# Patient Record
Sex: FEMALE | Race: WHITE | Employment: OTHER | URBAN - METROPOLITAN AREA
[De-identification: names, ages, dates, MRNs, and addresses within clinical notes are randomized per-mention and may not be internally consistent; named-entity substitution may affect disease eponyms.]

---

## 2017-02-01 RX ORDER — TRAMADOL HYDROCHLORIDE 50 MG/1
50 TABLET ORAL 2 TIMES DAILY
Qty: 60 TAB | Refills: 2 | Status: SHIPPED | OUTPATIENT
Start: 2017-02-01 | End: 2017-09-25 | Stop reason: SDUPTHER

## 2017-02-01 RX ORDER — FENTANYL 25 UG/1
1 PATCH TRANSDERMAL
Qty: 10 PATCH | Refills: 0 | Status: SHIPPED | OUTPATIENT
Start: 2017-02-01 | End: 2017-02-28 | Stop reason: SDUPTHER

## 2017-02-28 ENCOUNTER — OFFICE VISIT (OUTPATIENT)
Dept: INTERNAL MEDICINE CLINIC | Age: 82
End: 2017-02-28

## 2017-02-28 VITALS
SYSTOLIC BLOOD PRESSURE: 116 MMHG | TEMPERATURE: 98.8 F | HEART RATE: 74 BPM | DIASTOLIC BLOOD PRESSURE: 58 MMHG | OXYGEN SATURATION: 96 % | RESPIRATION RATE: 16 BRPM

## 2017-02-28 DIAGNOSIS — N30.00 ACUTE CYSTITIS WITHOUT HEMATURIA: Primary | ICD-10-CM

## 2017-02-28 LAB
BILIRUB UR QL STRIP: NEGATIVE
GLUCOSE UR-MCNC: NEGATIVE MG/DL
KETONES P FAST UR STRIP-MCNC: NEGATIVE MG/DL
PH UR STRIP: 5.5 [PH] (ref 4.6–8)
PROT UR QL STRIP: NEGATIVE MG/DL
SP GR UR STRIP: 1 (ref 1–1.03)
UA UROBILINOGEN AMB POC: NORMAL (ref 0.2–1)
URINALYSIS CLARITY POC: CLEAR
URINALYSIS COLOR POC: YELLOW
URINE BLOOD POC: NEGATIVE
URINE LEUKOCYTES POC: NORMAL
URINE NITRITES POC: NEGATIVE

## 2017-02-28 RX ORDER — GABAPENTIN 300 MG/1
600 CAPSULE ORAL 2 TIMES DAILY
Qty: 120 CAP | Refills: 5 | Status: SHIPPED | OUTPATIENT
Start: 2017-02-28 | End: 2017-09-11 | Stop reason: SDUPTHER

## 2017-02-28 RX ORDER — FENTANYL 25 UG/1
1 PATCH TRANSDERMAL
Qty: 10 PATCH | Refills: 0 | Status: SHIPPED | OUTPATIENT
Start: 2017-02-28 | End: 2017-04-05 | Stop reason: SDUPTHER

## 2017-02-28 RX ORDER — CIPROFLOXACIN 250 MG/1
250 TABLET, FILM COATED ORAL EVERY 12 HOURS
Qty: 6 TAB | Refills: 0 | Status: SHIPPED | OUTPATIENT
Start: 2017-02-28 | End: 2017-03-03

## 2017-02-28 NOTE — PROGRESS NOTES
HISTORY OF PRESENT ILLNESS  Amadeo Starr is a 80 y.o. female.   HPI    ROS    Physical Exam    ASSESSMENT and PLAN  {ASSESSMENT/PLAN:[id number]}

## 2017-02-28 NOTE — PROGRESS NOTES
Subjective:     Buddy Rosado is a 80 y.o. female who complains of dysuria, frequency, urgency, pelvic discomfort and urinary incontinence for 3 days. Patient denies flank pain, vomiting, fever, unusual vaginal discharge. Patient does not have a history of recurrent UTI. Patient does not have a history of pyelonephritis. 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 Current Outpatient Prescriptions   Medication Sig Dispense Refill    fentaNYL (DURAGESIC) 25 mcg/hr PATCH 1 Patch by TransDERmal route every seventy-two (72) hours. Max Daily Amount: 1 Patch. Indications: Chronic Pain with Opioid Tolerance 10 Patch 0    gabapentin (NEURONTIN) 300 mg capsule Take 2 Caps by mouth two (2) times a day. 120 Cap 5    traMADol (ULTRAM) 50 mg tablet Take 1 Tab by mouth two (2) times a day. Max Daily Amount: 100 mg. 60 Tab 2    topiramate (TOPAMAX) 25 mg tablet Take 1 Tab by mouth daily (with breakfast). 60 Tab 5    spironolactone (ALDACTONE) 25 mg tablet Take 1 Tab by mouth daily. 30 Tab 11    metoprolol (LOPRESSOR) 25 mg tablet Take 1 Tab by mouth two (2) times a day. 60 Tab 11    predniSONE (DELTASONE) 5 mg tablet Take 1 Tab by mouth daily. 30 Tab 11    nystatin (MYCOSTATIN) powder Apply  to affected area four (4) times daily. 60 g 1    XARELTO 15 mg tab tablet       cetirizine (ZYRTEC) 10 mg tablet Take  by mouth daily.  nitroglycerin (NITROSTAT) 0.4 mg SL tablet by SubLINGual route every five (5) minutes as needed for Chest Pain.  ondansetron hcl (ZOFRAN) 8 mg tablet Take 8 mg by mouth as needed for Nausea.  cholecalciferol (VITAMIN D3) 1,000 unit tablet Take  by mouth daily.  esomeprazole (NEXIUM) 40 mg capsule Take  by mouth daily. Review of Systems  Pertinent items are noted in HPI.     Objective:     Visit Vitals    /58 (BP 1 Location: Left arm, BP Patient Position: Sitting)    Pulse 74    Temp 98.8 °F (37.1 °C) (Oral)    Resp 16    SpO2 96% General:  alert, cooperative, no distress   Abdomen: soft, nontender, nondistended, no masses or organomegaly. Back:  CVA tenderness absent   :  defer exam     Laboratory:   Urine dipstick shows positive for leukocytes. Micro exam: not done. Assessment/Plan:     Acute cystitis     1. ciprofloxacin  2. Maintain adequate hydration  3. May use OTC pyridium as desired, which will turn urine orange/red color  4. Follow up if symptoms not improving, and prn. Orders Placed This Encounter    CULTURE, URINE    AMB POC URINALYSIS DIP STICK AUTO W/O MICRO    fentaNYL (DURAGESIC) 25 mcg/hr PATCH    gabapentin (NEURONTIN) 300 mg capsule    ciprofloxacin HCl (CIPRO) 250 mg tablet     Follow-up Disposition: Not on File.

## 2017-02-28 NOTE — PROGRESS NOTES
Results for orders placed or performed in visit on 02/28/17   AMB POC URINALYSIS DIP STICK AUTO W/O MICRO   Result Value Ref Range    Color (UA POC) Yellow     Clarity (UA POC) Clear     Glucose (UA POC) Negative Negative    Bilirubin (UA POC) Negative Negative    Ketones (UA POC) Negative Negative    Specific gravity (UA POC) 1.005 1.001 - 1.035    Blood (UA POC) Negative Negative    pH (UA POC) 5.5 4.6 - 8.0    Protein (UA POC) Negative Negative mg/dL    Urobilinogen (UA POC) 0.2 mg/dL 0.2 - 1    Nitrites (UA POC) Negative Negative    Leukocyte esterase (UA POC) 1+ Negative

## 2017-03-02 LAB — BACTERIA UR CULT: ABNORMAL

## 2017-04-06 RX ORDER — FENTANYL 25 UG/1
1 PATCH TRANSDERMAL
Qty: 10 PATCH | Refills: 0 | Status: SHIPPED | OUTPATIENT
Start: 2017-04-06 | End: 2017-05-10 | Stop reason: SDUPTHER

## 2017-04-13 RX ORDER — METOPROLOL TARTRATE 25 MG/1
25 TABLET, FILM COATED ORAL 2 TIMES DAILY
Qty: 60 TAB | Refills: 11 | Status: SHIPPED | OUTPATIENT
Start: 2017-04-13 | End: 2018-02-05 | Stop reason: DRUGHIGH

## 2017-04-17 RX ORDER — PREDNISONE 5 MG/1
5 TABLET ORAL DAILY
Qty: 30 TAB | Refills: 11 | Status: SHIPPED | OUTPATIENT
Start: 2017-04-17 | End: 2017-04-28 | Stop reason: SDUPTHER

## 2017-04-26 RX ORDER — SPIRONOLACTONE 25 MG/1
25 TABLET ORAL DAILY
Qty: 30 TAB | Refills: 11 | Status: SHIPPED | OUTPATIENT
Start: 2017-04-26 | End: 2018-04-25 | Stop reason: DRUGHIGH

## 2017-04-26 RX ORDER — TOPIRAMATE 25 MG/1
25 TABLET ORAL
Qty: 60 TAB | Refills: 5 | Status: SHIPPED | OUTPATIENT
Start: 2017-04-26 | End: 2018-05-02 | Stop reason: SDUPTHER

## 2017-04-28 RX ORDER — PREDNISONE 5 MG/1
5 TABLET ORAL DAILY
Qty: 30 TAB | Refills: 11 | Status: SHIPPED | OUTPATIENT
Start: 2017-04-28 | End: 2017-11-15 | Stop reason: SDUPTHER

## 2017-05-10 ENCOUNTER — OFFICE VISIT (OUTPATIENT)
Dept: INTERNAL MEDICINE CLINIC | Age: 82
End: 2017-05-10

## 2017-05-10 VITALS
WEIGHT: 134.4 LBS | TEMPERATURE: 99.2 F | BODY MASS INDEX: 26.39 KG/M2 | HEIGHT: 60 IN | DIASTOLIC BLOOD PRESSURE: 48 MMHG | SYSTOLIC BLOOD PRESSURE: 133 MMHG | OXYGEN SATURATION: 95 % | RESPIRATION RATE: 17 BRPM | HEART RATE: 72 BPM

## 2017-05-10 DIAGNOSIS — Z00.00 ROUTINE GENERAL MEDICAL EXAMINATION AT A HEALTH CARE FACILITY: ICD-10-CM

## 2017-05-10 DIAGNOSIS — M48.061 LUMBAR SPINAL STENOSIS: ICD-10-CM

## 2017-05-10 DIAGNOSIS — I50.9 CHF, STAGE C (HCC): ICD-10-CM

## 2017-05-10 DIAGNOSIS — Z78.9 ADVANCE DIRECTIVE ON FILE: ICD-10-CM

## 2017-05-10 DIAGNOSIS — I25.111 CORONARY ARTERY DISEASE INVOLVING NATIVE CORONARY ARTERY OF NATIVE HEART WITH ANGINA PECTORIS WITH DOCUMENTED SPASM (HCC): Primary | ICD-10-CM

## 2017-05-10 DIAGNOSIS — Z13.39 SCREENING FOR ALCOHOLISM: ICD-10-CM

## 2017-05-10 DIAGNOSIS — S46.912A LEFT SHOULDER STRAIN, INITIAL ENCOUNTER: ICD-10-CM

## 2017-05-10 RX ORDER — FENTANYL 25 UG/1
1 PATCH TRANSDERMAL
Qty: 10 PATCH | Refills: 0 | Status: SHIPPED | OUTPATIENT
Start: 2017-05-10 | End: 2017-05-31 | Stop reason: SDUPTHER

## 2017-05-10 NOTE — MR AVS SNAPSHOT
Visit Information Date & Time Provider Department Dept. Phone Encounter #  
 5/10/2017 12:40 PM [address], [place] [facility] [phone] [id number] Upcoming Health Maintenance Date Due DTaP/Tdap/Td series (1 - Tdap) 7/27/1946 OSTEOPOROSIS SCREENING (DEXA) 7/27/1990 Pneumococcal 65+ Low/Medium Risk (1 of 2 - PCV13) 7/27/1990 MEDICARE YEARLY EXAM 7/27/1990 INFLUENZA AGE 9 TO ADULT 8/1/2017 GLAUCOMA SCREENING Q2Y 8/9/2018 Allergies as of 5/10/2017  Review Complete On: 5/10/2017 By: [id number], [place]  
  
 Severity Noted Reaction Type Reactions Novacare High 05/18/2015    Other (comments) PARALYSIS; \"NOVACAINE\" not novacare. Phenergan [Promethazine] High 02/16/2015   Systemic Other (comments) \"loose my wits i go crazy\" Nsaids (Non-steroidal Anti-inflammatory Drug) Medium 02/16/2015   Systemic Nausea and Vomiting Adhesive Tape-silicones  67/01/4468    Other (comments) BLISTERS Atenolol  05/18/2015    Nausea and Vomiting Bactrim [Sulfamethoprim Ds]  05/18/2015    Nausea and Vomiting Cymbalta [Duloxetine]  05/18/2015    Other (comments) CONFUSION Digoxin  05/18/2015    Nausea and Vomiting Gluten  05/14/2015    Other (comments) GLUTEN INTOLERANCE Macrobid [Nitrofurantoin Monohyd/m-cryst]  05/18/2015    Nausea and Vomiting  
 Sulfa (Sulfonamide Antibiotics)  05/18/2015    Nausea and Vomiting Codeine Low 02/16/2015   Systemic Other (comments) Unable to swallow Current Immunizations  Reviewed on 1/28/2016 Name Date Influenza High Dose Vaccine PF 10/12/2016, 10/20/2015 11:52 AM  
 Zoster Vaccine, Live 3/22/2012 Not reviewed this visit You Were Diagnosed With   
  
 Codes Comments Coronary artery disease involving native coronary artery of native heart with angina pectoris with documented spasm ([facility] [address]    -  Primary ICD-10-CM: I25.111 ICD-9-CM: 414.01, 413.9 Routine general medical examination at a health care facility     ICD-10-CM: Z00.00 ICD-9-CM: V70.0 Screening for alcoholism     ICD-10-CM: Z13.89 ICD-9-CM: V79.1 CHF, stage C ([facility] [address]     ICD-10-CM: I50.9 ICD-9-CM: 428.0 Lumbar spinal stenosis     ICD-10-CM: M48.06 
ICD-9-CM: 724.02 Left shoulder strain, initial encounter     ICD-10-CM: S78.444D ICD-9-CM: 840.9 Vitals BP Pulse Temp Resp Height(growth percentile) Weight(growth percentile) 133/48 (BP 1 Location: Right arm, BP Patient Position: Sitting) 72 99.2 °F (37.3 °C) (Oral) 17 5' (1.524 m) 134 lb 6.4 oz (61 kg) SpO2 BMI OB Status Smoking Status 95% 26.25 kg/m2 Postmenopausal Never Smoker Vitals History BMI and BSA Data Body Mass Index Body Surface Area  
 26.25 kg/m 2 1.61 m 2 Preferred Pharmacy Pharmacy Name Phone [id number] [address] [phone] Your Updated Medication List  
  
   
This list is accurate as of: 5/10/17  1:34 PM.  Always use your most recent med list.  
  
  
  
  
 cetirizine 10 mg tablet Commonly known as:  ZYRTEC Take  by mouth daily. fentaNYL 25 mcg/hr PATCH Commonly known as:  DURAGESIC  
1 Patch by TransDERmal route every seventy-two (72) hours. Max Daily Amount: 1 Patch. Indications: Chronic Pain with Opioid Tolerance  
  
 gabapentin 300 mg capsule Commonly known as:  NEURONTIN Take 2 Caps by mouth two (2) times a day. metoprolol tartrate 25 mg tablet Commonly known as:  LOPRESSOR Take 1 Tab by mouth two (2) times a day. NexIUM 40 mg capsule Generic drug:  esomeprazole Take  by mouth daily. NITROSTAT 0.4 mg SL tablet Generic drug:  nitroglycerin  
by SubLINGual route every five (5) minutes as needed for Chest Pain. ondansetron hcl 8 mg tablet Commonly known as:  Lori Nation Take 8 mg by mouth as needed for Nausea. predniSONE 5 mg tablet Commonly known as:  Sadia Schilder Take 1 Tab by mouth daily. spironolactone 25 mg tablet Commonly known as:  ALDACTONE Take 1 Tab by mouth daily. topiramate 25 mg tablet Commonly known as:  TOPAMAX Take 1 Tab by mouth daily (with breakfast). traMADol 50 mg tablet Commonly known as:  ULTRAM  
Take 1 Tab by mouth two (2) times a day. Max Daily Amount: 100 mg. VITAMIN D3 1,000 unit tablet Generic drug:  cholecalciferol Take  by mouth daily. XARELTO 15 mg Tab tablet Generic drug:  rivaroxaban  
  
  
  
  
Prescriptions Printed Refills  
 fentaNYL (DURAGESIC) 25 mcg/hr PATCH 0 Si Patch by TransDERmal route every seventy-two (72) hours. Max Daily Amount: 1 Patch. Indications: Chronic Pain with Opioid Tolerance Class: Print Route: TransDERmal  
  
Patient Instructions Medicare Wellness Visit, Female The best way to live healthy is to have a healthy lifestyle by eating a well-balanced diet, exercising regularly, limiting alcohol and stopping smoking. Regular physical exams and screening tests are another way to keep healthy. Preventive exams provided by your health care provider can find health problems before they become diseases or illnesses. Preventive services including immunizations, screening tests, monitoring and exams can help you take care of your own health. All people over age 72 should have a pneumovax  and and a prevnar shot to prevent pneumonia. These are once in a lifetime unless you and your provider decide differently. All people over 65 should have a yearly flu shot and a tetanus vaccine every 10 years. A bone mass density to screen for osteoporosis or thinning of the bones should be done every 2 years after 65. Screening for diabetes mellitus with a blood sugar test should be done every year.  
 
Glaucoma is a disease of the eye due to increased ocular pressure that can lead to blindness and it should be done every year by an eye professional. 
 
Cardiovascular screening tests that check for elevated lipids (fatty part of blood) which can lead to heart disease and strokes should be done every 5 years. Colorectal screening that evaluates for blood or polyps in your colon should be done yearly as a stool test or every five years as a flexible sigmoidoscope or every 10 years as a colonoscopy up to age 76. Breast cancer screening with a mammogram is recommended biennially  for women age 54-69. Screening for cervical cancer with a pap smear and pelvic exam is recommended for women after age 72 years every 2 years up to age 79 or when the provider and patient decide to stop. If there is a history of cervical abnormalities or other increased risk for cancer then the test is recommended yearly. Hepatitis C screening is also recommended for anyone born between [birth year] through [address]. A shingles vaccine is also recommended once in a lifetime after age 61. Your Medicare Wellness Exam is recommended annually. Here is a list of your current Health Maintenance items with a due date: 
Health Maintenance Due Topic Date Due  
 DTaP/Tdap/Td  (1 - Tdap) Pt declined  Bone Density Screening  Will obtain copy  Pneumococcal Vaccine (1 of 2 - PCV13) Completed. Georganna Duverney Annual Well Visit  05/10/2018 Rotator Cuff: Exercises Your Care Instructions Here are some examples of typical rehabilitation exercises for your condition. Start each exercise slowly. Ease off the exercise if you start to have pain. Your doctor or physical therapist will tell you when you can start these exercises and which ones will work best for you. How to do the exercises Pendulum swing Note: If you have pain in your back, do not do this exercise. 1. Hold on to a table or the back of a chair with your good arm. Then bend forward a little and let your sore arm hang straight down.  This exercise does not use the arm muscles. Rather, use your legs and your hips to create movement that makes your arm swing freely. 2. Use the movement from your hips and legs to guide the slightly swinging arm back and forth like a pendulum (or elephant trunk). Then guide it in circles that start small (about the size of a dinner plate). Make the circles a bit larger each day, as your pain allows. 3. Do this exercise for 5 minutes, 5 to 7 times each day. 4. As you have less pain, try bending over a little farther to do this exercise. This will increase the amount of movement at your shoulder. Posterior stretching exercise 1. Hold the elbow of your injured arm with your other hand. 2. Use your hand to pull your injured arm gently up and across your body. You will feel a gentle stretch across the back of your injured shoulder. 3. Hold for at least 15 to 30 seconds. Then slowly lower your arm. 4. Repeat 2 to 4 times. Up-the-back stretch Note: Your doctor or physical therapist may want you to wait to do this stretch until you have regained most of your range of motion and strength. You can do this stretch in different ways. Hold any of these stretches for at least 15 to 30 seconds. Repeat them 2 to 4 times. 1. Put your hand in your back pocket. Let it rest there to stretch your shoulder. 2. With your other hand, hold your injured arm (palm outward) behind your back by the wrist. Pull your arm up gently to stretch your shoulder. 3. Next, put a towel over your other shoulder. Put the hand of your injured arm behind your back. Now hold the back end of the towel. With the other hand, hold the front end of the towel in front of your body. Pull gently on the front end of the towel. This will bring your hand farther up your back to stretch your shoulder. Overhead stretch 1. Standing about an arm's length away, grasp onto a solid surface. You could use a countertop, a doorknob, or the back of a sturdy chair. 2. With your knees slightly bent, bend forward with your arms straight. Lower your upper body, and let your shoulders stretch. 3. As your shoulders are able to stretch farther, you may need to take a step or two backward. 4. Hold for at least 15 to 30 seconds. Then stand up and relax. If you had stepped back during your stretch, step forward so you can keep your hands on the solid surface. 5. Repeat 2 to 4 times. Shoulder flexion (lying down) Note: To make a wand for this exercise, use a piece of PVC pipe or a broom handle with the broom removed. Make the wand about a foot wider than your shoulders. 1. Lie on your back, holding a wand with both hands. Your palms should face down as you hold the wand. 2. Keeping your elbows straight, slowly raise your arms over your head. Raise them until you feel a stretch in your shoulders, upper back, and chest. 
3. Hold for 15 to 30 seconds. 4. Repeat 2 to 4 times. Shoulder rotation (lying down) Note: To make a wand for this exercise, use a piece of PVC pipe or a broom handle with the broom removed. Make the wand about a foot wider than your shoulders. 1. Lie on your back. Hold a wand with both hands with your elbows bent and palms up. 2. Keep your elbows close to your body, and move the wand across your body toward the sore arm. 3. Hold for 8 to 12 seconds. 4. Repeat 2 to 4 times. Wall climbing (to the side) Note: Avoid any movement that is straight to your side, and be careful not to arch your back. Your arm should stay about 30 degrees to the front of your side. 1. Stand with your side to a wall so that your fingers can just touch it at an angle about 30 degrees toward the front of your body. 2. Walk the fingers of your injured arm up the wall as high as pain permits. Try not to shrug your shoulder up toward your ear as you move your arm up. 3. Hold that position for a count of at least 15 to 20. 4. Walk your fingers back down to the starting position. 5. Repeat at least 2 to 4 times. Try to reach higher each time. Wall climbing (to the front) Note: During this stretching exercise, be careful not to arch your back. 1. Face a wall, and stand so your fingers can just touch it. 2. Keeping your shoulder down, walk the fingers of your injured arm up the wall as high as pain permits. (Don't shrug your shoulder up toward your ear.) 3. Hold your arm in that position for at least 15 to 30 seconds. 4. Slowly walk your fingers back down to where you started. 5. Repeat at least 2 to 4 times. Try to reach higher each time. Shoulder blade squeeze 1. Stand with your arms at your sides, and squeeze your shoulder blades together. Do not raise your shoulders up as you squeeze. 2. Hold 6 seconds. 3. Repeat 8 to 12 times. Scapular exercise: Arm reach 1. Lie flat on your back. This exercise is a very slight motion that starts with your arms raised (elbows straight, arms straight). 2. From this position, reach higher toward the mary jo or ceiling. Keep your elbows straight. All motion should be from your shoulder blade only. 3. Relax your arms back to where you started. 4. Repeat 8 to 12 times. Arm raise to the side Note: During this strengthening exercise, your arm should stay about 30 degrees to the front of your side. 1. Slowly raise your injured arm to the side, with your thumb facing up. Raise your arm 60 degrees at the most (shoulder level is 90 degrees). 2. Hold the position for 3 to 5 seconds. Then lower your arm back to your side. If you need to, bring your \"good\" arm across your body and place it under the elbow as you lower your injured arm. Use your good arm to keep your injured arm from dropping down too fast. 
3. Repeat 8 to 12 times. 4. When you first start out, don't hold any extra weight in your hand. As you get stronger, you may use a 1-pound to 2-pound dumbbell or a small can of food. Shoulder flexor and extensor exercise Note: These are isometric exercises. That means you contract your muscles without actually moving. · Push forward (flex): Stand facing a wall or doorjamb, about 6 inches or less back. Hold your injured arm against your body. Make a closed fist with your thumb on top. Then gently push your hand forward into the wall with about 25% to 50% of your strength. Don't let your body move backward as you push. Hold for about 6 seconds. Relax for a few seconds. Repeat 8 to 12 times. · Push backward (extend): Stand with your back flat against a wall. Your upper arm should be against the wall, with your elbow bent 90 degrees (your hand straight ahead). Push your elbow gently back against the wall with about 25% to 50% of your strength. Don't let your body move forward as you push. Hold for about 6 seconds. Relax for a few seconds. Repeat 8 to 12 times. Scapular exercise: Wall push-ups Note: This exercise is best done with your fingers somewhat turned out, rather than straight up and down. 1. Stand facing a wall, about 12 inches to 18 inches away. 2. Place your hands on the wall at shoulder height. 3. Slowly bend your elbows and bring your face to the wall. Keep your back and hips straight. 4. Push back to where you started. 5. Repeat 8 to 12 times. 6. When you can do this exercise against a wall comfortably, you can try it against a counter. You can then slowly progress to the end of a couch, then to a sturdy chair, and finally to the floor. Scapular exercise: Retraction Note: For this exercise, you will need elastic exercise material, such as surgical tubing or Thera-Band. 1. Put the band around a solid object at about waist level. (A bedpost will work well.) Each hand should hold an end of the band. 2. With your elbows at your sides and bent to 90 degrees, pull the band back. Your shoulder blades should move toward each other.  Then move your arms back where you started. 3. Repeat 8 to 12 times. 4. If you have good range of motion in your shoulders, try this exercise with your arms lifted out to the sides. Keep your elbows at a 90-degree angle. Raise the elastic band up to about shoulder level. Pull the band back to move your shoulder blades toward each other. Then move your arms back where you started. Internal rotator strengthening exercise 1. Start by tying a piece of elastic exercise material to a doorknob. You can use surgical tubing or Thera-Band. 2. Stand or sit with your shoulder relaxed and your elbow bent 90 degrees. Your upper arm should rest comfortably against your side. Squeeze a rolled towel between your elbow and your body for comfort. This will help keep your arm at your side. 3. Hold one end of the elastic band in the hand of the painful arm. 4. Slowly rotate your forearm toward your body until it touches your belly. Slowly move it back to where you started. 5. Keep your elbow and upper arm firmly tucked against the towel roll or at your side. 6. Repeat 8 to 12 times. External rotator strengthening exercise 1. Start by tying a piece of elastic exercise material to a doorknob. You can use surgical tubing or Thera-Band. (You may also hold one end of the band in each hand.) 2. Stand or sit with your shoulder relaxed and your elbow bent 90 degrees. Your upper arm should rest comfortably against your side. Squeeze a rolled towel between your elbow and your body for comfort. This will help keep your arm at your side. 3. Hold one end of the elastic band with the hand of the painful arm. 4. Start with your forearm across your belly. Slowly rotate the forearm out away from your body. Keep your elbow and upper arm tucked against the towel roll or the side of your body until you begin to feel tightness in your shoulder. Slowly move your arm back to where you started. 5. Repeat 8 to 12 times. Follow-up care is a key part of your treatment and safety. Be sure to make and go to all appointments, and call your doctor if you are having problems. It's also a good idea to know your test results and keep a list of the medicines you take. Where can you learn more? Go to http://anna-sampson.info/. Enter Елена Reeves in the search box to learn more about \"Rotator Cuff: Exercises. \" Current as of: May 23, 2016 Content Version: 11.2 © 8321-5538 [employer]. Care instructions adapted under license by [employer] (which disclaims liability or warranty for this information). If you have questions about a medical condition or this instruction, always ask your healthcare professional. [address] any warranty or liability for your use of this information. Introducing [facility]! Dear Nick Cardenas: 
Thank you for requesting a [employer] account. Our records indicate that you already have an active [employer] account. You can access your account anytime at [email or web address] Did you know that you can access your hospital and ER discharge instructions at any time in [employer]? You can also review all of your test results from your hospital stay or ER visit. Additional Information If you have questions, please visit the Frequently Asked Questions section of the [employer] website at [email or web address] Remember, [employer]entigen is NOT to be used for urgent needs. For medical emergencies, dial 911. Now available from your iPhone and Android! Please provide this summary of care documentation to your next provider. Your primary care clinician is listed as CORNELIA NICK. If you have any questions after today's visit, please call [phone].

## 2017-05-10 NOTE — PROGRESS NOTES
HISTORY OF PRESENT ILLNESS  Alfreda Villa is a 80 y.o. female. HPI  2 weeks of increased left shoulder pain. starte when reached behind her to adjust a pillow. Pain anterior and into upper arm. Occ tramadol which does not help. Heating pad helps some. Increased pain with movement. Left great toe with ingrown toenail. ? Blood blister. Has an appointment with Dr. Leslie Franco. CAD, CHF and paf - denies recent chest pain or tightness. No recent NTG use. Baseline sob/chawla. occ fits of palpitations. Can have a dizzy spell lasting < 30 seconds. Due for appoitnmetn with Dr. Lashonda Parks. PMR - continues on low dose prednisone. Taking tramadol approx 3 times a week. Current Outpatient Prescriptions:     predniSONE (DELTASONE) 5 mg tablet, Take 1 Tab by mouth daily. , Disp: 30 Tab, Rfl: 11    topiramate (TOPAMAX) 25 mg tablet, Take 1 Tab by mouth daily (with breakfast). , Disp: 60 Tab, Rfl: 5    spironolactone (ALDACTONE) 25 mg tablet, Take 1 Tab by mouth daily. , Disp: 30 Tab, Rfl: 11    metoprolol tartrate (LOPRESSOR) 25 mg tablet, Take 1 Tab by mouth two (2) times a day., Disp: 60 Tab, Rfl: 11    fentaNYL (DURAGESIC) 25 mcg/hr PATCH, 1 Patch by TransDERmal route every seventy-two (72) hours. Max Daily Amount: 1 Patch. Indications: Chronic Pain with Opioid Tolerance, Disp: 10 Patch, Rfl: 0    gabapentin (NEURONTIN) 300 mg capsule, Take 2 Caps by mouth two (2) times a day., Disp: 120 Cap, Rfl: 5    traMADol (ULTRAM) 50 mg tablet, Take 1 Tab by mouth two (2) times a day. Max Daily Amount: 100 mg., Disp: 60 Tab, Rfl: 2    XARELTO 15 mg tab tablet, , Disp: , Rfl:     cetirizine (ZYRTEC) 10 mg tablet, Take  by mouth daily. , Disp: , Rfl:     nitroglycerin (NITROSTAT) 0.4 mg SL tablet, by SubLINGual route every five (5) minutes as needed for Chest Pain., Disp: , Rfl:     ondansetron hcl (ZOFRAN) 8 mg tablet, Take 8 mg by mouth as needed for Nausea., Disp: , Rfl:     cholecalciferol (VITAMIN D3) 1,000 unit tablet, Take  by mouth daily. , Disp: , Rfl:     esomeprazole (NEXIUM) 40 mg capsule, Take  by mouth daily. , Disp: , Rfl:     Visit Vitals    /48 (BP 1 Location: Right arm, BP Patient Position: Sitting)    Pulse 72    Temp 99.2 °F (37.3 °C) (Oral)    Resp 17    Ht 5' (1.524 m)    Wt 134 lb 6.4 oz (61 kg)    SpO2 95%    BMI 26.25 kg/m2       ROS  See above  Physical Exam   Constitutional: She appears well-developed and well-nourished. HENT:   Head: Normocephalic and atraumatic. Neck: Neck supple. Cardiovascular: Normal rate, regular rhythm and normal heart sounds. Exam reveals no gallop and no friction rub. No murmur heard. Pulmonary/Chest: Effort normal and breath sounds normal.   Abdominal: Soft. Bowel sounds are normal. She exhibits no distension and no mass. There is no tenderness. Musculoskeletal: Edema: trace bilat. Left shoulder - anterior tenderness with mildly limited active and passive rom.  + impingement sign. Lymphadenopathy:     She has no cervical adenopathy. Vitals reviewed. ASSESSMENT and PLAN  CAD, CHF - controlled. Baseline le edema. Controlled by elevating feet during day and at night  PAF - mild intermittent symptoms. In NSR by auscultation today. Continue same  Lumbar spinal stenosis - controlled with fentanyl low dose. Continue same  Left shoulder strain - ice, gentle stretching. Orders Placed This Encounter    fentaNYL ([address]) 25 mcg/hr PATCH     Follow-up Disposition: Not on File       This is an Initial Medicare Annual Wellness Exam (AWV) (Performed 12 months after IPPE or effective date of Medicare Part B enrollment, Once in a lifetime)    I have reviewed the patient's medical history in detail and updated the computerized patient record.      History     Past Medical History:   Diagnosis Date    Arrhythmia     A-FIB    Arthritis     CAD (coronary artery disease) 1989    MI    GERD (gastroesophageal reflux disease)     Gluten intolerance     Heart failure (HCC)     CHF    Polymyalgia [facility])       Past Surgical History:   Procedure Laterality Date   [facility] CARDIAC SURG PROCEDURE UNLIST  1980'S    CARDIAC CATH    HX BACK SURGERY  1998, 2006, 2013    LUMBAR SPINE    HX CHOLECYSTECTOMY      Neyd[facility]raeveline HIP REPLACEMENT Right 5/2015    [facility]    HX TONSILLECTOMY  CHILD    HX UROLOGICAL      BLADDER SUSPENSION - MESH     Current Outpatient Prescriptions   Medication Sig Dispense Refill    predniSONE (DELTASONE) 5 mg tablet Take 1 Tab by mouth daily. 30 Tab 11    topiramate (TOPAMAX) 25 mg tablet Take 1 Tab by mouth daily (with breakfast). 60 Tab 5    spironolactone (ALDACTONE) 25 mg tablet Take 1 Tab by mouth daily. 30 Tab 11    metoprolol tartrate (LOPRESSOR) 25 mg tablet Take 1 Tab by mouth two (2) times a day. 60 Tab 11    fentaNYL (DURAGESIC) 25 mcg/hr PATCH 1 Patch by TransDERmal route every seventy-two (72) hours. Max Daily Amount: 1 Patch. Indications: Chronic Pain with Opioid Tolerance 10 Patch 0    gabapentin (NEURONTIN) 300 mg capsule Take 2 Caps by mouth two (2) times a day. 120 Cap 5    traMADol (ULTRAM) 50 mg tablet Take 1 Tab by mouth two (2) times a day. Max Daily Amount: 100 mg. 60 Tab 2    XARELTO 15 mg tab tablet       cetirizine (ZYRTEC) 10 mg tablet Take  by mouth daily.  nitroglycerin (NITROSTAT) 0.4 mg SL tablet by SubLINGual route every five (5) minutes as needed for Chest Pain.  ondansetron hcl (ZOFRAN) 8 mg tablet Take 8 mg by mouth as needed for Nausea.  cholecalciferol (VITAMIN D3) 1,000 unit tablet Take  by mouth daily.  esomeprazole (NEXIUM) 40 mg capsule Take  by mouth daily. Allergies   Allergen Reactions    Novacare Other (comments)     PARALYSIS; \"NOVACAINE\" not novacare.     Phenergan [Promethazine] Other (comments)     \"loose my wits i go crazy\"    Nsaids (Non-Steroidal Anti-Inflammatory Drug) Nausea and Vomiting    Adhesive Tape-Silicones Other (comments)     BLISTERS    Atenolol Nausea and Vomiting    Bactrim [Sulfamethoprim Ds] Nausea and Vomiting    Cymbalta [Duloxetine] Other (comments)     CONFUSION      Digoxin Nausea and Vomiting    Gluten Other (comments)     GLUTEN INTOLERANCE    Macrobid [Nitrofurantoin Monohyd/M-Cryst] Nausea and Vomiting    Sulfa (Sulfonamide Antibiotics) Nausea and Vomiting    Codeine Other (comments)     Unable to swallow     Family History   Problem Relation Age of Onset    Cancer Father     Stroke Father     Anesth Problems Neg Hx      Social History   Substance Use Topics    Smoking status: Never Smoker    Smokeless tobacco: Never Used    Alcohol use No     Patient Active Problem List   Diagnosis Code    Osteoarthritis M19.90    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 ([facility]) I25.111    CHF, stage C ([facility]) I50.9    Lumbar spinal stenosis M48.06    Paroxysmal atrial fibrillation ([facility]) I48.0         Depression Risk Factor Screening:   No flowsheet data found. Alcohol Risk Factor Screening: On any occasion during the past 3 months, have you had more than 3 drinks containing alcohol? No    Do you average more than 7 drinks per week? No    Functional Ability and Level of Safety:     Hearing Loss   none    Activities of Daily Living   Partial assistance. Requires assistance with: ambulation and bathing and hygiene  Limited walking around house with walker. Lives with son and daughter-in-law    Fall Risk     Fall Risk Assessment, last 12 mths 10/20/2015   Able to walk? Yes   Fall in past 12 months? No     Abuse Screen   Patient is not abused    Review of Systems   Pertinent items are noted in HPI. Physical Examination     No exam data present    Evaluation of Cognitive Function:  Mood/affect:  happy  Appearance: age appropriate  Family member/caregiver input: son is present.       See above    Patient Care Team:  Beti Khan MD as PCP - General (Internal Medicine)    Advice/Referrals/Counseling   Education and counseling provided:  advanced care plan on file      Assessment/Plan    HM -   declines tdap  We will locate past DXA results. Advanced care plan on file  Orders Placed This Encounter    fentaNYL ([place]) 25 mcg/hr PATCH     Follow-up Disposition: Not on File.

## 2017-05-10 NOTE — PATIENT INSTRUCTIONS
Medicare Wellness Visit, Female    The best way to live healthy is to have a healthy lifestyle by eating a well-balanced diet, exercising regularly, limiting alcohol and stopping smoking. Regular physical exams and screening tests are another way to keep healthy. Preventive exams provided by your health care provider can find health problems before they become diseases or illnesses. Preventive services including immunizations, screening tests, monitoring and exams can help you take care of your own health. All people over age 72 should have a pneumovax  and and a prevnar shot to prevent pneumonia. These are once in a lifetime unless you and your provider decide differently. All people over 65 should have a yearly flu shot and a tetanus vaccine every 10 years. A bone mass density to screen for osteoporosis or thinning of the bones should be done every 2 years after 65. Screening for diabetes mellitus with a blood sugar test should be done every year. Glaucoma is a disease of the eye due to increased ocular pressure that can lead to blindness and it should be done every year by an eye professional.    Cardiovascular screening tests that check for elevated lipids (fatty part of blood) which can lead to heart disease and strokes should be done every 5 years. Colorectal screening that evaluates for blood or polyps in your colon should be done yearly as a stool test or every five years as a flexible sigmoidoscope or every 10 years as a colonoscopy up to age 76. Breast cancer screening with a mammogram is recommended biennially  for women age 54-69. Screening for cervical cancer with a pap smear and pelvic exam is recommended for women after age 72 years every 2 years up to age 79 or when the provider and patient decide to stop. If there is a history of cervical abnormalities or other increased risk for cancer then the test is recommended yearly.     Hepatitis C screening is also recommended for anyone born between [birth year] through [address]. A shingles vaccine is also recommended once in a lifetime after age 61. Your Medicare Wellness Exam is recommended annually. Here is a list of your current Health Maintenance items with a due date:  Health Maintenance Due   Topic Date Due    DTaP/Tdap/Td  (1 - Tdap) Pt declined    Bone Density Screening  Will obtain copy    Pneumococcal Vaccine (1 of 2 - PCV13) Completed.  Annual Well Visit  05/10/2018          Rotator Cuff: Exercises  Your Care Instructions  Here are some examples of typical rehabilitation exercises for your condition. Start each exercise slowly. Ease off the exercise if you start to have pain. Your doctor or physical therapist will tell you when you can start these exercises and which ones will work best for you. How to do the exercises  Pendulum swing    Note: If you have pain in your back, do not do this exercise. 1. Hold on to a table or the back of a chair with your good arm. Then bend forward a little and let your sore arm hang straight down. This exercise does not use the arm muscles. Rather, use your legs and your hips to create movement that makes your arm swing freely. 2. Use the movement from your hips and legs to guide the slightly swinging arm back and forth like a pendulum (or elephant trunk). Then guide it in circles that start small (about the size of a dinner plate). Make the circles a bit larger each day, as your pain allows. 3. Do this exercise for 5 minutes, 5 to 7 times each day. 4. As you have less pain, try bending over a little farther to do this exercise. This will increase the amount of movement at your shoulder. Posterior stretching exercise    1. Hold the elbow of your injured arm with your other hand. 2. Use your hand to pull your injured arm gently up and across your body. You will feel a gentle stretch across the back of your injured shoulder. 3. Hold for at least 15 to 30 seconds.  Then slowly lower your arm.  4. Repeat 2 to 4 times. Up-the-back stretch    Note: Your doctor or physical therapist may want you to wait to do this stretch until you have regained most of your range of motion and strength. You can do this stretch in different ways. Hold any of these stretches for at least 15 to 30 seconds. Repeat them 2 to 4 times. 1. Put your hand in your back pocket. Let it rest there to stretch your shoulder. 2. With your other hand, hold your injured arm (palm outward) behind your back by the wrist. Pull your arm up gently to stretch your shoulder. 3. Next, put a towel over your other shoulder. Put the hand of your injured arm behind your back. Now hold the back end of the towel. With the other hand, hold the front end of the towel in front of your body. Pull gently on the front end of the towel. This will bring your hand farther up your back to stretch your shoulder. Overhead stretch    1. Standing about an arm's length away, grasp onto a solid surface. You could use a countertop, a doorknob, or the back of a sturdy chair. 2. With your knees slightly bent, bend forward with your arms straight. Lower your upper body, and let your shoulders stretch. 3. As your shoulders are able to stretch farther, you may need to take a step or two backward. 4. Hold for at least 15 to 30 seconds. Then stand up and relax. If you had stepped back during your stretch, step forward so you can keep your hands on the solid surface. 5. Repeat 2 to 4 times. Shoulder flexion (lying down)    Note: To make a wand for this exercise, use a piece of PVC pipe or a broom handle with the broom removed. Make the wand about a foot wider than your shoulders. 1. Lie on your back, holding a wand with both hands. Your palms should face down as you hold the wand. 2. Keeping your elbows straight, slowly raise your arms over your head.  Raise them until you feel a stretch in your shoulders, upper back, and chest.  3. Hold for 15 to 30 seconds. 4. Repeat 2 to 4 times. Shoulder rotation (lying down)    Note: To make a wand for this exercise, use a piece of PVC pipe or a broom handle with the broom removed. Make the wand about a foot wider than your shoulders. 1. Lie on your back. Hold a wand with both hands with your elbows bent and palms up. 2. Keep your elbows close to your body, and move the wand across your body toward the sore arm. 3. Hold for 8 to 12 seconds. 4. Repeat 2 to 4 times. Wall climbing (to the side)    Note: Avoid any movement that is straight to your side, and be careful not to arch your back. Your arm should stay about 30 degrees to the front of your side. 1. Stand with your side to a wall so that your fingers can just touch it at an angle about 30 degrees toward the front of your body. 2. Walk the fingers of your injured arm up the wall as high as pain permits. Try not to shrug your shoulder up toward your ear as you move your arm up. 3. Hold that position for a count of at least 15 to 20.  4. Walk your fingers back down to the starting position. 5. Repeat at least 2 to 4 times. Try to reach higher each time. Wall climbing (to the front)    Note: During this stretching exercise, be careful not to arch your back. 1. Face a wall, and stand so your fingers can just touch it. 2. Keeping your shoulder down, walk the fingers of your injured arm up the wall as high as pain permits. (Don't shrug your shoulder up toward your ear.)  3. Hold your arm in that position for at least 15 to 30 seconds. 4. Slowly walk your fingers back down to where you started. 5. Repeat at least 2 to 4 times. Try to reach higher each time. Shoulder blade squeeze    1. Stand with your arms at your sides, and squeeze your shoulder blades together. Do not raise your shoulders up as you squeeze. 2. Hold 6 seconds. 3. Repeat 8 to 12 times. Scapular exercise: Arm reach    1. Lie flat on your back.  This exercise is a very slight motion that starts with your arms raised (elbows straight, arms straight). 2. From this position, reach higher toward the mary jo or ceiling. Keep your elbows straight. All motion should be from your shoulder blade only. 3. Relax your arms back to where you started. 4. Repeat 8 to 12 times. Arm raise to the side    Note: During this strengthening exercise, your arm should stay about 30 degrees to the front of your side. 1. Slowly raise your injured arm to the side, with your thumb facing up. Raise your arm 60 degrees at the most (shoulder level is 90 degrees). 2. Hold the position for 3 to 5 seconds. Then lower your arm back to your side. If you need to, bring your \"good\" arm across your body and place it under the elbow as you lower your injured arm. Use your good arm to keep your injured arm from dropping down too fast.  3. Repeat 8 to 12 times. 4. When you first start out, don't hold any extra weight in your hand. As you get stronger, you may use a 1-pound to 2-pound dumbbell or a small can of food. Shoulder flexor and extensor exercise    Note: These are isometric exercises. That means you contract your muscles without actually moving. · Push forward (flex): Stand facing a wall or doorjamb, about 6 inches or less back. Hold your injured arm against your body. Make a closed fist with your thumb on top. Then gently push your hand forward into the wall with about 25% to 50% of your strength. Don't let your body move backward as you push. Hold for about 6 seconds. Relax for a few seconds. Repeat 8 to 12 times. · Push backward (extend): Stand with your back flat against a wall. Your upper arm should be against the wall, with your elbow bent 90 degrees (your hand straight ahead). Push your elbow gently back against the wall with about 25% to 50% of your strength. Don't let your body move forward as you push. Hold for about 6 seconds. Relax for a few seconds. Repeat 8 to 12 times.   Scapular exercise: Wall push-ups    Note: This exercise is best done with your fingers somewhat turned out, rather than straight up and down. 1. Stand facing a wall, about 12 inches to 18 inches away. 2. Place your hands on the wall at shoulder height. 3. Slowly bend your elbows and bring your face to the wall. Keep your back and hips straight. 4. Push back to where you started. 5. Repeat 8 to 12 times. 6. When you can do this exercise against a wall comfortably, you can try it against a counter. You can then slowly progress to the end of a couch, then to a sturdy chair, and finally to the floor. Scapular exercise: Retraction    Note: For this exercise, you will need elastic exercise material, such as surgical tubing or Thera-Band. 1. Put the band around a solid object at about waist level. (A bedpost will work well.) Each hand should hold an end of the band. 2. With your elbows at your sides and bent to 90 degrees, pull the band back. Your shoulder blades should move toward each other. Then move your arms back where you started. 3. Repeat 8 to 12 times. 4. If you have good range of motion in your shoulders, try this exercise with your arms lifted out to the sides. Keep your elbows at a 90-degree angle. Raise the elastic band up to about shoulder level. Pull the band back to move your shoulder blades toward each other. Then move your arms back where you started. Internal rotator strengthening exercise    1. Start by tying a piece of elastic exercise material to a doorknob. You can use surgical tubing or Thera-Band. 2. Stand or sit with your shoulder relaxed and your elbow bent 90 degrees. Your upper arm should rest comfortably against your side. Squeeze a rolled towel between your elbow and your body for comfort. This will help keep your arm at your side. 3. Hold one end of the elastic band in the hand of the painful arm. 4. Slowly rotate your forearm toward your body until it touches your belly. Slowly move it back to where you started.   5. Keep your elbow and upper arm firmly tucked against the towel roll or at your side. 6. Repeat 8 to 12 times. External rotator strengthening exercise    1. Start by tying a piece of elastic exercise material to a doorknob. You can use surgical tubing or Thera-Band. (You may also hold one end of the band in each hand.)  2. Stand or sit with your shoulder relaxed and your elbow bent 90 degrees. Your upper arm should rest comfortably against your side. Squeeze a rolled towel between your elbow and your body for comfort. This will help keep your arm at your side. 3. Hold one end of the elastic band with the hand of the painful arm. 4. Start with your forearm across your belly. Slowly rotate the forearm out away from your body. Keep your elbow and upper arm tucked against the towel roll or the side of your body until you begin to feel tightness in your shoulder. Slowly move your arm back to where you started. 5. Repeat 8 to 12 times. Follow-up care is a key part of your treatment and safety. Be sure to make and go to all appointments, and call your doctor if you are having problems. It's also a good idea to know your test results and keep a list of the medicines you take. Where can you learn more? Go to http://anna-sampson.info/. Enter [employer] in the search box to learn more about \"Rotator Cuff: Exercises. \"  Current as of: May 23, 2016  Content Version: 11.2  © 1549-4872 [employer]. Care instructions adapted under license by [employer] (which disclaims liability or warranty for this information). If you have questions about a medical condition or this instruction, always ask your healthcare professional. [place] [address] any warranty or liability for your use of this information.

## 2017-05-31 ENCOUNTER — OFFICE VISIT (OUTPATIENT)
Dept: INTERNAL MEDICINE CLINIC | Age: 82
End: 2017-05-31

## 2017-05-31 VITALS
SYSTOLIC BLOOD PRESSURE: 127 MMHG | DIASTOLIC BLOOD PRESSURE: 54 MMHG | HEART RATE: 78 BPM | WEIGHT: 134 LBS | TEMPERATURE: 98.6 F | BODY MASS INDEX: 26.31 KG/M2 | HEIGHT: 60 IN | OXYGEN SATURATION: 95 % | RESPIRATION RATE: 16 BRPM

## 2017-05-31 DIAGNOSIS — L89.311 DECUBITUS ULCER OF RIGHT BUTTOCK, STAGE 1: Primary | ICD-10-CM

## 2017-05-31 RX ORDER — FENTANYL 25 UG/1
1 PATCH TRANSDERMAL
Qty: 10 PATCH | Refills: 0 | Status: SHIPPED | OUTPATIENT
Start: 2017-05-31 | End: 2017-07-21 | Stop reason: SDUPTHER

## 2017-05-31 NOTE — PATIENT INSTRUCTIONS
Rotator Cuff: Exercises  Your Care Instructions  Here are some examples of typical rehabilitation exercises for your condition. Start each exercise slowly. Ease off the exercise if you start to have pain. Your doctor or physical therapist will tell you when you can start these exercises and which ones will work best for you. How to do the exercises  Pendulum swing    Note: If you have pain in your back, do not do this exercise. 1. Hold on to a table or the back of a chair with your good arm. Then bend forward a little and let your sore arm hang straight down. This exercise does not use the arm muscles. Rather, use your legs and your hips to create movement that makes your arm swing freely. 2. Use the movement from your hips and legs to guide the slightly swinging arm back and forth like a pendulum (or elephant trunk). Then guide it in circles that start small (about the size of a dinner plate). Make the circles a bit larger each day, as your pain allows. 3. Do this exercise for 5 minutes, 5 to 7 times each day. 4. As you have less pain, try bending over a little farther to do this exercise. This will increase the amount of movement at your shoulder. Posterior stretching exercise    1. Hold the elbow of your injured arm with your other hand. 2. Use your hand to pull your injured arm gently up and across your body. You will feel a gentle stretch across the back of your injured shoulder. 3. Hold for at least 15 to 30 seconds. Then slowly lower your arm. 4. Repeat 2 to 4 times. Up-the-back stretch    Note: Your doctor or physical therapist may want you to wait to do this stretch until you have regained most of your range of motion and strength. You can do this stretch in different ways. Hold any of these stretches for at least 15 to 30 seconds. Repeat them 2 to 4 times. 1. Put your hand in your back pocket. Let it rest there to stretch your shoulder.   2. With your other hand, hold your injured arm (palm outward) behind your back by the wrist. Pull your arm up gently to stretch your shoulder. 3. Next, put a towel over your other shoulder. Put the hand of your injured arm behind your back. Now hold the back end of the towel. With the other hand, hold the front end of the towel in front of your body. Pull gently on the front end of the towel. This will bring your hand farther up your back to stretch your shoulder. Overhead stretch    1. Standing about an arm's length away, grasp onto a solid surface. You could use a countertop, a doorknob, or the back of a sturdy chair. 2. With your knees slightly bent, bend forward with your arms straight. Lower your upper body, and let your shoulders stretch. 3. As your shoulders are able to stretch farther, you may need to take a step or two backward. 4. Hold for at least 15 to 30 seconds. Then stand up and relax. If you had stepped back during your stretch, step forward so you can keep your hands on the solid surface. 5. Repeat 2 to 4 times. Shoulder flexion (lying down)    Note: To make a wand for this exercise, use a piece of PVC pipe or a broom handle with the broom removed. Make the wand about a foot wider than your shoulders. 1. Lie on your back, holding a wand with both hands. Your palms should face down as you hold the wand. 2. Keeping your elbows straight, slowly raise your arms over your head. Raise them until you feel a stretch in your shoulders, upper back, and chest.  3. Hold for 15 to 30 seconds. 4. Repeat 2 to 4 times. Shoulder rotation (lying down)    Note: To make a wand for this exercise, use a piece of PVC pipe or a broom handle with the broom removed. Make the wand about a foot wider than your shoulders. 1. Lie on your back. Hold a wand with both hands with your elbows bent and palms up. 2. Keep your elbows close to your body, and move the wand across your body toward the sore arm. 3. Hold for 8 to 12 seconds. 4. Repeat 2 to 4 times.   Burgess Garza climbing (to the side)    Note: Avoid any movement that is straight to your side, and be careful not to arch your back. Your arm should stay about 30 degrees to the front of your side. 1. Stand with your side to a wall so that your fingers can just touch it at an angle about 30 degrees toward the front of your body. 2. Walk the fingers of your injured arm up the wall as high as pain permits. Try not to shrug your shoulder up toward your ear as you move your arm up. 3. Hold that position for a count of at least 15 to 20.  4. Walk your fingers back down to the starting position. 5. Repeat at least 2 to 4 times. Try to reach higher each time. Wall climbing (to the front)    Note: During this stretching exercise, be careful not to arch your back. 1. Face a wall, and stand so your fingers can just touch it. 2. Keeping your shoulder down, walk the fingers of your injured arm up the wall as high as pain permits. (Don't shrug your shoulder up toward your ear.)  3. Hold your arm in that position for at least 15 to 30 seconds. 4. Slowly walk your fingers back down to where you started. 5. Repeat at least 2 to 4 times. Try to reach higher each time. Shoulder blade squeeze    1. Stand with your arms at your sides, and squeeze your shoulder blades together. Do not raise your shoulders up as you squeeze. 2. Hold 6 seconds. 3. Repeat 8 to 12 times. Scapular exercise: Arm reach    1. Lie flat on your back. This exercise is a very slight motion that starts with your arms raised (elbows straight, arms straight). 2. From this position, reach higher toward the mary jo or ceiling. Keep your elbows straight. All motion should be from your shoulder blade only. 3. Relax your arms back to where you started. 4. Repeat 8 to 12 times. Arm raise to the side    Note: During this strengthening exercise, your arm should stay about 30 degrees to the front of your side.   1. Slowly raise your injured arm to the side, with your thumb facing up. Raise your arm 60 degrees at the most (shoulder level is 90 degrees). 2. Hold the position for 3 to 5 seconds. Then lower your arm back to your side. If you need to, bring your \"good\" arm across your body and place it under the elbow as you lower your injured arm. Use your good arm to keep your injured arm from dropping down too fast.  3. Repeat 8 to 12 times. 4. When you first start out, don't hold any extra weight in your hand. As you get stronger, you may use a 1-pound to 2-pound dumbbell or a small can of food. Shoulder flexor and extensor exercise    Note: These are isometric exercises. That means you contract your muscles without actually moving. · Push forward (flex): Stand facing a wall or doorjamb, about 6 inches or less back. Hold your injured arm against your body. Make a closed fist with your thumb on top. Then gently push your hand forward into the wall with about 25% to 50% of your strength. Don't let your body move backward as you push. Hold for about 6 seconds. Relax for a few seconds. Repeat 8 to 12 times. · Push backward (extend): Stand with your back flat against a wall. Your upper arm should be against the wall, with your elbow bent 90 degrees (your hand straight ahead). Push your elbow gently back against the wall with about 25% to 50% of your strength. Don't let your body move forward as you push. Hold for about 6 seconds. Relax for a few seconds. Repeat 8 to 12 times. Scapular exercise: Wall push-ups    Note: This exercise is best done with your fingers somewhat turned out, rather than straight up and down. 1. Stand facing a wall, about 12 inches to 18 inches away. 2. Place your hands on the wall at shoulder height. 3. Slowly bend your elbows and bring your face to the wall. Keep your back and hips straight. 4. Push back to where you started. 5. Repeat 8 to 12 times. 6. When you can do this exercise against a wall comfortably, you can try it against a counter.  You can then slowly progress to the end of a couch, then to a sturdy chair, and finally to the floor. Scapular exercise: Retraction    Note: For this exercise, you will need elastic exercise material, such as surgical tubing or Thera-Band. 1. Put the band around a solid object at about waist level. (A bedpost will work well.) Each hand should hold an end of the band. 2. With your elbows at your sides and bent to 90 degrees, pull the band back. Your shoulder blades should move toward each other. Then move your arms back where you started. 3. Repeat 8 to 12 times. 4. If you have good range of motion in your shoulders, try this exercise with your arms lifted out to the sides. Keep your elbows at a 90-degree angle. Raise the elastic band up to about shoulder level. Pull the band back to move your shoulder blades toward each other. Then move your arms back where you started. Internal rotator strengthening exercise    1. Start by tying a piece of elastic exercise material to a doorknob. You can use surgical tubing or Thera-Band. 2. Stand or sit with your shoulder relaxed and your elbow bent 90 degrees. Your upper arm should rest comfortably against your side. Squeeze a rolled towel between your elbow and your body for comfort. This will help keep your arm at your side. 3. Hold one end of the elastic band in the hand of the painful arm. 4. Slowly rotate your forearm toward your body until it touches your belly. Slowly move it back to where you started. 5. Keep your elbow and upper arm firmly tucked against the towel roll or at your side. 6. Repeat 8 to 12 times. External rotator strengthening exercise    1. Start by tying a piece of elastic exercise material to a doorknob. You can use surgical tubing or Thera-Band. (You may also hold one end of the band in each hand.)  2. Stand or sit with your shoulder relaxed and your elbow bent 90 degrees. Your upper arm should rest comfortably against your side.  Squeeze a rolled towel between your elbow and your body for comfort. This will help keep your arm at your side. 3. Hold one end of the elastic band with the hand of the painful arm. 4. Start with your forearm across your belly. Slowly rotate the forearm out away from your body. Keep your elbow and upper arm tucked against the towel roll or the side of your body until you begin to feel tightness in your shoulder. Slowly move your arm back to where you started. 5. Repeat 8 to 12 times. Follow-up care is a key part of your treatment and safety. Be sure to make and go to all appointments, and call your doctor if you are having problems. It's also a good idea to know your test results and keep a list of the medicines you take. Where can you learn more? Go to http://anna-sampson.info/. Enter Slava Pete in the search box to learn more about \"Rotator Cuff: Exercises. \"  Current as of: May 23, 2016  Content Version: 11.2  © 0181-6259 [employer]. Care instructions adapted under license by [employer] (which disclaims liability or warranty for this information). If you have questions about a medical condition or this instruction, always ask your healthcare professional. [place] [address] any warranty or liability for your use of this information. 2nd Skin to area of right buttocks.

## 2017-05-31 NOTE — PROGRESS NOTES
HISTORY OF PRESENT ILLNESS  Debby Hong is a 80 y.o. female. HPI  Mild pressure ulcer on buttocks \"forever\". Home health aide noticed it was looking worse. Mild discomfort when scooting in and out of the bed. Sits on sofa most of the day. Also lays down with feet elevated. Pressure when sitting on area but less when lying down. No drainage. Skin is not tender. Aide suggested \"second skin\". No fevers or chills. Had toe surgery left food [facility] [place] last month. Tendon released. Also had skin cancer removed from right face. Current Outpatient Prescriptions:     fentaNYL (DURAGESIC) 25 mcg/hr PATCH, 1 Patch by TransDERmal route every seventy-two (72) hours. Max Daily Amount: 1 Patch. Indications: Chronic Pain with Opioid Tolerance, Disp: 10 Patch, Rfl: 0    predniSONE (DELTASONE) 5 mg tablet, Take 1 Tab by mouth daily. , Disp: 30 Tab, Rfl: 11    topiramate (TOPAMAX) 25 mg tablet, Take 1 Tab by mouth daily (with breakfast). , Disp: 60 Tab, Rfl: 5    spironolactone (ALDACTONE) 25 mg tablet, Take 1 Tab by mouth daily. , Disp: 30 Tab, Rfl: 11    metoprolol tartrate (LOPRESSOR) 25 mg tablet, Take 1 Tab by mouth two (2) times a day., Disp: 60 Tab, Rfl: 11    gabapentin (NEURONTIN) 300 mg capsule, Take 2 Caps by mouth two (2) times a day., Disp: 120 Cap, Rfl: 5    traMADol (ULTRAM) 50 mg tablet, Take 1 Tab by mouth two (2) times a day. Max Daily Amount: 100 mg., Disp: 60 Tab, Rfl: 2    XARELTO 15 mg tab tablet, , Disp: , Rfl:     cetirizine (ZYRTEC) 10 mg tablet, Take  by mouth daily. , Disp: , Rfl:     nitroglycerin (NITROSTAT) 0.4 mg SL tablet, by SubLINGual route every five (5) minutes as needed for Chest Pain., Disp: , Rfl:     ondansetron hcl (ZOFRAN) 8 mg tablet, Take 8 mg by mouth as needed for Nausea., Disp: , Rfl:     cholecalciferol (VITAMIN D3) 1,000 unit tablet, Take  by mouth daily. , Disp: , Rfl:     esomeprazole (NEXIUM) 40 mg capsule, Take  by mouth daily. , Disp: , Rfl: Visit Vitals    /54 (BP 1 Location: Left arm, BP Patient Position: Sitting)    Pulse 78    Temp 98.6 °F (37 °C) (Oral)    Resp 16    Ht 5' (1.524 m)    Wt 134 lb (60.8 kg)    SpO2 95%    BMI 26.17 kg/m2       ROS  See above  Physical Exam   Constitutional: She appears well-developed and well-nourished. HENT:   Head: Normocephalic and atraumatic. Skin:   Right mid inner buttock with 3-4 cm area of discoloration and mild tenderness. No skin loss. No warmth or fluctuance   Vitals reviewed. ASSESSMENT and PLAN  Stage 1 pressure ulcer right buttocks - apply 2nd skin product daily. Discussed alleviating pressure from area when sitting. Orders Placed This Encounter    fentaNYL (DURAGESIC) 25 mcg/hr PATCH     Follow-up Disposition:  Return if symptoms worsen or fail to improve.

## 2017-06-07 RX ORDER — NITROGLYCERIN 0.4 MG/1
0.4 TABLET SUBLINGUAL
Qty: 50 TAB | Refills: 3 | Status: SHIPPED | OUTPATIENT
Start: 2017-06-07

## 2017-06-21 ENCOUNTER — TELEPHONE (OUTPATIENT)
Dept: INTERNAL MEDICINE CLINIC | Age: 82
End: 2017-06-21

## 2017-06-21 RX ORDER — NYSTATIN 100000 U/G
CREAM TOPICAL 2 TIMES DAILY
Qty: 45 G | Refills: 3 | Status: SHIPPED | OUTPATIENT
Start: 2017-06-21 | End: 2018-04-26

## 2017-07-24 RX ORDER — FENTANYL 25 UG/1
1 PATCH TRANSDERMAL
Qty: 10 PATCH | Refills: 0 | Status: SHIPPED | OUTPATIENT
Start: 2017-07-24 | End: 2017-08-23 | Stop reason: SDUPTHER

## 2017-07-24 NOTE — TELEPHONE ENCOUNTER
Called to inform patient that Rx is ready for . However son Katy Cain answered who is on HIPPA for med and Rx  only. Informed Katy Cain that form will need to be updated before medical info may be discussed with him. Will call patient back this evening after she awakes.

## 2017-07-27 NOTE — TELEPHONE ENCOUNTER
Spoke with João Rangel who was been verified as an acceptable person on HIPPA to  meds. Informed that Rx is ready for  along with PHI form for update, which is enclosed in envelope as well.

## 2017-09-11 RX ORDER — GABAPENTIN 300 MG/1
600 CAPSULE ORAL 2 TIMES DAILY
Qty: 120 CAP | Refills: 5 | Status: SHIPPED | OUTPATIENT
Start: 2017-09-11 | End: 2017-12-28 | Stop reason: DRUGHIGH

## 2017-09-11 NOTE — TELEPHONE ENCOUNTER
Valentine Smoke from [address] [place] [address] is requesting a medication refill on \"Gabapentin\".  Best contact number is [phone].

## 2017-09-11 NOTE — TELEPHONE ENCOUNTER
Last seen: 05/31/17    Requested Prescriptions     Pending Prescriptions Disp Refills    gabapentin (NEURONTIN) 300 mg capsule 120 Cap 5     Sig: Take 2 Caps by mouth two (2) times a day.

## 2017-09-26 RX ORDER — FENTANYL 25 UG/1
1 PATCH TRANSDERMAL
Qty: 10 PATCH | Refills: 0 | Status: SHIPPED | OUTPATIENT
Start: 2017-09-26 | End: 2017-10-30 | Stop reason: SDUPTHER

## 2017-09-26 RX ORDER — TRAMADOL HYDROCHLORIDE 50 MG/1
50 TABLET ORAL 2 TIMES DAILY
Qty: 60 TAB | Refills: 2 | Status: SHIPPED | OUTPATIENT
Start: 2017-09-26 | End: 2017-12-28 | Stop reason: DRUGHIGH

## 2017-09-26 NOTE — TELEPHONE ENCOUNTER
From: Jaci Barbosa  To: Dean Perez MD  Sent: 9/25/2017 8:56 PM EDT  Subject: Medication Renewal Request    Original authorizing provider: Laretta Sanes, MD Vick Stabs Lan Oppenheim would like a refill of the following medications:  traMADol (ULTRAM) 50 mg tablet Dean Perez MD]    Preferred pharmacy: [facility] #[id number] [address]    Comment:      Medication renewals requested in this message routed to other providers:  fentaNYL ([address]) 25 mcg/hr PATCH Billie Durbin MD]

## 2017-09-29 ENCOUNTER — TELEPHONE (OUTPATIENT)
Dept: RHEUMATOLOGY | Age: 82
End: 2017-09-29

## 2017-10-04 ENCOUNTER — OFFICE VISIT (OUTPATIENT)
Dept: RHEUMATOLOGY | Age: 82
End: 2017-10-04

## 2017-10-04 VITALS
TEMPERATURE: 97.9 F | HEART RATE: 67 BPM | HEIGHT: 60 IN | DIASTOLIC BLOOD PRESSURE: 77 MMHG | RESPIRATION RATE: 18 BRPM | WEIGHT: 133 LBS | BODY MASS INDEX: 26.11 KG/M2 | SYSTOLIC BLOOD PRESSURE: 131 MMHG

## 2017-10-04 DIAGNOSIS — M35.3 PMR (POLYMYALGIA RHEUMATICA) (HCC): ICD-10-CM

## 2017-10-04 DIAGNOSIS — Z79.52 LONG TERM (CURRENT) USE OF SYSTEMIC STEROIDS: ICD-10-CM

## 2017-10-04 DIAGNOSIS — M06.9 RHEUMATOID ARTHRITIS WITH UNKNOWN RHEUMATOID FACTOR STATUS (HCC): Primary | ICD-10-CM

## 2017-10-04 DIAGNOSIS — R60.0 LOWER EXTREMITY EDEMA: ICD-10-CM

## 2017-10-04 NOTE — PROGRESS NOTES
REASON FOR VISIT    This is the initial evaluation for Ms. Elina Izquierdo a 80 y.o.  female for question of an inflammatory arthritis. The patient is referred to the Arthritis and [address] [place] [address] at the request of Dr. Levi Luna. HISTORY OF PRESENT ILLNESS      I have reviewed and summarized old records from [facility]    In 2012, she developed a diffuse ache and nausea associated with weight loss. She was evaluated by a rheumatologist who diagnosed with Polymyalgia Rheumatica based on labs in [facility] and was treated with prednisone 5 mg which helped after a couple of weeks. Today, she has pain in her shoulder and knee. Her shoulder pain is worse with activity. She does not have stiffness in her shoulders. She has pain in her PIP aching that is worse with activity. There is no swelling or stiffness. Hot coffee makes her feel better. Hot bathes make her feel better. She is on prednisone 5 mg daily and wants to get off it. She also has spinal stenosis. She has done physical therapy without much relief. Therapy History includes:     Current DMARD therapy includes: none  Prior DMARD therapy includes: none  The following DMARDs have been ineffective: none  The following DMARDs were stopped because of side effects: none    REVIEW OF SYSTEMS    A 15 point review of systems was performed and summarized below. The questionnaire was reviewed with the patient and scanned into the patient's medical record.     General: endorses 17 lbs weight gain (two years ago), denies recent weight loss, fatigue, weakness, fever, night sweats  Musculoskeletal: endorses joint pain, muscle weakness, denies joint swelling, morning stiffness   Ears: denies ringing in ears, loss of hearing, deafness  Eyes: denies pain, redness, loss of vision, double vision, blurred vision, dryness, foreign body sensation  Mouth: endorses dryness, denies sore tongue, oral ulcers, bleeding gums, loss of taste, increased dental caries  Nose: denies nosebleeds, loss of smell, nasal ulcers  Throat: denies frequent sore throats, hoarseness, difficulty in swallowing, pain in jaw while chewing  Neck: denies swollen glands, tender glands  Cardiopulmonary: endorses pain in chest, irregular heart beat, shortness of breath, swollen legs or feet, cough, denies sudden changes in heart beat, difficulty breathing at night, coughing of blood, wheezing  Gastrointestinal: endorses nausea, increasing constipation, persistent diarrhea, denies heartburn, stomach pain relieved by food, vomiting of blood/\"coffee grounds\", jaundice, blood in stools, black stools  Genitourinary: endorses getting up at night to pass urine, frequent urination, vaginal dryness, denies difficult urination, pain or burning on urination, blood in urine, cloudy urine, pus in urine, genital discharge, rash/ulcers, sexual difficulties   Hematologic: denies anemia, bleeding tendency, blood clots  Skin: endorses easy bruising, denies redness, rash, hives, sun sensitive, skin tightness, nodules/bumps, hair loss, color changes of hands or feet in the cold (Raynaud's)  Neurologic: endorses headaches, muscle weakness, denies dizziness, fainting or loss of consciousness, numbness or tingling in hands/feet, memory loss  Psychiatric: denies depression, excessive worries  Sleep: endorse daytime somnolence, denies poor sleep, denies snoring, difficulty falling asleep, difficulty staying asleep     PAST MEDICAL HISTORY    She has a past medical history of Arrhythmia; Arthritis; CAD (coronary artery disease) (1989); Congestive heart failure (CHF) ([facility] GERD (gastroesophageal reflux disease); Gluten intolerance; Heart failure ([facility] and Polymyalgia ([facility] [address] [facility] FAMILY HISTORY    Her family history includes Cancer in her brother, brother, and father; Heart Attack in her brother; Other in her mother; Stroke in her father. There is no history of Anesth Problems.     SOCIAL HISTORY    She reports that she has never smoked. She has never used smokeless tobacco. She reports that she does not drink alcohol or use illicit drugs. HEALTH MAINTENANCE    Immunizations  Immunization History   Administered Date(s) Administered    Influenza High Dose Vaccine PF 10/20/2015, 10/12/2016    Zoster Vaccine, Live 03/22/2012     MEDICATIONS    Current Outpatient Prescriptions   Medication Sig Dispense Refill    fentaNYL (DURAGESIC) 25 mcg/hr PATCH 1 Patch by TransDERmal route every seventy-two (72) hours. Max Daily Amount: 1 Patch. Indications: Chronic Pain with Opioid Tolerance 10 Patch 0    traMADol (ULTRAM) 50 mg tablet Take 1 Tab by mouth two (2) times a day. Max Daily Amount: 100 mg. 60 Tab 2    gabapentin (NEURONTIN) 300 mg capsule Take 2 Caps by mouth two (2) times a day. 120 Cap 5    nystatin (MYCOSTATIN) topical cream Apply  to affected area two (2) times a day. 45 g 3    nitroglycerin (NITROSTAT) 0.4 mg SL tablet 1 Tab by SubLINGual route every five (5) minutes as needed for Chest Pain. 50 Tab 3    predniSONE (DELTASONE) 5 mg tablet Take 1 Tab by mouth daily. 30 Tab 11    topiramate (TOPAMAX) 25 mg tablet Take 1 Tab by mouth daily (with breakfast). 60 Tab 5    spironolactone (ALDACTONE) 25 mg tablet Take 1 Tab by mouth daily. 30 Tab 11    metoprolol tartrate (LOPRESSOR) 25 mg tablet Take 1 Tab by mouth two (2) times a day. 60 Tab 11    XARELTO 15 mg tab tablet       cetirizine (ZYRTEC) 10 mg tablet Take  by mouth daily.  ondansetron hcl (ZOFRAN) 8 mg tablet Take 8 mg by mouth as needed for Nausea.  cholecalciferol (VITAMIN D3) 1,000 unit tablet Take  by mouth daily.  esomeprazole (NEXIUM) 40 mg capsule Take  by mouth daily. ALLERGIES    Allergies   Allergen Reactions    Novacare Other (comments)     PARALYSIS; \"NOVACAINE\" not novacare.     Phenergan [Promethazine] Other (comments)     \"loose my wits i go crazy\"    Nsaids (Non-Steroidal Anti-Inflammatory Drug) Nausea and Vomiting    Adhesive Tape-Silicones Other (comments)     BLISTERS    Atenolol Nausea and Vomiting    Bactrim [Sulfamethoprim Ds] Nausea and Vomiting    Cymbalta [Duloxetine] Other (comments)     CONFUSION      Digoxin Nausea and Vomiting    Gluten Other (comments)     GLUTEN INTOLERANCE    Macrobid [Nitrofurantoin Monohyd/M-Cryst] Nausea and Vomiting    Sulfa (Sulfonamide Antibiotics) Nausea and Vomiting    Codeine Other (comments)     Unable to swallow       PHYSICAL EXAMINATION    Visit Vitals    /77 (BP 1 Location: Left arm, BP Patient Position: Sitting)    Pulse 67    Temp 97.9 °F (36.6 °C)    Resp 18    Ht 5' (1.524 m)    Wt 133 lb (60.3 kg)    BMI 25.97 kg/m2     Body mass index is 25.97 kg/(m^2). General: Patient is alert, oriented x 3, not in acute distress, son at bedside    HEENT:   Conjunctiva are not injected and appear moist, oral mucous membranes are moist, there are no ulcers present, there is no alopecia, neck is supple, there is no lymphadenopathy. Salivary glands are normal    Cardiovascular:  Heart is regular rate and rhythm, no murmurs. Chest:  Lungs are clear to auscultation bilaterally. Extremities:  Free of clubbing, cyanosis, +2 non-pitting edema,     Neurological exam:  No focal sensory deficits, muscle strength is full in upper and lower extremities. Skin exam:  There are no rashes, no tophi, no psoriasis, no active Raynaud's, no livedo reticularis, no periungual erythema. Thinning of skin on lower extremities with yellow discoloration     Musculoskeletal exam:  A comprehensive musculoskeletal exam was performed for all joints of each upper and lower extremity and assessed for swelling, tenderness and range of motion. Pertinent results are documented as below:    Bilateral Cem and [place] nodes. Bilateral knee crepitus without effusion.   Allodynia    Z-Deformities:   no  [place]ck Deformities:  no  Boutonierre's Deformities:  no  Ulnar Deviation:   no  MCP Subluxation:  no    Joint Count 10/4/2017   Patient pain (0-100) 65   MHAQ 1.375   Left shoulder - Tender 1   Left shoulder - Swollen 1   Left wrist- Tender 1   Left wrist- Swollen 1   Left 3rd MCP - Tender 1   Right elbow - Tender 1   Right wrist- Tender 1   Right wrist- Swollen 1   Right 1st MCP - Swollen 1   Right 2nd MCP - Tender 1   Right 3rd MCP - Tender 1   Tender Joint Count (Total) 7   Swollen Joint Count (Total) 4   Physician Assessment (0-10) 3   Patient Assessment (0-10) 4   CDAI Total (calculated) 18       DATA REVIEW    Prior medical records were reviewed and are summarized as below:    Laboratory data:  none available    Imaging: none available     ASSESSMENT AND PLAN    1) Rheumatoid Arthritis with Secondary Polymyalgia Rheumatica. She has been on prednisone 5 mg daily for around 5 years for Polymyalgia Rheumatica but continues to have shoulder girdle pain. She also reports pain in her joints which appears to be synovitis, consistent with Rheumatoid Arthritis. Her CDAI 18 with 7 tender and 4 swollen joints. I ordered labs and radiographs today and will have have her follow up to discuss DMARDs. 2) Polymyalgia Rheumatica. See #1. 3) Long Term Use of Systemic Steroids (Prednisone). He is on prednisone 5 mg daily. She has evidence of skin thinning on her lower extremities. 4) Lower Extremity Edema. I recommended compression hose. The patient voiced understanding of the aforementioned assessment and plan. Summary of plan was provided in the After Visit Summary patient instructions. I also provided education about MyChart setup and utility.     TODAY'S ORDERS    Orders Placed This Encounter    QUANTIFERON TB GOLD    XR HAND LT MIN 3 V    XR HAND RT MIN 3 V    XR SHOULDER RT AP/LAT MIN 2 V    XR SHOULDER LT AP/LAT MIN 2 V    XR ELBOW RT MIN 3 V    CYCLIC CITRUL PEPTIDE AB, IGG    CBC WITH AUTOMATED DIFF    CHRONIC HEPATITIS PANEL    METABOLIC PANEL, COMPREHENSIVE    C REACTIVE PROTEIN, QT    SED RATE (ESR)    RHEUMATOID FACTOR, QL    PROTEIN ELECTROPHORESIS W/ REFLX TRINY    VITAMIN D, 25 HYDROXY    URIC ACID       Future Appointments  Date Time Provider Irais [place]   10/23/2017 10:40 AM Marian Jordan MD [address]   11/15/2017 12:00 PM Yvonne Thompson  [place], MD, [address] [facility]    Adult Rheumatology   Musculoskeletal Ultrasound Certified  [address]   Phone [phone]  Fax [phone]

## 2017-10-04 NOTE — MR AVS SNAPSHOT
Visit Information Date & Time Provider Department Dept. Phone Encounter #  
 10/4/2017  1:00 PM Srini PaintingAvery 48 [id number] Follow-up Instructions Return in about 2 weeks (around 10/18/2017). Your Appointments 11/15/2017 12:00 PM  
ROUTINE CARE with Stanislav Waters MD  
[facility] [address] [facility]) Appt Note: 6 month f/up [place] [address] [facility] [address]  
[facility] [address] [facility] [address] [address] Upcoming Health Maintenance Date Due DTaP/Tdap/Td series (1 - Tdap) 7/27/1946 OSTEOPOROSIS SCREENING (DEXA) 7/27/1990 Pneumococcal 65+ Low/Medium Risk (1 of 2 - PCV13) 7/27/1990 INFLUENZA AGE 9 TO ADULT 8/1/2017 MEDICARE YEARLY EXAM 5/11/2018 GLAUCOMA SCREENING Q2Y 8/9/2018 Allergies as of 10/4/2017  Review Complete On: 10/4/2017 By: Srini Painting MD  
  
 Severity Noted Reaction Type Reactions Novacare High 05/18/2015    Other (comments) PARALYSIS; \"NOVACAINE\" not novacare. Phenergan [Promethazine] High 02/16/2015   Systemic Other (comments) \"loose my wits i go crazy\" Nsaids (Non-steroidal Anti-inflammatory Drug) Medium 02/16/2015   Systemic Nausea and Vomiting Adhesive Tape-silicones  17/20/7356    Other (comments) BLISTERS Atenolol  05/18/2015    Nausea and Vomiting Bactrim [Sulfamethoprim Ds]  05/18/2015    Nausea and Vomiting Cymbalta [Duloxetine]  05/18/2015    Other (comments) CONFUSION Digoxin  05/18/2015    Nausea and Vomiting Gluten  05/14/2015    Other (comments) GLUTEN INTOLERANCE Macrobid [Nitrofurantoin Monohyd/m-cryst]  05/18/2015    Nausea and Vomiting  
 Sulfa (Sulfonamide Antibiotics)  05/18/2015    Nausea and Vomiting Codeine Low 02/16/2015   Systemic Other (comments) Unable to swallow Current Immunizations  Reviewed on 1/28/2016 Name Date Influenza High Dose Vaccine PF 10/12/2016, 10/20/2015 11:52 AM  
 Zoster Vaccine, Live 3/22/2012 Not reviewed this visit You Were Diagnosed With   
  
 Codes Comments Rheumatoid arthritis with unknown rheumatoid factor status ([facility] [address]    -  Primary ICD-10-CM: M06.9 ICD-9-CM: 714.0 PMR (polymyalgia rheumatica) (HCC)     ICD-10-CM: M35.3 ICD-9-CM: 811 Long term (current) use of systemic steroids     ICD-10-CM: Z79.52 
ICD-9-CM: V58.65 Vitals BP Pulse Temp Resp Height(growth percentile) Weight(growth percentile) 131/77 (BP 1 Location: Left arm, BP Patient Position: Sitting) 67 97.9 °F (36.6 °C) 18 5' (1.524 m) 133 lb (60.3 kg) BMI OB Status Smoking Status 25.97 kg/m2 Postmenopausal Never Smoker BMI and BSA Data Body Mass Index Body Surface Area  
 25.97 kg/m 2 1.6 m 2 Preferred Pharmacy Pharmacy Name Phone [id number] [address] [phone] Your Updated Medication List  
  
   
This list is accurate as of: 10/4/17  1:48 PM.  Always use your most recent med list.  
  
  
  
  
 cetirizine 10 mg tablet Commonly known as:  ZYRTEC Take  by mouth daily. fentaNYL 25 mcg/hr PATCH Commonly known as:  DURAGESIC  
1 Patch by TransDERmal route every seventy-two (72) hours. Max Daily Amount: 1 Patch. Indications: Chronic Pain with Opioid Tolerance  
  
 gabapentin 300 mg capsule Commonly known as:  NEURONTIN Take 2 Caps by mouth two (2) times a day. metoprolol tartrate 25 mg tablet Commonly known as:  LOPRESSOR Take 1 Tab by mouth two (2) times a day. NexIUM 40 mg capsule Generic drug:  esomeprazole Take  by mouth daily. nitroglycerin 0.4 mg SL tablet Commonly known as:  NITROSTAT  
1 Tab by SubLINGual route every five (5) minutes as needed for Chest Pain.   
  
 nystatin topical cream  
 Commonly known as:  MYCOSTATIN Apply  to affected area two (2) times a day. ondansetron hcl 8 mg tablet Commonly known as:  Laruth Downer Take 8 mg by mouth as needed for Nausea. predniSONE 5 mg tablet Commonly known as:  Therman Rail Take 1 Tab by mouth daily. spironolactone 25 mg tablet Commonly known as:  ALDACTONE Take 1 Tab by mouth daily. topiramate 25 mg tablet Commonly known as:  TOPAMAX Take 1 Tab by mouth daily (with breakfast). traMADol 50 mg tablet Commonly known as:  ULTRAM  
Take 1 Tab by mouth two (2) times a day. Max Daily Amount: 100 mg. VITAMIN D3 1,000 unit tablet Generic drug:  cholecalciferol Take  by mouth daily. XARELTO 15 mg Tab tablet Generic drug:  rivaroxaban We Performed the Following C REACTIVE PROTEIN, QT [47254 CPT(R)] CBC WITH AUTOMATED DIFF [86455 CPT(R)] CHRONIC HEPATITIS PANEL [MTA9995 Custom] [address], IGG [id number] CPT(R)] METABOLIC PANEL, COMPREHENSIVE [49304 CPT(R)] PROTEIN ELECTROPHORESIS W/ REFLX TRINY [[id number] Custom] QUANTIFERON TB GOLD [[id number] Custom] RHEUMATOID FACTOR, QL [id number] CPT(R)] SED RATE (ESR) [id number] CPT(R)] URIC ACID [id number] CPT(R)] VITAMIN D, 25 HYDROXY [id number] CPT(R)] Follow-up Instructions Return in about 2 weeks (around 10/18/2017). To-Do List   
 10/04/2017 Imaging:  XR ELBOW RT MIN 3 V   
  
 10/04/2017 Imaging:  XR HAND LT MIN 3 V   
  
 10/04/2017 Imaging:  XR HAND RT MIN 3 V   
  
 10/04/2017 Imaging:  XR SHOULDER LT AP/LAT MIN 2 V   
  
 10/04/2017 Imaging:  XR SHOULDER RT AP/LAT MIN 2 V Introducing [facility]! Dear Coni Lynch: 
Thank you for requesting a [employer] account. Our records indicate that you already have an active [employer] account. You can access your account anytime at [email or web address] Did you know that you can access your hospital and ER discharge instructions at any time in [employer]? You can also review all of your test results from your hospital stay or ER visit. Additional Information If you have questions, please visit the Frequently Asked Questions section of the [employer] website at [email or web address] Remember, [employer] is NOT to be used for urgent needs. For medical emergencies, dial 911. Now available from your iPhone and Android! Please provide this summary of care documentation to your next provider. Your primary care clinician is listed as CORNELIA NICK. If you have any questions after today's visit, please call [phone].

## 2017-10-06 LAB
25(OH)D3+25(OH)D2 SERPL-MCNC: 43.1 NG/ML (ref 30–100)
ALBUMIN SERPL ELPH-MCNC: 3.7 G/DL (ref 2.9–4.4)
ALBUMIN SERPL-MCNC: 4.5 G/DL (ref 3.2–4.6)
ALBUMIN/GLOB SERPL: 1.5 {RATIO} (ref 0.7–1.7)
ALBUMIN/GLOB SERPL: 2.6 {RATIO} (ref 1.2–2.2)
ALP SERPL-CCNC: 66 IU/L (ref 39–117)
ALPHA1 GLOB SERPL ELPH-MCNC: 0.3 G/DL (ref 0–0.4)
ALPHA2 GLOB SERPL ELPH-MCNC: 0.7 G/DL (ref 0.4–1)
ALT SERPL-CCNC: 14 IU/L (ref 0–32)
AST SERPL-CCNC: 15 IU/L (ref 0–40)
B-GLOBULIN SERPL ELPH-MCNC: 1 G/DL (ref 0.7–1.3)
BASOPHILS # BLD AUTO: 0 X10E3/UL (ref 0–0.2)
BASOPHILS NFR BLD AUTO: 0 %
BILIRUB SERPL-MCNC: 0.3 MG/DL (ref 0–1.2)
BUN SERPL-MCNC: 14 MG/DL (ref 10–36)
BUN/CREAT SERPL: 18 (ref 12–28)
CALCIUM SERPL-MCNC: 9.7 MG/DL (ref 8.7–10.3)
CCP IGA+IGG SERPL IA-ACNC: 4 UNITS (ref 0–19)
CHLORIDE SERPL-SCNC: 102 MMOL/L (ref 96–106)
CO2 SERPL-SCNC: 23 MMOL/L (ref 18–29)
COMMENT, 144067: NORMAL
CREAT SERPL-MCNC: 0.8 MG/DL (ref 0.57–1)
CRP SERPL-MCNC: 1.7 MG/L (ref 0–4.9)
EOSINOPHIL # BLD AUTO: 0 X10E3/UL (ref 0–0.4)
EOSINOPHIL NFR BLD AUTO: 1 %
ERYTHROCYTE [DISTWIDTH] IN BLOOD BY AUTOMATED COUNT: 13.9 % (ref 12.3–15.4)
ERYTHROCYTE [SEDIMENTATION RATE] IN BLOOD BY WESTERGREN METHOD: 18 MM/HR (ref 0–40)
GAMMA GLOB SERPL ELPH-MCNC: 0.4 G/DL (ref 0.4–1.8)
GLOBULIN SER CALC-MCNC: 1.7 G/DL (ref 1.5–4.5)
GLOBULIN SER CALC-MCNC: 2.5 G/DL (ref 2.2–3.9)
GLUCOSE SERPL-MCNC: 107 MG/DL (ref 65–99)
HBV CORE AB SERPL QL IA: NEGATIVE
HBV CORE IGM SERPL QL IA: NEGATIVE
HBV E AB SERPL QL IA: NEGATIVE
HBV E AG SERPL QL IA: NEGATIVE
HBV SURFACE AB SER QL: NON REACTIVE
HBV SURFACE AG SERPL QL IA: NEGATIVE
HCT VFR BLD AUTO: 44.1 % (ref 34–46.6)
HCV AB S/CO SERPL IA: <0.1 S/CO RATIO (ref 0–0.9)
HGB BLD-MCNC: 14.6 G/DL (ref 11.1–15.9)
IMM GRANULOCYTES # BLD: 0 X10E3/UL (ref 0–0.1)
IMM GRANULOCYTES NFR BLD: 0 %
LYMPHOCYTES # BLD AUTO: 0.5 X10E3/UL (ref 0.7–3.1)
LYMPHOCYTES NFR BLD AUTO: 9 %
Lab: NORMAL
M PROTEIN SERPL ELPH-MCNC: NORMAL G/DL
MCH RBC QN AUTO: 33.3 PG (ref 26.6–33)
MCHC RBC AUTO-ENTMCNC: 33.1 G/DL (ref 31.5–35.7)
MCV RBC AUTO: 101 FL (ref 79–97)
MONOCYTES # BLD AUTO: 0.3 X10E3/UL (ref 0.1–0.9)
MONOCYTES NFR BLD AUTO: 6 %
NEUTROPHILS # BLD AUTO: 4.9 X10E3/UL (ref 1.4–7)
NEUTROPHILS NFR BLD AUTO: 84 %
PLATELET # BLD AUTO: 174 X10E3/UL (ref 150–379)
PLEASE NOTE, 011150: NORMAL
POTASSIUM SERPL-SCNC: 4.3 MMOL/L (ref 3.5–5.2)
PROT PATTERN SERPL ELPH-IMP: NORMAL
PROT SERPL-MCNC: 6.2 G/DL (ref 6–8.5)
RBC # BLD AUTO: 4.39 X10E6/UL (ref 3.77–5.28)
RHEUMATOID FACT SERPL-ACNC: <10 IU/ML (ref 0–13.9)
SODIUM SERPL-SCNC: 142 MMOL/L (ref 134–144)
URATE SERPL-MCNC: 3.9 MG/DL (ref 2.5–7.1)
WBC # BLD AUTO: 5.8 X10E3/UL (ref 3.4–10.8)

## 2017-10-09 LAB
ANNOTATION COMMENT IMP: NORMAL
GAMMA INTERFERON BACKGROUND BLD IA-ACNC: 0.02 IU/ML
M TB IFN-G BLD-IMP: NEGATIVE
M TB IFN-G CD4+ BCKGRND COR BLD-ACNC: 0.04 IU/ML
M TB IFN-G CD4+ T-CELLS BLD-ACNC: 0.06 IU/ML
MITOGEN IGNF BLD-ACNC: 8.97 IU/ML
QUANTIFERON INCUBATION: NORMAL
SERVICE CMNT-IMP: NORMAL

## 2017-10-21 NOTE — PROGRESS NOTES
REASON FOR VISIT    This is a follow-up visit for Ms. 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 Inflammatory arthritis phenotype includes:  Anti-CCP positive: no  Rheumatoid factor positive: no  Erosive disease: yes  Extra-articular manifestations include: polymyalgia rheumatica    Immunosuppression Screening (10/04/2017): Quantiferon TB: negative  PPD:  Not performed  Hepatitis B: negative  Hepatitis C: negative    Therapy History includes:  Current DMARD therapy include: none  Prior DMARD therapy include: none  Discontinued DMARDs because of inefficacy: None  Discontinued DMARDs because of side effects: None    Immunizations:   Immunization History   Administered Date(s) Administered    Influenza High Dose Vaccine PF 10/20/2015, 10/12/2016    Zoster Vaccine, Live 03/22/2012       Active problems include:    Patient Active Problem List   Diagnosis Code    Osteoarthritis M19.90    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 ([facility] I25.111    CHF, stage C ([facility] I50.9    Lumbar spinal stenosis M48.061    Paroxysmal atrial fibrillation (HCC) I48.0    Advance directive on file Z78.9    PMR (polymyalgia rheumatica) ([facility] M35.3    Long term (current) use of systemic steroids Z79.52    Lower extremity edema R60.0    CKD (chronic kidney disease) stage 2, GFR 60-89 ml/min N18.2    Long term current use of systemic steroids Z79.52    Seronegative rheumatoid arthritis of multiple sites ([facility] [address] M06.09       HISTORY OF PRESENT ILLNESS    Ms. Sheyla Caicedo returns for a follow-up. On her last visit, she presented with chronic shoulder girdle pain while on prednisone 5 mg daily for around the last 5 years for Polymyalgia Rheumatica. She also complained of peripheral joint pain and had synovitis on exam consistent with Rheumatoid Arthritis. I ordered labs, which revealed negative RF and CCP with normal inflammatory markers. Radiographs of hands showed bilateral triquetrum erosions     She continues on prednisone 5 mg. Last toxicity monitoring by blood work was done on 10/04/2017 and did not reveal any significant adverse effects. Most recent inflammatory markers from 10/04/2017 revealed a ESR 18 mm/hr (previously NA mm/hr) and CRP 1.7 mg/L (previously NA mg/L). The patient has not had any interval hospital admissions, infections, or surgeries. REVIEW OF SYSTEMS    A comprehensive review of systems was performed and pertinent results are documented in the HPI, review of systems is otherwise non-contributory. PAST MEDICAL HISTORY    She has a past medical history of Arrhythmia; Arthritis; CAD (coronary artery disease) (1989); Congestive heart failure (CHF) ([facility] GERD (gastroesophageal reflux disease); Gluten intolerance; Heart failure ([facility] [facility] and Polymyalgia ([facility] [address] [facility] FAMILY HISTORY    Her family history includes Cancer in her brother, brother, and father; Heart Attack in her brother; Other in her mother; Stroke in her father. There is no history of Anesth Problems. SOCIAL HISTORY    She reports that she has never smoked. She has never used smokeless tobacco. She reports that she does not drink alcohol or use illicit drugs. MEDICATIONS    Current Outpatient Prescriptions   Medication Sig Dispense Refill    leflunomide (ARAVA) 20 mg tablet Take 1 Tab by mouth daily for 90 days. Take half tab daily for 7 days and then one tab if tolerated 90 Tab 0    fentaNYL (DURAGESIC) 25 mcg/hr PATCH 1 Patch by TransDERmal route every seventy-two (72) hours. Max Daily Amount: 1 Patch. Indications: Chronic Pain with Opioid Tolerance 10 Patch 0    traMADol (ULTRAM) 50 mg tablet Take 1 Tab by mouth two (2) times a day. Max Daily Amount: 100 mg. (Patient taking differently: Take 50 mg by mouth as needed.) 60 Tab 2    gabapentin (NEURONTIN) 300 mg capsule Take 2 Caps by mouth two (2) times a day.  (Patient taking differently: Take 300 mg by mouth three (3) times daily.) 120 Cap 5    nystatin (MYCOSTATIN) topical cream Apply  to affected area two (2) times a day. 45 g 3    nitroglycerin (NITROSTAT) 0.4 mg SL tablet 1 Tab by SubLINGual route every five (5) minutes as needed for Chest Pain. 50 Tab 3    predniSONE (DELTASONE) 5 mg tablet Take 1 Tab by mouth daily. 30 Tab 11    topiramate (TOPAMAX) 25 mg tablet Take 1 Tab by mouth daily (with breakfast). 60 Tab 5    spironolactone (ALDACTONE) 25 mg tablet Take 1 Tab by mouth daily. 30 Tab 11    metoprolol tartrate (LOPRESSOR) 25 mg tablet Take 1 Tab by mouth two (2) times a day. 60 Tab 11    XARELTO 15 mg tab tablet       cetirizine (ZYRTEC) 10 mg tablet Take  by mouth daily.  ondansetron hcl (ZOFRAN) 8 mg tablet Take 8 mg by mouth as needed for Nausea.  cholecalciferol (VITAMIN D3) 1,000 unit tablet Take  by mouth daily.  esomeprazole (NEXIUM) 40 mg capsule Take  by mouth daily. ALLERGIES    Allergies   Allergen Reactions    Novacare Other (comments)     PARALYSIS; \"NOVACAINE\" not novacare.  Phenergan [Promethazine] Other (comments)     \"loose my wits i go crazy\"    Nsaids (Non-Steroidal Anti-Inflammatory Drug) Nausea and Vomiting    Adhesive Tape-Silicones Other (comments)     BLISTERS    Atenolol Nausea and Vomiting    Bactrim [Sulfamethoprim Ds] Nausea and Vomiting    Cymbalta [Duloxetine] Other (comments)     CONFUSION      Digoxin Nausea and Vomiting    Gluten Other (comments)     GLUTEN INTOLERANCE    Macrobid [Nitrofurantoin Monohyd/M-Cryst] Nausea and Vomiting    Sulfa (Sulfonamide Antibiotics) Nausea and Vomiting    Codeine Other (comments)     Unable to swallow       PHYSICAL EXAMINATION    Visit Vitals    /76 (BP 1 Location: Left arm, BP Patient Position: Sitting)    Pulse 72    Temp 98.6 °F (37 °C)    Resp 18    Ht 5' (1.524 m)    Wt 134 lb (60.8 kg)    BMI 26.17 kg/m2     Body mass index is 26.17 kg/(m^2).     General: Patient is alert, oriented x 3, not in acute distress, son at bedside    HEENT:   Sclerae are not injected and appear moist.  Oral mucous membranes are moist, there are no ulcers present. There is no alopecia. Neck is supple,     Cardiovascular:  Heart is regular rate and rhythm, no murmurs. Chest:  Lungs are clear to auscultation bilaterally. No rhonchi, wheezes, or crackles. Extremities:  Free of clubbing, cyanosis, edema    Neurological exam:  No focal sensory deficits, muscle strength is full in upper and lower extremities     Skin exam:  There are no rashes, no alopecia, no discoid lesions, no active Raynaud's, no livedo reticularis, no periungual erythema. Musculoskeletal exam:  A comprehensive musculoskeletal exam was performed for all joints of each upper and lower extremity and assessed for swelling, tenderness and range of motion. Positive results are documented as below:    Bilateral Cem and [place] nodes. Bilateral knee crepitus without effusion.   Allodynia     Z-Deformities:                                   no  [place]ck Deformities:                   no  Boutonierre's Deformities:                no  Ulnar Deviation:                                 no  MCP Subluxation:                              no       Joint Count 10/4/2017   Patient pain (0-100) 65   MHAQ 1.375   Left shoulder - Tender 1   Left shoulder - Swollen 1   Left wrist- Tender 1   Left wrist- Swollen 1   Left 3rd MCP - Tender 1   Right elbow - Tender 1   Right wrist- Tender 1   Right wrist- Swollen 1   Right 1st MCP - Swollen 1   Right 2nd MCP - Tender 1   Right 3rd MCP - Tender 1   Tender Joint Count (Total) 7   Swollen Joint Count (Total) 4   Physician Assessment (0-10) 3   Patient Assessment (0-10) 4   CDAI Total (calculated) 18       DATA REVIEW    Laboratory     The following laboratory results were reviewed and discussed with the patient:    Office Visit on 10/04/2017   Component Date Value    CCP Antibodies IgG/IgA 10/04/2017 4     WBC 10/04/2017 5.8     RBC 10/04/2017 4.39     HGB 10/04/2017 14.6     HCT 10/04/2017 44.1     MCV 10/04/2017 101*    MCH 10/04/2017 33.3*    MCHC 10/04/2017 33.1     RDW 10/04/2017 13.9     PLATELET 52/88/3260 987     NEUTROPHILS 10/04/2017 84     Lymphocytes 10/04/2017 9     MONOCYTES 10/04/2017 6     EOSINOPHILS 10/04/2017 1     BASOPHILS 10/04/2017 0     ABS. NEUTROPHILS 10/04/2017 4.9     Abs Lymphocytes 10/04/2017 0.5*    ABS. MONOCYTES 10/04/2017 0.3     ABS. EOSINOPHILS 10/04/2017 0.0     ABS. BASOPHILS 10/04/2017 0.0     IMMATURE GRANULOCYTES 10/04/2017 0     ABS. IMM. GRANS. 10/04/2017 0.0     Hep B surface Ag screen 10/04/2017 Negative     Hepatitis Be Antigen 10/04/2017 Negative     Hep B Core Ab, IgM 10/04/2017 Negative     Hep B Core Ab, total 10/04/2017 Negative     Hepatitis Be Antibody 10/04/2017 Negative     HEP B SURFACE AB, QUAL 10/04/2017 Non Reactive     HCV Ab 10/04/2017 <0.1     Glucose 10/04/2017 107*    BUN 10/04/2017 14     Creatinine 10/04/2017 0.80     GFR est non-AA 10/04/2017 64     GFR est AA 10/04/2017 74     BUN/Creatinine ratio 10/04/2017 18     Sodium 10/04/2017 142     Potassium 10/04/2017 4.3     Chloride 10/04/2017 102     CO2 10/04/2017 23     Calcium 10/04/2017 9.7     Protein, total 10/04/2017 6.2     Albumin 10/04/2017 4.5     GLOBULIN, TOTAL 10/04/2017 1.7     A-G Ratio 10/04/2017 2.6*    Bilirubin, total 10/04/2017 0.3     Alk. phosphatase 10/04/2017 66     AST (SGOT) 10/04/2017 15     ALT (SGPT) 10/04/2017 14     C-Reactive Protein, Qt 10/04/2017 1.7     Sed rate (ESR) 10/04/2017 18     QuantiFERON Incubation 10/04/2017                      Value:Incubated, specimen forwarded to [employer], [place] for  completion of the assay.       Rheumatoid factor 10/04/2017 <10.0     Albumin 10/04/2017 3.7     Alpha-1-globulin 10/04/2017 0.3     ALPHA-2 GLOBULIN 10/04/2017 0.7     Beta globulin 10/04/2017 1.0  Gamma globulin 10/04/2017 0.4     M-spike 10/04/2017 Not Observed     Globulin, total 10/04/2017 2.5     A/G ratio 10/04/2017 1.5     Please note 10/04/2017 Comment     Interpretation (see belo* 10/04/2017 Comment     PDF 10/04/2017 .  VITAMIN D, 25-HYDROXY 10/04/2017 43.1     Uric acid 10/04/2017 3.9     Comment 10/04/2017 Comment     QuantiFERON TB Gold 10/04/2017 Negative     QUANTIFERON CRITERIA 10/04/2017 Comment     QuantiFERON TB Ag Value 10/04/2017 0.06     QuantiFERON Nil Value 10/04/2017 0.02     QuantiFERON Mitogen Value 10/04/2017 8.97     QFT TB Ag minus Nil Value 10/04/2017 0.04     Interpretation: 10/04/2017 Comment        Imaging    Musculoskeletal Ultrasound    None    Radiographs    Bilateral Hand 10/04/2017: LEFT: Bones are osteopenic. There is no acute fracture or dislocation. TFC chondrocalcinosis is greater than intercarpal chondrocalcinosis. Mild narrowing of the radiocarpal joint. Severe narrowing of the triscaphe joint. Questionable erosion of the triquetrum. No CMC joint erosion. RIGHT: No fracture or dislocation on plain film. The bones are osteopenic. Chondrocalcinosis is in the TFC and intercarpal ligaments. Mild narrowing of the radiocarpal joint. Severe narrowing of the triscaphe joint. Subtle erosion of the triquetrum. No other carpal erosion. CMC joints are within normal limits. MCP and IP joints are within normal limits. No periosteal reaction. Bilateral Shoulder 10/04/2017: LEFT: osteopenia. No acute fracture or dislocation. Minimal AC joint osteoarthritis for age. No erosion. Glenohumeral joint is within normal limits. Calcifications proximal to the humeral head are most likely within the rotator cuff tendons. Aorta is atherosclerotic without aneurysm on these images. RIGHT:  osteopenia. No acute fracture or dislocation. Acromioclavicular joint arthritis is age-appropriate and most compatible with osteoarthritis.  Glenohumeral joint is within normal limits. No erosion or chondrocalcinosis. Right Elbow 10/04/2017: osteopenia. No acute fracture or dislocation. No joint effusion. No soft tissue calcification. Articulations are within normal limits. Chest Portable AP 5/24/2015 : The heart is mildly enlarged. There is irregular opacification in  the left lower lobe. No pleural effusion or pneumothorax. The bones are osteopenic. There is mild rightward curvature of the lower thoracic spine  with multilevel degenerative change. CT Imaging    None    MR Imaging    None    DXA     None    PROCEDURE     Indications:   Symptom relief from Left Shoulder Arthritis. (10/23/17)     Procedure:   Under my direct observation, my PA Yvonne Rodriguez, performed this procedure. After consent was obtained, and timeout performed, using sterile technique the Left Shoulder joint was prepped using Chlorprep. Local anesthetic used: Ethyl Chloride. The joint was entered and 0 ml's of fluid was withdrawn. Kenalog 40 mg was mixed with 1% lidocaine 1 ml and injected into the joint and the needle withdrawn. The procedure was well tolerated. The patient was asked to continue to rest the joint for a few more days before resuming regular activities. It may be more painful for the first 1-2 days. Watch for fever, or increased swelling or persistent pain in the joint. Call or return to clinic prn if such symptoms occur or there is failure to improve as anticipated. ASSESSMENT AND PLAN    This is a follow-up visit for Ms. Bhargavi Fenton. 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 She has been on prednisone 5 mg daily for around 5 years for Polymyalgia Rheumatica but continues to have shoulder girdle pain. She also reports pain in her joints which appears to be synovitis, consistent with Rheumatoid Arthritis. Her CDAI 18 with 7 tender and 4 swollen joints.  I discussed initiation with the DMARD Arava (leflunomide) instead of methotrexate due to chronic kidney disease. I informed the patient the potential adverse effects, which may include: nausea, vomiting, dyspepsia, diarrhea, oral ulcers, infection, liver function abnormalities, blood count abnormalities, and rarely melanoma and non-melanoma skin cancer. The patient understood these possible adverse effects. I informed the patient of the need for routine CBC and CMP as a measure for long term use of immunosuppressants. I also instructed the patient to avoid ill contacts and the needs for annual influenza vaccines and the pneumonia vaccines. In childbearing patients, pregnancy is contra-indicated on leflunomide due to risk for birth defects. I informed the patient that leflunomide may take 4 to 12 weeks to be effective. I prescribed the patient Arava (leflunomide) 20 mg, and instructed to start with half a tablet (10 mg) daily for 7 days and then increase to a full tablet (20 mg) if she has no side effects, such as diarrhea or upset stomach. The patient will follow up in 4 weeks for laboratory monitoring.    2) Polymyalgia Rheumatica. See #1.     3) Long Term Use of Systemic Steroids (Prednisone). She is on prednisone 5 mg daily. She has evidence of skin thinning on her lower extremities. I plan on weaning her off when leflunomide builds up in her system     4) Lower Extremity Edema. I recommended compression hose.      5) Chronic Kidney Disease Stage 2. Her creatinine was 0.80 mg/dL and eGFR 64. The patient voiced understanding of the aforementioned assessment and plan. Summary of plan was provided in the After Visit Summary patient instructions.      TODAY'S ORDERS    Orders Placed This Encounter    TRIAMCINOLONE ACETONIDE INJ    leflunomide (ARAVA) 20 mg tablet    lidocaine, PF, (XYLOCAINE) 10 mg/mL (1 %) injection    triamcinolone acetonide (KENALOG) 40 mg/mL injection       Future Appointments  Date Time Provider Irais Madera   11/15/2017 12:00 PM MD Nabil Bro Rupesh 11/20/2017 2:20 PM MD Wil Castaneda MD, [facility]    Adult Rheumatology   Musculoskeletal Ultrasound Certified  [address] [place] [address]   Phone [phone]  Fax [phone]

## 2017-10-23 ENCOUNTER — OFFICE VISIT (OUTPATIENT)
Dept: RHEUMATOLOGY | Age: 82
End: 2017-10-23

## 2017-10-23 VITALS
RESPIRATION RATE: 18 BRPM | BODY MASS INDEX: 26.31 KG/M2 | WEIGHT: 134 LBS | TEMPERATURE: 98.6 F | HEIGHT: 60 IN | HEART RATE: 72 BPM | DIASTOLIC BLOOD PRESSURE: 76 MMHG | SYSTOLIC BLOOD PRESSURE: 135 MMHG

## 2017-10-23 DIAGNOSIS — M35.3 PMR (POLYMYALGIA RHEUMATICA) (HCC): ICD-10-CM

## 2017-10-23 DIAGNOSIS — N18.2 CKD (CHRONIC KIDNEY DISEASE) STAGE 2, GFR 60-89 ML/MIN: ICD-10-CM

## 2017-10-23 DIAGNOSIS — Z79.52 LONG TERM CURRENT USE OF SYSTEMIC STEROIDS: ICD-10-CM

## 2017-10-23 DIAGNOSIS — M06.09 SERONEGATIVE RHEUMATOID ARTHRITIS OF MULTIPLE SITES (HCC): Primary | ICD-10-CM

## 2017-10-23 DIAGNOSIS — G89.29 CHRONIC LEFT SHOULDER PAIN: ICD-10-CM

## 2017-10-23 DIAGNOSIS — M25.512 CHRONIC LEFT SHOULDER PAIN: ICD-10-CM

## 2017-10-23 RX ORDER — TRIAMCINOLONE ACETONIDE 40 MG/ML
40 INJECTION, SUSPENSION INTRA-ARTICULAR; INTRAMUSCULAR ONCE
Qty: 1 ML | Refills: 0
Start: 2017-10-23 | End: 2017-10-23

## 2017-10-23 RX ORDER — LEFLUNOMIDE 20 MG/1
20 TABLET ORAL DAILY
Qty: 90 TAB | Refills: 0 | Status: SHIPPED | OUTPATIENT
Start: 2017-10-23 | End: 2018-01-21

## 2017-10-23 RX ORDER — LIDOCAINE HYDROCHLORIDE 10 MG/ML
1 INJECTION, SOLUTION EPIDURAL; INFILTRATION; INTRACAUDAL; PERINEURAL ONCE
Qty: 1 ML | Refills: 0
Start: 2017-10-23 | End: 2017-10-23

## 2017-10-23 NOTE — MR AVS SNAPSHOT
Visit Information Date & Time Provider Department Dept. Phone Encounter #  
 10/23/2017 10:40 AM Ian Marroquin MD [address] [facility] [id number] Follow-up Instructions Return in about 4 weeks (around 11/20/2017). Your Appointments 10/23/2017 10:40 AM  
ESTABLISHED PATIENT with Ian Marroquin MD  
[address] ([address]) Appt Note: 2 wk f/u  
 [address] [facility] [address]  
[phone]  
  
   
 [facility] [id number]  
  
    
 11/15/2017 12:00 PM  
ROUTINE CARE with Devika Haider MD  
[facility] [address]) Appt Note: 6 month f/up [address] [facility] [address]  
[address] [facility] [facility] [address] [address] Upcoming Health Maintenance Date Due DTaP/Tdap/Td series (1 - Tdap) 7/27/1946 OSTEOPOROSIS SCREENING (DEXA) 7/27/1990 Pneumococcal 65+ Low/Medium Risk (1 of 2 - PCV13) 7/27/1990 INFLUENZA AGE 9 TO ADULT 8/1/2017 MEDICARE YEARLY EXAM 5/11/2018 GLAUCOMA SCREENING Q2Y 8/9/2018 Allergies as of 10/23/2017  Review Complete On: 10/23/2017 By: Barb Hilario RN Severity Noted Reaction Type Reactions Novacare High 05/18/2015    Other (comments) PARALYSIS; \"NOVACAINE\" not novacare. Phenergan [Promethazine] High 02/16/2015   Systemic Other (comments) \"loose my wits i go crazy\" Nsaids (Non-steroidal Anti-inflammatory Drug) Medium 02/16/2015   Systemic Nausea and Vomiting Adhesive Tape-silicones  65/49/0436    Other (comments) BLISTERS Atenolol  05/18/2015    Nausea and Vomiting Bactrim [Sulfamethoprim Ds]  05/18/2015    Nausea and Vomiting Cymbalta [Duloxetine]  05/18/2015    Other (comments) CONFUSION Digoxin  05/18/2015    Nausea and Vomiting Gluten  05/14/2015    Other (comments) GLUTEN INTOLERANCE Macrobid [Nitrofurantoin Monohyd/m-cryst]  05/18/2015    Nausea and Vomiting  
 Sulfa (Sulfonamide Antibiotics)  05/18/2015    Nausea and Vomiting Codeine Low 02/16/2015   Systemic Other (comments) Unable to swallow Current Immunizations  Reviewed on 1/28/2016 Name Date Influenza High Dose Vaccine PF 10/12/2016, 10/20/2015 11:52 AM  
 Zoster Vaccine, Live 3/22/2012 Not reviewed this visit You Were Diagnosed With   
  
 Codes Comments Seronegative rheumatoid arthritis of multiple sites [facility])    -  Primary ICD-10-CM: M06.09 
ICD-9-CM: 714.0 Chronic left shoulder pain     ICD-10-CM: M25.512, G89.29 ICD-9-CM: 719.41, 338.29 Long term current use of systemic steroids     ICD-10-CM: Z79.52 
ICD-9-CM: V58.65 Vitals BP Pulse Temp Resp Height(growth percentile) Weight(growth percentile) 135/76 (BP 1 Location: Left arm, BP Patient Position: Sitting) 72 98.6 °F (37 °C) 18 5' (1.524 m) 134 lb (60.8 kg) BMI OB Status Smoking Status 26.17 kg/m2 Postmenopausal Never Smoker BMI and BSA Data Body Mass Index Body Surface Area  
 26.17 kg/m 2 1.6 m 2 Preferred Pharmacy Pharmacy Name Phone [id number] [address] [phone] Your Updated Medication List  
  
   
This list is accurate as of: 10/23/17 10:37 AM.  Always use your most recent med list.  
  
  
  
  
 cetirizine 10 mg tablet Commonly known as:  ZYRTEC Take  by mouth daily. fentaNYL 25 mcg/hr PATCH Commonly known as:  DURAGESIC  
1 Patch by TransDERmal route every seventy-two (72) hours. Max Daily Amount: 1 Patch. Indications: Chronic Pain with Opioid Tolerance  
  
 gabapentin 300 mg capsule Commonly known as:  NEURONTIN Take 2 Caps by mouth two (2) times a day. leflunomide 20 mg tablet Commonly known as:  Kenya Hebert Take 1 Tab by mouth daily for 90 days. Take half tab daily for 7 days and then one tab if tolerated  
  
 lidocaine (PF) 10 mg/mL (1 %) injection Commonly known as:  XYLOCAINE  
1 mL by Intra artICUlar route once for 1 dose. Indications: ADMINISTRATION OF LOCAL ANESTHESIA  
  
 metoprolol tartrate 25 mg tablet Commonly known as:  LOPRESSOR Take 1 Tab by mouth two (2) times a day. NexIUM 40 mg capsule Generic drug:  esomeprazole Take  by mouth daily. nitroglycerin 0.4 mg SL tablet Commonly known as:  NITROSTAT  
1 Tab by SubLINGual route every five (5) minutes as needed for Chest Pain. nystatin topical cream  
Commonly known as:  MYCOSTATIN Apply  to affected area two (2) times a day. ondansetron hcl 8 mg tablet Commonly known as:  Art Smiley Take 8 mg by mouth as needed for Nausea. predniSONE 5 mg tablet Commonly known as:  La Greek Take 1 Tab by mouth daily. spironolactone 25 mg tablet Commonly known as:  ALDACTONE Take 1 Tab by mouth daily. topiramate 25 mg tablet Commonly known as:  TOPAMAX Take 1 Tab by mouth daily (with breakfast). traMADol 50 mg tablet Commonly known as:  ULTRAM  
Take 1 Tab by mouth two (2) times a day. Max Daily Amount: 100 mg.  
  
 triamcinolone acetonide 40 mg/mL injection Commonly known as:  KENALOG  
1 mL by Intra artICUlar route once for 1 dose. VITAMIN D3 1,000 unit tablet Generic drug:  cholecalciferol Take  by mouth daily. XARELTO 15 mg Tab tablet Generic drug:  rivaroxaban  
  
  
  
  
Prescriptions Sent to Pharmacy Refills  
 leflunomide (ARAVA) 20 mg tablet 0 Sig: Take 1 Tab by mouth daily for 90 days. Take half tab daily for 7 days and then one tab if tolerated Class: Normal  
 Pharmacy: [facility]/pharmacy #[id number] Ph #: [phone] Route: Oral  
  
We Performed the Following TRIAMCINOLONE ACETONIDE INJ [ [facility]] Follow-up Instructions Return in about 4 weeks (around 11/20/2017). Patient Instructions Please continue to rest the joint for a few more days before resuming regular activities. It may be more painful for the first 1-2 days. Watch for fever, or increased swelling or persistent pain in the joint. Call or return to clinic prn if such symptoms occur or there is failure to improve as anticipated.  
  
 
ARAVA (leflunomide) I prescribed you Arava (leflunomide) 20 mg tablets. Please start with half a tablet (10 mg) daily for 7 days and if you have no side effects, such as diarrhea or upset stomach, please increase to one full tablet daily (20 mg). Potential Adverse Affects 
 
- Nausea - Vomiting - Upset stomach 
- Diarrhea 
- Oral ulcers 
- Hair thinning - Infection - Liver function abnormalities - Blood count abnormalities Medication Monitoring You will need routine blood counts and kidney and liver testing as a measure of monitoring for long term use of immunosuppressants. Things You Should Do You should avoid ill contacts You should get annual influenza vaccines and the pneumonia vaccines. You should apply SPF 50 sunscreen when out in the sun. You should avoid alcohol as it may hasten hepatotoxicity. You should avoid pregnancy due to risk for birth defects. Leflunomide may take 4 to 12 weeks to be effective. If you have any problems or side effects with this medication, please contact me immediately to inform me. Introducing [facility]! Dear Gregory Pollard: 
Thank you for requesting a [employer] account. Our records indicate that you already have an active [employer] account. You can access your account anytime at [email or web address] Did you know that you can access your hospital and ER discharge instructions at any time in [employer]?   You can also review all of your test results from your hospital stay or ER visit. Additional Information If you have questions, please visit the Frequently Asked Questions section of the [employer] website at [email or web address] Remember, [employer] is NOT to be used for urgent needs. For medical emergencies, dial 911. Now available from your iPhone and Android! Please provide this summary of care documentation to your next provider. Your primary care clinician is listed as CORNELIA NICK. If you have any questions after today's visit, please call [phone].

## 2017-10-23 NOTE — PATIENT INSTRUCTIONS
Please continue to rest the joint for a few more days before resuming regular activities. It may be more painful for the first 1-2 days. Watch for fever, or increased swelling or persistent pain in the joint. Call or return to clinic prn if such symptoms occur or there is failure to improve as anticipated.        ARAVA (leflunomide)    I prescribed you Arava (leflunomide) 20 mg tablets. Please start with half a tablet (10 mg) daily for 7 days and if you have no side effects, such as diarrhea or upset stomach, please increase to one full tablet daily (20 mg). Potential Adverse Affects    - Nausea  - Vomiting  - Upset stomach  - Diarrhea  - Oral ulcers  - Hair thinning  - Infection  - Liver function abnormalities  - Blood count abnormalities    Medication Monitoring    You will need routine blood counts and kidney and liver testing as a measure of monitoring for long term use of immunosuppressants. Things You Should Do    You should avoid ill contacts     You should get annual influenza vaccines and the pneumonia vaccines. You should apply SPF 50 sunscreen when out in the sun. You should avoid alcohol as it may hasten hepatotoxicity. You should avoid pregnancy due to risk for birth defects. Leflunomide may take 4 to 12 weeks to be effective. If you have any problems or side effects with this medication, please contact me immediately to inform me.

## 2017-10-24 PROBLEM — M06.09 SERONEGATIVE RHEUMATOID ARTHRITIS OF MULTIPLE SITES (HCC): Status: ACTIVE | Noted: 2017-10-24

## 2017-10-24 PROBLEM — Z79.52 LONG TERM CURRENT USE OF SYSTEMIC STEROIDS: Status: ACTIVE | Noted: 2017-10-24

## 2017-10-24 PROBLEM — N18.2 CKD (CHRONIC KIDNEY DISEASE) STAGE 2, GFR 60-89 ML/MIN: Status: ACTIVE | Noted: 2017-10-24

## 2017-10-30 RX ORDER — FENTANYL 25 UG/1
1 PATCH TRANSDERMAL
Qty: 10 PATCH | Refills: 0 | Status: SHIPPED | OUTPATIENT
Start: 2017-10-30 | End: 2017-11-30 | Stop reason: SDUPTHER

## 2017-11-15 ENCOUNTER — OFFICE VISIT (OUTPATIENT)
Dept: INTERNAL MEDICINE CLINIC | Age: 82
End: 2017-11-15

## 2017-11-15 VITALS
HEIGHT: 60 IN | RESPIRATION RATE: 14 BRPM | BODY MASS INDEX: 25.91 KG/M2 | TEMPERATURE: 97.9 F | SYSTOLIC BLOOD PRESSURE: 102 MMHG | WEIGHT: 132 LBS | HEART RATE: 62 BPM | OXYGEN SATURATION: 95 % | DIASTOLIC BLOOD PRESSURE: 62 MMHG

## 2017-11-15 DIAGNOSIS — M35.3 PMR (POLYMYALGIA RHEUMATICA) (HCC): ICD-10-CM

## 2017-11-15 DIAGNOSIS — M48.061 SPINAL STENOSIS OF LUMBAR REGION WITHOUT NEUROGENIC CLAUDICATION: ICD-10-CM

## 2017-11-15 DIAGNOSIS — I50.9 CHF, STAGE C (HCC): ICD-10-CM

## 2017-11-15 DIAGNOSIS — I48.0 PAROXYSMAL ATRIAL FIBRILLATION (HCC): ICD-10-CM

## 2017-11-15 DIAGNOSIS — I25.111 CORONARY ARTERY DISEASE INVOLVING NATIVE CORONARY ARTERY OF NATIVE HEART WITH ANGINA PECTORIS WITH DOCUMENTED SPASM (HCC): Primary | ICD-10-CM

## 2017-11-15 DIAGNOSIS — M06.09 SERONEGATIVE RHEUMATOID ARTHRITIS OF MULTIPLE SITES (HCC): ICD-10-CM

## 2017-11-15 RX ORDER — PREDNISONE 5 MG/1
5 TABLET ORAL DAILY
Qty: 30 TAB | Refills: 11 | Status: SHIPPED | OUTPATIENT
Start: 2017-11-15 | End: 2017-12-28 | Stop reason: ALTCHOICE

## 2017-11-15 RX ORDER — LANOLIN ALCOHOL/MO/W.PET/CERES
1000 CREAM (GRAM) TOPICAL DAILY
COMMUNITY
End: 2018-12-05 | Stop reason: ALTCHOICE

## 2017-11-15 NOTE — MR AVS SNAPSHOT
Visit Information Date & Time Provider Department Dept. Phone Encounter #  
 11/15/2017 12:00 PM Ian Argueta MD Pending sale to [facility] [phone] [id number] Follow-up Instructions Return for medicare wellness. Your Appointments 11/20/2017  2:20 PM  
ESTABLISHED PATIENT with Janell Nicholas MD  
[address] ([address]) Appt Note: 4 week f/up  
 [address] [facility] [address]  
[phone]  
  
   
 [address] [facility] [address] [address] 5/16/2018 12:00 PM  
ROUTINE CARE with Ian Argueta MD  
Pending sale to [facility] [address]) Appt Note: 6 month follow up visit [place] [address] [facility] [address]  
[facility] [address] [facility] [address] [facility] [address] Upcoming Health Maintenance Date Due DTaP/Tdap/Td series (1 - Tdap) 7/27/1946 OSTEOPOROSIS SCREENING (DEXA) 7/27/1990 Pneumococcal 65+ Low/Medium Risk (1 of 2 - PCV13) 7/27/1990 Influenza Age 5 to Adult 8/1/2017 MEDICARE YEARLY EXAM 5/11/2018 GLAUCOMA SCREENING Q2Y 8/9/2018 Allergies as of 11/15/2017  Review Complete On: 11/15/2017 By: Ian Argueta MD  
  
 Severity Noted Reaction Type Reactions [facility] High 05/18/2015    Other (comments) PARALYSIS; \"NOVACAINE\" not novacare. Phenergan [Promethazine] High 02/16/2015   Systemic Other (comments) \"loose my wits i go crazy\" Nsaids (Non-steroidal Anti-inflammatory Drug) Medium 02/16/2015   Systemic Nausea and Vomiting Adhesive Tape-silicones  86/02/3097    Other (comments) BLISTERS Atenolol  05/18/2015    Nausea and Vomiting Bactrim [Sulfamethoprim Ds]  05/18/2015    Nausea and Vomiting Cymbalta [Duloxetine]  05/18/2015    Other (comments) CONFUSION Digoxin  05/18/2015    Nausea and Vomiting Gluten  05/14/2015    Other (comments)  GLUTEN INTOLERANCE  
 Macrobid [Nitrofurantoin Monohyd/m-cryst]  05/18/2015    Nausea and Vomiting  
 Sulfa (Sulfonamide Antibiotics)  05/18/2015    Nausea and Vomiting Codeine Low 02/16/2015   Systemic Other (comments) Unable to swallow Current Immunizations  Reviewed on 1/28/2016 Name Date Influenza High Dose Vaccine PF 10/12/2016, 10/20/2015 11:52 AM  
 Zoster Vaccine, Live 3/22/2012 Not reviewed this visit Vitals BP Pulse Temp Resp Height(growth percentile) Weight(growth percentile) 102/62 (BP 1 Location: Right arm, BP Patient Position: Sitting) 62 97.9 °F (36.6 °C) (Oral) 14 5' (1.524 m) 132 lb (59.9 kg) SpO2 BMI OB Status Smoking Status 95% 25.78 kg/m2 Postmenopausal Never Smoker Vitals History BMI and BSA Data Body Mass Index Body Surface Area 25.78 kg/m 2 1.59 m 2 Preferred Pharmacy Pharmacy Name Phone [id number] [phone] Your Updated Medication List  
  
   
This list is accurate as of: 11/15/17  1:09 PM.  Always use your most recent med list.  
  
  
  
  
 cetirizine 10 mg tablet Commonly known as:  ZYRTEC Take  by mouth daily. cyanocobalamin 1,000 mcg tablet Take 1,000 mcg by mouth daily. fentaNYL 25 mcg/hr PATCH Commonly known as:  DURAGESIC  
1 Patch by TransDERmal route every seventy-two (72) hours. Max Daily Amount: 1 Patch. Indications: Chronic Pain with Opioid Tolerance  
  
 gabapentin 300 mg capsule Commonly known as:  NEURONTIN Take 2 Caps by mouth two (2) times a day. leflunomide 20 mg tablet Commonly known as:  Supriya Burnett Take 1 Tab by mouth daily for 90 days. Take half tab daily for 7 days and then one tab if tolerated  
  
 metoprolol tartrate 25 mg tablet Commonly known as:  LOPRESSOR Take 1 Tab by mouth two (2) times a day. NexIUM 40 mg capsule Generic drug:  esomeprazole Take  by mouth daily. nitroglycerin 0.4 mg SL tablet Commonly known as:  NITROSTAT  
1 Tab by SubLINGual route every five (5) minutes as needed for Chest Pain. nystatin topical cream  
Commonly known as:  MYCOSTATIN Apply  to affected area two (2) times a day. ondansetron hcl 8 mg tablet Commonly known as:  Lurlean Precise Take 8 mg by mouth as needed for Nausea. predniSONE 5 mg tablet Commonly known as:  Lara Beam Take 1 Tab by mouth daily. spironolactone 25 mg tablet Commonly known as:  ALDACTONE Take 1 Tab by mouth daily. topiramate 25 mg tablet Commonly known as:  TOPAMAX Take 1 Tab by mouth daily (with breakfast). traMADol 50 mg tablet Commonly known as:  ULTRAM  
Take 1 Tab by mouth two (2) times a day. Max Daily Amount: 100 mg. VITAMIN D3 1,000 unit tablet Generic drug:  cholecalciferol Take  by mouth daily. XARELTO 15 mg Tab tablet Generic drug:  rivaroxaban Follow-up Instructions Return for medicare wellness. Introducing [facility] & HEALTH SERVICES! Dear Verlyn Mortimer: 
Thank you for requesting a AdsIt account. Our records indicate that you already have an active AdsIt account. You can access your account anytime at [email or web address] Did you know that you can access your hospital and ER discharge instructions at any time in AdsIt? You can also review all of your test results from your hospital stay or ER visit. Additional Information If you have questions, please visit the Frequently Asked Questions section of the AdsIt website at [email or web address] Remember, AdsIt is NOT to be used for urgent needs. For medical emergencies, dial 911. Now available from your iPhone and Android! Please provide this summary of care documentation to your next provider. Your primary care clinician is listed as CORNELIA NICK.  If you have any questions after today's visit, please call [phone].

## 2017-11-15 NOTE — PROGRESS NOTES
HISTORY OF PRESENT ILLNESS  Carol Resendiz is a 80 y.o. female. HPI  Worsening low back pain. Can radiate to her right leg and buttocks. Worse with walking. Uses walker to ambulate. Will take Tramadol just when leaving the house. Using Fentanyl patch as prescribes. Has tried Tylnenol, ice and heat without improvement. UTD with Dr. Kamran Cooper. Occ chest pain - will take NTG approx 3 times a month. Baseline MOSS. Le edema is controlled by elevating leg. Brief dizzy spells (last < 45 seconds) thought secondary to arrythmia. Has not fallen secondayr to these dizzy spells. Skin breakdown on buttocks has resolved. REcently saw ermatologist who \"zapped\" several precancerous lesions on her legs and head. Seeing Dr. Gerda Paredes for seroneg RA and PRM. STarting her on Leflunomide to hoepfully wean off prednisone in the future. Current Outpatient Prescriptions:     cyanocobalamin 1,000 mcg tablet, Take 1,000 mcg by mouth daily. , Disp: , Rfl:     fentaNYL (DURAGESIC) 25 mcg/hr PATCH, 1 Patch by TransDERmal route every seventy-two (72) hours. Max Daily Amount: 1 Patch. Indications: Chronic Pain with Opioid Tolerance, Disp: 10 Patch, Rfl: 0    leflunomide (ARAVA) 20 mg tablet, Take 1 Tab by mouth daily for 90 days. Take half tab daily for 7 days and then one tab if tolerated, Disp: 90 Tab, Rfl: 0    traMADol (ULTRAM) 50 mg tablet, Take 1 Tab by mouth two (2) times a day. Max Daily Amount: 100 mg. (Patient taking differently: Take 50 mg by mouth as needed.), Disp: 60 Tab, Rfl: 2    gabapentin (NEURONTIN) 300 mg capsule, Take 2 Caps by mouth two (2) times a day.  (Patient taking differently: Take 300 mg by mouth three (3) times daily.), Disp: 120 Cap, Rfl: 5    nystatin (MYCOSTATIN) topical cream, Apply  to affected area two (2) times a day., Disp: 45 g, Rfl: 3    nitroglycerin (NITROSTAT) 0.4 mg SL tablet, 1 Tab by SubLINGual route every five (5) minutes as needed for Chest Pain., Disp: 50 Tab, Rfl: 3    predniSONE (DELTASONE) 5 mg tablet, Take 1 Tab by mouth daily. , Disp: 30 Tab, Rfl: 11    topiramate (TOPAMAX) 25 mg tablet, Take 1 Tab by mouth daily (with breakfast). , Disp: 60 Tab, Rfl: 5    spironolactone (ALDACTONE) 25 mg tablet, Take 1 Tab by mouth daily. , Disp: 30 Tab, Rfl: 11    metoprolol tartrate (LOPRESSOR) 25 mg tablet, Take 1 Tab by mouth two (2) times a day., Disp: 60 Tab, Rfl: 11    XARELTO 15 mg tab tablet, , Disp: , Rfl:     cetirizine (ZYRTEC) 10 mg tablet, Take  by mouth daily. , Disp: , Rfl:     ondansetron hcl (ZOFRAN) 8 mg tablet, Take 8 mg by mouth as needed for Nausea., Disp: , Rfl:     cholecalciferol (VITAMIN D3) 1,000 unit tablet, Take  by mouth daily. , Disp: , Rfl:     esomeprazole (NEXIUM) 40 mg capsule, Take  by mouth daily. , Disp: , Rfl:     Visit Vitals    /62 (BP 1 Location: Right arm, BP Patient Position: Sitting)    Pulse 62    Temp 97.9 °F (36.6 °C) (Oral)    Resp 14    Ht 5' (1.524 m)    Wt 132 lb (59.9 kg)    SpO2 95%    BMI 25.78 kg/m2       ROS  See above  Physical Exam   Constitutional: She appears well-developed and well-nourished. Neck: Neck supple. No thyromegaly present. Cardiovascular: Normal rate, regular rhythm and normal heart sounds. Exam reveals no gallop and no friction rub. No murmur heard. Pulmonary/Chest: Effort normal and breath sounds normal.   Abdominal: Soft. Bowel sounds are normal. She exhibits no distension and no mass. There is no tenderness. Musculoskeletal: She exhibits edema (trace le edema bilat. ). Lymphadenopathy:     She has no cervical adenopathy. Vitals reviewed. ASSESSMENT and PLAN  Chronic low back pain - discussed ice, heat, tramadol prn.  continus same amt fentanyl. Discussed reasons why not to increase chronic narcotics at this time. CAD - controlled, cont same  CHF - baseline dependent edema. Controlled, cont same  AFIB - controlled, cont same.   Continue on Xarelto  htn - controlled, cont same  Seronegative RA with PMR - agree with med changed as per Rheum. Orders Placed This Encounter    cyanocobalamin 1,000 mcg tablet     Follow-up Disposition:  Return for medicare wellness.

## 2017-11-15 NOTE — TELEPHONE ENCOUNTER
Requested Prescriptions     Pending Prescriptions Disp Refills    predniSONE (DELTASONE) 5 mg tablet 30 Tab 11     Sig: Take 1 Tab by mouth daily.

## 2017-11-20 ENCOUNTER — OFFICE VISIT (OUTPATIENT)
Dept: RHEUMATOLOGY | Age: 82
End: 2017-11-20

## 2017-11-20 VITALS
RESPIRATION RATE: 18 BRPM | DIASTOLIC BLOOD PRESSURE: 84 MMHG | HEART RATE: 61 BPM | BODY MASS INDEX: 25.72 KG/M2 | HEIGHT: 60 IN | WEIGHT: 131 LBS | TEMPERATURE: 97.9 F | SYSTOLIC BLOOD PRESSURE: 138 MMHG

## 2017-11-20 DIAGNOSIS — M35.3 PMR (POLYMYALGIA RHEUMATICA) (HCC): ICD-10-CM

## 2017-11-20 DIAGNOSIS — Z78.0 POST-MENOPAUSAL: ICD-10-CM

## 2017-11-20 DIAGNOSIS — Z79.52 LONG TERM (CURRENT) USE OF SYSTEMIC STEROIDS: ICD-10-CM

## 2017-11-20 DIAGNOSIS — Z79.60 LONG-TERM USE OF IMMUNOSUPPRESSANT MEDICATION: ICD-10-CM

## 2017-11-20 DIAGNOSIS — M06.09 SERONEGATIVE RHEUMATOID ARTHRITIS OF MULTIPLE SITES (HCC): Primary | ICD-10-CM

## 2017-11-20 DIAGNOSIS — N18.2 CKD (CHRONIC KIDNEY DISEASE) STAGE 2, GFR 60-89 ML/MIN: ICD-10-CM

## 2017-11-20 NOTE — MR AVS SNAPSHOT
Visit Information Date & Time Provider Department Dept. Phone Encounter #  
 11/20/2017  2:20 PM Clementine TrevinoAvery [id number] Follow-up Instructions Return in about 2 months (around 1/20/2018). Your Appointments 5/16/2018 12:00 PM  
ROUTINE CARE with Avani Murillo MD  
[facility] [address] [facility]) Appt Note: 6 month follow up visit [place] [address] [facility] [address]  
[facility] [address] [facility] [address] [facility] [address] Upcoming Health Maintenance Date Due DTaP/Tdap/Td series (1 - Tdap) 7/27/1946 OSTEOPOROSIS SCREENING (DEXA) 7/27/1990 Pneumococcal 65+ Low/Medium Risk (2 of 2 - PPSV23) 12/7/2017 MEDICARE YEARLY EXAM 5/11/2018 GLAUCOMA SCREENING Q2Y 8/9/2018 Allergies as of 11/20/2017  Review Complete On: 11/20/2017 By: Clementine Trevino MD  
  
 Severity Noted Reaction Type Reactions Novac[facility] High 05/18/2015    Other (comments) PARALYSIS; \"NOVACAINE\" not novacare. Phenergan [Promethazine] High 02/16/2015   Systemic Other (comments) \"loose my wits i go crazy\" Nsaids (Non-steroidal Anti-inflammatory Drug) Medium 02/16/2015   Systemic Nausea and Vomiting Adhesive Tape-silicones  46/52/6645    Other (comments) BLISTERS Atenolol  05/18/2015    Nausea and Vomiting Bactrim [Sulfamethoprim Ds]  05/18/2015    Nausea and Vomiting Cymbalta [Duloxetine]  05/18/2015    Other (comments) CONFUSION Digoxin  05/18/2015    Nausea and Vomiting Gluten  05/14/2015    Other (comments) GLUTEN INTOLERANCE Macrobid [Nitrofurantoin Monohyd/m-cryst]  05/18/2015    Nausea and Vomiting  
 Sulfa (Sulfonamide Antibiotics)  05/18/2015    Nausea and Vomiting Codeine Low 02/16/2015   Systemic Other (comments) Unable to swallow Current Immunizations  Reviewed on 1/28/2016 Name Date Influenza High Dose Vaccine PF 10/10/2017, 10/12/2016, 10/20/2015 11:52 AM  
 Pneumococcal Conjugate (PCV-13) 12/7/2016 Zoster Vaccine, Live 3/22/2012 Not reviewed this visit You Were Diagnosed With   
  
 Codes Comments Seronegative rheumatoid arthritis of multiple sites [facility])    -  Primary ICD-10-CM: M06.09 
ICD-9-CM: 714.0 PMR (polymyalgia rheumatica) (HCC)     ICD-10-CM: M35.3 ICD-9-CM: 330 Long term (current) use of systemic steroids     ICD-10-CM: Z79.52 
ICD-9-CM: V58.65 Post-menopausal     ICD-10-CM: Z78.0 ICD-9-CM: V49.81 Vitals BP Pulse Temp Resp Height(growth percentile) Weight(growth percentile) 138/84 (BP 1 Location: Right arm, BP Patient Position: Sitting) 61 97.9 °F (36.6 °C) 18 5' (1.524 m) 131 lb (59.4 kg) BMI OB Status Smoking Status 25.58 kg/m2 Postmenopausal Never Smoker BMI and BSA Data Body Mass Index Body Surface Area 25.58 kg/m 2 1.59 m 2 Preferred Pharmacy Pharmacy Name Phone [id number] [phone] Your Updated Medication List  
  
   
This list is accurate as of: 11/20/17  2:23 PM.  Always use your most recent med list.  
  
  
  
  
 cetirizine 10 mg tablet Commonly known as:  ZYRTEC Take  by mouth daily. cyanocobalamin 1,000 mcg tablet Take 1,000 mcg by mouth daily. fentaNYL 25 mcg/hr PATCH Commonly known as:  DURAGESIC  
1 Patch by TransDERmal route every seventy-two (72) hours. Max Daily Amount: 1 Patch. Indications: Chronic Pain with Opioid Tolerance  
  
 gabapentin 300 mg capsule Commonly known as:  NEURONTIN Take 2 Caps by mouth two (2) times a day. leflunomide 20 mg tablet Commonly known as:  Mammie Feil Take 1 Tab by mouth daily for 90 days. Take half tab daily for 7 days and then one tab if tolerated  
  
 metoprolol tartrate 25 mg tablet Commonly known as:  LOPRESSOR  
 Take 1 Tab by mouth two (2) times a day. NexIUM 40 mg capsule Generic drug:  esomeprazole Take  by mouth daily. nitroglycerin 0.4 mg SL tablet Commonly known as:  NITROSTAT  
1 Tab by SubLINGual route every five (5) minutes as needed for Chest Pain. nystatin topical cream  
Commonly known as:  MYCOSTATIN Apply  to affected area two (2) times a day. ondansetron hcl 8 mg tablet Commonly known as:  Erbacongene Gonzalez Take 8 mg by mouth as needed for Nausea. predniSONE 5 mg tablet Commonly known as:  Adair Stair Take 1 Tab by mouth daily. spironolactone 25 mg tablet Commonly known as:  ALDACTONE Take 1 Tab by mouth daily. topiramate 25 mg tablet Commonly known as:  TOPAMAX Take 1 Tab by mouth daily (with breakfast). traMADol 50 mg tablet Commonly known as:  ULTRAM  
Take 1 Tab by mouth two (2) times a day. Max Daily Amount: 100 mg. VITAMIN D3 1,000 unit tablet Generic drug:  cholecalciferol Take  by mouth daily. XARELTO 15 mg Tab tablet Generic drug:  rivaroxaban We Performed the Following C REACTIVE PROTEIN, QT [26551 CPT(R)] CBC WITH AUTOMATED DIFF [12071 CPT(R)] METABOLIC PANEL, COMPREHENSIVE [59164 CPT(R)] PTH INTACT [53278 CPT(R)] SED RATE (ESR) [id number] CPT(R)] TSH REFLEX TO T4 [[id number] Custom] Follow-up Instructions Return in about 2 months (around 1/20/2018). To-Do List   
 11/20/2017 Imaging:  DEXA BONE DENSITY STUDY AXIAL Patient Instructions Please call the Patient Care Scheduling Team [phone] to schedule your test. 
 
 
 
  
Introducing [facility] & HEALTH SERVICES! Dear Penelope Negro: 
Thank you for requesting a tradeNOW account. Our records indicate that you already have an active tradeNOW account. You can access your account anytime at [email or web address] Did you know that you can access your hospital and ER discharge instructions at any time in Fancorps? You can also review all of your test results from your hospital stay or ER visit. Additional Information If you have questions, please visit the Frequently Asked Questions section of the Fancorps website at [email or web address] Remember, Fancorps is NOT to be used for urgent needs. For medical emergencies, dial 911. Now available from your iPhone and Android! Please provide this summary of care documentation to your next provider. Your primary care clinician is listed as CORNELIA NICK. If you have any questions after today's visit, please call [phone].

## 2017-11-21 LAB
ALBUMIN SERPL-MCNC: 3.9 G/DL (ref 3.2–4.6)
ALBUMIN/GLOB SERPL: 2.1 {RATIO} (ref 1.2–2.2)
ALP SERPL-CCNC: 64 IU/L (ref 39–117)
ALT SERPL-CCNC: 13 IU/L (ref 0–32)
AST SERPL-CCNC: 13 IU/L (ref 0–40)
BASOPHILS # BLD AUTO: 0 X10E3/UL (ref 0–0.2)
BASOPHILS NFR BLD AUTO: 0 %
BILIRUB SERPL-MCNC: 0.4 MG/DL (ref 0–1.2)
BUN SERPL-MCNC: 15 MG/DL (ref 10–36)
BUN/CREAT SERPL: 21 (ref 12–28)
CALCIUM SERPL-MCNC: 9.8 MG/DL (ref 8.7–10.3)
CHLORIDE SERPL-SCNC: 105 MMOL/L (ref 96–106)
CO2 SERPL-SCNC: 22 MMOL/L (ref 18–29)
CREAT SERPL-MCNC: 0.71 MG/DL (ref 0.57–1)
CRP SERPL-MCNC: 0.8 MG/L (ref 0–4.9)
EOSINOPHIL # BLD AUTO: 0.1 X10E3/UL (ref 0–0.4)
EOSINOPHIL NFR BLD AUTO: 2 %
ERYTHROCYTE [DISTWIDTH] IN BLOOD BY AUTOMATED COUNT: 14.4 % (ref 12.3–15.4)
ERYTHROCYTE [SEDIMENTATION RATE] IN BLOOD BY WESTERGREN METHOD: 8 MM/HR (ref 0–40)
GFR SERPLBLD CREATININE-BSD FMLA CKD-EPI: 74 ML/MIN/1.73
GFR SERPLBLD CREATININE-BSD FMLA CKD-EPI: 86 ML/MIN/1.73
GLOBULIN SER CALC-MCNC: 1.9 G/DL (ref 1.5–4.5)
GLUCOSE SERPL-MCNC: 98 MG/DL (ref 65–99)
HCT VFR BLD AUTO: 41.6 % (ref 34–46.6)
HGB BLD-MCNC: 14 G/DL (ref 11.1–15.9)
IMM GRANULOCYTES # BLD: 0 X10E3/UL (ref 0–0.1)
IMM GRANULOCYTES NFR BLD: 0 %
LYMPHOCYTES # BLD AUTO: 0.5 X10E3/UL (ref 0.7–3.1)
LYMPHOCYTES NFR BLD AUTO: 10 %
MCH RBC QN AUTO: 33.2 PG (ref 26.6–33)
MCHC RBC AUTO-ENTMCNC: 33.7 G/DL (ref 31.5–35.7)
MCV RBC AUTO: 99 FL (ref 79–97)
MONOCYTES # BLD AUTO: 0.5 X10E3/UL (ref 0.1–0.9)
MONOCYTES NFR BLD AUTO: 10 %
NEUTROPHILS # BLD AUTO: 3.7 X10E3/UL (ref 1.4–7)
NEUTROPHILS NFR BLD AUTO: 78 %
PLATELET # BLD AUTO: 153 X10E3/UL (ref 150–379)
POTASSIUM SERPL-SCNC: 4.4 MMOL/L (ref 3.5–5.2)
PROT SERPL-MCNC: 5.8 G/DL (ref 6–8.5)
PTH-INTACT SERPL-MCNC: 33 PG/ML (ref 15–65)
RBC # BLD AUTO: 4.22 X10E6/UL (ref 3.77–5.28)
SODIUM SERPL-SCNC: 146 MMOL/L (ref 134–144)
TSH SERPL DL<=0.005 MIU/L-ACNC: 1.56 UIU/ML (ref 0.45–4.5)
WBC # BLD AUTO: 4.7 X10E3/UL (ref 3.4–10.8)

## 2017-12-28 ENCOUNTER — PATIENT OUTREACH (OUTPATIENT)
Dept: INTERNAL MEDICINE CLINIC | Age: 82
End: 2017-12-28

## 2017-12-28 RX ORDER — MIRTAZAPINE 15 MG/1
15 TABLET, FILM COATED ORAL
COMMUNITY
End: 2018-04-25 | Stop reason: ALTCHOICE

## 2017-12-28 RX ORDER — TRAMADOL HYDROCHLORIDE 50 MG/1
50 TABLET ORAL
COMMUNITY
End: 2018-04-17 | Stop reason: SDUPTHER

## 2017-12-28 RX ORDER — GABAPENTIN 300 MG/1
300 CAPSULE ORAL
COMMUNITY
End: 2018-01-02 | Stop reason: SDUPTHER

## 2017-12-28 NOTE — Clinical Note
This is the pt I am trying to get into [facility]. I heard from [facility] in admissions this morning, she is waiting on auth from insurance, which may take up to 24 hours, I gave her your number and she has the pt's son's number. Thanks!

## 2017-12-28 NOTE — PROGRESS NOTES
Patient was admitted to [facility] - for enteritis. Presented with c/o nausea & poor appetite x 3 weeks. Pt also with pyuria, urine cxs negative. Also was in A Fib with RVR which resolved with IVFs. Acute Enteritis: presumed to be infectious, clear liquid diet, IV abx, probiotics    PLAN: Discharged home where she lives w/ her son Kiara Jean-Baptiste. Kiara Jean-Baptiste sent a [employer] message to Dr. Jose Lee w/ concerns that the pt is not able to complete ADLs without assistance and needs 24/7 care, he would like to get her into rehab. [facility] SN/PT/OT has been ordered. Contacted [address] and updated her on pt's son's concerns. Nursing is scheduled to visit today and PT/OT tomorrow. They will go ahead and continue with their assessments since the process for admission to rehab may take a few days if pt is accepted. Contacted patient's son Kiara Jean-Baptiste, listed on HIPAA, for MARLENY follow up. Introduced self and Nurse Navigator role. Verified patient's . Discussed reason for admission and dx of enteritis. His main concern is that pt was independent with ADLs PTA, but now cannot even stand without assistance. The pt has been admitted to [address] [place] [address] in the past and would like to return there since she is familiar with the staff and the attending geriatrician. Provided him with this 's direct number, plan is to contact [place] and update Kiara Jean-Baptiste. Reviewed all medications, which Kiara Jean-Baptiste knew from memory, and updated Med Rec. He has not filled Rx for Remeron yet, but states a plan to  tonight. Reports Dr. Slim Martins d/c prednisone and replaced it with [address] Verbal order from Dr. Jose Lee to d/c meds that are not current.     Medications Discontinued During This Encounter   Medication Reason    predniSONE (DELTASONE) 5 mg tablet Discontinued by Another Clinician    gabapentin (NEURONTIN) 300 mg capsule Dose Adjustment    traMADol (ULTRAM) 50 mg tablet Dose Adjustment     Contacted [facility] admissions at [facility], faxed hospital paperwork, lab and test results, demographics, & last office visit notes from Dr. Millicent Rios and Dr. Gabe Youngblood, received fax confirmation. Received a call from [facility] in admissions stating they received the paperwork and have started the authorization process with Elmer, which may take up to 24 hours. She has notified Trey Garcia of this process.

## 2017-12-28 NOTE — MR AVS SNAPSHOT
Visit Information Date & Time Provider Department Dept. Phone Encounter #  
 12/28/2017  9:40 AM Julio Bernal [facility] [phone] Your Appointments 1/17/2018  2:00 PM  
ESTABLISHED PATIENT with Dorinda Perez MD  
[address] ([facility]) Appt Note: 2 month f/up; R/S  
 [address] [facility] [address]  
[phone]  
  
   
 [address] [address] 5/16/2018 12:00 PM  
ROUTINE CARE with Michael Amezquita MD  
[facility]) Appt Note: 6 month follow up visit [place] [address] [facility] [address]  
[address] [facility] [address] [address] Upcoming Health Maintenance Date Due DTaP/Tdap/Td series (1 - Tdap) 7/27/1946 OSTEOPOROSIS SCREENING (DEXA) 7/27/1990 Pneumococcal 65+ Low/Medium Risk (2 of 2 - PPSV23) 12/7/2017 MEDICARE YEARLY EXAM 5/11/2018 GLAUCOMA SCREENING Q2Y 8/9/2018 Allergies as of 12/28/2017  Review Complete On: 11/20/2017 By: Dorinda Perez MD  
  
 Severity Noted Reaction Type Reactions Novacare High 05/18/2015    Other (comments) PARALYSIS; \"NOVACAINE\" not novacare. Phenergan [Promethazine] High 02/16/2015   Systemic Other (comments) \"loose my wits i go crazy\" Nsaids (Non-steroidal Anti-inflammatory Drug) Medium 02/16/2015   Systemic Nausea and Vomiting Adhesive Tape-silicones  43/86/6719    Other (comments) BLISTERS Atenolol  05/18/2015    Nausea and Vomiting Bactrim [Sulfamethoprim Ds]  05/18/2015    Nausea and Vomiting Cymbalta [Duloxetine]  05/18/2015    Other (comments) CONFUSION Digoxin  05/18/2015    Nausea and Vomiting Gluten  05/14/2015    Other (comments) GLUTEN INTOLERANCE Macrobid [Nitrofurantoin Monohyd/m-cryst]  05/18/2015    Nausea and Vomiting Sulfa (Sulfonamide Antibiotics)  05/18/2015    Nausea and Vomiting Codeine Low 02/16/2015   Systemic Other (comments) Unable to swallow Current Immunizations  Reviewed on 1/28/2016 Name Date Influenza High Dose Vaccine PF 10/10/2017, 10/12/2016, 10/20/2015 11:52 AM  
 Pneumococcal Conjugate (PCV-13) 12/7/2016 Zoster Vaccine, Live 3/22/2012 Not reviewed this visit Vitals OB Status Smoking Status Postmenopausal Never Smoker Preferred Pharmacy Pharmacy Name Phone [facility]Y [id number],  [facility] [phone] Your Updated Medication List  
  
   
This list is accurate as of: 12/28/17 10:24 AM.  Always use your most recent med list.  
  
  
  
  
 ALIGN PO Take 1 Tab by mouth daily. cetirizine 10 mg tablet Commonly known as:  ZYRTEC Take 10 mg by mouth daily. cyanocobalamin 1,000 mcg tablet Take 1,000 mcg by mouth daily. fentaNYL 25 mcg/hr PATCH Commonly known as:  DURAGESIC  
1 Patch by TransDERmal route every seventy-two (72) hours. Max Daily Amount: 1 Patch. Indications: Chronic Pain with Opioid Tolerance  
  
 gabapentin 300 mg capsule Commonly known as:  NEURONTIN Take 300 mg by mouth. 1 cap in the morning, 1 cap with lunch, 2 caps in the evening  
  
 leflunomide 20 mg tablet Commonly known as:  Haylee Nieves Take 1 Tab by mouth daily for 90 days. Take half tab daily for 7 days and then one tab if tolerated  
  
 metoprolol tartrate 25 mg tablet Commonly known as:  LOPRESSOR Take 1 Tab by mouth two (2) times a day. NexIUM 40 mg capsule Generic drug:  esomeprazole Take 40 mg by mouth daily. nitroglycerin 0.4 mg SL tablet Commonly known as:  NITROSTAT  
1 Tab by SubLINGual route every five (5) minutes as needed for Chest Pain. nystatin topical cream  
Commonly known as:  MYCOSTATIN Apply  to affected area two (2) times a day. ondansetron hcl 8 mg tablet Commonly known as:  Azul Laser Take 8 mg by mouth every eight (8) hours as needed for Nausea. REMERON 15 mg tablet Generic drug:  mirtazapine Take 15 mg by mouth nightly. spironolactone 25 mg tablet Commonly known as:  ALDACTONE Take 1 Tab by mouth daily. topiramate 25 mg tablet Commonly known as:  TOPAMAX Take 1 Tab by mouth daily (with breakfast). traMADol 50 mg tablet Commonly known as:  ULTRAM  
Take 50 mg by mouth two (2) times daily as needed for Pain. VITAMIN D3 1,000 unit tablet Generic drug:  cholecalciferol Take 1,000 Units by mouth daily. XARELTO 15 mg Tab tablet Generic drug:  rivaroxaban Take 15 mg by mouth daily (with breakfast). Introducing [facility]! Dear Butch Mtz: 
Thank you for requesting a [employer] account. Our records indicate that you already have an active [employer] account. You can access your account anytime at [email or web address] Did you know that you can access your hospital and ER discharge instructions at any time in [employer]? You can also review all of your test results from your hospital stay or ER visit. Additional Information If you have questions, please visit the Frequently Asked Questions section of the [employer] website at [email or web address] Remember, [employer] is NOT to be used for urgent needs. For medical emergencies, dial 911. Now available from your iPhone and Android! Please provide this summary of care documentation to your next provider. Your primary care clinician is listed as CORNELIA NICK. If you have any questions after today's visit, please call [phone].

## 2017-12-29 ENCOUNTER — PATIENT OUTREACH (OUTPATIENT)
Dept: INTERNAL MEDICINE CLINIC | Age: 82
End: 2017-12-29

## 2017-12-29 NOTE — PROGRESS NOTES
Whitney in admissions at [facility] contacted this NN Tricia Aguilar out of office today) requesting additional information needed for insurance to [facility] admission. Paperwork faxed as requested.

## 2018-01-02 DIAGNOSIS — M06.09 SERONEGATIVE RHEUMATOID ARTHRITIS OF MULTIPLE SITES (HCC): ICD-10-CM

## 2018-01-02 DIAGNOSIS — M48.061 SPINAL STENOSIS OF LUMBAR REGION WITHOUT NEUROGENIC CLAUDICATION: Primary | ICD-10-CM

## 2018-01-02 DIAGNOSIS — G89.29 OTHER CHRONIC PAIN: ICD-10-CM

## 2018-01-02 DIAGNOSIS — M19.91 PRIMARY OSTEOARTHRITIS, UNSPECIFIED SITE: ICD-10-CM

## 2018-01-02 RX ORDER — FENTANYL 25 UG/1
1 PATCH TRANSDERMAL
Qty: 10 PATCH | Refills: 0 | OUTPATIENT
Start: 2018-01-02

## 2018-01-02 RX ORDER — GABAPENTIN 300 MG/1
CAPSULE ORAL
Qty: 120 CAP | Refills: 5 | Status: SHIPPED | OUTPATIENT
Start: 2018-01-02 | End: 2018-04-25 | Stop reason: DRUGHIGH

## 2018-01-02 NOTE — PROGRESS NOTES
1/2/18: Contacted admissions department at [facility] and verified that she has been in touch with [facility] today and is still awaiting their authorization. Apparently [facility] is backed up due to the holiday and she will hopefully receive a call from them today in order to provide them with clinicals.

## 2018-01-02 NOTE — TELEPHONE ENCOUNTER
From: Lorena Mcleod  To: Shannan Castro MD  Sent: 1/2/2018 7:35 AM EST  Subject: Medication Renewal Request    Original authorizing provider: MD Katie Johnson would like a refill of the following medications:  fentaNYL (DURAGESIC) 25 mcg/hr PATCH Shannan Castro MD]    Preferred pharmacy: [facility]/PHARMACY [address] [place] [address] Rd:

## 2018-01-03 RX ORDER — FENTANYL 25 UG/1
1 PATCH TRANSDERMAL
Qty: 10 PATCH | Refills: 0 | Status: CANCELLED | OUTPATIENT
Start: 2018-01-03

## 2018-01-03 NOTE — PROGRESS NOTES
1/3: Received a call from Chandan in admissions at [facility] stating that pt's insurance authorized admission today. Notifying Dr. Tahira Tierney. Plan to f/u with SW at [facility] in a few weeks.

## 2018-01-17 ENCOUNTER — PATIENT OUTREACH (OUTPATIENT)
Dept: INTERNAL MEDICINE CLINIC | Age: 83
End: 2018-01-17

## 2018-01-17 NOTE — PROGRESS NOTES
Contacted [place] and verified that pt is still admitted. Attempted to contact [facility], unable to reach, left detailed VMM requesting a return call. Left detailed VMM for pt's son Ammy and sent a Dyyno message requesting an update on his mom's progress and discharge plan. 1/22: Contacted [facility] at [place]. She reports the patient may be discharged next week depending on her insurance coverage with [employer].

## 2018-01-31 ENCOUNTER — PATIENT OUTREACH (OUTPATIENT)
Dept: INTERNAL MEDICINE CLINIC | Age: 83
End: 2018-01-31

## 2018-01-31 NOTE — PROGRESS NOTES
Contacted [facility] and verified that the patient is still admitted. Attempted to contact [facility], but after call was transferred multiple times, call ended up at [facility], another . [facility] requesting her to have Cecil contact this NN. Upon review of [facility] records, it is noted that patient was admitted to [address] [place] [address] Rd 1/30 for chest pain, r/o MI. Most recent notes indicate that MI was ruled out and CP is most likely r/t chronic pain. Plan to follow up with patient after discharge. Updating Dr. Estela Mi.

## 2018-01-31 NOTE — Clinical Note
fyi-patient was still in [place] up until yesterday when she was admitted to [facility] for chest pain. MI has been ruled out.

## 2018-02-05 ENCOUNTER — PATIENT OUTREACH (OUTPATIENT)
Dept: INTERNAL MEDICINE CLINIC | Age: 83
End: 2018-02-05

## 2018-02-05 RX ORDER — ACETAMINOPHEN 325 MG/1
650 TABLET ORAL
COMMUNITY
End: 2018-04-26

## 2018-02-05 RX ORDER — METOPROLOL TARTRATE 50 MG/1
50 TABLET ORAL 2 TIMES DAILY
COMMUNITY
End: 2018-04-25 | Stop reason: DRUGHIGH

## 2018-02-05 RX ORDER — LIDOCAINE 50 MG/G
PATCH TOPICAL EVERY 24 HOURS
COMMUNITY
End: 2018-04-26

## 2018-02-05 NOTE — PROGRESS NOTES
Patient was admitted to [facility] 12/19-12/27 for enteritis and A Fib. She was discharged home with her son where she was quite debilitated, so she was admitted to [facility] for rehab. Patient was then admitted from [facility] to [facility] 1/30-2/1 for chest pain. Chest Pain: thought to be r/t chronic pain syndrome described as dull and located in middle of chest, associated with SOB, HR 130s on admission, O2 89%. Troponins and cardiac enzymes negative. Metoprolol increased and lidocaine patch added, hx CHF likely diastolic, echo with EF 45-47% this admission    Recent Colitis: being treated with Vancomycin, C diff culture not done, CT abd/pelvis negative for acute findings    PLAN: Discharged back to [facility]. MEDICATION CHANGES: metoprolol increased from 25 mg BID to 50 mg BID, Lidocaine patch added  Vancomycin dose and frequency not specified on discharge med list    2/6/18: After multiple attempts, contacted a nurse at [facility] and requested current medication list. She will fax to this Nurse Navigator. 2/7/18: Med list has not been received. Attempted again to contact [facility] to request medication list, transferred to the wrong wing, then transferred to correct wing where there was no answer. Will attempt to contact the  in a few weeks to discuss discharge plans.

## 2018-02-15 ENCOUNTER — PATIENT OUTREACH (OUTPATIENT)
Dept: INTERNAL MEDICINE CLINIC | Age: 83
End: 2018-02-15

## 2018-02-15 NOTE — PROGRESS NOTES
Contacted [place] and verified that patient is still admitted. Left detailed VMM for [facility] requesting a return call for an update on patient's progress and d/c plans.

## 2018-02-19 NOTE — PROGRESS NOTES
2/19/18: Contacted patient's son Hedy Schirmer and verified that the patient is still in [place]. He received a call from the  around 2:30 pm today and she stated they were planning to discharge the patient this Thursday. He expressed concerns that his mom was not doing well yesterday when he visited her, as she was very weak and could barely speak. He is concerned that she may have influenza, so the facility is now in the process of working on testing, to include a CXR. They will call him with an update. Plan to contact him tomorrow.

## 2018-02-20 NOTE — PROGRESS NOTES
2/20/18: Contacted pt's son Mikey Ford. Reports he saw the patient last night and his mom is still not feeling well,  CXR showed atelectasis but negative for PNA. The plan is for the staff to continue to monitor the pt and increase her activity since she is weak. Currently she is unable to get out of bed on her own. She will not be discharged on Thursday as previously planned. He is in agreement with this NN contacting him in the next couple of weeks for an update.

## 2018-03-06 ENCOUNTER — PATIENT OUTREACH (OUTPATIENT)
Dept: INTERNAL MEDICINE CLINIC | Age: 83
End: 2018-03-06

## 2018-03-06 NOTE — PROGRESS NOTES
Contacted [place] and verified that the patient is still admitted. Attempted to contact . Unable to reach. Left detailed M for [facility] requesting a return call to discuss pt's progress and discharge plans. 3/7/18: Contacted patient's son [facility]. He reports he attended a care plan meeting yesterday with the staff at [place]. CXR showed bronchitis, so she is receiving breathing treatments. Possible discharge next week depending on her progress. The patient cannot stand on her own at this point in time and he feels he would need additional assistance at home. He works from home, but does have to leave at times for work. He talked to the  about his options and understands that the patient would need to have < $4,000 in assets in order to apply for Medicaid. Discussed the option of hiring personal care aides and cost of $20-23 per hour. Encouraged him to request a home health company that has a  once the patient is discharged. He states it would be difficult to get the patient out of the house to see Dr. Carlos Canales, so the option of transitioning her into Home Based Primary Care was discussed and he would like to pursue this option. He will call this NN once he knows of plan for discharge. Provided him with this NN's direct number.     Inbox message sent to Daniel Abel with the [email or web address] team inquiring about potential referral to their program.

## 2018-03-26 ENCOUNTER — PATIENT OUTREACH (OUTPATIENT)
Dept: INTERNAL MEDICINE CLINIC | Age: 83
End: 2018-03-26

## 2018-03-26 NOTE — PROGRESS NOTES
Hospital Discharge Follow-Up      Date/Time:  3/27/2018 9:32 AM    Patient was admitted to [facility] 12/19-12/27 for enteritis and A Fib. She was discharged home with her son where she was quite debilitated, so she was admitted to [facility] for rehab.      Patient was then admitted from [facility] to [facility] 1/30-2/1 for chest pain. She was discharged back to [facility] 2/1-3/25. Patient was readmitted to [facility] 3/26 for significant debility after arriving home [place] 3/25. Received inbox messages from Sandrita Seagraves with [email or web address] and from [place] with [facility]. intake. Updated them on patient's current status and admission back to [facility] yesterday. [employer] Renaldo will have their PSR investigate to see if they can accept pt's insurance. Attempted to contact pt's son Dipak Cunningham. Unable to reach. Left detailed M requesting a return call. Contacted pt's daughter in law Lindsay, listed on HIPAA, and introduced self. She reports the patient came home [place] 3/25 and was still very debilitated, so they took her back to [facility] where she was transported to [facility]. Contacted [place] and verified that the patient was admitted there on 3/26 and has a UTI.  Spoke to the [place], introduced self and role, and updated her on plan for [email or web address] to evaluate pt's insurance and whether or not they would be able to accept her into their program.

## 2018-03-28 ENCOUNTER — NURSE NAVIGATOR (OUTPATIENT)
Dept: PALLATIVE CARE | Age: 83
End: 2018-03-28

## 2018-03-28 ENCOUNTER — TELEPHONE (OUTPATIENT)
Dept: PALLATIVE CARE | Age: 83
End: 2018-03-28

## 2018-03-28 NOTE — PROGRESS NOTES
Home Based Primary Care & Supportive Services   (previously: At Home)  [address]  [facility] [address]    Received inquiry from JUAN PABLO Lizarraga, [place] in Dr. Reyes Perkins office asking if pt would meet criteria for Home Based Primary Care. Chart reviewed, pt meets clinical criteria. Our PSR Rosemary Holder called pt's [employer] insurance. Dr. Denny Garnica is in-network with [facility] so we could see pt in Home Based [place] [address], before we can get a prior authorization for visits, the patient or family has to call [facility] customer service, [phone] and request to change patient's PCP to Dr. Obdulia Mendenhall. Staff message sent to Zia relaying above information.     Daniel Abel RN  Referral Navigator, Home Based Primary Care [facility] Supportive Services

## 2018-03-28 NOTE — TELEPHONE ENCOUNTER
Calling insurance company to see if Dr. Shelia Rose can be PCP for patient for [facility]. According to Eitan Gaviria with [employer] Dr. Eduard Felty can be PCP once patient calls them to change her PCP however for CPT Code 24359 a prior authorization would be needed/required. Must call [phone] option 3.     Call reference no.  [id number]

## 2018-03-29 ENCOUNTER — PATIENT OUTREACH (OUTPATIENT)
Dept: INTERNAL MEDICINE CLINIC | Age: 83
End: 2018-03-29

## 2018-03-29 NOTE — PROGRESS NOTES
Reviewed [facility] records and verified that patient was seen in [facility] ED on 3/26 with c/o abdominal pain, R knee pain, and inability to walk. Her son noted that [place] PT notes did not accurately portray the patient's ability to ambulate. Urine cx pending on discharge, resulted 3/28 + for E coli. Results faxed to [facility] BETHANYDARLENE, fax confirmation received. Rx given for Keflex 500 mg BID x 7 days. Patient was discharged back to [facility] for more rehab. Received notification from Ilda Horner with the [address] [place] [address]) program stating that they do accept patient's insurance. The patient's son will need to contact [address] [place] [address]  customer service at [phone] and notify them of PCP change to Dr. Omid Francois before they would be able to initiate a home visit. Notified Emir Re that the patient was readmitted to [facility] and this NN will keep her updated on discharge plans. Contacted pt's son [facility] to obtain an update. He reports when the pt was d/c from [facility], the PT notes indicated that pt was able to ambulate 50-60 feet with a walker and that she was independent with transferring from supine to sitting; however, when he picked her up from rehab, the pt needed full assistance with getting in the car and once home was not even able to stand up due to her legs giving out. The pt has been experiencing nausea since she was d/c and they are not sure if this is r/t the UTI or something else. He gave her ondansetron at home and this was not effective. The SLP from [facility] contacted [facility] and notified him that the pt has not been eating or drinking and has been withdrawn. [facility] will visit the pt today. He will keep this NN updated with pt's progress. Notified him of option for [facility] after discharge, pending pt's progress and discharge plans. He verbalized understanding.

## 2018-04-03 ENCOUNTER — PATIENT OUTREACH (OUTPATIENT)
Dept: INTERNAL MEDICINE CLINIC | Age: 83
End: 2018-04-03

## 2018-04-03 NOTE — PROGRESS NOTES
Received a call from pt's son Robles Be with an update on pt's status. He reports the pt is still not doing well. She still c/o nausea, so Dr. Marcio Welch had discontinued some medications including Zyrtec and Fentanyl patch. They will monitor the pt for s/s withdrawal and will taper the patch if needed. The patch was removed yesterday and the pt had \"perked up\" today. She is still depressed and told PT that she was ready to die. She has not been taking her medications, which means she has been off of Remeron. The facility does not have a psychiatrist that makes rounds. She has not been eating or drinking much, so she was receiving hydration via a subcutaneous catheter in her abdomen, but this has been discontinued and she is now drinking fluids PO. Robles Be is confident in Dr. Marion Mora judgement, as the pt was seeing him as her PCP prior to Dr. Antonieta Saavedra. He will keep this NN updated.

## 2018-04-17 ENCOUNTER — TELEPHONE (OUTPATIENT)
Dept: INTERNAL MEDICINE CLINIC | Age: 83
End: 2018-04-17

## 2018-04-17 ENCOUNTER — PATIENT OUTREACH (OUTPATIENT)
Dept: INTERNAL MEDICINE CLINIC | Age: 83
End: 2018-04-17

## 2018-04-17 DIAGNOSIS — M48.061 SPINAL STENOSIS OF LUMBAR REGION WITHOUT NEUROGENIC CLAUDICATION: Primary | ICD-10-CM

## 2018-04-17 RX ORDER — TRAMADOL HYDROCHLORIDE 50 MG/1
50 TABLET ORAL
Qty: 60 TAB | Refills: 2 | Status: SHIPPED | OUTPATIENT
Start: 2018-04-17 | End: 2018-06-06 | Stop reason: ALTCHOICE

## 2018-04-17 RX ORDER — HYDROMORPHONE HYDROCHLORIDE 2 MG/1
1 TABLET ORAL 3 TIMES DAILY
Qty: 45 TAB | Refills: 0 | Status: SHIPPED | OUTPATIENT
Start: 2018-04-17 | End: 2018-04-25 | Stop reason: DRUGHIGH

## 2018-04-17 RX ORDER — NALOXONE HYDROCHLORIDE 4 MG/.1ML
SPRAY NASAL
Qty: 2 EACH | Refills: 0 | Status: SHIPPED | OUTPATIENT
Start: 2018-04-17 | End: 2018-10-03

## 2018-04-17 NOTE — PROGRESS NOTES
Hospital Discharge Follow-Up      Date/Time:  2018 3:00 PM    Patient was admitted to [facility] - for enteritis and A Fib. She was discharged home initially and then admitted to [facility] for rehab due to debility.      Patient was then admitted from [facility] to [facility] - for chest pain. She was discharged back to [facility] -3/25.     Patient was readmitted to [facility] 3/26- for significant debility      The physician discharge summary was not available at the time of outreach. Patient's son Brisa Ray was contacted within 2 business days of discharge. Top Challenges reviewed with the provider   General Debility: pt now requires more assistance at home, lives with son Daniel Holder, he is working with SW at [facility] to receive more assistance with her care         Method of communication with provider :[facility] provider can see this note in chart when she visits patient    Inpatient RRAT score: n/a  Was this a readmission? no   Patient stated reason for the readmission: n/a    Nurse Navigator (NN) contacted the patient's son Brisa Ray by telephone to perform post hospital discharge assessment. Verified name and  with family as identifiers. Provided introduction to self, and explanation of the Nurse Navigator role. The family agrees to contact the PCP office for questions related to their healthcare. NN provided contact information for future reference. Summary of patients top problems:    1.  General Debility: patient now requires 2 person assist, son is working with  at [facility] to obtain a hospital bed, Alexsandra Bolivar will pay for 35 hours per week for a [facility] aide and  will assist with this as well, instructions provided to Daniel Holder to change PCP with Alexsandra Bolivar in order to initiate [facility] referral      Home Health orders at discharge: PT, OT, [address]: [facility]  Date of initial visit:     [employer] ordered/company: hospital bed/unknown  [employer] received: in process  Ammy is currently meeting with Romel Cortez at [facility] to obtain bed    Barriers to care? significant mobility limitations-instructed Ammy to contact [employer] to change PCP to Dr. Gerardo Hodges, then this NN will inititate referral to Andreea:   Does patient have an Advance Directive:  reviewed and current-[facility]    Medication:       4/17: Unable to perform medication reconciliation due to other concerns being addressed. [facility] for Medical Records at [facility] requesting discharge medication list, which has not been received.    4/25: Received discharge medication list and updated Med Rec. New Medications: Duoneb prn, Robitussin prn, Senna  Changed Medications: spironolactone decreased from 1 tab to 1/2 tab daily, metoprolol changed to XL and decreased from 50 mg to 25 mg BID  Discontinued Medications: Fentanyl patch, Remeron    Referral to Pharm D needed: no     Current Outpatient Prescriptions   Medication Sig    gabapentin (NEURONTIN) 300 mg capsule Take 300 mg by mouth two (2) times a day.  gabapentin (NEURONTIN) 300 mg capsule Take 600 mg by mouth nightly.  HYDROmorphone (DILAUDID) 2 mg tablet Take 1 mg by mouth daily.  HYDROmorphone (DILAUDID) 2 mg tablet Take 1 mg by mouth two (2) times daily as needed for Pain.  albuterol-ipratropium (DUO-NEB) 2.5 mg-0.5 mg/3 ml nebu 3 mL by Nebulization route every eight (8) hours as needed.  metoprolol succinate (TOPROL-XL) 25 mg XL tablet Take 25 mg by mouth two (2) times a day.  spironolactone (ALDACTONE) 25 mg tablet Take 12.5 mg by mouth daily.  guaiFENesin (ROBITUSSIN) 100 mg/5 mL liquid Take 200 mg by mouth three (3) times daily as needed for Cough.  senna-docusate (SENNA PLUS) 8.6-50 mg per tablet Take 2 Tabs by mouth daily.  traMADol (ULTRAM) 50 mg tablet Take 1 Tab by mouth two (2) times daily as needed for Pain. Max Daily Amount: 100 mg.     acetaminophen (TYLENOL) 325 mg tablet Take 650 mg by mouth every six (6) hours as needed for Pain.  lidocaine (LIDODERM) 5 % by TransDERmal route every twenty-four (24) hours. Apply patch to the affected area for 12 hours a day and remove for 12 hours a day.  BIFIDOBACTERIUM INFANTIS (ALIGN PO) Take 1 Tab by mouth daily.  cyanocobalamin 1,000 mcg tablet Take 1,000 mcg by mouth daily.  nitroglycerin (NITROSTAT) 0.4 mg SL tablet 1 Tab by SubLINGual route every five (5) minutes as needed for Chest Pain.  topiramate (TOPAMAX) 25 mg tablet Take 1 Tab by mouth daily (with breakfast).  XARELTO 15 mg tab tablet Take 15 mg by mouth daily (with breakfast).  ondansetron hcl (ZOFRAN) 8 mg tablet Take 8 mg by mouth every eight (8) hours as needed for Nausea.  cholecalciferol (VITAMIN D3) 1,000 unit tablet Take 1,000 Units by mouth daily.  esomeprazole (NEXIUM) 40 mg capsule Take 40 mg by mouth daily.  naloxone (NARCAN) 4 mg/actuation nasal spray Use 1 spray intranasally into 1 nostril. Use a new Narcan nasal spray for subsequent doses and administer into alternating nostrils. May repeat every 2 to 3 minutes as needed.  nystatin (MYCOSTATIN) topical cream Apply  to affected area two (2) times a day.  cetirizine (ZYRTEC) 10 mg tablet Take 10 mg by mouth daily. No current facility-administered medications for this visit.         Medications Discontinued During This Encounter   Medication Reason    gabapentin (NEURONTIN) 300 mg capsule Dose Adjustment    HYDROmorphone (DILAUDID) 2 mg tablet Dose Adjustment    metoprolol tartrate (LOPRESSOR) 50 mg tablet Dose Adjustment    spironolactone (ALDACTONE) 25 mg tablet Dose Adjustment    fentaNYL (DURAGESIC) 25 mcg/hr PATCH Discontinued by Another Clinician    mirtazapine (REMERON) 15 mg tablet Discontinued by Another Clinician    VANCOMYCIN HCL (VANCOMYCIN PO) Therapy Completed       PCP/Specialist follow up:   Future Appointments  Date Time Provider Irais Madera   5/16/2018 12:00 PM MD MARTIN Rivera has contacted [facility] and changed PCP to Dr. Natanael Lopes. This NN left a detailed VMM for Dean Marino, Referral Coordinator with [facility], notifying her that PCP has been updated so they can proceed with a home visit. Goals      Patient will have access to PCP (pt-stated)            4/25/18: Dinorah Watson has sent a MyChart visit request to [address] [place] [address]. This NN left a detailed VMM with Dean Marino and sent an urgent referral fax along with ED report. -SRW    4/24/18: Patient's son Dinorah Watson has updated PCP with insurance company to Dr. Natanael Lopes.  Left detailed VMM with Dean Marino, Referral Coordinator with [facility], notifying her that they can proceed with a home visit. -SRW

## 2018-04-17 NOTE — Clinical Note
FYI- plan is to transition patient into [address] [place] [address] where Dr. Annmarie Sánchez will visit the patient at home since it's become difficult to get her out of the house with her decline in mobility.

## 2018-04-17 NOTE — TELEPHONE ENCOUNTER
----- Message from Josue Reza sent at 4/17/2018  7:24 AM EDT -----  Regarding: FW: Prescription Question  Contact: [phone]      ----- Message -----     From: Adalgisa Koehler     Sent: 4/16/2018   7:20 PM       To: [facility] [place] [facility]  Subject: Prescription Question                            Dr. Kathleen Bazan,    During her recent stay at [facility], Dr. Anitha Stroud adjusted my mother's prescription regime somewhat. He discontinued the Fentanyl patch and put her on a low dose of hydromorphone (Dilaudid). Dr. Jannette Fischer prescription calls for a 2 mg hydromorphone cut in half, taken three times per day. If I have my math right, that amounts to (23) 2 mg tabs per month, so we will need a prescription for that. Also, he has prescribed 50 mg Tramadol twice per day. You have been prescribing the Tramadol already, but I didn't see it listed on the prescription refill form here on this site, so we will need a prescription for that as well.     Many thanks,    Samantha Vides

## 2018-04-24 ENCOUNTER — PATIENT OUTREACH (OUTPATIENT)
Dept: INTERNAL MEDICINE CLINIC | Age: 83
End: 2018-04-24

## 2018-04-24 NOTE — PROGRESS NOTES
Contacted pt's son Haylee Watson to notify him that this NN LVMM for Marcy Ends with [address] notifying them that they can proceed with a home visit. He reports the patient woke up this morning delirious, very weak, c/o feeling cold, temp at baseline is 96 but during call is 98, dry cough with baseline SOB. Requesting [facility] visit. [address] [facility] and provided them with the above information. They will contact Haylee Watson to obtain more information and proceed from there. This NN will follow up with Haylee Olds tomorrow.

## 2018-04-25 ENCOUNTER — TELEPHONE (OUTPATIENT)
Dept: PALLATIVE CARE | Age: 83
End: 2018-04-25

## 2018-04-25 ENCOUNTER — PATIENT OUTREACH (OUTPATIENT)
Dept: INTERNAL MEDICINE CLINIC | Age: 83
End: 2018-04-25

## 2018-04-25 ENCOUNTER — NURSE NAVIGATOR (OUTPATIENT)
Dept: PALLATIVE CARE | Age: 83
End: 2018-04-25

## 2018-04-25 ENCOUNTER — PATIENT MESSAGE (OUTPATIENT)
Dept: PALLATIVE CARE | Age: 83
End: 2018-04-25

## 2018-04-25 DIAGNOSIS — I48.0 PAROXYSMAL ATRIAL FIBRILLATION (HCC): ICD-10-CM

## 2018-04-25 DIAGNOSIS — K57.30 DIVERTICULOSIS OF LARGE INTESTINE WITHOUT PERFORATION OR ABSCESS WITHOUT BLEEDING: Primary | ICD-10-CM

## 2018-04-25 DIAGNOSIS — R63.0 DECREASED APPETITE: ICD-10-CM

## 2018-04-25 DIAGNOSIS — M35.3 PMR (POLYMYALGIA RHEUMATICA) (HCC): ICD-10-CM

## 2018-04-25 DIAGNOSIS — M06.09 SERONEGATIVE RHEUMATOID ARTHRITIS OF MULTIPLE SITES (HCC): ICD-10-CM

## 2018-04-25 DIAGNOSIS — R11.0 NAUSEA WITHOUT VOMITING: ICD-10-CM

## 2018-04-25 RX ORDER — AMOXICILLIN 250 MG
1 CAPSULE ORAL 2 TIMES DAILY
COMMUNITY
End: 2019-10-10

## 2018-04-25 RX ORDER — GABAPENTIN 300 MG/1
600 CAPSULE ORAL
COMMUNITY
End: 2018-05-14

## 2018-04-25 RX ORDER — SPIRONOLACTONE 25 MG/1
12.5 TABLET ORAL DAILY
COMMUNITY
End: 2018-05-02 | Stop reason: SDUPTHER

## 2018-04-25 RX ORDER — GUAIFENESIN 100 MG/5ML
200 SOLUTION ORAL
COMMUNITY
End: 2018-04-26

## 2018-04-25 RX ORDER — HYDROMORPHONE HYDROCHLORIDE 2 MG/1
1 TABLET ORAL
COMMUNITY
End: 2018-04-26 | Stop reason: DRUGHIGH

## 2018-04-25 RX ORDER — GABAPENTIN 300 MG/1
600 CAPSULE ORAL 2 TIMES DAILY
COMMUNITY
End: 2018-07-16 | Stop reason: SDUPTHER

## 2018-04-25 RX ORDER — METOPROLOL SUCCINATE 25 MG/1
25 TABLET, EXTENDED RELEASE ORAL 2 TIMES DAILY
COMMUNITY
End: 2018-04-26 | Stop reason: DRUGHIGH

## 2018-04-25 RX ORDER — IPRATROPIUM BROMIDE AND ALBUTEROL SULFATE 2.5; .5 MG/3ML; MG/3ML
3 SOLUTION RESPIRATORY (INHALATION)
COMMUNITY
End: 2018-04-26

## 2018-04-25 RX ORDER — HYDROMORPHONE HYDROCHLORIDE 2 MG/1
1 TABLET ORAL 3 TIMES DAILY
COMMUNITY
End: 2018-06-19 | Stop reason: SDUPTHER

## 2018-04-25 NOTE — TELEPHONE ENCOUNTER
----- Message from [address]. Narinder Tomasz sent at 4/25/2018  9:02 AM EDT -----  Regarding: Non-Urgent Medical Question  Contact: [phone]  When could you or one of your colleagues visit Alexandre Khalil for an evaluation? Thanks!     Ana Lee

## 2018-04-25 NOTE — TELEPHONE ENCOUNTER
Nurse responded to email and also called pt's son Cherelle Malik regarding the Home Based Primary Care referral.  No answer, left message.

## 2018-04-25 NOTE — PROGRESS NOTES
Contacted patient's son Fariha Dunbar. He reports [facility] contacted him yesterday and referred the pt to the ED. Verified in [facility] records that patient went to [facility] ED 4/24 with c/o abdominal pain and nausea. Work up showed diverticulosis with possible diverticulitis. She was discharged home with no medications or follow up recommended. Today Fariha Dunbar reports she is still delirious saying \"Help me. I have a dent and I can't get it out. My tummy hurts\". She is still nauseous and is not eating. She is only sipping on small amounts of water. He has contacted [facility] to request a visit. Advised him this NN will contact them as well to update them with concerns. Left detailed VMM with Xenia Tierney requesting a visit. Contacted the main number and notified them of concerns. Faxed request for urgent visit along with ED report. Goals      Patient will have access to PCP (pt-stated)            4/25/18: Fariha Dunbar has sent a MyChart visit request to [address] [place] [address]. This NN left a detailed VMM with Xenia Tierney and sent an urgent referral fax along with ED report. -SRW    4/24/18: Patient's son Fariha Dunbar has updated PCP with insurance company to Dr. Kathleen Neumann.  Left detailed VMM with Xenia Tierney, Referral Coordinator with [facility], notifying her that they can proceed with a home visit. -SRW

## 2018-04-25 NOTE — TELEPHONE ENCOUNTER
Note: 4/25/18 - per Mckenzie Davis at [facility] - Provider is participating - PCP is Home Based Primary Care and SS.100% covered, no co-pay, $300. deductible. for [phone] and [phone] NO prior authorization/authorization is required as hodan as we are participating. Call ref: [id number]. cnc;  Also scanned in notes.

## 2018-04-26 ENCOUNTER — PATIENT OUTREACH (OUTPATIENT)
Dept: INTERNAL MEDICINE CLINIC | Age: 83
End: 2018-04-26

## 2018-04-26 ENCOUNTER — NURSE NAVIGATOR (OUTPATIENT)
Dept: PALLATIVE CARE | Age: 83
End: 2018-04-26

## 2018-04-26 RX ORDER — METOPROLOL TARTRATE 25 MG/1
25 TABLET, FILM COATED ORAL 2 TIMES DAILY
COMMUNITY
End: 2018-10-03

## 2018-04-26 NOTE — PROGRESS NOTES
Home Based Primary Care & Supportive Services   (previously: At Home)  [address]  [facility], [facility], [address]    Nurse called pt's son and caregiver Clement Journey to explain HBPC services and to complete intake questions. No answer, nurse left message for him to call our office at [phone].    (Nurse also called and left message with Mr. David Alexander yesterday and he has not returned call.)      Xenia Tierney RN  Referral Navigator, Home Based [facility]

## 2018-04-26 NOTE — PROGRESS NOTES
Brenden Koroma and completed med rec. Updated in chart. He did not fill the Duoneb solution since patient has not had any chest congestion or need for nebulizer. She has been using a \"pickle\", which he describes as a device like an IS. The patient has been using this to strengthen respiratory status. Reports baseline SOB is r/t CHF. Notified him that Jaxson Abrams from [facility] will be contacting him after they have a meeting today. Current Outpatient Prescriptions   Medication Sig    metoprolol tartrate (LOPRESSOR) 25 mg tablet Take 25 mg by mouth two (2) times a day.  gabapentin (NEURONTIN) 300 mg capsule Take 300 mg by mouth two (2) times a day.  gabapentin (NEURONTIN) 300 mg capsule Take 600 mg by mouth nightly.  HYDROmorphone (DILAUDID) 2 mg tablet Take 1 mg by mouth three (3) times daily.  spironolactone (ALDACTONE) 25 mg tablet Take 12.5 mg by mouth daily.  senna-docusate (SENNA PLUS) 8.6-50 mg per tablet Take 2 Tabs by mouth daily.  traMADol (ULTRAM) 50 mg tablet Take 1 Tab by mouth two (2) times daily as needed for Pain. Max Daily Amount: 100 mg.    BIFIDOBACTERIUM INFANTIS (ALIGN PO) Take 1 Tab by mouth daily.  cyanocobalamin 1,000 mcg tablet Take 1,000 mcg by mouth daily.  nitroglycerin (NITROSTAT) 0.4 mg SL tablet 1 Tab by SubLINGual route every five (5) minutes as needed for Chest Pain.  topiramate (TOPAMAX) 25 mg tablet Take 1 Tab by mouth daily (with breakfast).  XARELTO 15 mg tab tablet Take 15 mg by mouth daily (with breakfast).  ondansetron hcl (ZOFRAN) 8 mg tablet Take 8 mg by mouth every eight (8) hours as needed for Nausea.  cholecalciferol (VITAMIN D3) 1,000 unit tablet Take 1,000 Units by mouth daily.  esomeprazole (NEXIUM) 40 mg capsule Take 40 mg by mouth daily.  naloxone (NARCAN) 4 mg/actuation nasal spray Use 1 spray intranasally into 1 nostril. Use a new Narcan nasal spray for subsequent doses and administer into alternating nostrils.  May repeat every 2 to 3 minutes as needed. No current facility-administered medications for this visit. Medications Discontinued During This Encounter   Medication Reason    HYDROmorphone (DILAUDID) 2 mg tablet Dose Adjustment    albuterol-ipratropium (DUO-NEB) 2.5 mg-0.5 mg/3 ml nebu Not A Current Medication    metoprolol succinate (TOPROL-XL) 25 mg XL tablet Dose Adjustment    acetaminophen (TYLENOL) 325 mg tablet Not A Current Medication    lidocaine (LIDODERM) 5 % Not A Current Medication    guaiFENesin (ROBITUSSIN) 100 mg/5 mL liquid Not A Current Medication    cetirizine (ZYRTEC) 10 mg tablet Discontinued by Another Clinician    nystatin (MYCOSTATIN) topical cream Not A Current Medication       Goals Addressed             Most Recent     Patient will have access to PCP (pt-stated)   On track (4/17/2018)             4/26/18: Received VMM from Nella Rudolph stating she received the urgent referral. Bela Castillo will have a team meeting today to discuss referral. She has left a VMM on Zachery's number. Notified Darius Reaves of this update. -SRW    4/25/18Taliggy Mckenzie has sent a MyChart visit request to Bela Castillo. This NN left a detailed VMM with Nella Rudolph and sent an urgent referral fax along with ED report. -SRW    4/24/18: Patient's son Darius Reaves has updated PCP with insurance company to Dr. Zach Riley.  Left detailed VMM with Nella Rudolph, Referral Coordinator with [facility], notifying her that they can proceed with a home visit. -SRW

## 2018-04-27 ENCOUNTER — NURSE NAVIGATOR (OUTPATIENT)
Dept: PALLATIVE CARE | Age: 83
End: 2018-04-27

## 2018-04-27 ENCOUNTER — PATIENT OUTREACH (OUTPATIENT)
Dept: INTERNAL MEDICINE CLINIC | Age: 83
End: 2018-04-27

## 2018-04-27 NOTE — PROGRESS NOTES
Home Based Primary Care & Supportive Services   (previously: At Home)  [phone] 4101 [facility], [address] [place] [address], [address]    We received a HBPC & SS referral on Lilibeth Aldrich on  from Dr. Delmi Sebastian. We appreciate this referral.     The patient's case has been reviewed and _x__  Meets / ____ Does not Meet  the following criteria for a initial provider visit:    _x__  Patient's residence is within 20 miles of [address] [place] [address] address listed above  (8.6 miles)  _x___Patient is non-ambulatory (bedbound or wheel chair bound without ability to self-propel)  _x__ Patient's residence is determined to be a safe environment for the health care team  _x__ Patient has chronic care management health care needs    This patient also _x__ Meets / ___ Does not Meet a priority referral for:    _x__ Home Health integration  _x__ Post discharge follow up  _x__ High risk health care needs    This patient's referring provider has been notified of above.     PATIENT DEMOGRAPHICS     Lilibeth Aldrich  [address] [facility], [address] [place] [address]  Salvatore Johnson phone:  [phone]  7/27/1925     PRIMARY CARE PROVIDER:  Name: Sotero Mackenzie MD  Phone Number:  [phone]   Address: [facility], [address] [place] [address]     REFERRAL SOURCE CONTACT INFORMATION:  Name:  Dr. Delmi Sebastian     DIAGNOSIS:  Enteritis, diverticulosis of sigmoid colon,  a-fib, rheumatoid arthritis, polymyalgia rheumatica, CAD, debility     PRIMARY CARETAKER:  Name:  Elijah Corbett  Phone Devon Diamond  Address: [address] [facility], [address] [place] [address]  Relationship to the Patient: son    ACP:    Advance Care Planning 4/27/2018   Patient's Healthcare Decision Maker is: Named in scanned ACP document   Primary Decision Maker Name Elijah Corbett   Primary Decision Maker Phone Number [phone]   Primary Decision Maker Relationship to Patient Adult child   Secondary Decision Maker Name Brayden No   Secondary Decision Maker Phone Number [phone] Secondary Decision Maker Relationship to Patient Unknown   Confirm Advance Directive Yes, on file       HOME ENVIRONMENT:   2 story home, pt resides on first floor. Steps to enter home, no ramp    ADL's:  Medication Management: son puts medications into pill box and administers meds to pt  Transportation:    Stretcher/ambulance  Hygiene:    Dependent with bathing, dressing  Mobility: minimal ambulation with walker, pt sits in wheelchair for meals, otherwise is in bed     Falls within past 6 months:  no    DME:  Hospital bed, wheelchair, bedside commode, walker    HOME SERVICES:  Privately hired aides come twice weekly to bathe pt. PT, OT, SN through [address] [place] [address] Avenue:  Regular diet    SKIN:  Wound on leg, buttocks- dressing changes by [facility]    TOILETING:  Incontinent, wears briefs    DATE OF MOST RECENT CONTACT WITH A PROVIDER:  4/24/18  [facility] EMERGENCY [facility] ED    DATE OF MOST RECENT PCP OFFICE VISIT:  Dr. Wendi Mackenzie 11/15/17    DATE OF MOST RECENT SPECIALIST VISIT/ UPCOMING APPTS:  11/20/17 Dr. Graciela Cox, Rheumatology    WHAT BARRIERS DO YOU HAVE TO GETTING TO MD OFFICE:  Unable to walk, no ramp    RECENT ED VISITS:  4/24/18  [facility] EMERGENCY [facility] ED for 3 day history of abd pain and nausea. CT of abd/pelvis showed diverticulosis of sigmoid colon, could not exclude diverticulitis. RECENT HOSPITAL ADMISSIONS:     12/19/17-12/27/17 [facility] for enteritis and afib.  12/29-1/30/18 [facility]  1/30/18-2/1/18  [facility] for chest pain  2/1/18-3/25/18  [facility]  3/26-4/13/18  Patient was readmitted to [facility] for significant debility    INSURANCE:  Son (Dariel) called pt's [employer] Replacement insurance and changed PCP to [address] [place] [address]. Any provider within [address] [place] [address] can see pt, no prior auth needed. NOTES:  Nurse spoke with pt's son and caregiver Quan Devi. He is receptive to [address] [place] [address] services for his mother.     Amadeo Sargent, YADIN, RN-BC  Home Based Primary Care Referral Navigator  Phone:  [phone]    Fax: [phone]  [email or web address]

## 2018-04-27 NOTE — PROGRESS NOTES
Goals      Patient will have access to PCP (pt-stated)            4/27/18: Received a call from Jackeline Marrero stating that she has tried to reach pt's son Daniel Holder multiple times with no success. She gave permission to this NN to give Daniel Holder her direct phone number in case he is unable to reach their main line. Daniel Holder and provided him with this information. Encouraged him to call to schedule a visit. -SRW    4/26/18: Received VMM from Jackeline Marrero stating she received the urgent referral. Ariel Sánchez will have a team meeting today to discuss referral. She has left a VMM on Zachery's number. Notified Daniel Holder of this update. -SRW    4/25/18Mohamud Christina has sent a MyChart visit request to Ariel Sánchez. This NN left a detailed VMM with Jackeline Marrero and sent an urgent referral fax along with ED report. -SRW    4/24/18: Patient's son Daniel Holder has updated PCP with insurance company to Dr. Daniel Georges.  Left detailed VMM with Jackeline Marrero, Referral Coordinator with [facility], notifying her that they can proceed with a home visit. -SRW

## 2018-04-27 NOTE — PROGRESS NOTES
Home Based Primary Care & Supportive Services   (previously: At Home)  [phone] [facility], [facility], [address]    Call #3 to pt's son and caregiver Josue Romero regarding HBPC referral, no answer, left message. Nurse left messages on Mr. Lucita Seo on 4/25/28 and 4/26/18 also. Nurse called Lucita Rayray, RN, NN in Dr. Abeba Chaparro office. She says Mr. Marni Degroot usually answers his phone when she calls him. She says she will call him and ask him to call our office.     Bear Gonzalez RN  Referral Navigator, Home Based Primary Care & Supportive Services

## 2018-04-30 ENCOUNTER — TELEPHONE (OUTPATIENT)
Dept: FAMILY MEDICINE CLINIC | Age: 83
End: 2018-04-30

## 2018-05-02 ENCOUNTER — HOME VISIT (OUTPATIENT)
Dept: FAMILY MEDICINE CLINIC | Age: 83
End: 2018-05-02

## 2018-05-02 VITALS
RESPIRATION RATE: 16 BRPM | TEMPERATURE: 97.9 F | OXYGEN SATURATION: 92 % | HEART RATE: 95 BPM | DIASTOLIC BLOOD PRESSURE: 64 MMHG | SYSTOLIC BLOOD PRESSURE: 100 MMHG

## 2018-05-02 DIAGNOSIS — M06.09 SERONEGATIVE RHEUMATOID ARTHRITIS OF MULTIPLE SITES (HCC): ICD-10-CM

## 2018-05-02 DIAGNOSIS — Z76.89 ENCOUNTER TO ESTABLISH CARE: Primary | ICD-10-CM

## 2018-05-02 DIAGNOSIS — I50.9 CHF, STAGE C (HCC): ICD-10-CM

## 2018-05-02 DIAGNOSIS — M35.3 PMR (POLYMYALGIA RHEUMATICA) (HCC): ICD-10-CM

## 2018-05-02 RX ORDER — SPIRONOLACTONE 25 MG/1
12.5 TABLET ORAL DAILY
Qty: 60 TAB | Refills: 3 | Status: SHIPPED | OUTPATIENT
Start: 2018-05-02 | End: 2018-10-14

## 2018-05-02 RX ORDER — TOPIRAMATE 25 MG/1
25 TABLET ORAL
Qty: 90 TAB | Refills: 3 | Status: SHIPPED | OUTPATIENT
Start: 2018-05-02 | End: 2019-07-08 | Stop reason: SDUPTHER

## 2018-05-02 NOTE — PATIENT INSTRUCTIONS
Dear Vannesa Anderson ,    It was a pleasure seeing you at home today with [facility] ([phone])    Your stated goal: To get back to walking again. This is what we talked about:     1. Establishing Care  - Today we established care as your new primary care provider  - It was a pleasure to meet both you and Gloria Partida today  - Please contact us going for any questions or concerns  - A booklet was left in the home that briefly summarizes our visit  - We will repeat blood tests at an upcoming visit    2. Buttock Pain  - There was a sore there at one time  - No open wounds were found today  - It is important to change positions frequently both in the bed and wheelchair  - Barrier creams protect from shearing and also urine/feces against the skin    3. Health Maintenance  - Gloria Partida will work to obtain the dates of your most recent vaccines  - TDap vaccine was declined    Health Maintenance Due   Topic Date Due    DTaP/Tdap/Td series (1 - Tdap) [birth year]1946    Bone Densitometry (Dexa) Screening  07/27/1990    Pneumococcal 65+ Low/Medium Risk (2 of 2 - PPSV23) 12/07/2017       4. Advance Care Planning     This is what you have shared with us about Advance Care Planning  Advance Care Planning 4/27/2018   Patient's Healthcare Decision Maker is: Named in scanned ACP document   Primary Decision Maker Name Quan Devi   Primary Decision Maker Phone Number [phone]   Primary Decision Maker Relationship to Patient Adult child   Secondary Decision Maker Name Destiny Souza   Secondary Decision Maker Phone Number [phone]   Secondary Decision Maker Relationship to Patient Unknown   Confirm Advance Directive Yes, on file       Someone from the Home Based Primary Care Team will see you again in 2 week    If there are any concerns before that time, such as medication questions, worsening symptoms or a need to see a physician for an urgent or emergent situation; please call [phone].  A physician is also on call after our normal business hours of 8am to 5pm.     In order to serve you better, please allow up to 48 hours for prescription refills to be processed. Certain medications may require more paperwork or a written prescription that you may need to  from the office. We appreciate you letting us know of any refill requests as soon as possible. Sincerely,    The [facility] Team  Eustace Klein, MD Vale Epley, NP Francis How, RN       Learning About Skin Care for Immobile Adults  As we get older, our skin gets thinner and drier, so it is easier to damage. The chance of skin damage is higher for people who can't move much, or who spend most of their time in bed or in a wheelchair. The skin can develop rashes and sores, especially pressure injuries (also called pressure sores). These injuries are caused by constant pressure, which can limit the blood supply to the skin. Skin also can be damaged by sweat, feces, or urine, making pressure injuries more likely and harder to heal.  You can help protect the skin of the person you're caring for by checking it every day and by being careful when cleaning it. How do you watch for problems? Thin skin in older adults    1. As people age, their skin becomes thinner. Rash areas on the upper body    1. Rashes can develop in folds of skin on the torso, and in the creases of the armpits, elbows, and groin. Rash areas on the lower body    1. Rashes can develop in the areas around the knees, ankles, and toes. Rashes in the groin and anal areas    1. Rashes can develop in the groin and anal areas. What can you do to help prevent pressure injuries? You can help prevent pressure injuries by carefully turning the person every 2 hours. It relieves the pressure that can be placed on an area of the body when a person doesn't move for a long time. Let the person's doctor know if you see pressure injures.  The doctor or nurse may give you some advice about how to treat minor sores at home. Serious sores need more medical treatment. You may be able to use devices that help prevent pressure injuries. These include special cushions and mattresses. But they don't take the place of turning the person. What can you do to keep skin healthy? Keeping the person's skin clean and moisturized can help keep their skin healthy. · Help them bathe as often as needed to be clean and comfortable. When helping someone bathe:  ¨ Use gentle soap. ¨ Use warm (not hot) water. ¨ Wash gently with a washcloth. ¨ Pat the skin dry rather than rubbing. You also can offer the person a juan j cloth robe. Juan J cloth is a type of fabric often used for towels. It can help gently dry the skin. · Use moisturizing creams or lotions to keep the skin soft. If the skin is very dry, use a protective barrier cream, lotion, or ointment. These include over-the-counter lotions such as CeraVe and TriCeram. Some lotions are available by prescription. · Don't put moisturizers in creases and folds, such as those under the breasts and in the groin or on the stomach. These areas are already moist. More moisture can lead to rashes and infections. · A humidifier may help prevent dry skin. Make sure to clean the humidifier as directed. This can prevent mold, fungus, or bacteria from forming in the machine. · Good nutrition and plenty of fluids can also help the skin stay healthy and heal if it's damaged. Provide a healthy diet, with lots of protein and fruits and vegetables. Offer the person plenty of water. · When washing clothing and sheets, use mild detergents. Don't use fabric softeners. And try to have the person wear clothing made with soft fabrics, such as cotton (rather than wool). Where can you learn more? Go to http://anna-sampson.info/. Enter 207-788-219 in the search box to learn more about \"Learning About Skin Care for Immobile Adults. \"  Current as of: September 24, 2016  Content Version: 11.4  © 1403-5632 Healthwise, Incorporated. Care instructions adapted under license by [employer] (which disclaims liability or warranty for this information). If you have questions about a medical condition or this instruction, always ask your healthcare professional. [address] any warranty or liability for your use of this information.

## 2018-05-02 NOTE — MR AVS SNAPSHOT
[facility] [place] [address] [facility] [address] 
[phone] Patient: Elaine Estrada MRN: [id number] Visit Information Date & Time Provider Department Dept. Phone Encounter #  
 2018  2:00 PM Lloyd Colmenares, CRISTIN [address] [facility] [phone] [id number] Follow-up Instructions Return in about 2 weeks (around 2018), or if symptoms worsen or fail to improve, for Regular Follow-up. Follow-up and Disposition History Your Appointments 2018 12:00 PM  
ROUTINE CARE with Myles Baumgarten, MD  
[facility] Appt Note: 6 month follow up visit [address]is Turc[address] [facility] [address]  
[facility] [address] [facility] [address] [facility] [address] [address] Upcoming Health Maintenance Date Due DTaP/Tdap/Td series (1 - Tdap) 1946 Bone Densitometry (Dexa) Screening 1990 Pneumococcal 65+ Low/Medium Risk (2 of 2 - PPSV23) 2017 MEDICARE YEARLY EXAM 2018 Influenza Age 5 to Adult 2018 GLAUCOMA SCREENING Q2Y 2018 Allergies as of 2018  Review Complete On: 2018 By: Clifford Bishop RN Severity Noted Reaction Type Reactions Novacare High 2015    Other (comments) PARALYSIS; \"NOVACAINE\" not novacare. Phenergan [Promethazine] High 2015   Systemic Other (comments) \"loose my wits i go crazy\" Nsaids (Non-steroidal Anti-inflammatory Drug) Medium 2015   Systemic Nausea and Vomiting Adhesive Tape-silicones      Other (comments) BLISTERS Atenolol  2015    Nausea and Vomiting Bactrim [Sulfamethoprim Ds]  2015    Nausea and Vomiting Cymbalta [Duloxetine]  2015    Other (comments) CONFUSION Digoxin  2015    Nausea and Vomiting Gluten  2015    Other (comments) GLUTEN INTOLERANCE Macrobid [Nitrofurantoin Monohyd/m-cryst]  05/18/2015    Nausea and Vomiting  
 Sulfa (Sulfonamide Antibiotics)  05/18/2015    Nausea and Vomiting Codeine Low 02/16/2015   Systemic Other (comments) Unable to swallow Current Immunizations  Reviewed on 1/28/2016 Name Date Influenza High Dose Vaccine PF 10/10/2017, 10/12/2016, 10/20/2015 11:52 AM  
 Pneumococcal Conjugate (PCV-13) 12/7/2016 Zoster Vaccine, Live 3/22/2012 Not reviewed this visit You Were Diagnosed With   
  
 Codes Comments Encounter to establish care    -  Primary ICD-10-CM: Z76.89 
ICD-9-CM: V65.8 CHF, stage C ([address]     ICD-10-CM: I50.9 ICD-9-CM: 428.0 PMR (polymyalgia rheumatica) ([facility])     ICD-10-CM: M35.3 ICD-9-CM: 893 Seronegative rheumatoid arthritis of multiple sites [facility])     ICD-10-CM: M06.09 
ICD-9-CM: 714.0 Vitals BP Pulse Temp Resp SpO2 OB Status 100/64 (BP 1 Location: Left arm, BP Patient Position: Sitting) 95 97.9 °F (36.6 °C) (Temporal) 16 92% Postmenopausal  
 Smoking Status Never Smoker Preferred Pharmacy Pharmacy Name Phone [id number], [address] [place] [address] Street [phone] Your Updated Medication List  
  
   
This list is accurate as of 5/2/18 11:59 PM.  Always use your most recent med list.  
  
  
  
  
 ALIGN PO Take 1 Tab by mouth daily. cyanocobalamin 1,000 mcg tablet Take 1,000 mcg by mouth daily. * gabapentin 300 mg capsule Commonly known as:  NEURONTIN Take 300 mg by mouth two (2) times a day. * gabapentin 300 mg capsule Commonly known as:  NEURONTIN Take 600 mg by mouth nightly. HYDROmorphone 2 mg tablet Commonly known as:  DILAUDID Take 1 mg by mouth three (3) times daily. metoprolol tartrate 25 mg tablet Commonly known as:  LOPRESSOR Take 25 mg by mouth two (2) times a day. naloxone 4 mg/actuation nasal spray Commonly known as:  [employer] Use 1 spray intranasally into 1 nostril. Use a new Narcan nasal spray for subsequent doses and administer into alternating nostrils. May repeat every 2 to 3 minutes as needed. NexIUM 40 mg capsule Generic drug:  esomeprazole Take 40 mg by mouth daily. nitroglycerin 0.4 mg SL tablet Commonly known as:  NITROSTAT  
1 Tab by SubLINGual route every five (5) minutes as needed for Chest Pain. ondansetron hcl 8 mg tablet Commonly known as:  Frances Fanwood Take 8 mg by mouth every eight (8) hours as needed for Nausea. SENNA PLUS 8.6-50 mg per tablet Generic drug:  senna-docusate Take 2 Tabs by mouth daily. spironolactone 25 mg tablet Commonly known as:  ALDACTONE Take 0.5 Tabs by mouth daily. topiramate 25 mg tablet Commonly known as:  TOPAMAX Take 1 Tab by mouth daily (with breakfast). traMADol 50 mg tablet Commonly known as:  ULTRAM  
Take 1 Tab by mouth two (2) times daily as needed for Pain. Max Daily Amount: 100 mg. VITAMIN D3 1,000 unit tablet Generic drug:  cholecalciferol Take 1,000 Units by mouth daily. XARELTO 15 mg Tab tablet Generic drug:  rivaroxaban Take 15 mg by mouth daily (with breakfast). * Notice: This list has 2 medication(s) that are the same as other medications prescribed for you. Read the directions carefully, and ask your doctor or other care provider to review them with you. Prescriptions Sent to Pharmacy Refills  
 spironolactone (ALDACTONE) 25 mg tablet 3 Sig: Take 0.5 Tabs by mouth daily. Class: Normal  
 Pharmacy: [id number] Ph #: [phone] Route: Oral  
 topiramate (TOPAMAX) 25 mg tablet 3 Sig: Take 1 Tab by mouth daily (with breakfast).   
 Class: Normal  
 [id number] [address] #: [phone] Route: Oral  
  
Follow-up Instructions Return in about 2 weeks (around 5/16/2018), or if symptoms worsen or fail to improve, for Regular Follow-up. Patient Instructions Dear Jaci Barbosa , It was a pleasure seeing you at home today with [facility] ([phone]) Your stated goal: To get back to walking again. This is what we talked about: 1. Establishing Care - Today we established care as your new primary care provider - It was a pleasure to meet both you and Robert Gramajo today - Please contact us going for any questions or concerns - A booklet was left in the home that briefly summarizes our visit - We will repeat blood tests at an upcoming visit 2. Buttock Pain - There was a sore there at one time - No open wounds were found today - It is important to change positions frequently both in the bed and wheelchair - Barrier creams protect from shearing and also urine/feces against the skin 3. Health Maintenance Robin Agee will work to obtain the dates of your most recent vaccines 
- TDap vaccine was declined Health Maintenance Due Topic Date Due  
 DTaP/Tdap/Td series (1 - Tdap) 07/27/1946  Bone Densitometry (Dexa) Screening  07/27/1990  Pneumococcal 65+ Low/Medium Risk (2 of 2 - PPSV23) 12/07/2017 4. Advance Care Planning This is what you have shared with us about Advance Care Planning Advance Care Planning 4/27/2018 Patient's Healthcare Decision Maker is: Named in scanned ACP document Primary Decision Maker Name Brenda Horn Primary Decision Maker Phone Number [phone] Primary Decision Maker Relationship to Patient Adult child Secondary Decision Maker Name Tesha Maria Victoria Secondary Decision Maker Phone Number [phone] Secondary Decision Maker Relationship to Patient Unknown Confirm Advance Directive Yes, on file Someone from the Home Based Primary Care Team will see you again in 2 week If there are any concerns before that time, such as medication questions, worsening symptoms or a need to see a physician for an urgent or emergent situation; please call [phone]. A physician is also on call after our normal business hours of 8am to 5pm.  
 
In order to serve you better, please allow up to 48 hours for prescription refills to be processed. Certain medications may require more paperwork or a written prescription that you may need to  from the office. We appreciate you letting us know of any refill requests as soon as possible. Sincerely, The [facility] Team 
Cristal Rojas MD 
Richton Sensor, CRISTIN Bello RN Learning About Skin Care for Immobile Adults As we get older, our skin gets thinner and drier, so it is easier to damage. The chance of skin damage is higher for people who can't move much, or who spend most of their time in bed or in a wheelchair. The skin can develop rashes and sores, especially pressure injuries (also called pressure sores). These injuries are caused by constant pressure, which can limit the blood supply to the skin. Skin also can be damaged by sweat, feces, or urine, making pressure injuries more likely and harder to heal. 
You can help protect the skin of the person you're caring for by checking it every day and by being careful when cleaning it. How do you watch for problems? Thin skin in older adults 1. As people age, their skin becomes thinner. Rash areas on the upper body 1. Rashes can develop in folds of skin on the torso, and in the creases of the armpits, elbows, and groin. Rash areas on the lower body 1. Rashes can develop in the areas around the knees, ankles, and toes. Rashes in the groin and anal areas 1. Rashes can develop in the groin and anal areas. What can you do to help prevent pressure injuries? You can help prevent pressure injuries by carefully turning the person every 2 hours. It relieves the pressure that can be placed on an area of the body when a person doesn't move for a long time. Let the person's doctor know if you see pressure injures. The doctor or nurse may give you some advice about how to treat minor sores at home. Serious sores need more medical treatment. You may be able to use devices that help prevent pressure injuries. These include special cushions and mattresses. But they don't take the place of turning the person. What can you do to keep skin healthy? Keeping the person's skin clean and moisturized can help keep their skin healthy. · Help them bathe as often as needed to be clean and comfortable. When helping someone bathe: ¨ Use gentle soap. ¨ Use warm (not hot) water. ¨ Wash gently with a washcloth. ¨ Pat the skin dry rather than rubbing. You also can offer the person a juan j cloth robe. Juan J cloth is a type of fabric often used for towels. It can help gently dry the skin. · Use moisturizing creams or lotions to keep the skin soft. If the skin is very dry, use a protective barrier cream, lotion, or ointment. These include over-the-counter lotions such as CeraVe and TriCeram. Some lotions are available by prescription. · Don't put moisturizers in creases and folds, such as those under the breasts and in the groin or on the stomach. These areas are already moist. More moisture can lead to rashes and infections. · A humidifier may help prevent dry skin. Make sure to clean the humidifier as directed. This can prevent mold, fungus, or bacteria from forming in the machine. · Good nutrition and plenty of fluids can also help the skin stay healthy and heal if it's damaged. Provide a healthy diet, with lots of protein and fruits and vegetables. Offer the person plenty of water. · When washing clothing and sheets, use mild detergents.  Don't use fabric softeners. And try to have the person wear clothing made with soft fabrics, such as cotton (rather than wool). Where can you learn more? Go to http://anna-sampson.info/. Enter 190-778-529 in the search box to learn more about \"Learning About Skin Care for Immobile Adults. \" Current as of: September 24, 2016 Content Version: 11.4 © 4740-2584 [employer]. Care instructions adapted under license by [employer] (which disclaims liability or warranty for this information). If you have questions about a medical condition or this instruction, always ask your healthcare professional. [place] [address] any warranty or liability for your use of this information. Patient Instructions History Introducing [facility]! Dear Floretta Felty: 
Thank you for requesting a Zecco account. Our records indicate that you already have an active Zecco account. You can access your account anytime at [email or web address] Did you know that you can access your hospital and ER discharge instructions at any time in Zecco? You can also review all of your test results from your hospital stay or ER visit. Additional Information If you have questions, please visit the Frequently Asked Questions section of the Zecco website at [email or web address] Remember, Zecco is NOT to be used for urgent needs. For medical emergencies, dial 911. Now available from your iPhone and Android! Please provide this summary of care documentation to your next provider. Your primary care clinician is listed as Bobbi Hi. If you have any questions after today's visit, please call [phone].

## 2018-05-02 NOTE — PROGRESS NOTES
Home Based [facility]   [phone]  [address] [place] [address], [address] [place] [address] , [address]    RN Nursing Visit Note    Date of Initial Visit: 05/02/18    Diagnosis: Enteritis, diverticulosis of sigmoid colon,  a-fib, rheumatoid arthritis, polymyalgia rheumatica, CAD, debility    ACP:  Advance Care Planning 4/27/2018   Patient's Healthcare Decision Maker is: Named in scanned ACP document   Primary Decision Maker Name Ana Lee   Primary Decision Maker Phone Number [phone]   Primary Decision Maker Relationship to Patient Adult child   Secondary Decision Maker Name Natasha Smith   Secondary Decision Maker Phone Number [phone]   Secondary Decision Maker Relationship to Patient Unknown   Confirm Advance Directive Yes, on file       Nursing Assessment: Alexandre Khalil, 80year old female seen today to establish care with Home Based Primary Care. Patient was referred to our program by Dr. Etelvina José due to difficulty getting to the office. Team today included Bill Vásquez, NP and myself. Upon arrival patient sitting in the wheel chair in no apparent distress. Patient and son, Arnulfo Talavera present during the visit. Son reported that patient was reporting of abdominal pain and nausea - last episode yesterday. Primary Caregiver:  Son, Arnulfo Talavera    Social: Patient reside in single family, two stro home with her son, and cats. 3-4 steps to enter home. Home Environment:  -Ramp: None  -Fire Safety: Operable smoke alarms and fire extinguisher present in the home    Function: Patient need assistance with ADL's  -Fall Risk: Yes - last fall greater than 6 months ago    DME/Equipment:  Hospital bed  Wheel chair  Gait belt  The breather (incentive spirometer    Home Services:  [address]    Nutrition: - Regular textured food  Wt Readings from Last 3 Encounters:   11/20/17 131 lb (59.4 kg)   11/15/17 132 lb (59.9 kg)   10/23/17 134 lb (60.8 kg)       Skin: Warm, dry and intact.   Skin color appropriate for ethnicity    : Patient is incontinent of bowel and bladder - last BM yesterday - no constipation noted    Visit Vitals    /64 (BP 1 Location: Left arm, BP Patient Position: Sitting)    Pulse 95    Temp 97.9 °F (36.6 °C) (Temporal)    Resp 16    SpO2 92%       Medication Management:  Current Outpatient Prescriptions   Medication Sig    metoprolol tartrate (LOPRESSOR) 25 mg tablet Take 25 mg by mouth two (2) times a day.  gabapentin (NEURONTIN) 300 mg capsule Take 300 mg by mouth two (2) times a day.  gabapentin (NEURONTIN) 300 mg capsule Take 600 mg by mouth nightly.  HYDROmorphone (DILAUDID) 2 mg tablet Take 1 mg by mouth three (3) times daily.  spironolactone (ALDACTONE) 25 mg tablet Take 12.5 mg by mouth daily.  senna-docusate (SENNA PLUS) 8.6-50 mg per tablet Take 2 Tabs by mouth daily.  traMADol (ULTRAM) 50 mg tablet Take 1 Tab by mouth two (2) times daily as needed for Pain. Max Daily Amount: 100 mg.    BIFIDOBACTERIUM INFANTIS (ALIGN PO) Take 1 Tab by mouth daily.  topiramate (TOPAMAX) 25 mg tablet Take 1 Tab by mouth daily (with breakfast).  XARELTO 15 mg tab tablet Take 15 mg by mouth daily (with breakfast).  cholecalciferol (VITAMIN D3) 1,000 unit tablet Take 1,000 Units by mouth daily.  naloxone (NARCAN) 4 mg/actuation nasal spray Use 1 spray intranasally into 1 nostril. Use a new Narcan nasal spray for subsequent doses and administer into alternating nostrils. May repeat every 2 to 3 minutes as needed.  cyanocobalamin 1,000 mcg tablet Take 1,000 mcg by mouth daily.  nitroglycerin (NITROSTAT) 0.4 mg SL tablet 1 Tab by SubLINGual route every five (5) minutes as needed for Chest Pain.  ondansetron hcl (ZOFRAN) 8 mg tablet Take 8 mg by mouth every eight (8) hours as needed for Nausea.  esomeprazole (NEXIUM) 40 mg capsule Take 40 mg by mouth daily. No current facility-administered medications for this visit.         Test Results:  Lab Results   Component Value Date/Time    Sodium 146 (H) 11/20/2017 02:40 PM    Potassium 4.4 11/20/2017 02:40 PM    Chloride 105 11/20/2017 02:40 PM    CO2 22 11/20/2017 02:40 PM    Anion gap 8 05/23/2015 02:02 AM    Glucose 98 11/20/2017 02:40 PM    BUN 15 11/20/2017 02:40 PM    Creatinine 0.71 11/20/2017 02:40 PM    BUN/Creatinine ratio 21 11/20/2017 02:40 PM    GFR est AA 86 11/20/2017 02:40 PM    GFR est non-AA 74 11/20/2017 02:40 PM    Calcium 9.8 11/20/2017 02:40 PM       Lab Results   Component Value Date/Time    WBC 4.7 11/20/2017 02:40 PM    HGB 14.0 11/20/2017 02:40 PM    HCT 41.6 11/20/2017 02:40 PM    PLATELET 624 29/13/0091 02:40 PM    MCV 99 (H) 11/20/2017 02:40 PM       No results found for: XHEPCS, [id number], HCGAT    No results found for: MCACR, MCA1, MCA2, MCA3, MCAU, MCAU2, MCALPOCT    Lab Results   Component Value Date/Time    Hemoglobin A1c 6.1 05/14/2015 12:34 PM       Health Maintenance:  Health Maintenance Due   Topic Date Due    DTaP/Tdap/Td series (1 - Tdap) 07/27/1946    Bone Densitometry (Dexa) Screening  07/27/1990    Pneumococcal 65+ Low/Medium Risk (2 of 2 - PPSV23) 12/07/2017     Pill Count:  Dilaudid 2 mg #25 1/2 tablet on hand today - Last filled on 4/20/18 for # 45  Tramadol 50 mg # 35 on hand today - last filled on 4/20/18 for # 60  Tramadol 50 mg #30 on hand today - Last filled on 9/26/17 for # 60    Action Items:  1. Patient declined Tdap  2. Refills sent to local pharmacy  3. Son will obtain date of last pneumonia vaccine  4. Labs to be drawn at next visit  5.  NP recommend frequent reposition changes while sitting in wheel chair    Plan:  2  Week FU with Dr. Satish Reza - 5/14/18

## 2018-05-02 NOTE — PROGRESS NOTES
Home Based [address] [email or web address]   [phone]      Date of Current Visit: 05/03/18  Date of Initial [facility] Visit: 05/03/18     SUMMARY:   80 yr female referred to [facility] by Dr. West Rosenberg. She has a significant hx for seronegative RA, PMR of multiple joints, CAD, old MI, GERD and Afib. In November, was ambulating w/ walker. Per Jude Leblanc, in December his mother became ill and was hospitalized a [facility], [place] home, went to [facility] thru mid April then back home . Last seen approximately 1 week ago by Nabil Diez and sent to [facility] for work up of abdominal pain that did not require admission. Typical day as described by Adalgisaignacio Saundersfabio, the son, and Ms. Dm Rowe is that she sleeps in a hospital bed. Adalgisaignacio Benji assists with using the [facility] and dressing. She has coffee and a late breakfast. All meals are had in her room. Medication organization and administration are done by Jude Leblanc. .       Labs and Test results from [facility]                        Diagnosis: RA  Primary Caregiver: Jude Leblancgene  Home Environment:  -Ramp: no ramp  -Fire Safety: smoke alarms, fire estiguisher  Function: wheelchair  -Fall Risk: high  DME/Equipment: gait belt, wheelchair, walker, hospital bed, \"the breather\"  Home Services: Private aide to assist with bathing 2 times a week / [facility]   Nutrition: food preparred by sonJessica diet/liquids  Skin: intact  :  Medical Visits:  ED visits: 4/24/18 [facility] for abd pain  Admission: 2015     GOALS OF CARE / TREATMENT PREFERENCES:   GOALS OF CARE:  Patient / health care proxy stated goals: To get back to walking again.     TREATMENT PREFERENCES:   Code Status:  [] Attempt Resuscitation       [x] Do Not Attempt Resuscitation    Advance Care Planning:  Advance Care Planning 4/27/2018   Patient's Healthcare Decision Maker is: Named in scanned ACP document   Primary Decision Maker Name Tata Stewart   Primary Decision Maker Phone Number [phone] Primary Decision Maker Relationship to Patient Adult child   Secondary Decision Maker Name Preethi Gatica   Secondary Decision Maker Phone Number [phone]   Secondary Decision Maker Relationship to Patient Unknown   Confirm Advance Directive Yes, on file       DIAGNOSES:       ICD-10-CM ICD-9-CM    1. Encounter to establish care Z76.89 V65.8    2. CHF, stage C ([facility]) I50.9 428.0    3. PMR (polymyalgia rheumatica) ([facility]) M35.3 725    4. Seronegative rheumatoid arthritis of multiple sites [facility]) M06.09 714.0         PLAN:   Patient Instructions     Dear Thomas Mata ,    It was a pleasure seeing you at home today with Home Based Primary Care ([phone])    Your stated goal:    This is what we talked about:     1. Health Maintenance  Health Maintenance Due   Topic Date Due    DTaP/Tdap/Td series (1 - Tdap) [birth year]27/1946    Bone Densitometry (Dexa) Screening  07/27/1990    Pneumococcal 65+ Low/Medium Risk (2 of 2 - PPSV23) 12/07/2017       Advance Care Planning   This is what you have shared with us about Darby Umana. Planning 4/27/2018   Patient's Healthcare Decision Maker is: Named in scanned ACP document   Primary Decision Maker Name Tono Ortega   Primary Decision Maker Phone Number [phone]   Primary Decision Maker Relationship to Patient Adult child   Secondary Decision Maker Name Preethi Gatica   Secondary Decision Maker Phone Number [phone]   Secondary Decision Maker Relationship to Patient Unknown   Confirm Advance Directive Yes, on file       Someone from the Home Based Primary Care Team will see you again in 2 week    If there are any concerns before that time, such as medication questions, worsening symptoms or a need to see a physician for an urgent or emergent situation; please call [phone].  A physician is also on call after our normal business hours of 8am to 5pm.     In order to serve you better, please allow up to 48 hours for prescription refills to be processed. Certain medications may require more paperwork or a written prescription that you may need to  from the office. We appreciate you letting us know of any refill requests as soon as possible. Sincerely,    The Home Based Primary Care Team  MD Cecille Potter, CRISTIN Milan, JUAN PABLO         PRESCRIPTIONS GIVEN:     Medications Ordered Today   Medications    spironolactone (ALDACTONE) 25 mg tablet     Sig: Take 0.5 Tabs by mouth daily. Dispense:  60 Tab     Refill:  3    topiramate (TOPAMAX) 25 mg tablet     Sig: Take 1 Tab by mouth daily (with breakfast). Dispense:  90 Tab     Refill:  3         HISTORY:   Please see RN note from this current visit; history taken together in presence of patient/family in the home. CHIEF COMPLAINT:     Establish care    HPI/SUBJECTIVE:    The patient is: [x] Verbal / [] Nonverbal        As noted above    REVIEW OF SYSTEMS:     The following systems were [x] reviewed / [] unable to be reviewed  Systems: constitutional, ears/nose/mouth/throat, respiratory, gastrointestinal, genitourinary, musculoskeletal, integumentary, neurologic, psychiatric, endocrine. Positive findings noted below: intermittent abd pain/nausea     FUNCTIONAL ASSESSMENT:     Palliative Performance Scale (PPS): 50%     PSYCHOSOCIAL/SPIRITUAL SCREENING:      Any spiritual / Anabaptism concerns: [] Yes /  [x] No     Caregiver Burnout: [] Yes /  [x] No /  [] No Caregiver Present       PHYSICAL EXAM:     Wt Readings from Last 3 Encounters:   11/20/17 131 lb (59.4 kg)   11/15/17 132 lb (59.9 kg)   10/23/17 134 lb (60.8 kg)     Blood pressure 100/64, pulse 95, temperature 97.9 °F (36.6 °C), temperature source Temporal, resp. rate 16, SpO2 92 %. Last bowel movement:  yesterday    Constitutional:  female, adv.  Age, sitting up in WC, NAD  Eyes: pupils equal, anicteric  ENMT: no nasal discharge, moist mucous membranes  Cardiovascular: regular rhythm, distal pulses intact  Respiratory: breathing not labored, symmetric  Gastrointestinal: soft non-tender, +bowel sounds  Musculoskeletal: no deformity, no tenderness to palpation  Skin: warm, dry, enlarged DIP bilat. hands  Neurologic: following commands, moving all extremities  Psychiatric: full affect, no hallucinations  Other:     HISTORY:     Past Medical and Surgical History reviewed in [facility] on date of initial visit    Patient Active Problem List   Diagnosis Code    Osteoarthritis M19.90    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 ([facility] I25.111    CHF, stage C ([facility] I50.9    Lumbar spinal stenosis M48.061    Paroxysmal atrial fibrillation ([facility]) I48.0    Advance directive on file Z78.9    PMR (polymyalgia rheumatica) ([facility]) M35.3    Lower extremity edema R60.0    CKD (chronic kidney disease) stage 2, GFR 60-89 ml/min N18.2    Long term current use of systemic steroids Z79.52    Seronegative rheumatoid arthritis of multiple sites ([facility] M06.09    Long-term use of immunosuppressant medication Z79.899     Family History   Problem Relation Age of Onset    Other Mother      Intestinal obstruction    Cancer Father     Stroke Father     Heart Attack Brother     Cancer Brother     Cancer Brother     Anesth Problems Neg Hx       History reviewed, no pertinent family history. Social History   Substance Use Topics    Smoking status: Never Smoker    Smokeless tobacco: Never Used    Alcohol use No     Allergies   Allergen Reactions    Novacare Other (comments)     PARALYSIS; \"NOVACAINE\" not novacare.     Phenergan [Promethazine] Other (comments)     \"loose my wits i go crazy\"    Nsaids (Non-Steroidal Anti-Inflammatory Drug) Nausea and Vomiting    Adhesive Tape-Silicones Other (comments)     BLISTERS    Atenolol Nausea and Vomiting    Bactrim [Sulfamethoprim Ds] Nausea and Vomiting    Cymbalta [Duloxetine] Other (comments)     CONFUSION      Digoxin Nausea and Vomiting    Gluten Other (comments)     GLUTEN INTOLERANCE    Macrobid [Nitrofurantoin Monohyd/M-Cryst] Nausea and Vomiting    Sulfa (Sulfonamide Antibiotics) Nausea and Vomiting    Codeine Other (comments)     Unable to swallow      Current Outpatient Prescriptions   Medication Sig    spironolactone (ALDACTONE) 25 mg tablet Take 0.5 Tabs by mouth daily.  topiramate (TOPAMAX) 25 mg tablet Take 1 Tab by mouth daily (with breakfast).  metoprolol tartrate (LOPRESSOR) 25 mg tablet Take 25 mg by mouth two (2) times a day.  gabapentin (NEURONTIN) 300 mg capsule Take 300 mg by mouth two (2) times a day.  gabapentin (NEURONTIN) 300 mg capsule Take 600 mg by mouth nightly.  HYDROmorphone (DILAUDID) 2 mg tablet Take 1 mg by mouth three (3) times daily.  senna-docusate (SENNA PLUS) 8.6-50 mg per tablet Take 2 Tabs by mouth daily.  traMADol (ULTRAM) 50 mg tablet Take 1 Tab by mouth two (2) times daily as needed for Pain. Max Daily Amount: 100 mg.    BIFIDOBACTERIUM INFANTIS (ALIGN PO) Take 1 Tab by mouth daily.  cyanocobalamin 1,000 mcg tablet Take 1,000 mcg by mouth daily.  XARELTO 15 mg tab tablet Take 15 mg by mouth daily (with breakfast).  cholecalciferol (VITAMIN D3) 1,000 unit tablet Take 1,000 Units by mouth daily.  esomeprazole (NEXIUM) 40 mg capsule Take 40 mg by mouth daily.  naloxone (NARCAN) 4 mg/actuation nasal spray Use 1 spray intranasally into 1 nostril. Use a new Narcan nasal spray for subsequent doses and administer into alternating nostrils. May repeat every 2 to 3 minutes as needed.  nitroglycerin (NITROSTAT) 0.4 mg SL tablet 1 Tab by SubLINGual route every five (5) minutes as needed for Chest Pain.  ondansetron hcl (ZOFRAN) 8 mg tablet Take 8 mg by mouth every eight (8) hours as needed for Nausea. No current facility-administered medications for this visit.          LAB DATA REVIEWED:     Lab Results   Component Value Date/Time Hemoglobin A1c 6.1 05/14/2015 12:34 PM     No results found for: MCACR, MCA1, MCA2, MCA3, MCAU, MCAU2, MCALPOCT  Lab Results   Component Value Date/Time    TSH 1.560 11/20/2017 02:40 PM    TSH 0.86 05/25/2015 04:32 AM     Lab Results   Component Value Date/Time    VITAMIN D, 25-HYDROXY 43.1 10/04/2017 02:52 PM         Lab Results   Component Value Date/Time    WBC 4.7 11/20/2017 02:40 PM    HGB 14.0 11/20/2017 02:40 PM    PLATELET 567 47/52/9811 02:40 PM     Lab Results   Component Value Date/Time    Sodium 146 (H) 11/20/2017 02:40 PM    Potassium 4.4 11/20/2017 02:40 PM    Chloride 105 11/20/2017 02:40 PM    CO2 22 11/20/2017 02:40 PM    BUN 15 11/20/2017 02:40 PM    Creatinine 0.71 11/20/2017 02:40 PM    Calcium 9.8 11/20/2017 02:40 PM    Magnesium 1.8 05/25/2015 04:32 AM    Phosphorus 1.5 (L) 05/25/2015 04:32 AM      Lab Results   Component Value Date/Time    AST (SGOT) 13 11/20/2017 02:40 PM    Alk.  phosphatase 64 11/20/2017 02:40 PM    Protein, total 5.8 (L) 11/20/2017 02:40 PM    Albumin 3.9 11/20/2017 02:40 PM     No results found for: IRON, FE, TIBC, IBCT, PSAT, FERR           Total time:  70min  Counseling / coordination time: 5min  > 50% counseling / coordination?:

## 2018-05-09 ENCOUNTER — TELEPHONE (OUTPATIENT)
Dept: FAMILY MEDICINE CLINIC | Age: 83
End: 2018-05-09

## 2018-05-14 ENCOUNTER — HOME VISIT (OUTPATIENT)
Dept: FAMILY MEDICINE CLINIC | Age: 83
End: 2018-05-14

## 2018-05-14 VITALS
SYSTOLIC BLOOD PRESSURE: 126 MMHG | HEART RATE: 59 BPM | DIASTOLIC BLOOD PRESSURE: 73 MMHG | TEMPERATURE: 98.2 F | OXYGEN SATURATION: 97 % | RESPIRATION RATE: 16 BRPM

## 2018-05-14 DIAGNOSIS — R53.81 DEBILITY: ICD-10-CM

## 2018-05-14 DIAGNOSIS — G43.909 MIGRAINE WITHOUT STATUS MIGRAINOSUS, NOT INTRACTABLE, UNSPECIFIED MIGRAINE TYPE: ICD-10-CM

## 2018-05-14 DIAGNOSIS — G89.29 OTHER CHRONIC PAIN: Primary | ICD-10-CM

## 2018-05-14 DIAGNOSIS — I25.111 CORONARY ARTERY DISEASE INVOLVING NATIVE CORONARY ARTERY OF NATIVE HEART WITH ANGINA PECTORIS WITH DOCUMENTED SPASM (HCC): ICD-10-CM

## 2018-05-14 DIAGNOSIS — R23.3 ECCHYMOSES, SPONTANEOUS: ICD-10-CM

## 2018-05-14 DIAGNOSIS — I50.9 CHF, STAGE C (HCC): ICD-10-CM

## 2018-05-14 RX ORDER — PREDNISONE 5 MG/1
5 TABLET ORAL DAILY
Qty: 15 TAB | Refills: 0 | Status: SHIPPED | OUTPATIENT
Start: 2018-05-14 | End: 2018-05-29

## 2018-05-14 NOTE — PROGRESS NOTES
Home Based [facility]   [phone]  [facility], [address] [place] [address] , [address]    RN Nursing Visit Note    Date of Initial Visit: 05/14/18    Diagnosis: Enteritis, diverticulosis of sigmoid colon,  a-fib, rheumatoid arthritis, polymyalgia rheumatica, CAD, debility    ACP:  Advance Care Planning 4/27/2018   Patient's Healthcare Decision Maker is: Named in scanned ACP document   Primary Decision Maker Name Zainab Sher   Primary Decision Maker Phone Number [phone]   Primary Decision Maker Relationship to Patient Adult child   Secondary Decision Maker Name Porfirio Palomo   Secondary Decision Maker Phone Number [phone]   Secondary Decision Maker Relationship to Patient Unknown   Confirm Advance Directive Yes, on file       Nursing Assessment: Ralph El, 80year old female seen today for 2 week follow up visit. Team today included Dr. Xochitl Alas and myself. Upon arrival patient laying in hospital bed, PT Norm Casarez) assisted patient from bed to wheel chair without difficulty. Patient and son, Viktoria Stable present during the visit. Son reported that patient c/o stomach pain, and nausea all weekend and would not eat or take any medications. Patient reported knee pain today, and rated 10 on pain scale when it hits. Patient reported that she feels better after taking nausea medication today. Primary Caregiver:  Son, Hernán 41:  [address]    Nutrition: - Regular textured food  Wt Readings from Last 3 Encounters:   11/20/17 131 lb (59.4 kg)   11/15/17 132 lb (59.9 kg)   10/23/17 134 lb (60.8 kg)       Visit Vitals    /73 (BP 1 Location: Left arm, BP Patient Position: Sitting)    Pulse (!) 59    Temp 98.2 °F (36.8 °C) (Oral)    Resp 16    SpO2 97%       Medication Management:  Current Outpatient Prescriptions   Medication Sig    traMADol (ULTRAM) 50 mg tablet Take 1 Tab by mouth two (2) times daily as needed for Pain. Max Daily Amount: 100 mg.  spironolactone (ALDACTONE) 25 mg tablet Take 0.5 Tabs by mouth daily.  topiramate (TOPAMAX) 25 mg tablet Take 1 Tab by mouth daily (with breakfast).  metoprolol tartrate (LOPRESSOR) 25 mg tablet Take 25 mg by mouth two (2) times a day.  gabapentin (NEURONTIN) 300 mg capsule Take 300 mg by mouth two (2) times a day.  HYDROmorphone (DILAUDID) 2 mg tablet Take 1 mg by mouth three (3) times daily.  senna-docusate (SENNA PLUS) 8.6-50 mg per tablet Take 2 Tabs by mouth daily.  naloxone (NARCAN) 4 mg/actuation nasal spray Use 1 spray intranasally into 1 nostril. Use a new Narcan nasal spray for subsequent doses and administer into alternating nostrils. May repeat every 2 to 3 minutes as needed.  BIFIDOBACTERIUM INFANTIS (ALIGN PO) Take 1 Tab by mouth daily.  cyanocobalamin 1,000 mcg tablet Take 1,000 mcg by mouth daily.  nitroglycerin (NITROSTAT) 0.4 mg SL tablet 1 Tab by SubLINGual route every five (5) minutes as needed for Chest Pain.  XARELTO 15 mg tab tablet Take 15 mg by mouth daily (with breakfast).  ondansetron hcl (ZOFRAN) 8 mg tablet Take 8 mg by mouth every eight (8) hours as needed for Nausea.  cholecalciferol (VITAMIN D3) 1,000 unit tablet Take 1,000 Units by mouth daily.  esomeprazole (NEXIUM) 40 mg capsule Take 40 mg by mouth daily. No current facility-administered medications for this visit. Pill Count:  Dilaudid 2 mg #25 1/2 tablet on hand today - Last filled on 4/20/18 for # 45  Tramadol 50 mg # 35 on hand today - last filled on 4/20/18 for # 60  Tramadol 50 mg #30 on hand today - Last filled on 9/26/17 for # 60    Action Items:  1. Patient declined Tdap  2. Refills sent to local pharmacy  3. Son will obtain date of last pneumonia vaccine  4. Labs to be drawn at next visit  5.  NP recommend frequent reposition changes while sitting in wheel chair    Plan:  2  Week FU with Dr. Yisel Zhong - 5/14/18

## 2018-05-14 NOTE — MR AVS SNAPSHOT
[address] , [address] 
[phone] Patient: Laureen Arellano MRN: [id number] Visit Information Date & Time Provider Department Dept. Phone Encounter #  
 2018  2:00 PM Josephine Blas MD [address] [facility] [phone] [id number] Follow-up Instructions Return in about 2 weeks (around 2018). Follow-up and Disposition History Upcoming Health Maintenance Date Due DTaP/Tdap/Td series (1 - Tdap) 1946 Bone Densitometry (Dexa) Screening 1990 Pneumococcal 65+ Low/Medium Risk (2 of 2 - PPSV23) 2017 MEDICARE YEARLY EXAM 2018 Influenza Age 5 to Adult 2018 GLAUCOMA SCREENING Q2Y 2018 Allergies as of 2018  Review Complete On: 2018 By: Conrad Philippe RN Severity Noted Reaction Type Reactions Novacare High 2015    Other (comments) PARALYSIS; \"NOVACAINE\" not novacare. Phenergan [Promethazine] High 2015   Systemic Other (comments) \"loose my wits i go crazy\" Nsaids (Non-steroidal Anti-inflammatory Drug) Medium 2015   Systemic Nausea and Vomiting Adhesive Tape-silicones      Other (comments) BLISTERS Atenolol  2015    Nausea and Vomiting Bactrim [Sulfamethoprim Ds]  2015    Nausea and Vomiting Cymbalta [Duloxetine]  2015    Other (comments) CONFUSION Digoxin  2015    Nausea and Vomiting Gluten  2015    Other (comments) GLUTEN INTOLERANCE Macrobid [Nitrofurantoin Monohyd/m-cryst]  2015    Nausea and Vomiting  
 Sulfa (Sulfonamide Antibiotics)  2015    Nausea and Vomiting Codeine Low 2015   Systemic Other (comments) Unable to swallow Current Immunizations  Reviewed on 2016 Name Date  Influenza High Dose Vaccine PF 10/10/2017, 10/12/2016, 10/20/2015 11:52 AM  
 Pneumococcal Conjugate (PCV-13) 12/7/2016 Zoster Vaccine, Live 3/22/2012 Not reviewed this visit You Were Diagnosed With   
  
 Codes Comments Other chronic pain    -  Primary ICD-10-CM: G89.29 ICD-9-CM: 338.29 Coronary artery disease involving native coronary artery of native heart with angina pectoris with documented spasm ([facility]     ICD-10-CM: I25.111 ICD-9-CM: 414.01, 413.9 Migraine without status migrainosus, not intractable, unspecified migraine type     ICD-10-CM: G43.909 ICD-9-CM: 346.90 Ecchymoses, spontaneous     ICD-10-CM: R23.3 ICD-9-CM: 782.7 Debility     ICD-10-CM: R53.81 ICD-9-CM: 799.3 CHF, stage C ([facility].)     ICD-10-CM: I50.9 ICD-9-CM: 428.0 Vitals BP Pulse Temp Resp SpO2 OB Status 126/73 (BP 1 Location: Left arm, BP Patient Position: Sitting) (!) 59 98.2 °F (36.8 °C) (Oral) 16 97% Postmenopausal  
 Smoking Status Never Smoker Preferred Pharmacy Pharmacy Name Phone [id number] [place] [phone] Your Updated Medication List  
  
   
This list is accurate as of 5/14/18 11:59 PM.  Always use your most recent med list.  
  
  
  
  
 ALIGN PO Take 1 Tab by mouth daily. cyanocobalamin 1,000 mcg tablet Take 1,000 mcg by mouth daily. gabapentin 300 mg capsule Commonly known as:  NEURONTIN Take 300 mg by mouth two (2) times a day. HYDROmorphone 2 mg tablet Commonly known as:  DILAUDID Take 1 mg by mouth three (3) times daily. metoprolol tartrate 25 mg tablet Commonly known as:  LOPRESSOR Take 25 mg by mouth two (2) times a day.  
  
 naloxone 4 mg/actuation nasal spray Commonly known as:  [employer] Use 1 spray intranasally into 1 nostril. Use a new Narcan nasal spray for subsequent doses and administer into alternating nostrils. May repeat every 2 to 3 minutes as needed. NexIUM 40 mg capsule Generic drug:  esomeprazole Take 40 mg by mouth daily. nitroglycerin 0.4 mg SL tablet Commonly known as:  NITROSTAT  
1 Tab by SubLINGual route every five (5) minutes as needed for Chest Pain. ondansetron hcl 8 mg tablet Commonly known as:  Parveen Mighty Take 8 mg by mouth every eight (8) hours as needed for Nausea. predniSONE 5 mg tablet Commonly known as:  Ally Smoker Take 1 Tab by mouth daily for 15 days. SENNA PLUS 8.6-50 mg per tablet Generic drug:  senna-docusate Take 2 Tabs by mouth daily. spironolactone 25 mg tablet Commonly known as:  ALDACTONE Take 0.5 Tabs by mouth daily. topiramate 25 mg tablet Commonly known as:  TOPAMAX Take 1 Tab by mouth daily (with breakfast). traMADol 50 mg tablet Commonly known as:  ULTRAM  
Take 1 Tab by mouth two (2) times daily as needed for Pain. Max Daily Amount: 100 mg. VITAMIN D3 1,000 unit tablet Generic drug:  cholecalciferol Take 1,000 Units by mouth daily. XARELTO 15 mg Tab tablet Generic drug:  rivaroxaban Take 15 mg by mouth daily (with breakfast). Prescriptions Sent to Pharmacy Refills  
 predniSONE (DELTASONE) 5 mg tablet 0 Sig: Take 1 Tab by mouth daily for 15 days. Class: Normal  
 Pharmacy: [facility]/pharmacy #[id number] #: [phone] Route: Oral  
  
Follow-up Instructions Return in about 2 weeks (around 5/28/2018). Patient Instructions Dear Evelyne Beard , It was a pleasure seeing you at home today with [facility] Based [address] [place] [address] ([phone]) Your stated goal: To get back to walking again.  
 
This is what we talked about:  
 
You are on many medications and over time we would like to make sure you are only taking what you need for your quality of life AND that you are taking the right doses for your age and liver/kidney function so that you are not getting too much medication. We will work together on this. 1. Nausea 
-This has been an ongoing problem 
-You have had bouts of this that they cannot figure out 
-You have had more diarrhea than constipation; never any blood in your stool 
-You get pain in stomach though not after you eat; you have been on Nexium a very long time 
-The nausea happens typically when you get up and move around, though your blood pressure doesn't drop  
-You have had some episodes where your chest discomfort is happening at the same time as your nausea 
-This was when you went to the hospital in December but they didn't find anything wrong with your heart 
-We talked about how stopping the prednisone that you were on for polymyalgia rheumatica may be contributing to to your nausea  
-We decided to try to restart the prednisone 5mg just for a week to see if this made a difference 
-We will call you in a few days to see how you are feeling 2. Pain 
-You have back and knee pain from arthritis 
-You are on Gabapentin 600mg twice a day and this is for back pain; you have been on this dose for 2 years 
-Your back pain gets worse with you stand up or walk 
-You have had 3 back surgeries for the stenosis over the years 
-You were on Fentanyl but this has been stopped 
-Dilaudid was started while you were at [facility] (started I April, 1mg three times a day) -You have a history of migraine headaches 
-You have migraine headaches and take Topamax, Tramadol, and Dilaudid 
-You don't want to change the Topamax 25mg - you have been on this for 20 years 
-You have taken Sudafed; you took some today (which may be why your pulse is high) 3. Atrial fibrillation 
-You have had this for a long time 
-Your skin is very thin, possibly from longstanding steroid use and you bruise very easily 
-You are on a blood thinner 
-It may be time to reevaluate the benefits and risks of continuing on a blood thinner 
-I will call Dr. Vicente Linda -With your blood pressure being low, I recommended reducing your Metoprolol XR form twice a day to once a day 
-We will keep track of your pulse and how you feel at this lower dose 4. Function 
-You typically are in bed or a chair 
-You are getting physical therapy from a home health company which is helping with transfers 
-Your weight is down to 116lbs and is fairly steady now 
-You are eating some but have times where you don't feel like eating as much; you don't see this as a problem right now 
-There is currently no ramp to get out of the house; we talked some about this today - there are portable ramps that may help this; you can no longer get to [employer] or go on outings because of this difficulty Wt Readings from Last 3 Encounters:  
11/20/17 131 lb (59.4 kg) 11/15/17 132 lb (59.9 kg) 10/23/17 134 lb (60.8 kg) 5. Health Maintenance 
-João Rangel will work to obtain the dates of your most recent vaccines -TDap vaccine was declined 
-You would like to see a dentist but its difficult for you to get out of the house 
-We will take blood probably at our next visit to see how you bloodcounts, kidney and liver function are doing Health Maintenance Due Topic Date Due  
 DTaP/Tdap/Td series (1 - Tdap) 07/27/1946  Bone Densitometry (Dexa) Screening  07/27/1990  Pneumococcal 65+ Low/Medium Risk (2 of 2 - PPSV23) 12/07/2017  MEDICARE YEARLY EXAM  05/11/2018 6. Advance Care Planning This is what you have shared with us about Advance Care Planning Advance Care Planning 4/27/2018 Patient's Healthcare Decision Maker is: Named in scanned ACP document Primary Decision Maker Name Carey Cobb Primary Decision Maker Phone Number [phone] Primary Decision Maker Relationship to Patient Adult child Secondary Decision Maker Name Vernonignacio Abby Secondary Decision Maker Phone Number [phone] Secondary Decision Maker Relationship to Patient Unknown Confirm Advance Directive Yes, on file Someone from the Home Based Primary Care Team will see you again in 2-3 weeks. If there are any concerns before that time, such as medication questions, worsening symptoms or a need to see a physician for an urgent or emergent situation; please call [phone]. A physician is also on call after our normal business hours of 8am to 5pm.  
 
In order to serve you better, please allow up to 48 hours for prescription refills to be processed. Certain medications may require more paperwork or a written prescription that you may need to  from the office. We appreciate you letting us know of any refill requests as soon as possible. Sincerely, The [facility] Team 
MD Vaughn Wan NP Olam Rouleau, RN Oral Corticosteroids: Care Instructions Your Care Instructions Oral corticosteroids are commonly used medicines. They help calm down the body's response to inflammation. Oral means that they are taken by mouth. This is most often in the form of a pill. They are used for treating many conditions. You may take them for asthma, COPD, back pain, or allergic reactions. They are also used for other conditions such as autoimmune diseases and certain types of cancer. You may have side effects from taking this medicine. These include nausea, headache, dizziness, and anxiety. Pregnant women should not take this medicine unless their doctor tells them to. Follow your doctor's instructions on how to take this medicine. If you are taking it for 2 weeks or more, your doctor may give you special instructions to slowly reduce (taper) the amount you take. Slowly cutting down on the medicine over time helps your body adjust to the change. Follow-up care is a key part of your treatment and safety.  Be sure to make and go to all appointments, and call your doctor if you are having problems. It's also a good idea to know your test results and keep a list of the medicines you take. How can you care for yourself at home? · Be safe with medicines. Take your medicines exactly as prescribed. Call your doctor if you think you are having a problem with your medicine. You will get more details on the specific medicines your doctor prescribes. · Take your medicine after a meal. It may cause nausea if you take it on an empty stomach. · Avoid taking nonsteroidal anti-inflammatory drugs (NSAIDs) while you are taking oral corticosteroids. Taking both of these medicines might cause an upset stomach. NSAIDs include ibuprofen (Advil, Motrin) and naproxen (Aleve). · If you have a history of stomach ulcers, you may want to avoid taking this medicine and an NSAID at the same time. This can cause stomach upset or bleeding. · Follow your doctor's instructions for how to stop taking this medicine. You may need to taper it. This means the medicine should be slowly reduced. Do not stop taking the medicine all at once. When should you call for help? Call 911 if: 
? · You vomit blood or what looks like coffee grounds. ?Call your doctor now or seek immediate medical care if: 
? · Your symptoms are getting worse. ? · You are dizzy or lightheaded, or you feel like you may faint. ? · You have new or worse nausea or vomiting. ? · You have stomach pain that is getting worse. ? · Your stools are black. ? Watch closely for changes in your health, and be sure to contact your doctor if: 
? · You do not get better as expected. Where can you learn more? Go to http://anna-sampson.info/. Enter L209 in the search box to learn more about \"Oral Corticosteroids: Care Instructions. \" Current as of: May 12, 2017 Content Version: 11.4 © 2251-0235 [employer].  Care instructions adapted under license by [employer] (which disclaims liability or warranty for this information). If you have questions about a medical condition or this instruction, always ask your healthcare professional. [address] any warranty or liability for your use of this information. Patient Instructions History Introducing [facility]! Dear [facility]: 
Thank you for requesting a [employer] account. Our records indicate that you already have an active [employer] account. You can access your account anytime at [email or web address] Did you know that you can access your hospital and ER discharge instructions at any time in [employer]? You can also review all of your test results from your hospital stay or ER visit. Additional Information If you have questions, please visit the Frequently Asked Questions section of the [employer] website at [email or web address] Remember, [employer] is NOT to be used for urgent needs. For medical emergencies, dial 911. Now available from your iPhone and Android! Please provide this summary of care documentation to your next provider. Your primary care clinician is listed as Jose Gaitan. If you have any questions after today's visit, please call [phone].

## 2018-05-14 NOTE — PATIENT INSTRUCTIONS
Dear Vannesa Anderson ,    It was a pleasure seeing you at home today with [facility] ([phone])    Your stated goal: To get back to walking again. This is what we talked about:     You are on many medications and over time we would like to make sure you are only taking what you need for your quality of life AND that you are taking the right doses for your age and liver/kidney function so that you are not getting too much medication. We will work together on this. 1. Nausea  -This has been an ongoing problem  -You have had bouts of this that they cannot figure out  -You have had more diarrhea than constipation; never any blood in your stool  -You get pain in stomach though not after you eat; you have been on Nexium a very long time  -The nausea happens typically when you get up and move around, though your blood pressure doesn't drop   -You have had some episodes where your chest discomfort is happening at the same time as your nausea  -This was when you went to the hospital in December but they didn't find anything wrong with your heart  -We talked about how stopping the prednisone that you were on for polymyalgia rheumatica may be contributing to to your nausea   -We decided to try to restart the prednisone 5mg just for a week to see if this made a difference  -We will call you in a few days to see how you are feeling    2.  Pain  -You have back and knee pain from arthritis  -You are on Gabapentin 600mg twice a day and this is for back pain; you have been on this dose for 2 years  -Your back pain gets worse with you stand up or walk  -You have had 3 back surgeries for the stenosis over the years  -You were on Fentanyl but this has been stopped  -Dilaudid was started while you were at [facility] (started I April, 1mg three times a day)    -You have a history of migraine headaches  -You have migraine headaches and take Topamax, Tramadol, and Dilaudid  -You don't want to change the Topamax 25mg - you have been on this for 20 years  -You have taken Sudafed; you took some today (which may be why your pulse is high)    3. Atrial fibrillation  -You have had this for a long time  -Your skin is very thin, possibly from longstanding steroid use and you bruise very easily  -You are on a blood thinner  -It may be time to reevaluate the benefits and risks of continuing on a blood thinner  -I will call Dr. Gareth Meade  -With your blood pressure being low, I recommended reducing your Metoprolol XR form twice a day to once a day  -We will keep track of your pulse and how you feel at this lower dose    4. Function  -You typically are in bed or a chair  -You are getting physical therapy from a home health company which is helping with transfers  -Your weight is down to 116lbs and is fairly steady now  -You are eating some but have times where you don't feel like eating as much; you don't see this as a problem right now  -There is currently no ramp to get out of the house; we talked some about this today - there are portable ramps that may help this; you can no longer get to [employer] or go on outings because of this difficulty    Wt Readings from Last 3 Encounters:   11/20/17 131 lb (59.4 kg)   11/15/17 132 lb (59.9 kg)   10/23/17 134 lb (60.8 kg)     5. Health Maintenance  -Dinorah Watson will work to obtain the dates of your most recent vaccines  -TDap vaccine was declined  -You would like to see a dentist but its difficult for you to get out of the house  -We will take blood probably at our next visit to see how you bloodcounts, kidney and liver function are doing    Health Maintenance Due   Topic Date Due    DTaP/Tdap/Td series (1 - Tdap) 07/27/1946    Bone Densitometry (Dexa) Screening  07/27/1990    Pneumococcal 65+ Low/Medium Risk (2 of 2 - PPSV23) 12/07/2017    MEDICARE YEARLY EXAM  05/11/2018     6.  Advance Care Planning     This is what you have shared with us about Advance Care Planning  Advance Care Planning 4/27/2018 Patient's Healthcare Decision Maker is: Named in scanned ACP document   Primary Decision Maker Name Ailyn Jarquin   Primary Decision Maker Phone Number [phone]   Primary Decision Maker Relationship to Patient Adult child   Secondary Decision Maker Name Wilton Hirsch   Secondary Decision Maker Phone Number [phone]   Secondary Decision Maker Relationship to Patient Unknown   Confirm Advance Directive Yes, on file       Someone from the Home [facility] Primary Care Team will see you again in 2-3 weeks. If there are any concerns before that time, such as medication questions, worsening symptoms or a need to see a physician for an urgent or emergent situation; please call [phone]. A physician is also on call after our normal business hours of 8am to 5pm.     In order to serve you better, please allow up to 48 hours for prescription refills to be processed. Certain medications may require more paperwork or a written prescription that you may need to  from the office. We appreciate you letting us know of any refill requests as soon as possible. Sincerely,    The [facility] Team  MD Raquel Koenig NP Lyndal Lucky, RN           Oral Corticosteroids: Care Instructions  Your Care Instructions    Oral corticosteroids are commonly used medicines. They help calm down the body's response to inflammation. Oral means that they are taken by mouth. This is most often in the form of a pill. They are used for treating many conditions. You may take them for asthma, COPD, back pain, or allergic reactions. They are also used for other conditions such as autoimmune diseases and certain types of cancer. You may have side effects from taking this medicine. These include nausea, headache, dizziness, and anxiety. Pregnant women should not take this medicine unless their doctor tells them to. Follow your doctor's instructions on how to take this medicine.  If you are taking it for 2 weeks or more, your doctor may give you special instructions to slowly reduce (taper) the amount you take. Slowly cutting down on the medicine over time helps your body adjust to the change. Follow-up care is a key part of your treatment and safety. Be sure to make and go to all appointments, and call your doctor if you are having problems. It's also a good idea to know your test results and keep a list of the medicines you take. How can you care for yourself at home? · Be safe with medicines. Take your medicines exactly as prescribed. Call your doctor if you think you are having a problem with your medicine. You will get more details on the specific medicines your doctor prescribes. · Take your medicine after a meal. It may cause nausea if you take it on an empty stomach. · Avoid taking nonsteroidal anti-inflammatory drugs (NSAIDs) while you are taking oral corticosteroids. Taking both of these medicines might cause an upset stomach. NSAIDs include ibuprofen (Advil, Motrin) and naproxen (Aleve). · If you have a history of stomach ulcers, you may want to avoid taking this medicine and an NSAID at the same time. This can cause stomach upset or bleeding. · Follow your doctor's instructions for how to stop taking this medicine. You may need to taper it. This means the medicine should be slowly reduced. Do not stop taking the medicine all at once. When should you call for help? Call 911 if:  ? · You vomit blood or what looks like coffee grounds. ?Call your doctor now or seek immediate medical care if:  ? · Your symptoms are getting worse. ? · You are dizzy or lightheaded, or you feel like you may faint. ? · You have new or worse nausea or vomiting. ? · You have stomach pain that is getting worse. ? · Your stools are black. ? Watch closely for changes in your health, and be sure to contact your doctor if:  ? · You do not get better as expected. Where can you learn more?   Go to http://anna-sampson.info/. Enter N410 in the search box to learn more about \"Oral Corticosteroids: Care Instructions. \"  Current as of: May 12, 2017  Content Version: 11.4  © 6095-1166 [employer]wise, [employer]. Care instructions adapted under license by [employer] (which disclaims liability or warranty for this information). If you have questions about a medical condition or this instruction, always ask your healthcare professional. [place] [address] any warranty or liability for your use of this information.

## 2018-05-14 NOTE — PROGRESS NOTES
Home Based [address] [facility] [address]   [phone]      Date of Current Visit: 05/14/18  Date of Initial [facility] Visit: 05/03/18     SUMMARY:   80 yr female referred to [facility] by Dr. Juanis Argueta. She has a significant hx for seronegative RA, PMR of multiple joints, CAD, old MI, GERD and Afib. In November, was ambulating w/ walker. Per Kaitlyn Hough, in December his mother became ill and was hospitalized a [facility], CT'd home, went to [facility] thru mid April then back home . Last seen approximately 1 week ago by Nabil Diez and sent to [facility] for work up of abdominal pain that did not require admission. Typical day as described by Kaitlyn Hough, the son, and Ms. Arsen Viramontes is that she sleeps in a hospital bed. aKitlyn Hough assists with using the [facility] [employer] [facility] and dressing. She has coffee and a late breakfast. All meals are had in her room. Medication organization and administration are done by Kaitlyn Hough. .   Diagnosis: RA  Primary Caregiver: gene Saunders  Home Environment:  -Ramp: no ramp  -Fire Safety: smoke alarms, fire estiguisher  Function: wheelchair  -Fall Risk: high  DME/Equipment: gait belt, wheelchair, walker, hospital bed, \"the breather\"  Home Services: Private aide to assist with bathing 2 times a week / [employer]   Nutrition: food preparred by sonCharan diet/liquids  Skin: intact  :  Medical Visits:  ED visits: 4/24/18 Missael Mendez for abd pain  Admission: 2015     GOALS OF CARE / TREATMENT PREFERENCES:   GOALS OF CARE:  Patient / health care proxy stated goals: To get back to walking again.     TREATMENT PREFERENCES: ADDRESSING WITH PATIENT  Code Status:  [] Attempt Resuscitation       [] Do Not Attempt Resuscitation    Advance Care Planning:  Advance Care Planning 4/27/2018   Patient's Healthcare Decision Maker is: Named in scanned ACP document   Primary Decision Maker Name Elida Arias   Primary Decision Maker Phone Number [phone]   Primary Decision Maker Relationship to Patient Adult child Secondary Decision Maker Name Nicholas Maxwell   Secondary Decision Maker Phone Number [phone]   Secondary Decision Maker Relationship to Patient Unknown   Confirm Advance Directive Yes, on file     DIAGNOSES:       ICD-10-CM ICD-9-CM    1. Other chronic pain G89.29 338.29    2. Coronary artery disease involving native coronary artery of native heart with angina pectoris with documented spasm (HCC) I25.111 414.01      413.9    3. Migraine without status migrainosus, not intractable, unspecified migraine type G43.909 346.90    4. Ecchymoses, spontaneous R23.3 782.7    5. Debility R53.81 799.3    6. CHF, stage C ([facility] [address] I50.9 428.0         PLAN:   Patient Instructions     Dear Aurelio Ivan ,    It was a pleasure seeing you at home today with [facility] ([phone])    Your stated goal: To get back to walking again. This is what we talked about:     You are on many medications and over time we would like to make sure you are only taking what you need for your quality of life AND that you are taking the right doses for your age and liver/kidney function so that you are not getting too much medication. We will work together on this.     1. Nausea  -This has been an ongoing problem  -You have had bouts of this that they cannot figure out  -You have had more diarrhea than constipation; never any blood in your stool  -You get pain in stomach though not after you eat; you have been on Nexium a very long time  -The nausea happens typically when you get up and move around, though your blood pressure doesn't drop   -You have had some episodes where your chest discomfort is happening at the same time as your nausea  -This was when you went to the hospital in December but they didn't find anything wrong with your heart  -We talked about how stopping the prednisone that you were on for polymyalgia rheumatica may be contributing to to your nausea   -We decided to try to restart the prednisone 5mg just for a week to see if this made a difference  -We will call you in a few days to see how you are feeling    2. Pain  -You have back and knee pain from arthritis  -You are on Gabapentin 600mg twice a day and this is for back pain; you have been on this dose for 2 years  -Your back pain gets worse with you stand up or walk  -You have had 3 back surgeries for the stenosis over the years  -You were on Fentanyl but this has been stopped  -Dilaudid was started while you were at [facility] (started I April, 1mg three times a day)    -You have a history of migraine headaches  -You have migraine headaches and take Topamax, Tramadol, and Dilaudid  -You don't want to change the Topamax 25mg - you have been on this for 20 years  -You have taken Sudafed; you took some today (which may be why your pulse is high)    3. Atrial fibrillation  -You have had this for a long time  -Your skin is very thin, possibly from longstanding steroid use and you bruise very easily  -You are on a blood thinner  -It may be time to reevaluate the benefits and risks of continuing on a blood thinner  -I will call Dr. Monica Stone  -With your blood pressure being low, I recommended reducing your Metoprolol XR form twice a day to once a day  -We will keep track of your pulse and how you feel at this lower dose    4. Function  -You typically are in bed or a chair  -You are getting physical therapy from a home health company which is helping with transfers  -Your weight is down to 116lbs and is fairly steady now  -You are eating some but have times where you don't feel like eating as much; you don't see this as a problem right now  -There is currently no ramp to get out of the house; we talked some about this today - there are portable ramps that may help this; you can no longer get to [employer] or go on outings because of this difficulty    Wt Readings from Last 3 Encounters:   11/20/17 131 lb (59.4 kg)   11/15/17 132 lb (59.9 kg)   10/23/17 134 lb (60.8 kg)     5. Health Maintenance  -Terry Beaver will work to obtain the dates of your most recent vaccines  -TDap vaccine was declined  -You would like to see a dentist but its difficult for you to get out of the house  -We will take blood probably at our next visit to see how you bloodcounts, kidney and liver function are doing    Health Maintenance Due   Topic Date Due    DTaP/Tdap/Td series (1 - Tdap) 07/27/1946    Bone Densitometry (Dexa) Screening  07/27/1990    Pneumococcal 65+ Low/Medium Risk (2 of 2 - PPSV23) 12/07/2017    MEDICARE YEARLY EXAM  05/11/2018     6. Advance Care Planning     This is what you have shared with us about Advance Care Planning  Advance Care Planning 4/27/2018   Patient's Healthcare Decision Maker is: Named in scanned ACP document   Primary Decision Maker Name Tian Bowman   Primary Decision Maker Phone Number [phone]   Primary Decision Maker Relationship to Patient Adult child   Secondary Decision Maker Name Mayte Velazquez   Secondary Decision Maker Phone Number [phone]   Secondary Decision Maker Relationship to Patient Unknown   Confirm Advance Directive Yes, on file       Someone from the Home Based Primary Care Team will see you again in 2-3 weeks. If there are any concerns before that time, such as medication questions, worsening symptoms or a need to see a physician for an urgent or emergent situation; please call [phone]. A physician is also on call after our normal business hours of 8am to 5pm.     In order to serve you better, please allow up to 48 hours for prescription refills to be processed. Certain medications may require more paperwork or a written prescription that you may need to  from the office. We appreciate you letting us know of any refill requests as soon as possible.     Sincerely,    The Home Based Primary Care Team  MD Vaishali Kee NP Colby Kleine, JUAN PABLO           Oral Corticosteroids: Care Instructions  Your Care Instructions    Oral corticosteroids are commonly used medicines. They help calm down the body's response to inflammation. Oral means that they are taken by mouth. This is most often in the form of a pill. They are used for treating many conditions. You may take them for asthma, COPD, back pain, or allergic reactions. They are also used for other conditions such as autoimmune diseases and certain types of cancer. You may have side effects from taking this medicine. These include nausea, headache, dizziness, and anxiety. Pregnant women should not take this medicine unless their doctor tells them to. Follow your doctor's instructions on how to take this medicine. If you are taking it for 2 weeks or more, your doctor may give you special instructions to slowly reduce (taper) the amount you take. Slowly cutting down on the medicine over time helps your body adjust to the change. Follow-up care is a key part of your treatment and safety. Be sure to make and go to all appointments, and call your doctor if you are having problems. It's also a good idea to know your test results and keep a list of the medicines you take. How can you care for yourself at home? · Be safe with medicines. Take your medicines exactly as prescribed. Call your doctor if you think you are having a problem with your medicine. You will get more details on the specific medicines your doctor prescribes. · Take your medicine after a meal. It may cause nausea if you take it on an empty stomach. · Avoid taking nonsteroidal anti-inflammatory drugs (NSAIDs) while you are taking oral corticosteroids. Taking both of these medicines might cause an upset stomach. NSAIDs include ibuprofen (Advil, Motrin) and naproxen (Aleve). · If you have a history of stomach ulcers, you may want to avoid taking this medicine and an NSAID at the same time. This can cause stomach upset or bleeding. · Follow your doctor's instructions for how to stop taking this medicine.  You may need to taper it. This means the medicine should be slowly reduced. Do not stop taking the medicine all at once. When should you call for help? Call 911 if:  ? · You vomit blood or what looks like coffee grounds. ?Call your doctor now or seek immediate medical care if:  ? · Your symptoms are getting worse. ? · You are dizzy or lightheaded, or you feel like you may faint. ? · You have new or worse nausea or vomiting. ? · You have stomach pain that is getting worse. ? · Your stools are black. ? Watch closely for changes in your health, and be sure to contact your doctor if:  ? · You do not get better as expected. Where can you learn more? Go to http://anna-sampson.info/. Enter T485 in the search box to learn more about \"Oral Corticosteroids: Care Instructions. \"  Current as of: May 12, 2017  Content Version: 11.4  © 4208-3215 [employer]. Care instructions adapted under license by [employer] (which disclaims liability or warranty for this information). If you have questions about a medical condition or this instruction, always ask your healthcare professional. [place] [address] any warranty or liability for your use of this information. PRESCRIPTIONS GIVEN:     Medications Ordered Today   Medications    predniSONE (DELTASONE) 5 mg tablet     Sig: Take 1 Tab by mouth daily for 15 days. Dispense:  15 Tab     Refill:  0         HISTORY:   Please see RN note from this current visit; history taken together in presence of patient/family in the home.       CHIEF COMPLAINT:   Chief Complaint   Patient presents with    Nausea     HPI/SUBJECTIVE:    The patient is: [x] Verbal / [] Nonverbal     As noted above in Patient Instructions    [++++ Clinical Pain Assessment++++]  [++++Pain Severity++++]:   6  [++++Pain Character++++]: aching  [++++Pain Duration++++]: hours  [++++Pain Effect++++]: can't walk  [++++Pain Factors++++]: worse with walking  [++++Pain Frequency++++]: daily  [++++Pain Location++++]: knee / back  [++++ Clinical Pain Assessment++++]    REVIEW OF SYSTEMS:     The following systems were [x] reviewed / [] unable to be reviewed  Systems: constitutional, ears/nose/mouth/throat, respiratory, gastrointestinal, genitourinary, musculoskeletal, integumentary, neurologic, psychiatric, endocrine. Positive findings noted below: intermittent abd pain/nausea; right knee pain; back pain     FUNCTIONAL ASSESSMENT:     Palliative Performance Scale (PPS): 50-60%     PSYCHOSOCIAL/SPIRITUAL SCREENING:      Any spiritual / Faith concerns: [] Yes /  [x] No Spiritism, no longer goes to [employer] because can't get out of home; [employer] does not come to her, may be willing to reconnect    Caregiver Burnout: [] Yes /  [x] No /  [] No Caregiver Present       PHYSICAL EXAM:     Wt Readings from Last 3 Encounters:   11/20/17 131 lb (59.4 kg)   11/15/17 132 lb (59.9 kg)   10/23/17 134 lb (60.8 kg)     Blood pressure 126/73, pulse (!) 59, temperature 98.2 °F (36.8 °C), temperature source Oral, resp. rate 16, SpO2 97 %. Last bowel movement:  yesterday    Constitutional:  female, adv.  Age, sitting up in [facility] 23, NAD  Eyes: pupils equal, anicteric  ENMT: no nasal discharge, moist mucous membranes  Cardiovascular: irregular rhythm, distal pulses intact  Respiratory: breathing not labored, symmetric  Gastrointestinal: soft non-tender, +bowel sounds  Musculoskeletal: no deformity, no tenderness to palpation  Skin: warm, dry, thin skin with ecchymoses especially forearms and shins  Neurologic: following commands, moving all extremities  Psychiatric: full affect, no hallucinations  Other:     HISTORY:     Past Medical and Surgical History reviewed in [facility] on date of initial visit    Patient Active Problem List   Diagnosis Code    Osteoarthritis M19.90    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 ([facility] I25.111    CHF, stage C ([facility]) I50.9    Lumbar spinal stenosis M48.061    Paroxysmal atrial fibrillation ([facility]) I48.0    Advance directive on file Z78.9    PMR (polymyalgia rheumatica) ([facility]) M35.3    Lower extremity edema R60.0    CKD (chronic kidney disease) stage 2, GFR 60-89 ml/min N18.2    Long term current use of systemic steroids Z79.52    Seronegative rheumatoid arthritis of multiple sites ([facility] M06.09    Long-term use of immunosuppressant medication Z79.899     Family History   Problem Relation Age of Onset    Other Mother      Intestinal obstruction    Cancer Father     Stroke Father     Heart Attack Brother     Cancer Brother     Cancer Brother     Anesth Problems Neg Hx       History reviewed, no pertinent family history. Social History   Substance Use Topics    Smoking status: Never Smoker    Smokeless tobacco: Never Used    Alcohol use No     Allergies   Allergen Reactions    Novacare Other (comments)     PARALYSIS; \"NOVACAINE\" not novacare.  Phenergan [Promethazine] Other (comments)     \"loose my wits i go crazy\"    Nsaids (Non-Steroidal Anti-Inflammatory Drug) Nausea and Vomiting    Adhesive Tape-Silicones Other (comments)     BLISTERS    Atenolol Nausea and Vomiting    Bactrim [Sulfamethoprim Ds] Nausea and Vomiting    Cymbalta [Duloxetine] Other (comments)     CONFUSION      Digoxin Nausea and Vomiting    Gluten Other (comments)     GLUTEN INTOLERANCE    Macrobid [Nitrofurantoin Monohyd/M-Cryst] Nausea and Vomiting    Sulfa (Sulfonamide Antibiotics) Nausea and Vomiting    Codeine Other (comments)     Unable to swallow      Current Outpatient Prescriptions   Medication Sig    predniSONE (DELTASONE) 5 mg tablet Take 1 Tab by mouth daily for 15 days.  spironolactone (ALDACTONE) 25 mg tablet Take 0.5 Tabs by mouth daily.  topiramate (TOPAMAX) 25 mg tablet Take 1 Tab by mouth daily (with breakfast).     metoprolol tartrate (LOPRESSOR) 25 mg tablet Take 25 mg by mouth two (2) times a day.  gabapentin (NEURONTIN) 300 mg capsule Take 300 mg by mouth two (2) times a day.  HYDROmorphone (DILAUDID) 2 mg tablet Take 1 mg by mouth three (3) times daily.  senna-docusate (SENNA PLUS) 8.6-50 mg per tablet Take 2 Tabs by mouth daily.  traMADol (ULTRAM) 50 mg tablet Take 1 Tab by mouth two (2) times daily as needed for Pain. Max Daily Amount: 100 mg.  cyanocobalamin 1,000 mcg tablet Take 1,000 mcg by mouth daily.  XARELTO 15 mg tab tablet Take 15 mg by mouth daily (with breakfast).  ondansetron hcl (ZOFRAN) 8 mg tablet Take 8 mg by mouth every eight (8) hours as needed for Nausea.  cholecalciferol (VITAMIN D3) 1,000 unit tablet Take 1,000 Units by mouth daily.  esomeprazole (NEXIUM) 40 mg capsule Take 40 mg by mouth daily.  naloxone (NARCAN) 4 mg/actuation nasal spray Use 1 spray intranasally into 1 nostril. Use a new Narcan nasal spray for subsequent doses and administer into alternating nostrils. May repeat every 2 to 3 minutes as needed.  BIFIDOBACTERIUM INFANTIS (ALIGN PO) Take 1 Tab by mouth daily.  nitroglycerin (NITROSTAT) 0.4 mg SL tablet 1 Tab by SubLINGual route every five (5) minutes as needed for Chest Pain. No current facility-administered medications for this visit.          LAB DATA REVIEWED:     Lab Results   Component Value Date/Time    Hemoglobin A1c 6.1 05/14/2015 12:34 PM     No results found for: MCACR, MCA1, MCA2, MCA3, MCAU, MCAU2, MCALPOCT  Lab Results   Component Value Date/Time    TSH 1.560 11/20/2017 02:40 PM    TSH 0.86 05/25/2015 04:32 AM     Lab Results   Component Value Date/Time    VITAMIN D, 25-HYDROXY 43.1 10/04/2017 02:52 PM         Lab Results   Component Value Date/Time    WBC 4.7 11/20/2017 02:40 PM    HGB 14.0 11/20/2017 02:40 PM    PLATELET 986 11/67/6407 02:40 PM     Lab Results   Component Value Date/Time    Sodium 146 (H) 11/20/2017 02:40 PM    Potassium 4.4 11/20/2017 02:40 PM    Chloride 105 11/20/2017 02:40 PM    CO2 22 11/20/2017 02:40 PM    BUN 15 11/20/2017 02:40 PM    Creatinine 0.71 11/20/2017 02:40 PM    Calcium 9.8 11/20/2017 02:40 PM    Magnesium 1.8 05/25/2015 04:32 AM    Phosphorus 1.5 (L) 05/25/2015 04:32 AM      Lab Results   Component Value Date/Time    AST (SGOT) 13 11/20/2017 02:40 PM    Alk.  phosphatase 64 11/20/2017 02:40 PM    Protein, total 5.8 (L) 11/20/2017 02:40 PM    Albumin 3.9 11/20/2017 02:40 PM     No results found for: IRON, FE, TIBC, IBCT, PSAT, FERR           Total time:  min  Counseling / coordination time: min  > 50% counseling / coordination?:

## 2018-05-17 NOTE — ACP (ADVANCE CARE PLANNING)
Home Based Primary Care    Only POA, not Advance Directive; will discuss AD over next several visits as well as resuscitation.      Advance Care Planning 4/27/2018   Patient's Healthcare Decision Maker is: Named in scanned ACP document   Primary Decision Maker Name Josue Osmankeley   Primary Decision Maker Phone Number [phone]   Primary Decision Maker Relationship to Patient Adult child   Secondary Decision Maker Name Genevieve Casianodict   Secondary Decision Maker Phone Number [phone]   Secondary Decision Maker Relationship to Patient Unknown   Confirm Advance Directive Yes, on file

## 2018-05-17 NOTE — ASSESSMENT & PLAN NOTE
Stable, based on history, physical exam and review of pertinent labs, studies and medications; meds reconciled; continue current treatment plan. Key CAD CHF Meds             spironolactone (ALDACTONE) 25 mg tablet  (Taking) Take 0.5 Tabs by mouth daily. metoprolol tartrate (LOPRESSOR) 25 mg tablet  (Taking) Take 25 mg by mouth two (2) times a day. XARELTO 15 mg tab tablet  (Taking) Take 15 mg by mouth daily (with breakfast). nitroglycerin (NITROSTAT) 0.4 mg SL tablet 1 Tab by SubLINGual route every five (5) minutes as needed for Chest Pain.

## 2018-05-19 ENCOUNTER — TELEPHONE (OUTPATIENT)
Dept: PALLATIVE CARE | Age: 83
End: 2018-05-19

## 2018-05-19 NOTE — TELEPHONE ENCOUNTER
Patient son Loye Cannon called , patient has  h/o chronic abdominal pain , was recently seen in [address] [place] [address] ER on 4/24, work up including CT of Abdomen  Was negative. Patient having severe abdominal pain in left lower quadrant , typical, sharp, not associated with fever or constipation or nausea , has h/o ongoing nausea on and off , takes zofran with good relief. Advised to use 1 mg of dilaudid (she is on 1 mg dilaudid 3 times a day for migraine headache )    - To call me if symptom gets worse .

## 2018-05-25 ENCOUNTER — TELEPHONE (OUTPATIENT)
Dept: FAMILY MEDICINE CLINIC | Age: 83
End: 2018-05-25

## 2018-05-25 NOTE — TELEPHONE ENCOUNTER
Outgoing call placed to patient's son, Philipp Rubi and he stated that patient is being discharged from [facility] today to [facility] and will be there until next Friday. He stated that hospital was not able to find any reason for nausea - all work up was normal.    Per Philipp Rubi, prednisone that was prescribed by Dr. Dante Thompson did not help the nausea. Our fax number was provided to have [facility] to fax discharge summary to us. Philipp Rubi will notify us when patient is being discharged home so we can schedule a home visit.

## 2018-06-01 ENCOUNTER — TELEPHONE (OUTPATIENT)
Dept: FAMILY MEDICINE CLINIC | Age: 83
End: 2018-06-01

## 2018-06-01 NOTE — TELEPHONE ENCOUNTER
Luis Alfredo stated pt is being discharged from [facility] today and she will send me the records. Advised Tonie to start care for pt's nursing, pt, and ot orders and Dr. Alice Burciaga will cover future orders.  This is informational for [employer] and [employer]

## 2018-06-05 ENCOUNTER — TELEPHONE (OUTPATIENT)
Dept: FAMILY MEDICINE CLINIC | Age: 83
End: 2018-06-05

## 2018-06-05 ENCOUNTER — HOME VISIT (OUTPATIENT)
Dept: FAMILY MEDICINE CLINIC | Age: 83
End: 2018-06-05

## 2018-06-05 VITALS
SYSTOLIC BLOOD PRESSURE: 138 MMHG | WEIGHT: 120.6 LBS | OXYGEN SATURATION: 96 % | RESPIRATION RATE: 16 BRPM | DIASTOLIC BLOOD PRESSURE: 72 MMHG | BODY MASS INDEX: 23.68 KG/M2 | TEMPERATURE: 97.9 F | HEART RATE: 82 BPM | HEIGHT: 60 IN

## 2018-06-05 DIAGNOSIS — I50.9 CHF, STAGE C (HCC): ICD-10-CM

## 2018-06-05 DIAGNOSIS — I48.0 PAROXYSMAL ATRIAL FIBRILLATION (HCC): ICD-10-CM

## 2018-06-05 DIAGNOSIS — L89.302 DECUBITUS ULCER OF BUTTOCK, STAGE 2, UNSPECIFIED LATERALITY: ICD-10-CM

## 2018-06-05 DIAGNOSIS — M35.3 PMR (POLYMYALGIA RHEUMATICA) (HCC): ICD-10-CM

## 2018-06-05 DIAGNOSIS — D62 ACUTE BLOOD LOSS ANEMIA: ICD-10-CM

## 2018-06-05 RX ORDER — POLYETHYLENE GLYCOL 3350 17 G/17G
17 POWDER, FOR SOLUTION ORAL
COMMUNITY
End: 2019-10-10

## 2018-06-05 RX ORDER — CODEINE PHOSPHATE AND GUAIFENESIN 10; 100 MG/5ML; MG/5ML
5 SOLUTION ORAL
COMMUNITY
End: 2018-06-19 | Stop reason: ALTCHOICE

## 2018-06-05 RX ORDER — PREDNISONE 5 MG/1
2.5 TABLET ORAL DAILY
COMMUNITY
End: 2018-06-05 | Stop reason: SDUPTHER

## 2018-06-05 RX ORDER — PREDNISONE 5 MG/1
2.5 TABLET ORAL DAILY
Qty: 14 TAB | Refills: 0 | Status: SHIPPED | OUTPATIENT
Start: 2018-06-05 | End: 2018-06-19 | Stop reason: SDUPTHER

## 2018-06-05 NOTE — PROGRESS NOTES
Home Based [address]   [id number]      Date of Current Visit: 06/06/18    Date of Initial [facility] Visit: 05/03/18    AMY GARRIDO VISIT [facility])    Cecilia 80: 5/20/18 - 5/25/18 /  [facility]: 5/25/18 - 6/1/18     SUMMARY:   80 yr female referred to [facility] by Dr. Mami Davison. She has a significant hx for seronegative RA, PMR of multiple joints, CAD, old MI, GERD and Afib. In November, was ambulating w/ walker. Per Jeannine Madison, in December his mother became ill and was hospitalized a [facility], IL'd home, went to [facility] thru mid April then back home . DISCHARGE SUMMARY                            GOALS OF CARE / TREATMENT PREFERENCES:   GOALS OF CARE:  Patient / health care proxy stated goals: To get back to walking again. TREATMENT PREFERENCES:   Code Status:  [] Attempt Resuscitation       [x] Do Not Attempt Resuscitation    Advance Care Planning:  Advance Care Planning 4/27/2018   Patient's Healthcare Decision Maker is: Named in scanned ACP document   Primary Decision Maker Name Choco Toscano   Primary Decision Maker Phone Number [phone]   Primary Decision Maker Relationship to Patient Adult child   Secondary Decision Maker Name Nicholas Maxwell   Secondary Decision Maker Phone Number [phone]   Secondary Decision Maker Relationship to Patient Unknown   Confirm Advance Directive Yes, on file     Advance Care Planning (ACP) Provider Note - Comprehensive     Date of ACP Conversation: 06/06/18  Persons included in Conversation:  patient and family  Length of ACP Conversation in minutes:  20 minutes    Authorized Decision Maker (if patient is incapable of making informed decisions):    This person is:  Healthcare Agent/Medical Power of  under Advance Directive          General ACP for ALL Patients with Decision Making Capacity:   Importance of advance care planning, including choosing a healthcare agent to communicate patient's healthcare decisions if patient lost the ability to make decisions, such as after a sudden illness or accident    Review of Existing Advance Directive:  Does this advance directive still reflect your preferences? Yes (Provide new form/Refer for assistance in updating)    For Serious or Chronic Illness:  Understanding of CPR, goals and expected outcomes, benefits and burdens discussed. Information on CPR success rates provided (e.g. for CPR in hospital, survival to d/c at two weeks is 22%, for chronically ill or elderly/frail survival is less than 3%); Individual asked to communicate understanding of information in his/her own words. Explored fears and concerns regarding CPR or possible outcomes    Interventions Provided:  Entered DNR order (If yes, complete Durable DNR form)      DIAGNOSES:       ICD-10-CM ICD-9-CM    1. Transition of care performed with sharing of clinical summary Z91.89 V15.89    2. CHF, stage C ([facility]) I50.9 428.0 DO NOT RESUSCITATE   3. Paroxysmal atrial fibrillation ([facility]) I48.0 427.31 DO NOT RESUSCITATE   4. Decubitus ulcer of buttock, stage 2, unspecified laterality L89.302 707.05      707.22    5. Acute blood loss anemia D62 285.1 DO NOT RESUSCITATE   6. PMR (polymyalgia rheumatica) ([facility]) M35.3 725 DO NOT RESUSCITATE        PLAN:   Patient Instructions     Dear Farzana Saunders ,    It was a pleasure seeing you at home today with [facility] ([phone])    Your stated goal: To get back to walking again. This is what we talked about:     1. Transition of care  - Together we reviewed your mothers hospital stay and the discharge instructions from [facility] facility  - Some medication changes have been made since at the hospital and at rehab  - [facility] will resume care with you    2.  Bleeding  - The discharge instructions show that the Xarelto was discontinued until cleared by a Gastroenterologist, Dr. Cleo Rosas in 2-4 weeks  - Medical transport assistance will be needed  - Please remove the Xarelto from her pill boxes and set aside from her regular medications. - Please monitor for any further vomiting of blood, coffee ground appearance to vomit as well as red or black/tarry bowel movements  - Should this happen, please call our office right away. 3. Stage 1 and 2 right/left buttock wound  - Home Health wound care orders will be submitted to [address] cream has been recommended to use as a skin barrier and protectant  - Offloading pressure frequently is recommended   - Avoiding prolonged contact with moisture against the skin by frequent toileting or adult brief changes are advised    4. Cough  - You have a productive, mucous-like cough   - The congestion is heard in the throat but not in the lungs  - Mucinex 600mg twice a day for 1 week   - Home oxygen was set up by the rehab facility  - Your oxygen levels were normal while off the oxygen during our visit  - A home pulse oximeter has been recommended  - Once purchased, please check the levels daily both on and off oxygen and record the values  - This will better assess the ongoing need for the supplemental oxygen  - Please use your \"Pickle\" aka [employer] at least 4 times a day    5. Health Maintenance  -TDap vaccine was declined  - A Medicare wellness exam will be completed at an upcoming visit. Health Maintenance Due   Topic Date Due    Pneumococcal 65+ Low/Medium Risk (2 of 2 - PPSV23) 12/07/2017    MEDICARE YEARLY EXAM  05/11/2018     6. Advance Care Planning   - A Durable Do Not Resusitate (DDNR) was completed today  - An Advance Directive is already on file.      This is what you have shared with us about Advance Care Planning  Advance Care Planning 4/27/2018   Patient's Healthcare Decision Maker is: Named in scanned ACP document   Primary Decision Maker Name Kiahumaira Alejandro   Primary Decision Maker Phone Number [phone]   Primary Decision Maker Relationship to Patient Adult child   Secondary Decision Maker Name Ag Stephens   Secondary Decision Maker Phone Number [phone]   Secondary Decision Maker Relationship to Patient Unknown   Confirm Advance Directive Yes, on file       Someone from the Home Based Primary Care Team will see you again in 2 weeks. If there are any concerns before that time, such as medication questions, worsening symptoms or a need to see a physician for an urgent or emergent situation; please call [phone]. A physician is also on call after our normal business hours of 8am to 5pm.     In order to serve you better, please allow up to 48 hours for prescription refills to be processed. Certain medications may require more paperwork or a written prescription that you may need to  from the office. We appreciate you letting us know of any refill requests as soon as possible. Sincerely,    The Home Based Primary Care Team  Eustace Klein, MD Vale Epley, NP Francis How, RN           Learning About Preventing Pressure Sores  What are pressure sores? A pressure sore is an injury to the skin caused by constant pressure over a period of time. The constant pressure blocks the blood supply to the skin. This causes skin cells to die and creates a sore. Pressure sores are also called bedsores. Pressure sores usually occur over bony areas, such as the hips, lower back, elbows, heels, and shoulders. Pressure sores can also occur in places where the skin folds over on itself, or where medical equipment presses on the skin, such as when oxygen tubes press on the ears or cheeks. Pressure sores can range from red areas on the surface of the skin to severe tissue damage that goes deep into muscle and bone. Severe sores are hard to treat and slow to heal. When pressure sores do not heal properly, problems such as bone, blood, and skin infections can develop. What causes pressure sores? Things that cause pressure sores include:  · Pressure on the skin and tissues.  For example, the sores may happen when a person lies in bed or sits in a wheelchair for a long time. This is the most common cause of pressure sores. · Sliding down in a bed or chair, forcing the skin to fold over itself (shear force). · Being pulled across bed sheets or other surfaces (friction burns). As we get older, our skin gets more thin and dry and less elastic, so it is easier to damage. Poor nutrition and not getting enough fluids make these natural changes in the skin worse. Skin in this condition may easily develop a pressure sore. Skin can also be damaged by sweat, feces, or urine, making pressure sores more likely and harder to heal.  How can you help prevent pressure sores? If you are not able to do these things yourself, ask a family member or friend for help. Change position often  · In a bed, change position every 2 hours. · In a wheelchair or other type of chair, shift your weight every 15 minutes, and give yourself a full relief of weight every hour. ¨ For a weight shift, lean forward and to the left and right. Push up out of the chair with your arms. If you have a chair that tilts, use the tilt control to help relieve pressure. ¨ For a full relief of weight, stand up or move to another chair or bed if you are able to. Personal care  · Check your skin every day, especially around bony areas. When a pressure sore is forming, skin temperature can be different than nearby skin. It might be warmer or cooler. The skin can feel either firmer or softer than the surrounding skin. · Keep your skin clean and free of sweat, wound drainage, urine, and feces. · Use skin lotions to keep your skin from drying out and cracking. Barrier lotions or creams have ingredients that can act as a shield to help protect the skin from moisture or irritation. · Try not to slide or slump across sheets in a chair or bed. And try not to sleep in a recliner chair.   Lifestyle choices  · Eat healthy foods with plenty of protein to help heal damaged skin and to help new skin grow. · Get plenty of fluids. · Stay at a healthy weight. Being either overweight or underweight can make pressure sores more likely. · If you smoke, stop. Smoking dries the skin and reduces its blood supply. If you need help quitting, talk to your doctor about stop-smoking programs and medicines. These can increase your chances of quitting for good. Ask about using cushions or pads  · Overlays are special pads you put on top of a mattress. They provide a softer surface that will fit your body's shape better than a regular mattress. · Cushions or devices can be used to reduce pressure on certain areas of the body. For example, you can use a \"medical heel pillow\" to help prevent pressure sores on heels. You can also get cushions for seating surfaces, such as wheelchair seats. Talk with your doctor about cushions and pads. Some products, such as doughnut-type devices, may actually cause pressure sores or make them worse. Where can you learn more? Go to http://anna-sampson.info/. Enter 129 [id number] in the search box to learn more about \"Learning About Preventing Pressure Sores. \"  Current as of: March 20, 2017  Content Version: 11.4  © 0988-1932 [employer]. Care instructions adapted under license by [employer] (which disclaims liability or warranty for this information). If you have questions about a medical condition or this instruction, always ask your healthcare professional. [place] [address] any warranty or liability for your use of this information. PRESCRIPTIONS GIVEN:     Medications Ordered Today   Medications    predniSONE (DELTASONE) 5 mg tablet     Sig: Take 0.5 Tabs by mouth daily. Dispense:  14 Tab     Refill:  0         HISTORY:   Please see RN note from this current visit; history taken together in presence of patient/family in the home.       CHIEF COMPLAINT:   Chief Complaint   Patient presents with    Transitions Of Care     HPI/SUBJECTIVE:    The patient is: [x] Verbal / [] Nonverbal     Transition of care post hospitalization at [facility]. See notes above. REVIEW OF SYSTEMS:     The following systems were [x] reviewed / [] unable to be reviewed  Systems: constitutional, ears/nose/mouth/throat, respiratory, gastrointestinal, genitourinary, musculoskeletal, integumentary, neurologic, psychiatric, endocrine. Positive findings noted below: cough, buttock wounds     FUNCTIONAL ASSESSMENT:     Palliative Performance Scale (PPS): 50-60%     PSYCHOSOCIAL/SPIRITUAL SCREENING:      Any spiritual / Amish concerns: [] Yes /  [x] No Congregation, no longer goes to [employer] because can't get out of home; [employer] does not come to her, may be willing to reconnect    Caregiver Burnout: [] Yes /  [x] No /  [] No Caregiver Present       PHYSICAL EXAM:     Wt Readings from Last 3 Encounters:   06/05/18 120 lb 9.6 oz (54.7 kg)   11/20/17 131 lb (59.4 kg)   11/15/17 132 lb (59.9 kg)     Blood pressure 138/72, pulse 82, temperature 97.9 °F (36.6 °C), temperature source Oral, resp. rate 16, height 5' (1.524 m), weight 120 lb 9.6 oz (54.7 kg), SpO2 96 %. Last bowel movement:  TODAY    Constitutional:  female, adv. Age, thin and frail, sitting up in WC, NAD  Eyes: pupils equal, anicteric  ENMT: no nasal discharge, moist mucous membranes  Cardiovascular: regular/ irregular rhythm, distal pulses intact  Respiratory: breathing not labored, symmetric  Gastrointestinal: soft non-tender, +bowel sounds  Musculoskeletal: no deformity, no tenderness to palpation  Skin: warm, dry, stage 1 and 2 bilat.  Buttock wounds, thin skin with ecchymoses especially forearms and shins  Neurologic: following commands, moving all extremities  Psychiatric: full affect, no hallucinations  Other:     HISTORY:     Past Medical and Surgical History reviewed in [facility] on date of initial visit    Patient Active Problem List   Diagnosis Code    Osteoarthritis M19.90    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 ([facility] I25.111    CHF, stage C ([facility] I50.9    Lumbar spinal stenosis M48.061    Paroxysmal atrial fibrillation (H[facility]) I48.0    Advance directive on file Z78.9    PMR (polymyalgia rheumatica) ([facility] M35.3    Lower extremity edema R60.0    CKD (chronic kidney disease) stage 2, GFR 60-89 ml/min N18.2    Long term current use of systemic steroids Z79.52    Seronegative rheumatoid arthritis of multiple sites ([facility] M06.09    Long-term use of immunosuppressant medication Z79.899     Family History   Problem Relation Age of Onset    Other Mother      Intestinal obstruction    Cancer Father     Stroke Father     Heart Attack Brother     Cancer Brother     Cancer Brother     Anesth Problems Neg Hx       History reviewed, no pertinent family history. Social History   Substance Use Topics    Smoking status: Never Smoker    Smokeless tobacco: Never Used    Alcohol use No     Allergies   Allergen Reactions    Novacare Other (comments)     PARALYSIS; \"NOVACAINE\" not novacare.     Phenergan [Promethazine] Other (comments)     \"loose my wits i go crazy\"    Nsaids (Non-Steroidal Anti-Inflammatory Drug) Nausea and Vomiting    Adhesive Tape-Silicones Other (comments)     BLISTERS    Atenolol Nausea and Vomiting    Bactrim [Sulfamethoprim Ds] Nausea and Vomiting    Cymbalta [Duloxetine] Other (comments)     CONFUSION      Digoxin Nausea and Vomiting    Gluten Other (comments)     GLUTEN INTOLERANCE    Macrobid [Nitrofurantoin Monohyd/M-Cryst] Nausea and Vomiting    Sulfa (Sulfonamide Antibiotics) Nausea and Vomiting    Codeine Other (comments)     Unable to swallow      Current Outpatient Prescriptions   Medication Sig    guaiFENesin-codeine (GUAIFENESIN AC) 100-10 mg/5 mL solution Take 5 mL by mouth three (3) times daily as needed for Cough.  polyethylene glycol (MIRALAX) 17 gram/dose powder Take 17 g by mouth daily.  OXYGEN-AIR DELIVERY SYSTEMS 2 L by Nasal route continuous.  predniSONE (DELTASONE) 5 mg tablet Take 0.5 Tabs by mouth daily.  spironolactone (ALDACTONE) 25 mg tablet Take 0.5 Tabs by mouth daily.  topiramate (TOPAMAX) 25 mg tablet Take 1 Tab by mouth daily (with breakfast).  metoprolol tartrate (LOPRESSOR) 25 mg tablet Take 25 mg by mouth two (2) times a day.  gabapentin (NEURONTIN) 300 mg capsule Take 600 mg by mouth two (2) times a day.  HYDROmorphone (DILAUDID) 2 mg tablet Take 1 mg by mouth three (3) times daily.  senna-docusate (SENNA PLUS) 8.6-50 mg per tablet Take 2 Tabs by mouth daily.  BIFIDOBACTERIUM INFANTIS (ALIGN PO) Take 1 Tab by mouth daily.  cyanocobalamin 1,000 mcg tablet Take 1,000 mcg by mouth daily.  cholecalciferol (VITAMIN D3) 1,000 unit tablet Take 1,000 Units by mouth daily.  esomeprazole (NEXIUM) 40 mg capsule Take 40 mg by mouth daily.  naloxone (NARCAN) 4 mg/actuation nasal spray Use 1 spray intranasally into 1 nostril. Use a new Narcan nasal spray for subsequent doses and administer into alternating nostrils. May repeat every 2 to 3 minutes as needed.  nitroglycerin (NITROSTAT) 0.4 mg SL tablet 1 Tab by SubLINGual route every five (5) minutes as needed for Chest Pain.  ondansetron hcl (ZOFRAN) 8 mg tablet Take 8 mg by mouth every eight (8) hours as needed for Nausea. No current facility-administered medications for this visit.          LAB DATA REVIEWED:     Lab Results   Component Value Date/Time    Hemoglobin A1c 6.1 05/14/2015 12:34 PM     No results found for: MCACR, MCA1, MCA2, MCA3, MCAU, MCAU2, MCALPOCT  Lab Results   Component Value Date/Time    TSH 1.560 11/20/2017 02:40 PM    TSH 0.86 05/25/2015 04:32 AM     Lab Results   Component Value Date/Time    VITAMIN D, 25-HYDROXY 43.1 10/04/2017 02:52 PM         Lab Results   Component Value Date/Time    WBC 4.7 11/20/2017 02:40 PM    HGB 14.0 11/20/2017 02:40 PM    PLATELET 850 82/46/9469 02:40 PM     Lab Results   Component Value Date/Time    Sodium 146 (H) 11/20/2017 02:40 PM    Potassium 4.4 11/20/2017 02:40 PM    Chloride 105 11/20/2017 02:40 PM    CO2 22 11/20/2017 02:40 PM    BUN 15 11/20/2017 02:40 PM    Creatinine 0.71 11/20/2017 02:40 PM    Calcium 9.8 11/20/2017 02:40 PM    Magnesium 1.8 05/25/2015 04:32 AM    Phosphorus 1.5 (L) 05/25/2015 04:32 AM      Lab Results   Component Value Date/Time    AST (SGOT) 13 11/20/2017 02:40 PM    Alk.  phosphatase 64 11/20/2017 02:40 PM    Protein, total 5.8 (L) 11/20/2017 02:40 PM    Albumin 3.9 11/20/2017 02:40 PM     No results found for: IRON, FE, TIBC, IBCT, PSAT, FERR           Total time:  60min + 20min ACP time  Counseling / coordination time: 20min ACP  > 50% counseling / coordination?:

## 2018-06-05 NOTE — PROGRESS NOTES
Home Based [address]  [phone]  RN Nursing Visit Note    Date of current Visit: 06/05/18   Date of last visit: 5/16/18  Date of initial visit: 5/2/18    Date of Admission: 5/25/18 -[facility]  Date of Discharge: 6/1/18    Patient admitted to [facility] for GI bleed and A-fib. Patient was discharge with instruction to hold Xarelto until she has follow up with GI specialist.  Patient sent home with Guaifensin 100 mg/ml to take 10 ml every 8 hours as needed for cough    Diagnosis: Enteritis, diverticulosis of sigmoid colon,  a-fib, rheumatoid arthritis, polymyalgia rheumatica, CAD, debility    ACP:  Advance Care Planning 4/27/2018   Patient's Healthcare Decision Maker is: Named in scanned ACP document   Primary Decision Maker Name Emily De Leon   Primary Decision Maker Phone Number [phone]   Primary Decision Maker Relationship to Patient Adult child   Secondary Decision Maker Name Timothy Burnett   Secondary Decision Maker Phone Number [phone]   Secondary Decision Maker Relationship to Patient Unknown   Confirm Advance Directive Yes, on file       Nursing Assessment: Monika Cárdenas, 80year old female seen today for hospital discharge follow up visit. Team today included Tonie Kayser, NP and myself. Upon arrival patient wheeled via wheel chair to the kitchen table by her son, Rubén Marshall who was also present during the visit. Patient denies nausea since discharge from [place]. Son unclear if prednisone helped, but he is now out of the medication.  Patient currently on 2 Liters of oxygen     Primary Caregiver:  Son, Hernán 41:  [address]    Nutrition: - Regular textured food  Wt Readings from Last 3 Encounters:   06/05/18 120 lb 9.6 oz (54.7 kg)   11/20/17 131 lb (59.4 kg)   11/15/17 132 lb (59.9 kg)       Visit Vitals    /72 (BP 1 Location: Left arm, BP Patient Position: Sitting)    Pulse 82    Temp 97.9 °F (36.6 °C) (Oral)    Resp 16    Ht 5' (1.524 m)    Wt 120 lb 9.6 oz (54.7 kg)    SpO2 96%    BMI 23.55 kg/m2       Medication Management:  Current Outpatient Prescriptions   Medication Sig    spironolactone (ALDACTONE) 25 mg tablet Take 0.5 Tabs by mouth daily.  topiramate (TOPAMAX) 25 mg tablet Take 1 Tab by mouth daily (with breakfast).  metoprolol tartrate (LOPRESSOR) 25 mg tablet Take 25 mg by mouth two (2) times a day.  gabapentin (NEURONTIN) 300 mg capsule Take 600 mg by mouth two (2) times a day.  HYDROmorphone (DILAUDID) 2 mg tablet Take 1 mg by mouth three (3) times daily.  senna-docusate (SENNA PLUS) 8.6-50 mg per tablet Take 2 Tabs by mouth daily.  BIFIDOBACTERIUM INFANTIS (ALIGN PO) Take 1 Tab by mouth daily.  cyanocobalamin 1,000 mcg tablet Take 1,000 mcg by mouth daily.  XARELTO 15 mg tab tablet Take 15 mg by mouth daily (with breakfast).  cholecalciferol (VITAMIN D3) 1,000 unit tablet Take 1,000 Units by mouth daily.  esomeprazole (NEXIUM) 40 mg capsule Take 40 mg by mouth daily.  traMADol (ULTRAM) 50 mg tablet Take 1 Tab by mouth two (2) times daily as needed for Pain. Max Daily Amount: 100 mg.    naloxone (NARCAN) 4 mg/actuation nasal spray Use 1 spray intranasally into 1 nostril. Use a new Narcan nasal spray for subsequent doses and administer into alternating nostrils. May repeat every 2 to 3 minutes as needed.  nitroglycerin (NITROSTAT) 0.4 mg SL tablet 1 Tab by SubLINGual route every five (5) minutes as needed for Chest Pain.  ondansetron hcl (ZOFRAN) 8 mg tablet Take 8 mg by mouth every eight (8) hours as needed for Nausea. No current facility-administered medications for this visit. Pill Count:  Dilaudid 2 mg #9 tablet on hand today - Last filled on 4/20/18 for # 45  Tramadol 50 mg # 49 on hand today - last filled on 4/20/18 for # 60      Action Items:  1. NP recommended spot checking oxygen level with and without oxygen and record numbers  2. No refills requested today  3. Son will obtain date of last pneumonia vaccine  4. Continue to hold xarelto until you see a GI specialist (Dr. Beatris Barton - [phone]) for a follow up visit  5. Will call [facility] [phone]  to resume home health services (Nursing, PT and OT) - resumption of care called into [facility] at [facility] [place]  6. Use robitussin or Mucinex twice daily for 1 week for cough  7.  Use tylenol and heat pack for breast pain    Plan:  2 Week FU - 6/19/18    DNR form completed and signed by patient today

## 2018-06-05 NOTE — PATIENT INSTRUCTIONS
Dear Malina Bourgeois ,    It was a pleasure seeing you at home today with [facility] ([phone])    Your stated goal: To get back to walking again. This is what we talked about:     1. Transition of care  - Together we reviewed your mothers hospital stay and the discharge instructions from [facility] rehab facility  - Some medication changes have been made since at the hospital and at rehab  - [facility] will resume care with you    2. Bleeding  - The discharge instructions show that the Xarelto was discontinued until cleared by a Gastroenterologist, Dr. Tamara Fuentes in 2-4 weeks  - Medical transport assistance will be needed  - Please remove the Xarelto from her pill boxes and set aside from her regular medications. - Please monitor for any further vomiting of blood, coffee ground appearance to vomit as well as red or black/tarry bowel movements  - Should this happen, please call our office right away. 3. Stage 1 and 2 right/left buttock wound  - Home Health wound care orders will be submitted to [address] cream has been recommended to use as a skin barrier and protectant  - Offloading pressure frequently is recommended   - Avoiding prolonged contact with moisture against the skin by frequent toileting or adult brief changes are advised    4. Cough  - You have a productive, mucous-like cough   - The congestion is heard in the throat but not in the lungs  - Mucinex 600mg twice a day for 1 week   - Home oxygen was set up by the rehab facility  - Your oxygen levels were normal while off the oxygen during our visit  - A home pulse oximeter has been recommended  - Once purchased, please check the levels daily both on and off oxygen and record the values  - This will better assess the ongoing need for the supplemental oxygen  - Please use your \"Pickle\" aka The Breather at least 4 times a day    5.  Health Maintenance  -TDap vaccine was declined  - A Medicare wellness exam will be completed at an upcoming visit. Health Maintenance Due   Topic Date Due    Pneumococcal 65+ Low/Medium Risk (2 of 2 - PPSV23) 12/07/2017    MEDICARE YEARLY EXAM  05/11/2018     6. Advance Care Planning   - A Durable Do Not Resusitate (DDNR) was completed today  - An Advance Directive is already on file. This is what you have shared with us about Darby Umana. Planning 4/27/2018   Patient's Healthcare Decision Maker is: Named in scanned ACP document   Primary Decision Maker Name Erin Cortez   Primary Decision Maker Phone Number [phone]   Primary Decision Maker Relationship to Patient Adult child   Secondary Decision Maker Name Sirisha King   Secondary Decision Maker Phone Number [phone]   Secondary Decision Maker Relationship to Patient Unknown   Confirm Advance Directive Yes, on file       Someone from the [facility] Team will see you again in 2 weeks. If there are any concerns before that time, such as medication questions, worsening symptoms or a need to see a physician for an urgent or emergent situation; please call [phone]. A physician is also on call after our normal business hours of 8am to 5pm.     In order to serve you better, please allow up to 48 hours for prescription refills to be processed. Certain medications may require more paperwork or a written prescription that you may need to  from the office. We appreciate you letting us know of any refill requests as soon as possible. Sincerely,    The [facility] Team  MD Andi Hernandez NP Lupita Horner, JUAN PABLO           Learning About Preventing Pressure Sores  What are pressure sores? A pressure sore is an injury to the skin caused by constant pressure over a period of time. The constant pressure blocks the blood supply to the skin. This causes skin cells to die and creates a sore. Pressure sores are also called bedsores.   Pressure sores usually occur over bony areas, such as the hips, lower back, elbows, heels, and shoulders. Pressure sores can also occur in places where the skin folds over on itself, or where medical equipment presses on the skin, such as when oxygen tubes press on the ears or cheeks. Pressure sores can range from red areas on the surface of the skin to severe tissue damage that goes deep into muscle and bone. Severe sores are hard to treat and slow to heal. When pressure sores do not heal properly, problems such as bone, blood, and skin infections can develop. What causes pressure sores? Things that cause pressure sores include:  · Pressure on the skin and tissues. For example, the sores may happen when a person lies in bed or sits in a wheelchair for a long time. This is the most common cause of pressure sores. · Sliding down in a bed or chair, forcing the skin to fold over itself (shear force). · Being pulled across bed sheets or other surfaces (friction burns). As we get older, our skin gets more thin and dry and less elastic, so it is easier to damage. Poor nutrition and not getting enough fluids make these natural changes in the skin worse. Skin in this condition may easily develop a pressure sore. Skin can also be damaged by sweat, feces, or urine, making pressure sores more likely and harder to heal.  How can you help prevent pressure sores? If you are not able to do these things yourself, ask a family member or friend for help. Change position often  · In a bed, change position every 2 hours. · In a wheelchair or other type of chair, shift your weight every 15 minutes, and give yourself a full relief of weight every hour. ¨ For a weight shift, lean forward and to the left and right. Push up out of the chair with your arms. If you have a chair that tilts, use the tilt control to help relieve pressure. ¨ For a full relief of weight, stand up or move to another chair or bed if you are able to.   Personal care  · Check your skin every day, especially around bony areas. When a pressure sore is forming, skin temperature can be different than nearby skin. It might be warmer or cooler. The skin can feel either firmer or softer than the surrounding skin. · Keep your skin clean and free of sweat, wound drainage, urine, and feces. · Use skin lotions to keep your skin from drying out and cracking. Barrier lotions or creams have ingredients that can act as a shield to help protect the skin from moisture or irritation. · Try not to slide or slump across sheets in a chair or bed. And try not to sleep in a recliner chair. Lifestyle choices  · Eat healthy foods with plenty of protein to help heal damaged skin and to help new skin grow. · Get plenty of fluids. · Stay at a healthy weight. Being either overweight or underweight can make pressure sores more likely. · If you smoke, stop. Smoking dries the skin and reduces its blood supply. If you need help quitting, talk to your doctor about stop-smoking programs and medicines. These can increase your chances of quitting for good. Ask about using cushions or pads  · Overlays are special pads you put on top of a mattress. They provide a softer surface that will fit your body's shape better than a regular mattress. · Cushions or devices can be used to reduce pressure on certain areas of the body. For example, you can use a \"medical heel pillow\" to help prevent pressure sores on heels. You can also get cushions for seating surfaces, such as wheelchair seats. Talk with your doctor about cushions and pads. Some products, such as doughnut-type devices, may actually cause pressure sores or make them worse. Where can you learn more? Go to http://anna-sampson.info/. Enter 948 8271 in the search box to learn more about \"Learning About Preventing Pressure Sores. \"  Current as of: March 20, 2017  Content Version: 11.4  © 0322-8156 Healthwise, [employer].  Care instructions adapted under license by [employer] (which disclaims liability or warranty for this information). If you have questions about a medical condition or this instruction, always ask your healthcare professional. [address] any warranty or liability for your use of this information.

## 2018-06-05 NOTE — MR AVS SNAPSHOT
[address] , [address] 
[phone] Patient: Paula Cook MRN: [id number] Visit Information Date & Time Provider Department Dept. Phone Encounter #  
 2018  2:00 PM Hernando Resendiz NP [address] [facility] and [facility] [phone] [id number] Follow-up Instructions Return in about 2 weeks (around 2018), or if symptoms worsen or fail to improve, for Regular Follow-up. Follow-up and Disposition History Upcoming Health Maintenance Date Due Pneumococcal 65+ Low/Medium Risk (2 of 2 - PPSV23) 2017 MEDICARE YEARLY EXAM 2018 Bone Densitometry (Dexa) Screening 2020* DTaP/Tdap/Td series (1 - Tdap) 2020* Influenza Age 5 to Adult 2018 GLAUCOMA SCREENING Q2Y 2018 *Topic was postponed. The date shown is not the original due date. Allergies as of 2018  Review Complete On: 2018 By: Amelie Cummings RN Severity Noted Reaction Type Reactions Novacare High 2015    Other (comments) PARALYSIS; \"NOVACAINE\" not novacare. Phenergan [Promethazine] High 2015   Systemic Other (comments) \"loose my wits i go crazy\" Nsaids (Non-steroidal Anti-inflammatory Drug) Medium 2015   Systemic Nausea and Vomiting Adhesive Tape-silicones      Other (comments) BLISTERS Atenolol  2015    Nausea and Vomiting Bactrim [Sulfamethoprim Ds]  2015    Nausea and Vomiting Cymbalta [Duloxetine]  2015    Other (comments) CONFUSION Digoxin  2015    Nausea and Vomiting Gluten  2015    Other (comments) GLUTEN INTOLERANCE Macrobid [Nitrofurantoin Monohyd/m-cryst]  2015    Nausea and Vomiting  
 Sulfa (Sulfonamide Antibiotics)  2015    Nausea and Vomiting Codeine Low 2015   Systemic Other (comments) Unable to swallow Current Immunizations  Reviewed on 1/28/2016 Name Date Influenza High Dose Vaccine PF 10/10/2017, 10/12/2016, 10/20/2015 11:52 AM  
 Pneumococcal Conjugate (PCV-13) 12/7/2016 Zoster Vaccine, Live 3/22/2012 Not reviewed this visit You Were Diagnosed With   
  
 Codes Comments Transition of care performed with sharing of clinical summary    -  Primary ICD-10-CM: Z91.89 ICD-9-CM: V15.89   
 CHF, stage C ([facility] [address]     ICD-10-CM: I50.9 ICD-9-CM: 428.0 Paroxysmal atrial fibrillation (HCC)     ICD-10-CM: I48.0 ICD-9-CM: 427.31 Decubitus ulcer of buttock, stage 2, unspecified laterality     ICD-10-CM: I51.378 ICD-9-CM: 707.05, 707.22 Acute blood loss anemia     ICD-10-CM: D62 
ICD-9-CM: 285.1 PMR (polymyalgia rheumatica) (HCC)     ICD-10-CM: M35.3 ICD-9-CM: 826 Vitals BP Pulse Temp Resp Height(growth percentile) Weight(growth percentile) 138/72 (BP 1 Location: Left arm, BP Patient Position: Sitting) 82 97.9 °F (36.6 °C) (Oral) 16 5' (1.524 m) 120 lb 9.6 oz (54.7 kg) SpO2 BMI OB Status Smoking Status 96% 23.55 kg/m2 Postmenopausal Never Smoker BMI and BSA Data Body Mass Index Body Surface Area  
 23.55 kg/m 2 1.52 m 2 Preferred Pharmacy Pharmacy Name Phone [facility]ACY [id number] [address] [phone] Your Updated Medication List  
  
   
This list is accurate as of 6/5/18 11:59 PM.  Always use your most recent med list.  
  
  
  
  
 ALIGN PO Take 1 Tab by mouth daily. cyanocobalamin 1,000 mcg tablet Take 1,000 mcg by mouth daily. gabapentin 300 mg capsule Commonly known as:  NEURONTIN Take 600 mg by mouth two (2) times a day. guaiFENesin -10 mg/5 mL solution Generic drug:  guaiFENesin-codeine Take 5 mL by mouth three (3) times daily as needed for Cough. HYDROmorphone 2 mg tablet Commonly known as:  DILAUDID  
 Take 1 mg by mouth three (3) times daily. metoprolol tartrate 25 mg tablet Commonly known as:  LOPRESSOR Take 25 mg by mouth two (2) times a day. MIRALAX 17 gram/dose powder Generic drug:  polyethylene glycol Take 17 g by mouth daily. naloxone 4 mg/actuation nasal spray Commonly known as:  [employer] Use 1 spray intranasally into 1 nostril. Use a new Narcan nasal spray for subsequent doses and administer into alternating nostrils. May repeat every 2 to 3 minutes as needed. NexIUM 40 mg capsule Generic drug:  esomeprazole Take 40 mg by mouth daily. nitroglycerin 0.4 mg SL tablet Commonly known as:  NITROSTAT  
1 Tab by SubLINGual route every five (5) minutes as needed for Chest Pain. ondansetron hcl 8 mg tablet Commonly known as:  Elesa Balm Take 8 mg by mouth every eight (8) hours as needed for Nausea. OXYGEN-AIR DELIVERY SYSTEMS  
2 L by Nasal route continuous. predniSONE 5 mg tablet Commonly known as:  Marleni Look Take 0.5 Tabs by mouth daily. SENNA PLUS 8.6-50 mg per tablet Generic drug:  senna-docusate Take 2 Tabs by mouth daily. spironolactone 25 mg tablet Commonly known as:  ALDACTONE Take 0.5 Tabs by mouth daily. topiramate 25 mg tablet Commonly known as:  TOPAMAX Take 1 Tab by mouth daily (with breakfast). VITAMIN D3 1,000 unit tablet Generic drug:  cholecalciferol Take 1,000 Units by mouth daily. Prescriptions Sent to Pharmacy Refills  
 predniSONE (DELTASONE) 5 mg tablet 0 Sig: Take 0.5 Tabs by mouth daily. Class: Normal  
 Pharmacy: [facility]/pharmacy #[id number] Ph #: [phone] Route: Oral  
  
We Performed the Following DO NOT RESUSCITATE Nettie Fischer Follow-up Instructions Return in about 2 weeks (around 6/19/2018), or if symptoms worsen or fail to improve, for Regular Follow-up. Patient Instructions Dear Jatinder Schilling , It was a pleasure seeing you at home today with [facility] ([phone]) Your stated goal: To get back to walking again. This is what we talked about: 1. Transition of care - Together we reviewed your mothers hospital stay and the discharge instructions from [facility] rehab facility - Some medication changes have been made since at the hospital and at rehab - [facility] will resume care with you 2. Bleeding - The discharge instructions show that the Xarelto was discontinued until cleared by a Gastroenterologist, Dr. Renetta Parra in 2-4 weeks - Medical transport assistance will be needed - Please remove the Xarelto from her pill boxes and set aside from her regular medications. - Please monitor for any further vomiting of blood, coffee ground appearance to vomit as well as red or black/tarry bowel movements - Should this happen, please call our office right away. 3. Stage 1 and 2 right/left buttock wound - Home Health wound care orders will be submitted to [address] cream has been recommended to use as a skin barrier and protectant 
- Offloading pressure frequently is recommended - Avoiding prolonged contact with moisture against the skin by frequent toileting or adult brief changes are advised 4. Cough - You have a productive, mucous-like cough - The congestion is heard in the throat but not in the lungs - Mucinex 600mg twice a day for 1 week - Home oxygen was set up by the rehab facility - Your oxygen levels were normal while off the oxygen during our visit - A home pulse oximeter has been recommended 
- Once purchased, please check the levels daily both on and off oxygen and record the values - This will better assess the ongoing need for the supplemental oxygen - Please use your \"Pickle\" aka The Breather at least 4 times a day 5. Health Maintenance -TDap vaccine was declined - A Medicare wellness exam will be completed at an upcoming visit. Health Maintenance Due Topic Date Due  Pneumococcal 65+ Low/Medium Risk (2 of 2 - PPSV23) 12/07/2017  MEDICARE YEARLY EXAM  05/11/2018 6. Advance Care Planning - A Durable Do Not Resusitate (DDNR) was completed today - An Advance Directive is already on file. This is what you have shared with us about Advance Care Planning Advance Care Planning 4/27/2018 Patient's Healthcare Decision Maker is: Named in scanned ACP document Primary Decision Maker Name You Bryan Primary Decision Maker Phone Number [phone] Primary Decision Maker Relationship to Patient Adult child Secondary Decision Maker Name Catina Zhang Secondary Decision Maker Phone Number [phone] Secondary Decision Maker Relationship to Patient Unknown Confirm Advance Directive Yes, on file Someone from the [facility] Based Primary Care Team will see you again in 2 weeks. If there are any concerns before that time, such as medication questions, worsening symptoms or a need to see a physician for an urgent or emergent situation; please call [phone]. A physician is also on call after our normal business hours of 8am to 5pm.  
 
In order to serve you better, please allow up to 48 hours for prescription refills to be processed. Certain medications may require more paperwork or a written prescription that you may need to  from the office. We appreciate you letting us know of any refill requests as soon as possible. Sincerely, The [facility] Team 
MD Aarti Schwartz NP Matha Aland, RN Learning About Preventing Pressure Sores What are pressure sores? A pressure sore is an injury to the skin caused by constant pressure over a period of time. The constant pressure blocks the blood supply to the skin. This causes skin cells to die and creates a sore.  Pressure sores are also called bedsores. Pressure sores usually occur over bony areas, such as the hips, lower back, elbows, heels, and shoulders. Pressure sores can also occur in places where the skin folds over on itself, or where medical equipment presses on the skin, such as when oxygen tubes press on the ears or cheeks. Pressure sores can range from red areas on the surface of the skin to severe tissue damage that goes deep into muscle and bone. Severe sores are hard to treat and slow to heal. When pressure sores do not heal properly, problems such as bone, blood, and skin infections can develop. What causes pressure sores? Things that cause pressure sores include: · Pressure on the skin and tissues. For example, the sores may happen when a person lies in bed or sits in a wheelchair for a long time. This is the most common cause of pressure sores. · Sliding down in a bed or chair, forcing the skin to fold over itself (shear force). · Being pulled across bed sheets or other surfaces (friction burns). As we get older, our skin gets more thin and dry and less elastic, so it is easier to damage. Poor nutrition and not getting enough fluids make these natural changes in the skin worse. Skin in this condition may easily develop a pressure sore. Skin can also be damaged by sweat, feces, or urine, making pressure sores more likely and harder to heal. 
How can you help prevent pressure sores? If you are not able to do these things yourself, ask a family member or friend for help. Change position often · In a bed, change position every 2 hours. · In a wheelchair or other type of chair, shift your weight every 15 minutes, and give yourself a full relief of weight every hour. ¨ For a weight shift, lean forward and to the left and right. Push up out of the chair with your arms. If you have a chair that tilts, use the tilt control to help relieve pressure. ¨ For a full relief of weight, stand up or move to another chair or bed if you are able to. Personal care · Check your skin every day, especially around bony areas. When a pressure sore is forming, skin temperature can be different than nearby skin. It might be warmer or cooler. The skin can feel either firmer or softer than the surrounding skin. · Keep your skin clean and free of sweat, wound drainage, urine, and feces. · Use skin lotions to keep your skin from drying out and cracking. Barrier lotions or creams have ingredients that can act as a shield to help protect the skin from moisture or irritation. · Try not to slide or slump across sheets in a chair or bed. And try not to sleep in a recliner chair. Lifestyle choices · Eat healthy foods with plenty of protein to help heal damaged skin and to help new skin grow. · Get plenty of fluids. · Stay at a healthy weight. Being either overweight or underweight can make pressure sores more likely. · If you smoke, stop. Smoking dries the skin and reduces its blood supply. If you need help quitting, talk to your doctor about stop-smoking programs and medicines. These can increase your chances of quitting for good. Ask about using cushions or pads · Overlays are special pads you put on top of a mattress. They provide a softer surface that will fit your body's shape better than a regular mattress. · Cushions or devices can be used to reduce pressure on certain areas of the body. For example, you can use a \"medical heel pillow\" to help prevent pressure sores on heels. You can also get cushions for seating surfaces, such as wheelchair seats. Talk with your doctor about cushions and pads. Some products, such as doughnut-type devices, may actually cause pressure sores or make them worse. Where can you learn more? Go to http://anna-sampson.info/. Enter 410 3448 in the search box to learn more about \"Learning About Preventing Pressure Sores. \" 
 Current as of: March 20, 2017 Content Version: 11.4 © 8975-0514 Healthwise, [employer]. Care instructions adapted under license by [employer] (which disclaims liability or warranty for this information). If you have questions about a medical condition or this instruction, always ask your healthcare professional. [address] any warranty or liability for your use of this information. Patient Instructions History Introducing [facility]! Dear Kurtis Oswald: 
Thank you for requesting a [employer] account. Our records indicate that you already have an active DreamHeart account. You can access your account anytime at [email or web address] Did you know that you can access your hospital and ER discharge instructions at any time in DreamHeart? You can also review all of your test results from your hospital stay or ER visit. Additional Information If you have questions, please visit the Frequently Asked Questions section of the DreamHeart website at [email or web address] Remember, DreamHeart is NOT to be used for urgent needs. For medical emergencies, dial 911. Now available from your iPhone and Android! Please provide this summary of care documentation to your next provider. Your primary care clinician is listed as Hillary Jiang. If you have any questions after today's visit, please call [phone].

## 2018-06-06 ENCOUNTER — PATIENT MESSAGE (OUTPATIENT)
Dept: FAMILY MEDICINE CLINIC | Age: 83
End: 2018-06-06

## 2018-06-11 ENCOUNTER — PATIENT MESSAGE (OUTPATIENT)
Dept: FAMILY MEDICINE CLINIC | Age: 83
End: 2018-06-11

## 2018-06-12 ENCOUNTER — NURSE NAVIGATOR (OUTPATIENT)
Dept: FAMILY MEDICINE CLINIC | Age: 83
End: 2018-06-12

## 2018-06-12 ENCOUNTER — PATIENT MESSAGE (OUTPATIENT)
Dept: FAMILY MEDICINE CLINIC | Age: 83
End: 2018-06-12

## 2018-06-12 DIAGNOSIS — I50.9 CHF, STAGE C (HCC): Primary | ICD-10-CM

## 2018-06-12 RX ORDER — BENZONATATE 100 MG/1
100 CAPSULE ORAL
Qty: 30 CAP | Refills: 1 | Status: SHIPPED | OUTPATIENT
Start: 2018-06-12 | End: 2018-06-19

## 2018-06-12 NOTE — PROGRESS NOTES
NN called [facility] spoke with Charanjit Ruff regarding orders for portable Chest X-ray. Left voice message with patients son Jude Leblanc, regarding mobile imaging. Name and contact information provided.

## 2018-06-13 DIAGNOSIS — I50.9 CHF, STAGE C (HCC): ICD-10-CM

## 2018-06-15 ENCOUNTER — TELEPHONE (OUTPATIENT)
Dept: FAMILY MEDICINE CLINIC | Age: 83
End: 2018-06-15

## 2018-06-19 ENCOUNTER — PATIENT MESSAGE (OUTPATIENT)
Dept: FAMILY MEDICINE CLINIC | Age: 83
End: 2018-06-19

## 2018-06-19 ENCOUNTER — HOME VISIT (OUTPATIENT)
Dept: FAMILY MEDICINE CLINIC | Age: 83
End: 2018-06-19

## 2018-06-19 VITALS
HEART RATE: 77 BPM | RESPIRATION RATE: 16 BRPM | DIASTOLIC BLOOD PRESSURE: 73 MMHG | SYSTOLIC BLOOD PRESSURE: 138 MMHG | OXYGEN SATURATION: 99 % | TEMPERATURE: 97.7 F

## 2018-06-19 DIAGNOSIS — M35.3 PMR (POLYMYALGIA RHEUMATICA) (HCC): ICD-10-CM

## 2018-06-19 DIAGNOSIS — R05.9 COUGH: ICD-10-CM

## 2018-06-19 DIAGNOSIS — N18.2 CKD (CHRONIC KIDNEY DISEASE) STAGE 2, GFR 60-89 ML/MIN: Primary | ICD-10-CM

## 2018-06-19 DIAGNOSIS — D62 ANEMIA ASSOCIATED WITH ACUTE BLOOD LOSS: ICD-10-CM

## 2018-06-19 DIAGNOSIS — M72.2 PLANTAR FASCIITIS, BILATERAL: ICD-10-CM

## 2018-06-19 RX ORDER — PREDNISONE 2.5 MG/1
2.5 TABLET ORAL DAILY
Qty: 90 TAB | Refills: 3 | Status: SHIPPED | OUTPATIENT
Start: 2018-06-19 | End: 2018-10-09

## 2018-06-19 RX ORDER — HYDROMORPHONE HYDROCHLORIDE 2 MG/1
1 TABLET ORAL EVERY 8 HOURS
Qty: 45 TAB | Refills: 0
Start: 2018-06-19 | End: 2018-07-18 | Stop reason: SDUPTHER

## 2018-06-19 RX ORDER — ALBUTEROL SULFATE 5 MG/ML
2.5 SOLUTION RESPIRATORY (INHALATION)
Qty: 0.5 ML | Refills: 0
Start: 2018-06-19 | End: 2018-06-20 | Stop reason: SDUPTHER

## 2018-06-19 RX ORDER — HYDROCODONE POLISTIREX AND CHLORPHENIRAMINE POLISTIREX 10; 8 MG/5ML; MG/5ML
2.5 SUSPENSION, EXTENDED RELEASE ORAL
Qty: 115 ML | Refills: 0
Start: 2018-06-19 | End: 2018-09-20 | Stop reason: ALTCHOICE

## 2018-06-19 NOTE — PROGRESS NOTES
Home Based [address] [facility]   [phone]      Date of Current Visit: 06/19/18  Date of Initial [facility] Visit: 05/03/18       SUMMARY:   80 yr female referred to [facility] by Dr. Ifeanyi Cruz. She has a significant hx for seronegative RA, PMR of multiple joints, CAD, old MI, GERD and Afib. Due to this, Ms. Malik Melton is unable to see a community PCP without significant taxing effort. GOALS OF CARE / TREATMENT PREFERENCES:   GOALS OF CARE:  Patient / health care proxy stated goals: To get back to walking again. TREATMENT PREFERENCES:   Code Status:  [] Attempt Resuscitation       [x] Do Not Attempt Resuscitation    Advance Care Planning:  Advance Care Planning 4/27/2018   Patient's Healthcare Decision Maker is: Named in scanned ACP document   Primary Decision Maker Name Brisa Ray   Primary Decision Maker Phone Number [phone]   Primary Decision Maker Relationship to Patient Adult child   Secondary Decision Maker Name Jeromy Díaz   Secondary Decision Maker Phone Number [phone]   Secondary Decision Maker Relationship to Patient Unknown   Confirm Advance Directive Yes, on file       DIAGNOSES:       ICD-10-CM ICD-9-CM    1. CKD (chronic kidney disease) stage 2, GFR 60-89 ml/min N18.2 585.2 CBC W/O DIFF      METABOLIC PANEL, COMPREHENSIVE   2. PMR (polymyalgia rheumatica) (HCC) M35.3 725 CBC W/O DIFF      METABOLIC PANEL, COMPREHENSIVE      HYDROmorphone (DILAUDID) 2 mg tablet   3. Anemia associated with acute blood loss D62 285.1 CBC W/O DIFF      METABOLIC PANEL, COMPREHENSIVE   4. Plantar fasciitis, bilateral M72.2 728.71    5. Cough R05 786.2 chlorpheniramine-HYDROcodone (TUSSIONEX) 10-8 mg/5 mL suspension        PLAN:   Patient Instructions     Dear Laureen Arellano ,    It was a pleasure seeing you at home today with [facility] ([phone])    Your stated goal: To get back to walking again. This is what we talked about:     1.  Bleeding/ Anemia  - Xarelto was discontinued until cleared by a Gastroenterologist, Dr. Leoncio Robertson in 2-4 weeks  - An appointment with the GI doctor has not been made as of yet  - Please monitor for any further vomiting of blood, coffee ground appearance to vomit as well as red or black/tarry bowel movements  - Should this happen, please call our office right away. - Today we are collecting blood to follow up on her anemia    2. Bilateral Heel pain / Neuropathy/ Plantar fascitis  - You have been experiencing burning and pain in your heels  - You have a history of neuropathy as well as plantar fascitis  - There are no wounds on your feet  - This is good  - You are already taking 1200mg of gabapentin a day  - We discussed Cymbata which you have tried briefly in the past  - Gensherron Route shared that when you took Cymbalta, it caused extreme confusion  - You were also on more pain medicine than you are now  - If need, you and Dinorah Route are open to trying Cymbalta again if needed  - PT has been asked to show you some stretches and exercises for your feet that can help with Plantar fascitis  - Rolling ice (frozen water bottle or similar) under the foot 3-4 times a daily will also reduce pain and inflammation    3. Cough / Oxygen  - You continue to experience a cough  - There has been no improvement using Robitussin DM, Mucinex or Tessalon  - The chest xray on 6/13/18 was normal  - A small dose of Tussionex cough syrup will be started. You are unable to use Robitussin with Codeine due to an allergy  - This new cough syrup is long acting and only used every 12 hrs as needed  - Albuterol nebulizer medication has been sent to the pharmacy  - A home pulse oximeter has been purchased  - You are doing an excellent job checking and recording the results        - Please continue the oxygen as directed  - Please use your \"Pickle\" aka The Breather at least 4 times a day    4.  Kidneys  - You have some decreased kidney function which is not new  - Today we collected blood to follow up on this    5. Stage 1 and 2 right/left buttock wound  - Home Health wound care orders will be submitted to [address] cream has been recommended to use as a skin barrier and protectant  - Offloading pressure frequently is recommended   - Avoiding prolonged contact with moisture against the skin by frequent toileting or adult brief changes are advised    6. Health Maintenance  -TDap vaccine was declined  - A Medicare wellness exam will be completed at an upcoming visit. Health Maintenance Due   Topic Date Due    Pneumococcal 65+ Low/Medium Risk (2 of 2 - PPSV23) 12/07/2017    MEDICARE YEARLY EXAM  05/11/2018    GLAUCOMA SCREENING Q2Y  08/09/2018     6. Advance Care Planning   - A Durable Do Not Resusitate (DDNR) is on file  - An Advance Directive is already on file. This is what you have shared with us about Darby Umana. Planning 4/27/2018   Patient's Healthcare Decision Maker is: Named in scanned ACP document   Primary Decision Maker Name Choco Toscano   Primary Decision Maker Phone Number [phone]   Primary Decision Maker Relationship to Patient Adult child   Secondary Decision Maker Name Nicholas Maxwell   Secondary Decision Maker Phone Number [phone]   Secondary Decision Maker Relationship to Patient Unknown   Confirm Advance Directive Yes, on file       Someone from the Home Based Primary Care Team will see you again in 4 weeks. If there are any concerns before that time, such as medication questions, worsening symptoms or a need to see a physician for an urgent or emergent situation; please call [phone]. A physician is also on call after our normal business hours of 8am to 5pm.     In order to serve you better, please allow up to 48 hours for prescription refills to be processed. Certain medications may require more paperwork or a written prescription that you may need to  from the office.  We appreciate you letting us know of any refill requests as soon as possible. Sincerely,    The Home Based Primary Care Team  MD Macey Warren, CRISTIN Kelley RN             Plantar Fasciitis: Exercises  Your Care Instructions  Here are some examples of typical rehabilitation exercises for your condition. Start each exercise slowly. Ease off the exercise if you start to have pain. Your doctor or physical therapist will tell you when you can start these exercises and which ones will work best for you. How to do the exercises  Towel stretch    1. Sit with your legs extended and knees straight. 2. Place a towel around your foot just under the toes. 3. Hold each end of the towel in each hand, with your hands above your knees. 4. Pull back with the towel so that your foot stretches toward you. 5. Hold the position for at least 15 to 30 seconds. 6. Repeat 2 to 4 times a session, up to 5 sessions a day. Calf stretch    This exercise stretches the muscles at the back of the lower leg (the calf) and the Achilles tendon. Do this exercise 3 or 4 times a day, 5 days a week. 1. Stand facing a wall with your hands on the wall at about eye level. Put the leg you want to stretch about a step behind your other leg. 2. Keeping your back heel on the floor, bend your front knee until you feel a stretch in the back leg. 3. Hold the stretch for 15 to 30 seconds. Repeat 2 to 4 times. Plantar fascia and calf stretch    Stretching the plantar fascia and calf muscles can increase flexibility and decrease heel pain. You can do this exercise several times each day and before and after activity. 1. Stand on a step as shown above. Be sure to hold on to the banister. 2. Slowly let your heels down over the edge of the step as you relax your calf muscles. You should feel a gentle stretch across the bottom of your foot and up the back of your leg to your knee.   3. Hold the stretch about 15 to 30 seconds, and then tighten your calf muscle a little to bring your heel back up to the level of the step. Repeat 2 to 4 times. Towel curls    Make this exercise more challenging by placing a weighted object, such as a soup can, on the other end of the towel. 1. While sitting, place your foot on a towel on the floor and scrunch the towel toward you with your toes. 2. Then, also using your toes, push the towel away from you. [place] pickups    1. Put marbles on the floor next to a cup.  2. Using your toes, try to lift the marbles up from the floor and put them in the cup. Follow-up care is a key part of your treatment and safety. Be sure to make and go to all appointments, and call your doctor if you are having problems. It's also a good idea to know your test results and keep a list of the medicines you take. Where can you learn more? Go to http://annaCashSentinelsampson.info/. Darreld Landau in the search box to learn more about \"Plantar Fasciitis: Exercises. \"  Current as of: 2017  Content Version: 11.4  © 1692-5081 [employer]. Care instructions adapted under license by [employer] (which disclaims liability or warranty for this information). If you have questions about a medical condition or this instruction, always ask your healthcare professional. [address] any warranty or liability for your use of this information. PRESCRIPTIONS GIVEN:     Medications Ordered Today   Medications    HYDROmorphone (DILAUDID) 2 mg tablet     Sig: Take 0.5 Tabs by mouth every eight (8) hours for 30 days. Max Daily Amount: 3 mg. Dispense:  45 Tab     Refill:  0    chlorpheniramine-HYDROcodone (TUSSIONEX) 10-8 mg/5 mL suspension     Sig: Take 2.5 mL by mouth every twelve (12) hours as needed for Cough. Max Daily Amount: 5 mL. Dispense:  115 mL     Refill:  0    albuterol (PROVENTIL) 5 mg/mL nebulizer solution     Si.5 mL by Nebulization route every six (6) hours as needed for Wheezing. Indications: BRONCHOSPASM PREVENTION     Dispense:  0.5 mL     Refill:  0    predniSONE (DELTASONE) 2.5 mg tablet     Sig: Take 1 Tab by mouth daily. Dispense:  90 Tab     Refill:  3         HISTORY:   Please see RN note from this current visit; history taken together in presence of patient/family in the home. CHIEF COMPLAINT:   Chief Complaint   Patient presents with    Anemia    Cough    O2/Oxygen     HPI/SUBJECTIVE:    The patient is: [x] Verbal / [] Nonverbal   Son has purchased home sat monitor. Reporting burning to bilat heels R>L. Tried cymbalta in the past (several years ago) and experienced significant confusion after 1 dose. At that time was on multiple opiates as well - oxycontin and morphine. Recording results daily. Continues to experience cough without relief from robitussin, mucinex or tessalon. Recent CXR was negative. REVIEW OF SYSTEMS:     The following systems were [x] reviewed / [] unable to be reviewed  Systems: constitutional, ears/nose/mouth/throat, respiratory, gastrointestinal, genitourinary, musculoskeletal, integumentary, neurologic, psychiatric, endocrine. Positive findings noted below: cough, heel burning and pain,      FUNCTIONAL ASSESSMENT:     Palliative Performance Scale (PPS): 50-60%     PSYCHOSOCIAL/SPIRITUAL SCREENING:      Any spiritual / Orthodox concerns: [] Yes /  [x] No Sabianist, no longer goes to [employer] because can't get out of home; Orthodoxy does not come to her, may be willing to reconnect    Caregiver Burnout: [] Yes /  [x] No /  [] No Caregiver Present       PHYSICAL EXAM:     Wt Readings from Last 3 Encounters:   06/05/18 120 lb 9.6 oz (54.7 kg)   11/20/17 131 lb (59.4 kg)   11/15/17 132 lb (59.9 kg)     Blood pressure 138/73, pulse 77, temperature 97.7 °F (36.5 °C), temperature source Oral, resp. rate 16, SpO2 99 %. Last bowel movement:  TODAY    Constitutional:  female, adv.  Age, thin and frail, sitting up in WC, NAD  Eyes: pupils equal, anicteric  ENMT: no nasal discharge, moist mucous membranes  Cardiovascular: regular/ irregular rhythm, distal pulses intact  Respiratory: breathing not labored, symmetric  Gastrointestinal: soft non-tender, +bowel sounds  Musculoskeletal: no deformity, no tenderness to palpation  Skin: warm, dry, stage 1 and 2 bilat. Buttock wounds,  Neurologic: following commands, moving all extremities  Psychiatric: full affect, no hallucinations  Other:     HISTORY:     Past Medical and Surgical History reviewed in [facility] on date of initial visit    Patient Active Problem List   Diagnosis Code    Osteoarthritis M19.90    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 I25.111    CHF, stage C ([facility] I50.9    Lumbar spinal stenosis M48.061    Paroxysmal atrial fibrillation ([facility]) I48.0    Advance directive on file Z78.9    PMR (polymyalgia rheumatica) ([facility]) M35.3    Lower extremity edema R60.0    CKD (chronic kidney disease) stage 2, GFR 60-89 ml/min N18.2    Long term current use of systemic steroids Z79.52    Seronegative rheumatoid arthritis of multiple sites ([facility] M06.09    Long-term use of immunosuppressant medication Z79.899    Anemia associated with acute blood loss D62     Family History   Problem Relation Age of Onset    Other Mother      Intestinal obstruction    Cancer Father     Stroke Father     Heart Attack Brother     Cancer Brother     Cancer Brother     Anesth Problems Neg Hx       History reviewed, no pertinent family history. Social History   Substance Use Topics    Smoking status: Never Smoker    Smokeless tobacco: Never Used    Alcohol use No     Allergies   Allergen Reactions    Novacare Other (comments)     PARALYSIS; \"NOVACAINE\" not novacare.     Phenergan [Promethazine] Other (comments)     \"loose my wits i go crazy\"    Nsaids (Non-Steroidal Anti-Inflammatory Drug) Nausea and Vomiting    Adhesive Tape-Silicones Other (comments)     BLISTERS    Atenolol Nausea and Vomiting    Bactrim [Sulfamethoprim Ds] Nausea and Vomiting    Cymbalta [Duloxetine] Other (comments)     CONFUSION      Digoxin Nausea and Vomiting    Gluten Other (comments)     GLUTEN INTOLERANCE    Macrobid [Nitrofurantoin Monohyd/M-Cryst] Nausea and Vomiting    Sulfa (Sulfonamide Antibiotics) Nausea and Vomiting    Codeine Other (comments)     Unable to swallow      Current Outpatient Prescriptions   Medication Sig    HYDROmorphone (DILAUDID) 2 mg tablet Take 0.5 Tabs by mouth every eight (8) hours for 30 days. Max Daily Amount: 3 mg.  chlorpheniramine-HYDROcodone (TUSSIONEX) 10-8 mg/5 mL suspension Take 2.5 mL by mouth every twelve (12) hours as needed for Cough. Max Daily Amount: 5 mL.  albuterol (PROVENTIL) 5 mg/mL nebulizer solution 0.5 mL by Nebulization route every six (6) hours as needed for Wheezing. Indications: BRONCHOSPASM PREVENTION    predniSONE (DELTASONE) 2.5 mg tablet Take 1 Tab by mouth daily.  polyethylene glycol (MIRALAX) 17 gram/dose powder Take 17 g by mouth daily.  spironolactone (ALDACTONE) 25 mg tablet Take 0.5 Tabs by mouth daily.  topiramate (TOPAMAX) 25 mg tablet Take 1 Tab by mouth daily (with breakfast).  metoprolol tartrate (LOPRESSOR) 25 mg tablet Take 25 mg by mouth two (2) times a day.  gabapentin (NEURONTIN) 300 mg capsule Take 600 mg by mouth two (2) times a day.  senna-docusate (SENNA PLUS) 8.6-50 mg per tablet Take 2 Tabs by mouth daily.  BIFIDOBACTERIUM INFANTIS (ALIGN PO) Take 1 Tab by mouth daily.  cyanocobalamin 1,000 mcg tablet Take 1,000 mcg by mouth daily.  cholecalciferol (VITAMIN D3) 1,000 unit tablet Take 1,000 Units by mouth daily.  esomeprazole (NEXIUM) 40 mg capsule Take 40 mg by mouth daily.  OXYGEN-AIR DELIVERY SYSTEMS 2 L by Nasal route continuous.  naloxone (NARCAN) 4 mg/actuation nasal spray Use 1 spray intranasally into 1 nostril.  Use a new Narcan nasal spray for subsequent doses and administer into alternating nostrils. May repeat every 2 to 3 minutes as needed.  nitroglycerin (NITROSTAT) 0.4 mg SL tablet 1 Tab by SubLINGual route every five (5) minutes as needed for Chest Pain.  ondansetron hcl (ZOFRAN) 8 mg tablet Take 8 mg by mouth every eight (8) hours as needed for Nausea. No current facility-administered medications for this visit. LAB DATA REVIEWED:     Lab Results   Component Value Date/Time    Hemoglobin A1c 6.1 05/14/2015 12:34 PM     No results found for: MCACR, MCA1, MCA2, MCA3, MCAU, MCAU2, MCALPOCT  Lab Results   Component Value Date/Time    TSH 1.560 11/20/2017 02:40 PM    TSH 0.86 05/25/2015 04:32 AM     Lab Results   Component Value Date/Time    VITAMIN D, 25-HYDROXY 43.1 10/04/2017 02:52 PM         Lab Results   Component Value Date/Time    WBC 4.3 06/19/2018 02:10 AM    HGB 11.0 (L) 06/19/2018 02:10 AM    PLATELET 989 59/77/8798 02:10 AM     Lab Results   Component Value Date/Time    Sodium 139 06/19/2018 02:10 AM    Potassium 4.0 06/19/2018 02:10 AM    Chloride 100 06/19/2018 02:10 AM    CO2 21 06/19/2018 02:10 AM    BUN 7 (L) 06/19/2018 02:10 AM    Creatinine 0.84 06/19/2018 02:10 AM    Calcium 9.3 06/19/2018 02:10 AM    Magnesium 1.8 05/25/2015 04:32 AM    Phosphorus 1.5 (L) 05/25/2015 04:32 AM      Lab Results   Component Value Date/Time    AST (SGOT) 11 06/19/2018 02:10 AM    Alk.  phosphatase 92 06/19/2018 02:10 AM    Protein, total 5.4 (L) 06/19/2018 02:10 AM    Albumin 3.4 06/19/2018 02:10 AM     No results found for: IRON, FE, TIBC, IBCT, PSAT, FERR           Total time:  50min   Counseling / coordination time: 10min - review medication/allergy hx, lab draw, plantar fascitis exercises/interventions  > 50% counseling / coordination?:

## 2018-06-19 NOTE — PROGRESS NOTES
Home Based [address] [facility] [address]  [phone]  RN Nursing Visit Note    Date of current Visit: 06/19/18   Date of last visit: 6/5/18  Date of initial visit: 5/2/18    Diagnosis: Enteritis, diverticulosis of sigmoid colon,  a-fib, rheumatoid arthritis, polymyalgia rheumatica, CAD, debility    ACP:  Advance Care Planning 4/27/2018   Patient's Healthcare Decision Maker is: Named in scanned ACP document   Primary Decision Maker Name Samantha Vides   Primary Decision Maker Phone Number [phone]   Primary Decision Maker Relationship to Patient Adult child   Secondary Decision Maker Name Martinez Gonsalez   Secondary Decision Maker Phone Number [phone]   Secondary Decision Maker Relationship to Patient Unknown   Confirm Advance Directive Yes, on file       Nursing Assessment: Jimmie Moreno, 80year old female seen today for 2 week follow up visit. Team today included Zohra Cervantes, NP and myself. Upon arrival patient wheeled via wheel chair to the kitchen table by her son, Yvan Peña who was also present during the visit. Son reported that patient is having more difficulty with standing with walker. Patient denies nausea since discharge from [place]. Son unclear if prednisone helped, but he is now out of the medication.  Patient currently on 2 Liters of oxygen     Primary Caregiver:  Son, Hernán [address]:  [address]    Nutrition: - Regular textured food  Wt Readings from Last 3 Encounters:   06/05/18 120 lb 9.6 oz (54.7 kg)   11/20/17 131 lb (59.4 kg)   11/15/17 132 lb (59.9 kg)       Visit Vitals    /73 (BP 1 Location: Left arm, BP Patient Position: Sitting)    Pulse 77    Temp 97.7 °F (36.5 °C) (Oral)    Resp 16    SpO2 99%     MUAC  RA - 24 cm  LA - 25 cm  Medication Management:  Current Outpatient Prescriptions   Medication Sig    benzonatate (TESSALON) 100 mg capsule Take 1 Cap by mouth three (3) times daily as needed for Cough for up to 7 days.  guaiFENesin-codeine (GUAIFENESIN AC) 100-10 mg/5 mL solution Take 5 mL by mouth three (3) times daily as needed for Cough.  polyethylene glycol (MIRALAX) 17 gram/dose powder Take 17 g by mouth daily.  OXYGEN-AIR DELIVERY SYSTEMS 2 L by Nasal route continuous.  predniSONE (DELTASONE) 5 mg tablet Take 0.5 Tabs by mouth daily.  spironolactone (ALDACTONE) 25 mg tablet Take 0.5 Tabs by mouth daily.  topiramate (TOPAMAX) 25 mg tablet Take 1 Tab by mouth daily (with breakfast).  metoprolol tartrate (LOPRESSOR) 25 mg tablet Take 25 mg by mouth two (2) times a day.  gabapentin (NEURONTIN) 300 mg capsule Take 600 mg by mouth two (2) times a day.  HYDROmorphone (DILAUDID) 2 mg tablet Take 1 mg by mouth three (3) times daily.  senna-docusate (SENNA PLUS) 8.6-50 mg per tablet Take 2 Tabs by mouth daily.  naloxone (NARCAN) 4 mg/actuation nasal spray Use 1 spray intranasally into 1 nostril. Use a new Narcan nasal spray for subsequent doses and administer into alternating nostrils. May repeat every 2 to 3 minutes as needed.  BIFIDOBACTERIUM INFANTIS (ALIGN PO) Take 1 Tab by mouth daily.  cyanocobalamin 1,000 mcg tablet Take 1,000 mcg by mouth daily.  nitroglycerin (NITROSTAT) 0.4 mg SL tablet 1 Tab by SubLINGual route every five (5) minutes as needed for Chest Pain.  ondansetron hcl (ZOFRAN) 8 mg tablet Take 8 mg by mouth every eight (8) hours as needed for Nausea.  cholecalciferol (VITAMIN D3) 1,000 unit tablet Take 1,000 Units by mouth daily.  esomeprazole (NEXIUM) 40 mg capsule Take 40 mg by mouth daily. No current facility-administered medications for this visit. Pill Count:  Dilaudid 2 mg # 12 tablet on hand today - Last filled on 4/20/18 for # 45  Tramadol 50 mg # 43 on hand today - last filled on 4/20/18 for # 60        Action Items:    1. Prescription left in the home for Dilaudid, and refill for Prednisone sent to local pharmacy  2.  NP recommend that patient continues on 2 Liters of Oxygen  3. Continue to hold xarelto until you [facility] a GI [phone][facility] for a follow up visit  4. Blood sample obtained today ([facility]) - we will call with results  5. NP recommended breathing treatment - albuterol 4 times per day, and Tussinex cough syrup  6.  PT to provide exercises for plantar fascitis, and home remedy roll ice bottle under for 3-4 times per day    Plan:  4 Week FU -  7/18/18

## 2018-06-19 NOTE — ACP (ADVANCE CARE PLANNING)
Advance Care Planning (ACP) Provider Note - Comprehensive      Date of ACP Conversation: 06/06/18  Persons included in Conversation:  patient and family  Length of ACP Conversation in minutes:  20 minutes     Authorized Decision Maker (if patient is incapable of making informed decisions): This person is:  Healthcare Agent/Medical Power of  under Advance Directive         General ACP for ALL Patients with Decision Making Capacity:   Importance of advance care planning, including choosing a healthcare agent to communicate patient's healthcare decisions if patient lost the ability to make decisions, such as after a sudden illness or accident     Review of Existing Advance Directive:  Does this advance directive still reflect your preferences? Yes (Provide new form/Refer for assistance in updating)     For Serious or Chronic Illness:  Understanding of CPR, goals and expected outcomes, benefits and burdens discussed. Information on CPR success rates provided (e.g. for CPR in hospital, survival to d/c at two weeks is 22%, for chronically ill or elderly/frail survival is less than 3%); Individual asked to communicate understanding of information in his/her own words.   Explored fears and concerns regarding CPR or possible outcomes     Interventions Provided:  Entered DNR order (If yes, complete Durable DNR form)

## 2018-06-19 NOTE — PATIENT INSTRUCTIONS
Dear Cornelio Notice ,    It was a pleasure seeing you at home today with [facility] ([phone])    Your stated goal: To get back to walking again. This is what we talked about:     1. Bleeding/ Anemia  - Xarelto was discontinued until cleared by a Gastroenterologist, Dr. Whitaker Delay in 2-4 weeks  - An appointment with the GI doctor has not been made as of yet  - Please monitor for any further vomiting of blood, coffee ground appearance to vomit as well as red or black/tarry bowel movements  - Should this happen, please call our office right away. - Today we are collecting blood to follow up on her anemia    2. Bilateral Heel pain / Neuropathy/ Plantar fascitis  - You have been experiencing burning and pain in your heels  - You have a history of neuropathy as well as plantar fascitis  - There are no wounds on your feet  - This is good  - You are already taking 1200mg of gabapentin a day  - We discussed Cymbata which you have tried briefly in the past  - Philipp Rubi shared that when you took Cymbalta, it caused extreme confusion  - You were also on more pain medicine than you are now  - If need, you and Philipp Rubi are open to trying Cymbalta again if needed  - PT has been asked to show you some stretches and exercises for your feet that can help with Plantar fascitis  - Rolling ice (frozen water bottle or similar) under the foot 3-4 times a daily will also reduce pain and inflammation    3. Cough / Oxygen  - You continue to experience a cough  - There has been no improvement using Robitussin DM, Mucinex or Tessalon  - The chest xray on 6/13/18 was normal  - A small dose of Tussionex cough syrup will be started.  You are unable to use Robitussin with Codeine due to an allergy  - This new cough syrup is long acting and only used every 12 hrs as needed  - Albuterol nebulizer medication has been sent to the pharmacy  - A home pulse oximeter has been purchased  - You are doing an excellent job checking and recording the results        - Please continue the oxygen as directed  - Please use your \"Pickle\" aka The Breather at least 4 times a day    4. Kidneys  - You have some decreased kidney function which is not new  - Today we collected blood to follow up on this    5. Stage 1 and 2 right/left buttock wound  - Home Health wound care orders will be submitted to [address] cream has been recommended to use as a skin barrier and protectant  - Offloading pressure frequently is recommended   - Avoiding prolonged contact with moisture against the skin by frequent toileting or adult brief changes are advised    6. Health Maintenance  -TDap vaccine was declined  - A Medicare wellness exam will be completed at an upcoming visit. Health Maintenance Due   Topic Date Due    Pneumococcal 65+ Low/Medium Risk (2 of 2 - PPSV23) 12/07/2017    MEDICARE YEARLY EXAM  05/11/2018    GLAUCOMA SCREENING Q2Y  08/09/2018     6. Advance Care Planning   - A Durable Do Not Resusitate (DDNR) is on file  - An Advance Directive is already on file. This is what you have shared with us about Darby Umana. Planning 4/27/2018   Patient's Healthcare Decision Maker is: Named in scanned ACP document   Primary Decision Maker Name Tata Stewart   Primary Decision Maker Phone Number [phone]   Primary Decision Maker Relationship to Patient Adult child   Secondary Decision Maker Name Luis Kevin   Secondary Decision Maker Phone Number [phone]   Secondary Decision Maker Relationship to Patient Unknown   Confirm Advance Directive Yes, on file       Someone from the Home Based [facility] Team will see you again in 4 weeks. If there are any concerns before that time, such as medication questions, worsening symptoms or a need to see a physician for an urgent or emergent situation; please call [phone].  A physician is also on call after our normal business hours of 8am to 5pm.     In order to serve you better, please allow up to 48 hours for prescription refills to be processed. Certain medications may require more paperwork or a written prescription that you may need to  from the office. We appreciate you letting us know of any refill requests as soon as possible. Sincerely,    The [facility] Team  Eustace Klein, MD Vale Epley, NP Francis How, RN             Plantar Fasciitis: Exercises  Your Care Instructions  Here are some examples of typical rehabilitation exercises for your condition. Start each exercise slowly. Ease off the exercise if you start to have pain. Your doctor or physical therapist will tell you when you can start these exercises and which ones will work best for you. How to do the exercises  Towel stretch    1. Sit with your legs extended and knees straight. 2. Place a towel around your foot just under the toes. 3. Hold each end of the towel in each hand, with your hands above your knees. 4. Pull back with the towel so that your foot stretches toward you. 5. Hold the position for at least 15 to 30 seconds. 6. Repeat 2 to 4 times a session, up to 5 sessions a day. Calf stretch    This exercise stretches the muscles at the back of the lower leg (the calf) and the Achilles tendon. Do this exercise 3 or 4 times a day, 5 days a week. 1. Stand facing a wall with your hands on the wall at about eye level. Put the leg you want to stretch about a step behind your other leg. 2. Keeping your back heel on the floor, bend your front knee until you feel a stretch in the back leg. 3. Hold the stretch for 15 to 30 seconds. Repeat 2 to 4 times. Plantar fascia and calf stretch    Stretching the plantar fascia and calf muscles can increase flexibility and decrease heel pain. You can do this exercise several times each day and before and after activity. 1. Stand on a step as shown above. Be sure to hold on to the banister.   2. Slowly let your heels down over the edge of the step as you relax your calf muscles. You should feel a gentle stretch across the bottom of your foot and up the back of your leg to your knee. 3. Hold the stretch about 15 to 30 seconds, and then tighten your calf muscle a little to bring your heel back up to the level of the step. Repeat 2 to 4 times. Towel curls    Make this exercise more challenging by placing a weighted object, such as a soup can, on the other end of the towel. 1. While sitting, place your foot on a towel on the floor and scrunch the towel toward you with your toes. 2. Then, also using your toes, push the towel away from you. [place] pickups    1. Put marbles on the floor next to a cup.  2. Using your toes, try to lift the marbles up from the floor and put them in the cup. Follow-up care is a key part of your treatment and safety. Be sure to make and go to all appointments, and call your doctor if you are having problems. It's also a good idea to know your test results and keep a list of the medicines you take. Where can you learn more? Go to http://anna-sampson.info/. Rani Cristina in the search box to learn more about \"Plantar Fasciitis: Exercises. \"  Current as of: March 21, 2017  Content Version: 11.4  © 9281-4141 Healthwise, Incorporated. Care instructions adapted under license by [employer] (which disclaims liability or warranty for this information). If you have questions about a medical condition or this instruction, always ask your healthcare professional. [place] [address] any warranty or liability for your use of this information.

## 2018-06-19 NOTE — MR AVS SNAPSHOT
[address] , [address] [facility] [place] [facility] [place] [address] 
[phone] Patient: Cornelio Notice MRN: [id number] Visit Information Date & Time Provider Department Dept. Phone Encounter #  
 2018 12:00 PM Domenica Sanabria NP [address] [facility] [phone] [id number] Follow-up Instructions Return in about 4 weeks (around 2018). Follow-up and Disposition History Upcoming Health Maintenance Date Due Pneumococcal 65+ Low/Medium Risk (2 of 2 - PPSV23) 2017 MEDICARE YEARLY EXAM 2018 GLAUCOMA SCREENING Q2Y 2018 Bone Densitometry (Dexa) Screening 2020* DTaP/Tdap/Td series (1 - Tdap) 2020* Influenza Age 5 to Adult 2018 *Topic was postponed. The date shown is not the original due date. Allergies as of 2018  Review Complete On: 2018 By: Domenica Sanabria NP Severity Noted Reaction Type Reactions Novacare High 2015    Other (comments) PARALYSIS; \"NOVACAINE\" not novacare. Phenergan [Promethazine] High 2015   Systemic Other (comments) \"loose my wits i go crazy\" Nsaids (Non-steroidal Anti-inflammatory Drug) Medium 2015   Systemic Nausea and Vomiting Adhesive Tape-silicones      Other (comments) BLISTERS Atenolol  2015    Nausea and Vomiting Bactrim [Sulfamethoprim Ds]  2015    Nausea and Vomiting Cymbalta [Duloxetine]  2015    Other (comments) CONFUSION Digoxin  2015    Nausea and Vomiting Gluten  2015    Other (comments) GLUTEN INTOLERANCE Macrobid [Nitrofurantoin Monohyd/m-cryst]  2015    Nausea and Vomiting  
 Sulfa (Sulfonamide Antibiotics)  2015    Nausea and Vomiting Codeine Low 2015   Systemic Other (comments) Unable to swallow Current Immunizations  Reviewed on 2016 Name Date Influenza High Dose Vaccine PF 10/10/2017, 10/12/2016, 10/20/2015 11:52 AM  
 Pneumococcal Conjugate (PCV-13) 12/7/2016 Zoster Vaccine, Live 3/22/2012 Not reviewed this visit You Were Diagnosed With   
  
 Codes Comments CKD (chronic kidney disease) stage 2, GFR 60-89 ml/min    -  Primary ICD-10-CM: N18.2 ICD-9-CM: 040. 2 PMR (polymyalgia rheumatica) (HCC)     ICD-10-CM: M35.3 ICD-9-CM: 886 Anemia associated with acute blood loss     ICD-10-CM: D62 
ICD-9-CM: 285.1 Plantar fasciitis, bilateral     ICD-10-CM: M72.2 ICD-9-CM: 728.71 Cough     ICD-10-CM: R05 ICD-9-CM: 683. 2 Vitals BP Pulse Temp Resp SpO2 OB Status 138/73 (BP 1 Location: Left arm, BP Patient Position: Sitting) 77 97.7 °F (36.5 °C) (Oral) 16 99% Postmenopausal  
 Smoking Status Never Smoker Preferred Pharmacy Pharmacy Name Phone [id number] [address] [phone] Your Updated Medication List  
  
   
This list is accurate as of 6/19/18 11:59 PM.  Always use your most recent med list.  
  
  
  
  
 albuterol 5 mg/mL nebulizer solution Commonly known as:  PROVENTIL  
0.5 mL by Nebulization route every six (6) hours as needed for Wheezing. Indications: BRONCHOSPASM PREVENTION  
  
 ALIGN PO Take 1 Tab by mouth daily. benzonatate 100 mg capsule Commonly known as:  TESSALON Take 1 Cap by mouth three (3) times daily as needed for Cough for up to 7 days. chlorpheniramine-HYDROcodone 10-8 mg/5 mL suspension Commonly known as:  Duey Sparks Take 2.5 mL by mouth every twelve (12) hours as needed for Cough. Max Daily Amount: 5 mL. cyanocobalamin 1,000 mcg tablet Take 1,000 mcg by mouth daily. gabapentin 300 mg capsule Commonly known as:  NEURONTIN Take 600 mg by mouth two (2) times a day. HYDROmorphone 2 mg tablet Commonly known as:  DILAUDID  
 Take 0.5 Tabs by mouth every eight (8) hours for 30 days. Max Daily Amount: 3 mg.  
  
 metoprolol tartrate 25 mg tablet Commonly known as:  LOPRESSOR Take 25 mg by mouth two (2) times a day. MIRALAX 17 gram/dose powder Generic drug:  polyethylene glycol Take 17 g by mouth daily. naloxone 4 mg/actuation nasal spray Commonly known as:  [employer] Use 1 spray intranasally into 1 nostril. Use a new Narcan nasal spray for subsequent doses and administer into alternating nostrils. May repeat every 2 to 3 minutes as needed. NexIUM 40 mg capsule Generic drug:  esomeprazole Take 40 mg by mouth daily. nitroglycerin 0.4 mg SL tablet Commonly known as:  NITROSTAT  
1 Tab by SubLINGual route every five (5) minutes as needed for Chest Pain. ondansetron hcl 8 mg tablet Commonly known as:  Patrice Marshal Take 8 mg by mouth every eight (8) hours as needed for Nausea. OXYGEN-AIR DELIVERY SYSTEMS  
2 L by Nasal route continuous. predniSONE 2.5 mg tablet Commonly known as:  Alvarez Smaller Take 1 Tab by mouth daily. SENNA PLUS 8.6-50 mg per tablet Generic drug:  senna-docusate Take 2 Tabs by mouth daily. spironolactone 25 mg tablet Commonly known as:  ALDACTONE Take 0.5 Tabs by mouth daily. topiramate 25 mg tablet Commonly known as:  TOPAMAX Take 1 Tab by mouth daily (with breakfast). VITAMIN D3 1,000 unit tablet Generic drug:  cholecalciferol Take 1,000 Units by mouth daily. Prescriptions Sent to Pharmacy Refills  
 predniSONE (DELTASONE) 2.5 mg tablet 3 Sig: Take 1 Tab by mouth daily. Class: Normal  
 Pharmacy: [facility]acy #[id number] Ph #: [phone] Route: Oral  
  
We Performed the Following CBC W/O DIFF [41382 CPT(R)] METABOLIC PANEL, COMPREHENSIVE [07181 CPT(R)] Follow-up Instructions Return in about 4 weeks (around 7/17/2018). Patient Instructions Dear Vannesa Anderson , It was a pleasure seeing you at home today with [facility] ([phone]) Your stated goal: To get back to walking again. This is what we talked about: 1. Bleeding/ Anemia - Xarelto was discontinued until cleared by a Gastroenterologist, Dr. Joanna Truong in 2-4 weeks - An appointment with the GI doctor has not been made as of yet - Please monitor for any further vomiting of blood, coffee ground appearance to vomit as well as red or black/tarry bowel movements - Should this happen, please call our office right away. - Today we are collecting blood to follow up on her anemia 2. Bilateral Heel pain / Neuropathy/ Plantar fascitis - You have been experiencing burning and pain in your heels - You have a history of neuropathy as well as plantar fascitis - There are no wounds on your feet - This is good - You are already taking 1200mg of gabapentin a day - We discussed Cymbata which you have tried briefly in the past 
- Gloria Partida shared that when you took Cymbalta, it caused extreme confusion - You were also on more pain medicine than you are now - If need, you and Gloria Partida are open to trying Cymbalta again if needed - PT has been asked to show you some stretches and exercises for your feet that can help with Plantar fascitis - Rolling ice (frozen water bottle or similar) under the foot 3-4 times a daily will also reduce pain and inflammation 3. Cough / Oxygen - You continue to experience a cough - There has been no improvement using Robitussin DM, Mucinex or Tessalon - The chest xray on 6/13/18 was normal 
- A small dose of Tussionex cough syrup will be started. You are unable to use Robitussin with Codeine due to an allergy - This new cough syrup is long acting and only used every 12 hrs as needed - Albuterol nebulizer medication has been sent to the pharmacy - A home pulse oximeter has been purchased - You are doing an excellent job checking and recording the results - Please continue the oxygen as directed - Please use your \"Pickle\" aka The Breather at least 4 times a day 4. Kidneys - You have some decreased kidney function which is not new - Today we collected blood to follow up on this 5. Stage 1 and 2 right/left buttock wound - Home Health wound care orders will be submitted to [address] cream has been recommended to use as a skin barrier and protectant 
- Offloading pressure frequently is recommended - Avoiding prolonged contact with moisture against the skin by frequent toileting or adult brief changes are advised 6. Health Maintenance -TDap vaccine was declined - A Medicare wellness exam will be completed at an upcoming visit. Health Maintenance Due Topic Date Due  Pneumococcal 65+ Low/Medium Risk (2 of 2 - PPSV23) 12/07/2017  MEDICARE YEARLY EXAM  05/11/2018  GLAUCOMA SCREENING Q2Y  08/09/2018 6. Advance Care Planning - A Durable Do Not Resusitate (DDNR) is on file - An Advance Directive is already on file. This is what you have shared with us about Advance Care Planning Advance Care Planning 4/27/2018 Patient's Healthcare Decision Maker is: Named in scanned ACP document Primary Decision Maker Name Nae Royal Primary Decision Maker Phone Number [phone] Primary Decision Maker Relationship to Patient Adult child Secondary Decision Maker Name Aida Edilberto Secondary Decision Maker Phone Number [phone] Secondary Decision Maker Relationship to Patient Unknown Confirm Advance Directive Yes, on file Someone from the Home Based Primary Care Team will see you again in 4 weeks. If there are any concerns before that time, such as medication questions, worsening symptoms or a need to see a physician for an urgent or emergent situation; please call [phone].  A physician is also on call after our normal business hours of 8am to 5pm.  
 
In order to serve you better, please allow up to 48 hours for prescription refills to be processed. Certain medications may require more paperwork or a written prescription that you may need to  from the office. We appreciate you letting us know of any refill requests as soon as possible. Sincerely, The [facility] Team 
MD Latisha Henley, CRISTIN Robles RN Plantar Fasciitis: Exercises Your Care Instructions Here are some examples of typical rehabilitation exercises for your condition. Start each exercise slowly. Ease off the exercise if you start to have pain. Your doctor or physical therapist will tell you when you can start these exercises and which ones will work best for you. How to do the exercises Towel stretch 1. Sit with your legs extended and knees straight. 2. Place a towel around your foot just under the toes. 3. Hold each end of the towel in each hand, with your hands above your knees. 4. Pull back with the towel so that your foot stretches toward you. 5. Hold the position for at least 15 to 30 seconds. 6. Repeat 2 to 4 times a session, up to 5 sessions a day. Calf stretch This exercise stretches the muscles at the back of the lower leg (the calf) and the Achilles tendon. Do this exercise 3 or 4 times a day, 5 days a week. 1. Stand facing a wall with your hands on the wall at about eye level. Put the leg you want to stretch about a step behind your other leg. 2. Keeping your back heel on the floor, bend your front knee until you feel a stretch in the back leg. 3. Hold the stretch for 15 to 30 seconds. Repeat 2 to 4 times. Plantar fascia and calf stretch Stretching the plantar fascia and calf muscles can increase flexibility and decrease heel pain. You can do this exercise several times each day and before and after activity. 1. Stand on a step as shown above. Be sure to hold on to the banister. 2. Slowly let your heels down over the edge of the step as you relax your calf muscles. You should feel a gentle stretch across the bottom of your foot and up the back of your leg to your knee. 3. Hold the stretch about 15 to 30 seconds, and then tighten your calf muscle a little to bring your heel back up to the level of the step. Repeat 2 to 4 times. Towel curls Make this exercise more challenging by placing a weighted object, such as a soup can, on the other end of the towel. 1. While sitting, place your foot on a towel on the floor and scrunch the towel toward you with your toes. 2. Then, also using your toes, push the towel away from you. [place] pickups 1. Put marbles on the floor next to a cup. 
2. Using your toes, try to lift the marbles up from the floor and put them in the cup. Follow-up care is a key part of your treatment and safety. Be sure to make and go to all appointments, and call your doctor if you are having problems. It's also a good idea to know your test results and keep a list of the medicines you take. Where can you learn more? Go to http://anna-sampson.info/. Yeison Newton in the search box to learn more about \"Plantar Fasciitis: Exercises. \" Current as of: March 21, 2017 Content Version: 11.4 © 0811-2296 Healthwise, [employer]. Care instructions adapted under license by [employer] (which disclaims liability or warranty for this information). If you have questions about a medical condition or this instruction, always ask your healthcare professional. [address] any warranty or liability for your use of this information. Patient Instructions History Introducing [facility]! Dear Bg Wakefield: 
Thank you for requesting a [employer] account. Our records indicate that you already have an active [employer] account.   You can access your account anytime at [email or web address] "[email or web address][email or web address] Did you know that you can access your hospital and ER discharge instructions at any time in [employer]? You can also review all of your test results from your hospital stay or ER visit. Additional Information If you have questions, please visit the Frequently Asked Questions section of the [employer] website at [email or web address] "[email or web address]/. Remember, [employer] is NOT to be used for urgent needs. For medical emergencies, dial 911. Now available from your iPhone and Android! Please provide this summary of care documentation to your next provider. Your primary care clinician is listed as [place] [facility]. If you have any questions after today's visit, please call [phone].

## 2018-06-20 LAB
ALBUMIN SERPL-MCNC: 3.4 G/DL (ref 3.2–4.6)
ALBUMIN/GLOB SERPL: 1.7 {RATIO} (ref 1.2–2.2)
ALP SERPL-CCNC: 92 IU/L (ref 39–117)
ALT SERPL-CCNC: 7 IU/L (ref 0–32)
AST SERPL-CCNC: 11 IU/L (ref 0–40)
BILIRUB SERPL-MCNC: 0.3 MG/DL (ref 0–1.2)
BUN SERPL-MCNC: 7 MG/DL (ref 10–36)
BUN/CREAT SERPL: 8 (ref 12–28)
CALCIUM SERPL-MCNC: 9.3 MG/DL (ref 8.7–10.3)
CHLORIDE SERPL-SCNC: 100 MMOL/L (ref 96–106)
CO2 SERPL-SCNC: 21 MMOL/L (ref 20–29)
CREAT SERPL-MCNC: 0.84 MG/DL (ref 0.57–1)
ERYTHROCYTE [DISTWIDTH] IN BLOOD BY AUTOMATED COUNT: 15.1 % (ref 12.3–15.4)
GFR SERPLBLD CREATININE-BSD FMLA CKD-EPI: 61 ML/MIN/1.73
GFR SERPLBLD CREATININE-BSD FMLA CKD-EPI: 70 ML/MIN/1.73
GLOBULIN SER CALC-MCNC: 2 G/DL (ref 1.5–4.5)
GLUCOSE SERPL-MCNC: 91 MG/DL (ref 65–99)
HCT VFR BLD AUTO: 34.5 % (ref 34–46.6)
HGB BLD-MCNC: 11 G/DL (ref 11.1–15.9)
MCH RBC QN AUTO: 30.7 PG (ref 26.6–33)
MCHC RBC AUTO-ENTMCNC: 31.9 G/DL (ref 31.5–35.7)
MCV RBC AUTO: 96 FL (ref 79–97)
PLATELET # BLD AUTO: 200 X10E3/UL (ref 150–379)
POTASSIUM SERPL-SCNC: 4 MMOL/L (ref 3.5–5.2)
PROT SERPL-MCNC: 5.4 G/DL (ref 6–8.5)
RBC # BLD AUTO: 3.58 X10E6/UL (ref 3.77–5.28)
SODIUM SERPL-SCNC: 139 MMOL/L (ref 134–144)
WBC # BLD AUTO: 4.3 X10E3/UL (ref 3.4–10.8)

## 2018-06-20 RX ORDER — DICLOFENAC SODIUM 10 MG/G
2 GEL TOPICAL 4 TIMES DAILY
Qty: 2 EACH | Refills: 0 | Status: SHIPPED | OUTPATIENT
Start: 2018-06-20 | End: 2018-10-03

## 2018-06-20 RX ORDER — ALBUTEROL SULFATE 5 MG/ML
2.5 SOLUTION RESPIRATORY (INHALATION)
Qty: 360 ML | Refills: 0 | Status: SHIPPED | OUTPATIENT
Start: 2018-06-20 | End: 2018-09-20 | Stop reason: ALTCHOICE

## 2018-06-21 ENCOUNTER — DOCUMENTATION ONLY (OUTPATIENT)
Dept: FAMILY MEDICINE CLINIC | Age: 83
End: 2018-06-21

## 2018-06-21 NOTE — PROGRESS NOTES
Certification approval    Initial certification: 7/54/34    Certification period: 4/16/18 to 6/14/18    CCN: Maritza Tanner    Diagnosis code: [id number]    I have reviewed and agree with plan of care.

## 2018-06-29 ENCOUNTER — TELEPHONE (OUTPATIENT)
Dept: PALLATIVE CARE | Age: 83
End: 2018-06-29

## 2018-06-29 ENCOUNTER — PATIENT MESSAGE (OUTPATIENT)
Dept: FAMILY MEDICINE CLINIC | Age: 83
End: 2018-06-29

## 2018-06-29 NOTE — TELEPHONE ENCOUNTER
Outgoing call placed to patient's son, Ladan Pimentel and he stated that patient is currently at [facility] being evaluated. He reported that her blood pressure and oxygen level was fluctuating and HHN recommended that he take her to ER. Reminded Ladan Pimentel when he has a concern about his mother to call our office so we can evaluate situation.   Ladan Pimentel verbalized understanding and will keep us updated on his mom's status

## 2018-06-29 NOTE — TELEPHONE ENCOUNTER
Romain Flanagan LPN with [employer], she advised patients son to take the patient to the ER pulse  /102.

## 2018-06-29 NOTE — TELEPHONE ENCOUNTER
Christiano Hennessy NP spoke with [facility] and requested that she call us in the future if they have concerns or questions related to patient.   Kaylene Gain verbalized understanding

## 2018-07-05 ENCOUNTER — DOCUMENTATION ONLY (OUTPATIENT)
Dept: PALLATIVE CARE | Age: 83
End: 2018-07-05

## 2018-07-05 ENCOUNTER — TELEPHONE (OUTPATIENT)
Dept: PALLATIVE CARE | Age: 83
End: 2018-07-05

## 2018-07-05 NOTE — TELEPHONE ENCOUNTER
Call placed to patients son who informs she was not admitted she went to ED and was released ( They could not anything wrong with her) , so she is home , also he wants HB team to know the cough syrup is working wonders for her . .... ZEYNEP, noted and forwarded to HB team

## 2018-07-12 ENCOUNTER — DOCUMENTATION ONLY (OUTPATIENT)
Dept: FAMILY MEDICINE CLINIC | Age: 83
End: 2018-07-12

## 2018-07-13 DIAGNOSIS — M35.3 PMR (POLYMYALGIA RHEUMATICA) (HCC): ICD-10-CM

## 2018-07-13 DIAGNOSIS — M48.061 SPINAL STENOSIS OF LUMBAR REGION WITHOUT NEUROGENIC CLAUDICATION: ICD-10-CM

## 2018-07-16 ENCOUNTER — TELEPHONE (OUTPATIENT)
Dept: FAMILY MEDICINE CLINIC | Age: 83
End: 2018-07-16

## 2018-07-16 RX ORDER — TRAMADOL HYDROCHLORIDE 50 MG/1
50 TABLET ORAL
Qty: 60 TAB | Refills: 2 | Status: CANCELLED | OUTPATIENT
Start: 2018-07-16

## 2018-07-16 RX ORDER — ONDANSETRON HYDROCHLORIDE 8 MG/1
8 TABLET, FILM COATED ORAL
Qty: 30 TAB | Refills: 3 | Status: SHIPPED | OUTPATIENT
Start: 2018-07-16 | End: 2018-09-07 | Stop reason: SDUPTHER

## 2018-07-16 RX ORDER — HYDROMORPHONE HYDROCHLORIDE 2 MG/1
1 TABLET ORAL EVERY 8 HOURS
Qty: 45 TAB | Refills: 0 | Status: CANCELLED | OUTPATIENT
Start: 2018-07-16 | End: 2018-08-15

## 2018-07-16 RX ORDER — GABAPENTIN 300 MG/1
600 CAPSULE ORAL 2 TIMES DAILY
Qty: 60 CAP | Refills: 2 | Status: SHIPPED | OUTPATIENT
Start: 2018-07-16 | End: 2018-07-21 | Stop reason: SDUPTHER

## 2018-07-16 NOTE — TELEPHONE ENCOUNTER
From: Buddy Rosado  To: Freddy Alvarez NP  Sent: 7/13/2018 7:52 PM EDT  Subject: Medication Renewal Request    Original authorizing provider: CRISTIN Castillo would like a refill of the following medications:  HYDROmorphone (DILAUDID) 2 mg tablet Freddy Alvarez NP]    Preferred pharmacy: [facility] [place] [address] Rd:

## 2018-07-16 NOTE — TELEPHONE ENCOUNTER
Please advise on Rx refill , patient currently has #16 tabs left , last filled 5/31/2018 for #30 tabs , son would like to  from [facility] location

## 2018-07-16 NOTE — TELEPHONE ENCOUNTER
Dilaudid and Tramadol prescriptions will be given during visit on Wednesday.   Gabapentin and Zofran sent to local pharmacy as requested

## 2018-07-18 ENCOUNTER — HOME VISIT (OUTPATIENT)
Dept: FAMILY MEDICINE CLINIC | Age: 83
End: 2018-07-18

## 2018-07-18 ENCOUNTER — TELEPHONE (OUTPATIENT)
Dept: FAMILY MEDICINE CLINIC | Age: 83
End: 2018-07-18

## 2018-07-18 VITALS
OXYGEN SATURATION: 93 % | SYSTOLIC BLOOD PRESSURE: 99 MMHG | TEMPERATURE: 98 F | DIASTOLIC BLOOD PRESSURE: 55 MMHG | RESPIRATION RATE: 16 BRPM | HEART RATE: 86 BPM

## 2018-07-18 DIAGNOSIS — I50.9 CHF, STAGE C (HCC): ICD-10-CM

## 2018-07-18 DIAGNOSIS — M35.3 PMR (POLYMYALGIA RHEUMATICA) (HCC): ICD-10-CM

## 2018-07-18 DIAGNOSIS — Z13.31 SCREENING FOR DEPRESSION: ICD-10-CM

## 2018-07-18 DIAGNOSIS — Z00.00 MEDICARE ANNUAL WELLNESS VISIT, SUBSEQUENT: Primary | ICD-10-CM

## 2018-07-18 DIAGNOSIS — I89.0 LYMPHEDEMA OF BOTH LOWER EXTREMITIES: ICD-10-CM

## 2018-07-18 DIAGNOSIS — I25.111 CORONARY ARTERY DISEASE INVOLVING NATIVE CORONARY ARTERY OF NATIVE HEART WITH ANGINA PECTORIS WITH DOCUMENTED SPASM (HCC): ICD-10-CM

## 2018-07-18 DIAGNOSIS — Z13.39 SCREENING FOR ALCOHOLISM: ICD-10-CM

## 2018-07-18 DIAGNOSIS — I48.0 PAROXYSMAL ATRIAL FIBRILLATION (HCC): ICD-10-CM

## 2018-07-18 PROBLEM — Z99.81 DEPENDENCE ON CONTINUOUS SUPPLEMENTAL OXYGEN: Status: ACTIVE | Noted: 2018-07-18

## 2018-07-18 RX ORDER — HYDROMORPHONE HYDROCHLORIDE 2 MG/1
1 TABLET ORAL EVERY 8 HOURS
Qty: 45 TAB | Refills: 0
Start: 2018-07-27 | End: 2018-08-26

## 2018-07-18 NOTE — PATIENT INSTRUCTIONS
Dear Jaswinder Reveles ,    It was a pleasure seeing you at home today with [facility] ([phone])    Your stated goal: To get back to walking again. This is what we talked about:     1. Well Visit  - A well visit was completed today  - Labs will be completed at our next visit    2. Oxygen / Cough  - Your oxygen level was 86% on room air while sitting in the kitchen  - You use the supplemental oxygen daily and at night  - After placing you back on your supplemental oxygen, your oxygen improved to 93%  - The cough has significantly improved  - You used the Tussionex syrup briefly which you shared was the only thing that helped  - Tussionex is no longer needed. You have not used it for about a week. 3. Leg edema  - You have significant swelling to both your legs  - You have had problems with this in that past, even with wounds and weeping skin  - Unna boots have been used when your legs were really bad  - Lymphedema wraps are recommended and will requested from your home health agency  - Please elevate your legs even while you are at rest    4. Bleeding/ Anemia  - Xarelto was discontinued until cleared by a Gastroenterologist, Dr. Suhas Chua in 2-4 weeks  - An appointment with the GI doctor has not been made as of yet  - Please monitor for any further vomiting of blood, coffee ground appearance to vomit as well as red or black/tarry bowel movements  - Should this happen, please call our office right away. - Follow up blood work will be completed at our next visit    5. Bilateral Heel pain / Neuropathy/ Plantar fascitis  - The pain is much better  - Ice and Voltaren have helped this  - You have a history of neuropathy as well as plantar fascitis  - Rolling ice (frozen water bottle or similar) under the foot 3-4 times a daily will also reduce pain and inflammation    6.  Stage 1 and 2 right/left buttock wound  - The wounds have healed  - Continue to calmoseptine cream has been recommended to use as a skin barrier and protectant  - Offloading pressure frequently is recommended   - Avoiding prolonged contact with moisture against the skin by frequent toileting or adult brief changes are advised    7. Health Maintenance  -TDap vaccine was declined    Health Maintenance Due   Topic Date Due    Pneumococcal 65+ Low/Medium Risk (2 of 2 - PPSV23) 12/07/2017    GLAUCOMA SCREENING Q2Y  08/09/2018     8. Advance Care Planning   - A Durable Do Not Resusitate (DDNR) is on file  - An Advance Directive is already on file. This is what you have shared with us about [address] 79. Planning 4/27/2018   Patient's Healthcare Decision Maker is: Named in scanned ACP document   Primary Decision Maker Name Luz Montenegro   Primary Decision Maker Phone Number [phone]   Primary Decision Maker Relationship to Patient Adult child   Secondary Decision Maker Name Gisell Henson   Secondary Decision Maker Phone Number [phone]   Secondary Decision Maker Relationship to Patient Unknown   Confirm Advance Directive Yes, on file       Someone from the [facility] will see you again in 6 weeks. If there are any concerns before that time, such as medication questions, worsening symptoms or a need to see a physician for an urgent or emergent situation; please call [phone]. A physician is also on call after our normal business hours of 8am to 5pm.     In order to serve you better, please allow up to 48 hours for prescription refills to be processed. Certain medications may require more paperwork or a written prescription that you may need to  from the office. We appreciate you letting us know of any refill requests as soon as possible.     Sincerely,    [facility]  MD Mallory Ruiz, NP Kelsie Felty, RN          Medicare Wellness Visit, Female    The best way to live healthy is to have a lifestyle where you eat a well-balanced diet, exercise regularly, limit alcohol use, and quit all forms of tobacco/nicotine, if applicable. Regular preventive services are another way to keep healthy. Preventive services (vaccines, screening tests, monitoring & exams) can help personalize your care plan, which helps you manage your own care. Screening tests can find health problems at the earliest stages, when they are easiest to treat. [facility] follows the current, evidence-based guidelines published by the [facility] ([facility]) when recommending preventive services for our patients. Because we follow these guidelines, sometimes recommendations change over time as research supports it. (For example, mammograms used to be recommended annually. Even though Medicare will still pay for an annual mammogram, the newer guidelines recommend a mammogram every two years for women of average risk.)    Of course, you and your provider may decide to screen more often for some diseases, based on your risk and co-morbidities (chronic disease you are already diagnosed with). Preventive services for you include:    - Medicare offers their members a free annual wellness visit, which is time for you and your primary care provider to discuss and plan for your preventive service needs. Take advantage of this benefit every year!    -All people over age 72 should receive the recommended pneumonia vaccines. Current USPSTF guidelines recommend a series of two vaccines for the best pneumonia protection.     -All adults should have a yearly flu vaccine and a tetanus vaccine every 10 years. All adults age 61 years should receive a shingles vaccine once in their lifetime.      -A bone mass density test is recommended when a woman turns 65 to screen for osteoporosis. This test is only recommended once as a screening. Some providers will use this same test as a disease monitoring tool if you already have osteoporosis.     -All adults age 40-70 years who are overweight should have a diabetes screening test once every three years.     -Other screening tests & preventive services for persons with diabetes include: an eye exam to screen for diabetic retinopathy, a kidney function test, a foot exam, and stricter control over your cholesterol.     -Cardiovascular screening for adults with routine risk involves an electrocardiogram (ECG) at intervals determined by the provider.     -Colorectal cancer screenings should be done for adults age 54-65 years with normal risk. There are a number of acceptable methods of screening for this type of cancer. Each test has its own benefits and drawbacks. Discuss with your provider what is most appropriate for you during your annual wellness visit. The different tests include: colonoscopy (considered the best screening method), a fecal occult blood test, a fecal DNA test, and sigmoidoscopy. -Breast cancer screenings are recommended every other year for women of normal risk age 54-69 years.     -Cervical cancer screenings for women over age 72 are only recommended with certain risk factors.     -All adults born between [facility] should be screened once for Hepatitis C.      Here is a list of your current Health Maintenance items (your personalized list of preventive services) with a due date:  Health Maintenance Due   Topic Date Due    Pneumococcal Vaccine (2 of 2 - PPSV23) 12/07/2017    Glaucoma Screening   08/09/2018

## 2018-07-18 NOTE — PROGRESS NOTES
Home Based [facility]   [id number]      Date of Current Visit: 07/19/18         SUMMARY:   80 yr female referred to [facility] by Dr. Amadou Haq. She has a significant hx for seronegative RA, PMR of multiple joints, CAD, old MI, GERD and Afib. Due to this, Ms. Nani Hudson is unable to see a community PCP without significant taxing effort. GOALS OF CARE / TREATMENT PREFERENCES:   GOALS OF CARE:  Patient / health care proxy stated goals: To get back to walking again. TREATMENT PREFERENCES:   Code Status:  [] Attempt Resuscitation       [x] Do Not Attempt Resuscitation    Advance Care Planning:  Advance Care Planning 4/27/2018   Patient's Healthcare Decision Maker is: Named in scanned ACP document   Primary Decision Maker Name Anny Barth   Primary Decision Maker Phone Number [phone]   Primary Decision Maker Relationship to Patient Adult child   Secondary Decision Maker Name Charly Arnett   Secondary Decision Maker Phone Number [phone]   Secondary Decision Maker Relationship to Patient Unknown   Confirm Advance Directive Yes, on file       DIAGNOSES:       ICD-10-CM ICD-9-CM    1. Medicare annual wellness visit, subsequent Z00.00 V70.0    2. Screening for alcoholism Z13.89 V79.1 OR ANNUAL ALCOHOL SCREEN 15 MIN   3. Screening for depression Z13.89 V79.0 DEPRESSION SCREEN ANNUAL   4. CHF, stage C ([facility] [address] I50.9 428.0 REFERRAL TO HOME HEALTH   5. Coronary artery disease involving native coronary artery of native heart with angina pectoris with documented spasm ([facility] [address] I25.111 414.01 REFERRAL TO HOME HEALTH     413.9    6. Paroxysmal atrial fibrillation (HCC) I48.0 427.31    7. PMR (polymyalgia rheumatica) ([facility]) M35.3 725 HYDROmorphone (DILAUDID) 2 mg tablet      REFERRAL TO HOME HEALTH   8.  Lymphedema of both lower extremities I89.0 457.1 REFERRAL TO HOME HEALTH        PLAN:   Patient Instructions     Dear Kathya Brown ,    It was a pleasure seeing you at home today with [facility] ([phone])    Your stated goal: To get back to walking again. This is what we talked about:     1. Well Visit  - A well visit was completed today  - Labs will be completed at our next visit    2. Oxygen / Cough  - Your oxygen level was 86% on room air while sitting in the kitchen  - You use the supplemental oxygen daily and at night  - After placing you back on your supplemental oxygen, your oxygen improved to 93%  - The cough has significantly improved  - You used the Tussionex syrup briefly which you shared was the only thing that helped  - Tussionex is no longer needed. You have not used it for about a week. 3. Leg edema  - You have significant swelling to both your legs  - You have had problems with this in that past, even with wounds and weeping skin  - Unna boots have been used when your legs were really bad  - Lymphedema wraps are recommended and will requested from your home health agency  - Please elevate your legs even while you are at rest    4. Bleeding/ Anemia  - Xarelto was discontinued until cleared by a Gastroenterologist, Dr. Lin Form in 2-4 weeks  - An appointment with the GI doctor has not been made as of yet  - Please monitor for any further vomiting of blood, coffee ground appearance to vomit as well as red or black/tarry bowel movements  - Should this happen, please call our office right away. - Follow up blood work will be completed at our next visit    5. Bilateral Heel pain / Neuropathy/ Plantar fascitis  - The pain is much better  - Ice and Voltaren have helped this  - You have a history of neuropathy as well as plantar fascitis  - Rolling ice (frozen water bottle or similar) under the foot 3-4 times a daily will also reduce pain and inflammation    6.  Stage 1 and 2 right/left buttock wound  - The wounds have healed  - Continue to calmoseptine cream has been recommended to use as a skin barrier and protectant  - Offloading pressure frequently is recommended - Avoiding prolonged contact with moisture against the skin by frequent toileting or adult brief changes are advised    7. Health Maintenance  -TDap vaccine was declined    Health Maintenance Due   Topic Date Due    Pneumococcal 65+ Low/Medium Risk (2 of 2 - PPSV23) 12/07/2017    GLAUCOMA SCREENING Q2Y  08/09/2018     8. Advance Care Planning   - A Durable Do Not Resusitate (DDNR) is on file  - An Advance Directive is already on file. This is what you have shared with us about Darby Allennatividadrolo Dong. Planning 4/27/2018   Patient's Healthcare Decision Maker is: Named in scanned ACP document   Primary Decision Maker Name Alma Weeks   Primary Decision Maker Phone Number [phone]   Primary Decision Maker Relationship to Patient Adult child   Secondary Decision Maker Name Sylvia Lu   Secondary Decision Maker Phone Number [phone]   Secondary Decision Maker Relationship to Patient Unknown   Confirm Advance Directive Yes, on file       Someone from the Home Based Primary Care Team will see you again in 6 weeks. If there are any concerns before that time, such as medication questions, worsening symptoms or a need to see a physician for an urgent or emergent situation; please call [phone]. A physician is also on call after our normal business hours of 8am to 5pm.     In order to serve you better, please allow up to 48 hours for prescription refills to be processed. Certain medications may require more paperwork or a written prescription that you may need to  from the office. We appreciate you letting us know of any refill requests as soon as possible. Sincerely,    The Home Based Primary Care Team  MD Viv Garcia NP Tiffany Heater, RN          Medicare Wellness Visit, Female    The best way to live healthy is to have a lifestyle where you eat a well-balanced diet, exercise regularly, limit alcohol use, and quit all forms of tobacco/nicotine, if applicable. Regular preventive services are another way to keep healthy. Preventive services (vaccines, screening tests, monitoring & exams) can help personalize your care plan, which helps you manage your own care. Screening tests can find health problems at the earliest stages, when they are easiest to treat. [facility] follows the current, evidence-based guidelines published by the [facility] Milo Fried ([facility]) when recommending preventive services for our patients. Because we follow these guidelines, sometimes recommendations change over time as research supports it. (For example, mammograms used to be recommended annually. Even though Medicare will still pay for an annual mammogram, the newer guidelines recommend a mammogram every two years for women of average risk.)    Of course, you and your provider may decide to screen more often for some diseases, based on your risk and co-morbidities (chronic disease you are already diagnosed with). Preventive services for you include:    - Medicare offers their members a free annual wellness visit, which is time for you and your primary care provider to discuss and plan for your preventive service needs. Take advantage of this benefit every year!    -All people over age 72 should receive the recommended pneumonia vaccines. Current USPSTF guidelines recommend a series of two vaccines for the best pneumonia protection.     -All adults should have a yearly flu vaccine and a tetanus vaccine every 10 years. All adults age 61 years should receive a shingles vaccine once in their lifetime.      -A bone mass density test is recommended when a woman turns 65 to screen for osteoporosis. This test is only recommended once as a screening. Some providers will use this same test as a disease monitoring tool if you already have osteoporosis.     -All adults age 38-68 years who are overweight should have a diabetes screening test once every three years.     -Other screening tests & preventive services for persons with diabetes include: an eye exam to screen for diabetic retinopathy, a kidney function test, a foot exam, and stricter control over your cholesterol.     -Cardiovascular screening for adults with routine risk involves an electrocardiogram (ECG) at intervals determined by the provider.     -Colorectal cancer screenings should be done for adults age 54-65 years with normal risk. There are a number of acceptable methods of screening for this type of cancer. Each test has its own benefits and drawbacks. Discuss with your provider what is most appropriate for you during your annual wellness visit. The different tests include: colonoscopy (considered the best screening method), a fecal occult blood test, a fecal DNA test, and sigmoidoscopy. -Breast cancer screenings are recommended every other year for women of normal risk age 54-69 years.     -Cervical cancer screenings for women over age 72 are only recommended with certain risk factors.     -All adults born between [facility] should be screened once for Hepatitis C. Here is a list of your current Health Maintenance items (your personalized list of preventive services) with a due date:  Health Maintenance Due   Topic Date Due    Pneumococcal Vaccine (2 of 2 - PPSV23) 12/07/2017    Glaucoma Screening   08/09/2018        PRESCRIPTIONS GIVEN:     Medications Ordered Today   Medications    HYDROmorphone (DILAUDID) 2 mg tablet     Sig: Take 0.5 Tabs by mouth every eight (8) hours for 30 days. Max Daily Amount: 3 mg. Dispense:  45 Tab     Refill:  0         HISTORY:   Please see RN note from this current visit; history taken together in presence of patient/family in the home.       CHIEF COMPLAINT:   Chief Complaint   Patient presents with    Annual Wellness Visit     HPI/SUBJECTIVE:    The patient is: [x] Verbal / [] Nonverbal       Well visit today  Heel pain resolved  Son has not made an appointment with GI for follow s/p GI bleed          REVIEW OF SYSTEMS:     The following systems were [x] reviewed / [] unable to be reviewed  Systems: constitutional, ears/nose/mouth/throat, respiratory, gastrointestinal, genitourinary, musculoskeletal, integumentary, neurologic, psychiatric, endocrine. Positive findings noted below:      FUNCTIONAL ASSESSMENT:     Palliative Performance Scale (PPS): 50-60%     PSYCHOSOCIAL/SPIRITUAL SCREENING:      Any spiritual / Temple concerns: [] Yes /  [x] No Hinduism, no longer goes to Hinduism because can't get out of home; Hinduism does not come to her, may be willing to reconnect    Caregiver Burnout: [] Yes /  [x] No /  [] No Caregiver Present       PHYSICAL EXAM:     Wt Readings from Last 3 Encounters:   06/05/18 120 lb 9.6 oz (54.7 kg)   11/20/17 131 lb (59.4 kg)   11/15/17 132 lb (59.9 kg)     Blood pressure 99/55, pulse 86, temperature 98 °F (36.7 °C), temperature source Temporal, resp. rate 16, SpO2 93 %, peak flow (!) 2 L/min. Last bowel movement:  TODAY    Constitutional:  female, adv. Age, thin and frail, sitting up in WC, NAD  Eyes: pupils equal, anicteric  ENMT: no nasal discharge, moist mucous membranes  Cardiovascular: regular/ irregular rhythm, distal pulses intact  Respiratory: breathing not labored, symmetric  Gastrointestinal: soft non-tender, +bowel sounds  Musculoskeletal: no deformity, no tenderness to palpation  Skin: warm, dry, stage 1 and 2 bilat.  Buttock wounds,  Neurologic: following commands, moving all extremities  Psychiatric: full affect, no hallucinations  Other:     HISTORY:     Past Medical and Surgical History reviewed in [address] [place] [address] on date of initial visit    Patient Active Problem List   Diagnosis Code    Osteoarthritis M19.90    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 ([address] I25.111    CHF, stage C ([address] I50.9    Lumbar spinal stenosis M48.061    Paroxysmal atrial fibrillation ([facility]) I48.0    Advance directive on file Z78.9    PMR (polymyalgia rheumatica) ([facility]) M35.3    Lower extremity edema R60.0    CKD (chronic kidney disease) stage 2, GFR 60-89 ml/min N18.2    Long term current use of systemic steroids Z79.52    Seronegative rheumatoid arthritis of multiple sites ([facility]) M06.09    Long-term use of immunosuppressant medication Z79.899    Anemia associated with acute blood loss D62    Dependence on continuous supplemental oxygen Z99.81     Family History   Problem Relation Age of Onset    Other Mother      Intestinal obstruction    Cancer Father     Stroke Father     Heart Attack Brother     Cancer Brother     Cancer Brother     Anesth Problems Neg Hx       History reviewed, no pertinent family history. Social History   Substance Use Topics    Smoking status: Never Smoker    Smokeless tobacco: Never Used    Alcohol use No     Allergies   Allergen Reactions    Novacare Other (comments)     PARALYSIS; \"NOVACAINE\" not novacare.  Phenergan [Promethazine] Other (comments)     \"loose my wits i go crazy\"    Nsaids (Non-Steroidal Anti-Inflammatory Drug) Nausea and Vomiting    Adhesive Tape-Silicones Other (comments)     BLISTERS    Atenolol Nausea and Vomiting    Bactrim [Sulfamethoprim Ds] Nausea and Vomiting    Cymbalta [Duloxetine] Other (comments)     CONFUSION      Digoxin Nausea and Vomiting    Gluten Other (comments)     GLUTEN INTOLERANCE    Macrobid [Nitrofurantoin Monohyd/M-Cryst] Nausea and Vomiting    Sulfa (Sulfonamide Antibiotics) Nausea and Vomiting    Codeine Other (comments)     Unable to swallow      Current Outpatient Prescriptions   Medication Sig    [START ON 7/27/2018] HYDROmorphone (DILAUDID) 2 mg tablet Take 0.5 Tabs by mouth every eight (8) hours for 30 days. Max Daily Amount: 3 mg.  ondansetron hcl (ZOFRAN) 8 mg tablet Take 1 Tab by mouth every eight (8) hours as needed for Nausea.     gabapentin (NEURONTIN) 300 mg capsule Take 2 Caps by mouth two (2) times a day.  diclofenac (VOLTAREN) 1 % gel Apply 2 g to affected area four (4) times daily. Apply to affected area as needed    predniSONE (DELTASONE) 2.5 mg tablet Take 1 Tab by mouth daily.  OXYGEN-AIR DELIVERY SYSTEMS 2 L by Nasal route continuous.  spironolactone (ALDACTONE) 25 mg tablet Take 0.5 Tabs by mouth daily.  topiramate (TOPAMAX) 25 mg tablet Take 1 Tab by mouth daily (with breakfast).  metoprolol tartrate (LOPRESSOR) 25 mg tablet Take 25 mg by mouth two (2) times a day.  senna-docusate (SENNA PLUS) 8.6-50 mg per tablet Take 2 Tabs by mouth daily.  BIFIDOBACTERIUM INFANTIS (ALIGN PO) Take 1 Tab by mouth daily.  cyanocobalamin 1,000 mcg tablet Take 1,000 mcg by mouth daily.  cholecalciferol (VITAMIN D3) 1,000 unit tablet Take 1,000 Units by mouth daily.  esomeprazole (NEXIUM) 40 mg capsule Take 40 mg by mouth daily.  albuterol (PROVENTIL) 5 mg/mL nebulizer solution 0.5 mL by Nebulization route every six (6) hours as needed for Wheezing. Indications: BRONCHOSPASM PREVENTION    chlorpheniramine-HYDROcodone (TUSSIONEX) 10-8 mg/5 mL suspension Take 2.5 mL by mouth every twelve (12) hours as needed for Cough. Max Daily Amount: 5 mL.  polyethylene glycol (MIRALAX) 17 gram/dose powder Take 17 g by mouth daily.  naloxone (NARCAN) 4 mg/actuation nasal spray Use 1 spray intranasally into 1 nostril. Use a new Narcan nasal spray for subsequent doses and administer into alternating nostrils. May repeat every 2 to 3 minutes as needed.  nitroglycerin (NITROSTAT) 0.4 mg SL tablet 1 Tab by SubLINGual route every five (5) minutes as needed for Chest Pain. No current facility-administered medications for this visit.          LAB DATA REVIEWED:     Lab Results   Component Value Date/Time    Hemoglobin A1c 6.1 05/14/2015 12:34 PM     No results found for: MCACR, MCA1, MCA2, MCA3, MCAU, MCAU2, MCALPOCT  Lab Results   Component Value Date/Time TSH 1.560 11/20/2017 02:40 PM    TSH 0.86 05/25/2015 04:32 AM     Lab Results   Component Value Date/Time    VITAMIN D, 25-HYDROXY 43.1 10/04/2017 02:52 PM         Lab Results   Component Value Date/Time    WBC 4.3 06/19/2018 02:10 AM    HGB 11.0 (L) 06/19/2018 02:10 AM    PLATELET 568 10/13/3121 02:10 AM     Lab Results   Component Value Date/Time    Sodium 139 06/19/2018 02:10 AM    Potassium 4.0 06/19/2018 02:10 AM    Chloride 100 06/19/2018 02:10 AM    CO2 21 06/19/2018 02:10 AM    BUN 7 (L) 06/19/2018 02:10 AM    Creatinine 0.84 06/19/2018 02:10 AM    Calcium 9.3 06/19/2018 02:10 AM    Magnesium 1.8 05/25/2015 04:32 AM    Phosphorus 1.5 (L) 05/25/2015 04:32 AM      Lab Results   Component Value Date/Time    AST (SGOT) 11 06/19/2018 02:10 AM    Alk. phosphatase 92 06/19/2018 02:10 AM    Protein, total 5.4 (L) 06/19/2018 02:10 AM    Albumin 3.4 06/19/2018 02:10 AM     No results found for: IRON, FE, TIBC, IBCT, PSAT, FERR               This is the Subsequent Medicare Annual Wellness Exam, performed 12 months or more after the Initial AWV or the last Subsequent AWV    I have reviewed the patient's medical history in detail and updated the computerized patient record. Depression Risk Factor Screening:   No flowsheet data found. Alcohol Risk Factor Screening: You do not drink alcohol or very rarely. Functional Ability and Level of Safety:   Hearing Loss  Hearing is good. Activities of Daily Living  The home contains: handrails and grab bars  Patient needs help with:  transportation, preparing meals, laundry, housework, managing medications, managing money, eating, dressing, bathing, hygiene and bathroom needs    Fall Risk  Fall Risk Assessment, last 12 mths 11/15/2017   Able to walk? Yes   Fall in past 12 months?  No       Abuse Screen  Patient is not abused    Cognitive Screening   Evaluation of Cognitive Function:  Has your family/caregiver stated any concerns about your memory: no  Normal    Patient Care Team   Patient Care Team:  Dagoberto Ariza MD as PCP - General (Palliative Medicine)  Allen Ceron, RN as Ambulatory Care Navigator (Internal Medicine)  Lety Greco NP as Nurse Practitioner (Nurse Practitioner)  Hema Garcia RN as Nurse Navigator    Assessment/Plan   Education and counseling provided:  Are appropriate based on today's review and evaluation    Diagnoses and all orders for this visit:    1. Medicare annual wellness visit, subsequent    2. Screening for alcoholism  -     Annual  Alcohol Screen 15 min ()    3. Screening for depression  -     Depression Screen Annual    4. CHF, stage C ([facility] [address]  -     REFERRAL TO HOME HEALTH    5. Coronary artery disease involving native coronary artery of native heart with angina pectoris with documented spasm [facility]  -     [address] [place] [address]    6. Paroxysmal atrial fibrillation (HCC)    7. PMR (polymyalgia rheumatica) (HCC)  -     HYDROmorphone (DILAUDID) 2 mg tablet; Take 0.5 Tabs by mouth every eight (8) hours for 30 days. Max Daily Amount: 3 mg.  -     REFERRAL TO HOME HEALTH    8.  Lymphedema of both lower extremities  -     REFERRAL TO [facility] Due   Topic Date Due    Pneumococcal 65+ Low/Medium Risk (2 of 2 - PPSV23) 12/07/2017    GLAUCOMA SCREENING Q2Y  08/09/2018     Total time:  50min   Counseling / coordination time: 10min -lymphedema management, medication refill, oxygen use and monitoring  > 50% counseling / coordination?:

## 2018-07-18 NOTE — PROGRESS NOTES
Home Based [facility] [facility] [address]  [phone]  RN Nursing Visit Note    Date of current Visit: 07/18/18   Date of last visit: 6/19/18  Date of initial visit: 5/2/18    Diagnosis: Enteritis, diverticulosis of sigmoid colon,  a-fib, rheumatoid arthritis, polymyalgia rheumatica, CAD, debility    ACP:  Advance Care Planning 4/27/2018   Patient's Healthcare Decision Maker is: Named in scanned ACP document   Primary Decision Maker Name Maycol Grover   Primary Decision Maker Phone Number [phone]   Primary Decision Maker Relationship to Patient Adult child   Secondary Decision Maker Name Isac Weaver   Secondary Decision Maker Phone Number [phone]   Secondary Decision Maker Relationship to Patient Unknown   Confirm Advance Directive Yes, on file       Nursing Assessment: Hayley Greenfield, 80year old female seen today for 4 week follow up visit. Team today included Arnulfo Smith, NP and myself. Upon arrival patient wheeled via wheel chair to the kitchen table by her son, Gio Villalta who was also present during the visit. Son reported that patient is using oxygen continuously every day      Patient denies nausea. Patient currently on 2 Liters of oxygen     Primary Caregiver:  Son, Hernán 41:  [address] - [phone]    [address] [place] [address]    Nutrition: - Regular textured food  Wt Readings from Last 3 Encounters:   06/05/18 120 lb 9.6 oz (54.7 kg)   11/20/17 131 lb (59.4 kg)   11/15/17 132 lb (59.9 kg)       Visit Vitals    BP 99/55 (BP 1 Location: Left arm, BP Patient Position: Sitting)    Pulse 86    Temp 98 °F (36.7 °C) (Temporal)    Resp 16    SpO2 (!) 86%    PF (!) 2 L/min     MUAC  RA - 22 cm  LA - 21.5 cm    Medication Management:  Current Outpatient Prescriptions   Medication Sig    ondansetron hcl (ZOFRAN) 8 mg tablet Take 1 Tab by mouth every eight (8) hours as needed for Nausea.     gabapentin (NEURONTIN) 300 mg capsule Take 2 Caps by mouth two (2) times a day.  diclofenac (VOLTAREN) 1 % gel Apply 2 g to affected area four (4) times daily. Apply to affected area as needed    predniSONE (DELTASONE) 2.5 mg tablet Take 1 Tab by mouth daily.  OXYGEN-AIR DELIVERY SYSTEMS 2 L by Nasal route continuous.  spironolactone (ALDACTONE) 25 mg tablet Take 0.5 Tabs by mouth daily.  topiramate (TOPAMAX) 25 mg tablet Take 1 Tab by mouth daily (with breakfast).  metoprolol tartrate (LOPRESSOR) 25 mg tablet Take 25 mg by mouth two (2) times a day.  BIFIDOBACTERIUM INFANTIS (ALIGN PO) Take 1 Tab by mouth daily.  cyanocobalamin 1,000 mcg tablet Take 1,000 mcg by mouth daily.  cholecalciferol (VITAMIN D3) 1,000 unit tablet Take 1,000 Units by mouth daily.  albuterol (PROVENTIL) 5 mg/mL nebulizer solution 0.5 mL by Nebulization route every six (6) hours as needed for Wheezing. Indications: BRONCHOSPASM PREVENTION    HYDROmorphone (DILAUDID) 2 mg tablet Take 0.5 Tabs by mouth every eight (8) hours for 30 days. Max Daily Amount: 3 mg.  chlorpheniramine-HYDROcodone (TUSSIONEX) 10-8 mg/5 mL suspension Take 2.5 mL by mouth every twelve (12) hours as needed for Cough. Max Daily Amount: 5 mL.  polyethylene glycol (MIRALAX) 17 gram/dose powder Take 17 g by mouth daily.  senna-docusate (SENNA PLUS) 8.6-50 mg per tablet Take 2 Tabs by mouth daily.  naloxone (NARCAN) 4 mg/actuation nasal spray Use 1 spray intranasally into 1 nostril. Use a new Narcan nasal spray for subsequent doses and administer into alternating nostrils. May repeat every 2 to 3 minutes as needed.  nitroglycerin (NITROSTAT) 0.4 mg SL tablet 1 Tab by SubLINGual route every five (5) minutes as needed for Chest Pain.  esomeprazole (NEXIUM) 40 mg capsule Take 40 mg by mouth daily. No current facility-administered medications for this visit.         Pill Count:  Dilaudid 2 mg # 17.5 tablet on hand today - Last filled on 6/19/18 for # 45  Tramadol 50 mg # 29 on hand today - last filled on 4/20/18 for # 60    Action Items:  1. Prescription left in the home for Dilaudid today  2. NP recommend that patient continues on 2 Liters of Oxygen  3. Continue to hold xarelto until you see a GI specialist (Dr. Silas Belcher - [phone]) for a follow up visit  4. Recommend labs to be wrapped (lymphedema) - referral sent to [facility]  5.  NP recommend applying Calmoseptine barrier cream to sacrum with each brief change and repositioning every 2 hours    Follow up:  1-2 Weeks 7/30/18

## 2018-07-18 NOTE — MR AVS SNAPSHOT
[address] , [address] 
[phone] Patient: Deric Harrison MRN: [id number] Visit Information Date & Time Provider Department Dept. Phone Encounter #  
 2018 11:00 AM Benoit Avery NP [address] [facility] Services [phone] [id number] Follow-up Instructions Return in about 6 weeks (around 2018), or if symptoms worsen or fail to improve, for Regular Follow-up, Lab Visit. Follow-up and Disposition History Upcoming Health Maintenance Date Due Pneumococcal 65+ Low/Medium Risk (2 of 2 - PPSV23) 2017 GLAUCOMA SCREENING Q2Y 2018 Bone Densitometry (Dexa) Screening 2020* DTaP/Tdap/Td series (1 - Tdap) 2020* Influenza Age 5 to Adult 2018 MEDICARE YEARLY EXAM 2019 *Topic was postponed. The date shown is not the original due date. Allergies as of 2018  Review Complete On: 2018 By: Benoit Avery NP Severity Noted Reaction Type Reactions Novacare High 2015    Other (comments) PARALYSIS; \"NOVACAINE\" not novacare. Phenergan [Promethazine] High 2015   Systemic Other (comments) \"loose my wits i go crazy\" Nsaids (Non-steroidal Anti-inflammatory Drug) Medium 2015   Systemic Nausea and Vomiting Adhesive Tape-silicones      Other (comments) BLISTERS Atenolol  2015    Nausea and Vomiting Bactrim [Sulfamethoprim Ds]  2015    Nausea and Vomiting Cymbalta [Duloxetine]  2015    Other (comments) CONFUSION Digoxin  2015    Nausea and Vomiting Gluten  2015    Other (comments) GLUTEN INTOLERANCE Macrobid [Nitrofurantoin Monohyd/m-cryst]  2015    Nausea and Vomiting  
 Sulfa (Sulfonamide Antibiotics)  2015    Nausea and Vomiting Codeine Low 2015   Systemic Other (comments) Unable to swallow Current Immunizations  Reviewed on 1/28/2016 Name Date Influenza High Dose Vaccine PF 10/10/2017, 10/12/2016, 10/20/2015 11:52 AM  
 Pneumococcal Conjugate (PCV-13) 12/7/2016 Zoster Vaccine, Live 3/22/2012 Not reviewed this visit You Were Diagnosed With   
  
 Codes Comments Medicare annual wellness visit, subsequent    -  Primary ICD-10-CM: Z00.00 ICD-9-CM: V70.0 Screening for alcoholism     ICD-10-CM: Z13.89 ICD-9-CM: V79.1 Screening for depression     ICD-10-CM: Z13.89 ICD-9-CM: V79.0   
 CHF, stage C ([facility]     ICD-10-CM: I50.9 ICD-9-CM: 428.0 Coronary artery disease involving native coronary artery of native heart with angina pectoris with documented spasm ([facility]     ICD-10-CM: I25.111 ICD-9-CM: 414.01, 413.9 Paroxysmal atrial fibrillation (HCC)     ICD-10-CM: I48.0 ICD-9-CM: 427.31 PMR (polymyalgia rheumatica) (HCC)     ICD-10-CM: M35.3 ICD-9-CM: 354 Lymphedema of both lower extremities     ICD-10-CM: I89.0 ICD-9-CM: 167.0 Vitals BP Pulse Temp Resp SpO2 PF  
 99/55 (BP 1 Location: Left arm, BP Patient Position: Sitting) 86 98 °F (36.7 °C) (Temporal) 16 93% (!) 2 L/min OB Status Smoking Status Postmenopausal Never Smoker Vitals History Preferred Pharmacy Pharmacy Name Phone [id number], [address] [place] [address] [phone] Your Updated Medication List  
  
   
This list is accurate as of 7/18/18 11:59 PM.  Always use your most recent med list.  
  
  
  
  
 albuterol 5 mg/mL nebulizer solution Commonly known as:  PROVENTIL  
0.5 mL by Nebulization route every six (6) hours as needed for Wheezing. Indications: BRONCHOSPASM PREVENTION  
  
 ALIGN PO Take 1 Tab by mouth daily. chlorpheniramine-HYDROcodone 10-8 mg/5 mL suspension Commonly known as:  Damien Mcintosh Take 2.5 mL by mouth every twelve (12) hours as needed for Cough.  Max Daily Amount: 5 mL. cyanocobalamin 1,000 mcg tablet Take 1,000 mcg by mouth daily. diclofenac 1 % Gel Commonly known as:  VOLTAREN Apply 2 g to affected area four (4) times daily. Apply to affected area as needed  
  
 gabapentin 300 mg capsule Commonly known as:  NEURONTIN Take 2 Caps by mouth two (2) times a day. HYDROmorphone 2 mg tablet Commonly known as:  DILAUDID Take 0.5 Tabs by mouth every eight (8) hours for 30 days. Max Daily Amount: 3 mg. Start taking on:  7/27/2018  
  
 metoprolol tartrate 25 mg tablet Commonly known as:  LOPRESSOR Take 25 mg by mouth two (2) times a day. MIRALAX 17 gram/dose powder Generic drug:  polyethylene glycol Take 17 g by mouth daily. naloxone 4 mg/actuation nasal spray Commonly known as:  [employer] Use 1 spray intranasally into 1 nostril. Use a new Narcan nasal spray for subsequent doses and administer into alternating nostrils. May repeat every 2 to 3 minutes as needed. NexIUM 40 mg capsule Generic drug:  esomeprazole Take 40 mg by mouth daily. nitroglycerin 0.4 mg SL tablet Commonly known as:  NITROSTAT  
1 Tab by SubLINGual route every five (5) minutes as needed for Chest Pain. ondansetron hcl 8 mg tablet Commonly known as:  Melvena [employer] Take 1 Tab by mouth every eight (8) hours as needed for Nausea. OXYGEN-AIR DELIVERY SYSTEMS  
2 L by Nasal route continuous. predniSONE 2.5 mg tablet Commonly known as:  Merle Shahbaz Take 1 Tab by mouth daily. SENNA PLUS 8.6-50 mg per tablet Generic drug:  senna-docusate Take 2 Tabs by mouth daily. spironolactone 25 mg tablet Commonly known as:  ALDACTONE Take 0.5 Tabs by mouth daily. topiramate 25 mg tablet Commonly known as:  TOPAMAX Take 1 Tab by mouth daily (with breakfast). VITAMIN D3 1,000 unit tablet Generic drug:  cholecalciferol Take 1,000 Units by mouth daily. We Performed the Following [address] [[id number] [facility]] CO ANNUAL ALCOHOL SCREEN 15 MIN [id number] [facility]] [address] Comments:  
 Referral to home health SN/PT/OT -- AT [address] [place] [address] for BLE lymphedema wraps and management. Pt to be discharged from [facility] due to inability to provide lymphedema care. Follow-up Instructions Return in about 6 weeks (around 8/29/2018), or if symptoms worsen or fail to improve, for Regular Follow-up, Lab Visit. Referral Information Referral ID Referred By Referred To  
  
 [id number] Anastasiia Clements Not Available Visits Status Start Date End Date 1 New Request 7/18/18 7/18/19 If your referral has a status of pending review or denied, additional information will be sent to support the outcome of this decision. Patient Instructions Dear Eric Estrada , It was a pleasure seeing you at home today with [facility] ([phone]) Your stated goal: To get back to walking again. This is what we talked about: 1. Well Visit - A well visit was completed today - Labs will be completed at our next visit 2. Oxygen / Cough - Your oxygen level was 86% on room air while sitting in the kitchen - You use the supplemental oxygen daily and at night - After placing you back on your supplemental oxygen, your oxygen improved to 93% - The cough has significantly improved - You used the Tussionex syrup briefly which you shared was the only thing that helped - Tussionex is no longer needed. You have not used it for about a week. 3. Leg edema - You have significant swelling to both your legs - You have had problems with this in that past, even with wounds and weeping skin 
- Unna boots have been used when your legs were really bad - Lymphedema wraps are recommended and will requested from your home health agency - Please elevate your legs even while you are at rest 
 
4. Bleeding/ Anemia - Xarelto was discontinued until cleared by a Gastroenterologist, Dr. Christopher Caceres in 2-4 weeks - An appointment with the GI doctor has not been made as of yet - Please monitor for any further vomiting of blood, coffee ground appearance to vomit as well as red or black/tarry bowel movements - Should this happen, please call our office right away. - Follow up blood work will be completed at our next visit 5. Bilateral Heel pain / Neuropathy/ Plantar fascitis - The pain is much better - Ice and Voltaren have helped this - You have a history of neuropathy as well as plantar fascitis - Rolling ice (frozen water bottle or similar) under the foot 3-4 times a daily will also reduce pain and inflammation 6. Stage 1 and 2 right/left buttock wound - The wounds have healed - Continue to calmoseptine cream has been recommended to use as a skin barrier and protectant 
- Offloading pressure frequently is recommended - Avoiding prolonged contact with moisture against the skin by frequent toileting or adult brief changes are advised 7. Health Maintenance -TDap vaccine was declined Health Maintenance Due Topic Date Due  Pneumococcal 65+ Low/Medium Risk (2 of 2 - PPSV23) 12/07/2017  GLAUCOMA SCREENING Q2Y  08/09/2018 8. Advance Care Planning - A Durable Do Not Resusitate (DDNR) is on file - An Advance Directive is already on file. This is what you have shared with us about Advance Care Planning Advance Care Planning 4/27/2018 Patient's Healthcare Decision Maker is: Named in scanned ACP document Primary Decision Maker Name Jay Young Primary Decision Maker Phone Number [phone] Primary Decision Maker Relationship to Patient Adult child Secondary Decision Maker Name Yolanda Coleen Secondary Decision Maker Phone Number [phone] Secondary Decision Maker Relationship to Patient Unknown Confirm Advance Directive Yes, on file Someone from the Home Based Primary Care Team will see you again in 6 weeks. If there are any concerns before that time, such as medication questions, worsening symptoms or a need to see a physician for an urgent or emergent situation; please call [phone]. A physician is also on call after our normal business hours of 8am to 5pm.  
 
In order to serve you better, please allow up to 48 hours for prescription refills to be processed. Certain medications may require more paperwork or a written prescription that you may need to  from the office. We appreciate you letting us know of any refill requests as soon as possible. Sincerely, The [facility] Primary Care Team 
MD Lauren Santillan, CRISTIN Mays, RN Medicare Wellness Visit, Female The best way to live healthy is to have a lifestyle where you eat a well-balanced diet, exercise regularly, limit alcohol use, and quit all forms of tobacco/nicotine, if applicable. Regular preventive services are another way to keep healthy. Preventive services (vaccines, screening tests, monitoring & exams) can help personalize your care plan, which helps you manage your own care. Screening tests can find health problems at the earliest stages, when they are easiest to treat. [facility] follows the current, evidence-based guidelines published by the [place] (USPSTF) when recommending preventive services for our patients. Because we follow these guidelines, sometimes recommendations change over time as research supports it. (For example, mammograms used to be recommended annually. Even though Medicare will still pay for an annual mammogram, the newer guidelines recommend a mammogram every two years for women of average risk.) Of course, you and your provider may decide to screen more often for some diseases, based on your risk and co-morbidities (chronic disease you are already diagnosed with). Preventive services for you include: - Medicare offers their members a free annual wellness visit, which is time for you and your primary care provider to discuss and plan for your preventive service needs. Take advantage of this benefit every year! 
 
-All people over age 72 should receive the recommended pneumonia vaccines. Current USPSTF guidelines recommend a series of two vaccines for the best pneumonia protection.  
 
-All adults should have a yearly flu vaccine and a tetanus vaccine every 10 years. All adults age 61 years should receive a shingles vaccine once in their lifetime.   
 
-A bone mass density test is recommended when a woman turns 65 to screen for osteoporosis. This test is only recommended once as a screening. Some providers will use this same test as a disease monitoring tool if you already have osteoporosis. -All adults age 38-68 years who are overweight should have a diabetes screening test once every three years.  
 
-Other screening tests & preventive services for persons with diabetes include: an eye exam to screen for diabetic retinopathy, a kidney function test, a foot exam, and stricter control over your cholesterol.  
 
-Cardiovascular screening for adults with routine risk involves an electrocardiogram (ECG) at intervals determined by the provider.  
 
-Colorectal cancer screenings should be done for adults age 54-65 years with normal risk. There are a number of acceptable methods of screening for this type of cancer. Each test has its own benefits and drawbacks. Discuss with your provider what is most appropriate for you during your annual wellness visit. The different tests include: colonoscopy (considered the best screening method), a fecal occult blood test, a fecal DNA test, and sigmoidoscopy. -Breast cancer screenings are recommended every other year for women of normal risk age 54-69 years. -Cervical cancer screenings for women over age 72 are only recommended with certain risk factors.  
 
-All adults born between [facility][1] should be screened once for Hepatitis C. Here is a list of your current Health Maintenance items (your personalized list of preventive services) with a due date: 
Health Maintenance Due Topic Date Due  Pneumococcal Vaccine (2 of 2 - PPSV23) 12/07/2017  Glaucoma Screening   08/09/2018 Patient Instructions History Introducing [facility]! Dear Gladys Beltrán: 
Thank you for requesting a [email or web address] account. Our records indicate that you already have an active [email or web address] account. You can access your account anytime at [email or web address] Did you know that you can access your hospital and ER discharge instructions at any time in [email or web address]? You can also review all of your test results from your hospital stay or ER visit. Additional Information If you have questions, please visit the Frequently Asked Questions section of the [email or web address] website at [email or web address] Remember, [email or web address] is NOT to be used for urgent needs. For medical emergencies, dial 911. Now available from your iPhone and Android! Please provide this summary of care documentation to your next provider. Your primary care clinician is listed as Adeline Pressley. If you have any questions after today's visit, please call [phone].

## 2018-07-18 NOTE — TELEPHONE ENCOUNTER
Nga from At  [facility] called to say they aren't able to accept any new patients at this time. Said her Lymphedema clinic is full.

## 2018-07-20 NOTE — TELEPHONE ENCOUNTER
Per NP Rebeca Seay :    \"Call son . He may need to contact the insurance for local agencies in the area.  (Routing comment) \"    Call place son Justin Felicita who verbalized understanding, no further questions or concerns presented and will keep our office aware of outcome

## 2018-07-21 RX ORDER — GABAPENTIN 300 MG/1
600 CAPSULE ORAL 2 TIMES DAILY
Qty: 120 CAP | Refills: 3 | Status: SHIPPED | OUTPATIENT
Start: 2018-07-21 | End: 2018-07-31 | Stop reason: SDUPTHER

## 2018-07-24 ENCOUNTER — TELEPHONE (OUTPATIENT)
Dept: FAMILY MEDICINE CLINIC | Age: 83
End: 2018-07-24

## 2018-07-31 ENCOUNTER — HOME VISIT (OUTPATIENT)
Dept: FAMILY MEDICINE CLINIC | Age: 83
End: 2018-07-31

## 2018-07-31 VITALS
OXYGEN SATURATION: 93 % | SYSTOLIC BLOOD PRESSURE: 110 MMHG | HEIGHT: 60 IN | DIASTOLIC BLOOD PRESSURE: 57 MMHG | RESPIRATION RATE: 16 BRPM | HEART RATE: 82 BPM | TEMPERATURE: 97.9 F

## 2018-07-31 DIAGNOSIS — I48.0 PAROXYSMAL ATRIAL FIBRILLATION (HCC): ICD-10-CM

## 2018-07-31 DIAGNOSIS — I50.9 CHF, STAGE C (HCC): ICD-10-CM

## 2018-07-31 DIAGNOSIS — G63 POLYNEUROPATHY ASSOCIATED WITH UNDERLYING DISEASE (HCC): ICD-10-CM

## 2018-07-31 DIAGNOSIS — M35.3 PMR (POLYMYALGIA RHEUMATICA) (HCC): Primary | ICD-10-CM

## 2018-07-31 DIAGNOSIS — M62.81 GENERALIZED MUSCLE WEAKNESS: ICD-10-CM

## 2018-07-31 RX ORDER — GABAPENTIN 300 MG/1
600 CAPSULE ORAL 3 TIMES DAILY
Qty: 540 CAP | Refills: 2 | Status: SHIPPED | OUTPATIENT
Start: 2018-07-31 | End: 2018-10-09

## 2018-07-31 NOTE — MR AVS SNAPSHOT
[address] , [address] 
[phone] Patient: Eric Estrada MRN: [id number] Visit Information Date & Time Provider Department Dept. Phone Encounter #  
 2018 10:30 AM Sandy Mcmahon NP [address] [facility] Services [phone] [id number] Follow-up Instructions Return in about 6 weeks (around 2018), or if symptoms worsen or fail to improve, for Regular Follow-up. Follow-up and Disposition History Upcoming Health Maintenance Date Due Pneumococcal 65+ Low/Medium Risk (2 of 2 - PPSV23) 2017 Influenza Age 5 to Adult 2018 GLAUCOMA SCREENING Q2Y 2018 Bone Densitometry (Dexa) Screening 2020* DTaP/Tdap/Td series (1 - Tdap) 2020* MEDICARE YEARLY EXAM 2019 *Topic was postponed. The date shown is not the original due date. Allergies as of 2018  Review Complete On: 2018 By: Sandy Mcmahon NP Severity Noted Reaction Type Reactions Novacare High 2015    Other (comments) PARALYSIS; \"NOVACAINE\" not novacare. Phenergan [Promethazine] High 2015   Systemic Other (comments) \"loose my wits i go crazy\" Nsaids (Non-steroidal Anti-inflammatory Drug) Medium 2015   Systemic Nausea and Vomiting Adhesive Tape-silicones      Other (comments) BLISTERS Atenolol  2015    Nausea and Vomiting Bactrim [Sulfamethoprim Ds]  2015    Nausea and Vomiting Cymbalta [Duloxetine]  2015    Other (comments) CONFUSION Digoxin  2015    Nausea and Vomiting Gluten  2015    Other (comments) GLUTEN INTOLERANCE Macrobid [Nitrofurantoin Monohyd/m-cryst]  2015    Nausea and Vomiting  
 Sulfa (Sulfonamide Antibiotics)  2015    Nausea and Vomiting Codeine Low 2015   Systemic Other (comments) Unable to swallow Current Immunizations  Reviewed on 1/28/2016 Name Date Influenza High Dose Vaccine PF 10/10/2017, 10/12/2016, 10/20/2015 11:52 AM  
 Pneumococcal Conjugate (PCV-13) 12/7/2016 Zoster Vaccine, Live 3/22/2012 Not reviewed this visit You Were Diagnosed With   
  
 Codes Comments PMR (polymyalgia rheumatica) ([facility])    -  Primary ICD-10-CM: M35.3 ICD-9-CM: 896 Paroxysmal atrial fibrillation (HCC)     ICD-10-CM: I48.0 ICD-9-CM: 427.31   
 CHF, stage C ([facility]     ICD-10-CM: I50.9 ICD-9-CM: 428.0 Generalized muscle weakness     ICD-10-CM: M62.81 ICD-9-CM: 728.87 Polyneuropathy associated with underlying disease ([facility]     ICD-10-CM: U82 ICD-9-CM: 357.4 Vitals BP Pulse Temp Resp Height(growth percentile) SpO2  
 110/57 82 97.9 °F (36.6 °C) (Temporal) 16 5' (1.524 m) 93% OB Status Smoking Status Postmenopausal Never Smoker Vitals History Preferred Pharmacy Pharmacy Name Phone [id number] [address] [phone] Your Updated Medication List  
  
   
This list is accurate as of 7/31/18 11:59 PM.  Always use your most recent med list.  
  
  
  
  
 albuterol 5 mg/mL nebulizer solution Commonly known as:  PROVENTIL  
0.5 mL by Nebulization route every six (6) hours as needed for Wheezing. Indications: BRONCHOSPASM PREVENTION  
  
 ALIGN PO Take 1 Tab by mouth daily. chlorpheniramine-HYDROcodone 10-8 mg/5 mL suspension Commonly known as:  Marii Favorite Take 2.5 mL by mouth every twelve (12) hours as needed for Cough. Max Daily Amount: 5 mL. cyanocobalamin 1,000 mcg tablet Take 1,000 mcg by mouth daily. diclofenac 1 % Gel Commonly known as:  VOLTAREN Apply 2 g to affected area four (4) times daily. Apply to affected area as needed  
  
 gabapentin 300 mg capsule Commonly known as:  NEURONTIN Take 2 Caps by mouth three (3) times daily for 90 days. HYDROmorphone 2 mg tablet Commonly known as:  DILAUDID Take 0.5 Tabs by mouth every eight (8) hours for 30 days. Max Daily Amount: 3 mg.  
  
 metoprolol tartrate 25 mg tablet Commonly known as:  LOPRESSOR Take 25 mg by mouth two (2) times a day. MIRALAX 17 gram/dose powder Generic drug:  polyethylene glycol Take 17 g by mouth daily. naloxone 4 mg/actuation nasal spray Commonly known as:  [employer] Use 1 spray intranasally into 1 nostril. Use a new Narcan nasal spray for subsequent doses and administer into alternating nostrils. May repeat every 2 to 3 minutes as needed. NexIUM 40 mg capsule Generic drug:  esomeprazole Take 40 mg by mouth daily. nitroglycerin 0.4 mg SL tablet Commonly known as:  NITROSTAT  
1 Tab by SubLINGual route every five (5) minutes as needed for Chest Pain. ondansetron hcl 8 mg tablet Commonly known as:  Angelica Omid Take 1 Tab by mouth every eight (8) hours as needed for Nausea. OXYGEN-AIR DELIVERY SYSTEMS  
2 L by Nasal route continuous. predniSONE 2.5 mg tablet Commonly known as:  Bosie Spittle Take 1 Tab by mouth daily. SENNA PLUS 8.6-50 mg per tablet Generic drug:  senna-docusate Take 2 Tabs by mouth daily. spironolactone 25 mg tablet Commonly known as:  ALDACTONE Take 0.5 Tabs by mouth daily. topiramate 25 mg tablet Commonly known as:  TOPAMAX Take 1 Tab by mouth daily (with breakfast). VITAMIN D3 1,000 unit tablet Generic drug:  cholecalciferol Take 1,000 Units by mouth daily. Prescriptions Sent to Pharmacy Refills  
 gabapentin (NEURONTIN) 300 mg capsule 2 Sig: Take 2 Caps by mouth three (3) times daily for 90 days. Class: Normal  
 Pharmacy: [facility] #[id number] [place] [address] Ph #: [phone] Route: Oral  
  
We Performed the Following CBC W/O DIFF [11459 CPT(R)] METABOLIC PANEL, COMPREHENSIVE [22456 CPT(R)] [address] Comments: REFERRAL TO [facility] (patient/family request): SN - wound care for buttock wound, edema management w/ tubigrip, medication education, CHF with weight monitoring PT/OT - eval and treat for strength, endurance, gait impairment, ADLs Home Aide - Assistance with bathing/ADL's Follow-up Instructions Return in about 6 weeks (around 9/11/2018), or if symptoms worsen or fail to improve, for Regular Follow-up. Referral Information Referral ID Referred By Referred To  
  
 [id number] Dasha Grullon Not Available Visits Status Start Date End Date 1 New Request 7/31/18 7/31/19 If your referral has a status of pending review or denied, additional information will be sent to support the outcome of this decision. Patient Instructions Dear Juan Teran and Shira Sahni, It was a pleasure seeing you at home today with Home Based [facility] ([phone]) Your stated goal: To get back to walking again. This is what we talked about: 1. Arthritis / Lorra Jose - We have submitted to request to the home health agency of your request which is [facility] - Nursing, PT/OT, and an aide have been ordered - Your goal is to get back to walking again - You are due for pain medication refill. It is at the pharmacy to be picked up by Shira Sahni 2. Oxygen / Cough 
-You wear your oxygen daily - Your oxygen has normal today while off the oxygen for a short time - You no longer need the cough medicine 3. Leg edema / CHF - The swelling to both your legs has gone down a little bit - You have had problems with this in that past, even with wounds and weeping skin 
- Unna boots have been used when your legs were really bad - Tubigrip is recommended Linda Guzman will look into purchasing from [employer] - A referral has been made back to your previous home health agency - Please elevate your legs even while you are at rest 
 
4. Bleeding/ Anemia - Xarelto was discontinued until cleared by a Gastroenterologist, Dr. Reese Samuels in 2-4 weeks - An appointment with the GI doctor has not been made as of yet - Please monitor for any further vomiting of blood, coffee ground appearance to vomit as well as red or black/tarry bowel movements - Should this happen, please call our office right away. - Follow up blood work was collected today 5. Stage 1 and 2 right/left buttock wound - The wounds have healed - You remain at high risk for wound developement 
- Continue to calmoseptine cream has been recommended to use as a skin barrier and protectant 
- Offloading pressure frequently is recommended - Avoiding prolonged contact with moisture against the skin by frequent toileting or adult brief changes are advised 6. Neuropathy - You still have symptoms that you experience in your feet/heels - You are currently on 600mg twice a day - We are increasing the dose to three times a day 7. Health Maintenance -TDap vaccine was declined Health Maintenance Due Topic Date Due  Pneumococcal 65+ Low/Medium Risk (2 of 2 - PPSV23) 12/07/2017  Influenza Age 5 to Adult  08/01/2018  GLAUCOMA SCREENING Q2Y  08/09/2018 8. Advance Care Planning - A Durable Do Not Resusitate (DDNR) is on file - An Advance Directive is already on file. This is what you have shared with us about Advance Care Planning Advance Care Planning 4/27/2018 Patient's Healthcare Decision Maker is: Named in scanned ACP document Primary Decision Maker Name Elizabeth Woodson Primary Decision Maker Phone Number [phone] Primary Decision Maker Relationship to Patient Adult child Secondary Decision Maker Name Kolton Meza Secondary Decision Maker Phone Number [phone] Secondary Decision Maker Relationship to Patient Unknown Confirm Advance Directive Yes, on file Someone from the Home Based Primary Care Team will see you again in 6 weeks. If there are any concerns before that time, such as medication questions, worsening symptoms or a need to see a physician for an urgent or emergent situation; please call [phone]. A physician is also on call after our normal business hours of 8am to 5pm.  
 
In order to serve you better, please allow up to 48 hours for prescription refills to be processed. Certain medications may require more paperwork or a written prescription that you may need to  from the office. We appreciate you letting us know of any refill requests as soon as possible. Sincerely, The Home Based Primary Care Team 
Ervin Busman, MD Corinne Rumble, NP Goble Cecil, JUAN PABLO Au RN 
 
 
 
  
 
  
Polymyalgia Rheumatica: Care Instructions Your Care Instructions Polymyalgia rheumatica causes pain and swelling in joints and muscles, mainly in the hips, neck, and shoulders. Pain and swelling may be worse in the morning. This condition can occur quickly and often lasts for a year or two. Your doctor will treat you with medicine to reduce swelling. Your symptoms should get much better in 1 to 3 days and go away in 2 to 4 weeks. Still, you may need to take medicine to prevent it from coming back. You may be on the medicine for 1 to 2 years or longer. Some people who have this also get giant cell arteritis (temporal arteritis), which causes swelling of some blood vessels in the head. Tell your doctor if you have any headaches, jaw pain, or tightness or tenderness along the temple or scalp. This condition can cause blindness if it is not treated. Tell your doctor if you have problems with your vision, including blurring or seeing double. Follow-up care is a key part of your treatment and safety. Be sure to make and go to all appointments, and call your doctor if you are having problems.  It's also a good idea to know your test results and keep a list of the medicines you take. How can you care for yourself at home? · Take your medicines exactly as prescribed. Call your doctor if you think you are having a problem with your medicine. You may get medicines to reduce pain and to keep your bones from getting thin. · Take anti-inflammatory medicines to reduce pain, if your doctor recommends them. These include ibuprofen (Advil, Motrin) and naproxen (Aleve). Read and follow all instructions on the label. · Eat a healthy diet. Make sure to drink milk and eat dairy products, such as low-fat cheese and yogurt. Ask your doctor how much calcium you need. If you cannot eat dairy products or you do not get enough calcium from food, you may take pills. · Do gentle weight lifting to protect your bones. This is very important for women who have gone through menopause. · Do not smoke or allow others to smoke around you. If you need help quitting, talk to your doctor about stop-smoking programs and medicines. These can increase your chances of quitting for good. When should you call for help? Call your doctor now or seek immediate medical care if: 
  · You have a headache, jaw pain, or problems seeing.  
 Watch closely for changes in your health, and be sure to contact your doctor if: 
  · Your joint and muscle pain or stiffness gets worse.  
  · You have side effects from your corticosteroid medicine, such as: ¨ Signs of diabetes (feeling thirsty all the time, needing to urinate often). ¨ Signs of infection (fever, chills, cough, burning during urination, severe sore throat, or skin infection). ¨ A large weight gain. ¨ Mood changes. ¨ Trouble sleeping. ¨ Bruising easily.  
  · You have any other problems with your medicine.  
  · You do not get better as expected. Where can you learn more? Go to http://anna-sampson.info/. Enter M396 in the search box to learn more about \"Polymyalgia Rheumatica: Care Instructions. \" 
 Current as of: October 10, 2017 Content Version: 11.7 © 5231-9655 [employer]. Care instructions adapted under license by [employer] (which disclaims liability or warranty for this information). If you have questions about a medical condition or this instruction, always ask your healthcare professional. [address] any warranty or liability for your use of this information. Patient Instructions History Introducing [facility]! Dear Nga Cons: 
Thank you for requesting a UpdateLogic account. Our records indicate that you already have an active UpdateLogic account. You can access your account anytime at [email or web address] Did you know that you can access your hospital and ER discharge instructions at any time in UpdateLogic? You can also review all of your test results from your hospital stay or ER visit. Additional Information If you have questions, please visit the Frequently Asked Questions section of the UpdateLogic website at [email or web address] Remember, UpdateLogic is NOT to be used for urgent needs. For medical emergencies, dial 911. Now available from your iPhone and Android! Please provide this summary of care documentation to your next provider. Your primary care clinician is listed as Aroldo Pierson. If you have any questions after today's visit, please call [phone].

## 2018-07-31 NOTE — PATIENT INSTRUCTIONS
Dear Juan Teran and Shira Sahni, It was a pleasure seeing you at home today with Home Based [facility] ([phone]) Your stated goal: To get back to walking again. This is what we talked about: 1. Arthritis / Lorra Jose - We have submitted to request to the home health agency of your request which is [facility] - Nursing, PT/OT, and an aide have been ordered - Your goal is to get back to walking again - You are due for pain medication refill. It is at the pharmacy to be picked up by Shira Sahni 2. Oxygen / Cough 
-You wear your oxygen daily - Your oxygen has normal today while off the oxygen for a short time - You no longer need the cough medicine 3. Leg edema / CHF - The swelling to both your legs has gone down a little bit - You have had problems with this in that past, even with wounds and weeping skin 
- Unna boots have been used when your legs were really bad - Tubigrip is recommended Linda Guzman will look into purchasing from [employer] - A referral has been made back to your previous home health agency - Please elevate your legs even while you are at rest 
 
4. Bleeding/ Anemia - Xarelto was discontinued until cleared by a Gastroenterologist, Dr. Lin Form in 2-4 weeks - An appointment with the GI doctor has not been made as of yet - Please monitor for any further vomiting of blood, coffee ground appearance to vomit as well as red or black/tarry bowel movements - Should this happen, please call our office right away. - Follow up blood work was collected today 5. Stage 1 and 2 right/left buttock wound - The wounds have healed - You remain at high risk for wound developement 
- Continue to calmoseptine cream has been recommended to use as a skin barrier and protectant 
- Offloading pressure frequently is recommended - Avoiding prolonged contact with moisture against the skin by frequent toileting or adult brief changes are advised 6. Neuropathy - You still have symptoms that you experience in your feet/heels - You are currently on 600mg twice a day - We are increasing the dose to three times a day 7. Health Maintenance -TDap vaccine was declined Health Maintenance Due Topic Date Due  Pneumococcal 65+ Low/Medium Risk (2 of 2 - PPSV23) 12/07/2017  Influenza Age 5 to Adult  08/01/2018  GLAUCOMA SCREENING Q2Y  08/09/2018 8. Advance Care Planning - A Durable Do Not Resusitate (DDNR) is on file - An Advance Directive is already on file. This is what you have shared with us about Advance Care Planning Advance Care Planning 4/27/2018 Patient's Healthcare Decision Maker is: Named in scanned ACP document Primary Decision Maker Name Jhony Velez Primary Decision Maker Phone Number [phone] Primary Decision Maker Relationship to Patient Adult child Secondary Decision Maker Name Olympia Schwab Secondary Decision Maker Phone Number [phone] Secondary Decision Maker Relationship to Patient Unknown Confirm Advance Directive Yes, on file Someone from the Home Based Primary Care Team will see you again in 6 weeks. If there are any concerns before that time, such as medication questions, worsening symptoms or a need to see a physician for an urgent or emergent situation; please call [phone]. A physician is also on call after our normal business hours of 8am to 5pm.  
 
In order to serve you better, please allow up to 48 hours for prescription refills to be processed. Certain medications may require more paperwork or a written prescription that you may need to  from the office. We appreciate you letting us know of any refill requests as soon as possible. Sincerely, The Home Based Primary Care Team 
MD Juan Patterson NP Bertha Cumber, JUAN PABLO Pelletier, JUAN PABLO 
 
 
 
  
 
  
Polymyalgia Rheumatica: Care Instructions Your Care Instructions Polymyalgia rheumatica causes pain and swelling in joints and muscles, mainly in the hips, neck, and shoulders. Pain and swelling may be worse in the morning. This condition can occur quickly and often lasts for a year or two. Your doctor will treat you with medicine to reduce swelling. Your symptoms should get much better in 1 to 3 days and go away in 2 to 4 weeks. Still, you may need to take medicine to prevent it from coming back. You may be on the medicine for 1 to 2 years or longer. Some people who have this also get giant cell arteritis (temporal arteritis), which causes swelling of some blood vessels in the head. Tell your doctor if you have any headaches, jaw pain, or tightness or tenderness along the temple or scalp. This condition can cause blindness if it is not treated. Tell your doctor if you have problems with your vision, including blurring or seeing double. Follow-up care is a key part of your treatment and safety. Be sure to make and go to all appointments, and call your doctor if you are having problems. It's also a good idea to know your test results and keep a list of the medicines you take. How can you care for yourself at home? · Take your medicines exactly as prescribed. Call your doctor if you think you are having a problem with your medicine. You may get medicines to reduce pain and to keep your bones from getting thin. · Take anti-inflammatory medicines to reduce pain, if your doctor recommends them. These include ibuprofen (Advil, Motrin) and naproxen (Aleve). Read and follow all instructions on the label. · Eat a healthy diet. Make sure to drink milk and eat dairy products, such as low-fat cheese and yogurt. Ask your doctor how much calcium you need. If you cannot eat dairy products or you do not get enough calcium from food, you may take pills. · Do gentle weight lifting to protect your bones. This is very important for women who have gone through menopause. · Do not smoke or allow others to smoke around you.  If you need help quitting, talk to your doctor about stop-smoking programs and medicines. These can increase your chances of quitting for good. When should you call for help? Call your doctor now or seek immediate medical care if: 
  · You have a headache, jaw pain, or problems seeing.  
 Watch closely for changes in your health, and be sure to contact your doctor if: 
  · Your joint and muscle pain or stiffness gets worse.  
  · You have side effects from your corticosteroid medicine, such as: ¨ Signs of diabetes (feeling thirsty all the time, needing to urinate often). ¨ Signs of infection (fever, chills, cough, burning during urination, severe sore throat, or skin infection). ¨ A large weight gain. ¨ Mood changes. ¨ Trouble sleeping. ¨ Bruising easily.  
  · You have any other problems with your medicine.  
  · You do not get better as expected. Where can you learn more? Go to http://anna-sampson.info/. Enter M396 in the search box to learn more about \"Polymyalgia Rheumatica: Care Instructions. \" Current as of: October 10, 2017 Content Version: 11.7 © 9848-8933 [employer]. Care instructions adapted under license by [employer] (which disclaims liability or warranty for this information). If you have questions about a medical condition or this instruction, always ask your healthcare professional. [address] any warranty or liability for your use of this information.

## 2018-07-31 NOTE — PROGRESS NOTES
[facility] [address] [place] [address] Chaya Aguilar 23 
[phone] RN Nursing Visit Note Date of current Visit: 07/31/18 Date of last visit: 7/18/18 Date of initial visit: 5/2/18 Diagnosis: Enteritis, diverticulosis of sigmoid colon,  a-fib, rheumatoid arthritis, polymyalgia rheumatica, CAD, debility ACP: 
Advance Care Planning 4/27/2018 Patient's Healthcare Decision Maker is: Named in scanned ACP document Primary Decision Maker Name Ryan Malone Primary Decision Maker Phone Number [phone] Primary Decision Maker Relationship to Patient Adult child Secondary Decision Maker Name Anshul Samayoa Secondary Decision Maker Phone Number [phone] Secondary Decision Maker Relationship to Patient Unknown Confirm Advance Directive Yes, on file Primary Caregiver: 
Cam [facility]: 
[address] - [phone] [facility] 
 
Nutrition: - Regular textured food Wt Readings from Last 3 Encounters:  
06/05/18 120 lb 9.6 oz (54.7 kg) 11/20/17 131 lb (59.4 kg) 11/15/17 132 lb (59.9 kg) Visit Vitals  /57  Pulse 82  Temp 97.9 °F (36.6 °C) (Temporal)  Resp 16  
 Ht 5' (1.524 m)  SpO2 93% MUAC 
RA - 22 cm 
LA - 21.5 cm Medication Management: 
Current Outpatient Prescriptions Medication Sig  
 gabapentin (NEURONTIN) 300 mg capsule Take 2 Caps by mouth two (2) times a day.  predniSONE (DELTASONE) 2.5 mg tablet Take 1 Tab by mouth daily.  OXYGEN-AIR DELIVERY SYSTEMS 2 L by Nasal route continuous.  topiramate (TOPAMAX) 25 mg tablet Take 1 Tab by mouth daily (with breakfast).  metoprolol tartrate (LOPRESSOR) 25 mg tablet Take 25 mg by mouth two (2) times a day.  cholecalciferol (VITAMIN D3) 1,000 unit tablet Take 1,000 Units by mouth daily.  HYDROmorphone (DILAUDID) 2 mg tablet Take 0.5 Tabs by mouth every eight (8) hours for 30 days. Max Daily Amount: 3 mg.   
 ondansetron hcl (ZOFRAN) 8 mg tablet Take 1 Tab by mouth every eight (8) hours as needed for Nausea.  diclofenac (VOLTAREN) 1 % gel Apply 2 g to affected area four (4) times daily. Apply to affected area as needed  albuterol (PROVENTIL) 5 mg/mL nebulizer solution 0.5 mL by Nebulization route every six (6) hours as needed for Wheezing. Indications: BRONCHOSPASM PREVENTION  chlorpheniramine-HYDROcodone (TUSSIONEX) 10-8 mg/5 mL suspension Take 2.5 mL by mouth every twelve (12) hours as needed for Cough. Max Daily Amount: 5 mL.  polyethylene glycol (MIRALAX) 17 gram/dose powder Take 17 g by mouth daily.  spironolactone (ALDACTONE) 25 mg tablet Take 0.5 Tabs by mouth daily.  senna-docusate (SENNA PLUS) 8.6-50 mg per tablet Take 2 Tabs by mouth daily.  naloxone (NARCAN) 4 mg/actuation nasal spray Use 1 spray intranasally into 1 nostril. Use a new Narcan nasal spray for subsequent doses and administer into alternating nostrils. May repeat every 2 to 3 minutes as needed.  BIFIDOBACTERIUM INFANTIS (ALIGN PO) Take 1 Tab by mouth daily.  cyanocobalamin 1,000 mcg tablet Take 1,000 mcg by mouth daily.  nitroglycerin (NITROSTAT) 0.4 mg SL tablet 1 Tab by SubLINGual route every five (5) minutes as needed for Chest Pain.  esomeprazole (NEXIUM) 40 mg capsule Take 40 mg by mouth daily. No current facility-administered medications for this visit. Pill Count: 
Dilaudid 2 mg # .5 tablet on hand today - Last filled on 6/19/18 for # 39 Tramadol 50 mg # 25 on hand today - last filled on 4/20/18for # 60 Plan: 1. Set up home health [facility]  [phone] 2. CBC CMP drawn 3. Refilled gabapentin 4. Tubi- recommended for bilateral lower extremity edema Refilled Gabapentin Follow up:  9/11/18

## 2018-07-31 NOTE — PROGRESS NOTES
Home Based [address]  
[phone] Date of Current Visit: 07/31/18 SUMMARY:  
80 yr female referred to [facility] by Dr. Xochitl Long. She has a significant hx for seronegative RA, PMR of multiple joints, CAD, old MI, GERD and Afib. Due to this, Ms. Tomy Harris is unable to see a community PCP without significant taxing effort. GOALS OF CARE / TREATMENT PREFERENCES:  
GOALS OF CARE: 
Patient / health care proxy stated goals: To get back to walking again. TREATMENT PREFERENCES:  
Code Status:  [] Attempt Resuscitation       [x] Do Not Attempt Resuscitation Advance Care Planning: 
Advance Care Planning 4/27/2018 Patient's Healthcare Decision Maker is: Named in scanned ACP document Primary Decision Maker Name Robert King Primary Decision Maker Phone Number [phone] Primary Decision Maker Relationship to Patient Adult child Secondary Decision Maker Name Elena Hopper Secondary Decision Maker Phone Number [phone] Secondary Decision Maker Relationship to Patient Unknown Confirm Advance Directive Yes, on file DIAGNOSES:  
 
  ICD-10-CM ICD-9-CM 1. PMR (polymyalgia rheumatica) ([facility]) M35.3 725 CBC W/O DIFF  
   METABOLIC PANEL, COMPREHENSIVE [address] [place] [address] 2. Paroxysmal atrial fibrillation ([facility]) I48.0 427.31 CBC W/O DIFF  
   METABOLIC PANEL, [address] 3. CHF, stage C ([facility]) I50.9 428.0 CBC W/O DIFF  
   METABOLIC PANEL, [address] 4. Generalized muscle weakness M62.81 728.87   
5. Polyneuropathy associated with underlying disease ([facility] [address] G63 357.4 PLAN:  
Patient Instructions Dear Eusebia Castillo and Justin Mims, It was a pleasure seeing you at home today with Home Based [address] ([phone]) Your stated goal: To get back to walking again. This is what we talked about: 1. Arthritis / [place] - We have submitted to request to the [place] health agency of your request which is [facility] - Nursing, PT/OT, and an aide have been ordered - Your goal is to get back to walking again - You are due for pain medication refill. It is at the pharmacy to be picked up by Eric Garcia 2. Oxygen / Cough 
-You wear your oxygen daily - Your oxygen has normal today while off the oxygen for a short time - You no longer need the cough medicine 3. Leg edema / CHF - The swelling to both your legs has gone down a little bit - You have had problems with this in that past, even with wounds and weeping skin 
- Unna boots have been used when your legs were really bad - Tubigrip is recommended Evlyn Staff will look into purchasing from [employer] - A referral has been made back to your previous home health agency - Please elevate your legs even while you are at rest 
 
4. Bleeding/ Anemia - Xarelto was discontinued until cleared by a Gastroenterologist, Dr. Richard Claudio in 2-4 weeks - An appointment with the GI doctor has not been made as of yet - Please monitor for any further vomiting of blood, coffee ground appearance to vomit as well as red or black/tarry bowel movements - Should this happen, please call our office right away. - Follow up blood work was collected today 5. Stage 1 and 2 right/left buttock wound - The wounds have healed - You remain at high risk for wound developement 
- Continue to calmoseptine cream has been recommended to use as a skin barrier and protectant 
- Offloading pressure frequently is recommended - Avoiding prolonged contact with moisture against the skin by frequent toileting or adult brief changes are advised 6. Neuropathy - You still have symptoms that you experience in your feet/heels - You are currently on 600mg twice a day - We are increasing the dose to three times a day 7. Health Maintenance -TDap vaccine was declined Health Maintenance Due Topic Date Due  Pneumococcal 65+ Low/Medium Risk (2 of 2 - PPSV23) 12/07/2017  Influenza Age 5 to Adult  08/01/2018  GLAUCOMA SCREENING Q2Y  08/09/2018 8. Advance Care Planning - A Durable Do Not Resusitate (DDNR) is on file - An Advance Directive is already on file. This is what you have shared with us about Advance Care Planning Advance Care Planning 4/27/2018 Patient's Healthcare Decision Maker is: Named in scanned ACP document Primary Decision Maker Name Jhony Velez Primary Decision Maker Phone Number [phone] Primary Decision Maker Relationship to Patient Adult child Secondary Decision Maker Name Olympia Schwab Secondary Decision Maker Phone Number [phone] Secondary Decision Maker Relationship to Patient Unknown Confirm Advance Directive Yes, on file Someone from the Home Based Primary Care Team will see you again in 6 weeks. If there are any concerns before that time, such as medication questions, worsening symptoms or a need to see a physician for an urgent or emergent situation; please call [phone]. A physician is also on call after our normal business hours of 8am to 5pm.  
 
In order to serve you better, please allow up to 48 hours for prescription refills to be processed. Certain medications may require more paperwork or a written prescription that you may need to  from the office. We appreciate you letting us know of any refill requests as soon as possible. Sincerely, The Home Based Primary Care Team 
MD Juan Patterson NP Bertha Cumber, JUAN PABLO Pelletier, JUAN PABLO 
 
 
 
  
 
 
 
 PRESCRIPTIONS GIVEN:  
 
Medications Ordered Today Medications  gabapentin (NEURONTIN) 300 mg capsule Sig: Take 2 Caps by mouth three (3) times daily for 90 days. Dispense:  540 Cap Refill:  2 HISTORY:  
Please see RN note from this current visit; history taken together in presence of patient/family in the home. CHIEF COMPLAINT:  
Chief Complaint Patient presents with  Arthritis  Labs  
 O2/Oxygen  Extremity Weakness HPI/SUBJECTIVE: The patient is: [x] Verbal / [] Nonverbal  
 
Requesting HH referral  
Leg swelling has improved since previous visit but remains Reports increased neuropathy - burning sensation to feet, more notable at night Son has not made an appointment with GI for follow s/p GI bleed REVIEW OF SYSTEMS:  
 
The following systems were [x] reviewed / [] unable to be reviewed Systems: constitutional, ears/nose/mouth/throat, respiratory, gastrointestinal, genitourinary, musculoskeletal, integumentary, neurologic, psychiatric, endocrine. Positive findings noted below: burning in feet, more during night FUNCTIONAL ASSESSMENT:  
 
Palliative Performance Scale (PPS): 50-60% PSYCHOSOCIAL/SPIRITUAL SCREENING:  
  
Any spiritual / Mandaeism concerns: [] Yes /  [x] No Faith, no longer goes to [employer] because can't get out of home; [employer] does not come to her, may be willing to reconnect Caregiver Burnout: [] Yes /  [x] No /  [] No Caregiver Present PHYSICAL EXAM:  
 
Wt Readings from Last 3 Encounters:  
06/05/18 120 lb 9.6 oz (54.7 kg) 11/20/17 131 lb (59.4 kg) 11/15/17 132 lb (59.9 kg) Blood pressure 110/57, pulse 82, temperature 97.9 °F (36.6 °C), temperature source Temporal, resp. rate 16, height 5' (1.524 m), SpO2 93 %. Last bowel movement:  TODAY Constitutional:  female, adv. Age, thin and frail, sitting up in WC, NAD Eyes: pupils equal, anicteric ENMT: no nasal discharge, moist mucous membranes Cardiovascular: regular/ irregular rhythm, distal pulses intact Respiratory: breathing not labored, symmetric Gastrointestinal: soft non-tender, +bowel sounds Musculoskeletal: no deformity, no tenderness to palpation Skin: warm, dry, stage 1 and 2 bilat. Buttock wounds, +2BLE edema Neurologic: following commands, moving all extremities Psychiatric: full affect, no hallucinations Other: 
 
 HISTORY:  
 
Past Medical and Surgical History reviewed in [facility] on date of initial visit Patient Active Problem List  
Diagnosis Code  Osteoarthritis M19.90  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 ([facility]) I25.111  
 CHF, stage C ([facility]) I50.9  Lumbar spinal stenosis M48.061  
 Paroxysmal atrial fibrillation ([facility]) I48.0  Advance directive on file A01.3  PMR (polymyalgia rheumatica) ([facility]) M35.3  Lower extremity edema R60.0  CKD (chronic kidney disease) stage 2, GFR 60-89 ml/min N18.2  Long term current use of systemic steroids Z79.52  Seronegative rheumatoid arthritis of multiple sites ([facility] [address] M06.09  
 Long-term use of immunosuppressant medication Z79.899  Anemia associated with acute blood loss D62  Dependence on continuous supplemental oxygen Z99.81  
 Polyneuropathy associated with underlying disease ([facility] [address] G63 Family History Problem Relation Age of Onset 24 [facility] Other Mother Intestinal obstruction  Cancer Father  Stroke Father  Heart Attack Brother  Cancer Brother  Cancer Brother  Anesth Problems Neg Hx History reviewed, no pertinent family history. Social History Substance Use Topics  Smoking status: Never Smoker  Smokeless tobacco: Never Used  Alcohol use No  
 
Allergies Allergen Reactions  Novacare Other (comments) PARALYSIS; \"NOVACAINE\" not novacare.  Phenergan [Promethazine] Other (comments) \"loose my wits i go crazy\"  Nsaids (Non-Steroidal Anti-Inflammatory Drug) Nausea and Vomiting  Adhesive Tape-Silicones Other (comments) BLISTERS  
 Atenolol Nausea and Vomiting  Bactrim [Sulfamethoprim Ds] Nausea and Vomiting  Cymbalta [Duloxetine] Other (comments) CONFUSION 
  
 Digoxin Nausea and Vomiting  Gluten Other (comments) GLUTEN INTOLERANCE  
 Macrobid [Nitrofurantoin Monohyd/M-Cryst] Nausea and Vomiting  Sulfa (Sulfonamide Antibiotics) Nausea and Vomiting  Codeine Other (comments) Unable to swallow Current Outpatient Prescriptions Medication Sig  
 gabapentin (NEURONTIN) 300 mg capsule Take 2 Caps by mouth three (3) times daily for 90 days.  predniSONE (DELTASONE) 2.5 mg tablet Take 1 Tab by mouth daily.  OXYGEN-AIR DELIVERY SYSTEMS 2 L by Nasal route continuous.  spironolactone (ALDACTONE) 25 mg tablet Take 0.5 Tabs by mouth daily.  topiramate (TOPAMAX) 25 mg tablet Take 1 Tab by mouth daily (with breakfast).  metoprolol tartrate (LOPRESSOR) 25 mg tablet Take 25 mg by mouth two (2) times a day.  BIFIDOBACTERIUM INFANTIS (ALIGN PO) Take 1 Tab by mouth daily.  cyanocobalamin 1,000 mcg tablet Take 1,000 mcg by mouth daily.  cholecalciferol (VITAMIN D3) 1,000 unit tablet Take 1,000 Units by mouth daily.  HYDROmorphone (DILAUDID) 2 mg tablet Take 0.5 Tabs by mouth every eight (8) hours for 30 days. Max Daily Amount: 3 mg.  ondansetron hcl (ZOFRAN) 8 mg tablet Take 1 Tab by mouth every eight (8) hours as needed for Nausea.  diclofenac (VOLTAREN) 1 % gel Apply 2 g to affected area four (4) times daily. Apply to affected area as needed  albuterol (PROVENTIL) 5 mg/mL nebulizer solution 0.5 mL by Nebulization route every six (6) hours as needed for Wheezing. Indications: BRONCHOSPASM PREVENTION  chlorpheniramine-HYDROcodone (TUSSIONEX) 10-8 mg/5 mL suspension Take 2.5 mL by mouth every twelve (12) hours as needed for Cough. Max Daily Amount: 5 mL.  polyethylene glycol (MIRALAX) 17 gram/dose powder Take 17 g by mouth daily.  senna-docusate (SENNA PLUS) 8.6-50 mg per tablet Take 2 Tabs by mouth daily.  naloxone (NARCAN) 4 mg/actuation nasal spray Use 1 spray intranasally into 1 nostril. Use a new Narcan nasal spray for subsequent doses and administer into alternating nostrils. May repeat every 2 to 3 minutes as needed.   
 nitroglycerin (NITROSTAT) 0.4 mg SL tablet 1 Tab by SubLINGual route every five (5) minutes as needed for Chest Pain.  esomeprazole (NEXIUM) 40 mg capsule Take 40 mg by mouth daily. No current facility-administered medications for this visit. LAB DATA REVIEWED:  
 
Lab Results Component Value Date/Time Hemoglobin A1c 6.1 05/14/2015 12:34 PM  
 
No results found for: Merline Giovany, MCA2, MCA3, MCAU, MCAU2, MCALPOCT Lab Results Component Value Date/Time TSH 1.560 11/20/2017 02:40 PM  
 TSH 0.86 05/25/2015 04:32 AM  
 
Lab Results Component Value Date/Time VITAMIN D, 25-HYDROXY 43.1 10/04/2017 02:52 PM  
   
 
Lab Results Component Value Date/Time WBC 4.3 06/19/2018 02:10 AM  
 HGB 11.0 (L) 06/19/2018 02:10 AM  
 PLATELET 900 11/45/0314 02:10 AM  
 
Lab Results Component Value Date/Time Sodium 139 06/19/2018 02:10 AM  
 Potassium 4.0 06/19/2018 02:10 AM  
 Chloride 100 06/19/2018 02:10 AM  
 CO2 21 06/19/2018 02:10 AM  
 BUN 7 (L) 06/19/2018 02:10 AM  
 Creatinine 0.84 06/19/2018 02:10 AM  
 Calcium 9.3 06/19/2018 02:10 AM  
 Magnesium 1.8 05/25/2015 04:32 AM  
 Phosphorus 1.5 (L) 05/25/2015 04:32 AM  
  
Lab Results Component Value Date/Time AST (SGOT) 11 06/19/2018 02:10 AM  
 Alk. phosphatase 92 06/19/2018 02:10 AM  
 Protein, total 5.4 (L) 06/19/2018 02:10 AM  
 Albumin 3.4 06/19/2018 02:10 AM  
 
No results found for: IRON, FE, TIBC, IBCT, PSAT, FERR Total time:  30min Counseling / coordination time: 5min 
> 50% counseling / coordination?:

## 2018-08-01 PROBLEM — G63 POLYNEUROPATHY ASSOCIATED WITH UNDERLYING DISEASE (HCC): Status: ACTIVE | Noted: 2018-08-01

## 2018-08-01 LAB
ALBUMIN SERPL-MCNC: 3.6 G/DL (ref 3.2–4.6)
ALBUMIN/GLOB SERPL: 1.8 {RATIO} (ref 1.2–2.2)
ALP SERPL-CCNC: 79 IU/L (ref 39–117)
ALT SERPL-CCNC: 8 IU/L (ref 0–32)
AST SERPL-CCNC: 16 IU/L (ref 0–40)
BILIRUB SERPL-MCNC: 0.3 MG/DL (ref 0–1.2)
BUN SERPL-MCNC: 9 MG/DL (ref 10–36)
BUN/CREAT SERPL: 13 (ref 12–28)
CALCIUM SERPL-MCNC: 9.3 MG/DL (ref 8.7–10.3)
CHLORIDE SERPL-SCNC: 107 MMOL/L (ref 96–106)
CO2 SERPL-SCNC: 21 MMOL/L (ref 20–29)
CREAT SERPL-MCNC: 0.72 MG/DL (ref 0.57–1)
ERYTHROCYTE [DISTWIDTH] IN BLOOD BY AUTOMATED COUNT: 15.4 % (ref 12.3–15.4)
GLOBULIN SER CALC-MCNC: 2 G/DL (ref 1.5–4.5)
GLUCOSE SERPL-MCNC: 111 MG/DL (ref 65–99)
HCT VFR BLD AUTO: 34.6 % (ref 34–46.6)
HGB BLD-MCNC: 10.8 G/DL (ref 11.1–15.9)
MCH RBC QN AUTO: 30.1 PG (ref 26.6–33)
MCHC RBC AUTO-ENTMCNC: 31.2 G/DL (ref 31.5–35.7)
MCV RBC AUTO: 96 FL (ref 79–97)
PLATELET # BLD AUTO: 195 X10E3/UL (ref 150–379)
POTASSIUM SERPL-SCNC: 4.3 MMOL/L (ref 3.5–5.2)
PROT SERPL-MCNC: 5.6 G/DL (ref 6–8.5)
RBC # BLD AUTO: 3.59 X10E6/UL (ref 3.77–5.28)
SODIUM SERPL-SCNC: 145 MMOL/L (ref 134–144)
WBC # BLD AUTO: 3.5 X10E3/UL (ref 3.4–10.8)

## 2018-08-08 ENCOUNTER — TELEPHONE (OUTPATIENT)
Dept: FAMILY MEDICINE CLINIC | Age: 83
End: 2018-08-08

## 2018-08-08 NOTE — TELEPHONE ENCOUNTER
Return call to [facility] to inform of NP Request below see chart note insert,      \"1. Arthritis / Debility  - We have submitted to request to the home health agency of your request which is [facility]  - Nursing, PT/OT, and an aide have been ordered  - Your goal is to get back to walking again  - You are due for pain medication refill. It is at the pharmacy to be picked up by Zachery\"    [facility] verbalized understanding , no further questions or concerns presented .

## 2018-08-08 NOTE — TELEPHONE ENCOUNTER
Caller stated they were told that pt should be on comfort care, she is trying to confirm is this is correct.  Caller stated they have hospice and wants to be sure they are supplying the pt with all of her needs

## 2018-08-10 ENCOUNTER — TELEPHONE (OUTPATIENT)
Dept: PALLATIVE CARE | Age: 83
End: 2018-08-10

## 2018-08-10 DIAGNOSIS — R05.9 COUGH: ICD-10-CM

## 2018-08-10 RX ORDER — HYDROCODONE POLISTIREX AND CHLORPHENIRAMINE POLISTIREX 10; 8 MG/5ML; MG/5ML
2.5 SUSPENSION, EXTENDED RELEASE ORAL
Qty: 115 ML | Refills: 0
Start: 2018-08-10

## 2018-08-10 NOTE — TELEPHONE ENCOUNTER
From: Hai Duque  To: Miriam Auguste NP  Sent: 8/10/2018 8:01 AM EDT  Subject: Medication Renewal Request    Original authorizing provider: CRISTIN Torres would like a refill of the following medications:  chlorpheniramine-HYDROcodone (TUSSIONEX) 10-8 mg/5 mL suspension Miriam Auguste NP]    Preferred pharmacy: [facility]Y #[id number]:

## 2018-08-10 NOTE — TELEPHONE ENCOUNTER
Lizzeth Eller with [facility] is calling to discuss getting supplies for legs. Please call to discuss. Advised nurse would call.

## 2018-08-10 NOTE — TELEPHONE ENCOUNTER
LVM for patient's son, Rafita Disla to return call with some detailed information on what's going on with his mom.    Per Elisabeth Gómez - if approved script will need to be picked up from the office

## 2018-08-16 ENCOUNTER — DOCUMENTATION ONLY (OUTPATIENT)
Dept: PALLATIVE CARE | Age: 83
End: 2018-08-16

## 2018-08-24 ENCOUNTER — DOCUMENTATION ONLY (OUTPATIENT)
Dept: FAMILY MEDICINE CLINIC | Age: 83
End: 2018-08-24

## 2018-08-30 ENCOUNTER — TELEPHONE (OUTPATIENT)
Dept: PALLATIVE CARE | Age: 83
End: 2018-08-30

## 2018-08-30 NOTE — TELEPHONE ENCOUNTER
Home health nurse in patients home requesting Verbal order.  Patient has new wound on left lower leg looks like trauma

## 2018-08-30 NOTE — TELEPHONE ENCOUNTER
Returned call to [facility] giver noticed some time last week, cleaned with saline with foam dressing and tubi  to secure . No depth to skin tear currently will continue to monitor due to her edema wound on Left Lateral leg , possibly from wheel chair .    Noted and VO given for continued wound care and evaluation

## 2018-09-07 RX ORDER — ONDANSETRON HYDROCHLORIDE 8 MG/1
8 TABLET, FILM COATED ORAL
Qty: 30 TAB | Refills: 3 | Status: SHIPPED | OUTPATIENT
Start: 2018-09-07 | End: 2018-09-20 | Stop reason: DRUGHIGH

## 2018-09-14 ENCOUNTER — TELEPHONE (OUTPATIENT)
Dept: FAMILY MEDICINE CLINIC | Age: 83
End: 2018-09-14

## 2018-09-20 ENCOUNTER — HOME VISIT (OUTPATIENT)
Dept: FAMILY MEDICINE CLINIC | Age: 83
End: 2018-09-20

## 2018-09-20 VITALS
DIASTOLIC BLOOD PRESSURE: 60 MMHG | RESPIRATION RATE: 16 BRPM | HEART RATE: 74 BPM | SYSTOLIC BLOOD PRESSURE: 126 MMHG | OXYGEN SATURATION: 95 % | TEMPERATURE: 98.3 F

## 2018-09-20 DIAGNOSIS — I25.111 CORONARY ARTERY DISEASE INVOLVING NATIVE CORONARY ARTERY OF NATIVE HEART WITH ANGINA PECTORIS WITH DOCUMENTED SPASM (HCC): ICD-10-CM

## 2018-09-20 DIAGNOSIS — R73.03 PREDIABETES: ICD-10-CM

## 2018-09-20 DIAGNOSIS — M48.061 SPINAL STENOSIS OF LUMBAR REGION, UNSPECIFIED WHETHER NEUROGENIC CLAUDICATION PRESENT: ICD-10-CM

## 2018-09-20 DIAGNOSIS — I50.9 CHF, STAGE C (HCC): ICD-10-CM

## 2018-09-20 DIAGNOSIS — M35.3 PMR (POLYMYALGIA RHEUMATICA) (HCC): ICD-10-CM

## 2018-09-20 DIAGNOSIS — G89.29 CHRONIC MIDLINE LOW BACK PAIN WITHOUT SCIATICA: ICD-10-CM

## 2018-09-20 DIAGNOSIS — M54.50 CHRONIC MIDLINE LOW BACK PAIN WITHOUT SCIATICA: ICD-10-CM

## 2018-09-20 DIAGNOSIS — I48.0 PAROXYSMAL ATRIAL FIBRILLATION (HCC): ICD-10-CM

## 2018-09-20 DIAGNOSIS — R11.0 NAUSEA: Primary | ICD-10-CM

## 2018-09-20 DIAGNOSIS — M06.09 SERONEGATIVE RHEUMATOID ARTHRITIS OF MULTIPLE SITES (HCC): ICD-10-CM

## 2018-09-20 RX ORDER — TRAMADOL HYDROCHLORIDE 50 MG/1
50 TABLET ORAL
COMMUNITY
End: 2018-10-03

## 2018-09-20 RX ORDER — LOPERAMIDE HYDROCHLORIDE 2 MG/1
CAPSULE ORAL
COMMUNITY
End: 2018-10-03

## 2018-09-20 RX ORDER — ONDANSETRON 8 MG/1
8 TABLET, ORALLY DISINTEGRATING ORAL EVERY 8 HOURS
Qty: 90 TAB | Refills: 2 | Status: SHIPPED | OUTPATIENT
Start: 2018-09-20 | End: 2018-11-07 | Stop reason: DRUGHIGH

## 2018-09-20 RX ORDER — FENTANYL 12.5 UG/1
1 PATCH TRANSDERMAL
Qty: 10 PATCH | Refills: 0
Start: 2018-09-20 | End: 2018-09-21 | Stop reason: SDUPTHER

## 2018-09-20 NOTE — PROGRESS NOTES
[facility] [address] [place] [address] Chaya Aguilar 23 
[phone] RN Nursing Visit Note Date of current Visit: 09/20/18 Date of last visit: 731/18 Date of initial visit: 5/2/18 Diagnosis: Enteritis, diverticulosis of sigmoid colon,  a-fib, rheumatoid arthritis, polymyalgia rheumatica, CAD, debility ACP: 
Advance Care Planning 4/27/2018 Patient's Healthcare Decision Maker is: Named in scanned ACP document Primary Decision Maker Name Debra Hayden Primary Decision Maker Phone Number [phone] Primary Decision Maker Relationship to Patient Adult child Secondary Decision Maker Name Lavonne Clancy Secondary Decision Maker Phone Number [phone] Secondary Decision Maker Relationship to Patient Unknown Confirm Advance Directive Yes, on file Primary Caregiver: 
E[facility]: 
[address] - [phone] Oxygen - [facility] 
 
Nutrition: - Regular textured food Wt Readings from Last 3 Encounters:  
06/05/18 120 lb 9.6 oz (54.7 kg) 11/20/17 131 lb (59.4 kg) 11/15/17 132 lb (59.9 kg) Visit Vitals  /60 (BP 1 Location: Left arm, BP Patient Position: Sitting)  Pulse 74  Temp 98.3 °F (36.8 °C) (Temporal)  Resp 16  SpO2 95% MUAC 
RA - 22 cm 
LA - 21.5 cm Pill Count: 
Tramadol 50 mg # 79 on hand today - last filled on 9/10/18for # 60 Plan: 1. Prescription for Fentanyl 12 mcg left in the home today 2. Zofran scheduled medication sent to the local pharmacy as requested 3. Labs drawn today - A1C 
[facility] Prevnar 13 Follow up 3 weeks:  10/10/18

## 2018-09-20 NOTE — MR AVS SNAPSHOT
[address] [facility], [address] [address] [phone] Patient: Kale Tuttle MRN: [id number] Visit Information Date & Time Provider Department Dept. Phone Encounter #  
 2018 11:30 AM Edilberto Escalera NP [address] [facility] [phone] [id number] Follow-up Instructions Return in about 3 weeks (around 10/11/2018). Follow-up and Disposition History Upcoming Health Maintenance Date Due Pneumococcal 65+ Low/Medium Risk (2 of 2 - PPSV23) 2017 Influenza Age 5 to Adult 2018 GLAUCOMA SCREENING Q2Y 2018 Bone Densitometry (Dexa) Screening 2020* DTaP/Tdap/Td series (1 - Tdap) 2020* MEDICARE YEARLY EXAM 2019 *Topic was postponed. The date shown is not the original due date. Allergies as of 2018  Review Complete On: 2018 By: Edilberto Escalera NP Severity Noted Reaction Type Reactions Novacare High 2015    Other (comments) PARALYSIS; \"NOVACAINE\" not novacare. Phenergan [Promethazine] High 2015   Systemic Other (comments) \"loose my wits i go crazy\" Nsaids (Non-steroidal Anti-inflammatory Drug) Medium 2015   Systemic Nausea and Vomiting Adhesive Tape-silicones      Other (comments) BLISTERS Atenolol  2015    Nausea and Vomiting Bactrim [Sulfamethoprim Ds]  2015    Nausea and Vomiting Cymbalta [Duloxetine]  2015    Other (comments) CONFUSION Digoxin  2015    Nausea and Vomiting Gluten  2015    Other (comments) GLUTEN INTOLERANCE Macrobid [Nitrofurantoin Monohyd/m-cryst]  2015    Nausea and Vomiting  
 Sulfa (Sulfonamide Antibiotics)  2015    Nausea and Vomiting Codeine Low 2015   Systemic Other (comments) Unable to swallow Current Immunizations  Reviewed on 2016 Name Date Influenza High Dose Vaccine PF 10/10/2017, 10/12/2016, 10/20/2015 11:52 AM  
 Pneumococcal Conjugate (PCV-13) 12/7/2016 Zoster Vaccine, Live 3/22/2012 Not reviewed this visit You Were Diagnosed With   
  
 Codes Comments Nausea    -  Primary ICD-10-CM: R11.0 ICD-9-CM: 787.02 Chronic midline low back pain without sciatica     ICD-10-CM: M54.5, G89.29 ICD-9-CM: 724.2, 338.29 Prediabetes     ICD-10-CM: R73.03 
ICD-9-CM: 790.29 Spinal stenosis of lumbar region, unspecified whether neurogenic claudication present     ICD-10-CM: M48.061 
ICD-9-CM: 724.02   
 CHF, stage C ([facility]     ICD-10-CM: I50.9 ICD-9-CM: 428.0 PMR (polymyalgia rheumatica) ([facility])     ICD-10-CM: M35.3 ICD-9-CM: 459 Seronegative rheumatoid arthritis of multiple sites [facility])     ICD-10-CM: M06.09 
ICD-9-CM: 714.0 Paroxysmal atrial fibrillation (HCC)     ICD-10-CM: I48.0 ICD-9-CM: 427.31 Coronary artery disease involving native coronary artery of native heart with angina pectoris with documented spasm ([facility]     ICD-10-CM: I25.111 ICD-9-CM: 414.01, 413.9 Vitals BP Pulse Temp Resp SpO2 OB Status 126/60 (BP 1 Location: Left arm, BP Patient Position: Sitting) 74 98.3 °F (36.8 °C) (Temporal) 16 95% Postmenopausal  
 Smoking Status Never Smoker Preferred Pharmacy Pharmacy Name Phone [id number] [address] [phone] Your Updated Medication List  
  
   
This list is accurate as of 9/20/18 11:59 PM.  Always use your most recent med list.  
  
  
  
  
 ALIGN PO Take 1 Tab by mouth daily. cyanocobalamin 1,000 mcg tablet Take 1,000 mcg by mouth daily. diclofenac 1 % Gel Commonly known as:  VOLTAREN Apply 2 g to affected area four (4) times daily. Apply to affected area as needed  
  
 fentaNYL 12 mcg/hr patch Commonly known as:  Karli Vega  
 1 Patch by TransDERmal route every seventy-two (72) hours. Max Daily Amount: 1 Patch. Indications: Chronic Pain with Opioid Tolerance  
  
 gabapentin 300 mg capsule Commonly known as:  NEURONTIN Take 2 Caps by mouth three (3) times daily for 90 days. loperamide 2 mg capsule Commonly known as:  IMODIUM Take  by mouth.  
  
 metoprolol tartrate 25 mg tablet Commonly known as:  LOPRESSOR Take 25 mg by mouth two (2) times a day. MIRALAX 17 gram/dose powder Generic drug:  polyethylene glycol Take 17 g by mouth daily. naloxone 4 mg/actuation nasal spray Commonly known as:  [employer] Use 1 spray intranasally into 1 nostril. Use a new Narcan nasal spray for subsequent doses and administer into alternating nostrils. May repeat every 2 to 3 minutes as needed. NexIUM 40 mg capsule Generic drug:  esomeprazole Take 40 mg by mouth daily. nitroglycerin 0.4 mg SL tablet Commonly known as:  NITROSTAT  
1 Tab by SubLINGual route every five (5) minutes as needed for Chest Pain. ondansetron 8 mg disintegrating tablet Commonly known as:  ZOFRAN ODT Take 1 Tab by mouth every eight (8) hours. OXYGEN-AIR DELIVERY SYSTEMS  
2 L by Nasal route continuous. predniSONE 2.5 mg tablet Commonly known as:  Melvin Lever Take 1 Tab by mouth daily. SENNA PLUS 8.6-50 mg per tablet Generic drug:  senna-docusate Take 2 Tabs by mouth daily. spironolactone 25 mg tablet Commonly known as:  ALDACTONE Take 0.5 Tabs by mouth daily. topiramate 25 mg tablet Commonly known as:  TOPAMAX Take 1 Tab by mouth daily (with breakfast). traMADol 50 mg tablet Commonly known as:  ULTRAM  
Take 50 mg by mouth every twelve (12) hours as needed for Pain. VITAMIN D3 1,000 unit tablet Generic drug:  cholecalciferol Take 1,000 Units by mouth daily. Prescriptions Sent to Pharmacy Refills ondansetron (ZOFRAN ODT) 8 mg disintegrating tablet 2 Sig: Take 1 Tab by mouth every eight (8) hours. Class: Normal  
 Pharmacy: [id number]- [address] #: [phone] Route: Oral  
  
We Performed the Following HEMOGLOBIN A1C WITH EAG [44819 CPT(R)] Follow-up Instructions Return in about 3 weeks (around 10/11/2018). Patient Instructions Dear Reece Zavala and Angela Romero, It was a pleasure seeing you at home today with Home Based [facility] ([phone]) Your stated goal: To get back to walking again. This is what we talked about: 1. Arthritis with pain/ Debility and Chronic Back Pain 
-You feel that when you were on fentanyl, your pain was much better controlled and you were able to be more mobile 
-You want to pursue your hobbies and pain is holding you back 
-The Fentanyl was stopped while you were at a skilled nursing facility because it was suspected it might be causing nausea 
-You feel you do very well on the medication 
-We gave you a prescription for Fentanyl patches today, 12 mcg, to change every 3 days 
-Your already have narcan nasal spray in the home 
-Continue Ultram as needed for pain two times a day 2. Leg edema / CHF - The swelling to both your legs has gone down a little bit - You have had problems with this in that past, even with wounds and weeping skin 
- Unna boots have been used when your legs were really bad - Please elevate your legs even while you are at rest 
 
3. Atrial Fibrillation 
-You and your son feel that your legs are much improved off Xalrelto and do not wish to restart it 
-There is an increased risk of stroke without Xarelto, however, you have already had gastrointestinal bleeding as a complication 4. Prediabetes 
-This may be due to chronic steroid use 
-We reno a lab today and will call with the result 5. Ear wax 
-Your ear canals are full of wax -We will flush your ears when we come back 6. Chronic Nausea 
-This is well controlled on zofran three times a day 
-We have refilled the medication for you 7. Health Maintenance -Jeannie Eastman will be coming to give your pneumonia vaccine (Prevnar 13, the newer one) and the flu vaccine Health Maintenance Due Topic Date Due  Pneumococcal 65+ Low/Medium Risk (2 of 2 - PPSV23) 12/07/2017  Influenza Age 5 to Adult  08/01/2018  GLAUCOMA SCREENING Q2Y  08/09/2018 8. Advance Care Planning - A Durable Do Not Resusitate (DDNR) is on file - An Advance Directive is already on file. This is what you have shared with us about Advance Care Planning Advance Care Planning 4/27/2018 Patient's Healthcare Decision Maker is: Named in scanned ACP document Primary Decision Maker Name Myriam David Primary Decision Maker Phone Number [phone] Primary Decision Maker Relationship to Patient Adult child Secondary Decision Maker Name Jean Brewer Secondary Decision Maker Phone Number [phone] Secondary Decision Maker Relationship to Patient Unknown Confirm Advance Directive Yes, on file Someone from the Home Based Primary Care Team will see you again in 3 weeks to flush your ears and follow-up on your pain medicaiton. If there are any concerns before that time, such as medication questions, worsening symptoms or a need to see a physician for an urgent or emergent situation; please call [phone]. A physician is also on call after our normal business hours of 8am to 5pm.  
 
In order to serve you better, please allow up to 48 hours for prescription refills to be processed. Certain medications may require more paperwork or a written prescription that you may need to  from the office. We appreciate you letting us know of any refill requests as soon as possible. Sincerely, The Home Based Primary Care Team 
MD Arron Ayala NP Crissie Best, RN 
 Adline Appl, RN Naloxone (Into the nose) Naloxone (nal-OX-one) Treats opioid overdose in an emergency situation. Must be given as soon as possible. Brand Name(s): Narcan There may be other brand names for this medicine. When This Medicine Should Not Be Used: This medicine is not right for everyone. Do not use it if you had an allergic reaction to naloxone. How to Use This Medicine:  
Spray · Your doctor will tell you how much medicine to use. Do not use more than directed. · This medicine must be given to you (the patient) by someone else. Talk with people close to you so they know what to do in case of an emergency. · Read and follow the patient instructions that come with this medicine. Talk to your doctor or pharmacist if you have any questions. · This medicine is for use only in the nose. Do not get any of it in your eyes or on your skin. If it does get on these areas, rinse it off right away. · To use:  
¨ Each nasal spray contains only one dose of naloxone. Do not prime or test the nasal spray. ¨ Hold the nasal spray with your thumb on the bottom of the plunger and your first and middle fingers on either side of the nozzle. ¨ Lay the patient on his or her back. Support the patient's neck with your hand and let the head tilt back. ¨ Gently insert the tip of the nozzle into one nostril, until your fingers on either side of the nozzle are against the bottom of the patient's nose. ¨ Press the plunger firmly to give the dose. Remove the nasal spray from the patient's nose. ¨ Move the patient on his or her side (recovery position). Get emergency medical help right away. ¨ Watch the patient closely. If needed, you may give more doses every 2 to 3 minutes until the patient responds. Use a new nasal spray for each dose and spray the medicine into the other nostril each time.  
· Store the medicine in a closed container at room temperature, away from heat, moisture, and direct light. Do not freeze. · Ask your pharmacist about the best way to dispose of medicine that you do not use. Drugs and Foods to Avoid: Ask your doctor or pharmacist before using any other medicine, including over-the-counter medicines, vitamins, and herbal products. Warnings While Using This Medicine: · Tell your doctor if you are pregnant or breastfeeding, or if you have heart or blood vessel disease. · This medicine should be given right away after a suspected or known overdose of an opioid (narcotic) medicine. This will help prevent serious breathing problems and severe sleepiness that can lead to death. · The effects of the opioid medicine may last longer than the effects of the naloxone. This means the breathing problems and sleepiness could come back. Always call for emergency help after the first dose of naloxone. · This medicine could cause withdrawal symptoms from the opioid medicine. · Keep all medicine out of the reach of children. Never share your medicine with anyone. Possible Side Effects While Using This Medicine:  
Call your doctor right away if you notice any of these side effects: · Allergic reaction: Itching or hives, swelling in your face or hands, swelling or tingling in your mouth or throat, chest tightness, trouble breathing · Crying more than the usual (in babies) · Diarrhea, nausea, vomiting, stomach cramps · Fast, pounding, or uneven heartbeat · Fever, runny nose, sneezing, sweating, yawning · Ongoing trouble breathing · Seizure, shaking, or feeling restless, nervous, or irritable If you notice these less serious side effects, talk with your doctor:  
· Headache · Joint or muscle pain If you notice other side effects that you think are caused by this medicine, tell your doctor. Call your doctor for medical advice about side effects. You may report side effects to FDA at 8-217-THZ-9598 © 2017 [facility] Information is for End User's use only and may not be sold, redistributed or otherwise used for commercial purposes. The above information is an  only. It is not intended as medical advice for individual conditions or treatments. Talk to your doctor, nurse or pharmacist before following any medical regimen to see if it is safe and effective for you. Patient Instructions History Introducing [facility]! Dear Rene Jett: 
Thank you for requesting a MySupportAssistant account. Our records indicate that you already have an active MySupportAssistant account. You can access your account anytime at [email or web address] Did you know that you can access your hospital and ER discharge instructions at any time in MySupportAssistant? You can also review all of your test results from your hospital stay or ER visit. Additional Information If you have questions, please visit the Frequently Asked Questions section of the MySupport[employer]istant website at [email or web address] Remember, MySupportAssistant is NOT to be used for urgent needs. For medical emergencies, dial 911. Now available from your iPhone and Android! Please provide this summary of care documentation to your next provider. Your primary care clinician is listed as [employer]. If you have any questions after today's visit, please call [phone].

## 2018-09-20 NOTE — PROGRESS NOTES
Home Based [address] [facility]  
[phone] Date of Current Visit: 07/31/18 SUMMARY:  
80 yr female referred to [facility] by Dr. Eric Natarajan. She has a significant hx for seronegative RA, PMR of multiple joints, CAD, old MI, GERD and Afib. Due to this, Ms. Deisy Youngblood is unable to see a community PCP without significant taxing effort. GOALS OF CARE / TREATMENT PREFERENCES:  
GOALS OF CARE: 
Patient / health care proxy stated goals: To get back to walking again. TREATMENT PREFERENCES:  
Code Status:  [] Attempt Resuscitation       [x] Do Not Attempt Resuscitation Advance Care Planning: 
Advance Care Planning 4/27/2018 Patient's Healthcare Decision Maker is: Named in scanned ACP document Primary Decision Maker Name Ana Laura Santana Primary Decision Maker Phone Number [phone] Primary Decision Maker Relationship to Patient Adult child Secondary Decision Maker Name Bernardino Delgado Secondary Decision Maker Phone Number [phone] Secondary Decision Maker Relationship to Patient Unknown Confirm Advance Directive Yes, on file DIAGNOSES:  
 
  ICD-10-CM ICD-9-CM 1. Nausea R11.0 787.02 ondansetron (ZOFRAN ODT) 8 mg disintegrating tablet 2. Chronic midline low back pain without sciatica M54.5 724.2 fentaNYL (DURAGESIC) 12 mcg/hr patch G89.29 338.29 3. Prediabetes R73.03 790.29 HEMOGLOBIN A1C WITH EAG 4. Spinal stenosis of lumbar region, unspecified whether neurogenic claudication present M48.061 724.02   
5. CHF, stage C ([facility]) I50.9 428.0 6. PMR (polymyalgia rheumatica) ([facility]) M35.3 725   
7. Seronegative rheumatoid arthritis of multiple sites ([facility] [address] M06.09 714.0 8. Paroxysmal atrial fibrillation ([facility]) I48.0 427.31   
9. Coronary artery disease involving native coronary artery of native heart with angina pectoris with documented spasm ([facility]) I25.111 414.01   
  413.9 PLAN:  
Patient Instructions Dear Jeanine Reyna and Matty Ambrocio, 
 
 It was a pleasure seeing you at home today with Home Based Xander Perkins ([phone]) Your stated goal: To get back to walking again. This is what we talked about: 1. Arthritis with pain/ Debility and Chronic Back Pain 
-You feel that when you were on fentanyl, your pain was much better controlled and you were able to be more mobile 
-You want to pursue your hobbies and pain is holding you back 
-The Fentanyl was stopped while you were at a skilled nursing facility because it was suspected it might be causing nausea 
-You feel you do very well on the medication 
-We gave you a prescription for Fentanyl patches today, 12 mcg, to change every 3 days 
-Your already have narcan nasal spray in the home 
-Continue Ultram as needed for pain two times a day 2. Leg edema / CHF - The swelling to both your legs has gone down a little bit - You have had problems with this in that past, even with wounds and weeping skin 
- Unna boots have been used when your legs were really bad - Please elevate your legs even while you are at rest 
 
3. Atrial Fibrillation 
-You and your son feel that your legs are much improved off Xalrelto and do not wish to restart it 
-There is an increased risk of stroke without Xarelto, however, you have already had gastrointestinal bleeding as a complication 4. Prediabetes 
-This may be due to chronic steroid use 
-We reno a lab today and will call with the result 5. Ear wax 
-Your ear canals are full of wax 
-We will flush your ears when we come back 6. Chronic Nausea 
-This is well controlled on zofran three times a day 
-We have refilled the medication for you 7. Health Maintenance -iLnh Roberts will be coming to give your pneumonia vaccine (Prevnar 13, the newer one) and the flu vaccine Health Maintenance Due Topic Date Due  Pneumococcal 65+ Low/Medium Risk (2 of 2 - PPSV23) 12/07/2017  Influenza Age 5 to Adult  08/01/2018  GLAUCOMA SCREENING Q2Y  08/09/2018 8. Advance Care Planning - A Durable Do Not Resusitate (DDNR) is on file - An Advance Directive is already on file. This is what you have shared with us about Advance Care Planning Advance Care Planning 4/27/2018 Patient's Healthcare Decision Maker is: Named in scanned ACP document Primary Decision Maker Name Carlos Carrasquillo Primary Decision Maker Phone Number [phone] Primary Decision Maker Relationship to Patient Adult child Secondary Decision Maker Name Jo-Ann Lombardo Secondary Decision Maker Phone Number [phone] Secondary Decision Maker Relationship to Patient Unknown Confirm Advance Directive Yes, on file Someone from the Home Based Primary Care Team will see you again in 3 weeks to flush your ears and follow-up on your pain medicaiton. If there are any concerns before that time, such as medication questions, worsening symptoms or a need to see a physician for an urgent or emergent situation; please call [phone]. A physician is also on call after our normal business hours of 8am to 5pm.  
 
In order to serve you better, please allow up to 48 hours for prescription refills to be processed. Certain medications may require more paperwork or a written prescription that you may need to  from the office. We appreciate you letting us know of any refill requests as soon as possible. Sincerely, The Home Based Primary Care Team 
MD Leida Rodriguez, CRISTIN Riley, JUAN PABLO Blas, RN Naloxone (Into the nose) Naloxone (nal-OX-one) Treats opioid overdose in an emergency situation. Must be given as soon as possible. Brand Name(s): Narcan There may be other brand names for this medicine. When This Medicine Should Not Be Used: This medicine is not right for everyone. Do not use it if you had an allergic reaction to naloxone. How to Use This Medicine:  
Spray · Your doctor will tell you how much medicine to use. Do not use more than directed. · This medicine must be given to you (the patient) by someone else. Talk with people close to you so they know what to do in case of an emergency. · Read and follow the patient instructions that come with this medicine. Talk to your doctor or pharmacist if you have any questions. · This medicine is for use only in the nose. Do not get any of it in your eyes or on your skin. If it does get on these areas, rinse it off right away. · To use:  
¨ Each nasal spray contains only one dose of naloxone. Do not prime or test the nasal spray. ¨ Hold the nasal spray with your thumb on the bottom of the plunger and your first and middle fingers on either side of the nozzle. ¨ Lay the patient on his or her back. Support the patient's neck with your hand and let the head tilt back. ¨ Gently insert the tip of the nozzle into one nostril, until your fingers on either side of the nozzle are against the bottom of the patient's nose. ¨ Press the plunger firmly to give the dose. Remove the nasal spray from the patient's nose. ¨ Move the patient on his or her side (recovery position). Get emergency medical help right away. ¨ Watch the patient closely. If needed, you may give more doses every 2 to 3 minutes until the patient responds. Use a new nasal spray for each dose and spray the medicine into the other nostril each time. · Store the medicine in a closed container at room temperature, away from heat, moisture, and direct light. Do not freeze. · Ask your pharmacist about the best way to dispose of medicine that you do not use. Drugs and Foods to Avoid: Ask your doctor or pharmacist before using any other medicine, including over-the-counter medicines, vitamins, and herbal products. Warnings While Using This Medicine: · Tell your doctor if you are pregnant or breastfeeding, or if you have heart or blood vessel disease. · This medicine should be given right away after a suspected or known overdose of an opioid (narcotic) medicine. This will help prevent serious breathing problems and severe sleepiness that can lead to death. · The effects of the opioid medicine may last longer than the effects of the naloxone. This means the breathing problems and sleepiness could come back. Always call for emergency help after the first dose of naloxone. · This medicine could cause withdrawal symptoms from the opioid medicine. · Keep all medicine out of the reach of children. Never share your medicine with anyone. Possible Side Effects While Using This Medicine:  
Call your doctor right away if you notice any of these side effects: · Allergic reaction: Itching or hives, swelling in your face or hands, swelling or tingling in your mouth or throat, chest tightness, trouble breathing · Crying more than the usual (in babies) · Diarrhea, nausea, vomiting, stomach cramps · Fast, pounding, or uneven heartbeat · Fever, runny nose, sneezing, sweating, yawning · Ongoing trouble breathing · Seizure, shaking, or feeling restless, nervous, or irritable If you notice these less serious side effects, talk with your doctor:  
· Headache · Joint or muscle pain If you notice other side effects that you think are caused by this medicine, tell your doctor. Call your doctor for medical advice about side effects. You may report side effects to FDA at 9-434-FDA-1430 © 2017 [facility] Information is for End User's use only and may not be sold, redistributed or otherwise used for commercial purposes. The above information is an  only. It is not intended as medical advice for individual conditions or treatments. Talk to your doctor, nurse or pharmacist before following any medical regimen to see if it is safe and effective for you. PRESCRIPTIONS GIVEN:  
 
Medications Ordered Today Medications  ondansetron (ZOFRAN ODT) 8 mg disintegrating tablet Sig: Take 1 Tab by mouth every eight (8) hours. Dispense:  90 Tab Refill:  2  
 fentaNYL (DURAGESIC) 12 mcg/hr patch Si Patch by TransDERmal route every seventy-two (72) hours. Max Daily Amount: 1 Patch. Indications: Chronic Pain with Opioid Tolerance Dispense:  10 Patch Refill:  0  
  
 
 HISTORY:  
Please see RN note from this current visit; history taken together in presence of patient/family in the home. CHIEF COMPLAINT:  
Chief Complaint Patient presents with  Follow Up Chronic Condition  LOW BACK PAIN  
 CHF  
 
HPI/SUBJECTIVE: The patient is: [x] Verbal / [] Nonverbal  
 
Mrs. Manning's chief complaint is pain. She states her back pain is keeping her from being as active as she was. Ultram doesn't help much and neither did Dilaudid. When she took Fentanyl patches, 25 mcg, she felt her pain was well controlled after side effects. It as stopped when she was in a SNF secondary to concerns it was causing nausea but stopping it made no difference. She has a lifelong pattern of constipation interspersed with diarrhea and c/o explosive diarrhea last night. She has OTC loperamide but did not take it. She denies abdominal pain and has a good appetite. She takes senna daily and miralax prn. We discussed Prevnar and influenza vaccines and she agrees to get them. Her nausea is well controlled on zofran tid. She and her son do not want to restart Xarelto and she does not want to go to the GI MD.  She has less edema in her LE's and HH is coming to check on her. She denies CP or SOB. She is working on making new clothes for the [place] doll she made. She hasn't been making jewelry much lately secondary to pain. She has not needed to use her NTG. REVIEW OF SYSTEMS:  
 
The following systems were [x] reviewed / [] unable to be reviewed Systems: constitutional, ears/nose/mouth/throat, respiratory, gastrointestinal, genitourinary, musculoskeletal, integumentary, neurologic, psychiatric, endocrine. Positive findings noted:  Back pain, edema in legs, diarrhea and constipation FUNCTIONAL ASSESSMENT:  
 
Palliative Performance Scale (PPS): 50-60% PSYCHOSOCIAL/SPIRITUAL SCREENING:  
  
Any spiritual / Restorationist concerns: [] Yes /  [x] No Confucianist, no longer goes to Christianity because can't get out of home; Christianity does not come to her, may be willing to reconnect Caregiver Burnout: [] Yes /  [x] No /  [] No Caregiver Present PHYSICAL EXAM:  
 
Wt Readings from Last 3 Encounters:  
06/05/18 120 lb 9.6 oz (54.7 kg) 11/20/17 131 lb (59.4 kg) 11/15/17 132 lb (59.9 kg) Blood pressure 126/60, pulse 74, temperature 98.3 °F (36.8 °C), temperature source Temporal, resp. rate 16, SpO2 95 %. Last bowel movement:  TODAY Constitutional:  female, adv. Age, thin and frail, sitting up in WC, NAD, very interactive Eyes: pupils equal, anicteric, conjunctiva are pink ENMT: no nasal discharge, moist mucous membranes. EAC's filled with mtz cerumen Cardiovascular: regular/ irregular rhythm, distal pulses intact, edema +2 LE's with tubigrip in place Respiratory: breathing not labored, symmetric, CTA A&P Gastrointestinal: soft non-tender, +bowel sounds. No TTP HSM, mass R, G or R Musculoskeletal: no deformity, no tenderness to palpation Skin: warm, dry, Neurologic: following commands, moving all extremities Psychiatric: full affect, no hallucinations HISTORY:  
 
Past Medical and Surgical History reviewed in [facility] on date of initial visit Patient Active Problem List  
Diagnosis Code  Osteoarthritis M19.90  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 ([facility]) I25.111  
 CHF, stage C ([facility]) I50.9  Lumbar spinal stenosis M48.061  
 Paroxysmal atrial fibrillation ([facility]) I48.0  Advance directive on file E07.6  PMR (polymyalgia rheumatica) ([facility]) M35.3  Lower extremity edema R60.0  CKD (chronic kidney disease) stage 2, GFR 60-89 ml/min N18.2  Long term current use of systemic steroids Z79.52  Seronegative rheumatoid arthritis of multiple sites ([facility] [address] M06.09  
 Long-term use of immunosuppressant medication Z79.899  Anemia associated with acute blood loss D62  Dependence on continuous supplemental oxygen Z99.81  
 Polyneuropathy associated with underlying disease ([facility] [address] G63 Family History Problem Relation Age of Onset 24 [facility] Other Mother Intestinal obstruction  Cancer Father  Stroke Father  Heart Attack Brother  Cancer Brother  Cancer Brother  Anesth Problems Neg Hx History reviewed, no pertinent family history. Social History Substance Use Topics  Smoking status: Never Smoker  Smokeless tobacco: Never Used  Alcohol use No  
 
Allergies Allergen Reactions  Novacare Other (comments) PARALYSIS; \"NOVACAINE\" not novacare.  Phenergan [Promethazine] Other (comments) \"loose my wits i go crazy\"  Nsaids (Non-Steroidal Anti-Inflammatory Drug) Nausea and Vomiting  Adhesive Tape-Silicones Other (comments) BLISTERS  
 Atenolol Nausea and Vomiting  Bactrim [Sulfamethoprim Ds] Nausea and Vomiting  Cymbalta [Duloxetine] Other (comments) CONFUSION 
  
 Digoxin Nausea and Vomiting  Gluten Other (comments) GLUTEN INTOLERANCE  
 Macrobid [Nitrofurantoin Monohyd/M-Cryst] Nausea and Vomiting  Sulfa (Sulfonamide Antibiotics) Nausea and Vomiting  Codeine Other (comments) Unable to swallow Current Outpatient Prescriptions Medication Sig  loperamide (IMODIUM) 2 mg capsule Take  by mouth.  traMADol (ULTRAM) 50 mg tablet Take 50 mg by mouth every twelve (12) hours as needed for Pain.  ondansetron (ZOFRAN ODT) 8 mg disintegrating tablet Take 1 Tab by mouth every eight (8) hours.  fentaNYL (DURAGESIC) 12 mcg/hr patch 1 Patch by TransDERmal route every seventy-two (72) hours. Max Daily Amount: 1 Patch. Indications: Chronic Pain with Opioid Tolerance  gabapentin (NEURONTIN) 300 mg capsule Take 2 Caps by mouth three (3) times daily for 90 days.  predniSONE (DELTASONE) 2.5 mg tablet Take 1 Tab by mouth daily.  OXYGEN-AIR DELIVERY SYSTEMS 2 L by Nasal route continuous.  spironolactone (ALDACTONE) 25 mg tablet Take 0.5 Tabs by mouth daily.  topiramate (TOPAMAX) 25 mg tablet Take 1 Tab by mouth daily (with breakfast).  metoprolol tartrate (LOPRESSOR) 25 mg tablet Take 25 mg by mouth two (2) times a day.  senna-docusate (SENNA PLUS) 8.6-50 mg per tablet Take 2 Tabs by mouth daily.  BIFIDOBACTERIUM INFANTIS (ALIGN PO) Take 1 Tab by mouth daily.  cyanocobalamin 1,000 mcg tablet Take 1,000 mcg by mouth daily.  cholecalciferol (VITAMIN D3) 1,000 unit tablet Take 1,000 Units by mouth daily.  esomeprazole (NEXIUM) 40 mg capsule Take 40 mg by mouth daily.  diclofenac (VOLTAREN) 1 % gel Apply 2 g to affected area four (4) times daily. Apply to affected area as needed  polyethylene glycol (MIRALAX) 17 gram/dose powder Take 17 g by mouth daily.  naloxone (NARCAN) 4 mg/actuation nasal spray Use 1 spray intranasally into 1 nostril. Use a new Narcan nasal spray for subsequent doses and administer into alternating nostrils. May repeat every 2 to 3 minutes as needed.  nitroglycerin (NITROSTAT) 0.4 mg SL tablet 1 Tab by SubLINGual route every five (5) minutes as needed for Chest Pain. No current facility-administered medications for this visit. LAB DATA REVIEWED:  
 
Lab Results Component Value Date/Time Hemoglobin A1c 6.1 05/14/2015 12:34 PM  
 
No results found for: Merline Adair, MCA2, MCA3, MCAU, MCAU2, MCALPOCT Lab Results Component Value Date/Time TSH 1.560 11/20/2017 02:40 PM  
 TSH 0.86 05/25/2015 04:32 AM  
 
Lab Results Component Value Date/Time VITAMIN D, 25-HYDROXY 43.1 10/04/2017 02:52 PM  
   
 
Lab Results Component Value Date/Time WBC 3.5 07/31/2018 01:56 PM  
 HGB 10.8 (L) 07/31/2018 01:56 PM  
 PLATELET 945 37/58/0699 01:56 PM  
 
Lab Results Component Value Date/Time Sodium 145 (H) 07/31/2018 01:56 PM  
 Potassium 4.3 07/31/2018 01:56 PM  
 Chloride 107 (H) 07/31/2018 01:56 PM  
 CO2 21 07/31/2018 01:56 PM  
 BUN 9 (L) 07/31/2018 01:56 PM  
 Creatinine 0.72 07/31/2018 01:56 PM  
 Calcium 9.3 07/31/2018 01:56 PM  
 Magnesium 1.8 05/25/2015 04:32 AM  
 Phosphorus 1.5 (L) 05/25/2015 04:32 AM  
  
Lab Results Component Value Date/Time AST (SGOT) 16 07/31/2018 01:56 PM  
 Alk. phosphatase 79 07/31/2018 01:56 PM  
 Protein, total 5.6 (L) 07/31/2018 01:56 PM  
 Albumin 3.6 07/31/2018 01:56 PM  
 
No results found for: IRON, FE, TIBC, IBCT, PSAT, FERR Total time:  50 min Counseling / coordination time: 20 min on opioids, narcan, edema, cerumen 
> 50% counseling / coordination?: No

## 2018-09-20 NOTE — PATIENT INSTRUCTIONS
Dear Jessy Meek and Michelle So, It was a pleasure seeing you at home today with [facility] ([phone]) Your stated goal: To get back to walking again. This is what we talked about: 1. Arthritis with pain/ Debility and Chronic Back Pain 
-You feel that when you were on fentanyl, your pain was much better controlled and you were able to be more mobile 
-You want to pursue your hobbies and pain is holding you back 
-The Fentanyl was stopped while you were at a skilled nursing facility because it was suspected it might be causing nausea 
-You feel you do very well on the medication 
-We gave you a prescription for Fentanyl patches today, 12 mcg, to change every 3 days 
-Your already have narcan nasal spray in the home 
-Continue Ultram as needed for pain two times a day 2. Leg edema / CHF - The swelling to both your legs has gone down a little bit - You have had problems with this in that past, even with wounds and weeping skin 
- Unna boots have been used when your legs were really bad - Please elevate your legs even while you are at rest 
 
3. Atrial Fibrillation 
-You and your son feel that your legs are much improved off Xalrelto and do not wish to restart it 
-There is an increased risk of stroke without Xarelto, however, you have already had gastrointestinal bleeding as a complication 4. Prediabetes 
-This may be due to chronic steroid use 
-We reno a lab today and will call with the result 5. Ear wax 
-Your ear canals are full of wax 
-We will flush your ears when we come back 6. Chronic Nausea 
-This is well controlled on zofran three times a day 
-We have refilled the medication for you 7. Health Maintenance -Abby Givens will be coming to give your pneumonia vaccine (Prevnar 13, the newer one) and the flu vaccine Health Maintenance Due Topic Date Due  Pneumococcal 65+ Low/Medium Risk (2 of 2 - PPSV23) 12/07/2017  Influenza Age 5 to Adult  08/01/2018  GLAUCOMA SCREENING Q2Y  08/09/2018 8. Advance Care Planning - A Durable Do Not Resusitate (DDNR) is on file - An Advance Directive is already on file. This is what you have shared with us about Advance Care Planning Advance Care Planning 4/27/2018 Patient's Healthcare Decision Maker is: Named in scanned ACP document Primary Decision Maker Name Nicolás Melgoza Primary Decision Maker Phone Number [phone] Primary Decision Maker Relationship to Patient Adult child Secondary Decision Maker Name Valeria Cool Secondary Decision Maker Phone Number [phone] Secondary Decision Maker Relationship to Patient Unknown Confirm Advance Directive Yes, on file Someone from the Home Based Primary Care Team will see you again in 3 weeks to flush your ears and follow-up on your pain medicaiton. If there are any concerns before that time, such as medication questions, worsening symptoms or a need to see a physician for an urgent or emergent situation; please call [phone]. A physician is also on call after our normal business hours of 8am to 5pm.  
 
In order to serve you better, please allow up to 48 hours for prescription refills to be processed. Certain medications may require more paperwork or a written prescription that you may need to  from the office. We appreciate you letting us know of any refill requests as soon as possible. Sincerely, The [facility] Team 
MD Maxi Urena, JUAN PABLO Maria, JUAN PABLO Naloxone (Into the nose) Naloxone (nal-OX-one) Treats opioid overdose in an emergency situation. Must be given as soon as possible. Brand Name(s): Narcan There may be other brand names for this medicine. When This Medicine Should Not Be Used: This medicine is not right for everyone. Do not use it if you had an allergic reaction to naloxone. How to Use This Medicine:  
Spray · Your doctor will tell you how much medicine to use. Do not use more than directed. · This medicine must be given to you (the patient) by someone else. Talk with people close to you so they know what to do in case of an emergency. · Read and follow the patient instructions that come with this medicine. Talk to your doctor or pharmacist if you have any questions. · This medicine is for use only in the nose. Do not get any of it in your eyes or on your skin. If it does get on these areas, rinse it off right away. · To use:  
¨ Each nasal spray contains only one dose of naloxone. Do not prime or test the nasal spray. ¨ Hold the nasal spray with your thumb on the bottom of the plunger and your first and middle fingers on either side of the nozzle. ¨ Lay the patient on his or her back. Support the patient's neck with your hand and let the head tilt back. ¨ Gently insert the tip of the nozzle into one nostril, until your fingers on either side of the nozzle are against the bottom of the patient's nose. ¨ Press the plunger firmly to give the dose. Remove the nasal spray from the patient's nose. ¨ Move the patient on his or her side (recovery position). Get emergency medical help right away. ¨ Watch the patient closely. If needed, you may give more doses every 2 to 3 minutes until the patient responds. Use a new nasal spray for each dose and spray the medicine into the other nostril each time. · Store the medicine in a closed container at room temperature, away from heat, moisture, and direct light. Do not freeze. · Ask your pharmacist about the best way to dispose of medicine that you do not use. Drugs and Foods to Avoid: Ask your doctor or pharmacist before using any other medicine, including over-the-counter medicines, vitamins, and herbal products. Warnings While Using This Medicine: · Tell your doctor if you are pregnant or breastfeeding, or if you have heart or blood vessel disease. · This medicine should be given right away after a suspected or known overdose of an opioid (narcotic) medicine. This will help prevent serious breathing problems and severe sleepiness that can lead to death. · The effects of the opioid medicine may last longer than the effects of the naloxone. This means the breathing problems and sleepiness could come back. Always call for emergency help after the first dose of naloxone. · This medicine could cause withdrawal symptoms from the opioid medicine. · Keep all medicine out of the reach of children. Never share your medicine with anyone. Possible Side Effects While Using This Medicine:  
Call your doctor right away if you notice any of these side effects: · Allergic reaction: Itching or hives, swelling in your face or hands, swelling or tingling in your mouth or throat, chest tightness, trouble breathing · Crying more than the usual (in babies) · Diarrhea, nausea, vomiting, stomach cramps · Fast, pounding, or uneven heartbeat · Fever, runny nose, sneezing, sweating, yawning · Ongoing trouble breathing · Seizure, shaking, or feeling restless, nervous, or irritable If you notice these less serious side effects, talk with your doctor:  
· Headache · Joint or muscle pain If you notice other side effects that you think are caused by this medicine, tell your doctor. Call your doctor for medical advice about side effects. You may report side effects to FDA at 2-442-FDA-2343 © 2017 [address] Information is for End User's use only and may not be sold, redistributed or otherwise used for commercial purposes. The above information is an  only. It is not intended as medical advice for individual conditions or treatments. Talk to your doctor, nurse or pharmacist before following any medical regimen to see if it is safe and effective for you.

## 2018-09-21 DIAGNOSIS — M54.50 CHRONIC MIDLINE LOW BACK PAIN WITHOUT SCIATICA: ICD-10-CM

## 2018-09-21 DIAGNOSIS — G89.29 CHRONIC MIDLINE LOW BACK PAIN WITHOUT SCIATICA: ICD-10-CM

## 2018-09-21 LAB
EST. AVERAGE GLUCOSE BLD GHB EST-MCNC: 114 MG/DL
HBA1C MFR BLD: 5.6 % (ref 4.8–5.6)

## 2018-09-21 RX ORDER — FENTANYL 12.5 UG/1
1 PATCH TRANSDERMAL
Qty: 10 PATCH | Refills: 0 | Status: SHIPPED | OUTPATIENT
Start: 2018-09-21 | End: 2018-10-03

## 2018-10-01 ENCOUNTER — TELEPHONE (OUTPATIENT)
Dept: FAMILY MEDICINE CLINIC | Age: 83
End: 2018-10-01

## 2018-10-01 ENCOUNTER — HOME VISIT (OUTPATIENT)
Dept: FAMILY MEDICINE CLINIC | Age: 83
End: 2018-10-01

## 2018-10-01 VITALS
OXYGEN SATURATION: 91 % | TEMPERATURE: 98.8 F | RESPIRATION RATE: 18 BRPM | SYSTOLIC BLOOD PRESSURE: 88 MMHG | DIASTOLIC BLOOD PRESSURE: 60 MMHG | HEART RATE: 84 BPM

## 2018-10-01 DIAGNOSIS — E86.0 DEHYDRATION: ICD-10-CM

## 2018-10-01 DIAGNOSIS — R41.82 ALTERED MENTAL STATUS, UNSPECIFIED ALTERED MENTAL STATUS TYPE: Primary | ICD-10-CM

## 2018-10-01 DIAGNOSIS — I95.9 HYPOTENSION, UNSPECIFIED HYPOTENSION TYPE: ICD-10-CM

## 2018-10-01 NOTE — MR AVS SNAPSHOT
[address] [facility], [address] [id number] 
[phone] Patient: Rosy Browne MRN: [id number] Visit Information Date & Time Provider Department Dept. Phone Encounter #  
 10/1/2018 12:00 PM Aguila Dejesus NP [id number] [facility] [phone] [id number] Follow-up Instructions Return in about 1 day (around 10/2/2018). Follow-up and Disposition History Upcoming Health Maintenance Date Due Shingrix Vaccine Age 50> (1 of 2) 1975 Pneumococcal 65+ Low/Medium Risk (2 of 2 - PPSV23) 2017 Influenza Age 5 to Adult 2018 GLAUCOMA SCREENING Q2Y 2018 Bone Densitometry (Dexa) Screening 2020* DTaP/Tdap/Td series (1 - Tdap) 2020* MEDICARE YEARLY EXAM 2019 *Topic was postponed. The date shown is not the original due date. Allergies as of 10/1/2018  Review Complete On: 2018 By: Aguila Dejesus NP Severity Noted Reaction Type Reactions Novacare High 2015    Other (comments) PARALYSIS; \"NOVACAINE\" not novacare. Phenergan [Promethazine] High 2015   Systemic Other (comments) \"loose my wits i go crazy\" Nsaids (Non-steroidal Anti-inflammatory Drug) Medium 2015   Systemic Nausea and Vomiting Adhesive Tape-silicones      Other (comments) BLISTERS Atenolol  2015    Nausea and Vomiting Bactrim [Sulfamethoprim Ds]  2015    Nausea and Vomiting Cymbalta [Duloxetine]  2015    Other (comments) CONFUSION Digoxin  2015    Nausea and Vomiting Gluten  2015    Other (comments) GLUTEN INTOLERANCE Macrobid [Nitrofurantoin Monohyd/m-cryst]  2015    Nausea and Vomiting  
 Sulfa (Sulfonamide Antibiotics)  2015    Nausea and Vomiting Codeine Low 2015   Systemic Other (comments) Unable to swallow Current Immunizations  Reviewed on 1/28/2016 Name Date Influenza High Dose Vaccine PF 10/10/2017, 10/12/2016, 10/20/2015 11:52 AM  
 Pneumococcal Conjugate (PCV-13) 12/7/2016 Zoster Vaccine, Live 3/22/2012 Not reviewed this visit You Were Diagnosed With   
  
 Codes Comments Altered mental status, unspecified altered mental status type    -  Primary ICD-10-CM: R41.82 
ICD-9-CM: 780.97 Dehydration     ICD-10-CM: E86.0 ICD-9-CM: 276.51 Hypotension, unspecified hypotension type     ICD-10-CM: I95.9 ICD-9-CM: 264. 9 Vitals BP Pulse Temp Resp SpO2 OB Status (!) 88/60 (BP 1 Location: Left arm, BP Patient Position: Supine) 84 98.8 °F (37.1 °C) (Oral) 18 91% Postmenopausal  
 Smoking Status Never Smoker [facility]erred Pharmacy [facility] Name Phone [id number] [phone] Your Updated Medication List  
  
   
This list is accurate as of 10/1/18 11:59 PM.  Always use your most recent med list.  
  
  
  
  
 ALIGN PO Take 1 Tab by mouth daily. cyanocobalamin 1,000 mcg tablet Take 1,000 mcg by mouth daily. diclofenac 1 % Gel Commonly known as:  VOLTAREN Apply 2 g to affected area four (4) times daily. Apply to affected area as needed  
  
 fentaNYL 12 mcg/hr patch Commonly known as:  DURAGESIC  
1 Patch by TransDERmal route every seventy-two (72) hours. Max Daily Amount: 1 Patch. Indications: Chronic Pain with Opioid Tolerance  
  
 gabapentin 300 mg capsule Commonly known as:  NEURONTIN Take 2 Caps by mouth three (3) times daily for 90 days. loperamide 2 mg capsule Commonly known as:  IMODIUM Take  by mouth.  
  
 metoprolol tartrate 25 mg tablet Commonly known as:  LOPRESSOR Take 25 mg by mouth two (2) times a day. MIRALAX 17 gram/dose powder Generic drug:  polyethylene glycol Take 17 g by mouth daily. NexIUM 40 mg capsule Generic drug:  esomeprazole Take 40 mg by mouth daily. nitroglycerin 0.4 mg SL tablet Commonly known as:  NITROSTAT  
1 Tab by SubLINGual route every five (5) minutes as needed for Chest Pain. ondansetron 8 mg disintegrating tablet Commonly known as:  ZOFRAN ODT Take 1 Tab by mouth every eight (8) hours. predniSONE 2.5 mg tablet Commonly known as:  Cozette Corona Take 1 Tab by mouth daily. SENNA PLUS 8.6-50 mg per tablet Generic drug:  senna-docusate Take 2 Tabs by mouth daily. spironolactone 25 mg tablet Commonly known as:  ALDACTONE Take 0.5 Tabs by mouth daily. topiramate 25 mg tablet Commonly known as:  TOPAMAX Take 1 Tab by mouth daily (with breakfast). traMADol 50 mg tablet Commonly known as:  ULTRAM  
Take 50 mg by mouth every twelve (12) hours as needed for Pain. VITAMIN D3 1,000 unit tablet Generic drug:  cholecalciferol Take 1,000 Units by mouth daily. Follow-up Instructions Return in about 1 day (around 10/2/2018). Patient Instructions Dear Sheryll Riedel and Av Barfield, It was a pleasure seeing you at home today with Home Based [address] [place] [address] ([phone]) Your stated goal: To get back to walking again. This is what we talked about: 1. Altered Mental Status 
-This could be due to multiple medical issues 
-Your mother needs an ER evaluation to rule out the more serious ones quickly 2. Dehydration 
-Your mother has not been drinking or eating for 3 days and is dehydrated 
-She needs an ER visit to have IV fluids and labs 3. Low Blood Pressure 
-This could be due to dehydration but also could be due to an infection or other medical problems 4. Health Maintenance -Cuong Teresa will be coming to give your pneumonia vaccine (Prevnar 13, the newer one) and the flu vaccine Health Maintenance Due Topic Date Due  Shingrix Vaccine Age 50> (1 of 2) 07/27/1975  Pneumococcal 65+ Low/Medium Risk (2 of 2 - PPSV23) 12/07/2017  Influenza Age 5 to Adult  08/01/2018  GLAUCOMA SCREENING Q2Y  08/09/2018 5. Advance Care Planning - A Durable Do Not Resusitate (DDNR) is on file - An Advance Directive is already on file. This is what you have shared with us about Advance Care Planning Advance Care Planning 4/27/2018 Patient's Healthcare Decision Maker is: Named in scanned ACP document Primary Decision Maker Name Jolayne Dancer Primary Decision Maker Phone Number [phone] Primary Decision Maker Relationship to Patient Adult child Secondary Decision Maker Name Mike Ybarra Secondary Decision Maker Phone Number [phone] Secondary Decision Maker Relationship to Patient Unknown Confirm Advance Directive Yes, on file Someone from the Home Based Primary Care Team will see you again in 3 weeks to flush your ears and follow-up on your pain medicaiton. If there are any concerns before that time, such as medication questions, worsening symptoms or a need to see a physician for an urgent or emergent situation; please call [phone]. A physician is also on call after our normal business hours of 8am to 5pm.  
 
In order to serve you better, please allow up to 48 hours for prescription refills to be processed. Certain medications may require more paperwork or a written prescription that you may need to  from the office. We appreciate you letting us know of any refill requests as soon as possible. Sincerely, The [facility] Primary Care Team 
MD George Sheets NP Margret Bad, NP Justo Nan, RN Glorious Market, RN Altered Mental Status: Care Instructions Your Care Instructions Altered mental status is a change in how well your brain is working. As a result, you may be confused, be less alert than usual, or act in odd ways.  This may include seeing or hearing things that aren't really there (hallucinations). A mental status change has many possible causes. For example, it may be the result of an infection, an imbalance of chemicals in the body, or a chronic disease such as diabetes or COPD. It can also be caused by things such as a head injury, taking certain medicines, or using alcohol or drugs. The doctor may do tests to look for the cause. These tests may include urine tests, blood tests, and imaging tests such as a CT scan. Sometimes a clear cause isn't found. But tests can help the doctor rule out a serious cause of your symptoms. A change in mental status can be scary. But mental status will often return to normal when the cause is treated. So it is important to get any follow-up testing or treatment the doctor has suggested. The doctor has checked you carefully, but problems can develop later. If you notice any problems or new symptoms, get medical treatment right away. Follow-up care is a key part of your treatment and safety. Be sure to make and go to all appointments, and call your doctor if you are having problems. It's also a good idea to know your test results and keep a list of the medicines you take. How can you care for yourself at home? · Be safe with medicines. Take your medicines exactly as prescribed. Call your doctor if you think you are having a problem with your medicine. · Have another adult stay with you until you are better. This can help keep you safe. Ask that person to watch for signs that your mental status is getting worse. When should you call for help? Call 911 anytime you think you may need emergency care. For example, call if: 
  · You passed out (lost consciousness).  
 Call your doctor now or seek immediate medical care if: 
  · Your mental status is getting worse.  
  · You have new symptoms, such as a fever, chills, or shortness of breath.  
  · You do not feel safe.  
 Watch closely for changes in your health, and be sure to contact your doctor if:   · You do not get better as expected. Where can you learn more? Go to http://anna-sampson.info/. Enter J338 in the search box to learn more about \"Altered Mental Status: Care Instructions. \" Current as of: October 9, 2017 Content Version: 11.7 © 4038-9802 [employer]. Care instructions adapted under license by [employer] (which disclaims liability or warranty for this information). If you have questions about a medical condition or this instruction, always ask your healthcare professional. [address] any warranty or liability for your use of this information. Patient Instructions History Introducing [facility]! Dear Lawrenec Abbasi: 
Thank you for requesting a Ta[employer] account. Our records indicate that you already have an active Taquilla account. You can access your account anytime at [email or web address] [employer] Did you know that you can access your hospital and ER discharge instructions at any time in Taquilla? You can also review all of your test results from your hospital stay or ER visit. Additional Information If you have questions, please visit the Frequently Asked Questions section of the [employer] website at [email or web address] [email or web address]/. Remember, Ta[employer]illa is NOT to be used for urgent needs. For medical emergencies, dial 911. Now available from your iPhone and Android! Please provide this summary of care documentation to your next provider. Your primary care clinician is listed as [place]. If you have any questions after today's visit, please call [phone].

## 2018-10-02 ENCOUNTER — TELEPHONE (OUTPATIENT)
Dept: PALLATIVE CARE | Age: 83
End: 2018-10-02

## 2018-10-02 ENCOUNTER — HOME VISIT (OUTPATIENT)
Dept: FAMILY MEDICINE CLINIC | Age: 83
End: 2018-10-02

## 2018-10-02 VITALS
HEART RATE: 82 BPM | DIASTOLIC BLOOD PRESSURE: 68 MMHG | TEMPERATURE: 98.3 F | OXYGEN SATURATION: 92 % | RESPIRATION RATE: 18 BRPM | SYSTOLIC BLOOD PRESSURE: 125 MMHG

## 2018-10-02 DIAGNOSIS — R41.0 DELIRIUM: Primary | ICD-10-CM

## 2018-10-02 DIAGNOSIS — N39.0 URINARY TRACT INFECTION WITHOUT HEMATURIA, SITE UNSPECIFIED: ICD-10-CM

## 2018-10-02 DIAGNOSIS — E86.0 DEHYDRATION: ICD-10-CM

## 2018-10-02 NOTE — PROGRESS NOTES
[facility] [address] [place] [address] Chaya Aguilar 23 
[phone] RN Nursing Visit Note Date of current Visit: 10/02/18 Date of last visit: 9/20/18 Date of initial visit: 5/2/18 ED visit 10/1/18 - [address] - UTI Patient discharged home with 1 prescription Cipro - Kamron Brito, son - patient not currently taking Cipro as pharmacist advised that it could interact with Fentanyl Patch. Pharmacist is contacting prescribing provider to get medication change to Keflex. Diagnosis: Enteritis, diverticulosis of sigmoid colon,  a-fib, rheumatoid arthritis, polymyalgia rheumatica, CAD, debility ACP: 
Advance Care Planning 4/27/2018 Patient's Healthcare Decision Maker is: Named in scanned ACP document Primary Decision Maker Name Gila Meek Primary Decision Maker Phone Number [phone] Primary Decision Maker Relationship to Patient Adult child Secondary Decision Maker Name Yumiko Garcia Secondary Decision Maker Phone Number [phone] Secondary Decision Maker Relationship to Patient Unknown Confirm Advance Directive Yes, on file Primary Caregiver: 
Ilsa Petersen Home Services: 
[address] - [phone] Oxygen - [facility] 
 
Nutrition: - Regular textured food Wt Readings from Last 3 Encounters:  
06/05/18 120 lb 9.6 oz (54.7 kg) 11/20/17 131 lb (59.4 kg) 11/15/17 132 lb (59.9 kg) Visit Vitals  /68 (BP 1 Location: Left arm, BP Patient Position: Supine)  Pulse 82  Temp 98.3 °F (36.8 °C) (Temporal)  Resp 18  SpO2 92%  PF (!) 2 L/min MUAC 
RA - 22 cm 
LA - 21.5 cm Pill Count: 
Tramadol 50 mg # 79 on hand today - last filled on 9/10/18for # 60 Plan: 1. Blood sample obtained today - CBC, CMP - we will call later with results 2. Encouraged to continue giving fluidsPrescription for Fentanyl 12 mcg left in the home today Follow up Keep appt as schedule -10/10/18

## 2018-10-02 NOTE — PATIENT INSTRUCTIONS
Dear Yeison Rose and Ammy, It was a pleasure seeing you at home today with [facility] [address] [facility] ([phone]) Your stated goal: To get back to walking again. This is what we talked about: 1. Altered Mental Status 
-This could be due to multiple medical issues 
-Your mother needs an ER evaluation to rule out the more serious ones quickly 2. Dehydration 
-Your mother has not been drinking or eating for 3 days and is dehydrated 
-She needs an ER visit to have IV fluids and labs 3. Low Blood Pressure 
-This could be due to dehydration but also could be due to an infection or other medical problems 4. Health Maintenance -Evangelina Ho will be coming to give your pneumonia vaccine (Prevnar 13, the newer one) and the flu vaccine Health Maintenance Due Topic Date Due  Shingrix Vaccine Age 50> (1 of 2) 07/27/1975  Pneumococcal 65+ Low/Medium Risk (2 of 2 - PPSV23) 12/07/2017  Influenza Age 5 to Adult  08/01/2018  GLAUCOMA SCREENING Q2Y  08/09/2018 5. Advance Care Planning - A Durable Do Not Resusitate (DDNR) is on file - An Advance Directive is already on file. This is what you have shared with us about Advance Care Planning Advance Care Planning 4/27/2018 Patient's Healthcare Decision Maker is: Named in scanned ACP document Primary Decision Maker Name Norrine Sacks Primary Decision Maker Phone Number [phone] Primary Decision Maker Relationship to Patient Adult child Secondary Decision Maker Name Evelyn Weeks Secondary Decision Maker Phone Number [phone] Secondary Decision Maker Relationship to Patient Unknown Confirm Advance Directive Yes, on file Someone from the Home Based Primary Care Team will see you again in 3 weeks to flush your ears and follow-up on your pain medicaiton.    
 
If there are any concerns before that time, such as medication questions, worsening symptoms or a need to see a physician for an urgent or emergent situation; please call [phone]. A physician is also on call after our normal business hours of 8am to 5pm.  
 
In order to serve you better, please allow up to 48 hours for prescription refills to be processed. Certain medications may require more paperwork or a written prescription that you may need to  from the office. We appreciate you letting us know of any refill requests as soon as possible. Sincerely, The Home Based Primary Care Team 
MD Obie Moreno, CRISTIN Molina, JUAN PABLO Hernandez RN Altered Mental Status: Care Instructions Your Care Instructions Altered mental status is a change in how well your brain is working. As a result, you may be confused, be less alert than usual, or act in odd ways. This may include seeing or hearing things that aren't really there (hallucinations). A mental status change has many possible causes. For example, it may be the result of an infection, an imbalance of chemicals in the body, or a chronic disease such as diabetes or COPD. It can also be caused by things such as a head injury, taking certain medicines, or using alcohol or drugs. The doctor may do tests to look for the cause. These tests may include urine tests, blood tests, and imaging tests such as a CT scan. Sometimes a clear cause isn't found. But tests can help the doctor rule out a serious cause of your symptoms. A change in mental status can be scary. But mental status will often return to normal when the cause is treated. So it is important to get any follow-up testing or treatment the doctor has suggested. The doctor has checked you carefully, but problems can develop later. If you notice any problems or new symptoms, get medical treatment right away. Follow-up care is a key part of your treatment and safety.  Be sure to make and go to all appointments, and call your doctor if you are having problems. It's also a good idea to know your test results and keep a list of the medicines you take. How can you care for yourself at home? · Be safe with medicines. Take your medicines exactly as prescribed. Call your doctor if you think you are having a problem with your medicine. · Have another adult stay with you until you are better. This can help keep you safe. Ask that person to watch for signs that your mental status is getting worse. When should you call for help? Call 911 anytime you think you may need emergency care. For example, call if: 
  · You passed out (lost consciousness).  
 Call your doctor now or seek immediate medical care if: 
  · Your mental status is getting worse.  
  · You have new symptoms, such as a fever, chills, or shortness of breath.  
  · You do not feel safe.  
 Watch closely for changes in your health, and be sure to contact your doctor if: 
  · You do not get better as expected. Where can you learn more? Go to http://anna-sampson.info/. Enter F387 in the search box to learn more about \"Altered Mental Status: Care Instructions. \" Current as of: October 9, 2017 Content Version: 11.7 © 4697-2890 [employer]. Care instructions adapted under license by [employer] (which disclaims liability or warranty for this information). If you have questions about a medical condition or this instruction, always ask your healthcare professional. [place] [address] any warranty or liability for your use of this information.

## 2018-10-02 NOTE — PROGRESS NOTES
[facility] [address]  
[phone] Date of Current Visit: 10/01/18 SUMMARY:  
80 yr female referred to [facility] by Dr. Evelyn Gan. She has a significant hx for seronegative RA, PMR of multiple joints, CAD, old MI, GERD and Afib. Due to this, Ms. Mami Raygoza is unable to see a community PCP without significant taxing effort. GOALS OF CARE / TREATMENT PREFERENCES:  
GOALS OF CARE: 
Patient / health care proxy stated goals: To get back to walking again. TREATMENT PREFERENCES:  
Code Status:  [] Attempt Resuscitation       [x] Do Not Attempt Resuscitation Advance Care Planning: 
Advance Care Planning 4/27/2018 Patient's Healthcare Decision Maker is: Named in scanned ACP document Primary Decision Maker Name Sundeep Guzmán Primary Decision Maker Phone Number [phone] Primary Decision Maker Relationship to Patient Adult child Secondary Decision Maker Name Anupama Tavarez Secondary Decision Maker Phone Number [phone] Secondary Decision Maker Relationship to Patient Unknown Confirm Advance Directive Yes, on file DIAGNOSES:  
 
  ICD-10-CM ICD-9-CM 1. Altered mental status, unspecified altered mental status type R41.82 780.97 2. Dehydration E86.0 276.51   
3. Hypotension, unspecified hypotension type I95.9 458.9 PLAN:  
Patient Instructions Dear Di Rodney and Sergio Galvin, It was a pleasure seeing you at home today with Home Based [address] ([phone]) Your stated goal: To get back to walking again. This is what we talked about: 1. Altered Mental Status 
-This could be due to multiple medical issues 
-Your mother needs an ER evaluation to rule out the more serious ones quickly 2. Dehydration 
-Your mother has not been drinking or eating for 3 days and is dehydrated 
-She needs an ER visit to have IV fluids and labs 3.  Low Blood Pressure 
-This could be due to dehydration but also could be due to an infection or other medical problems 4. Health Maintenance -Maurisio Franklin will be coming to give your pneumonia vaccine (Prevnar 13, the newer one) and the flu vaccine Health Maintenance Due Topic Date Due  Shingrix Vaccine Age 50> (1 of 2) 07/27/1975  Pneumococcal 65+ Low/Medium Risk (2 of 2 - PPSV23) 12/07/2017  Influenza Age 5 to Adult  08/01/2018  GLAUCOMA SCREENING Q2Y  08/09/2018 5. Advance Care Planning - A Durable Do Not Resusitate (DDNR) is on file - An Advance Directive is already on file. This is what you have shared with us about Advance Care Planning Advance Care Planning 4/27/2018 Patient's Healthcare Decision Maker is: Named in scanned ACP document Primary Decision Maker Name Alyssa Macias Primary Decision Maker Phone Number [phone] Primary Decision Maker Relationship to Patient Adult child Secondary Decision Maker Name Christy Teague Secondary Decision Maker Phone Number [phone] Secondary Decision Maker Relationship to Patient Unknown Confirm Advance Directive Yes, on file Someone from the Home Based Primary Care Team will see you again in 3 weeks to flush your ears and follow-up on your pain medicaiton. If there are any concerns before that time, such as medication questions, worsening symptoms or a need to see a physician for an urgent or emergent situation; please call [phone]. A physician is also on call after our normal business hours of 8am to 5pm.  
 
In order to serve you better, please allow up to 48 hours for prescription refills to be processed. Certain medications may require more paperwork or a written prescription that you may need to  from the office. We appreciate you letting us know of any refill requests as soon as possible. Sincerely, The [facility] Primary Care Team 
MD Vitaly Marinelli NP Lana Casino, NP Jaymes Golden, RN Illa Mems, RN Altered Mental Status: Care Instructions Your Care Instructions Altered mental status is a change in how well your brain is working. As a result, you may be confused, be less alert than usual, or act in odd ways. This may include seeing or hearing things that aren't really there (hallucinations). A mental status change has many possible causes. For example, it may be the result of an infection, an imbalance of chemicals in the body, or a chronic disease such as diabetes or COPD. It can also be caused by things such as a head injury, taking certain medicines, or using alcohol or drugs. The doctor may do tests to look for the cause. These tests may include urine tests, blood tests, and imaging tests such as a CT scan. Sometimes a clear cause isn't found. But tests can help the doctor rule out a serious cause of your symptoms. A change in mental status can be scary. But mental status will often return to normal when the cause is treated. So it is important to get any follow-up testing or treatment the doctor has suggested. The doctor has checked you carefully, but problems can develop later. If you notice any problems or new symptoms, get medical treatment right away. Follow-up care is a key part of your treatment and safety. Be sure to make and go to all appointments, and call your doctor if you are having problems. It's also a good idea to know your test results and keep a list of the medicines you take. How can you care for yourself at home? · Be safe with medicines. Take your medicines exactly as prescribed. Call your doctor if you think you are having a problem with your medicine. · Have another adult stay with you until you are better. This can help keep you safe. Ask that person to watch for signs that your mental status is getting worse. When should you call for help? Call 911 anytime you think you may need emergency care. For example, call if: 
  · You passed out (lost consciousness).  Call your doctor now or seek immediate medical care if: 
  · Your mental status is getting worse.  
  · You have new symptoms, such as a fever, chills, or shortness of breath.  
  · You do not feel safe.  
 Watch closely for changes in your health, and be sure to contact your doctor if: 
  · You do not get better as expected. Where can you learn more? Go to http://anna-sampson.info/. Enter G499 in the search box to learn more about \"Altered Mental Status: Care Instructions. \" Current as of: October 9, 2017 Content Version: 11.7 © 7544-8724 [employer]. Care instructions adapted under license by [employer] (which disclaims liability or warranty for this information). If you have questions about a medical condition or this instruction, always ask your healthcare professional. [address] any warranty or liability for your use of this information. PRESCRIPTIONS GIVEN:  
 
No orders of the defined types were placed in this encounter. HISTORY:  
Please see RN note from this current visit; history taken together in presence of patient/family in the home. CHIEF COMPLAINT:  
Chief Complaint Patient presents with  Altered mental status  Dehydration  Nausea HPI/SUBJECTIVE: The patient is: [x] Verbal / [] Dorothy Garcia, Mrs. Carina Shetty son reports patient has been nauseated since Saturday September 29th and not drinking or eating. She is c/o neck pain. She is very confused and is not making sense. He asked that we come to see her today. She can be aroused and cries out whenever she is touched or moved. She is not answering questions. REVIEW OF SYSTEMS:  
 
The following systems were [x] reviewed / [] unable to be reviewed Systems: constitutional, ears/nose/mouth/throat, respiratory, gastrointestinal, genitourinary, musculoskeletal, integumentary, neurologic, psychiatric, endocrine. Positive findings noted: patient can give no ROS today. FUNCTIONAL ASSESSMENT:  
 
Palliative Performance Scale (PPS): 20% PSYCHOSOCIAL/SPIRITUAL SCREENING:  
  
Any spiritual / Gnosticist concerns: [] Yes /  [x] No Anglican, no longer goes to [employer] because can't get out of home; [employer] does not come to her, may be willing to reconnect Caregiver Burnout: [] Yes /  [x] No /  [] No Caregiver Present PHYSICAL EXAM:  
 
Wt Readings from Last 3 Encounters:  
06/05/18 120 lb 9.6 oz (54.7 kg) 11/20/17 131 lb (59.4 kg) 11/15/17 132 lb (59.9 kg) Blood pressure (!) 88/60, pulse 84, temperature 98.8 °F (37.1 °C), temperature source Oral, resp. rate 18, SpO2 91 %. Last bowel movement:  Two days ago Constitutional:  Frail, elderly female lying in bed, moaning, not interactive, appears acutely ill Eyes: pupils equal, anicteric, conjunctiva are pink ENMT: no nasal discharge, moist mucous membranes. EAC's filled with mtz cerumen Cardiovascular: regular/ irregular rhythm, distal pulses intact, edema +1 LE's with tubigrip in place Respiratory: breathing not labored, symmetric, CTA A&P Gastrointestinal: soft, screams when she is touched anywhere, +bowel sounds. No TTP HSM, mass R, G or R Musculoskeletal: no deformity, no tenderness to palpation Skin: warm, dry Neurologic: oriented X 0, screaming when touched anywhere Psychiatric: unable to assess secondary to near obtundation at times and screaming with movement or touch HISTORY:  
 
Past Medical and Surgical History reviewed in [facility] on date of initial visit Patient Active Problem List  
Diagnosis Code  Osteoarthritis M19.90  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 ([facility]) I25.111  
 CHF, stage C ([facility]) I50.9  Lumbar spinal stenosis M48.061  
 Paroxysmal atrial fibrillation ([facility]) I48.0  Advance directive on file P58.7  PMR (polymyalgia rheumatica) ([facility]) M35.3  Lower extremity edema R60.0  CKD (chronic kidney disease) stage 2, GFR 60-89 ml/min N18.2  Long term current use of systemic steroids Z79.52  Seronegative rheumatoid arthritis of multiple sites ([facility] [address] M06.09  
 Long-term use of immunosuppressant medication Z79.899  Anemia associated with acute blood loss D62  Dependence on continuous supplemental oxygen Z99.81  
 Polyneuropathy associated with underlying disease ([facility] [address] G63 Family History Problem Relation Age of Onset [facility] Other Mother Intestinal obstruction  Cancer Father  Stroke Father  Heart Attack Brother  Cancer Brother  Cancer Brother  Anesth Problems Neg Hx History reviewed, no pertinent family history. Social History Substance Use Topics  Smoking status: Never Smoker  Smokeless tobacco: Never Used  Alcohol use No  
 
Allergies Allergen Reactions  Novacare Other (comments) PARALYSIS; \"NOVACAINE\" not novacare.  Phenergan [Promethazine] Other (comments) \"loose my wits i go crazy\"  Nsaids (Non-Steroidal Anti-Inflammatory Drug) Nausea and Vomiting  Adhesive Tape-Silicones Other (comments) BLISTERS  
 Atenolol Nausea and Vomiting  Bactrim [Sulfamethoprim Ds] Nausea and Vomiting  Cymbalta [Duloxetine] Other (comments) CONFUSION 
  
 Digoxin Nausea and Vomiting  Gluten Other (comments) GLUTEN INTOLERANCE  
 Macrobid [Nitrofurantoin Monohyd/M-Cryst] Nausea and Vomiting  Sulfa (Sulfonamide Antibiotics) Nausea and Vomiting  Codeine Other (comments) Unable to swallow Current Outpatient Prescriptions Medication Sig  
 fentaNYL (DURAGESIC) 12 mcg/hr patch 1 Patch by TransDERmal route every seventy-two (72) hours. Max Daily Amount: 1 Patch. Indications: Chronic Pain with Opioid Tolerance  loperamide (IMODIUM) 2 mg capsule Take  by mouth.  traMADol (ULTRAM) 50 mg tablet Take 50 mg by mouth every twelve (12) hours as needed for Pain.  ondansetron (ZOFRAN ODT) 8 mg disintegrating tablet Take 1 Tab by mouth every eight (8) hours.  gabapentin (NEURONTIN) 300 mg capsule Take 2 Caps by mouth three (3) times daily for 90 days.  diclofenac (VOLTAREN) 1 % gel Apply 2 g to affected area four (4) times daily. Apply to affected area as needed  predniSONE (DELTASONE) 2.5 mg tablet Take 1 Tab by mouth daily.  polyethylene glycol (MIRALAX) 17 gram/dose powder Take 17 g by mouth daily.  OXYGEN-AIR DELIVERY SYSTEMS 2 L by Nasal route continuous.  spironolactone (ALDACTONE) 25 mg tablet Take 0.5 Tabs by mouth daily.  topiramate (TOPAMAX) 25 mg tablet Take 1 Tab by mouth daily (with breakfast).  metoprolol tartrate (LOPRESSOR) 25 mg tablet Take 25 mg by mouth two (2) times a day.  senna-docusate (SENNA PLUS) 8.6-50 mg per tablet Take 2 Tabs by mouth daily.  naloxone (NARCAN) 4 mg/actuation nasal spray Use 1 spray intranasally into 1 nostril. Use a new Narcan nasal spray for subsequent doses and administer into alternating nostrils. May repeat every 2 to 3 minutes as needed.  BIFIDOBACTERIUM INFANTIS (ALIGN PO) Take 1 Tab by mouth daily.  cyanocobalamin 1,000 mcg tablet Take 1,000 mcg by mouth daily.  nitroglycerin (NITROSTAT) 0.4 mg SL tablet 1 Tab by SubLINGual route every five (5) minutes as needed for Chest Pain.  cholecalciferol (VITAMIN D3) 1,000 unit tablet Take 1,000 Units by mouth daily.  esomeprazole (NEXIUM) 40 mg capsule Take 40 mg by mouth daily. No current facility-administered medications for this visit. LAB DATA REVIEWED:  
 
Lab Results Component Value Date/Time Hemoglobin A1c 5.6 09/20/2018 11:59 AM  
 
No results found for: Kristal Meigs, MCA2, MCA3, MCAU, MCAU2, MCALPOCT Lab Results Component Value Date/Time  TSH 1.560 11/20/2017 02:40 PM  
 TSH 0.86 05/25/2015 04:32 AM  
 
 Lab Results Component Value Date/Time VITAMIN D, 25-HYDROXY 43.1 10/04/2017 02:52 PM  
   
 
Lab Results Component Value Date/Time WBC 3.5 07/31/2018 01:56 PM  
 HGB 10.8 (L) 07/31/2018 01:56 PM  
 PLATELET 501 70/50/6446 01:56 PM  
 
Lab Results Component Value Date/Time Sodium 145 (H) 07/31/2018 01:56 PM  
 Potassium 4.3 07/31/2018 01:56 PM  
 Chloride 107 (H) 07/31/2018 01:56 PM  
 CO2 21 07/31/2018 01:56 PM  
 BUN 9 (L) 07/31/2018 01:56 PM  
 Creatinine 0.72 07/31/2018 01:56 PM  
 Calcium 9.3 07/31/2018 01:56 PM  
 Magnesium 1.8 05/25/2015 04:32 AM  
 Phosphorus 1.5 (L) 05/25/2015 04:32 AM  
  
Lab Results Component Value Date/Time AST (SGOT) 16 07/31/2018 01:56 PM  
 Alk. phosphatase 79 07/31/2018 01:56 PM  
 Protein, total 5.6 (L) 07/31/2018 01:56 PM  
 Albumin 3.6 07/31/2018 01:56 PM  
 
No results found for: IRON, FE, TIBC, IBCT, PSAT, FERR Total time: 40 minutes Counseling / coordination time: 10 minutes on ER report to attending, discussing need for ER visit with son 
> 50% counseling / coordination?: No

## 2018-10-02 NOTE — MR AVS SNAPSHOT
[address] [facility], [address] [address] 
[phone] Patient: Sheryll Riedel MRN: [id number] Visit Information Date & Time Provider Department Dept. Phone Encounter #  
 10/2/2018  1:00 PM Francisco Pa NP [address] [facility] [phone] [id number] Follow-up Instructions Return in about 1 day (around 10/3/2018). Follow-up and Disposition History Upcoming Health Maintenance Date Due Shingrix Vaccine Age 50> (1 of 2) 1975 Pneumococcal 65+ Low/Medium Risk (2 of 2 - PPSV23) 2017 Influenza Age 5 to Adult 2018 GLAUCOMA SCREENING Q2Y 2018 Bone Densitometry (Dexa) Screening 2020* DTaP/Tdap/Td series (1 - Tdap) 2020* MEDICARE YEARLY EXAM 2019 *Topic was postponed. The date shown is not the original due date. Allergies as of 10/2/2018  Review Complete On: 10/2/2018 By: Francisco Pa NP Severity Noted Reaction Type Reactions Novacare High 2015    Other (comments) PARALYSIS; \"NOVACAINE\" not novacare. Phenergan [Promethazine] High 2015   Systemic Other (comments) \"loose my wits i go crazy\" Nsaids (Non-steroidal Anti-inflammatory Drug) Medium 2015   Systemic Nausea and Vomiting Adhesive Tape-silicones  29/10/3437    Other (comments) BLISTERS Atenolol  2015    Nausea and Vomiting Bactrim [Sulfamethoprim Ds]  2015    Nausea and Vomiting Cymbalta [Duloxetine]  2015    Other (comments) CONFUSION Digoxin  2015    Nausea and Vomiting Gluten  2015    Other (comments) GLUTEN INTOLERANCE Macrobid [Nitrofurantoin Monohyd/m-cryst]  2015    Nausea and Vomiting  
 Sulfa (Sulfonamide Antibiotics)  2015    Nausea and Vomiting Codeine Low 2015   Systemic Other (comments) Unable to swallow Current Immunizations  Reviewed on 1/28/2016 Name Date Influenza High Dose Vaccine PF 10/10/2017, 10/12/2016, 10/20/2015 11:52 AM  
 Influenza Vaccine (Quad) PF  Incomplete Pneumococcal Conjugate (PCV-13) 12/7/2016 Zoster Vaccine, Live 3/22/2012 Not reviewed this visit You Were Diagnosed With   
  
 Codes Comments Delirium    -  Primary ICD-10-CM: R41.0 ICD-9-CM: 780.09 Urinary tract infection without hematuria, site unspecified     ICD-10-CM: N39.0 ICD-9-CM: 599.0 Dehydration     ICD-10-CM: E86.0 ICD-9-CM: 276.51 Vitals BP Pulse Temp Resp SpO2 PF  
 125/68 (BP 1 Location: Left arm, BP Patient Position: Supine) 82 98.3 °F (36.8 °C) (Temporal) 18 92% (!) 2 L/min OB Status Smoking Status Postmenopausal Never Smoker Preferred Pharmacy Pharmacy Name Phone [id number] [phone] Your Updated Medication List  
  
   
This list is accurate as of 10/2/18 11:59 PM.  Always use your most recent med list.  
  
  
  
  
 cyanocobalamin 1,000 mcg tablet Take 1,000 mcg by mouth daily. gabapentin 300 mg capsule Commonly known as:  NEURONTIN Take 2 Caps by mouth three (3) times daily for 90 days. MIRALAX 17 gram/dose powder Generic drug:  polyethylene glycol Take 17 g by mouth daily as needed. NexIUM 40 mg capsule Generic drug:  esomeprazole Take 40 mg by mouth Daily (before breakfast). nitroglycerin 0.4 mg SL tablet Commonly known as:  NITROSTAT  
1 Tab by SubLINGual route every five (5) minutes as needed for Chest Pain. ondansetron 8 mg disintegrating tablet Commonly known as:  ZOFRAN ODT Take 1 Tab by mouth every eight (8) hours. predniSONE 2.5 mg tablet Commonly known as:  Demetra Colleton Take 1 Tab by mouth daily. SENNA PLUS 8.6-50 mg per tablet Generic drug:  senna-docusate Take 1 Tab by mouth daily. spironolactone 25 mg tablet Commonly known as:  ALDACTONE Take 0.5 Tabs by mouth daily. topiramate 25 mg tablet Commonly known as:  TOPAMAX Take 1 Tab by mouth daily (with breakfast). VITAMIN D3 1,000 unit tablet Generic drug:  cholecalciferol Take 1,000 Units by mouth daily. We Performed the Following CBC WITH AUTOMATED DIFF [02326 CPT(R)] METABOLIC PANEL, BASIC [64654 CPT(R)] Follow-up Instructions Return in about 1 day (around 10/3/2018). Patient Instructions Dear Alyssa Hogan and Sarah Miles, It was a pleasure seeing you at home today with Home [facility] [address] [facility] ([phone]) Your stated goal: To get back to walking again. This is what we talked about: 1. Altered Mental Status/Delirium 
-This could be due to multiple medical issues 
-She was evaluated at [facility] yesterday and was diagnosed with a urinary tract infection 
-She received antibitotics IV in the ER and also IV fluids  
-She has been hospitalized for the same type of episode 3 times in the past year and took two weeks to come back to baseline each time 
-I am drawing stat labs today and will call you with results 
-Call us if she is worsening  
-I am concerned she will need another hospitalization 2. Dehydration 
-This is the 4th day your mother is not eating or drinking 
-She received IV fluids yesterday at the ER 
-I am checking labs today to check her hydration status 3. Urinary Tract Infection 
-Your pharmacist felt the Cipro ordered by the ER would have a major interaction with her medication and a request has been made to the ER MD to change her to Keflex 4. Health Maintenance -Leigh Ann Buchanan will be coming to give your pneumonia vaccine (Prevnar 13, the newer one) and the flu vaccine Health Maintenance Due Topic Date Due  Shingrix Vaccine Age 50> (1 of 2) 07/27/1975  Pneumococcal 65+ Low/Medium Risk (2 of 2 - PPSV23) 12/07/2017  Influenza Age 5 to Adult  08/01/2018  GLAUCOMA SCREENING Q2Y  08/09/2018 5. Advance Care Planning - A Durable Do Not Resusitate (DDNR) is on file - An Advance Directive is already on file. This is what you have shared with us about Advance Care Planning Advance Care Planning 10/3/2018 Patient's Healthcare Decision Maker is: Legal Next of Kin Primary Decision Maker Name -  
Primary Decision Maker Phone Number -  
Primary Decision Maker Relationship to Patient - Secondary Decision Maker Name - Secondary Decision Maker Phone Number - Secondary Decision Maker Relationship to Patient -  
Confirm Advance Directive Yes, on file Someone from the Home Based Primary Care Team will see you again in 3 weeks to flush your ears and follow-up on your pain medicaiton. If there are any concerns before that time, such as medication questions, worsening symptoms or a need to see a physician for an urgent or emergent situation; please call [phone]. A physician is also on call after our normal business hours of 8am to 5pm.  
 
In order to serve you better, please allow up to 48 hours for prescription refills to be processed. Certain medications may require more paperwork or a written prescription that you may need to  from the office. We appreciate you letting us know of any refill requests as soon as possible. Sincerely, The Home Based Primary Care Team 
MD George Sheets NP Margret Bad, NP Justo Nan, JUAN PABLO Desouza RN Altered Mental Status: Care Instructions Your Care Instructions Altered mental status is a change in how well your brain is working. As a result, you may be confused, be less alert than usual, or act in odd ways. This may include seeing or hearing things that aren't really there (hallucinations). A mental status change has many possible causes.  For example, it may be the result of an infection, an imbalance of chemicals in the body, or a chronic disease such as diabetes or COPD. It can also be caused by things such as a head injury, taking certain medicines, or using alcohol or drugs. The doctor may do tests to look for the cause. These tests may include urine tests, blood tests, and imaging tests such as a CT scan. Sometimes a clear cause isn't found. But tests can help the doctor rule out a serious cause of your symptoms. A change in mental status can be scary. But mental status will often return to normal when the cause is treated. So it is important to get any follow-up testing or treatment the doctor has suggested. The doctor has checked you carefully, but problems can develop later. If you notice any problems or new symptoms, get medical treatment right away. Follow-up care is a key part of your treatment and safety. Be sure to make and go to all appointments, and call your doctor if you are having problems. It's also a good idea to know your test results and keep a list of the medicines you take. How can you care for yourself at home? · Be safe with medicines. Take your medicines exactly as prescribed. Call your doctor if you think you are having a problem with your medicine. · Have another adult stay with you until you are better. This can help keep you safe. Ask that person to watch for signs that your mental status is getting worse. When should you call for help? Call 911 anytime you think you may need emergency care. For example, call if: 
  · You passed out (lost consciousness).  
 Call your doctor now or seek immediate medical care if: 
  · Your mental status is getting worse.  
  · You have new symptoms, such as a fever, chills, or shortness of breath.  
  · You do not feel safe.  
 Watch closely for changes in your health, and be sure to contact your doctor if: 
  · You do not get better as expected. Where can you learn more? Go to http://anna-sampson.info/. Enter L693 in the search box to learn more about \"Altered Mental Status: Care Instructions. \" Current as of: October 9, 2017 Content Version: 11.7 © 8154-8795 [employer] Care instructions adapted under license by [employer] (which disclaims liability or warranty for this information). If you have questions about a medical condition or this instruction, always ask your healthcare professional. [address] any warranty or liability for your use of this information. Introducing [facility]! Dear Maggie Carrington: 
Thank you for requesting a Yeelion account. Our records indicate that you already have an active Yeelion account. You can access your account anytime at [email or web address] [employer])/[email or web address] Did you know that you can access your hospital and ER discharge instructions at any time in Yeelion? You can also review all of your test results from your hospital stay or ER visit. Additional Information If you have questions, please visit the Frequently Asked Questions section of the Yeelion website at [email or web address] Remember, Yeelion is NOT to be used for urgent needs. For medical emergencies, dial 911. Now available from your iPhone and Android! Please provide this summary of care documentation to your next provider. Your primary care clinician is listed as [employer]. If you have any questions after today's visit, please call [phone].

## 2018-10-02 NOTE — TELEPHONE ENCOUNTER
Diomedes Officer with [facility] wanted to advised patient will not be seen by home health nurse today she will be seen by her primary care physician.  Left on VM 12:50pm

## 2018-10-03 ENCOUNTER — APPOINTMENT (OUTPATIENT)
Dept: CT IMAGING | Age: 83
DRG: 641 | End: 2018-10-03
Attending: HOSPITALIST
Payer: MEDICARE

## 2018-10-03 ENCOUNTER — APPOINTMENT (OUTPATIENT)
Dept: GENERAL RADIOLOGY | Age: 83
DRG: 641 | End: 2018-10-03
Attending: EMERGENCY MEDICINE
Payer: MEDICARE

## 2018-10-03 ENCOUNTER — APPOINTMENT (OUTPATIENT)
Dept: CT IMAGING | Age: 83
DRG: 641 | End: 2018-10-03
Attending: EMERGENCY MEDICINE
Payer: MEDICARE

## 2018-10-03 ENCOUNTER — HOSPITAL ENCOUNTER (INPATIENT)
Age: 83
LOS: 6 days | Discharge: HOME HEALTH | DRG: 641 | End: 2018-10-09
Attending: EMERGENCY MEDICINE | Admitting: HOSPITALIST
Payer: MEDICARE

## 2018-10-03 DIAGNOSIS — R41.82 ALTERED MENTAL STATUS, UNSPECIFIED ALTERED MENTAL STATUS TYPE: Primary | ICD-10-CM

## 2018-10-03 DIAGNOSIS — K66.8 PNEUMOPERITONEUM: ICD-10-CM

## 2018-10-03 DIAGNOSIS — R41.0 ACUTE DELIRIUM: ICD-10-CM

## 2018-10-03 DIAGNOSIS — E86.0 DEHYDRATION: ICD-10-CM

## 2018-10-03 PROBLEM — E87.20 METABOLIC ACIDOSIS: Status: ACTIVE | Noted: 2018-10-03

## 2018-10-03 PROBLEM — G93.41 ACUTE METABOLIC ENCEPHALOPATHY: Status: ACTIVE | Noted: 2018-10-03

## 2018-10-03 PROBLEM — R52 SEVERE PAIN: Status: ACTIVE | Noted: 2018-10-03

## 2018-10-03 LAB
ALBUMIN SERPL-MCNC: 3 G/DL (ref 3.5–5)
ALBUMIN/GLOB SERPL: 0.7 {RATIO} (ref 1.1–2.2)
ALP SERPL-CCNC: 103 U/L (ref 45–117)
ALT SERPL-CCNC: 17 U/L (ref 12–78)
ANION GAP BLD CALC-SCNC: 16 MMOL/L (ref 10–20)
ANION GAP SERPL CALC-SCNC: 12 MMOL/L (ref 5–15)
ANION GAP SERPL CALC-SCNC: 16 MMOL/L (ref 5–15)
APPEARANCE UR: CLEAR
APTT PPP: 26.7 SEC (ref 22.1–32)
AST SERPL-CCNC: 54 U/L (ref 15–37)
ATRIAL RATE: 116 BPM
BACTERIA URNS QL MICRO: NEGATIVE /HPF
BASOPHILS # BLD AUTO: 0 X10E3/UL (ref 0–0.2)
BASOPHILS # BLD: 0 K/UL (ref 0–0.1)
BASOPHILS NFR BLD AUTO: 0 %
BASOPHILS NFR BLD: 0 % (ref 0–1)
BILIRUB SERPL-MCNC: 1.1 MG/DL (ref 0.2–1)
BILIRUB UR QL: NEGATIVE
BUN BLD-MCNC: 9 MG/DL (ref 9–20)
BUN SERPL-MCNC: 10 MG/DL (ref 6–20)
BUN SERPL-MCNC: 11 MG/DL (ref 10–36)
BUN SERPL-MCNC: 9 MG/DL (ref 6–20)
BUN/CREAT SERPL: 17 (ref 12–20)
BUN/CREAT SERPL: 18 (ref 12–20)
BUN/CREAT SERPL: 19 (ref 12–28)
CA-I BLD-MCNC: 1.04 MMOL/L (ref 1.12–1.32)
CALCIUM SERPL-MCNC: 8.6 MG/DL (ref 8.5–10.1)
CALCIUM SERPL-MCNC: 9.1 MG/DL (ref 8.7–10.3)
CALCIUM SERPL-MCNC: 9.7 MG/DL (ref 8.5–10.1)
CALCULATED P AXIS, ECG09: 40 DEGREES
CALCULATED R AXIS, ECG10: 63 DEGREES
CALCULATED T AXIS, ECG11: 35 DEGREES
CHLORIDE BLD-SCNC: 105 MMOL/L (ref 98–107)
CHLORIDE SERPL-SCNC: 102 MMOL/L (ref 96–106)
CHLORIDE SERPL-SCNC: 102 MMOL/L (ref 97–108)
CHLORIDE SERPL-SCNC: 105 MMOL/L (ref 97–108)
CO2 BLD-SCNC: 21 MMOL/L (ref 21–32)
CO2 SERPL-SCNC: 19 MMOL/L (ref 21–32)
CO2 SERPL-SCNC: 20 MMOL/L (ref 20–29)
CO2 SERPL-SCNC: 20 MMOL/L (ref 21–32)
COLOR UR: ABNORMAL
COMMENT, HOLDF: NORMAL
CREAT BLD-MCNC: 0.5 MG/DL (ref 0.6–1.3)
CREAT SERPL-MCNC: 0.51 MG/DL (ref 0.55–1.02)
CREAT SERPL-MCNC: 0.57 MG/DL (ref 0.57–1)
CREAT SERPL-MCNC: 0.6 MG/DL (ref 0.55–1.02)
DIAGNOSIS, 93000: NORMAL
DIFFERENTIAL METHOD BLD: ABNORMAL
EOSINOPHIL # BLD AUTO: 0 X10E3/UL (ref 0–0.4)
EOSINOPHIL # BLD: 0.1 K/UL (ref 0–0.4)
EOSINOPHIL NFR BLD AUTO: 0 %
EOSINOPHIL NFR BLD: 1 % (ref 0–7)
EPITH CASTS URNS QL MICRO: ABNORMAL /LPF
ERYTHROCYTE [DISTWIDTH] IN BLOOD BY AUTOMATED COUNT: 15.3 % (ref 12.3–15.4)
ERYTHROCYTE [DISTWIDTH] IN BLOOD BY AUTOMATED COUNT: 15.7 % (ref 11.5–14.5)
GLOBULIN SER CALC-MCNC: 4.3 G/DL (ref 2–4)
GLUCOSE BLD-MCNC: 89 MG/DL (ref 65–100)
GLUCOSE SERPL-MCNC: 83 MG/DL (ref 65–99)
GLUCOSE SERPL-MCNC: 91 MG/DL (ref 65–100)
GLUCOSE SERPL-MCNC: 96 MG/DL (ref 65–100)
GLUCOSE UR STRIP.AUTO-MCNC: NEGATIVE MG/DL
HCT VFR BLD AUTO: 30.1 % (ref 34–46.6)
HCT VFR BLD AUTO: 38.9 % (ref 35–47)
HCT VFR BLD CALC: 36 % (ref 35–47)
HGB BLD-MCNC: 10.2 G/DL (ref 11.1–15.9)
HGB BLD-MCNC: 12.1 G/DL (ref 11.5–16)
HGB UR QL STRIP: NEGATIVE
IMM GRANULOCYTES # BLD: 0 K/UL (ref 0–0.04)
IMM GRANULOCYTES NFR BLD AUTO: 0 % (ref 0–0.5)
INR PPP: 1.1 (ref 0.9–1.1)
KETONES UR QL STRIP.AUTO: >80 MG/DL
LACTATE BLD-SCNC: 1.2 MMOL/L (ref 0.4–2)
LEUKOCYTE ESTERASE UR QL STRIP.AUTO: NEGATIVE
LYMPHOCYTES # BLD AUTO: 0.4 X10E3/UL (ref 0.7–3.1)
LYMPHOCYTES # BLD: 0.9 K/UL (ref 0.8–3.5)
LYMPHOCYTES NFR BLD AUTO: 8 %
LYMPHOCYTES NFR BLD: 13 % (ref 12–49)
MAGNESIUM SERPL-MCNC: 1.7 MG/DL (ref 1.6–2.4)
MCH RBC QN AUTO: 29.5 PG (ref 26.6–33)
MCH RBC QN AUTO: 29.7 PG (ref 26–34)
MCHC RBC AUTO-ENTMCNC: 31.1 G/DL (ref 30–36.5)
MCHC RBC AUTO-ENTMCNC: 33.9 G/DL (ref 31.5–35.7)
MCV RBC AUTO: 87 FL (ref 79–97)
MCV RBC AUTO: 95.3 FL (ref 80–99)
MONOCYTES # BLD AUTO: 0.5 X10E3/UL (ref 0.1–0.9)
MONOCYTES # BLD: 0.7 K/UL (ref 0–1)
MONOCYTES NFR BLD AUTO: 10 %
MONOCYTES NFR BLD: 11 % (ref 5–13)
NEUTROPHILS # BLD AUTO: 4.4 X10E3/UL (ref 1.4–7)
NEUTROPHILS NFR BLD AUTO: 82 %
NEUTS SEG # BLD: 5 K/UL (ref 1.8–8)
NEUTS SEG NFR BLD: 75 % (ref 32–75)
NITRITE UR QL STRIP.AUTO: NEGATIVE
NRBC # BLD: 0 K/UL (ref 0–0.01)
NRBC BLD-RTO: 0 PER 100 WBC
P-R INTERVAL, ECG05: 136 MS
PH UR STRIP: 6 [PH] (ref 5–8)
PHOSPHATE SERPL-MCNC: 3.1 MG/DL (ref 2.6–4.7)
PLATELET # BLD AUTO: 164 K/UL (ref 150–400)
PLATELET # BLD AUTO: 167 X10E3/UL (ref 150–379)
PMV BLD AUTO: 10.5 FL (ref 8.9–12.9)
POTASSIUM BLD-SCNC: 7.1 MMOL/L (ref 3.5–5.1)
POTASSIUM SERPL-SCNC: 3.8 MMOL/L (ref 3.5–5.1)
POTASSIUM SERPL-SCNC: 4.2 MMOL/L (ref 3.5–5.2)
POTASSIUM SERPL-SCNC: 6.5 MMOL/L (ref 3.5–5.1)
PROT SERPL-MCNC: 7.3 G/DL (ref 6.4–8.2)
PROT UR STRIP-MCNC: NEGATIVE MG/DL
PROTHROMBIN TIME: 10.9 SEC (ref 9–11.1)
Q-T INTERVAL, ECG07: 326 MS
QRS DURATION, ECG06: 78 MS
QTC CALCULATION (BEZET), ECG08: 453 MS
RBC # BLD AUTO: 3.46 X10E6/UL (ref 3.77–5.28)
RBC # BLD AUTO: 4.08 M/UL (ref 3.8–5.2)
RBC #/AREA URNS HPF: ABNORMAL /HPF (ref 0–5)
SAMPLES BEING HELD,HOLD: NORMAL
SERVICE CMNT-IMP: ABNORMAL
SODIUM BLD-SCNC: 134 MMOL/L (ref 136–145)
SODIUM SERPL-SCNC: 134 MMOL/L (ref 136–145)
SODIUM SERPL-SCNC: 140 MMOL/L (ref 134–144)
SODIUM SERPL-SCNC: 140 MMOL/L (ref 136–145)
SP GR UR REFRACTOMETRY: 1.02 (ref 1–1.03)
THERAPEUTIC RANGE,PTTT: NORMAL SECS (ref 58–77)
UA: UC IF INDICATED,UAUC: ABNORMAL
UROBILINOGEN UR QL STRIP.AUTO: 0.2 EU/DL (ref 0.2–1)
VENTRICULAR RATE, ECG03: 116 BPM
WBC # BLD AUTO: 5.4 X10E3/UL (ref 3.4–10.8)
WBC # BLD AUTO: 6.7 K/UL (ref 3.6–11)
WBC URNS QL MICRO: ABNORMAL /HPF (ref 0–4)

## 2018-10-03 PROCEDURE — 72125 CT NECK SPINE W/O DYE: CPT

## 2018-10-03 PROCEDURE — 84100 ASSAY OF PHOSPHORUS: CPT | Performed by: EMERGENCY MEDICINE

## 2018-10-03 PROCEDURE — 74011000258 HC RX REV CODE- 258: Performed by: EMERGENCY MEDICINE

## 2018-10-03 PROCEDURE — 85730 THROMBOPLASTIN TIME PARTIAL: CPT | Performed by: EMERGENCY MEDICINE

## 2018-10-03 PROCEDURE — 77030038269 HC DRN EXT URIN PURWCK BARD -A

## 2018-10-03 PROCEDURE — 96372 THER/PROPH/DIAG INJ SC/IM: CPT

## 2018-10-03 PROCEDURE — 81001 URINALYSIS AUTO W/SCOPE: CPT | Performed by: EMERGENCY MEDICINE

## 2018-10-03 PROCEDURE — 80047 BASIC METABLC PNL IONIZED CA: CPT

## 2018-10-03 PROCEDURE — 65270000029 HC RM PRIVATE

## 2018-10-03 PROCEDURE — 74177 CT ABD & PELVIS W/CONTRAST: CPT

## 2018-10-03 PROCEDURE — 96361 HYDRATE IV INFUSION ADD-ON: CPT

## 2018-10-03 PROCEDURE — 74011250637 HC RX REV CODE- 250/637: Performed by: HOSPITALIST

## 2018-10-03 PROCEDURE — 87086 URINE CULTURE/COLONY COUNT: CPT | Performed by: EMERGENCY MEDICINE

## 2018-10-03 PROCEDURE — 85610 PROTHROMBIN TIME: CPT | Performed by: EMERGENCY MEDICINE

## 2018-10-03 PROCEDURE — 99284 EMERGENCY DEPT VISIT MOD MDM: CPT

## 2018-10-03 PROCEDURE — 36415 COLL VENOUS BLD VENIPUNCTURE: CPT | Performed by: EMERGENCY MEDICINE

## 2018-10-03 PROCEDURE — 74011636320 HC RX REV CODE- 636/320: Performed by: EMERGENCY MEDICINE

## 2018-10-03 PROCEDURE — 93005 ELECTROCARDIOGRAM TRACING: CPT

## 2018-10-03 PROCEDURE — 74011250636 HC RX REV CODE- 250/636: Performed by: HOSPITALIST

## 2018-10-03 PROCEDURE — 70450 CT HEAD/BRAIN W/O DYE: CPT

## 2018-10-03 PROCEDURE — 71045 X-RAY EXAM CHEST 1 VIEW: CPT

## 2018-10-03 PROCEDURE — 92610 EVALUATE SWALLOWING FUNCTION: CPT

## 2018-10-03 PROCEDURE — 74011250636 HC RX REV CODE- 250/636: Performed by: EMERGENCY MEDICINE

## 2018-10-03 PROCEDURE — 83605 ASSAY OF LACTIC ACID: CPT

## 2018-10-03 PROCEDURE — 74011000258 HC RX REV CODE- 258: Performed by: HOSPITALIST

## 2018-10-03 PROCEDURE — 83735 ASSAY OF MAGNESIUM: CPT | Performed by: EMERGENCY MEDICINE

## 2018-10-03 PROCEDURE — 74011000250 HC RX REV CODE- 250: Performed by: HOSPITALIST

## 2018-10-03 PROCEDURE — 80053 COMPREHEN METABOLIC PANEL: CPT | Performed by: EMERGENCY MEDICINE

## 2018-10-03 PROCEDURE — 85025 COMPLETE CBC W/AUTO DIFF WBC: CPT | Performed by: EMERGENCY MEDICINE

## 2018-10-03 PROCEDURE — 96374 THER/PROPH/DIAG INJ IV PUSH: CPT

## 2018-10-03 RX ORDER — GABAPENTIN 300 MG/1
600 CAPSULE ORAL 3 TIMES DAILY
Status: DISCONTINUED | OUTPATIENT
Start: 2018-10-03 | End: 2018-10-08

## 2018-10-03 RX ORDER — METOPROLOL SUCCINATE 25 MG/1
25 TABLET, EXTENDED RELEASE ORAL DAILY
COMMUNITY
End: 2018-10-10

## 2018-10-03 RX ORDER — SODIUM CHLORIDE 0.9 % (FLUSH) 0.9 %
5-10 SYRINGE (ML) INJECTION EVERY 8 HOURS
Status: DISCONTINUED | OUTPATIENT
Start: 2018-10-03 | End: 2018-10-09 | Stop reason: HOSPADM

## 2018-10-03 RX ORDER — METOPROLOL TARTRATE 25 MG/1
25 TABLET, FILM COATED ORAL 2 TIMES DAILY
Status: DISCONTINUED | OUTPATIENT
Start: 2018-10-03 | End: 2018-10-03

## 2018-10-03 RX ORDER — METOPROLOL SUCCINATE 25 MG/1
25 TABLET, EXTENDED RELEASE ORAL DAILY
Status: DISCONTINUED | OUTPATIENT
Start: 2018-10-03 | End: 2018-10-09 | Stop reason: HOSPADM

## 2018-10-03 RX ORDER — NITROGLYCERIN 0.4 MG/1
0.4 TABLET SUBLINGUAL
Status: DISCONTINUED | OUTPATIENT
Start: 2018-10-03 | End: 2018-10-09 | Stop reason: HOSPADM

## 2018-10-03 RX ORDER — FENTANYL 25 UG/1
1 PATCH TRANSDERMAL
Status: DISCONTINUED | OUTPATIENT
Start: 2018-10-03 | End: 2018-10-05

## 2018-10-03 RX ORDER — ESOMEPRAZOLE MAGNESIUM 40 MG/1
40 CAPSULE, DELAYED RELEASE ORAL DAILY
Status: DISCONTINUED | OUTPATIENT
Start: 2018-10-03 | End: 2018-10-03

## 2018-10-03 RX ORDER — METOPROLOL SUCCINATE 25 MG/1
50 TABLET, EXTENDED RELEASE ORAL EVERY EVENING
COMMUNITY
End: 2018-10-10

## 2018-10-03 RX ORDER — POLYETHYLENE GLYCOL 3350 17 G/17G
17 POWDER, FOR SOLUTION ORAL DAILY PRN
Status: DISCONTINUED | OUTPATIENT
Start: 2018-10-03 | End: 2018-10-09 | Stop reason: HOSPADM

## 2018-10-03 RX ORDER — PREDNISONE 5 MG/1
2.5 TABLET ORAL DAILY
Status: DISCONTINUED | OUTPATIENT
Start: 2018-10-03 | End: 2018-10-05

## 2018-10-03 RX ORDER — TRAMADOL HYDROCHLORIDE 50 MG/1
50 TABLET ORAL
Status: DISCONTINUED | OUTPATIENT
Start: 2018-10-03 | End: 2018-10-09 | Stop reason: HOSPADM

## 2018-10-03 RX ORDER — ENOXAPARIN SODIUM 100 MG/ML
40 INJECTION SUBCUTANEOUS EVERY 24 HOURS
Status: DISCONTINUED | OUTPATIENT
Start: 2018-10-03 | End: 2018-10-03

## 2018-10-03 RX ORDER — TOPIRAMATE 25 MG/1
25 TABLET ORAL
Status: DISCONTINUED | OUTPATIENT
Start: 2018-10-03 | End: 2018-10-09 | Stop reason: HOSPADM

## 2018-10-03 RX ORDER — LANOLIN ALCOHOL/MO/W.PET/CERES
1000 CREAM (GRAM) TOPICAL DAILY
Status: DISCONTINUED | OUTPATIENT
Start: 2018-10-03 | End: 2018-10-09 | Stop reason: HOSPADM

## 2018-10-03 RX ORDER — SODIUM CHLORIDE 0.9 % (FLUSH) 0.9 %
5-10 SYRINGE (ML) INJECTION AS NEEDED
Status: DISCONTINUED | OUTPATIENT
Start: 2018-10-03 | End: 2018-10-09 | Stop reason: HOSPADM

## 2018-10-03 RX ORDER — ONDANSETRON 4 MG/1
8 TABLET, ORALLY DISINTEGRATING ORAL
Status: DISCONTINUED | OUTPATIENT
Start: 2018-10-03 | End: 2018-10-09 | Stop reason: HOSPADM

## 2018-10-03 RX ORDER — ONDANSETRON 4 MG/1
8 TABLET, ORALLY DISINTEGRATING ORAL EVERY 8 HOURS
Status: DISCONTINUED | OUTPATIENT
Start: 2018-10-03 | End: 2018-10-03 | Stop reason: DRUGHIGH

## 2018-10-03 RX ORDER — FENTANYL 12.5 UG/1
1 PATCH TRANSDERMAL
Status: DISCONTINUED | OUTPATIENT
Start: 2018-10-03 | End: 2018-10-03

## 2018-10-03 RX ORDER — DEXTROSE MONOHYDRATE AND SODIUM CHLORIDE 5; .9 G/100ML; G/100ML
100 INJECTION, SOLUTION INTRAVENOUS CONTINUOUS
Status: DISCONTINUED | OUTPATIENT
Start: 2018-10-03 | End: 2018-10-08

## 2018-10-03 RX ORDER — PANTOPRAZOLE SODIUM 40 MG/1
40 TABLET, DELAYED RELEASE ORAL
Status: DISCONTINUED | OUTPATIENT
Start: 2018-10-04 | End: 2018-10-09 | Stop reason: HOSPADM

## 2018-10-03 RX ORDER — METOPROLOL SUCCINATE 50 MG/1
50 TABLET, EXTENDED RELEASE ORAL DAILY
Status: DISCONTINUED | OUTPATIENT
Start: 2018-10-03 | End: 2018-10-09 | Stop reason: HOSPADM

## 2018-10-03 RX ORDER — METOPROLOL TARTRATE 5 MG/5ML
2.5 INJECTION INTRAVENOUS ONCE
Status: COMPLETED | OUTPATIENT
Start: 2018-10-03 | End: 2018-10-03

## 2018-10-03 RX ORDER — TRAMADOL HYDROCHLORIDE 50 MG/1
50 TABLET ORAL
Status: ON HOLD | COMMUNITY
End: 2019-08-26 | Stop reason: SDUPTHER

## 2018-10-03 RX ORDER — METOPROLOL TARTRATE 5 MG/5ML
1.25 INJECTION INTRAVENOUS EVERY 6 HOURS
Status: DISCONTINUED | OUTPATIENT
Start: 2018-10-03 | End: 2018-10-07

## 2018-10-03 RX ORDER — ENOXAPARIN SODIUM 100 MG/ML
40 INJECTION SUBCUTANEOUS EVERY 24 HOURS
Status: DISCONTINUED | OUTPATIENT
Start: 2018-10-03 | End: 2018-10-09 | Stop reason: HOSPADM

## 2018-10-03 RX ORDER — SODIUM CHLORIDE 0.9 % (FLUSH) 0.9 %
10 SYRINGE (ML) INJECTION
Status: COMPLETED | OUTPATIENT
Start: 2018-10-03 | End: 2018-10-03

## 2018-10-03 RX ORDER — ONDANSETRON 2 MG/ML
4 INJECTION INTRAMUSCULAR; INTRAVENOUS
Status: DISCONTINUED | OUTPATIENT
Start: 2018-10-03 | End: 2018-10-09 | Stop reason: HOSPADM

## 2018-10-03 RX ORDER — FENTANYL 25 UG/1
1 PATCH TRANSDERMAL
Status: ON HOLD | COMMUNITY
End: 2019-08-26 | Stop reason: SDUPTHER

## 2018-10-03 RX ADMIN — Medication 10 ML: at 06:59

## 2018-10-03 RX ADMIN — IOPAMIDOL 100 ML: 755 INJECTION, SOLUTION INTRAVENOUS at 06:59

## 2018-10-03 RX ADMIN — ENOXAPARIN SODIUM 40 MG: 40 INJECTION SUBCUTANEOUS at 10:20

## 2018-10-03 RX ADMIN — SODIUM CHLORIDE 1000 ML: 900 INJECTION, SOLUTION INTRAVENOUS at 05:02

## 2018-10-03 RX ADMIN — Medication 10 ML: at 13:51

## 2018-10-03 RX ADMIN — SODIUM CHLORIDE 1000 ML: 900 INJECTION, SOLUTION INTRAVENOUS at 08:35

## 2018-10-03 RX ADMIN — SODIUM CHLORIDE 100 ML: 900 INJECTION, SOLUTION INTRAVENOUS at 06:59

## 2018-10-03 RX ADMIN — PIPERACILLIN SODIUM,TAZOBACTAM SODIUM 3.38 G: 3; .375 INJECTION, POWDER, FOR SOLUTION INTRAVENOUS at 08:35

## 2018-10-03 RX ADMIN — METOPROLOL TARTRATE 2.5 MG: 5 INJECTION, SOLUTION INTRAVENOUS at 13:50

## 2018-10-03 RX ADMIN — DEXTROSE MONOHYDRATE AND SODIUM CHLORIDE 100 ML/HR: 5; .9 INJECTION, SOLUTION INTRAVENOUS at 10:20

## 2018-10-03 NOTE — IP AVS SNAPSHOT
Summary of Care Report The Summary of Care report has been created to help improve care coordination. Users with access to frenting or [address] (Web-based application) may access additional patient information including the Discharge Summary. If you are not currently a [address] user and need more information, please call the number listed below in the [place] section and ask to be connected with Medical Records. Facility Information Name Address Phone [address] [facility] [address] [phone] [phone] Patient Information Patient Name Sex WILIAN Lozoya ([id number]) Female 1925 Discharge Information Admitting Provider Service Area Unit Mary Mackenzie MD / Andrew Lugo. 106 / [phone] Discharge Provider Discharge Date/Time Discharge Disposition Destination (none) 10/9/2018 15:00 (Pending) AHR (none) Patient Language Language ENGLISH [13] Hospital Problems as of 10/9/2018  Reviewed: 10/9/2018  8:43 AM by Taiwo Kirk MD  
  
  
  
 Class Noted - Resolved Last Modified POA Active Problems Vascular dementia without behavioral disturbance  Unknown - Present 10/8/2018 by Lord Raheem MD Unknown Entered by Lord Raheem MD  
  
Non-Hospital Problems as of 10/9/2018  Reviewed: 10/9/2018  8:43 AM by Taiwo Kirk MD  
  
  
  
 Class Noted - Resolved Last Modified Active Problems   Osteoarthritis  2015 - Present 2015 by Erica Ennis MD  
  Entered by Erica Ennis MD  
  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  
  Entered by Mary Carmen Zelaya MD  
  CHF, stage C ([facility] [address]  10/20/2015 - Present 10/20/2015 by Mary Carmen Zelaya MD  
 Entered by Deanna Mireles MD  
  Lumbar spinal stenosis  10/20/2015 - Present 10/20/2015 by Deanna Mireles MD  
  Entered by Deanna Mireles MD  
  Paroxysmal atrial fibrillation ([facility]  3/23/2016 - Present 3/23/2016 by Deanna Mireles MD  
  Entered by Deanna Mireles MD  
  Advance directive on file  5/10/2017 - Present 5/10/2017 by Deanna Mireles MD  
  Entered by Deanna Mireles MD  
  PMR (polymyalgia rheumatica) ([facility]  10/4/2017 - Present 10/4/2017 by Bibi Lakhani MD  
  Entered by Bibi Lakhani MD  
  Lower extremity edema  10/4/2017 - Present 10/4/2017 by Bibi Lakhani MD  
  Entered by Bibi Lakhani MD  
  CKD (chronic kidney disease) stage 2, GFR 60-89 ml/min  10/24/2017 - Present 10/24/2017 by Bibi Lakhani MD  
  Entered by Bibi Lakhani MD  
  Long term current use of systemic steroids  10/24/2017 - Present 10/24/2017 by Bibi Lakhani MD  
  Entered by Bibi Lakhani MD  
  Seronegative rheumatoid arthritis of multiple sites [facility])  10/24/2017 - Present 10/24/2017 by Bibi Lakhani MD  
  Entered by Bibi Lakhani MD  
  Long-term use of immunosuppressant medication  11/20/2017 - Present 11/20/2017 by Bibi Lakhani MD  
  Entered by Bibi Lakhani MD  
  Anemia associated with acute blood loss  6/19/2018 - Present 6/19/2018 by Brian Montes NP Entered by Brian Montes NP Dependence on continuous supplemental oxygen  7/18/2018 - Present 7/18/2018 by Brian Montes NP Entered by Brian Montes NP  
  Polyneuropathy associated with underlying disease ([facility]  8/1/2018 - Present 8/1/2018 by Brian Montes NP Entered by Brian Montes NP You are allergic to the following Allergen Reactions Phenergan (Promethazine) Other (comments) \"loose my wits i go crazy\" Nsaids (Non-Steroidal Anti-Inflammatory Drug) Nausea and Vomiting Adhesive Tape-Silicones Other (comments) BLISTERS Atenolol Nausea and Vomiting Cymbalta (Duloxetine) Other (comments) CONFUSION Digoxin Nausea and Vomiting Gluten Other (comments) GLUTEN INTOLERANCE Macrobid (Nitrofurantoin Monohyd/M-Cryst) Nausea and Vomiting Novocain (Procaine) Other (comments) Paralysis Sulfa (Sulfonamide Antibiotics) Nausea and Vomiting Codeine Other (comments) Unable to swallow Current Discharge Medication List  
  
START taking these medications Dose & Instructions Dispensing Information Comments  
 mirtazapine 15 mg tablet Commonly known as:  Bel Flirt Dose:  15 mg Take 1 Tab by mouth nightly for 30 days. Quantity:  30 Tab Refills:  0 CONTINUE these medications which have CHANGED Dose & Instructions Dispensing Information Comments  
 gabapentin 400 mg capsule Commonly known as:  NEURONTIN What changed:   
- medication strength 
- how much to take Dose:  400 mg Take 1 Cap by mouth three (3) times daily for 30 days. Quantity:  90 Cap Refills:  0  
   
 predniSONE 5 mg tablet Commonly known as:  Ke Santana Start taking on:  10/10/2018 What changed:   
- medication strength 
- how much to take Dose:  5 mg Take 1 Tab by mouth daily for 30 days. Quantity:  30 Tab Refills:  0 CONTINUE these medications which have NOT CHANGED Dose & Instructions Dispensing Information Comments ALIGN 4 mg Cap Generic drug:  Bifidobacterium Infantis Dose:  4 mg Take 4 mg by mouth daily. Refills:  0  
   
 cyanocobalamin 1,000 mcg tablet Dose:  1000 mcg Take 1,000 mcg by mouth daily. Refills:  0  
   
 fentaNYL 25 mcg/hr PATCH Commonly known as:  Ollen Hark Dose:  1 Patch 1 Patch by TransDERmal route every seventy-two (72) hours. Refills:  0 MIRALAX 17 gram/dose powder Generic drug:  polyethylene glycol Dose:  17 g Take 17 g by mouth daily as needed. Refills:  0 NexIUM 40 mg capsule Generic drug:  esomeprazole Dose:  40 mg Take 40 mg by mouth Daily (before breakfast). Refills:  0  
   
 nitroglycerin 0.4 mg SL tablet Commonly known as:  NITROSTAT Dose:  0.4 mg  
1 Tab by SubLINGual route every five (5) minutes as needed for Chest Pain. Quantity:  50 Tab Refills:  3  
   
 ondansetron 8 mg disintegrating tablet Commonly known as:  ZOFRAN ODT Dose:  8 mg Take 1 Tab by mouth every eight (8) hours. Quantity:  90 Tab Refills:  2 SENNA PLUS 8.6-50 mg per tablet Generic drug:  senna-docusate Dose:  1 Tab Take 1 Tab by mouth daily. Refills:  0  
   
 spironolactone 25 mg tablet Commonly known as:  ALDACTONE Dose:  12.5 mg Take 0.5 Tabs by mouth daily. Quantity:  60 Tab Refills:  3  
   
 topiramate 25 mg tablet Commonly known as:  TOPAMAX Dose:  25 mg Take 1 Tab by mouth daily (with breakfast). Quantity:  90 Tab Refills:  3  
   
 * TOPROL XL 25 mg XL tablet Generic drug:  metoprolol succinate Dose:  25 mg Take 25 mg by mouth daily. (25 mg in the morning; 50 mg in the evening) Refills:  0  
   
 * metoprolol succinate 25 mg XL tablet Commonly known as:  TOPROL-XL Dose:  50 mg Take 50 mg by mouth every evening. (25 mg in the morning; 50 mg in the evening) Refills:  0  
   
 traMADol 50 mg tablet Commonly known as:  ULTRAM  
 Dose:  50 mg Take 50 mg by mouth two (2) times daily as needed for Pain. Refills:  0  
   
 VITAMIN D3 1,000 unit tablet Generic drug:  cholecalciferol Dose:  1000 Units Take 1,000 Units by mouth daily. Refills:  0  
   
 * Notice: This list has 2 medication(s) that are the same as other medications prescribed for you. Read the directions carefully, and ask your doctor or other care provider to review them with you. Current Immunizations Name Date Influenza High Dose Vaccine PF 10/10/2017, 10/12/2016, 10/20/2015 Influenza Vaccine (Quad) PF  Deferred () Pneumococcal Conjugate (PCV-13) 12/7/2016 Zoster Vaccine, Live 3/22/2012 Follow-up Information Follow up With Details Comments Contact Info Pa Melgoza NP In 1 week  [address] [address] [phone] Merged with [facility]   [address] [facility] 
[phone] Discharge Instructions Discharge Instructions PATIENT ID: Freda Ziegler MRN: [id number] YOB: 1925 DATE OF ADMISSION: 10/3/2018  3:29 AM   
DATE OF DISCHARGE: 10/9/2018 PRIMARY CARE PROVIDER: Zachariah Hernández NP  
 
ATTENDING PHYSICIAN: Za Walton MD 
DISCHARGING PROVIDER: Za Walton MD   
To contact this individual call [phone] and ask the  to page. If unavailable ask to be transferred the Adult Hospitalist Department. DISCHARGE DIAGNOSES Dehydration CONSULTATIONS: IP CONSULT TO GENERAL SURGERY 
IP CONSULT TO HOSPITALIST 
IP CONSULT TO PALLIATIVE CARE - PROVIDER 
IP CONSULT TO PALLIATIVE CARE - PROVIDER 
IP CONSULT TO PSYCHIATRY PROCEDURES/SURGERIES: * No surgery found * PENDING TEST RESULTS:  
At the time of discharge the following test results are still pending: FOLLOW UP APPOINTMENTS:  
Follow-up Information Follow up With Details Comments Contact Info Pa Melgoza NP In 1 week  [address] [address] [phone] ADDITIONAL CARE RECOMMENDATIONS:  
 
DIET: Regular Diet and Cardiac Diet ACTIVITY: Activity as tolerated WOUND CARE:  
 
EQUIPMENT needed:  
 
 
DISCHARGE MEDICATIONS: 
 See Medication Reconciliation Form · It is important that you take the medication exactly as they are prescribed. · Keep your medication in the bottles provided by the pharmacist and keep a list of the medication names, dosages, and times to be taken in your wallet. · Do not take other medications without consulting your doctor. NOTIFY YOUR PHYSICIAN FOR ANY OF THE FOLLOWING:  
Fever over 101 degrees for 24 hours. Chest pain, shortness of breath, fever, chills, nausea, vomiting, diarrhea, change in mentation, falling, weakness, bleeding. Severe pain or pain not relieved by medications. Or, any other signs or symptoms that you may have questions about. DISPOSITION: 
x  Home With: 
 OT  PT  [facility]  RN  
  
 SNF/Inpatient Rehab/LTAC Independent/assisted living Hospice Other: CDMP Checked:  
Yes x PROBLEM LIST Updated: 
Yes x Signed:  
Radha Daniels MD 
10/9/2018 
8:45 AM 
 
Chart Review Routing History Recipient Method Report Sent By Rogelio Norwood MD  
Fax: [phone] Phone: [phone] Fax Marlene Webber MD NOTES AUTO ROUTING REPORT MD Leida Ortega 2/16/2015  2:10 PM 02/16/2015 Kishan Norwood MD  
Fax: [phone] Phone: [phone] Fax IP Auto Routed [employer], MD [[id number]] 5/22/2015  1:30 PM 05/22/2015 Kishan Norwood MD  
Fax: [phone] Phone: [phone] Fax IP Auto Routed [employer], MD [[id number]] 5/27/2015  7:34 AM 05/27/2015 Jose Brown MD  
Fax: [phone] Phone: [phone] Fax Notes/Transcriptions Rochelle Montenegro [[id number]] 5/27/2015  4:45 PM 05/22/2015 Hattie Antony NP Phone: [phone] In [address] [[id number]] 6/12/2018 10:54 AM 12/7/2016 Layla Goldmann, RN Phone: [phone] In [address] [[id number]] 6/12/2018 10:54 AM 12/7/2016 Andrews Hampton NP Phone: [phone] In Basket IP Auto Routed Notes Get Brown MD [[id number]] 10/3/2018 10:30 AM 10/03/2018 Andrews Hampton NP Phone: [phone] In [employer] Routed Elvis Brady MD [[id number]] 10/4/2018  7:26 AM 10/04/2018 Andrews Hampton NP Phone: [phone] In Basket IP Auto Routed Notes Radha Daniels MD [[id number]] 10/9/2018  8:48 AM 10/09/2018

## 2018-10-03 NOTE — PROGRESS NOTES
Problem: Dysphagia (Adult) Goal: *Acute Goals and Plan of Care (Insert Text) Speech pathology goals Initiated 10/3/2018 1. Patient will participate in re-evaluation of swallow function within 7 days Speech LAnguage Pathology bedside swallow evaluation Patient: Callum Lopez (11 y.o. female) Date: 10/3/2018 Primary Diagnosis: Dehydration Metabolic acidosis Acute metabolic encephalopathy Severe pain Dehydration Precautions: swallow ASSESSMENT : 
Based on the objective data described below, the patient presents with suspected mild oropharyngeal dysphagia, however evaluation limited by drowsiness, confusion, and refusal. Patient with absent acceptance of thin liquid via straw, however suspect behavioral. Patient also spit out large ice chip, again suspect behavioral. With two small ice chips, patient with mildly prolonged mastication, delayed posterior propulsion, and delayed swallow initiation. No overt s/s aspiration observed, however patient is at high risk for aspiration at this time secondary to drowsiness and confusion. Suspect swallow function will improve as alertness and cognitive status improve. Patient will benefit from skilled intervention to address the above impairments. Patients rehabilitation potential is considered to be Fair Factors which may influence rehabilitation potential include:  
[]            None noted [x]            Mental ability/status [x]            Medical condition []            Home/family situation and support systems [x]            Safety awareness 
[]            Pain tolerance/management []            Other: PLAN : 
Recommendations and Planned Interventions: 
--NPO 
--Non-oral route for medications due to poor alertness 
--If patient awakens and asks for PO, ok for ice chips. Can repeat the STAND if alertness improves 
--SLP to follow up tomorrow for re-evaluation of swallow function Frequency/Duration: Patient will be followed by speech-language pathology 3 times a week to address goals. Discharge Recommendations: To Be Determined SUBJECTIVE:  
Patient stated Heriberto Morales when asked her last name. Patient drowsy, confused, oriented to person only. Poor command following. OBJECTIVE:  
 
Past Medical History:  
Diagnosis Date  Arrhythmia A-FIB  Arthritis  CAD (coronary artery disease) 1989 MI  
 Congestive heart failure (CHF) ([address]  GERD (gastroesophageal reflux disease)  Gluten intolerance  Heart failure ([address] CHF  Polymyalgia ([facility] Past Surgical History:  
Procedure Laterality Date  CARDIAC SURG PROCEDURE UNLIST  1980'S CARDIAC CATH  
[address], 2006, 2013 LUMBAR SPINE  
 HX CHOLECYSTECTOMY  HX HIP REPLACEMENT Right 5/2015 [employer]  HX TONSILLECTOMY  CHILD  
 HX UROLOGICAL BLADDER SUSPENSION - MESH Prior Level of Function/Home Situation:  
Home Situation Home Environment: Private residence One/Two Story Residence: One story Living Alone: No 
Support Systems: Child(brittany) Patient Expects to be Discharged to[de-identified] Private residence Current DME Used/Available at Home: [employer], rollator Diet prior to admission: unknown Current Diet:  NPO Cognitive and Communication Status: 
Neurologic State: Drowsy, Confused Orientation Level: Oriented to person, Disoriented to time, Disoriented to situation, Disoriented to place Cognition: Decreased command following, Decreased attention/concentration Oral Assessment: 
Oral Assessment Dentition: Natural;Full Oral Hygiene: moist oral mucosa Lingual: Other (comment) (wound on right tongue? poor command following) P.O. Trials: 
Patient Position: upright in bed, head tilted to right Vocal quality prior to P.O.: No impairment Consistency Presented: Ice chips; Thin liquid How Presented: SLP-fed/presented;Spoon;Straw Bolus Acceptance: Absent (with thin via straw) Bolus Formation/Control: Impaired Type of Impairment: Delayed (large ice chip spit out) Propulsion: Delayed (# of seconds) Oral Residue: None Initiation of Swallow: Delayed (# of seconds) Laryngeal Elevation: Functional 
Aspiration Signs/Symptoms: None Pharyngeal Phase Characteristics: No impairment, issues, or problems Oral Phase Severity: Mild Pharyngeal Phase Severity : Mild NOMS:  
The NOMS functional outcome measure was used to quantify this patient's level of swallowing impairment. Based on the NOMS, the patient was determined to be at level 2 for swallow function G Codes: In compliance with CMSs Claims Based Outcome Reporting, the following G-code set was chosen for this patient based the use of the NOMS functional outcome to quantify this patient's level of swallowing impairment. Using the NOMS, the patient was determined to be at level 2 for swallow function which correlates with the CM= 80-99% level of severity. Based on the objective assessment provided within this note, the current, goal, and discharge g-codes are as follows: 
 
Swallow  Swallowing: 
 Swallow Current Status CM= 80-99%  Swallow Goal Status CJ= 20-39% NOMS Swallowing Levels: 
Level 1 (CN): NPO Level 2 (CM): NPO but takes consistency in therapy Level 3 (CL): Takes less than 50% of nutrition p.o. and continues with nonoral feedings; and/or safe with mod cues; and/or max diet restriction Level 4 (CK): Safe swallow but needs mod cues; and/or mod diet restriction; and/or still requires some nonoral feeding/supplements Level 5 (CJ): Safe swallow with min diet restriction; and/or needs min cues Level 6 (CI): Independent with p.o.; rare cues; usually self cues; may need to avoid some foods or needs extra time Level 7 (CH): Independent for all p.o. MOO. (2003).  National Outcomes Measurement System (NOMS): Adult Speech-Language Pathology User's Guide. Pain: 
Pain Scale 1: Numeric (0 - 10) Pain Intensity 1: 8 Pain Location 1: Back After treatment:  
[]            Patient left in no apparent distress sitting up in chair 
[]            Patient left in no apparent distress in bed 
[x]            Call bell left within reach [x]            Nursing notified 
[]            Caregiver present 
[]            Bed alarm activated COMMUNICATION/EDUCATION:  
The patients plan of care including recommendations, planned interventions, and recommended diet changes were discussed with: Registered Nurse. []            Patient/family have participated as able in goal setting and plan of care. []            Patient/family agree to work toward stated goals and plan of care. []            Patient understands intent and goals of therapy, but is neutral about his/her participation. [x]            Patient is unable to participate in goal setting and plan of care. Thank you for this referral. 
Deniz Correa, SLP Time Calculation: 10 mins

## 2018-10-03 NOTE — PROGRESS NOTES
Problem: Falls - Risk of 
Goal: *Absence of Falls Document Gamal Evans Fall Risk and appropriate interventions in the flowsheet. Outcome: Progressing Towards Goal 
Fall Risk Interventions: 
Mobility Interventions: Assess mobility with egress test, Bed/chair exit alarm Mentation Interventions: Bed/chair exit alarm, Door open when patient unattended Medication Interventions: Assess postural VS orthostatic hypotension, Bed/chair exit alarm, Evaluate medications/consider consulting pharmacy

## 2018-10-03 NOTE — ED NOTES
0715: Bedside and Verbal shift change report given to Nestor Nava RN (oncoming nurse) by Olena Almazan RN (offgoing nurse). Report included the following information SBAR, Kardex, ED Summary, MAR, Recent Results and Cardiac Rhythm AFIB. 0815: Patient is alert and oriented x3. GALVEZ, but weak. 95% on RA. Afib on the cardiac monitor. Patient rates back pain as a 7/10, pure wick in place with 575 mL of urine output. VSS. Call bell within reach. 7190: Gen Surg at the bedside. 0187: Hospitalist at the bedside. 0945: Bedside and Verbal shift change report given to Alvaro Alegria RN (oncoming nurse) by Nestor Nava RN (offgoing nurse). Report included the following information SBAR, Kardex, ED Summary, MAR, Recent Results and Cardiac Rhythm Afib.

## 2018-10-03 NOTE — IP AVS SNAPSHOT
[address] 
[phone] Patient: Toney Gonsalez MRN: [id number] :[id number] A check bethanie indicates which time of day the medication should be taken. My Medications START taking these medications Instructions Each Dose to Equal  
 Morning Noon Evening Bedtime  
 mirtazapine 15 mg tablet Commonly known as:  [place] Adames Your last dose was: Your next dose is: Take 1 Tab by mouth nightly for 30 days. 15 mg CHANGE how you take these medications Instructions Each Dose to Equal  
 Morning Noon Evening Bedtime  
 gabapentin 400 mg capsule Commonly known as:  NEURONTIN What changed:   
- medication strength 
- how much to take Your last dose was: Your next dose is: Take 1 Cap by mouth three (3) times daily for 30 days. 400 mg  
    
   
   
   
  
 predniSONE 5 mg tablet Commonly known as:  Orquidea Mcguire Start taking on:  10/10/2018 What changed:   
- medication strength 
- how much to take Your last dose was: Your next dose is: Take 1 Tab by mouth daily for 30 days. 5 mg CONTINUE taking these medications Instructions Each Dose to Equal  
 Morning Noon Evening Bedtime ALIGN 4 mg Cap Generic drug:  Bifidobacterium Infantis Your last dose was: Your next dose is: Take 4 mg by mouth daily. 4 mg  
    
   
   
   
  
 cyanocobalamin 1,000 mcg tablet Your last dose was: Your next dose is: Take 1,000 mcg by mouth daily. 1000 mcg  
    
   
   
   
  
 fentaNYL 25 mcg/hr PATCH Commonly known as:  Carmen Canales Your last dose was: Your next dose is:    
   
   
 1 Patch by TransDERmal route every seventy-two (72) hours. 1 Patch MIRALAX 17 gram/dose powder Generic drug:  polyethylene glycol Your last dose was: Your next dose is: Take 17 g by mouth daily as needed. 17 g NexIUM 40 mg capsule Generic drug:  esomeprazole Your last dose was: Your next dose is: Take 40 mg by mouth Daily (before breakfast). 40 mg  
    
   
   
   
  
 nitroglycerin 0.4 mg SL tablet Commonly known as:  NITROSTAT Your last dose was: Your next dose is:    
   
   
 1 Tab by SubLINGual route every five (5) minutes as needed for Chest Pain. 0.4 mg  
    
   
   
   
  
 ondansetron 8 mg disintegrating tablet Commonly known as:  ZOFRAN ODT Your last dose was: Your next dose is: Take 1 Tab by mouth every eight (8) hours. 8 mg SENNA PLUS 8.6-50 mg per tablet Generic drug:  senna-docusate Your last dose was: Your next dose is: Take 1 Tab by mouth daily. 1 Tab  
    
   
   
   
  
 spironolactone 25 mg tablet Commonly known as:  ALDACTONE Your last dose was: Your next dose is: Take 0.5 Tabs by mouth daily. 12.5 mg  
    
   
   
   
  
 topiramate 25 mg tablet Commonly known as:  TOPAMAX Your last dose was: Your next dose is: Take 1 Tab by mouth daily (with breakfast). 25 mg  
    
   
   
   
  
 * TOPROL XL 25 mg XL tablet Generic drug:  metoprolol succinate Your last dose was: Your next dose is: Take 25 mg by mouth daily. (25 mg in the morning; 50 mg in the evening) 25 mg  
    
   
   
   
  
 * metoprolol succinate 25 mg XL tablet Commonly known as:  TOPROL-XL Your last dose was: Your next dose is: Take 50 mg by mouth every evening. (25 mg in the morning; 50 mg in the evening) 50 mg  
    
   
   
   
  
 traMADol 50 mg tablet Commonly known as:  Celsa Hitchcock  
   
 Your last dose was: Your next dose is: Take 50 mg by mouth two (2) times daily as needed for Pain. 50 mg  
    
   
   
   
  
 VITAMIN D3 1,000 unit tablet Generic drug:  cholecalciferol Your last dose was: Your next dose is: Take 1,000 Units by mouth daily. 1000 Units * Notice: This list has 2 medication(s) that are the same as other medications prescribed for you. Read the directions carefully, and ask your doctor or other care provider to review them with you. Where to Get Your Medications Information on where to get these meds will be given to you by the nurse or doctor. ! Ask your nurse or doctor about these medications  
  gabapentin 400 mg capsule  
 mirtazapine 15 mg tablet  
 predniSONE 5 mg tablet

## 2018-10-03 NOTE — ED PROVIDER NOTES
HPI Comments: The patient is a 80-year-old female, with a past medical history significant for atrial fibrillation, arthritis, MI, with stents, CHF, GERD, polymyalgia, status post cardiac catheterization, cholecystectomy, right hip replacement, TNA, bladder suspension surgery, recent diagnosis of UTI 2 days ago at [facility] who presents to the ED with her son at the bedside with the complaint of altered mental status. Son states that the patient was moaning and crying tonight accompanied by several episodes of nausea and vomiting. She has not had anything substantial to eat over the past 4 days. She was seen at [address] [place] [address] and diagnosed with UTI. She was placed on Cipro that she hasn't yet taken. Son states that the patient was seen by her PCP, who recommended that if the patient did not get any better to come back to the ER for further evaluation. The patient is a very limited historian. She is in route, alert, complaining of pain all over her body. Son states that the patient is usually alert and conversant, oriented x3, ambulates with a walker. The patient resides at home with him. Patient is a 80 y.o. female presenting with altered mental status. Altered mental status Past Medical History:  
Diagnosis Date  Arrhythmia A-FIB  Arthritis  CAD (coronary artery disease) 1989 MI  
 Congestive heart failure (CHF) ([address]  GERD (gastroesophageal reflux disease)  Gluten intolerance  Heart failure ([address] CHF  Polymyalgia ([address] Past Surgical History:  
Procedure Laterality Date  CARDIAC SURG PROCEDURE UNLIST  1980'S CARDIAC CATH  
[address], 2006, 2013 LUMBAR SPINE  
 HX CHOLECYSTECTOMY  HX HIP REPLACEMENT Right 5/2015 [employer]  HX TONSILLECTOMY  CHILD  
 HX UROLOGICAL BLADDER SUSPENSION - MESH Family History:  
Problem Relation Age of Onset 24 [facility] Other Mother Intestinal obstruction  Cancer Father  Stroke Father  Heart Attack Brother  Cancer Brother  Cancer Brother  Anesth Problems Neg Hx Social History Social History  Marital status: SINGLE Spouse name: N/A  
 Number of children: N/A  
 Years of education: N/A Occupational History  Not on file. Social History Main Topics  Smoking status: Never Smoker  Smokeless tobacco: Never Used  Alcohol use No  
 Drug use: No  
 Sexual activity: No  
 
Other Topics Concern  Not on file Social History Narrative ALLERGIES: [facility]; Phenergan [promethazine]; Nsaids (non-steroidal anti-inflammatory drug); Adhesive tape-silicones; Atenolol; Bactrim [sulfamethoprim ds]; Cymbalta [duloxetine]; Digoxin; Gluten; Macrobid [nitrofurantoin monohyd/m-cryst]; Sulfa (sulfonamide antibiotics); and Codeine Review of Systems Unable to perform ROS: Acuity of condition Vitals:  
 10/03/18 0344 BP: 155/83 Pulse: (!) 112 Resp: 27 Temp: 98.9 °F (37.2 °C) SpO2: 97% Physical Exam  
Nursing note and vitals reviewed. CONSTITUTIONAL: Frail elderly female; in mild distress HEAD: Normocephalic; atraumatic EYES: PERRL; EOM intact; conjunctiva and sclera are clear bilaterally. ENT: No rhinorrhea; normal pharynx with no tonsillar hypertrophy; mucous membranes pink/dry, no erythema, no exudate. NECK: Supple; non-tender; no cervical lymphadenopathy CARD: Normal S1, S2; no murmurs, rubs, or gallops. Regular rate and rhythm. RESP: Normal respiratory effort; breath sounds clear and equal bilaterally; no wheezes, rhonchi, or rales. ABD: Normal bowel sounds; non-distended; diffuse tenderness; no rebound or guarding; no palpable organomegaly, no masses, no bruits. Back Exam: Normal inspection; no vertebral point tenderness, no CVA tenderness. Normal range of motion.  
EXT: Normal ROM in all four extremities; tender to palpation B/L; no swelling or deformity; distal pulses are normal, no edema. SKIN: Warm; dry; no rash. NEURO:Alert and oriented x 3, coherent, KATE-XII grossly intact, sensory and motor are non-focal. 
 
 
 
MDM Number of Diagnoses or Management Options Altered mental status, unspecified altered mental status type:  
Dehydration:  
Pneumoperitoneum:  
Diagnosis management comments: Assessment: differential diagnosis include dehydration/altered mental status, rule out acute encephalopathy/metabolic derangement/ sepsis/ failure to thrive. Prior records from [address] [place] [address] was reviewed and the patient had normal lab and electrolytes, a CT scan of the abdomen and pelvis without contrast reveal possible constipation without any other abnormality. Urine was positive for UTI and the patient was discharged on Cipro. That was negative for field Plan: EKG/ lab/ chest x-ray/ IV fluid/ analgesia/ serial exam/ p.o. Challenge/ gait Assessment and/ Monitor and Reevaluate. Amount and/or Complexity of Data Reviewed Clinical lab tests: ordered and reviewed Tests in the radiology section of CPT®: ordered and reviewed Tests in the medicine section of CPT®: reviewed and ordered Discussion of test results with the performing providers: yes Decide to obtain previous medical records or to obtain history from someone other than the patient: yes Obtain history from someone other than the patient: yes Review and summarize past medical records: yes Discuss the patient with other providers: yes Independent visualization of images, tracings, or specimens: yes Risk of Complications, Morbidity, and/or Mortality Presenting problems: moderate Diagnostic procedures: moderate Management options: moderate Patient Progress Patient progress: stable ED Course Procedures ED EKG interpretation: 
Rhythm: sinus tachycardia; and regular .  Rate (approx.): 116; Axis: normal; P wave: normal; QRS interval: normal ; ST/T wave: non-specific changes; in  Lead: Diffusely; Other findings: abnormal ekg. This EKG was interpreted by Marine Casey MD,ED Provider. XRAY INTERPRETATION (ED MD) Chest Xray No acute process seen. Normal heart size. No bony abnormalities. No infiltrate. Marine Casey MD 5:24 AM 
 
 
PROGRESS NOTE: 
Pt has been reexamined by Marine Casey MD all available results have been reviewed with pt and any available family. Tis coming in was in e CT scan is positive for pneumoperitoneum. Will consult general surgery. Pt/Son understands sx, dx, and tx in ED. Care plan has been outlined and questions have been answered. Pt and any available family understands and agrees to need for admission to hospital for further tx not available in ED. Pt is ready for admission. Written by Marine Casey MD,  7:22 AM 
 
CONSULT NOTE: 
Marine Casey MD spoke with Dr. Porfirio Steele of Surgery. Discussed patient's presentation, history, physical assessment, and available diagnostic results. Will send one of his partner to come and see the patient in the ED. 07:30 AM 
 
 
PROGRESS NOTE: The patient was seen and evaluated by Dr. Abdifatah Peacock of general surgery. He reviewed the CT scan of the patient as well as examining the patient. The patient does not have pneumoperitoneum. He recommends admission for this revealed to the hospitalist service for aggressive hydration and treatment of delirium. Will consult Adult hospitalist 
Written by Marine Casey MD,  08:45 AM 
 
. CONSULT NOTE: 
Marine Casey MD spoke with Dr. Christ Ignacio  of the adult hospitalist team. Discussed patient's presentation, history, physical assessment, and available diagnostic results. He will evaluate, write orders and admit the patient to the hospital. 9:01 AM 
.  
 
. Celeste Syed

## 2018-10-03 NOTE — CONSULTS
Surgery Consult    Subjective:      Yue Regan is a 80 y.o. female who presented complaining of diffuse pain and mental status changes. Her diffuse pain is a chronic episodic issue. This episode began 4 days ago. 2 days ago she was seen at [facility] and diagnosed with a UTI and given one dose of abx. She was discharged home from the ED and sent home. She was seen by NP at home. This am she had significant pain and her family called 46. She complains of pain in her legs, abdomen with nausea and vomiting. Of note she is on chronic pain medications for RA and PMR.  Due to her abdominal pain a CT scan was done that showed \"pneumoperitoneum\" prompting a Gen surgical consultation       Patient Active Problem List    Diagnosis Date Noted    Polyneuropathy associated with underlying disease ([address] 08/01/2018    Dependence on continuous supplemental oxygen 07/18/2018    Anemia associated with acute blood loss 06/19/2018    Long-term use of immunosuppressant medication 11/20/2017    CKD (chronic kidney disease) stage 2, GFR 60-89 ml/min 10/24/2017    Long term current use of systemic steroids 10/24/2017    Seronegative rheumatoid arthritis of multiple sites ([address] 10/24/2017    PMR (polymyalgia rheumatica) ([address] 10/04/2017    Lower extremity edema 10/04/2017    Advance directive on file 05/10/2017    Paroxysmal atrial fibrillation ([address] 03/23/2016    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 ([address] 10/20/2015    CHF, stage C ([address] 10/20/2015    Lumbar spinal stenosis 10/20/2015    Osteoarthritis 05/22/2015     Past Medical History:   Diagnosis Date    Arrhythmia     A-FIB    Arthritis     CAD (coronary artery disease) 1989    MI    Congestive heart failure (CHF) (HCC)     GERD (gastroesophageal reflux disease)     Gluten intolerance     Heart failure (HCC)     CHF    Polymyalgia (HCC)       Past Surgical History:   Procedure Laterality Date    CARDIAC SURG PROCEDURE UNLIST  1980'S    CARDIAC CATH    HX BACK SURGERY  1998, 2006, 2013    LUMBAR SPINE    HX CHOLECYSTECTOMY      [address] HIP REPLACEMENT Right 5/2015    David Dsouza HX TONSILLECTOMY  CHILD    HX UROLOGICAL      BLADDER SUSPENSION - MESH      Social History   Substance Use Topics    Smoking status: Never Smoker    Smokeless tobacco: Never Used    Alcohol use No      Family History   Problem Relation Age of Onset    Other Mother      Intestinal obstruction    Cancer Father     Stroke Father     Heart Attack Brother     Cancer Brother     Cancer Brother     Anesth Problems Neg Hx       Current Facility-Administered Medications   Medication Dose Route Frequency    piperacillin-tazobactam (ZOSYN) 3.375 g in 0.9% sodium chloride (MBP/ADV) 100 mL  3.375 g IntraVENous NOW     Current Outpatient Prescriptions   Medication Sig    fentaNYL (DURAGESIC) 12 mcg/hr patch 1 Patch by TransDERmal route every seventy-two (72) hours. Max Daily Amount: 1 Patch. Indications: Chronic Pain with Opioid Tolerance    loperamide (IMODIUM) 2 mg capsule Take  by mouth.  traMADol (ULTRAM) 50 mg tablet Take 50 mg by mouth every twelve (12) hours as needed for Pain.  ondansetron (ZOFRAN ODT) 8 mg disintegrating tablet Take 1 Tab by mouth every eight (8) hours.  gabapentin (NEURONTIN) 300 mg capsule Take 2 Caps by mouth three (3) times daily for 90 days.  diclofenac (VOLTAREN) 1 % gel Apply 2 g to affected area four (4) times daily. Apply to affected area as needed    predniSONE (DELTASONE) 2.5 mg tablet Take 1 Tab by mouth daily.  polyethylene glycol (MIRALAX) 17 gram/dose powder Take 17 g by mouth daily.  spironolactone (ALDACTONE) 25 mg tablet Take 0.5 Tabs by mouth daily.  topiramate (TOPAMAX) 25 mg tablet Take 1 Tab by mouth daily (with breakfast).  metoprolol tartrate (LOPRESSOR) 25 mg tablet Take 25 mg by mouth two (2) times a day.     senna-docusate (SENNA PLUS) 8.6-50 mg per tablet Take 2 Tabs by mouth daily.  BIFIDOBACTERIUM INFANTIS (ALIGN PO) Take 1 Tab by mouth daily.  cyanocobalamin 1,000 mcg tablet Take 1,000 mcg by mouth daily.  nitroglycerin (NITROSTAT) 0.4 mg SL tablet 1 Tab by SubLINGual route every five (5) minutes as needed for Chest Pain.  cholecalciferol (VITAMIN D3) 1,000 unit tablet Take 1,000 Units by mouth daily.  esomeprazole (NEXIUM) 40 mg capsule Take 40 mg by mouth daily. Allergies   Allergen Reactions    Phenergan [Promethazine] Other (comments)     \"loose my wits i go crazy\"    Nsaids (Non-Steroidal Anti-Inflammatory Drug) Nausea and Vomiting    Adhesive Tape-Silicones Other (comments)     BLISTERS    Atenolol Nausea and Vomiting    Cymbalta [Duloxetine] Other (comments)     CONFUSION      Digoxin Nausea and Vomiting    Gluten Other (comments)     GLUTEN INTOLERANCE    Macrobid [Nitrofurantoin Monohyd/M-Cryst] Nausea and Vomiting    Novocain [Procaine] Other (comments)     Paralysis     Sulfa (Sulfonamide Antibiotics) Nausea and Vomiting    Codeine Other (comments)     Unable to swallow       Review of Systems:    Review of systems not obtained due to patient factors. Objective:        Visit Vitals    /78 (BP 1 Location: Right arm, BP Patient Position: Sitting;Supine)    Pulse (!) 118    Temp 98.9 °F (37.2 °C)    Resp 25    SpO2 100%       Physical Exam:  GENERAL: alert, cooperative, no distress, appears stated age, mildly confused, EYE: negative, THROAT & NECK: normal, LUNG: clear to auscultation bilaterally, HEART: irregularly irregular rhythm, ABDOMEN: soft with mild tenderness no rebound or guarding., EXTREMITIES: Mild edema. There is a lidoderm patch on the left leg. THere is some tenderness with palpation. , SKIN: Normal., NEUROLOGIC: positive findings: mild confusion, PSYCH: non focal    Imaging:  images and reports reviewed- reviewed the CT scan with the day radiologist which prompted the current addendum.   CT-  There are loops of collapsed bowel containing small foci of gas  interposed between the liver and the diaphragm which have an appearance  suggestive of pneumoperitoneum, however there is no true pneumoperitoneum  identified.       Study Result          Lab/Data Review: All lab results for the last 24 hours reviewed. Recent Results (from the past 24 hour(s))   CBC WITH AUTOMATED DIFF    Collection Time: 10/02/18 12:40 PM   Result Value Ref Range    WBC 5.4 3.4 - 10.8 x10E3/uL    RBC 3.46 (L) 3.77 - 5.28 x10E6/uL    HGB 10.2 (L) 11.1 - 15.9 g/dL    HCT 30.1 (L) 34.0 - 46.6 %    MCV 87 79 - 97 fL    MCH 29.5 26.6 - 33.0 pg    MCHC 33.9 31.5 - 35.7 g/dL    RDW 15.3 12.3 - 15.4 %    PLATELET 330 721 - 298 x10E3/uL    NEUTROPHILS 82 Not Estab. %    Lymphocytes 8 Not Estab. %    MONOCYTES 10 Not Estab. %    EOSINOPHILS 0 Not Estab. %    BASOPHILS 0 Not Estab. %    ABS. NEUTROPHILS 4.4 1.4 - 7.0 x10E3/uL    Abs Lymphocytes 0.4 (L) 0.7 - 3.1 x10E3/uL    ABS. MONOCYTES 0.5 0.1 - 0.9 x10E3/uL    ABS. EOSINOPHILS 0.0 0.0 - 0.4 x10E3/uL    ABS.  BASOPHILS 0.0 0.0 - 0.2 T97M3/TW   METABOLIC PANEL, BASIC    Collection Time: 10/02/18 12:40 PM   Result Value Ref Range    Glucose 83 65 - 99 mg/dL    BUN 11 10 - 36 mg/dL    Creatinine 0.57 0.57 - 1.00 mg/dL    GFR est non-AA 80 >59 mL/min/1.73    GFR est AA 92 >59 mL/min/1.73    BUN/Creatinine ratio 19 12 - 28    Sodium 140 134 - 144 mmol/L    Potassium 4.2 3.5 - 5.2 mmol/L    Chloride 102 96 - 106 mmol/L    CO2 20 20 - 29 mmol/L    Calcium 9.1 8.7 - 10.3 mg/dL   EKG, 12 LEAD, INITIAL    Collection Time: 10/03/18  4:12 AM   Result Value Ref Range    Ventricular Rate 116 BPM    Atrial Rate 116 BPM    P-R Interval 136 ms    QRS Duration 78 ms    Q-T Interval 326 ms    QTC Calculation (Bezet) 453 ms    Calculated P Axis 40 degrees    Calculated R Axis 63 degrees    Calculated T Axis 35 degrees    Diagnosis       Sinus tachycardia with premature atrial complexes  When compared with ECG of 24-MAY-2015 04:41,  premature atrial complexes are now present     CBC WITH AUTOMATED DIFF    Collection Time: 10/03/18  4:58 AM   Result Value Ref Range    WBC 6.7 3.6 - 11.0 K/uL    RBC 4.08 3.80 - 5.20 M/uL    HGB 12.1 11.5 - 16.0 g/dL    HCT 38.9 35.0 - 47.0 %    MCV 95.3 80.0 - 99.0 FL    MCH 29.7 26.0 - 34.0 PG    MCHC 31.1 30.0 - 36.5 g/dL    RDW 15.7 (H) 11.5 - 14.5 %    PLATELET 883 979 - 750 K/uL    MPV 10.5 8.9 - 12.9 FL    NRBC 0.0 0  WBC    ABSOLUTE NRBC 0.00 0.00 - 0.01 K/uL    NEUTROPHILS 75 32 - 75 %    LYMPHOCYTES 13 12 - 49 %    MONOCYTES 11 5 - 13 %    EOSINOPHILS 1 0 - 7 %    BASOPHILS 0 0 - 1 %    IMMATURE GRANULOCYTES 0 0.0 - 0.5 %    ABS. NEUTROPHILS 5.0 1.8 - 8.0 K/UL    ABS. LYMPHOCYTES 0.9 0.8 - 3.5 K/UL    ABS. MONOCYTES 0.7 0.0 - 1.0 K/UL    ABS. EOSINOPHILS 0.1 0.0 - 0.4 K/UL    ABS. BASOPHILS 0.0 0.0 - 0.1 K/UL    ABS. IMM. GRANS. 0.0 0.00 - 0.04 K/UL    DF AUTOMATED     METABOLIC PANEL, COMPREHENSIVE    Collection Time: 10/03/18  4:58 AM   Result Value Ref Range    Sodium 134 (L) 136 - 145 mmol/L    Potassium 6.5 (H) 3.5 - 5.1 mmol/L    Chloride 102 97 - 108 mmol/L    CO2 20 (L) 21 - 32 mmol/L    Anion gap 12 5 - 15 mmol/L    Glucose 91 65 - 100 mg/dL    BUN 10 6 - 20 MG/DL    Creatinine 0.60 0.55 - 1.02 MG/DL    BUN/Creatinine ratio 17 12 - 20      GFR est AA >60 >60 ml/min/1.73m2    GFR est non-AA >60 >60 ml/min/1.73m2    Calcium 9.7 8.5 - 10.1 MG/DL    Bilirubin, total 1.1 (H) 0.2 - 1.0 MG/DL    ALT (SGPT) 17 12 - 78 U/L    AST (SGOT) 54 (H) 15 - 37 U/L    Alk.  phosphatase 103 45 - 117 U/L    Protein, total 7.3 6.4 - 8.2 g/dL    Albumin 3.0 (L) 3.5 - 5.0 g/dL    Globulin 4.3 (H) 2.0 - 4.0 g/dL    A-G Ratio 0.7 (L) 1.1 - 2.2     MAGNESIUM    Collection Time: 10/03/18  4:58 AM   Result Value Ref Range    Magnesium 1.7 1.6 - 2.4 mg/dL   PHOSPHORUS    Collection Time: 10/03/18  4:58 AM   Result Value Ref Range    Phosphorus 3.1 2.6 - 4.7 MG/DL   URINALYSIS W/ REFLEX CULTURE Collection Time: 10/03/18  6:18 AM   Result Value Ref Range    Color YELLOW/STRAW      Appearance CLEAR CLEAR      Specific gravity 1.025 1.003 - 1.030      pH (UA) 6.0 5.0 - 8.0      Protein NEGATIVE  NEG mg/dL    Glucose NEGATIVE  NEG mg/dL    Ketone >80 (A) NEG mg/dL    Bilirubin NEGATIVE  NEG      Blood NEGATIVE  NEG      Urobilinogen 0.2 0.2 - 1.0 EU/dL    Nitrites NEGATIVE  NEG      Leukocyte Esterase NEGATIVE  NEG      WBC 0-4 0 - 4 /hpf    RBC 0-5 0 - 5 /hpf    Epithelial cells FEW FEW /lpf    Bacteria NEGATIVE  NEG /hpf    UA:UC IF INDICATED CULTURE NOT INDICATED BY UA RESULT CNI     POC LACTIC ACID    Collection Time: 10/03/18  7:30 AM   Result Value Ref Range    Lactic Acid (POC) 1.2 0.4 - 2.0 mmol/L   POC CHEM8    Collection Time: 10/03/18  7:30 AM   Result Value Ref Range    Calcium, ionized (POC) 1.04 (L) 1.12 - 1.32 mmol/L    Sodium (POC) 134 (L) 136 - 145 mmol/L    Potassium (POC) 7.1 (HH) 3.5 - 5.1 mmol/L    Chloride (POC) 105 98 - 107 mmol/L    CO2 (POC) 21 21 - 32 mmol/L    Anion gap (POC) 16 10 - 20 mmol/L    Glucose (POC) 89 65 - 100 mg/dL    BUN (POC) 9 9 - 20 mg/dL    Creatinine (POC) 0.5 (L) 0.6 - 1.3 mg/dL    GFRAA, POC >60 >60 ml/min/1.73m2    GFRNA, POC >60 >60 ml/min/1.73m2    Hematocrit (POC) 36 35.0 - 47.0 %    Comment Notified RN or MD immediately by      SAMPLES BEING HELD    Collection Time: 10/03/18  8:20 AM   Result Value Ref Range    SAMPLES BEING HELD 1LAV     COMMENT        Add-on orders for these samples will be processed based on acceptable specimen integrity and analyte stability, which may vary by analyte.    METABOLIC PANEL, BASIC    Collection Time: 10/03/18  8:20 AM   Result Value Ref Range    Sodium 140 136 - 145 mmol/L    Potassium 3.8 3.5 - 5.1 mmol/L    Chloride 105 97 - 108 mmol/L    CO2 19 (L) 21 - 32 mmol/L    Anion gap 16 (H) 5 - 15 mmol/L    Glucose 96 65 - 100 mg/dL    BUN 9 6 - 20 MG/DL    Creatinine 0.51 (L) 0.55 - 1.02 MG/DL    BUN/Creatinine ratio 18 12 - 20      GFR est AA >60 >60 ml/min/1.73m2    GFR est non-AA >60 >60 ml/min/1.73m2    Calcium 8.6 8.5 - 10.1 MG/DL   PROTHROMBIN TIME + INR    Collection Time: 10/03/18  8:20 AM   Result Value Ref Range    INR 1.1 0.9 - 1.1      Prothrombin time 10.9 9.0 - 11.1 sec   PTT    Collection Time: 10/03/18  8:20 AM   Result Value Ref Range    aPTT 26.7 22.1 - 32.0 sec    aPTT, therapeutic range     58.0 - 77.0 SECS       Assessment:Plan   Abdominal pain- Seems to be chronic and episodic in nature. Given the Ct addendum findings there are no surgical indications at this time. Would recommend medical admission. Can consider Gi Consult for the nausea and vomiting .        Signed By: Frankie Bob MD     October 3, 2018

## 2018-10-03 NOTE — ED NOTES
2178:  Cleansed patient with wipes, applied periwick. Will attempt to draw urine specimen with positive urine output.

## 2018-10-03 NOTE — IP AVS SNAPSHOT
[address] [address] 
[phone] Patient: Eddie Sensor MRN: [id number] :[id number] About your hospitalization You were admitted on:  October 3, 2018 You last received care in the:  [facility] You were discharged on:  2018 Why you were hospitalized Your primary diagnosis was:  Acute Metabolic Encephalopathy Your diagnoses also included:  Dehydration, Metabolic Acidosis, Severe Pain, Dissociative Episodes, Poor Fluid Intake, Neck Pain, Depression, Vascular Dementia Without Behavioral Disturbance, Debility, Decreased Oral Intake, Allodynia Follow-up Information Follow up With Details Comments Contact Info Katelyn Melgoza NP In 1 week  [address] [id number] [phone] [facility]   [address] [facility] 
[phone] Discharge Orders None A check bethanie indicates which time of day the medication should be taken. My Medications START taking these medications Instructions Each Dose to Equal  
 Morning Noon Evening Bedtime  
 mirtazapine 15 mg tablet Commonly known as:  Mayford Shone Your last dose was: Your next dose is: Take 1 Tab by mouth nightly for 30 days. 15 mg CHANGE how you take these medications Instructions Each Dose to Equal  
 Morning Noon Evening Bedtime  
 gabapentin 400 mg capsule Commonly known as:  NEURONTIN What changed:   
- medication strength 
- how much to take Your last dose was: Your next dose is: Take 1 Cap by mouth three (3) times daily for 30 days. 400 mg  
    
   
   
   
  
 predniSONE 5 mg tablet Commonly known as:  Ila Schulte Start taking on:  10/10/2018 What changed:   
- medication strength 
- how much to take Your last dose was: Your next dose is: Take 1 Tab by mouth daily for 30 days. 5 mg CONTINUE taking these medications Instructions Each Dose to Equal  
 Morning Noon Evening Bedtime ALIGN 4 mg Cap Generic drug:  Bifidobacterium Infantis Your last dose was: Your next dose is: Take 4 mg by mouth daily. 4 mg  
    
   
   
   
  
 cyanocobalamin 1,000 mcg tablet Your last dose was: Your next dose is: Take 1,000 mcg by mouth daily. 1000 mcg  
    
   
   
   
  
 fentaNYL 25 mcg/hr PATCH Commonly known as:  Loreli Bologna Your last dose was: Your next dose is:    
   
   
 1 Patch by TransDERmal route every seventy-two (72) hours. 1 Patch MIRALAX 17 gram/dose powder Generic drug:  polyethylene glycol Your last dose was: Your next dose is: Take 17 g by mouth daily as needed. 17 g NexIUM 40 mg capsule Generic drug:  esomeprazole Your last dose was: Your next dose is: Take 40 mg by mouth Daily (before breakfast). 40 mg  
    
   
   
   
  
 nitroglycerin 0.4 mg SL tablet Commonly known as:  NITROSTAT Your last dose was: Your next dose is:    
   
   
 1 Tab by SubLINGual route every five (5) minutes as needed for Chest Pain. 0.4 mg  
    
   
   
   
  
 ondansetron 8 mg disintegrating tablet Commonly known as:  ZOFRAN ODT Your last dose was: Your next dose is: Take 1 Tab by mouth every eight (8) hours. 8 mg SENNA PLUS 8.6-50 mg per tablet Generic drug:  senna-docusate Your last dose was: Your next dose is: Take 1 Tab by mouth daily. 1 Tab  
    
   
   
   
  
 spironolactone 25 mg tablet Commonly known as:  ALDACTONE Your last dose was: Your next dose is: Take 0.5 Tabs by mouth daily. 12.5 mg  
    
   
   
   
  
 topiramate 25 mg tablet Commonly known as:  TOPAMAX Your last dose was: Your next dose is: Take 1 Tab by mouth daily (with breakfast). 25 mg  
    
   
   
   
  
 * TOPROL XL 25 mg XL tablet Generic drug:  metoprolol succinate Your last dose was: Your next dose is: Take 25 mg by mouth daily. (25 mg in the morning; 50 mg in the evening) 25 mg  
    
   
   
   
  
 * metoprolol succinate 25 mg XL tablet Commonly known as:  TOPROL-XL Your last dose was: Your next dose is: Take 50 mg by mouth every evening. (25 mg in the morning; 50 mg in the evening) 50 mg  
    
   
   
   
  
 traMADol 50 mg tablet Commonly known as:  ULTRAM  
   
Your last dose was: Your next dose is: Take 50 mg by mouth two (2) times daily as needed for Pain. 50 mg  
    
   
   
   
  
 VITAMIN D3 1,000 unit tablet Generic drug:  cholecalciferol Your last dose was: Your next dose is: Take 1,000 Units by mouth daily. 1000 Units * Notice: This list has 2 medication(s) that are the same as other medications prescribed for you. Read the directions carefully, and ask your doctor or other care provider to review them with you. Where to Get Your Medications Information on where to get these meds will be given to you by the nurse or doctor. ! Ask your nurse or doctor about these medications  
  gabapentin 400 mg capsule  
 mirtazapine 15 mg tablet  
 predniSONE 5 mg tablet Opioid Education Prescription Opioids: What You Need to Know: 
 
 
ATTENDING PHYSICIAN: Marco Antonio Fuentes MD 
DISCHARGING PROVIDER: Marco Antonio Fuentes MD   
To contact this individual call [phone] and ask the  to page. If unavailable ask to be transferred the Adult Hospitalist Department. DISCHARGE DIAGNOSES Dehydration CONSULTATIONS: IP CONSULT TO GENERAL SURGERY 
IP CONSULT TO HOSPITALIST 
IP CONSULT TO PALLIATIVE CARE - PROVIDER 
IP CONSULT TO PALLIATIVE CARE - PROVIDER 
IP CONSULT TO PSYCHIATRY PROCEDURES/SURGERIES: * No surgery found * PENDING TEST RESULTS:  
At the time of discharge the following test results are still pending: FOLLOW UP APPOINTMENTS:  
Follow-up Information Follow up With Details Comments Contact Info Jimmy Melgoza NP In 1 week  [address] [facility] [address] [phone] ADDITIONAL CARE RECOMMENDATIONS:  
 
DIET: Regular Diet and Cardiac Diet ACTIVITY: Activity as tolerated WOUND CARE:  
 
EQUIPMENT needed:  
 
 
DISCHARGE MEDICATIONS: 
 See Medication Reconciliation Form · It is important that you take the medication exactly as they are prescribed. · Keep your medication in the bottles provided by the pharmacist and keep a list of the medication names, dosages, and times to be taken in your wallet. · Do not take other medications without consulting your doctor. NOTIFY YOUR PHYSICIAN FOR ANY OF THE FOLLOWING:  
Fever over 101 degrees for 24 hours. Chest pain, shortness of breath, fever, chills, nausea, vomiting, diarrhea, change in mentation, falling, weakness, bleeding. Severe pain or pain not relieved by medications. Or, any other signs or symptoms that you may have questions about. DISPOSITION: 
x  Home With: 
 OT  PT  [facility]  RN  
  
 SNF/Inpatient Rehab/LTAC Independent/assisted living Hospice Other: CDMP Checked:  
Yes x PROBLEM LIST Updated: 
Yes x Signed:  
Ollie Cockayne, MD 
10/9/2018 
8:45 AM 
 
  
  
  
[employer] Announcement We are excited to announce that we are making your provider's discharge notes available to you in [employer]. You will see these notes when they are completed and signed by the physician that discharged you from your recent hospital stay. If you have any questions or concerns about any information you see in [employer], please call the Health Information Department where you were seen or reach out to your Primary Care Provider for more information about your plan of care. Introducing [facility]! Dear Isabel Patterson: 
Thank you for requesting a [employer] account. Our records indicate that you already have an active [employer] account. You can access your account anytime at [email or web address] Did you know that you can access your hospital and ER discharge instructions at any time in [employer]? You can also review all of your test results from your hospital stay or ER visit. Additional Information If you have questions, please visit the Frequently Asked Questions section of the [employer] website at [email or web address] Remember, [employer] is NOT to be used for urgent needs. For medical emergencies, dial 911. Now available from your iPhone and Android! Introducing Gideon Kearns As a [employer] patient, I wanted to make you aware of our electronic visit tool called Gideon Kearns. [employer] 24/7 allows you to connect within minutes with a medical provider 24 hours a day, seven days a week via a mobile device or tablet or logging into a secure website from your computer.   You can access [facility] [employer] 24/7 from anywhere in the [place]. A virtual visit might be right for you when you have a simple condition and feel like you just dont want to get out of bed, or cant get away from work for an appointment, when your regular [facility] provider is not available (evenings, weekends or holidays), or when youre out of town and need minor care. Electronic visits cost only $49 and if the [employer] 24/7 provider determines a prescription is needed to treat your condition, one can be electronically transmitted to a nearby pharmacy*. Please take a moment to enroll today if you have not already done so. The enrollment process is free and takes just a few minutes. To enroll, please download the [employer] 24/7 natasha to your tablet or phone, or visit [email or web address] to enroll on your computer. And, as an [address] [place] [address] patient with a [employer] account, the results of your visits will be scanned into your electronic medical record and your primary care provider will be able to view the scanned results. We urge you to continue to see your regular [facility] provider for your ongoing medical care. And while your primary care provider may not be the one available when you seek a [employer] virtual visit, the peace of mind you get from getting a real diagnosis real time can be priceless. For more information on [employer], view our Frequently Asked Questions (FAQs) at [email or web address]. Sincerely, 
 
Nicolasa Brady MD 
Chief Medical Officer [address] [place] [address] *:  certain medications cannot be prescribed via [employer] Providers Seen During Your Hospitalization Provider Specialty Primary office phone Luana Romeo MD Emergency Medicine [phone] Salas Espinosa MD Internal Medicine [phone] Mari Ayala MD Internal Medicine [phone] Carol Ann Lazaro MD Hospitalist [phone] Ted Amado MD Internal Medicine [phone] Immunizations Administered for This Admission Name Date Influenza Vaccine (Quad) PF  Deferred () Your Primary Care Physician (PCP) Primary Care Physician Office Phone Office Fax [address] [phone] You are allergic to the following Allergen Reactions Phenergan (Promethazine) Other (comments) \"loose my wits i go crazy\" Nsaids (Non-Steroidal Anti-Inflammatory Drug) Nausea and Vomiting Adhesive Tape-Silicones Other (comments) BLISTERS Atenolol Nausea and Vomiting Cymbalta (Duloxetine) Other (comments) CONFUSION Digoxin Nausea and Vomiting Gluten Other (comments) GLUTEN INTOLERANCE Macrobid (Nitrofurantoin Monohyd/M-Cryst) Nausea and Vomiting Novocain (Procaine) Other (comments) Paralysis Sulfa (Sulfonamide Antibiotics) Nausea and Vomiting Codeine Other (comments) Unable to swallow Recent Documentation Height Weight Breastfeeding? BMI OB Status Smoking Status 1.524 m 58.4 kg No 25.15 kg/m2 Postmenopausal Never Smoker Emergency Contacts Name Discharge Info Relation Home Work Mobile [address] [facility] [address] CAREGIVER [3] Child [2] [phone] [facility]     [phone] Patient Belongings The following personal items are in your possession at time of discharge: 
  Dental Appliances: None  Visual Aid: With patient, Glasses      Home Medications: None   Jewelry: None  Clothing: At bedside    Other Valuables: None Please provide this summary of care documentation to your next provider. Signatures-by signing, you are acknowledging that this After Visit Summary has been reviewed with you and you have received a copy. Patient Signature:  ____________________________________________________________ Date:  ____________________________________________________________  
  
Mardella Older Provider Signature:  ____________________________________________________________ Date:  ____________________________________________________________

## 2018-10-03 NOTE — Clinical Note
Patient Class[de-identified] OBSERVATION [413] Type of Bed: Medical [8] Reason for Observation: dehydration. For hydration and monitoring. Admitting Diagnosis: Dehydration [276.51. ICD-9-CM] Admitting Physician: Samira Philippe Attending Physician: Samira Philippe

## 2018-10-03 NOTE — H&P
[address]  HISTORY AND PHYSICAL Laurie Torres 
MR#: [id number] : [id number] ACCOUNT #: [de-identified] ADMIT DATE: 10/03/2018 CHIEF COMPLAINT:  Brought from home for pain,nausea,FTT. She has dementia. Her mental status has worsened over the last 4 or 5 days. Her son whom she lives with is a source of history. HISTORY OF PRESENT ILLNESS:  This is a 40-year-old female with a past medical history of rheumatoid arthritis, polymyalgia rheumatica, chronic pain, atrial fibrillation and congestive heart failure, came to the emergency room today for the above-mentioned reasons. All this started when the patient complained of severe neck pain last Friday and since then she pretty much stopped eating and on Monday she has been to the [facility] Emergency Room where she was diagnosed with UTI according to her son received a dose of IV ceftriaxone and was discharged home with oral antibiotics. Because of concern for drug interaction I believe the ciprofloxacin was not filled, was to be switched to Keflex; however, she has not started taking any of the oral antibiotics. Her son noted she has been progressively worse. Her memory is nonexistent and she is nonresponsive in his words. She has not eaten anything since last Friday, so he brought her to the emergency room. No nausea or vomiting, no headache, no fever or chills, no chest pain or cough, no diarrhea according to her son. Her history is significant for chronic pain. She has been on, according to her son, Clarene Dross dose of hydrocodone morphine\" when she was in [place]. She moved to live with son 4 years ago and she has been going to a pain specialist to wean her off the hydrocodone morphine and was put on fentanyl patch initially 25 mcg currently 12 mcg and she is on also gabapentin, tramadol and prednisone. She has not been back to her pain specialist for over 3 years.   Her primary doctor, presently Dr. Yady Mancilla, is managing that. This week her NP has visited at home. She takes topiramate for migraine prophylaxis. Normally she has dementia, but is alert, oriented. She is able to walk with a walker. According to son, she was admitted to the hospital in January and subsequently was discharged to a skilled nursing rehabilitation. She slowly recovered and recuperated and was able to get back to her baseline. Her son noted during that time, she had issue with her swallowing, but slowly recovered and she was able to eat normal.  During my interview, the patient is confused. She could not tell me her name. She keeps repeating \"my name\" but she was able to tell me her son's name Jackelyn Nguyen. PAST MEDICAL HISTORY:  As detailed in the HPI. MEDICATIONS:  I confirmed with her son are:  Imodium p.r.n.,  Aldactone 12.5 mg, senna Colace, vitamin D3, fentanyl patch 12 mcg, tramadol 50 mg every 12 hours as needed,  Zofran p.r.n.,  Neurontin 600 mg t.i.d., diclofenac gel, prednisone 2.5 mg daily, MiraLax, topiramate 25 mg daily, metoprolol 25 mg b.i.d., probiotic,  vitamin B12 1000 mcg daily, nitroglycerin as needed and Nexium 40 mg daily. ALLERGIES:  PHENERGAN AND NONSTEROIDALS, ATENOLOL, CYMBALTA DIGOXIN, MACROBID, NOVOCAIN, SULFA DRUGS, CODEINE, GLUTEN AND ADHESIVE TAPE. SOCIAL HISTORY:  Patient has dementia. She currently lives with her son, never smoked, no drug or alcohol use history. REVIEW OF SYSTEMS:  Unable to obtain directly from the patient due to her altered mental status, but according to the son she has diffuse pain and tender all over. She has become more lethargic and altered than her baseline. Otherwise, no fever, nausea, vomiting, headache, chest pain or diarrhea reported. FAMILY MEDICAL HISTORY:  Intestinal obstruction in her mother, cancer, unknown type of cancer in her father, stroke in her father, heart attack, cancer in her brother.  
 
PHYSICAL EXAMINATION: 
 GENERAL:  The patient is lethargic verbally responsive, does not appear to be aware of the surrounding. VITAL SIGNS:  Temperature is 98.9, heart rate is 112, blood pressure 155/83, respirations 27, oxygen saturation 97% on room air. HEENT:  Head is atraumatic. Nonicteric sclerae. No pallor. No cervical spine deformity. Patient is tender all over . LUNGS:  Clear bilaterally. CARDIOVASCULAR:  S1, S2 well heard and regular. No gallop. ABDOMEN:  Soft, nontender. EXTREMITIES:  No edema, but she is tender in the sternal area, diffuse abdominal area. Legs and extremities pretty much everywhere she is touched. She responds by saying \"ouch. \" CENTRAL NERVOUS SYSTEM:  She is lethargic and delirious otherwise nonfocal. 
 
LABORATORY DATA:  Complete blood count, no leukocytosis. Hemoglobin is 12. Urine examination is negative except elevated ketones. INR is 1.1. Serum chemistry showed initially at point of care calcium was 6.5, but in a regular chemistry lab it was 3.8, anion gap 16, CO2 19. Creatinine is normal. 
 
IMAGING STUDY:  Noncontrast CAT scan of the head is negative. She has contrasted abdominal pelvic CT that showed diverticulosis and initially there was a question of pneumoperitoneum, but this report has since been addended reflecting \"there are loops of collapsed bowel containing small foci of gas interposed between the liver and the diaphragm which have an appearance suggestive of pneumoperitoneum; however, there is now true peritoneum identified. \"  A portable chest x-ray is negative. A 12-lead EKG showed sinus tachycardia, heart rate of 116, . ASSESSMENT AND PLAN: 
1. Acute metabolic encephalopathy on top of dementia due to pain and dehydration. Exam is nonfocal.  Head CT is negative. Management will be managing the underlying issue. 2.  Dehydration with anion gap metabolic acidosis due to absent oral intake.  When she is getting intravenous fluid boluses we will continue maintenance. When she is screened for dysphagia we will institute diet. No evidence of bowel ischemia or bowel for perforation. 3.  There was question of pneumoperitoneum. Now, the CT reported is addended there is no evidence. General Surgery has evaluated and suggested medical management. 4. Severe pain. Patient has history of rheumatoid arthritis and polymyalgia rheumatica. She is followed with rheumatologist.  The PMR polymyalgia rheumatica was diagnosed in [place]. Her local rheumatologist here could not verify that diagnosis. Patient at one point was said to have been on heavy doses of hydrocodone and morphine but was weaned off to only a fentanyl patch, tramadol, and gabapentin. She has seen a local pain specialist about 3 years ago. Currently, her primary care doctors are managing that. She is in a lot of pain. We will increase the fentanyl patch from 12 at current dose to 25. According to her son, she was 25 more about a year ago. May adjust the tramadol, but we will leave the gabapentin as it is because she is already getting 1800 mg in 3 divided doses. May need help with palliative care. In fact, her primary doctor is Dr. Chaparrita Fenton, who does a good job with palliative medicine. Since this all started with severe neck pain, I think it is prudent to check a CT of cervical spine. 3.  Tachycardia sinus. Patient has history of AFib and congestive heart failure according to her son. Currently, she is sinus tachycardic. No evidence of congestive heart failure. The sinus tachycardia is most probably due to pain and dehydration. Hydration is ongoing. Her Lopressor will be continued and we give a  onetime dose of Lopressor and may use as needed. Hope this will settle down as she gets hydrated. Continue prednisone she is on for her rheumatoid arthritis, PMR. She is on Topamax for migraine prophylaxis as will be continued. 4. Gastrointestinal:  She is on Nexium. 5. Deep venous thrombosis. We will give her Lovenox. She is tender everywhere so we will SCDs on her legs. 6.  While she was recently diagnosed with urinary tract infection gave a single dose of antibiotics in the ER 3 days ago. Currently on examination is not consistent with urinary tract infection, so we will refrain from any antibiotics at the present time. SOCIAL HISTORY:  She has dementia, but she is mobile, fairly functional, lives with her son. Depending upon how she does, she may need rehabilitation to recuperate. CODE STATUS:  I have discussed advanced care planning with her son. She has durable DNR on file and her son confirmed DNR status. She has a medical power of  tapered on file. MD UGO Lu/RODNEY 
D: 10/03/2018 10:28    
T: 10/03/2018 11:56 JOB #: [id number]

## 2018-10-03 NOTE — PROGRESS NOTES
Admission Medication Reconciliation: 
 
Information obtained from: This medication history was obtained from the patient's son Rashida Siddiqui; [phone]) by phone; (s)he appears to be a reliable historian. An RX Query is available. Summary:  
 
Medications added: metoprolol succinate, cephalexin prescribed by [facility] but not picked up yet Medications deleted: diclofenac gel, loperamide, metoprolol tartrate Dose changes: fentanyl 25 mcg/hr (from 12 mcg/hr) Chief Complaint for this Admission:  failure to thrive Significant PMH/Disease States:  
Past Medical History:  
Diagnosis Date  Arrhythmia A-FIB  Arthritis  CAD (coronary artery disease)  MI  
 Congestive heart failure (CHF) ([facility] [address]  GERD (gastroesophageal reflux disease)  Gluten intolerance  Heart failure ([facility] [address] CHF  Polymyalgia ([facility] [address] Allergies:  Phenergan [promethazine]; Nsaids (non-steroidal anti-inflammatory drug); Adhesive tape-silicones; Atenolol; Cymbalta [duloxetine]; Digoxin; Gluten; Macrobid [nitrofurantoin monohyd/m-cryst]; Novocain [procaine]; Sulfa (sulfonamide antibiotics); and Codeine Prior to Admission Medications:  
Prior to Admission Medications Prescriptions Last Dose Informant Patient Reported? Taking? Bifidobacterium Infantis (ALIGN) 4 mg cap 2018  Yes Yes Sig: Take 4 mg by mouth daily. cholecalciferol (VITAMIN D3) 1,000 unit tablet 2018  Yes Yes Sig: Take 1,000 Units by mouth daily. cyanocobalamin 1,000 mcg tablet 2018  Yes Yes Sig: Take 1,000 mcg by mouth daily. esomeprazole (NEXIUM) 40 mg capsule 2018  Yes Yes Sig: Take 40 mg by mouth Daily (before breakfast). fentaNYL (DURAGESIC) 25 mcg/hr [facility] 2018  Yes Yes Si Patch by TransDERmal route every seventy-two (72) hours. gabapentin (NEURONTIN) 300 mg capsule 2018  No Yes Sig: Take 2 Caps by mouth three (3) times daily for 90 days. metoprolol succinate (TOPROL XL) 25 mg XL tablet 2018  Yes Yes Sig: Take 25 mg by mouth daily. (25 mg in the morning; 50 mg in the evening) metoprolol succinate (TOPROL-XL) 25 mg XL tablet 2018  Yes Yes Sig: Take 50 mg by mouth every evening. (25 mg in the morning; 50 mg in the evening)  
nitroglycerin (NITROSTAT) 0.4 mg SL tablet   No Yes Si Tab by SubLINGual route every five (5) minutes as needed for Chest Pain. ondansetron (ZOFRAN ODT) 8 mg disintegrating tablet   No Yes Sig: Take 1 Tab by mouth every eight (8) hours. polyethylene glycol (MIRALAX) 17 gram/dose powder   Yes Yes Sig: Take 17 g by mouth daily as needed. predniSONE (DELTASONE) 2.5 mg tablet 2018  No Yes Sig: Take 1 Tab by mouth daily. senna-docusate (SENNA PLUS) 8.6-50 mg per tablet 2018  Yes Yes Sig: Take 1 Tab by mouth daily. spironolactone (ALDACTONE) 25 mg tablet 2018  No Yes Sig: Take 0.5 Tabs by mouth daily. topiramate (TOPAMAX) 25 mg tablet 2018  No Yes Sig: Take 1 Tab by mouth daily (with breakfast). traMADol (ULTRAM) 50 mg tablet   Yes Yes Sig: Take 50 mg by mouth two (2) times daily as needed for Pain. Facility-Administered Medications: None Thank you for allowing me to participate in the care of this patient. Please contact the pharmacy () or the medication reconciliation pharmacist () with any questions. Darrel Salazar, Pharm. D., BCPS, BCPPS

## 2018-10-04 ENCOUNTER — APPOINTMENT (OUTPATIENT)
Dept: GENERAL RADIOLOGY | Age: 83
DRG: 641 | End: 2018-10-04
Attending: HOSPITALIST
Payer: MEDICARE

## 2018-10-04 LAB
BACTERIA SPEC CULT: NORMAL
CC UR VC: NORMAL
SERVICE CMNT-IMP: NORMAL

## 2018-10-04 PROCEDURE — 74011000258 HC RX REV CODE- 258: Performed by: HOSPITALIST

## 2018-10-04 PROCEDURE — 74011000250 HC RX REV CODE- 250: Performed by: HOSPITALIST

## 2018-10-04 PROCEDURE — 92526 ORAL FUNCTION THERAPY: CPT | Performed by: SPEECH-LANGUAGE PATHOLOGIST

## 2018-10-04 PROCEDURE — 74011250636 HC RX REV CODE- 250/636: Performed by: HOSPITALIST

## 2018-10-04 PROCEDURE — 74018 RADEX ABDOMEN 1 VIEW: CPT

## 2018-10-04 PROCEDURE — 65270000029 HC RM PRIVATE

## 2018-10-04 RX ORDER — LIDOCAINE 50 MG/G
1 PATCH TOPICAL EVERY 24 HOURS
Status: DISCONTINUED | OUTPATIENT
Start: 2018-10-04 | End: 2018-10-09 | Stop reason: HOSPADM

## 2018-10-04 RX ADMIN — Medication 5 ML: at 14:00

## 2018-10-04 RX ADMIN — DEXTROSE MONOHYDRATE AND SODIUM CHLORIDE 100 ML/HR: 5; .9 INJECTION, SOLUTION INTRAVENOUS at 14:54

## 2018-10-04 RX ADMIN — ENOXAPARIN SODIUM 40 MG: 40 INJECTION SUBCUTANEOUS at 10:36

## 2018-10-04 NOTE — PROGRESS NOTES
Progress Note Patient: Mukesh Barry MRN: [id number]  SSN: [id number] YOB: 1925  Age: 80 y.o. Sex: female Admit Date: 10/3/2018 * No surgery found * Procedure:   
 
Subjective:  
 
Patient is not all conversant today. Objective:  
 
Visit Vitals  /73 (BP 1 Location: Right arm)  Pulse (!) 108  Temp 98.5 °F (36.9 °C)  Resp 16  Wt 143 lb 8.3 oz (65.1 kg)  SpO2 97%  Breastfeeding No  
 BMI 28.03 kg/m2 Temp (24hrs), Av.3 °F (36.8 °C), Min:97.5 °F (36.4 °C), Max:98.8 °F (37.1 °C) Physical Exam:   
Gen- Arousable Lungs-CTA H-RRR 
abd- still soft with some tenderness to deep palpation. No rebound or guarding no peritoneal signs. Legs- More tender Data Review: images and reports reviewed AXR-Gas pattern is within normal limits. Free air is not excluded since 
this is a supine study. 
  
  
  
Lab Review: All lab results for the last 24 hours reviewed. No results found for this or any previous visit (from the past 24 hour(s)). Assessment:  
 
Hospital Problems  Date Reviewed: 10/2/2018 Codes Class Noted POA Dehydration ICD-10-CM: E86.0 ICD-9-CM: 276.51  10/3/2018 Unknown Metabolic acidosis URO-17-AL: E87.2 ICD-9-CM: 276.2  10/3/2018 Unknown Severe pain ICD-10-CM: J41 ICD-9-CM: 780.96  10/3/2018 Unknown * (Principal)Acute metabolic encephalopathy CZV-70-CP: G93.41 
ICD-9-CM: 348.31  10/3/2018 Unknown Plan/Recommendations/Medical Decision Making:  
Abdominal pain is unchanged. She does not have peritoneal signs and the abdominal axr is normal. No surgical plans at this point. Should be able to start clear liquids if not otherwise contraindicated. Signed By: Gladis Kyle MD   
 2018

## 2018-10-04 NOTE — PROGRESS NOTES
PT Note: 
 
Orders received and acknowledged. Chart reviewed and spoke with nursing. RN requests hold PT evaluation until later time secondary to patient moaning in pain, calling out, and vocalizing incoherently. Will continue to follow to complete evaluation. Thank you.

## 2018-10-04 NOTE — PROGRESS NOTES
Bedside and Verbal shift change report given to Diamante Akhtar (oncoming nurse) by Isis Hook (offgoing nurse). Report included the following information SBAR, Kardex, Intake/Output and MAR.

## 2018-10-04 NOTE — PROGRESS NOTES
Care Management Interventions PCP Verified by CM: Yes [facility] [phone]) Palliative Care Criteria Met (RRAT>21 & CHF Dx)?: No 
Mode of Transport at Discharge: Other (see comment) (TBD) Transition of Care Consult (CM Consult): Discharge Planning MyChart Signup: No 
Discharge Durable Medical Equipment: No 
Health Maintenance Reviewed: Yes Physical Therapy Consult: Yes Occupational Therapy Consult: Yes Speech Therapy Consult: Yes Current Support Network: Emmie (lives with her son) Confirm Follow Up Transport: Family Plan discussed with Pt/Family/Caregiver: Yes Freedom of Choice Offered: Yes [employer] Information Provided?: No 
Discharge Location Discharge Placement: Home (TBD) Reason for Admission:   Acute metabolic encephalopathy, Failure to Thrive RRAT Score:     27 Resources/supports as identified by patient/family:   Son / Rowan Abler Top Challenges facing patient (as identified by patient/family and CM): Not eating or drinking, has been declining at home Finances/Medication cost?    No concerns Transportation? Son/TBD Support system or lack thereof? Son/family, [facility] Living arrangements?     son Self-care/ADLs/Cognition? Needs assistance Current Advanced Directive/Advance Care Plan: On File Plan for utilizing home health:    TBD Likelihood of readmission: High, declining at home Transition of Care Plan:   TBD. PT, OT , and Speech consults.

## 2018-10-04 NOTE — PROGRESS NOTES
Home Based [address] [facility]  
[phone] Date of Current Visit: 10/02/18 SUMMARY:  
80 yr female referred to [facility] by Dr. Homa Jimenez. She has a significant hx for seronegative RA, PMR of multiple joints, CAD, old MI, GERD and Afib. Due to this, Ms. Bang is unable to see a community PCP without significant taxing effort. GOALS OF CARE / TREATMENT PREFERENCES:  
GOALS OF CARE: 
Patient / health care proxy stated goals: To get back to walking again. TREATMENT PREFERENCES:  
Code Status:  [] Attempt Resuscitation       [x] Do Not Attempt Resuscitation Advance Care Planning: 
Advance Care Planning 10/3/2018 Patient's Healthcare Decision Maker is: Legal Next of Kin Primary Decision Maker Name -  
Primary Decision Maker Phone Number -  
Primary Decision Maker Relationship to Patient - Secondary Decision Maker Name - Secondary Decision Maker Phone Number - Secondary Decision Maker Relationship to Patient -  
Confirm Advance Directive Yes, on file DIAGNOSES:  
 
  ICD-10-CM ICD-9-CM 1. Delirium R41.0 780.09 CBC WITH AUTOMATED DIFF  
   METABOLIC PANEL, BASIC 2. Urinary tract infection without hematuria, site unspecified N39.0 599.0 3. Dehydration E86.0 276.51 PLAN:  
Patient Instructions Dear Sridhar Cordero and Tirso Khanna, It was a pleasure seeing you at home today with [facility] ([phone]) Your stated goal: To get back to walking again. This is what we talked about: 1. Altered Mental Status/Delirium 
-This could be due to multiple medical issues 
-She was evaluated at [facility] yesterday and was diagnosed with a urinary tract infection 
-She received antibitotics IV in the ER and also IV fluids  
-She has been hospitalized for the same type of episode 3 times in the past year and took two weeks to come back to baseline each time 
-I am drawing stat labs today and will call you with results 
-Call us if she is worsening -I am concerned she will need another hospitalization 2. Dehydration 
-This is the 4th day your mother is not eating or drinking 
-She received IV fluids yesterday at the ER 
-I am checking labs today to check her hydration status 3. Urinary Tract Infection 
-Your pharmacist felt the Cipro ordered by the ER would have a major interaction with her medication and a request has been made to the ER MD to change her to Keflex 4. Health Maintenance -Jose Luis Potts will be coming to give your pneumonia vaccine (Prevnar 13, the newer one) and the flu vaccine Health Maintenance Due Topic Date Due  Shingrix Vaccine Age 50> (1 of 2) 07/27/1975  Pneumococcal 65+ Low/Medium Risk (2 of 2 - PPSV23) 12/07/2017  Influenza Age 5 to Adult  08/01/2018  GLAUCOMA SCREENING Q2Y  08/09/2018 5. Advance Care Planning - A Durable Do Not Resusitate (DDNR) is on file - An Advance Directive is already on file. This is what you have shared with us about Advance Care Planning Advance Care Planning 10/3/2018 Patient's Healthcare Decision Maker is: Legal Next of Kin Primary Decision Maker Name -  
Primary Decision Maker Phone Number -  
Primary Decision Maker Relationship to Patient - Secondary Decision Maker Name - Secondary Decision Maker Phone Number - Secondary Decision Maker Relationship to Patient -  
Confirm Advance Directive Yes, on file Someone from the Home Based Primary Care Team will see you again in 3 weeks to flush your ears and follow-up on your pain medicaiton. If there are any concerns before that time, such as medication questions, worsening symptoms or a need to see a physician for an urgent or emergent situation; please call [phone]. A physician is also on call after our normal business hours of 8am to 5pm.  
 
In order to serve you better, please allow up to 48 hours for prescription refills to be processed.  Certain medications may require more paperwork or a written prescription that you may need to  from the office. We appreciate you letting us know of any refill requests as soon as possible. Sincerely, The Home Based Primary Care Team 
MD Reagan Isaacs, CRISTIN Chávez, CRISTIN Blas, JUAN PABLO Jiang, JUAN PABLO Altered Mental Status: Care Instructions Your Care Instructions Altered mental status is a change in how well your brain is working. As a result, you may be confused, be less alert than usual, or act in odd ways. This may include seeing or hearing things that aren't really there (hallucinations). A mental status change has many possible causes. For example, it may be the result of an infection, an imbalance of chemicals in the body, or a chronic disease such as diabetes or COPD. It can also be caused by things such as a head injury, taking certain medicines, or using alcohol or drugs. The doctor may do tests to look for the cause. These tests may include urine tests, blood tests, and imaging tests such as a CT scan. Sometimes a clear cause isn't found. But tests can help the doctor rule out a serious cause of your symptoms. A change in mental status can be scary. But mental status will often return to normal when the cause is treated. So it is important to get any follow-up testing or treatment the doctor has suggested. The doctor has checked you carefully, but problems can develop later. If you notice any problems or new symptoms, get medical treatment right away. Follow-up care is a key part of your treatment and safety. Be sure to make and go to all appointments, and call your doctor if you are having problems. It's also a good idea to know your test results and keep a list of the medicines you take. How can you care for yourself at home? · Be safe with medicines. Take your medicines exactly as prescribed. Call your doctor if you think you are having a problem with your medicine. · Have another adult stay with you until you are better. This can help keep you safe. Ask that person to watch for signs that your mental status is getting worse. When should you call for help? Call 911 anytime you think you may need emergency care. For example, call if: 
  · You passed out (lost consciousness).  
 Call your doctor now or seek immediate medical care if: 
  · Your mental status is getting worse.  
  · You have new symptoms, such as a fever, chills, or shortness of breath.  
  · You do not feel safe.  
 Watch closely for changes in your health, and be sure to contact your doctor if: 
  · You do not get better as expected. Where can you learn more? Go to http://anna-sampson.info/. Enter C354 in the search box to learn more about \"Altered Mental Status: Care Instructions. \" Current as of: October 9, 2017 Content Version: 11.7 © 8491-1389 [employer]. Care instructions adapted under license by [employer] (which disclaims liability or warranty for this information). If you have questions about a medical condition or this instruction, always ask your healthcare professional. [place] [address] any warranty or liability for your use of this information. PRESCRIPTIONS GIVEN:  
 
No orders of the defined types were placed in this encounter. HISTORY:  
Please see RN note from this current visit; history taken together in presence of patient/family in the home. CHIEF COMPLAINT:  
Chief Complaint Patient presents with  Altered mental status  Dehydration  Bladder Infection HPI/SUBJECTIVE: The patient is: [x] Verbal / [] Nonverbal  
 
Mrs. Tania Correia was seen by me on October 1 for dehydration, hypotension and delirum. She [facility] to [facility] and was given IV fluids, an IV antibiotic and diagnosed with a UTI for which Cipro was ordered.   Her pharmacist did not want to fill it secondary to a major interaction with other medications and is awaiting a new prescription for cephalexin. His mother is worsening mentally and is not taking in medications, food or fluids. She still screams whenever she is touched. Lab work was normal in the ER per her son. REVIEW OF SYSTEMS:  
 
The following systems were [] reviewed / [x] unable to be reviewed Systems: constitutional, ears/nose/mouth/throat, respiratory, gastrointestinal, genitourinary, musculoskeletal, integumentary, neurologic, psychiatric, endocrine. Positive findings noted: patient can give no ROS today. FUNCTIONAL ASSESSMENT:  
 
Palliative Performance Scale (PPS): 20% PSYCHOSOCIAL/SPIRITUAL SCREENING:  
  
Any spiritual / Quaker concerns: [] Yes /  [x] No Hindu, no longer goes to [employer] because can't get out of home; [employer] does not come to her, may be willing to reconnect Caregiver Burnout: [] Yes /  [x] No /  [] No Caregiver Present PHYSICAL EXAM:  
 
Wt Readings from Last 3 Encounters:  
10/04/18 143 lb 8.3 oz (65.1 kg) 06/05/18 120 lb 9.6 oz (54.7 kg) 11/20/17 131 lb (59.4 kg) Blood pressure 125/68, pulse 82, temperature 98.3 °F (36.8 °C), temperature source Temporal, resp. rate 18, SpO2 92 %, peak flow (!) 2 L/min. Last bowel movement:  Two days ago Constitutional:  Frail, elderly female lying in bed, moaning, not interactive, appears acutely ill Eyes: pupils equal, anicteric, conjunctiva are pink ENMT: no nasal discharge, dry mucous membranes and lips. Nasal mucosa pink without discharge Cardiovascular: regular/ irregular rhythm, distal pulses intact, edema +1 LE's with tubigrip in place Respiratory: breathing not labored, symmetric, CTA A&P Gastrointestinal: soft, screams when she is touched anywhere, +bowel sounds. No TTP HSM, mass R, G or R Musculoskeletal: no deformity, no tenderness to palpation but screams that neck hurts Skin: warm, dry Neurologic: oriented X 0, screaming when touched anywhere Psychiatric: unable to assess secondary to near obtundation at times and screaming with movement or touch HISTORY:  
 
Past Medical and Surgical History reviewed in [facility] on date of initial visit Patient Active Problem List  
Diagnosis Code  Osteoarthritis M19.90  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 ([facility]) I25.111  
 CHF, stage C ([facility]) I50.9  Lumbar spinal stenosis M48.061  
 Paroxysmal atrial fibrillation ([facility]) I48.0  Advance directive on file Q76.4  PMR (polymyalgia rheumatica) ([facility]) M35.3  Lower extremity edema R60.0  CKD (chronic kidney disease) stage 2, GFR 60-89 ml/min N18.2  Long term current use of systemic steroids Z79.52  Seronegative rheumatoid arthritis of multiple sites ([facility] [address] M06.09  
 Long-term use of immunosuppressant medication Z79.899  Anemia associated with acute blood loss D62  Dependence on continuous supplemental oxygen Z99.81  
 Polyneuropathy associated with underlying disease ([facility] [address] G63  Dehydration E86.0  Metabolic acidosis I45.9  Severe pain R52  Acute metabolic encephalopathy S35.97 Family History Problem Relation Age of Onset [facility] Other Mother Intestinal obstruction  Cancer Father  Stroke Father  Heart Attack Brother  Cancer Brother  Cancer Brother  Anesth Problems Neg Hx History reviewed, no pertinent family history. Social History Substance Use Topics  Smoking status: Never Smoker  Smokeless tobacco: Never Used  Alcohol use No  
 
Allergies Allergen Reactions  Phenergan [Promethazine] Other (comments) \"loose my wits i go crazy\"  Nsaids (Non-Steroidal Anti-Inflammatory Drug) Nausea and Vomiting  Adhesive Tape-Silicones Other (comments) BLISTERS  
 Atenolol Nausea and Vomiting  Cymbalta [Duloxetine] Other (comments)   CONFUSION 
  
  Digoxin Nausea and Vomiting  Gluten Other (comments) GLUTEN INTOLERANCE  
 Macrobid [Nitrofurantoin Monohyd/M-Cryst] Nausea and Vomiting  Novocain [Procaine] Other (comments) Paralysis  Sulfa (Sulfonamide Antibiotics) Nausea and Vomiting  Codeine Other (comments) Unable to swallow No current facility-administered medications for this visit. No current outpatient prescriptions on file. Facility-Administered Medications Ordered in Other Visits Medication Dose Route Frequency  lidocaine (LIDODERM) 5 % patch 1 Patch  1 Patch TransDERmal Q24H  
 sodium chloride (NS) flush 5-10 mL  5-10 mL IntraVENous Q8H  
 sodium chloride (NS) flush 5-10 mL  5-10 mL IntraVENous PRN  
 ondansetron (ZOFRAN) injection 4 mg  4 mg IntraVENous Q4H PRN  
 enoxaparin (LOVENOX) injection 40 mg  40 mg SubCUTAneous Q24H  
 gabapentin (NEURONTIN) capsule 600 mg  600 mg Oral TID  traMADol (ULTRAM) tablet 50 mg  50 mg Oral Q12H PRN  predniSONE (DELTASONE) tablet 2.5 mg  2.5 mg Oral DAILY  polyethylene glycol (MIRALAX) packet 17 g  17 g Oral DAILY PRN  
 nitroglycerin (NITROSTAT) tablet 0.4 mg  0.4 mg SubLINGual Q5MIN PRN  
 cyanocobalamin (VITAMIN B12) tablet 1,000 mcg  1,000 mcg Oral DAILY  topiramate (TOPAMAX) tablet 25 mg  25 mg Oral DAILY WITH BREAKFAST  dextrose 5% and 0.9% NaCl infusion  100 mL/hr IntraVENous CONTINUOUS  
 fentaNYL (DURAGESIC) 25 mcg/hr patch 1 Patch  1 Patch TransDERmal Q72H  influenza vaccine 2018-19 (6 mos+)(PF) (FLUARIX QUAD/FLULAVAL QUAD) injection 0.5 mL  0.5 mL IntraMUSCular PRIOR TO DISCHARGE  metoprolol succinate (TOPROL-XL) XL tablet 25 mg  25 mg Oral DAILY And  
 metoprolol succinate (TOPROL-XL) XL tablet 50 mg  50 mg Oral DAILY  pantoprazole (PROTONIX) tablet 40 mg  40 mg Oral ACB  lactobac ac& pc-s.therm-b.anim (PETER Q/RISAQUAD)  1 Cap Oral DAILY  metoprolol (LOPRESSOR) injection 1.25 mg  1.25 mg IntraVENous Q6H  
  ondansetron (ZOFRAN ODT) tablet 8 mg  8 mg Oral Q8H PRN  
 
 
 LAB DATA REVIEWED:  
 
Lab Results Component Value Date/Time Hemoglobin A1c 5.6 09/20/2018 11:59 AM  
 
No results found for: Chente Both, MCA2, MCA3, MCAU, MCAU2, MCALPOCT Lab Results Component Value Date/Time TSH 1.560 11/20/2017 02:40 PM  
 TSH 0.86 05/25/2015 04:32 AM  
 
Lab Results Component Value Date/Time VITAMIN D, 25-HYDROXY 43.1 10/04/2017 02:52 PM  
   
 
Lab Results Component Value Date/Time WBC 6.7 10/03/2018 04:58 AM  
 HGB 12.1 10/03/2018 04:58 AM  
 PLATELET 747 22/12/3744 04:58 AM  
 
Lab Results Component Value Date/Time Sodium 140 10/03/2018 08:20 AM  
 Potassium 3.8 10/03/2018 08:20 AM  
 Chloride 105 10/03/2018 08:20 AM  
 CO2 19 (L) 10/03/2018 08:20 AM  
 BUN 9 10/03/2018 08:20 AM  
 Creatinine 0.51 (L) 10/03/2018 08:20 AM  
 Calcium 8.6 10/03/2018 08:20 AM  
 Magnesium 1.7 10/03/2018 04:58 AM  
 Phosphorus 3.1 10/03/2018 04:58 AM  
  
Lab Results Component Value Date/Time AST (SGOT) 54 (H) 10/03/2018 04:58 AM  
 Alk. phosphatase 103 10/03/2018 04:58 AM  
 Protein, total 7.3 10/03/2018 04:58 AM  
 Albumin 3.0 (L) 10/03/2018 04:58 AM  
 Globulin 4.3 (H) 10/03/2018 04:58 AM  
 
No results found for: IRON, FE, TIBC, IBCT, PSAT, FERR Total time: 40 minutes Counseling / coordination time: 10 minutes on medical concerns and why this keeps reoccurring (No UTI diagnosed in past episodes), labs to be checked, when to send her back to the hospital.   
> 50% counseling / coordination?: No

## 2018-10-04 NOTE — PROGRESS NOTES
Problem: Discharge Planning Goal: *Discharge to safe environment Outcome: Progressing Towards Goal 
Discharge planning

## 2018-10-04 NOTE — PROGRESS NOTES
Hospitalist Progress Note Joyce Mares MD 
     
                             Answering service: [phone] OR [phone] from in house phone Date of Service:  10/4/2018 NAME:  Chantell Dumont :  1925 MRN:  [id number] Admission Summary: Ms Gloria Barraza was brought from home for nausea,severe pain,altered mental status. All started on Friday with severe neck pain and she stooped eating since then. She more confused. She was diagnosed with UTI and prescribed cipro which was not filled due to concern for drug interaction per her son. She was supposed to be on keflex. Urine exam here is negative. Reason for follow up:  
 Ms Gloria Barraza is more alert and interactive. She know she is in a hospital,she thinks she is \"[place].\" She complains of abdominal and neck pain. Assessment & Plan:  
Acute metabolic encephalopathy in the setting of underlying dementia due to pain and dehydration 
-   Head CT is negative. Mx as below Dehydration with anion gap metabolic acidosis due to absent oral intake 
-Continue iv hydration. No evidence of bowel ischemia or bowel for perforation. 
-Lab not sent this morning,will ask nurse to send bmp. Abdominal pain,Dnea was unable to provide history yesterday,but today she is more alert and she tells me her belly hurts. 
-There was question of pneumoperitoneum as reported by CT abdomen and pelvis and surgery was consulted. CT reported is addended,there is no evidence of pneumoperitoneum Severe pain,neck and fiffuse. Patient has history of rheumatoid arthritis and polymyalgia rheumatica. She has followed with rheumatologist.   
-The PMR polymyalgia rheumatica was diagnosed in [place]. Her local rheumatologist here could not verify that diagnosis. -Patient at one point was said to have been on heavy doses of hydrocodone and morphine but was weaned off to only a fentanyl patch, tramadol, and gabapentin. She has seen a local pain specialist about 3 years ago. Currently, her primary care doctors are managing that. -Increased the fentanyl patch from 12 at current dose to 25. May adjust the tramadol, but we will leave the gabapentin as it is because she is already getting 1800 mg in 3 divided doses. -CT spine 3 mm chronic appearing anterolisthesis of C4 with respect to C5. No acute fracture. Tachycardia sinus. Patient has history of AFib and congestive heart failure according to her son 
-HR improved. Continue lopressor. D/cemote tele H/o RA,PMR chronci pain 
-Continue prednisone. She is on gabapentin and tramadol and fentanyl [patch which is increased now. History of migraine :she is on Topamax for migraine prophylaxis H/o GERD:  She is on Nexium. While she was recently diagnosed with urinary tract infection gave a single dose of antibiotics in the ER 3 days ago. Currently urine examination is not consistent with urinary tract infection, so we will refrain from any antibiotics at the present time. Diet:NPO Code status: DNR 
DVT prophylaxis: lovenox Care Plan discussed with: Guy Pascualia Discharge planning/disposition:she lives with son. post hospitalization d/c depends on her clinical and physical progress. Hospital Problems  Date Reviewed: 10/2/2018 Codes Class Noted POA Dehydration ICD-10-CM: E86.0 ICD-9-CM: 276.51  10/3/2018 Unknown Metabolic acidosis ZWE-13-DY: E87.2 ICD-9-CM: 276.2  10/3/2018 Unknown Severe pain ICD-10-CM: W58 ICD-9-CM: 780.96  10/3/2018 Unknown Acute metabolic encephalopathy SGD-97-GA: G93.41 
ICD-9-CM: 348.31  10/3/2018 Unknown Review of Systems:  
Review of systems not obtained due to patient factors. Physical Examination: Last 24hrs VS reviewed since prior progress note. Most recent are: 
Visit Vitals  /76 (BP 1 Location: Right arm, BP Patient Position: At rest)  Pulse 100  Temp 98.3 °F (36.8 °C)  Resp 16  Wt 65.1 kg (143 lb 8.3 oz)  SpO2 97%  Breastfeeding No  
 BMI 28.03 kg/m2 Constitutional:  Alert and oriented to self, person and place. HEENT: Head is a traumatic, Un icteric sclera. Pink conjunctiva,no erythema or discharge. Oral mucous moist, oropharynx benign. Neck supple, Resp:  CTA bilaterally. No wheezing/rhonchi/rales. No accessory muscle use CV:  Regular rhythm, normal rate, no murmurs, gallops, rubs GI:  Soft, non distended, non tender. normoactive bowel sounds, no hepatosplenomegaly :  No CVA or suprapubic tenderness Skin  :  No erythema,rash,bullae,dipigmentation Musculoskeletal:  Left hand swollen from iv line. Neurologic:  Alert and oriented. CN II-XII reviewed. Moves all extremities. No intake or output data in the 24 hours ending 10/04/18 0727 Data Review:  
 Review and/or order of clinical lab test 
Review and/or order of tests in the radiology section of CPT Review and/or order of tests in the medicine section of CPT Labs:  
 
Recent Labs 10/03/18 
 0458  10/02/18 [facility] WBC  6.7  5.4 HGB  12.1  10.2* HCT  38.9  30.1*  
PLT  164  167 Recent Labs 10/03/18 
 0820  10/03/18 
 0458  10/02/18 [facility] NA  140  134*  140  
K  3.8  6.5*  4.2 CL  105  102  102 CO2  19*  20*  20 BUN  9  10  11 CREA  0.51*  0.60  0.57 GLU  96  91  83 CA  8.6  9.7  9.1 MG   --   1.7   --   
PHOS   --   3.1   --   
 
Recent Labs 10/03/18 
 4448 SGOT  54* ALT  17 AP  103 TBILI  1.1* TP  7.3 ALB  3.0*  
GLOB  4.3* Recent Labs 10/03/18 
 0820 INR  1.1 PTP  10.9 APTT  26.7 No results for input(s): FE, TIBC, PSAT, FERR in the last 72 hours.   
No results found for: FOL, RBCF  
 No results for input(s): PH, PCO2, PO2 in the last 72 hours. No results for input(s): CPK, CKNDX, TROIQ in the last 72 hours. No lab exists for component: CPKMB No results found for: CHOL, CHOLX, CHLST, CHOLV, HDL, LDL, LDLC, DLDLP, TGLX, TRIGL, TRIGP, CHHD, CHHDX Lab Results Component Value Date/Time Glucose (POC) 89 10/03/2018 07:30 AM  
 
Lab Results Component Value Date/Time Color YELLOW/STRAW 10/03/2018 06:18 AM  
 Appearance CLEAR 10/03/2018 06:18 AM  
 Specific gravity 1.025 10/03/2018 06:18 AM  
 Specific gravity 1.012 05/14/2015 12:34 PM  
 pH (UA) 6.0 10/03/2018 06:18 AM  
 Protein NEGATIVE  10/03/2018 06:18 AM  
 Glucose NEGATIVE  10/03/2018 06:18 AM  
 Ketone >80 (A) 10/03/2018 06:18 AM  
 Bilirubin NEGATIVE  10/03/2018 06:18 AM  
 Urobilinogen 0.2 10/03/2018 06:18 AM  
 Nitrites NEGATIVE  10/03/2018 06:18 AM  
 Leukocyte Esterase NEGATIVE  10/03/2018 06:18 AM  
 Epithelial cells FEW 10/03/2018 06:18 AM  
 Bacteria NEGATIVE  10/03/2018 06:18 AM  
 WBC 0-4 10/03/2018 06:18 AM  
 RBC 0-5 10/03/2018 06:18 AM  
 
 
 
Medications Reviewed:  
 
Current Facility-Administered Medications Medication Dose Route Frequency  sodium chloride (NS) flush 5-10 mL  5-10 mL IntraVENous Q8H  
 sodium chloride (NS) flush 5-10 mL  5-10 mL IntraVENous PRN  
 ondansetron (ZOFRAN) injection 4 mg  4 mg IntraVENous Q4H PRN  
 enoxaparin (LOVENOX) injection 40 mg  40 mg SubCUTAneous Q24H  
 gabapentin (NEURONTIN) capsule 600 mg  600 mg Oral TID  traMADol (ULTRAM) tablet 50 mg  50 mg Oral Q12H PRN  predniSONE (DELTASONE) tablet 2.5 mg  2.5 mg Oral DAILY  polyethylene glycol (MIRALAX) packet 17 g  17 g Oral DAILY PRN  
 nitroglycerin (NITROSTAT) tablet 0.4 mg  0.4 mg SubLINGual Q5MIN PRN  
 cyanocobalamin (VITAMIN B12) tablet 1,000 mcg  1,000 mcg Oral DAILY  topiramate (TOPAMAX) tablet 25 mg  25 mg Oral DAILY WITH BREAKFAST  dextrose 5% and 0.9% NaCl infusion  100 mL/hr IntraVENous CONTINUOUS  
 fentaNYL (DURAGESIC) 25 mcg/hr patch 1 Patch  1 Patch TransDERmal Q72H  influenza vaccine 2018-19 (6 mos+)(PF) (FLUARIX QUAD/FLULAVAL QUAD) injection 0.5 mL  0.5 mL IntraMUSCular PRIOR TO DISCHARGE  metoprolol succinate (TOPROL-XL) XL tablet 25 mg  25 mg Oral DAILY And  
 metoprolol succinate (TOPROL-XL) XL tablet 50 mg  50 mg Oral DAILY  pantoprazole (PROTONIX) tablet 40 mg  40 mg Oral ACB  lactobac ac& pc-s.therm-b.anim (PETER Q/RISAQUAD)  1 Cap Oral DAILY  metoprolol (LOPRESSOR) injection 1.25 mg  1.25 mg IntraVENous Q6H  
 ondansetron (ZOFRAN ODT) tablet 8 mg  8 mg Oral Q8H PRN  
 
______________________________________________________________________ EXPECTED LENGTH OF STAY: - - - 
ACTUAL LENGTH OF STAY:          1 Geovanny Baez MD

## 2018-10-04 NOTE — PROGRESS NOTES
Came to see patient, spoke to RN and MD. Patient is having an Xray and is moaning in pain, calling out and vocalizing incoherantly. [facility] Physician recommends hold off on re- eval and I agree. Will try again later today. Jame Rowell M.S., CCC-SLP Speech-Language Pathologist  
 
 
200 am, returned to see patient again. She is about to get a bath. Will continue to attempt. Jame Rowell M.S., CCC-SLP Speech-Language Pathologist

## 2018-10-04 NOTE — PATIENT INSTRUCTIONS
Dear Adrian Palacios and Chay Medrano, It was a pleasure seeing you at home today with [facility] ([phone]) Your stated goal: To get back to walking again. This is what we talked about: 1. Altered Mental Status/Delirium 
-This could be due to multiple medical issues 
-She was evaluated at [facility] yesterday and was diagnosed with a urinary tract infection 
-She received antibitotics IV in the ER and also IV fluids  
-She has been hospitalized for the same type of episode 3 times in the past year and took two weeks to come back to baseline each time 
-I am drawing stat labs today and will call you with results 
-Call us if she is worsening  
-I am concerned she will need another hospitalization 2. Dehydration 
-This is the 4th day your mother is not eating or drinking 
-She received IV fluids yesterday at the ER 
-I am checking labs today to check her hydration status 3. Urinary Tract Infection 
-Your pharmacist felt the Cipro ordered by the ER would have a major interaction with her medication and a request has been made to the ER MD to change her to Keflex 4. Health Maintenance -Hellen Mata will be coming to give your pneumonia vaccine (Prevnar 13, the newer one) and the flu vaccine Health Maintenance Due Topic Date Due  Shingrix Vaccine Age 50> (1 of 2) 07/27/1975  Pneumococcal 65+ Low/Medium Risk (2 of 2 - PPSV23) 12/07/2017  Influenza Age 5 to Adult  08/01/2018  GLAUCOMA SCREENING Q2Y  08/09/2018 5. Advance Care Planning - A Durable Do Not Resusitate (DDNR) is on file - An Advance Directive is already on file. This is what you have shared with us about Advance Care Planning Advance Care Planning 10/3/2018 Patient's Healthcare Decision Maker is: Legal Next of Kin Primary Decision Maker Name -  
Primary Decision Maker Phone Number -  
Primary Decision Maker Relationship to Patient - Secondary Decision Maker Name - Secondary Decision Maker Phone Number -  
 Secondary Decision Maker Relationship to Patient -  
Confirm Advance Directive Yes, on file Someone from the Home [facility] Primary Care Team will see you again in 3 weeks to flush your ears and follow-up on your pain medicaiton. If there are any concerns before that time, such as medication questions, worsening symptoms or a need to see a physician for an urgent or emergent situation; please call [phone]. A physician is also on call after our normal business hours of 8am to 5pm.  
 
In order to serve you better, please allow up to 48 hours for prescription refills to be processed. Certain medications may require more paperwork or a written prescription that you may need to  from the office. We appreciate you letting us know of any refill requests as soon as possible. Sincerely, The [facility] Team 
MD Ankit Aggarwal, CRISTIN Shelton RN Vonzell Stands, RN Altered Mental Status: Care Instructions Your Care Instructions Altered mental status is a change in how well your brain is working. As a result, you may be confused, be less alert than usual, or act in odd ways. This may include seeing or hearing things that aren't really there (hallucinations). A mental status change has many possible causes. For example, it may be the result of an infection, an imbalance of chemicals in the body, or a chronic disease such as diabetes or COPD. It can also be caused by things such as a head injury, taking certain medicines, or using alcohol or drugs. The doctor may do tests to look for the cause. These tests may include urine tests, blood tests, and imaging tests such as a CT scan. Sometimes a clear cause isn't found. But tests can help the doctor rule out a serious cause of your symptoms. A change in mental status can be scary. But mental status will often return to normal when the cause is treated.  So it is important to get any follow-up testing or treatment the doctor has suggested. The doctor has checked you carefully, but problems can develop later. If you notice any problems or new symptoms, get medical treatment right away. Follow-up care is a key part of your treatment and safety. Be sure to make and go to all appointments, and call your doctor if you are having problems. It's also a good idea to know your test results and keep a list of the medicines you take. How can you care for yourself at home? · Be safe with medicines. Take your medicines exactly as prescribed. Call your doctor if you think you are having a problem with your medicine. · Have another adult stay with you until you are better. This can help keep you safe. Ask that person to watch for signs that your mental status is getting worse. When should you call for help? Call 911 anytime you think you may need emergency care. For example, call if: 
  · You passed out (lost consciousness).  
 Call your doctor now or seek immediate medical care if: 
  · Your mental status is getting worse.  
  · You have new symptoms, such as a fever, chills, or shortness of breath.  
  · You do not feel safe.  
 Watch closely for changes in your health, and be sure to contact your doctor if: 
  · You do not get better as expected. Where can you learn more? Go to http://anna-sampson.info/. Enter T068 in the search box to learn more about \"Altered Mental Status: Care Instructions. \" Current as of: October 9, 2017 Content Version: 11.7 © 0847-3072 [employer]. Care instructions adapted under license by [employer] (which disclaims liability or warranty for this information). If you have questions about a medical condition or this instruction, always ask your healthcare professional. [address] any warranty or liability for your use of this information.

## 2018-10-04 NOTE — PROGRESS NOTES
Bedside and Verbal shift change report given to Davy (oncoming nurse) by Ruba kenny (offgoing nurse). Report included the following information SBAR, Kardex, Intake/Output and MAR.

## 2018-10-04 NOTE — PROGRESS NOTES
Problem: Dysphagia (Adult) Goal: *Acute Goals and Plan of Care (Insert Text) Speech pathology goals Initiated 10/3/2018 1. Patient will participate in re-evaluation of swallow function within 7 days Speech language pathology dysphagia treatment Patient: Delores Mackenzie (45 y.o. female) Date: 10/4/2018 Diagnosis: Dehydration Metabolic acidosis Acute metabolic encephalopathy Severe pain Dehydration Acute metabolic encephalopathy Precautions: NPO   
 
ASSESSMENT: 
Patient with variations in mental status last 2 days, with little interest in PO, significant pain with movement. She took a little liquid from spoon but no further interest in participating in eval. She is cooperative but politely declines. Progression toward goals: 
[]         Improving appropriately and progressing toward goals [x]         Improving slowly and progressing toward goals 
[]         Not making progress toward goals and plan of care will be adjusted PLAN: 
Recommendations and Planned Interventions: 1. Continue NPO but allow sips of liquid, ice chips if she desires. Doubt she will be interested before tomorrow. 2. We will continue to follow Patient continues to benefit from skilled intervention to address the above impairments. Continue treatment per established plan of care. Discharge Recommendations: To Be Determined SUBJECTIVE:  
Patient stated Okay. \" Then \"I don't want any more. \" OBJECTIVE:  
Cognitive and Communication Status: 
Neurologic State: Alert Orientation Level: Oriented to person Cognition: Follows commands Dysphagia Treatment: 
Oral Assessment: 
Oral Assessment Labial: Decreased seal 
Dentition: Natural;Full Oral Hygiene: appears clean Lingual:  (can lateralize) P.O. Trials: 
Patient Position: not able to comfortably sit upright in bed, patient only partially upright. Vocal quality prior to P.O.: Low volume; No impairment Consistency Presented: Thin liquid How Presented: SLP-fed/presented;Spoon Bolus Acceptance: No impairment Bolus Formation/Control: No impairment Oral Residue: None Initiation of Swallow: No impairment Laryngeal Elevation: Functional 
Aspiration Signs/Symptoms: None Pharyngeal Phase Characteristics: No impairment, issues, or problems Pain: 
Pain Scale 1: Visual 
  
  
After treatment:  
[]              Patient left in no apparent distress sitting up in chair 
[x]              Patient left in no apparent distress in bed 
[x]              Call bell left within reach 
[]              Nursing notified 
[]              Caregiver present 
[]              Bed alarm activated COMMUNICATION/EDUCATION:  
Patient was educated regarding Her deficit(s) of dysphagia as this relates to Her diagnosis of AMS. She demonstrated Fair understanding as evidenced by her mental status. . 
 
The patients plan of care including recommendations, planned interventions, and recommended diet changes were discussed with: Registered Nurse. []              Posted safety precautions in patient's room. JUAN JOSÉ Harper Time Calculation: 10 mins

## 2018-10-04 NOTE — PROGRESS NOTES
Patient refusing to allow lab draws today. Have attempted twice and she repeats no and resists attempts. She does appear to be coherent and able to make decisions.

## 2018-10-04 NOTE — PROGRESS NOTES
Bedside and Verbal shift change report given to Evelyn Escobedo RN (oncoming nurse) by Glennette Halsted, RN (offgoing nurse).  Report included the following information SBAR, Kardex, OR Summary, Intake/Output, MAR and Recent Results.

## 2018-10-05 LAB
BACTERIA SPEC CULT: NORMAL
BACTERIA SPEC CULT: NORMAL
SERVICE CMNT-IMP: NORMAL

## 2018-10-05 PROCEDURE — 92526 ORAL FUNCTION THERAPY: CPT

## 2018-10-05 PROCEDURE — 97161 PT EVAL LOW COMPLEX 20 MIN: CPT

## 2018-10-05 PROCEDURE — 74011250637 HC RX REV CODE- 250/637: Performed by: HOSPITALIST

## 2018-10-05 PROCEDURE — 74011250637 HC RX REV CODE- 250/637: Performed by: INTERNAL MEDICINE

## 2018-10-05 PROCEDURE — 99223 1ST HOSP IP/OBS HIGH 75: CPT | Performed by: INTERNAL MEDICINE

## 2018-10-05 PROCEDURE — 65270000029 HC RM PRIVATE

## 2018-10-05 RX ORDER — PREDNISONE 10 MG/1
10 TABLET ORAL DAILY
Status: DISCONTINUED | OUTPATIENT
Start: 2018-10-05 | End: 2018-10-08

## 2018-10-05 RX ORDER — ACETAMINOPHEN 325 MG/1
650 TABLET ORAL 3 TIMES DAILY
Status: DISCONTINUED | OUTPATIENT
Start: 2018-10-05 | End: 2018-10-09 | Stop reason: HOSPADM

## 2018-10-05 RX ORDER — FENTANYL 12.5 UG/1
1 PATCH TRANSDERMAL
Status: DISCONTINUED | OUTPATIENT
Start: 2018-10-05 | End: 2018-10-09 | Stop reason: HOSPADM

## 2018-10-05 RX ADMIN — ACETAMINOPHEN 650 MG: 325 TABLET ORAL at 23:30

## 2018-10-05 RX ADMIN — METOPROLOL SUCCINATE 50 MG: 50 TABLET, EXTENDED RELEASE ORAL at 21:10

## 2018-10-05 RX ADMIN — Medication 10 ML: at 21:11

## 2018-10-05 RX ADMIN — GABAPENTIN 600 MG: 300 CAPSULE ORAL at 21:10

## 2018-10-05 NOTE — PROGRESS NOTES
Hospitalist Progress Note Vianey Saunders MD 
     
                             Answering service: [phone] OR [phone] from in house phone Date of Service:  10/5/2018 NAME:  Shelbi Cole :  1925 MRN:  [id number] Admission Summary: Ms Sedrick Pitt was brought from home for nausea,severe pain,altered mental status. All started on Friday with severe neck pain and she stooped eating since then. She more confused. She was diagnosed with UTI and prescribed cipro which was not filled due to concern for drug interaction per her son. She was supposed to be on keflex. Urine exam here is negative. Reason for follow up:  
 Ms eSdrick Pitt is more alert and interactive. She is tender everywhere. Assessment & Plan:  
Acute metabolic encephalopathy in the setting of underlying dementia due to pain and dehydration 
-   Head CT is negative. Mx as below Dehydration with anion gap metabolic acidosis due to absent oral intake 
-Continue iv hydration. No evidence of bowel ischemia or bowel for perforation. 
-Lab not sent ,per nursing patient refusing. Abdominal pain,Dena was unable to provide history yesterday,but today she is more alert and she tells me her belly hurts. 
-There was question of pneumoperitoneum as reported by CT abdomen and pelvis and surgery was consulted. CT reported is addended,there is no evidence of pneumoperitoneum Severe pain,neck and fiffuse. Patient has history of rheumatoid arthritis and polymyalgia rheumatica. She has followed with rheumatologist.   
-The PMR polymyalgia rheumatica was diagnosed in [place]. Her local rheumatologist here could not verify that diagnosis.    
-Patient at one point was said to have been on heavy doses of hydrocodone and morphine but was weaned off to only a fentanyl patch, tramadol, and gabapentin. She has seen a local pain specialist about 3 years ago. Currently, her primary care doctors are managing that. -Increased the fentanyl patch from 12 at current dose to 25. May adjust the tramadol, but we will leave the gabapentin as it is because she is already getting 1800 mg in 3 divided doses. -CT spine 3 mm chronic appearing anterolisthesis of C4 with respect to C5. No acute fracture. Tachycardia sinus. Patient has history of AFib and congestive heart failure according to her son 
-HR improved. Continue lopressor. D/cemote tele H/o RA,PMR chronci pain 
-Increase home prednisone from 2.5  to 10 mg for few days;she seems to have some wrist synovitis. She is on gabapentin and tramadol and fentanyl [patch which is increased now. History of migraine :she is on Topamax for migraine prophylaxis H/o GERD:  She is on Nexium. While she was recently diagnosed with urinary tract infection gave a single dose of antibiotics in the ER 3 days ago. Currently urine examination is not consistent with urinary tract infection, so we will refrain from any antibiotics at the present time. Diet:FLQ and advance as tolerated . Code status: DNR 
DVT prophylaxis: lovenox Care Plan discussed with: Maribell Child Discharge planning/disposition:she lives with son. post hospitalization d/c depends on her clinical and physical progress. Palliative consulted to discuss goals of care Hospital Problems  Date Reviewed: 10/4/2018 Codes Class Noted POA Dehydration ICD-10-CM: E86.0 ICD-9-CM: 276.51  10/3/2018 Unknown Metabolic acidosis FDA-37-BB: E87.2 ICD-9-CM: 276.2  10/3/2018 Unknown Severe pain ICD-10-CM: I70 ICD-9-CM: 780.96  10/3/2018 Unknown * (Principal)Acute metabolic encephalopathy LWI-42-YE: G93.41 
ICD-9-CM: 348.31  10/3/2018 Unknown Review of Systems:  
Review of systems not obtained due to patient factors. Physical Examination: Last 24hrs VS reviewed since prior progress note. Most recent are: 
Visit Vitals  /72 (BP 1 Location: Right arm, BP Patient Position: At rest)  Pulse 98  Temp 98.8 °F (37.1 °C)  Resp 16  Wt 65.1 kg (143 lb 8.3 oz)  SpO2 94%  Breastfeeding No  
 BMI 28.03 kg/m2 Constitutional:  Alert and oriented to self, person and place. HEENT: Head is a traumatic, Un icteric sclera. Pink conjunctiva,no erythema or discharge. Oral mucous moist, oropharynx benign. Neck supple, Resp:  CTA bilaterally. No wheezing/rhonchi/rales. No accessory muscle use CV:  Regular rhythm, normal rate, no murmurs, gallops, rubs GI:  Soft, non distended, non tender. normoactive bowel sounds, no hepatosplenomegaly :  No CVA or suprapubic tenderness Skin  :  No erythema,rash,bullae,dipigmentation Musculoskeletal:  Left hand swollen from iv line. Neurologic:  Alert and oriented. CN II-XII reviewed. Moves all extremities. Intake/Output Summary (Last 24 hours) at 10/05/18 1207 Last data filed at 10/05/18 0800 Gross per 24 hour Intake             2000 ml Output             1200 ml Net              800 ml Data Review:  
 Review and/or order of clinical lab test 
Review and/or order of tests in the radiology section of CPT Review and/or order of tests in the medicine section of CPT Labs:  
 
Recent Labs 10/03/18 
 0458  10/02/18 200 WBC  6.7  5.4 HGB  12.1  10.2* HCT  38.9  30.1*  
PLT  164  167 Recent Labs 10/03/18 
 0820  10/03/18 
 0458  10/02/18 200 NA  140  134*  140  
K  3.8  6.5*  4.2 CL  105  102  102 CO2  19*  20*  20 BUN  9  10  11 CREA  0.51*  0.60  0.57 GLU  96  91  83 CA  8.6  9.7  9.1 MG   --   1.7   --   
PHOS   --   3.1   --   
 
Recent Labs 10/03/18 
 3237 SGOT  54* ALT  17 AP  103 TBILI  1.1* TP  7.3 ALB  3.0*  
GLOB  4.3* Recent Labs 10/03/18 
 0820 INR  1.1 PTP  10.9 APTT  26.7 No results for input(s): FE, TIBC, PSAT, FERR in the last 72 hours. No results found for: FOL, RBCF No results for input(s): PH, PCO2, PO2 in the last 72 hours. No results for input(s): CPK, CKNDX, TROIQ in the last 72 hours. No lab exists for component: CPKMB No results found for: CHOL, CHOLX, CHLST, CHOLV, HDL, LDL, LDLC, DLDLP, TGLX, TRIGL, TRIGP, CHHD, CHHDX Lab Results Component Value Date/Time Glucose (POC) 89 10/03/2018 07:30 AM  
 
Lab Results Component Value Date/Time Color YELLOW/STRAW 10/03/2018 06:18 AM  
 Appearance CLEAR 10/03/2018 06:18 AM  
 Specific gravity 1.025 10/03/2018 06:18 AM  
 Specific gravity 1.012 05/14/2015 12:34 PM  
 pH (UA) 6.0 10/03/2018 06:18 AM  
 Protein NEGATIVE  10/03/2018 06:18 AM  
 Glucose NEGATIVE  10/03/2018 06:18 AM  
 Ketone >80 (A) 10/03/2018 06:18 AM  
 Bilirubin NEGATIVE  10/03/2018 06:18 AM  
 Urobilinogen 0.2 10/03/2018 06:18 AM  
 Nitrites NEGATIVE  10/03/2018 06:18 AM  
 Leukocyte Esterase NEGATIVE  10/03/2018 06:18 AM  
 Epithelial cells FEW 10/03/2018 06:18 AM  
 Bacteria NEGATIVE  10/03/2018 06:18 AM  
 WBC 0-4 10/03/2018 06:18 AM  
 RBC 0-5 10/03/2018 06:18 AM  
 
 
 
Medications Reviewed:  
 
Current Facility-Administered Medications Medication Dose Route Frequency  lidocaine (LIDODERM) 5 % patch 1 Patch  1 Patch TransDERmal Q24H  
 sodium chloride (NS) flush 5-10 mL  5-10 mL IntraVENous Q8H  
 sodium chloride (NS) flush 5-10 mL  5-10 mL IntraVENous PRN  
 ondansetron (ZOFRAN) injection 4 mg  4 mg IntraVENous Q4H PRN  
 enoxaparin (LOVENOX) injection 40 mg  40 mg SubCUTAneous Q24H  
 gabapentin (NEURONTIN) capsule 600 mg  600 mg Oral TID  traMADol (ULTRAM) tablet 50 mg  50 mg Oral Q12H PRN  predniSONE (DELTASONE) tablet 2.5 mg  2.5 mg Oral DAILY  polyethylene glycol (MIRALAX) packet 17 g  17 g Oral DAILY PRN  
 nitroglycerin (NITROSTAT) tablet 0.4 mg  0.4 mg SubLINGual Q5MIN PRN  
  cyanocobalamin (VITAMIN B12) tablet 1,000 mcg  1,000 mcg Oral DAILY  topiramate (TOPAMAX) tablet 25 mg  25 mg Oral DAILY WITH BREAKFAST  dextrose 5% and 0.9% NaCl infusion  100 mL/hr IntraVENous CONTINUOUS  
 fentaNYL (DURAGESIC) 25 mcg/hr patch 1 Patch  1 Patch TransDERmal Q72H  influenza vaccine 2018-19 (6 mos+)(PF) (FLUARIX QUAD/FLULAVAL QUAD) injection 0.5 mL  0.5 mL IntraMUSCular PRIOR TO DISCHARGE  metoprolol succinate (TOPROL-XL) XL tablet 25 mg  25 mg Oral DAILY And  
 metoprolol succinate (TOPROL-XL) XL tablet 50 mg  50 mg Oral DAILY  pantoprazole (PROTONIX) tablet 40 mg  40 mg Oral ACB  lactobac ac& pc-s.therm-b.anim (PETER Q/RISAQUAD)  1 Cap Oral DAILY  metoprolol (LOPRESSOR) injection 1.25 mg  1.25 mg IntraVENous Q6H  
 ondansetron (ZOFRAN ODT) tablet 8 mg  8 mg Oral Q8H PRN  
 
______________________________________________________________________ EXPECTED LENGTH OF STAY: 3d 7h 
ACTUAL LENGTH OF STAY:          2 Terrence De Santiago MD

## 2018-10-05 NOTE — PROGRESS NOTES
Problem: Dysphagia (Adult) Goal: *Acute Goals and Plan of Care (Insert Text) Speech pathology goals Initiated 10/3/2018 1. Patient will participate in re-evaluation of swallow function within 7 days. MET 2. Added 10/5/2018 Patient will tolerate full liquid diet with no overt s/s aspiration within 7 days 3. Added 10/5/2018 Patient will tolerate trials of soft solids with timely/complete mastication and full oral clearance within 7 days Speech language pathology dysphagia treatment Patient: Jaren De La Rosa (89 y.o. female) Date: 10/5/2018 Diagnosis: Dehydration Metabolic acidosis Acute metabolic encephalopathy Severe pain Dehydration Acute metabolic encephalopathy Precautions:    
 
ASSESSMENT: 
Patient received asleep in bed, however patient awoke easily to voice. Patient only agreeable to thin liquids initially, however patient agreed to puree as well with encouragement. Patient only ate 2 bites applesauce and drank 4 sips of thin liquid. Despite minimal PO trials observed, patient with a suspected WFL oropharyngeal swallow with purees and thin liquids. No overt s/s aspiration observed. Recommend initiate full liquid diet, and discussed with Dr. Bernett Mortimer who is in agreement. However, suspect patient will continue to refuse PO and intake will remain poor. Note palliative has been consulted. Progression toward goals: 
[]         Improving appropriately and progressing toward goals [x]         Improving slowly and progressing toward goals 
[]         Not making progress toward goals and plan of care will be adjusted PLAN: 
Recommendations and Planned Interventions: 
--Full liquid diet when patient awake, alert, and actively accepting 
--Straws ok 
--SLP to follow for diet tolerance and solid trials if patient will agree Patient continues to benefit from skilled intervention to address the above impairments. Continue treatment per established plan of care. Discharge Recommendations:  None SUBJECTIVE:  
Patient stated That's enough.  Patient oriented to person and place. Disoriented to time and situation. OBJECTIVE:  
Cognitive and Communication Status: 
Neurologic State: Alert Orientation Level: Oriented to person, Oriented to place, Disoriented to time, Disoriented to situation Cognition: Decreased command following, Decreased attention/concentration Perception: Appears intact Dysphagia Treatment: P.O. Trials: 
Patient Position: only agreeable to semi-upright positioning in bed Vocal quality prior to P.O.: No impairment Consistency Presented: Thin liquid;Puree How Presented: SLP-fed/presented;Spoon;Straw Bolus Acceptance: No impairment Bolus Formation/Control: No impairment Propulsion: No impairment Oral Residue: None Initiation of Swallow: No impairment Laryngeal Elevation: Functional 
Aspiration Signs/Symptoms: None Pharyngeal Phase Characteristics: No impairment, issues, or problems Pain: 
Pain Scale 1: Numeric (0 - 10) Pain Intensity 1: 0 After treatment:  
[]              Patient left in no apparent distress sitting up in chair 
[x]              Patient left in no apparent distress in bed 
[x]              Call bell left within reach [x]              Nursing notified 
[]              Caregiver present 
[]              Bed alarm activated COMMUNICATION/EDUCATION:  
The patients plan of care including recommendations, planned interventions, and recommended diet changes were discussed with: Registered Nurse and Physician. Hannah Olivera SLP Time Calculation: 8 mins

## 2018-10-05 NOTE — PROGRESS NOTES
Occupational Therapy Note:  
 
Spoke with PT this am following intervention and pt is not appropriate for OT intervention at this time. Palliative consult noted and palliative in to see patient this am.  Will defer evaluation today and follow up Monday as appropriate pending discharge planning process.   
 
 
Quoc Villatoro, OT

## 2018-10-05 NOTE — PROGRESS NOTES
Problem: Mobility Impaired (Adult and Pediatric) Goal: *Acute Goals and Plan of Care (Insert Text) Physical Therapy Goals Initiated 10/5/2018 1. Patient will move from supine to sit and sit to supine  and roll side to side in bed with supervision/set-up within 7 day(s). 2.  Patient will transfer from bed to chair and chair to bed with supervision/set-up using the least restrictive device within 7 day(s). 3.  Patient will perform sit to stand with supervision/set-up within 7 day(s). 4.  Patient will ambulate with supervision/set-up for 25 feet with the least restrictive device within 7 day(s). physical Therapy EVALUATION Patient: Sweetie Houser (25 y.o. female) Date: 10/5/2018 Primary Diagnosis: Dehydration Metabolic acidosis Acute metabolic encephalopathy Severe pain Dehydration Precautions:   Contact, Fall ASSESSMENT : 
Based on the objective data described below, the patient presents with decreased mobility and strength with increased pain after being admitted with metabolic encephalopathy and dehydration. She reports that at home she is able to ambulate to the bathroom and back with her walker multiple times a day without assistance. She has a wheelchair and a hospital bed at home, as well. Pain is the primary issue at this time, as she will not attempt any motion or even assist with ROM due to pain all over. She is oriented in general but not agreeable to any mobility attempt and calls out in pain with any touch or movement. Palliative consult is pending and discussed her presentation with them. We will follow up to attempt more mobility but patient is not at all indicating a desire to mobilize at the moment, so at this time she needs total care and would need total care 24 hours a day at home. Patient will benefit from skilled intervention to address the above impairments. Patients rehabilitation potential is considered to be Guarded Factors which may influence rehabilitation potential include:  
[]         None noted 
[]         Mental ability/status [x]         Medical condition 
[]         Home/family situation and support systems 
[]         Safety awareness [x]         Pain tolerance/management 
[]         Other: PLAN : 
Recommendations and Planned Interventions: 
[x]           Bed Mobility Training             []    Neuromuscular Re-Education 
[x]           Transfer Training                   []    Orthotic/Prosthetic Training 
[x]           Gait Training                         []    Modalities [x]           Therapeutic Exercises           []    Edema Management/Control 
[x]           Therapeutic Activities            [x]    Patient and Family Training/Education 
[]           Other (comment): Frequency/Duration: Patient will be followed by physical therapy  4 times a week to address goals. Discharge Recommendations: To Be Determined and she needs total care 24 hours a day at this time Further Equipment Recommendations for Discharge: none SUBJECTIVE:  
Patient stated Ow, ow, ow. OBJECTIVE DATA SUMMARY:  
HISTORY:   
Past Medical History:  
Diagnosis Date  Arrhythmia A-FIB  Arthritis  CAD (coronary artery disease) 1989 MI  
 Congestive heart failure (CHF) ([address]  GERD (gastroesophageal reflux disease)  Gluten intolerance  Heart failure ([address] CHF  [address]ymyalgia [address] [address] Past Surgical History:  
Procedure Laterality Date  CARDIAC SURG PROCEDURE UNLIST  1980'S CARDIAC CATH  
[address], 2006, 2013 LUMBAR SPINE  
 HX CHOLECYSTECTOMY  HX HIP REPLACEMENT Right 5/2015 [employer]  HX TONSILLECTOMY  CHILD  
 HX UROLOGICAL BLADDER SUSPENSION - MESH Prior Level of Function/Home Situation: ambulating short distances with a walker Personal factors and/or comorbidities impacting plan of care: pain, weakness, PMR Home Situation Home Environment: Private residence One/Two Story Residence: One story Living Alone: No 
Support Systems: Child(brittany) Patient Expects to be Discharged to[de-identified] Private residence Current DME Used/Available at Home: [address], rollator EXAMINATION/PRESENTATION/DECISION MAKING:  
Critical Behavior: 
Neurologic State: Alert Orientation Level: Oriented to person, Oriented to place, Disoriented to time, Disoriented to situation Cognition: Decreased command following, Decreased attention/concentration Hearing: Auditory Auditory Impairment: Hard of hearing, bilateral 
Skin:  Thin and fragile Edema: LE edema noted but minimal and some edema noted in her fingers Range Of Motion: 
AROM:  (patient initiates no motion for me due to pain) PROM: Generally decreased, functional 
  
  
  
Strength:   
Strength:  (very minimal effort, but states she walks to the bathroom) Tone & Sensation:  
  
  
  
  
  
  
  
  
  
   
Coordination: 
  
Vision:  
  
Functional Mobility: 
Bed Mobility: 
  
  
  
  
Transfers: 
  
  
     
  
     
  
  
Balance:  
  
Ambulation/Gait Training: 
  
  
  
  
  
  
  
  
  
  
  
  
  
  
  
  
  
  
 Stairs: Therapeutic Exercises:  
Elsa Palma Functional Measure: 
Barthel Index: 
 
Bathin Bladder: 0 Bowels: 10 
Groomin Dressin Feedin Mobility: 0 Stairs: 0 Toilet Use: 0 Transfer (Bed to Chair and Back): 0 Total: 15 Barthel and G-code impairment scale: 
Percentage of impairment CH 
0% CI 
1-19% CJ 
20-39% CK 
40-59% CL 
60-79% CM 
80-99% CN 
100% Barthel Score 0-100 100 99-80 79-60 59-40 20-39 1-19 
 0 Barthel Score 0-20 20 17-19 13-16 9-12 5-8 1-4 0 The Barthel ADL Index: Guidelines 1. The index should be used as a record of what a patient does, not as a record of what a patient could do.  
2. The main aim is to establish degree of independence from any help, physical or verbal, however minor and for whatever reason. 3. The need for supervision renders the patient not independent. 4. A patient's performance should be established using the best available evidence. Asking the patient, friends/relatives and nurses are the usual sources, but direct observation and common sense are also important. However direct testing is not needed. 5. Usually the patient's performance over the preceding 24-48 hours is important, but occasionally longer periods will be relevant. 6. Middle categories imply that the patient supplies over 50 per cent of the effort. 7. Use of aids to be independent is allowed. Zain Edaurdo., BarthelELE (2173). Functional evaluation: the Barthel Index. [address] [facility] (14)2. Aleisha Snell ha GIANNA Mcintyre, Jasmin Modi, Sade Sánchez., [address] [place] [address], [address] [place] [address] (1999). Measuring the change indisability after inpatient rehabilitation; comparison of the responsiveness of the Barthel Index and Functional Osborne Measure. Journal of Neurology, Neurosurgery, and Psychiatry, 66(4), 318-475. Romel Gil, N.GERHARD.ROSANNE, MEKHI Nascimento, & Mena Barbosa, MDiannA. (2004.) Assessment of post-stroke quality of life in cost-effectiveness studies: The usefulness of the Barthel Index and the EuroQoL-5D. [facility], 41, 795-04 G codes: In compliance with CMSs Claims Based Outcome Reporting, the following G-code set was chosen for this patient based on their primary functional limitation being treated: The outcome measure chosen to determine the severity of the functional limitation was the Barthel with a score of 15/100 which was correlated with the impairment scale. ? Mobility - Walking and Moving Around:  
  - CURRENT STATUS: CM - 80%-99% impaired, limited or restricted  - GOAL STATUS: CJ - 20%-39% impaired, limited or restricted   - D/C STATUS:  ---------------To be determined---------------  
  
 Physical Therapy Evaluation Charge Determination History Examination Presentation Decision-Making HIGH Complexity :3+ comorbidities / personal factors will impact the outcome/ POC  LOW Complexity : 1-2 Standardized tests and measures addressing body structure, function, activity limitation and / or participation in recreation  LOW Complexity : Stable, uncomplicated  LOW Complexity : FOTO score of  Based on the above components, the patient evaluation is determined to be of the following complexity level: LOW Pain: 
Pain Scale 1: Numeric (0 - 10) Pain Intensity 1: 0 Activity Tolerance:  
Poor Please refer to the flowsheet for vital signs taken during this treatment. After treatment:  
[]         Patient left in no apparent distress sitting up in chair 
[x]         Patient left in no apparent distress in bed 
[x]         Call bell left within reach [x]         Nursing notified 
[]         Caregiver present 
[]         Bed alarm activated COMMUNICATION/EDUCATION:  
The patients plan of care was discussed with: Registered Nurse and Physician. [x]         Fall prevention education was provided and the patient/caregiver indicated understanding. []         Patient/family have participated as able in goal setting and plan of care. []         Patient/family agree to work toward stated goals and plan of care. [x]         Patient understands intent and goals of therapy, but is neutral about his/her participation. []         Patient is unable to participate in goal setting and plan of care. Thank you for this referral. 
Justa Chandler, PT Time Calculation: 15 mins

## 2018-10-05 NOTE — PROGRESS NOTES
Bedside and Verbal shift change report given to Evette Ramirez RN (oncoming nurse) by Twila Vazquez RN (offgoing nurse). Report included the following information SBAR, Kardex, Intake/Output, MAR, Accordion and Recent Results.

## 2018-10-05 NOTE — CONSULTS
Palliative Medicine Consult  Adonis: [phone] ([phone])    Patient Name: Yoselin Mcfarlane  YOB: 1925    Date of Initial Consult: 10/5/18  Reason for Consult:  Care decisions  Requesting Provider: Hospitalist medicine - Dr. Gala Barfield  Primary Care Physician: Quentin Koch NP     SUMMARY:   Yoselin Mcfarlane is a 80 y.o. 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 Current medical issues leading to Palliative Medicine involvement include:  Delirium and care decisions. PALLIATIVE DIAGNOSES:   1. Delirium  2. Allodynia  3. Dissociative symptoms       PLAN:   1. Met with patient alone following PT assessment. 2. During PT assessment pt reported and squealed softly in pain with touch and passive ROM exercises. 3. She refused to attempts to get out of bed with PT, but endorsed being able to do so successfully prior to admission. 4. Refusal of blood draws. 5. Chart review c/w varying levels of alertness and orientation since presentation to ED. 6. On my assessment, also reported pain to light touch of arm, intermittently. Reported neck pain but unable to provide further descriptors or answer further questions about it. 7. Pt participated in conversation with me with intermittent eye contact. She was alert and oriented to person, place, month/season (did not report year) and the president. She answered several other simple questions quite appropriately. 8. Ms. Lonnie Snell opened up about trauma she has experienced in the past - namely the loss of a son to suicide (unclear how remote) by hanging, she unfortunately found him. She recalled the trauma of this. Also commented on difficult relationships in her family. Stated \"I have lived a sad life. \"    9.  Query re: PTSD potentially leading to the dissociative symptomatology we are seeing here. 10. Recommend psychiatry assessment while inpatient, as this will help guide our home based primary care team in further management. 11. Do not advise opioid escalation at this juncture.  reviewed, last opioid filled May 2018, fentanyl TD 12.5 mcg restarted on 9/20 by [facility] NP but not yet filled according to  (I have not yet spoken to pt's son or confirmed pharmacy to see if it was filled but not yet recorded in ). 12. Fentanyl TD inc to 25 mcg on 10/3, will reduce back to 12 until I can talk to her son. If not yet filled, would not continue opioids in this setting. 13. If Fentanyl TD was filled after new script on 9/20 -> this too correlates with her change in mental status that was noted on 9/29. 14. Start APAP 650 mg TID standing for h/o arthritic pain. 15. On Topamax 25 mg daily for h/o migraines. 16. On Gabapentin 600 mg TID here, home notes report dosing was BID. Will need to clarify with son. Would consider dose reduction after discussion with son as unclear benefit it is providing. 16. Communicated plan of care with: Palliative IDT        GOALS OF CARE / TREATMENT PREFERENCES:     GOALS OF CARE:  Pt unable to engage in goc discussion today. TREATMENT PREFERENCES:   Code Status: DNR    Advance Care Planning:  Advance Care Planning 10/3/2018   Patient's Healthcare Decision Maker is: Legal Next of Kin   Primary Decision Maker Name -   Primary Decision Maker Phone Number -   Primary Decision Maker Relationship to Patient -   Secondary Decision Maker Name -   Secondary Decision [address] Phone Number -   Secondary Decision Maker Relationship to Patient -   Confirm Advance Directive Yes, on file               Other:    As far as possible, the palliative care team has discussed with patient / health care proxy about goals of care / treatment preferences for patient.            HISTORY:     History obtained from:   Patient, chart review, [facility] team    CHIEF COMPLAINT:  Refusal to eat/get out of bed. HPI/SUBJECTIVE:    The patient is:   [x] Verbal and participatory  However minimally due to unclear etiology at this juncture  [] Non-participatory due to:     79 yo female brought in for assessment of change in mental status from baseline, refusal of food or drink and to move. Today pt endorses pain. When queried states neck, but unable to provide further descriptors necessary for adequate pain assessment. Poor eye contact. Intermittently tearful. Clinical Pain Assessment (nonverbal scale for severity on nonverbal patients):              Duration: for how long has pt been experiencing pain (e.g., 2 days, 1 month, years)  Frequency: how often pain is an issue (e.g., several times per day, once every few days, constant)     FUNCTIONAL ASSESSMENT:     Palliative Performance Scale (PPS):          PSYCHOSOCIAL/SPIRITUAL SCREENING:     Palliative IDT has assessed this patient for cultural preferences / practices and a referral made as appropriate to needs (Cultural Services, Patient Advocacy, Ethics, etc.)    Advance Care Planning:  Advance Care Planning 10/3/2018   Patient's Healthcare Decision Maker is: Legal Next of Yang 69   Primary Decision Maker Name -   Primary Decision [address] Phone Number -   Primary Decision Maker Relationship to Patient -   Secondary Decision [address] Name -   Secondary Decision [address] Phone Number -   Secondary Decision Maker Relationship to Patient -   Confirm Advance Directive Yes, on file       Any spiritual / Orthodox concerns:  [] Yes /  [] No    Caregiver Burnout:  [] Yes /  [] No /  [] No Caregiver Present      Anticipatory grief assessment:   [] Normal  / [] Maladaptive       ESAS Anxiety:      ESAS Depression:          REVIEW OF SYSTEMS:     Positive and pertinent negative findings in ROS are noted above in HPI.   The following systems were [] reviewed / [] unable to be reviewed as noted in HPI  Other findings are noted below. Systems: constitutional, ears/nose/mouth/throat, respiratory, gastrointestinal, genitourinary, musculoskeletal, integumentary, neurologic, psychiatric, endocrine. Positive findings noted below. Modified ESAS Completed by: provider                                   Stool Occurrence(s): 1        PHYSICAL EXAM:     From RN flowsheet:  Wt Readings from Last 3 Encounters:   10/04/18 65.1 kg (143 lb 8.3 oz)   06/05/18 54.7 kg (120 lb 9.6 oz)   11/20/17 59.4 kg (131 lb)     Blood pressure 152/72, pulse 98, temperature 98.8 °F (37.1 °C), resp. rate 16, weight 65.1 kg (143 lb 8.3 oz), SpO2 94 %, not currently breastfeeding.     Pain Scale 1: Numeric (0 - 10)  Pain Intensity 1: 0     Pain Location 1: Back           Last bowel movement, if known:     Constitutional: fatigued elderly female lying quietly in bed  Eyes: pupils equal, anicteric  ENMT: no nasal discharge, moist mucous membranes  Cardiovascular: regular rhythm, distal puls intact  Respiratory: breathing not labored, symmetric  Gastrointestinal: soft non-tender, +bowel sounds  Musculoskeletal: no deformity, no tenderness to palpation  Skin: warm, dry  Neurologic: alert, able to follow all commands, moves all 4 extremities  Psychiatric: flat affect, intermittently tearful, poor eye contact  Other:  \"ouch\" to light touch to hand intermittently during assessment       HISTORY:     Principal Problem:    Acute metabolic encephalopathy (74/7/1871)    Active Problems:    Dehydration (64/1/5004)      Metabolic acidosis (17/2/8732)      Severe pain (10/3/2018)      Past Medical History:   Diagnosis Date    Arrhythmia     A-FIB    Arthritis     CAD (coronary artery disease) 1989    MI    Congestive heart failure (CHF) (HCC)     GERD (gastroesophageal reflux disease)     Gluten intolerance     Heart failure (HCC)     CHF    Polymyalgia ([facility] [address]       Past Surgical History:   Procedure Laterality Date    CARDIAC SURG PROCEDURE UNLIST  1980'S CARDIAC CATH    HX BACK SURGERY  1998, 2006, 2013    LUMBAR SPINE    HX CHOLECYSTECTOMY      [place] HIP REPLACEMENT Right 5/2015    [place] [facility] HX TONSILLECTOMY  CHILD    HX UROLOGICAL      BLADDER SUSPENSION - MESH      Family History   Problem Relation Age of Onset    Other Mother      Intestinal obstruction    Cancer Father     Stroke Father     Heart Attack Brother     Cancer Brother     Cancer Brother     Anesth Problems Neg Hx       History reviewed, no pertinent family history.   Social History   Substance Use Topics    Smoking status: Never Smoker    Smokeless tobacco: Never Used    Alcohol use No     Allergies   Allergen Reactions    Phenergan [Promethazine] Other (comments)     \"loose my wits i go crazy\"    Nsaids (Non-Steroidal Anti-Inflammatory Drug) Nausea and Vomiting    Adhesive Tape-Silicones Other (comments)     BLISTERS    Atenolol Nausea and Vomiting    Cymbalta [Duloxetine] Other (comments)     CONFUSION      Digoxin Nausea and Vomiting    Gluten Other (comments)     GLUTEN INTOLERANCE    Macrobid [Nitrofurantoin Monohyd/M-Cryst] Nausea and Vomiting    Novocain [Procaine] Other (comments)     Paralysis     Sulfa (Sulfonamide Antibiotics) Nausea and Vomiting    Codeine Other (comments)     Unable to swallow      Current Facility-Administered Medications   Medication Dose Route Frequency    predniSONE (DELTASONE) tablet 10 mg  10 mg Oral DAILY    lidocaine (LIDODERM) 5 % patch 1 Patch  1 Patch TransDERmal Q24H    sodium chloride (NS) flush 5-10 mL  5-10 mL IntraVENous Q8H    sodium chloride (NS) flush 5-10 mL  5-10 mL IntraVENous PRN    ondansetron (ZOFRAN) injection 4 mg  4 mg IntraVENous Q4H PRN    enoxaparin (LOVENOX) injection 40 mg  40 mg SubCUTAneous Q24H    gabapentin (NEURONTIN) capsule 600 mg  600 mg Oral TID    traMADol (ULTRAM) tablet 50 mg  50 mg Oral Q12H PRN    polyethylene glycol (MIRALAX) packet 17 g  17 g Oral DAILY PRN    nitroglycerin (NITROSTAT) tablet 0.4 mg  0.4 mg SubLINGual Q5MIN PRN    cyanocobalamin (VITAMIN B12) tablet 1,000 mcg  1,000 mcg Oral DAILY    topiramate (TOPAMAX) tablet 25 mg  25 mg Oral DAILY WITH BREAKFAST    dextrose 5% and 0.9% NaCl infusion  100 mL/hr IntraVENous CONTINUOUS    fentaNYL (DURAGESIC) 25 mcg/hr patch 1 Patch  1 Patch TransDERmal Q72H    influenza vaccine 2018-19 (6 mos+)(PF) (FLUARIX QUAD/FLULAVAL QUAD) injection 0.5 mL  0.5 mL IntraMUSCular PRIOR TO DISCHARGE    metoprolol succinate (TOPROL-XL) XL tablet 25 mg  25 mg Oral DAILY    And    metoprolol succinate (TOPROL-XL) XL tablet 50 mg  50 mg Oral DAILY    pantoprazole (PROTONIX) tablet 40 mg  40 mg Oral ACB    lactobac ac& pc-s.therm-b.anim (PETER Q/RISAQUAD)  1 Cap Oral DAILY    metoprolol (LOPRESSOR) injection 1.25 mg  1.25 mg IntraVENous Q6H    ondansetron (ZOFRAN ODT) tablet 8 mg  8 mg Oral Q8H PRN          LAB AND IMAGING FINDINGS:     Lab Results   Component Value Date/Time    WBC 6.7 10/03/2018 04:58 AM    HGB 12.1 10/03/2018 04:58 AM    PLATELET 080 84/56/7862 04:58 AM     Lab Results   Component Value Date/Time    Sodium 140 10/03/2018 08:20 AM    Potassium 3.8 10/03/2018 08:20 AM    Chloride 105 10/03/2018 08:20 AM    CO2 19 (L) 10/03/2018 08:20 AM    BUN 9 10/03/2018 08:20 AM    Creatinine 0.51 (L) 10/03/2018 08:20 AM    Calcium 8.6 10/03/2018 08:20 AM    Magnesium 1.7 10/03/2018 04:58 AM    Phosphorus 3.1 10/03/2018 04:58 AM      Lab Results   Component Value Date/Time    AST (SGOT) 54 (H) 10/03/2018 04:58 AM    Alk.  phosphatase 103 10/03/2018 04:58 AM    Protein, total 7.3 10/03/2018 04:58 AM    Albumin 3.0 (L) 10/03/2018 04:58 AM    Globulin 4.3 (H) 10/03/2018 04:58 AM     Lab Results   Component Value Date/Time    INR 1.1 10/03/2018 08:20 AM    Prothrombin time 10.9 10/03/2018 08:20 AM    aPTT 26.7 10/03/2018 08:20 AM      No results found for: IRON, FE, TIBC, IBCT, PSAT, FERR   No results found for: PH, PCO2, PO2  No components found for: [place]   Lab Results   Component Value Date/Time     (H) 05/25/2015 04:32 AM    CK - MB 3.1 05/25/2015 04:32 AM                Total time: 70  Counseling / coordination time, spent as noted above:  35  > 50% counseling / coordination?:  Yes, extensive chart review and review of , discussion with home based primary care team NP Mariola Garner and physician Melody Leger in addition to primary team hospitalist.      Prolonged service was provided for  []30 min   []75 min in face to face time in the presence of the patient, spent as noted above. Time Start:   Time End:   Note: this can only be billed with 79076 (initial) or 77599 (follow up). If multiple start / stop times, list each separately.

## 2018-10-05 NOTE — PROGRESS NOTES
Spiritual Care Assessment/Progress Note [address] 
 
 
NAME: Volanda Denver      MRN: [id number] AGE: 80 y.o. SEX: female Christian Affiliation: Sikhism  
Language: English  
 
10/5/2018     Total Time (in minutes): 7 Spiritual Assessment begun in [facility] through conversation with: 
  
    []Patient        [] Family    [] Friend(s) Reason for Consult: Palliative Care, Initial/Spiritual Assessment Spiritual beliefs: (Please include comment if needed) 
   [] Identifies with a cliff tradition:     
   [] Supported by a cliff community:        
   [] Claims no spiritual orientation:       
   [] Seeking spiritual identity:            
   [] Adheres to an individual form of spirituality:       
   [x] Not able to assess:                   
 
    
Identified resources for coping:  
   [] Prayer                           
   [] Music                  [] Guided Imagery 
   [] Family/friends                 [] Pet visits [] Devotional reading                         [] Unknown 
   [] Other:                                          
 
 
Interventions offered during this visit: (See comments for more details) Patient Interventions: Other (comment) (Patient appeared asleep) Plan of Care: 
 
 [] Support spiritual and/or cultural needs  
 [] Support AMD and/or advance care planning process    
 [] Support grieving process 
 [] Coordinate Rites and/or Rituals  
 [] Coordination with community clergy [] No spiritual needs identified at this time 
 [] Detailed Plan of Care below (See Comments)  [] Make referral to Music Therapy 
[] Make referral to Pet Therapy    
[] Make referral to Addiction services 
[] Make referral to [facility] 
[] Make referral to Spiritual Care Partner 
[] No future visits requested       
[x] Follow up visits as needed Comments: Visited Ms Abrahan Lira in room  for initial Palliative Care spiritual assessment. Ms Addi Schultz appeared to be sleeping and no family was present; unable to do assessment at that time. Please notify [phone] [address] Perkins if  support desired. : Rev. Vernon Laurent. Lea Vernon; [facility], to contact [phone] Perkins call: [phone]

## 2018-10-05 NOTE — PROGRESS NOTES
Spiritual Care Assessment/Progress Note [address] 
 
 
NAME: Cain Ricardo      MRN: [id number] AGE: 80 y.o. SEX: female Episcopalian Affiliation: Scientology  
Language: English  
 
10/5/2018     Total Time (in minutes): 12 Spiritual Assessment begun in [facility] through conversation with: 
  
    [x]Patient        [] Family    [] Friend(s) Reason for Consult: Palliative Care, Initial/Spiritual Assessment Spiritual beliefs: (Please include comment if needed) [x] Identifies with a cliff tradition:     
   [] Supported by a cliff community:        
   [] Claims no spiritual orientation:       
   [] Seeking spiritual identity:            
   [] Adheres to an individual form of spirituality:       
   [] Not able to assess:                   
 
    
Identified resources for coping:  
   [x] Prayer                           
   [] Music                  [] Guided Imagery 
   [] Family/friends                 [] Pet visits [] Devotional reading                         [] Unknown 
   [] Other Interventions offered during this visit: (See comments for more details) Patient Interventions: Affirmation of emotions/emotional suffering, Initial/Spiritual assessment, patient floor, Prayer (actual), Prayer (assurance of) Plan of Care: 
 
 [] Support spiritual and/or cultural needs  
 [] Support AMD and/or advance care planning process    
 [] Support grieving process 
 [] Coordinate Rites and/or Rituals  
 [] Coordination with community clergy [] No spiritual needs identified at this time 
 [] Detailed Plan of Care below (See Comments)  [] Make referral to Music Therapy 
[] Make referral to Pet Therapy    
[] Make referral to Addiction services 
[] Make referral to [facility] 
[] Make referral to Spiritual Care Partner 
[] No future visits requested       
[x] Follow up visits as needed Comments: Visited with Ms. Lor Ríos after pt's case discussed with Palliative Dr Quang Botello. Pt shared that she is going through a difficult time. Attempted to explore pt's worries/concerns. Pt shared that she is experiencing pain and showed me her arms. Offered emotional support to pt. Shared prayer with Ms. Lor Ríos per her request.  Consulted with pt's nurse following my visit. Chaplains are available for continued support as needed. Please page at [phone]. Yvonne Romeo, Palliative

## 2018-10-05 NOTE — PROGRESS NOTES
Bedside and Verbal shift change report given to Barbi Waterman (oncoming nurse) by Kenzie Cochran (offgoing nurse). Report included the following information SBAR.

## 2018-10-06 PROCEDURE — 77010033678 HC OXYGEN DAILY

## 2018-10-06 PROCEDURE — 74011250637 HC RX REV CODE- 250/637: Performed by: HOSPITALIST

## 2018-10-06 PROCEDURE — 74011250637 HC RX REV CODE- 250/637: Performed by: INTERNAL MEDICINE

## 2018-10-06 PROCEDURE — 65270000029 HC RM PRIVATE

## 2018-10-06 PROCEDURE — 74011250636 HC RX REV CODE- 250/636: Performed by: HOSPITALIST

## 2018-10-06 RX ADMIN — ACETAMINOPHEN 650 MG: 325 TABLET ORAL at 22:35

## 2018-10-06 RX ADMIN — PANTOPRAZOLE SODIUM 40 MG: 40 TABLET, DELAYED RELEASE ORAL at 07:13

## 2018-10-06 RX ADMIN — TOPIRAMATE 25 MG: 25 TABLET, FILM COATED ORAL at 07:13

## 2018-10-06 RX ADMIN — Medication 10 ML: at 07:14

## 2018-10-06 RX ADMIN — GABAPENTIN 600 MG: 300 CAPSULE ORAL at 22:34

## 2018-10-06 RX ADMIN — Medication 10 ML: at 22:39

## 2018-10-06 NOTE — PROGRESS NOTES
Bedside and Verbal shift change report given to Hoda Reyes (oncoming nurse) by Henny Sheppard (offgoing nurse). Report included the following information SBAR.

## 2018-10-06 NOTE — PROGRESS NOTES
Problem: Falls - Risk of 
Goal: *Absence of Falls Document [place] Flow Fall Risk and appropriate interventions in the flowsheet. Outcome: Progressing Towards Goal 
Fall Risk Interventions: 
Mobility Interventions: Patient to call before getting OOB Mentation Interventions: Door open when patient unattended Medication Interventions: Patient to call before getting OOB Elimination Interventions: Call light in reach, Patient to call for help with toileting needs

## 2018-10-06 NOTE — PROGRESS NOTES
Chart reviewed, pt seen, spoke with staff, will try and speak with son. Pt reported to be declining in mental status which led to her being hospitalized. While here she has not be cooperative with medications nor nutrition. She consistently describes pain in various places and seemed to be a default expression for some unhappiness and confusion though would not overlook structural causes of pain. Dementia evident but she was able to follow a story a told her, tell me where she lives and some about her son. Nutrition and perhaps some variation in pain likely problems re decline in mental state which is manifestation of unhappiness. With questioning she expressed ambivalence about going to sleep and being in [place] or continuing to live. She indicated she was unhappy with her life but did not present pervasively depressed. Dx: Dementia, vascular with depressed symptoms. Stage of life difficulties with health. For depressive sx and appetite mirtazapine may be helpful. Gabapentin for pain at current dosage bound to be pretty sedating and reduction in dose may help her interact especially if mirtazapine is used as it is sedating. She did ask for water and expressed unhappiness in tap water she drank and she was right and so asked for cold water, perhaps with ice. Conversation with son will be attempted.

## 2018-10-06 NOTE — PROGRESS NOTES
Bedside shift change report given to Maritza (oncoming nurse) by Nestor Betancur (offgoing nurse). Report included the following information SBAR, Kardex, Intake/Output, MAR and Recent Results.

## 2018-10-06 NOTE — PROGRESS NOTES
General Surgery at [facility] Discussed patient's current condition with RN. She reports patient had SLP eval. Good for full liquids. However, RN reports patient refuses everything, I.e. Food, meds, lab draws. Not much for us to do surgically. will sign off; please feel free to re-consult if any surgical issues arise. Thank you.

## 2018-10-06 NOTE — PROGRESS NOTES
Vascular Access Team: Asked to attempt to get a PIV. Toney Cordial declined. She started gagging and stated \" I am vomiting. \" However, the emesis never cleared her mouth. Potential aspiration. She is sitting up 45 degrees with her head to the side. Encouraged her to spit the emesis out but she was unable to. Weak cough noted after each gaging episode. Princess To RN aware of events.

## 2018-10-06 NOTE — PROGRESS NOTES
Hospitalist Progress Note Soraida Bean MD 
     
                             Answering service: [phone] OR [phone] from in house phone Date of Service:  10/6/2018 NAME:  Sara Mejía :  1925 MRN:  [id number] Admission Summary: Ms Dipak Kim was brought from home for nausea,severe pain,altered mental status. All started on Friday with severe neck pain and she stooped eating since then. She more confused. She was diagnosed with UTI and prescribed cipro which was not filled due to concern for drug interaction per her son. She was supposed to be on keflex. Urine exam here is negative. Reason for follow up:  
 Ms Dipak Kim is more alert and interactive. She says it hurts every where she is refusing labs and treatment . I spoke with her son he says he will talk to her when he comes next time. Already seen by Palliative care . Assessment & Plan:  
Acute metabolic encephalopathy in the setting of underlying dementia due to pain and dehydration 
-   Head CT is negative. Mx as below Dehydration with anion gap metabolic acidosis due to absent oral intake 
-Continue iv hydration. No evidence of bowel ischemia or bowel for perforation. 
-Lab not sent ,per nursing patient refusing. Abdominal pain,Dena was unable to provide history yesterday,but today she is more alert and she tells me her belly hurts. 
-There was question of pneumoperitoneum as reported by CT abdomen and pelvis and surgery was consulted. CT reported is addended,there is no evidence of pneumoperitoneum Severe pain,neck and fiffuse. Patient has history of rheumatoid arthritis and polymyalgia rheumatica. She has followed with rheumatologist.   
-The PMR polymyalgia rheumatica was diagnosed in [place]. Her local rheumatologist here could not verify that diagnosis. -Patient at one point was said to have been on heavy doses of hydrocodone and morphine but was weaned off to only a fentanyl patch, tramadol, and gabapentin. She has seen a local pain specialist about 3 years ago. Currently, her primary care doctors are managing that. -Increased the fentanyl patch from 12 at current dose to 25. May adjust the tramadol, but we will leave the gabapentin as it is because she is already getting 1800 mg in 3 divided doses. -CT spine 3 mm chronic appearing anterolisthesis of C4 with respect to C5. No acute fracture. Tachycardia sinus. Patient has history of AFib and congestive heart failure according to her son 
-HR improved. Continue lopressor. D/cemote tele H/o RA,PMR chronci pain 
-Increase home prednisone from 2.5  to 10 mg for few days;she seems to have some wrist synovitis. She is on gabapentin and tramadol and fentanyl [patch which is increased now. History of migraine :she is on Topamax for migraine prophylaxis H/o GERD:  She is on Nexium. While she was recently diagnosed with urinary tract infection gave a single dose of antibiotics in the ER 3 days ago. Currently urine examination is not consistent with urinary tract infection, so we will refrain from any antibiotics at the present time. Diet:FLQ and advance as tolerated . Code status: DNR 
DVT prophylaxis: lovenox Care Plan discussed with: Mallory Grant Discharge planning/disposition:she lives with son. post hospitalization d/c depends on her clinical and physical progress. 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 
ICD-9-CM: 296.90  Unknown Unknown Dehydration ICD-10-CM: E86.0 ICD-9-CM: 276.51  10/3/2018 Unknown Metabolic acidosis [id number]-[id number]: E87.2 ICD-9-CM: 276.2  10/3/2018 Unknown Severe pain ICD-10-CM: W50 ICD-9-CM: 780.96  10/3/2018 Unknown * (Principal)Acute metabolic encephalopathy TBQ-07-WT: G93.41 
ICD-9-CM: 348.31  10/3/2018 Unknown Review of Systems:  
Review of systems not obtained due to patient factors. Physical Examination:  
 
 Last 24hrs VS reviewed since prior progress note. Most recent are: 
Visit Vitals  /81 (BP 1 Location: Right arm, BP Patient Position: At rest)  Pulse (!) 116  Temp 100.2 °F (37.9 °C)  Resp 18  Wt 70.6 kg (155 lb 10.3 oz)  SpO2 95%  Breastfeeding No  
 BMI 30.4 kg/m2 Constitutional:  Alert and oriented to self, person and place. HEENT: Head is a traumatic, Un icteric sclera. Pink conjunctiva,no erythema or discharge. Oral mucous moist, oropharynx benign. Neck supple, Resp:  CTA bilaterally. No wheezing/rhonchi/rales. No accessory muscle use CV:  Regular rhythm, normal rate, no murmurs, gallops, rubs GI:  Soft, non distended, non tender. normoactive bowel sounds, no hepatosplenomegaly :  No CVA or suprapubic tenderness Skin  :  No erythema,rash,bullae,dipigmentation Musculoskeletal:  Left hand swollen from iv line. Neurologic:  Alert and oriented. CN II-XII reviewed. Moves all extremities. No intake or output data in the 24 hours ending 10/06/18 1103 Data Review:  
 Review and/or order of clinical lab test 
Review and/or order of tests in the radiology section of CPT Review and/or order of tests in the medicine section of CPT Labs:  
 
No results for input(s): WBC, HGB, HCT, PLT, HGBEXT, HCTEXT, PLTEXT, HGBEXT, HCTEXT, PLTEXT in the last 72 hours. No results for input(s): NA, K, CL, CO2, BUN, CREA, GLU, CA, MG, PHOS, URICA in the last 72 hours. No results for input(s): SGOT, GPT, ALT, AP, TBIL, TBILI, TP, ALB, GLOB, GGT, AML, LPSE in the last 72 hours. No lab exists for component: AMYP, HLPSE No results for input(s): INR, PTP, APTT in the last 72 hours. No lab exists for component: INREXT, INREXT No results for input(s): FE, TIBC, PSAT, FERR in the last 72 hours. No results found for: FOL, RBCF No results for input(s): PH, PCO2, PO2 in the last 72 hours. No results for input(s): CPK, CKNDX, TROIQ in the last 72 hours. No lab exists for component: CPKMB No results found for: CHOL, CHOLX, CHLST, CHOLV, HDL, LDL, LDLC, DLDLP, TGLX, TRIGL, TRIGP, CHHD, CHHDX Lab Results Component Value Date/Time Glucose (POC) 89 10/03/2018 07:30 AM  
 
Lab Results Component Value Date/Time Color YELLOW/STRAW 10/03/2018 06:18 AM  
 Appearance CLEAR 10/03/2018 06:18 AM  
 Specific gravity 1.025 10/03/2018 06:18 AM  
 Specific gravity 1.012 05/14/2015 12:34 PM  
 pH (UA) 6.0 10/03/2018 06:18 AM  
 Protein NEGATIVE  10/03/2018 06:18 AM  
 Glucose NEGATIVE  10/03/2018 06:18 AM  
 Ketone >80 (A) 10/03/2018 06:18 AM  
 Bilirubin NEGATIVE  10/03/2018 06:18 AM  
 Urobilinogen 0.2 10/03/2018 06:18 AM  
 Nitrites NEGATIVE  10/03/2018 06:18 AM  
 Leukocyte Esterase NEGATIVE  10/03/2018 06:18 AM  
 Epithelial cells FEW 10/03/2018 06:18 AM  
 Bacteria NEGATIVE  10/03/2018 06:18 AM  
 WBC 0-4 10/03/2018 06:18 AM  
 RBC 0-5 10/03/2018 06:18 AM  
 
 
 
Medications Reviewed:  
 
Current Facility-Administered Medications Medication Dose Route Frequency  predniSONE (DELTASONE) tablet 10 mg  10 mg Oral DAILY  acetaminophen (TYLENOL) tablet 650 mg  650 mg Oral TID  fentaNYL (DURAGESIC) 12 mcg/hr patch 1 Patch  1 Patch TransDERmal Q72H  lidocaine (LIDODERM) 5 % patch 1 Patch  1 Patch TransDERmal Q24H  
 sodium chloride (NS) flush 5-10 mL  5-10 mL IntraVENous Q8H  
 sodium chloride (NS) flush 5-10 mL  5-10 mL IntraVENous PRN  
  ondansetron (ZOFRAN) injection 4 mg  4 mg IntraVENous Q4H PRN  
 enoxaparin (LOVENOX) injection 40 mg  40 mg SubCUTAneous Q24H  
 gabapentin (NEURONTIN) capsule 600 mg  600 mg Oral TID  traMADol (ULTRAM) tablet 50 mg  50 mg Oral Q12H PRN  polyethylene glycol (MIRALAX) packet 17 g  17 g Oral DAILY PRN  
 nitroglycerin (NITROSTAT) tablet 0.4 mg  0.4 mg SubLINGual Q5MIN PRN  
 cyanocobalamin (VITAMIN B12) tablet 1,000 mcg  1,000 mcg Oral DAILY  topiramate (TOPAMAX) tablet 25 mg  25 mg Oral DAILY WITH BREAKFAST  dextrose 5% and 0.9% NaCl infusion  100 mL/hr IntraVENous CONTINUOUS  
 influenza vaccine 2018-19 (6 mos+)(PF) (FLUARIX QUAD/FLULAVAL QUAD) injection 0.5 mL  0.5 mL IntraMUSCular PRIOR TO DISCHARGE  metoprolol succinate (TOPROL-XL) XL tablet 25 mg  25 mg Oral DAILY And  
 metoprolol succinate (TOPROL-XL) XL tablet 50 mg  50 mg Oral DAILY  pantoprazole (PROTONIX) tablet 40 mg  40 mg Oral ACB  lactobac ac& pc-s.therm-b.anim (PETER Q/RISAQUAD)  1 Cap Oral DAILY  metoprolol (LOPRESSOR) injection 1.25 mg  1.25 mg IntraVENous Q6H  
 ondansetron (ZOFRAN ODT) tablet 8 mg  8 mg Oral Q8H PRN  
 
______________________________________________________________________ EXPECTED LENGTH OF STAY: 3d 7h 
ACTUAL LENGTH OF STAY:          3 Gerber Alexander MD

## 2018-10-06 NOTE — PROGRESS NOTES
Bedside shift change report given to Vicente Ward (oncoming nurse) by Darylene Athens (offgoing nurse). Report included the following information SBAR, Kardex, Intake/Output and MAR.

## 2018-10-07 ENCOUNTER — APPOINTMENT (OUTPATIENT)
Dept: GENERAL RADIOLOGY | Age: 83
DRG: 641 | End: 2018-10-07
Attending: HOSPITALIST
Payer: MEDICARE

## 2018-10-07 PROCEDURE — 74011250637 HC RX REV CODE- 250/637: Performed by: INTERNAL MEDICINE

## 2018-10-07 PROCEDURE — 74011000250 HC RX REV CODE- 250: Performed by: HOSPITALIST

## 2018-10-07 PROCEDURE — 73120 X-RAY EXAM OF HAND: CPT

## 2018-10-07 PROCEDURE — 65270000029 HC RM PRIVATE

## 2018-10-07 PROCEDURE — 74011250637 HC RX REV CODE- 250/637: Performed by: HOSPITALIST

## 2018-10-07 RX ADMIN — ACETAMINOPHEN 650 MG: 325 TABLET ORAL at 22:26

## 2018-10-07 RX ADMIN — METOPROLOL SUCCINATE 50 MG: 50 TABLET, EXTENDED RELEASE ORAL at 22:26

## 2018-10-07 RX ADMIN — GABAPENTIN 600 MG: 300 CAPSULE ORAL at 22:26

## 2018-10-07 RX ADMIN — GABAPENTIN 600 MG: 300 CAPSULE ORAL at 16:15

## 2018-10-07 RX ADMIN — Medication 10 ML: at 07:05

## 2018-10-07 RX ADMIN — TOPIRAMATE 25 MG: 25 TABLET, FILM COATED ORAL at 07:05

## 2018-10-07 RX ADMIN — ACETAMINOPHEN 650 MG: 325 TABLET ORAL at 16:13

## 2018-10-07 RX ADMIN — TRAMADOL HYDROCHLORIDE 50 MG: 50 TABLET, FILM COATED ORAL at 23:58

## 2018-10-07 RX ADMIN — ACETAMINOPHEN 650 MG: 325 TABLET ORAL at 09:55

## 2018-10-07 NOTE — PROGRESS NOTES
Hospitalist Progress Note Kyrie Arellano MD 
     
                             Answering service: [phone] OR [phone] from in house phone Date of Service:  10/7/2018 NAME:  Ceferino Souza :  1925 MRN:  [id number] Admission Summary: Ms Zaida Fishman was brought from home for nausea,severe pain,altered mental status. All started on Friday with severe neck pain and she stooped eating since then. She more confused. She was diagnosed with UTI and prescribed cipro which was not filled due to concern for drug interaction per her son. She was supposed to be on keflex. Urine exam here is negative. Reason for follow up:  
 Ms Zaida Fishman is more alert and interactive. She says it hurts every where she is refusing labs and treatment . I spoke with her son he says he will talk to her when he comes next time. Already seen by Palliative care . 10/7 Patient still refusing labs. Seen by Dr Chela Bond may need antidepressants. Son has not visited her yet. Assessment & Plan:  
Acute metabolic encephalopathy in the setting of underlying dementia due to pain and dehydration 
-   Head CT is negative. Mx as below Dehydration with anion gap metabolic acidosis due to absent oral intake 
-Continue iv hydration. No evidence of bowel ischemia or bowel for perforation. 
-Lab not sent ,per nursing patient refusing. Abdominal pain,Dena was unable to provide history yesterday,but today she is more alert and she tells me her belly hurts. 
-There was question of pneumoperitoneum as reported by CT abdomen and pelvis and surgery was consulted. CT reported is addended,there is no evidence of pneumoperitoneum Severe pain,neck and fiffuse. Patient has history of rheumatoid arthritis and polymyalgia rheumatica.   She has followed with rheumatologist.   
 -The PMR polymyalgia rheumatica was diagnosed in [address] [facility]. Her local rheumatologist here could not verify that diagnosis. -Patient at one point was said to have been on heavy doses of hydrocodone and morphine but was weaned off to only a fentanyl patch, tramadol, and gabapentin. She has seen a local pain specialist about 3 years ago. Currently, her primary care doctors are managing that. -Increased the fentanyl patch from 12 at current dose to 25. May adjust the tramadol, but we will leave the gabapentin as it is because she is already getting 1800 mg in 3 divided doses. -CT spine 3 mm chronic appearing anterolisthesis of C4 with respect to C5. No acute fracture. Tachycardia sinus. Patient has history of AFib and congestive heart failure according to her son 
-HR improved. Continue lopressor. D/cemote tele H/o RA,PMR chronci pain 
-Increase home prednisone from 2.5  to 10 mg for few days;she seems to have some wrist synovitis. She is on gabapentin and tramadol and fentanyl [patch which is increased now. History of migraine :she is on Topamax for migraine prophylaxis H/o GERD:  She is on Nexium. While she was recently diagnosed with urinary tract infection gave a single dose of antibiotics in the ER 3 days ago. Currently urine examination is not consistent with urinary tract infection, so we will refrain from any antibiotics at the present time. Diet:FLQ and advance as tolerated . Code status: DNR 
DVT prophylaxis: lovenox Care Plan discussed with: Vitaly Montgomery Discharge planning/disposition:she lives with son. post hospitalization d/c depends on her clinical and physical progress. 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 
ICD-9-CM: 296.90  Unknown Unknown Dehydration ICD-10-CM: E86.0 ICD-9-CM: 276.51  10/3/2018 Unknown Metabolic acidosis IMS-15-YN: E87.2 ICD-9-CM: 276.2  10/3/2018 Unknown Severe pain ICD-10-CM: T68 ICD-9-CM: 780.96  10/3/2018 Unknown * (Principal)Acute metabolic encephalopathy CER-25-JF: G93.41 
ICD-9-CM: 348.31  10/3/2018 Unknown Review of Systems:  
Review of systems not obtained due to patient factors. Physical Examination:  
 
 Last 24hrs VS reviewed since prior progress note. Most recent are: 
Visit Vitals  /67  Pulse (!) 102  Temp 98.1 °F (36.7 °C)  Resp 18  Wt 54.5 kg (120 lb 2.4 oz)  SpO2 97%  Breastfeeding No  
 BMI 23.47 kg/m2 Constitutional:  Alert and oriented to self, person and place. HEENT: Head is a traumatic, Un icteric sclera. Pink conjunctiva,no erythema or discharge. Oral mucous moist, oropharynx benign. Neck supple, Resp:  CTA bilaterally. No wheezing/rhonchi/rales. No accessory muscle use CV:  Regular rhythm, normal rate, no murmurs, gallops, rubs GI:  Soft, non distended, non tender. normoactive bowel sounds, no hepatosplenomegaly :  No CVA or suprapubic tenderness Skin  :  No erythema,rash,bullae,dipigmentation Musculoskeletal:  Left hand swollen from iv line. Neurologic:  Alert and oriented. CN II-XII reviewed. Moves all extremities. No intake or output data in the 24 hours ending 10/07/18 1030 Data Review:  
 Review and/or order of clinical lab test 
Review and/or order of tests in the radiology section of CPT Review and/or order of tests in the medicine section of CPT Labs:  
 
No results for input(s): WBC, HGB, HCT, PLT, HGBEXT, HCTEXT, PLTEXT, HGBEXT, HCTEXT, PLTEXT in the last 72 hours. No results for input(s): NA, K, CL, CO2, BUN, CREA, GLU, CA, MG, PHOS, URICA in the last 72 hours. No results for input(s): SGOT, GPT, ALT, AP, TBIL, TBILI, TP, ALB, GLOB, GGT, AML, LPSE in the last 72 hours. No lab exists for component: AMYP, HLPSE No results for input(s): INR, PTP, APTT in the last 72 hours. No lab exists for component: INREXT, INREXT No results for input(s): FE, TIBC, PSAT, FERR in the last 72 hours. No results found for: FOL, RBCF No results for input(s): PH, PCO2, PO2 in the last 72 hours. No results for input(s): CPK, CKNDX, TROIQ in the last 72 hours. No lab exists for component: CPKMB No results found for: CHOL, CHOLX, CHLST, CHOLV, HDL, LDL, LDLC, DLDLP, TGLX, TRIGL, TRIGP, CHHD, CHHDX Lab Results Component Value Date/Time Glucose (POC) 89 10/03/2018 07:30 AM  
 
Lab Results Component Value Date/Time Color YELLOW/STRAW 10/03/2018 06:18 AM  
 Appearance CLEAR 10/03/2018 06:18 AM  
 Specific gravity 1.025 10/03/2018 06:18 AM  
 Specific gravity 1.012 05/14/2015 12:34 PM  
 pH (UA) 6.0 10/03/2018 06:18 AM  
 Protein NEGATIVE  10/03/2018 06:18 AM  
 Glucose NEGATIVE  10/03/2018 06:18 AM  
 Ketone >80 (A) 10/03/2018 06:18 AM  
 Bilirubin NEGATIVE  10/03/2018 06:18 AM  
 Urobilinogen 0.2 10/03/2018 06:18 AM  
 Nitrites NEGATIVE  10/03/2018 06:18 AM  
 Leukocyte Esterase NEGATIVE  10/03/2018 06:18 AM  
 Epithelial cells FEW 10/03/2018 06:18 AM  
 Bacteria NEGATIVE  10/03/2018 06:18 AM  
 WBC 0-4 10/03/2018 06:18 AM  
 RBC 0-5 10/03/2018 06:18 AM  
 
 
 
Medications Reviewed:  
 
Current Facility-Administered Medications Medication Dose Route Frequency  predniSONE (DELTASONE) tablet 10 mg  10 mg Oral DAILY  acetaminophen (TYLENOL) tablet 650 mg  650 mg Oral TID  fentaNYL (DURAGESIC) 12 mcg/hr patch 1 Patch  1 Patch TransDERmal Q72H  lidocaine (LIDODERM) 5 % patch 1 Patch  1 Patch TransDERmal Q24H  sodium chloride (NS) flush 5-10 mL  5-10 mL IntraVENous Q8H  
 sodium chloride (NS) flush 5-10 mL  5-10 mL IntraVENous PRN  
 ondansetron (ZOFRAN) injection 4 mg  4 mg IntraVENous Q4H PRN  
 enoxaparin (LOVENOX) injection 40 mg  40 mg SubCUTAneous Q24H  
 gabapentin (NEURONTIN) capsule 600 mg  600 mg Oral TID  traMADol (ULTRAM) tablet 50 mg  50 mg Oral Q12H PRN  polyethylene glycol (MIRALAX) packet 17 g  17 g Oral DAILY PRN  
 nitroglycerin (NITROSTAT) tablet 0.4 mg  0.4 mg SubLINGual Q5MIN PRN  
 cyanocobalamin (VITAMIN B12) tablet 1,000 mcg  1,000 mcg Oral DAILY  topiramate (TOPAMAX) tablet 25 mg  25 mg Oral DAILY WITH BREAKFAST  dextrose 5% and 0.9% NaCl infusion  100 mL/hr IntraVENous CONTINUOUS  
 influenza vaccine 2018-19 (6 mos+)(PF) (FLUARIX QUAD/FLULAVAL QUAD) injection 0.5 mL  0.5 mL IntraMUSCular PRIOR TO DISCHARGE  metoprolol succinate (TOPROL-XL) XL tablet 25 mg  25 mg Oral DAILY And  
 metoprolol succinate (TOPROL-XL) XL tablet 50 mg  50 mg Oral DAILY  pantoprazole (PROTONIX) tablet 40 mg  40 mg Oral ACB  lactobac ac& pc-s.therm-b.anim (PETER Q/RISAQUAD)  1 Cap Oral DAILY  metoprolol (LOPRESSOR) injection 1.25 mg  1.25 mg IntraVENous Q6H  
 ondansetron (ZOFRAN ODT) tablet 8 mg  8 mg Oral Q8H PRN  
 
______________________________________________________________________ EXPECTED LENGTH OF STAY: 3d 7h 
ACTUAL LENGTH OF STAY:          4 Rivka Muhammad MD

## 2018-10-07 NOTE — PROGRESS NOTES
Bedside shift change report given to Maritza (oncoming nurse) by Julienne Sawyer (offgoing nurse). Report included the following information SBAR, Kardex, Intake/Output, MAR and Recent Results.

## 2018-10-08 LAB
ALBUMIN SERPL-MCNC: 1.9 G/DL (ref 3.5–5)
ALBUMIN/GLOB SERPL: 0.6 {RATIO} (ref 1.1–2.2)
ALP SERPL-CCNC: 80 U/L (ref 45–117)
ALT SERPL-CCNC: 24 U/L (ref 12–78)
ANION GAP SERPL CALC-SCNC: 8 MMOL/L (ref 5–15)
AST SERPL-CCNC: 36 U/L (ref 15–37)
BILIRUB SERPL-MCNC: 0.3 MG/DL (ref 0.2–1)
BUN SERPL-MCNC: 11 MG/DL (ref 6–20)
BUN/CREAT SERPL: 28 (ref 12–20)
CALCIUM SERPL-MCNC: 8.5 MG/DL (ref 8.5–10.1)
CHLORIDE SERPL-SCNC: 105 MMOL/L (ref 97–108)
CO2 SERPL-SCNC: 28 MMOL/L (ref 21–32)
CREAT SERPL-MCNC: 0.4 MG/DL (ref 0.55–1.02)
ERYTHROCYTE [DISTWIDTH] IN BLOOD BY AUTOMATED COUNT: 15.8 % (ref 11.5–14.5)
GLOBULIN SER CALC-MCNC: 3.3 G/DL (ref 2–4)
GLUCOSE SERPL-MCNC: 100 MG/DL (ref 65–100)
HCT VFR BLD AUTO: 30.9 % (ref 35–47)
HGB BLD-MCNC: 9.6 G/DL (ref 11.5–16)
MCH RBC QN AUTO: 29.4 PG (ref 26–34)
MCHC RBC AUTO-ENTMCNC: 31.1 G/DL (ref 30–36.5)
MCV RBC AUTO: 94.5 FL (ref 80–99)
NRBC # BLD: 0 K/UL (ref 0–0.01)
NRBC BLD-RTO: 0 PER 100 WBC
PLATELET # BLD AUTO: 168 K/UL (ref 150–400)
PMV BLD AUTO: 9.6 FL (ref 8.9–12.9)
POTASSIUM SERPL-SCNC: 3.2 MMOL/L (ref 3.5–5.1)
PROT SERPL-MCNC: 5.2 G/DL (ref 6.4–8.2)
RBC # BLD AUTO: 3.27 M/UL (ref 3.8–5.2)
SODIUM SERPL-SCNC: 141 MMOL/L (ref 136–145)
WBC # BLD AUTO: 3.6 K/UL (ref 3.6–11)

## 2018-10-08 PROCEDURE — 92526 ORAL FUNCTION THERAPY: CPT

## 2018-10-08 PROCEDURE — 97535 SELF CARE MNGMENT TRAINING: CPT

## 2018-10-08 PROCEDURE — 74011250637 HC RX REV CODE- 250/637: Performed by: HOSPITALIST

## 2018-10-08 PROCEDURE — 80053 COMPREHEN METABOLIC PANEL: CPT | Performed by: HOSPITALIST

## 2018-10-08 PROCEDURE — 74011636637 HC RX REV CODE- 636/637: Performed by: HOSPITALIST

## 2018-10-08 PROCEDURE — 97165 OT EVAL LOW COMPLEX 30 MIN: CPT

## 2018-10-08 PROCEDURE — 74011250637 HC RX REV CODE- 250/637: Performed by: INTERNAL MEDICINE

## 2018-10-08 PROCEDURE — 36415 COLL VENOUS BLD VENIPUNCTURE: CPT | Performed by: HOSPITALIST

## 2018-10-08 PROCEDURE — 99233 SBSQ HOSP IP/OBS HIGH 50: CPT | Performed by: INTERNAL MEDICINE

## 2018-10-08 PROCEDURE — 85027 COMPLETE CBC AUTOMATED: CPT | Performed by: HOSPITALIST

## 2018-10-08 PROCEDURE — 74011250636 HC RX REV CODE- 250/636: Performed by: HOSPITALIST

## 2018-10-08 PROCEDURE — 97530 THERAPEUTIC ACTIVITIES: CPT

## 2018-10-08 PROCEDURE — 65270000029 HC RM PRIVATE

## 2018-10-08 PROCEDURE — 76450000000

## 2018-10-08 PROCEDURE — 74011000250 HC RX REV CODE- 250: Performed by: HOSPITALIST

## 2018-10-08 RX ORDER — GABAPENTIN 400 MG/1
400 CAPSULE ORAL 3 TIMES DAILY
Status: DISCONTINUED | OUTPATIENT
Start: 2018-10-08 | End: 2018-10-09 | Stop reason: HOSPADM

## 2018-10-08 RX ORDER — MIRTAZAPINE 15 MG/1
15 TABLET, FILM COATED ORAL
Status: DISCONTINUED | OUTPATIENT
Start: 2018-10-08 | End: 2018-10-09 | Stop reason: HOSPADM

## 2018-10-08 RX ORDER — PREDNISONE 5 MG/1
5 TABLET ORAL DAILY
Status: DISCONTINUED | OUTPATIENT
Start: 2018-10-09 | End: 2018-10-09 | Stop reason: HOSPADM

## 2018-10-08 RX ADMIN — Medication 5 ML: at 22:00

## 2018-10-08 RX ADMIN — TOPIRAMATE 25 MG: 25 TABLET, FILM COATED ORAL at 08:27

## 2018-10-08 RX ADMIN — Medication 5 ML: at 06:00

## 2018-10-08 RX ADMIN — METOPROLOL SUCCINATE 50 MG: 50 TABLET, EXTENDED RELEASE ORAL at 21:30

## 2018-10-08 RX ADMIN — ACETAMINOPHEN 650 MG: 325 TABLET ORAL at 16:20

## 2018-10-08 RX ADMIN — GABAPENTIN 600 MG: 300 CAPSULE ORAL at 08:28

## 2018-10-08 RX ADMIN — MIRTAZAPINE 15 MG: 15 TABLET, FILM COATED ORAL at 21:30

## 2018-10-08 RX ADMIN — Medication 1 CAPSULE: at 08:27

## 2018-10-08 RX ADMIN — CYANOCOBALAMIN TAB 500 MCG 1000 MCG: 500 TAB at 08:28

## 2018-10-08 RX ADMIN — ACETAMINOPHEN 650 MG: 325 TABLET ORAL at 08:27

## 2018-10-08 RX ADMIN — PANTOPRAZOLE SODIUM 40 MG: 40 TABLET, DELAYED RELEASE ORAL at 06:57

## 2018-10-08 RX ADMIN — ENOXAPARIN SODIUM 40 MG: 40 INJECTION SUBCUTANEOUS at 08:48

## 2018-10-08 RX ADMIN — GABAPENTIN 400 MG: 400 CAPSULE ORAL at 21:30

## 2018-10-08 RX ADMIN — PREDNISONE 10 MG: 10 TABLET ORAL at 08:27

## 2018-10-08 RX ADMIN — TOPIRAMATE 25 MG: 25 TABLET, FILM COATED ORAL at 06:57

## 2018-10-08 RX ADMIN — ACETAMINOPHEN 650 MG: 325 TABLET ORAL at 21:30

## 2018-10-08 RX ADMIN — METOPROLOL SUCCINATE 25 MG: 25 TABLET, EXTENDED RELEASE ORAL at 08:30

## 2018-10-08 RX ADMIN — GABAPENTIN 400 MG: 400 CAPSULE ORAL at 16:20

## 2018-10-08 NOTE — PROGRESS NOTES
Problem: Mobility Impaired (Adult and Pediatric) Goal: *Acute Goals and Plan of Care (Insert Text) Physical Therapy Goals Initiated 10/5/2018 1. Patient will move from supine to sit and sit to supine  and roll side to side in bed with supervision/set-up within 7 day(s). 2.  Patient will transfer from bed to chair and chair to bed with supervision/set-up using the least restrictive device within 7 day(s). 3.  Patient will perform sit to stand with supervision/set-up within 7 day(s). 4.  Patient will ambulate with supervision/set-up for 25 feet with the least restrictive device within 7 day(s). physical Therapy TREATMENT Patient: Toney Gonsalez (53 y.o. female) Date: 10/8/2018 Diagnosis: Dehydration Metabolic acidosis Acute metabolic encephalopathy Severe pain Dehydration Acute metabolic encephalopathy Precautions: Contact, Fall Chart, physical therapy assessment, plan of care and goals were reviewed. ASSESSMENT: 
Patient continues to complain of pain in throughout her body, but more isolated in the hands and L hip/groin area. She was much more alert today and ultimately agreed to sit EOB and partially stood at the side of the bed. She needed max to total assist for all mobility, but was able to maintain sitting balance with intermittent assistance. She continues to require total care. She reports that she was walking to the bathroom at home before at that she does have assist at home (\"shower lady\") and she is certainly not at that level of function at this time. If son is not able to care for her at this level of mobility, then recommend SNF level rehab when medically ready. Progression toward goals: 
[]    Improving appropriately and progressing toward goals [x]    Improving slowly and progressing toward goals 
[]    Not making progress toward goals and plan of care will be adjusted PLAN: 
Patient continues to benefit from skilled intervention to address the above impairments. Continue treatment per established plan of care. Discharge Recommendations:  [facility] vs home with 24 hour caregivers Further Equipment Recommendations for Discharge:  none SUBJECTIVE:  
Patient stated I have been through so much.  OBJECTIVE DATA SUMMARY:  
Critical Behavior: 
Neurologic State: Alert Orientation Level: Oriented X4 Cognition: Follows commands Functional Mobility Training: 
Bed Mobility: 
  
Supine to Sit: Maximum assistance Sit to Supine: Maximum assistance Scooting: Maximum assistance Transfers: 
Sit to Stand: Maximum assistance (to a partial stand) Stand to Sit: Maximum assistance Balance: 
Sitting: Impaired; Without support Sitting - Static: Fair (occasional) Sitting - Dynamic: Poor (constant support) Standing: Impaired; With support Standing - Static: Constant support;Poor Standing - Dynamic : Poor Ambulation/Gait Training: 
  
  
  
  
  
  
  
  
  
  
  
  
  
  
  
  
  
  
Stairs: 
  
  
   
 
Neuro Re-Education: 
 
Therapeutic Exercises:  
 
Pain: 
Pain Scale 1: Numeric (0 - 10) Pain Intensity 1: 10 Activity Tolerance:  
Fair, improved from last session, certainly Please refer to the flowsheet for vital signs taken during this treatment. After treatment:  
[]    Patient left in no apparent distress sitting up in chair 
[x]    Patient left in no apparent distress in bed 
[x]    Call bell left within reach [x]    Nursing notified 
[]    Caregiver present 
[]    Bed alarm activated COMMUNICATION/COLLABORATION:  
The patients plan of care was discussed with: Occupational Therapist, Registered Nurse and  Seb Vang PT Time Calculation: 35 mins

## 2018-10-08 NOTE — PROGRESS NOTES
Problem: Dysphagia (Adult) Goal: *Acute Goals and Plan of Care (Insert Text) Speech pathology goals Initiated 10/3/2018 1. Patient will participate in re-evaluation of swallow function within 7 days. MET 2. Added 10/5/2018 Patient will tolerate full liquid diet with no overt s/s aspiration within 7 days. MET 3. Added 10/5/2018 Patient will tolerate trials of soft solids with timely/complete mastication and full oral clearance within 7 days. MET Speech language pathology dysphagia treatment/discharge Patient: Richmond Her (25 y.o. female) Date: 10/8/2018 Diagnosis: Dehydration Metabolic acidosis Acute metabolic encephalopathy Severe pain Dehydration Acute metabolic encephalopathy Precautions:  Contact, Fall ASSESSMENT: 
Patient with resolved dysphagia. Patient now with timely/complete mastication, timely swallow initiation, functional hyolaryngeal elevation/excursion, and no overt s/s aspiration observed. Alertness and participation have significantly improved. Progression toward goals: 
[x]           Improving appropriately and progressing toward goals 
[]           Improving slowly and progressing toward goals 
[]           Not making progress toward goals and plan of care will be adjusted PLAN: 
--Regular/thin liquid diet Patient will be discharged from speech therapy at this time. Rationale for discharge: 
[x]      Goals Achieved 
[]      [address] 
[]      Patient not participating in therapy 
[]      Other: 
Discharge Recommendations:  None SUBJECTIVE:  
Patient stated I love bryan ariasracquel.  OBJECTIVE:  
Cognitive and Communication Status: 
Neurologic State: Alert Orientation Level: Oriented to person Cognition: Follows commands, Appropriate for age attention/concentration Perception: Appears intact Perseveration: No perseveration noted Safety/Judgement: Not assessed Dysphagia Treatment: P.O. Trials: 
Patient Position: upright in bed Vocal quality prior to P.O.: No impairment Consistency Presented: Thin liquid; Solid How Presented: Self-fed/presented;Cup/sip Bolus Acceptance: No impairment Bolus Formation/Control: No impairment Propulsion: No impairment Oral Residue: None Initiation of Swallow: No impairment Laryngeal Elevation: Functional 
Aspiration Signs/Symptoms: None Pharyngeal Phase Characteristics: No impairment, issues, or problems Effective Modifications: None Cues for Modifications: None Using the NOMS, the patient was determined to be at level 7 for swallow function NOMS Swallowing Levels: 
Level 1 (CN): NPO Level 2 (CM): NPO but takes consistency in therapy Level 3 (CL): Takes less than 50% of nutrition p.o. and continues with nonoral feedings; and/or safe with mod cues; and/or max diet restriction Level 4 (CK): Safe swallow but needs mod cues; and/or mod diet restriction; and/or still requires some nonoral feeding/supplements Level 5 (CJ): Safe swallow with min diet restriction; and/or needs min cues Level 6 (CI): Independent with p.o.; rare cues; usually self cues; may need to avoid some foods or needs extra time Level 7 (CH): Independent for all p.o. MOO. (2003). National Outcomes Measurement System (NOMS): Adult Speech-Language Pathology User's Guide. Pain: After treatment:  
[]                Patient left in no apparent distress sitting up in chair 
[x]                Patient left in no apparent distress in bed 
[x]                Call bell left within reach [x]                Nursing notified 
[]                Caregiver present 
[]                Bed alarm activated COMMUNICATION/EDUCATION:  
The patients plan of care including recommendations, planned interventions, and recommended diet changes were discussed with: Registered Nurse. Deniz Correa SLP Time Calculation: 8 mins

## 2018-10-08 NOTE — PROGRESS NOTES
Bedside shift change report given to [address] (oncoming nurse) by Lady Buchanan (offgoing nurse). Report included the following information SBAR, Kardex, Intake/Output and MAR.

## 2018-10-08 NOTE — PROGRESS NOTES
Hospitalist Progress Note Allie Rosas MD 
     
                             Answering service: [phone] OR [phone] from in house phone Date of Service:  10/8/2018 NAME:  Chantell Dumont :  1925 MRN:  [id number] Admission Summary: Ms. Gloria Barraza was brought from home for nausea, severe pain, and altered mental status. Reason for follow up: Ms. Gloria Barraza is alert and states she has severe pain this AM - not new. No SOB, CP, N/V. Wants to advance her diet. Speech consulted. Per review, she has been refusing some care/meds and has had multiple recent hospitalizations. Palliative is following. PT recs \"To Be Determined. \" May need placement. Assessment & Plan:  
Acute metabolic encephalopathy in the setting of underlying dementia  
- Head CT was negative - Patient alert and oriented to self and place on my encounter this AM. Responded to questions appropriately Dehydration with anion gap metabolic acidosis due to absent oral intake 
- resolved 
- encourage PO intake Abdominal pain, ? Resolved 
-There was question of pneumoperitoneum as reported by CT abdomen and pelvis and surgery was consulted 
-CT reported was addended - there is no evidence of pneumoperitoneum Severe pain, diffuse: Patient has a history of rheumatoid arthritis and polymyalgia rheumatica. -The PMR polymyalgia rheumatica was diagnosed in [address] [place] [address]. ? Her local rheumatologist here could not verify that diagnosis. -Patient at one point was said to have been on heavy doses of hydrocodone and morphine but was weaned off to only a fentanyl patch, tramadol, and gabapentin. She has seen a local pain specialist about 3 years ago.   Currently, her primary care doctors are managing that.  
-Palliative now weaning some meds - could have been a factor in her presenting delirium - appreciate recs 
-CT spine 3 mm chronic appearing anterolisthesis of C4 with respect to C5. No acute fracture. Tachycardia, sinus. Patient has history of AFib and congestive heart failure according to her son 
-resolved Chronic Pain/PMR 
-Patient's home dose of steroid was increased from 2.5 mg to 10 mg. Will decrease to 5 mg today and monitor History of migraine: She is on Topamax for migraine prophylaxis GERD:  She is on  a PPI. Diet: FLQ, Speech to evaluate Code status: DNR 
DVT prophylaxis: lovenox Care Plan discussed with: patient, nurse Discharge planning/disposition: TBD per PT. May need placement vs. 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 
ICD-9-CM: 296.90  Unknown Unknown Dehydration ICD-10-CM: E86.0 ICD-9-CM: 276.51  10/3/2018 Unknown Metabolic acidosis OHI-68-CY: E87.2 ICD-9-CM: 276.2  10/3/2018 Unknown Severe pain ICD-10-CM: K07 ICD-9-CM: 780.96  10/3/2018 Unknown * (Principal)Acute metabolic encephalopathy KPF-91-IA: G93.41 
ICD-9-CM: 348.31  10/3/2018 Unknown Review of Systems:  
Pertinent items are noted in HPI. Physical Examination:  
 
 Last 24hrs VS reviewed since prior progress note. Most recent are: 
Visit Vitals  /69  Pulse 86  Temp 98.1 °F (36.7 °C)  Resp 16  Wt 125.5 kg (276 lb 10.8 oz)  SpO2 100%  Breastfeeding No  
 BMI 54.03 kg/m2 Constitutional:  Alert and oriented to self, person, and place HEENT:  Neck supple Resp:  CTA bilaterally. No accessory muscle use CV:  Regular rhythm, normal rate, no murmur GI:  Soft, non distended, non tender. normoactive bowel sounds Skin  :  No amberly Musculoskeletal:  Bilateral swelling up digits in both hands. Tenderness with movement. No LE edema Neurologic:  Alert and oriented. Moves all extremities. No intake or output data in the 24 hours ending 10/08/18 0729 Data Review:  
 Review and/or order of clinical lab test 
Review and/or order of tests in the radiology section of [facility] Review and/or order of tests in the medicine section of [facility] Labs:  
 
Recent Labs 10/08/18 
 8050 WBC  3.6 HGB  9.6* HCT  30.9*  
PLT  168 Recent Labs 10/08/18 
 3403 NA  141  
K  3.2*  
CL  105 CO2  28 BUN  11  
CREA  0.40* GLU  100 CA  8.5 Recent Labs 10/08/18 
 2926 SGOT  36 ALT  24 AP  80 TBILI  0.3 TP  5.2* ALB  1.9*  
GLOB  3.3 No results for input(s): INR, PTP, APTT in the last 72 hours. No lab exists for component: INREXT, INREXT No results for input(s): FE, TIBC, PSAT, FERR in the last 72 hours. No results found for: FOL, RBCF No results for input(s): PH, PCO2, PO2 in the last 72 hours. No results for input(s): CPK, CKNDX, TROIQ in the last 72 hours. No lab exists for component: CPKMB No results found for: CHOL, CHOLX, CHLST, CHOLV, HDL, LDL, LDLC, DLDLP, TGLX, TRIGL, TRIGP, CHHD, CHHDX Lab Results Component Value Date/Time Glucose (POC) 89 10/03/2018 07:30 AM  
 
Lab Results Component Value Date/Time  Color YELLOW/STRAW 10/03/2018 06:18 AM  
 Appearance CLEAR 10/03/2018 06:18 AM  
 Specific gravity 1.025 10/03/2018 06:18 AM  
 Specific gravity 1.012 05/14/2015 12:34 PM  
 pH (UA) 6.0 10/03/2018 06:18 AM  
 Protein NEGATIVE  10/03/2018 06:18 AM  
 Glucose NEGATIVE  10/03/2018 06:18 AM  
 Ketone >80 (A) 10/03/2018 06:18 AM  
 Bilirubin NEGATIVE  10/03/2018 06:18 AM  
 Urobilinogen 0.2 10/03/2018 06:18 AM  
 Nitrites NEGATIVE  10/03/2018 06:18 AM  
 Leukocyte Esterase NEGATIVE  10/03/2018 06:18 AM  
 Epithelial cells FEW 10/03/2018 06:18 AM  
 Bacteria NEGATIVE  10/03/2018 06:18 AM  
 WBC 0-4 10/03/2018 06:18 AM  
 RBC 0-5 10/03/2018 06:18 AM  
 
 
 
Medications Reviewed:  
 
Current Facility-Administered Medications Medication Dose Route Frequency  predniSONE (DELTASONE) tablet 10 mg  10 mg Oral DAILY  acetaminophen (TYLENOL) tablet 650 mg  650 mg Oral TID  fentaNYL (DURAGESIC) 12 mcg/hr patch 1 Patch  1 Patch TransDERmal Q72H  lidocaine (LIDODERM) 5 % patch 1 Patch  1 Patch TransDERmal Q24H  
 sodium chloride (NS) flush 5-10 mL  5-10 mL IntraVENous Q8H  
 sodium chloride (NS) flush 5-10 mL  5-10 mL IntraVENous PRN  
 ondansetron (ZOFRAN) injection 4 mg  4 mg IntraVENous Q4H PRN  
 enoxaparin (LOVENOX) injection 40 mg  40 mg SubCUTAneous Q24H  
 gabapentin (NEURONTIN) capsule 600 mg  600 mg Oral TID  traMADol (ULTRAM) tablet 50 mg  50 mg Oral Q12H PRN  polyethylene glycol (MIRALAX) packet 17 g  17 g Oral DAILY PRN  
 nitroglycerin (NITROSTAT) tablet 0.4 mg  0.4 mg SubLINGual Q5MIN PRN  
 cyanocobalamin (VITAMIN B12) tablet 1,000 mcg  1,000 mcg Oral DAILY  topiramate (TOPAMAX) tablet 25 mg  25 mg Oral DAILY WITH BREAKFAST  dextrose 5% and 0.9% NaCl infusion  100 mL/hr IntraVENous CONTINUOUS  
 influenza vaccine 2018-19 (6 mos+)(PF) (FLUARIX QUAD/FLULAVAL QUAD) injection 0.5 mL  0.5 mL IntraMUSCular PRIOR TO DISCHARGE  metoprolol succinate (TOPROL-XL) XL tablet 25 mg  25 mg Oral DAILY And  
 metoprolol succinate (TOPROL-XL) XL tablet 50 mg  50 mg Oral DAILY  pantoprazole (PROTONIX) tablet 40 mg  40 mg Oral ACB  lactobac ac& pc-s.therm-b.anim (PETER Q/RISAQUAD)  1 Cap Oral DAILY  ondansetron (ZOFRAN ODT) tablet 8 mg  8 mg Oral Q8H PRN  
 
______________________________________________________________________ EXPECTED LENGTH OF STAY: 3d 7h 
ACTUAL LENGTH OF STAY:          5 Jayna Correa MD

## 2018-10-08 NOTE — PROGRESS NOTES
Chart reviewed. The patient is max assist with PT and OT. CRM called the son FREDRICK Briscoe to discuss discharge planning. [phone]. Will follow. kJT 
 
4:20pm CRM spoke with José Miguel Rao patient's POA and son at the bedside. This patient does not have SNF days left for this Calendar year. The patient used her days earlier this year at [facility]. The son stated that he was the primary care giver and he has used [facility] in the past for PT/OT and Nursing at home. The son stated that there is limited funds for private duty care and that the patient owns a home (no plan to sell) and would not be a candidate for medicaid. The son is open to [facility] home. CRM will follow for therapy progress/HH nursing, PT and OT orders.  KJT

## 2018-10-08 NOTE — PROGRESS NOTES
Problem: Self Care Deficits Care Plan (Adult) Goal: *Acute Goals and Plan of Care (Insert Text) Occupational Therapy Goals Initiated 10/8/2018 1. Patient will perform lower body ADLs with AE Min A  within 7 day(s). 2.  Patient will perform upper body ADLs sitting for 5 mins without fatigue or LOB with supervision/set-up within 7 day(s). 3.  Patient will perform toilet transfer with Min A  within 7 day(s). 4.  Patient will perform all aspects of toileting with supervision/set-up within 7 day(s). 5.  Patient will participate in upper extremity therapeutic exercise/activities with supervision/set-up for 10 minutes within 7 day(s). 6.  Patient will utilize energy conservation techniques during functional activities without cues within 7 day(s). Occupational Therapy EVALUATION Patient: Sweetie Houser (6 y.o. female) Date: 10/8/2018 Primary Diagnosis: Dehydration Metabolic acidosis Acute metabolic encephalopathy Severe pain Dehydration Precautions:  Contact, Fall ASSESSMENT : 
Based on the objective data described below, the patient presents with overall Max A for functional mobility, up to Total A for lower body ADLs, and setup-Mod A for upper body ADLs s/p AMS with dehydration. Patient received supine in bed following SLP session, cleared for regular diet (patient stating she does not have a gluten allergy and would like to have it removed from chart, RN notified). Patient able to tolerate modified bed/chair position for simple grooming tasks. Requiring increased time, physical assist d/t weakness and loss of ROM/ strength, and presenting with impaired attention/concentration during tasks. Patient will benefit from d/c to SNF rehab when medically stable as she is currently 80% impaired in all ADLs and has been unable to mobilize OOB yet for all daily ADL needs. Will continue to follow. Patient will benefit from skilled intervention to address the above impairments. Patients rehabilitation potential is considered to be Good Factors which may influence rehabilitation potential include:  
[]             None noted []             Mental ability/status []             Medical condition []             Home/family situation and support systems []             Safety awareness []             Pain tolerance/management 
[]             Other: PLAN : 
Recommendations and Planned Interventions: 
[x]               Self Care Training                  [x]        Therapeutic Activities [x]               Functional Mobility Training    []        Cognitive Retraining 
[x]               Therapeutic Exercises           [x]        Endurance Activities [x]               Balance Training                   []        Neuromuscular Re-Education []               Visual/Perceptual Training     [x]   Home Safety Training 
[x]               Patient Education                 [x]        Family Training/Education []               Other (comment): Frequency/Duration: Patient will be followed by occupational therapy 5 times a week to address goals. Discharge Recommendations: SNF Rehab Further Equipment Recommendations for Discharge: TBD SUBJECTIVE:  
Patient stated Well that meatloaf sounds good.  OBJECTIVE DATA SUMMARY:  
HISTORY:  
Past Medical History:  
Diagnosis Date  Arrhythmia A-FIB  Arthritis  CAD (coronary artery disease) 1989 MI  
 Congestive heart failure (CHF) ([address]  GERD (gastroesophageal reflux disease)  Gluten intolerance  Heart failure ([address] CHF  Polymyalgia ([address] Past Surgical History:  
Procedure Laterality Date  CARDIAC SURG PROCEDURE UNLIST  1980'S CARDIAC CATH  
[address], 2006, 2013 LUMBAR SPINE  
 HX CHOLECYSTECTOMY  HX HIP REPLACEMENT Right 5/2015 [employer]  HX TONSILLECTOMY  CHILD  
 HX UROLOGICAL  BLADDER SUSPENSION - MESH  
 
 
 Prior Level of Function/Environment/Context: Per patient report, lives at home with her children. Typically ambulatory with RW. History of cyclical depression, flat affect, decreased activity/eating and then feeling like herself. Otherwise, typically independent. Home Situation Home Environment: Private residence One/Two Story Residence: One story Living Alone: No 
Support Systems: Child(brittany) Patient Expects to be Discharged to[de-identified] Private residence Current DME Used/Available at Home: yesenia [employer] Hand dominance: Right EXAMINATION OF PERFORMANCE DEFICITS: 
Cognitive/Behavioral Status: 
Neurologic State: Alert Orientation Level: Oriented to person Cognition: Follows commands; Appropriate for age attention/concentration Perception: Appears intact Perseveration: No perseveration noted Safety/Judgement: Not assessed Skin: Appears intact Edema: None noted in BUEs Hearing: Auditory Auditory Impairment: Hard of hearing, bilateral 
 
Vision/Perceptual:   
Tracking: Able to track stimulus in all quadrants w/o difficulty Saccades: Within Defined Limits Convergence: Normal (within 6 inches from nose) Acuity: Impaired near vision; Impaired far vision Corrective Lenses: Glasses Range of Motion: In [facility] [address] AROM: Generally decreased, functional 
PROM: Generally decreased, functional 
 
Strength: In [address] [place] [address] [address] Strength: Generally decreased, functional 
 
Coordination: 
Coordination: Generally decreased, functional 
Fine Motor Skills-Upper: Left Intact; Right Intact Gross Motor Skills-Upper: Left Intact; Right Intact Tone & Sensation: In [facility] [address] Tone: Normal 
Sensation: Intact Balance: 
Sitting: Impaired; Without support Sitting - Static: Fair (occasional) Sitting - Dynamic: Poor (constant support) Standing: Impaired; With support Standing - Static: Constant support;Poor Standing - Dynamic : Poor Functional Mobility and Transfers for ADLs: 
Bed Mobility: Supine to Sit: Maximum assistance Sit to Supine: Maximum assistance Scooting: Maximum assistance Transfers: 
Sit to Stand: Maximum assistance (to a partial stand) Stand to Sit: Maximum assistance ADL Assessment: 
Feeding: Setup Oral Facial Hygiene/Grooming: Moderate assistance Bathing: Maximum assistance Upper Body Dressing: Moderate assistance Lower Body Dressing: Total assistance Toileting: Total assistance *Inferred per obs of BUE ROM, functional mobility, activity tolerance, and cognition ADL Intervention and task modifications: 
Cognitive Retraining Safety/Judgement: Not assessed Patient requiring assist with ordering her lunch d/t impaired attention/concentration and mild disorientation; spent time trying to read menu, needing therapist to put in order on the phone Grooming Washing face:setup Combing hair: Min A Applying deodorant: Max A Functional Measure: 
Barthel Index: 
 
Bathin Bladder: 5 Bowels: 10 
Groomin Dressin Feedin Mobility: 0 Stairs: 0 Toilet Use: 0 Transfer (Bed to Chair and Back): 0 Total: 20 Barthel and G-code impairment scale: 
Percentage of impairment CH 
0% CI 
1-19% CJ 
20-39% CK 
40-59% CL 
60-79% CM 
80-99% CN 
100% Barthel Score 0-100 100 99-80 79-60 59-40 20-39 1-19 
 0 Barthel Score 0-20 20 17-19 13-16 9-12 5-8 1-4 0 The Barthel ADL Index: Guidelines 1. The index should be used as a record of what a patient does, not as a record of what a patient could do. 2. The main aim is to establish degree of independence from any help, physical or verbal, however minor and for whatever reason. 3. The need for supervision renders the patient not independent. 4. A patient's performance should be established using the best available evidence. Asking the patient, friends/relatives and nurses are the usual sources, but direct observation and common sense are also important. However direct testing is not needed. 5. Usually the patient's performance over the preceding 24-48 hours is important, but occasionally longer periods will be relevant. 6. Middle categories imply that the patient supplies over 50 per cent of the effort. 7. Use of aids to be independent is allowed. Carlyn New., Barthel, D.W. (5936). Functional evaluation: the Barthel Index. [facility] (14)2. Kelly Plascencia ha GIANNA Mcintyre, Russel Pichardo., [address] Alvin, [facility] (1999). Measuring the change indisability after inpatient rehabilitation; comparison of the responsiveness of the Barthel Index and Functional Luce Measure. Journal of Neurology, Neurosurgery, and Psychiatry, 66(4), 859-408. MATTHEW Wasserman, MEKHI Nascimento, & Nena Mota M.A. (2004.) Assessment of post-stroke quality of life in cost-effectiveness studies: The usefulness of the Barthel Index and the EuroQoL-5D. [facility], 13, 211-92 Activity Tolerance:  
Good. Please refer to the flowsheet for vital signs taken during this treatment. After treatment:  
[] Patient left in no apparent distress sitting up in chair 
[x] Patient left in no apparent distress in bed 
[x] Call bell left within reach [x] Nursing notified 
[] Caregiver present [x] Bed alarm activated COMMUNICATION/EDUCATION:  
The patients plan of care was discussed with: Physical Therapist and Registered Nurse. [x] Home safety education was provided and the patient/caregiver indicated understanding. [x] Patient/family have participated as able in goal setting and plan of care. [] Patient/family agree to work toward stated goals and plan of care. [] Patient understands intent and goals of therapy, but is neutral about his/her participation. [] Patient is unable to participate in goal setting and plan of care. This patients plan of care is appropriate for delegation to [facility]. Thank you for this referral. 
Leydi Chavarria OT Time Calculation: 31 mins

## 2018-10-08 NOTE — CONSULTS
Palliative Medicine Consult  Adonis: [phone] ([phone])    Patient Name: Chantell Dumont  YOB: 1925    Date of Initial Consult: 10/5/18  Reason for Consult:  Care decisions  Requesting Provider: Hospitalist medicine - Dr. Leoncio Olivera  Primary Care Physician: Israel Cueto NP     SUMMARY:   Chantell Dumont is a 80 y.o. 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 Current medical issues leading to Palliative Medicine involvement include:  Delirium and care decisions. Social Hx: . Lives with son Jaylen Pereira and his wife. Moved from [place] 4 years ago to live with Jaylen Pereira. 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 She enjoys intricate doll making. PALLIATIVE DIAGNOSES:   1. Depressive symptoms  2. Dissociative symptoms - improved / resolved  3. Allodynia  4. Debility  5. Poor appetite       PLAN:   1. Spoke with son Eileen Higgins this morning, primary caregiver and mPOA. 2. Jaylen Pereira relayed this is her 4th episode in one year requiring hospitalization for similar symptoms - pain (albeit in various locations), refusal to eat and take meds and ensuing dehydration. First three hospitalizations were at [facility]. She spen Most of Jan-April 2018 at [facility]. 3. Jaylen Kelli confirmed with me the trauma Ms. Gloria Barraza had experienced noting the timeframe was 2002. She found her son (a physician) who had hung himself. Talked with Jaylen Pereira re: the impact this had on her. 3. Jaylen Pereira does is not aware of her entire psychiatric history prior to living with him, but does recall a hospitalization when he was a teenager at [facility] for psychiatric care.    He notes she was referred to counseling from a pain management physician when she first moved to [facility] in 2004, this did not last while as counselor did not feel it was of benefit. She was briefly on an SSRI to no effect. 5. Shanique Welch feels these episodes are triggered by loneliness / depression, leading to cycle of anorexia and dehydration followed by confusion. 10. Shanique Welch reports he visited her yesterday and her mood was notably improved, she was eating and drinking. This is similar time frame to her recovery in past, 1-2 weeks. 7. Clarified medications:  She is taking gabapentin 600 mg TID at home and Fentanyl 12 mcg TD was restarted on 9/20/18 (not yet registering in [facility]). [facility]dom[facility] Caballero consultation on 10/6. 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 Mirtazapine suggested as well as reduction in gabapentin to account for sedating effect. 5. Visited Ms. Socorro Bejnamin at [facility] more interactive today - had breakfast tray in front of her and asked me to feed her. Ate grits well. Told many stories about her dolls. Couldn't recall exactly why she came in but recounted conversation she had with Shanique Welch over the weekend. I asked her what she feels is going on - \"I am taking too many pills and it causes nausea. \"   10. Allodynia still present (arms and hands today) - note absence of pain to touch when distracted with stories. 11. Plan:   - Continue standing APAP 650 mg TID  - Agree with reduction of gabapentin, will start with reduction to 400 mg TID  - Start Mirtazapine 15 mg qhs  - Not crazy about continuing her on fentanyl patch, but will leave for now as I did not specifically address with Shanique Welch this morning. Note she is NOT utilizing breakthrough opioids at home nor in the hospital.    - Ongoing conversation with Shanique Welch re: what to do if repeat episode. 12. Communicated plan of care with: Palliative IDT        GOALS OF CARE / TREATMENT PREFERENCES:     GOALS OF CARE:  Pt unable to engage in goc discussion today.         TREATMENT PREFERENCES:   Code Status: DNR    Advance Care Planning:  Advance Care Planning 10/3/2018   Patient's Healthcare Decision Maker is: Legal Next of Kin   Primary Decision Maker Name -   Primary Decision Maker Phone Number -   Primary Decision Maker Relationship to Patient -   Secondary Decision [address] Name -   Secondary Decision [address] Phone Number -   Secondary Decision Maker Relationship to Patient -   Confirm Advance Directive Yes, on file               Other:    As far as possible, the palliative care team has discussed with patient / health care proxy about goals of care / treatment preferences for patient. HISTORY:     History obtained from:   Patient, chart review, [facility] team    CHIEF COMPLAINT:  Refusal to eat/get out of bed. HPI/SUBJECTIVE:    The patient is:   [x] Verbal and participatory  However minimally due to unclear etiology at this juncture  [] Non-participatory due to:     79 yo female brought in for assessment of change in mental status from baseline, refusal of food or drink and to move. Patient alert and conversant this morning. Carries on conversation well. Smiles as I come in room. Wants to eat but asks to be fed. Memory is foggy about past two weeks. She continues to endorse pain but today in hands primarily and arms. Also endorses legs however when questioned. Does anywhere not hurt? \"No - it is really terrible. \"  Pain is described as \"swelling. \"      She denies nausea, emesis.        Clinical Pain Assessment (nonverbal scale for severity on nonverbal patients):              Duration: for how long has pt been experiencing pain (e.g., 2 days, 1 month, years)  Frequency: how often pain is an issue (e.g., several times per day, once every few days, constant)     FUNCTIONAL ASSESSMENT:     Palliative Performance Scale (PPS):          PSYCHOSOCIAL/SPIRITUAL SCREENING:     Palliative IDT has assessed this patient for cultural preferences / practices and a referral made as appropriate to needs (Cultural Services, Patient Advocacy, Ethics, etc.)    Advance Care Planning:  Advance Care Planning 10/3/2018   Patient's Healthcare Decision Maker is: Legal Next of Kin   Primary Decision Maker Name -   Primary Decision Maker Phone Number -   Primary Decision Maker Relationship to Patient -   Secondary Decision Maker Name -   Secondary Decision [address] Phone Number -   Secondary Decision Maker Relationship to Patient -   Confirm Advance Directive Yes, on file       Any spiritual / Baptism concerns:  [] Yes /  [x] No    Caregiver Burnout:  [] Yes /  [x] No /  [] No Caregiver Present      Anticipatory grief assessment:   [x] Normal  / [] Maladaptive       ESAS Anxiety:      ESAS Depression:          REVIEW OF SYSTEMS:     Positive and pertinent negative findings in ROS are noted above in HPI. The following systems were [] reviewed / [] unable to be reviewed as noted in HPI  Other findings are noted below. Systems: constitutional, ears/nose/mouth/throat, respiratory, gastrointestinal, genitourinary, musculoskeletal, integumentary, neurologic, psychiatric, endocrine. Positive findings noted below. Modified ESAS Completed by: provider                                   Stool Occurrence(s): 1        PHYSICAL EXAM:     From RN flowsheet:  Wt Readings from Last 3 Encounters:   10/08/18 125.5 kg (276 lb 10.8 oz)   06/05/18 54.7 kg (120 lb 9.6 oz)   11/20/17 59.4 kg (131 lb)     Blood pressure 130/75, pulse 80, temperature 98.2 °F (36.8 °C), resp. rate 16, weight 125.5 kg (276 lb 10.8 oz), SpO2 93 %, not currently breastfeeding.     Pain Scale 1: Numeric (0 - 10)  Pain Intensity 1: 10     Pain Location 1: Back           Last bowel movement, if known:     Constitutional: frail elderly female sitting up in bed  Eyes: pupils equal, anicteric  ENMT: no nasal discharge, moist mucous membranes, op moist w/o thrush  Cardiovascular: regular rhythm, distal pulses intact  Respiratory: breathing not labored, symmetric  Gastrointestinal: soft non-tender, +bowel sounds  Musculoskeletal: no deformity, distal UEs and hands tender to light touch intermittently  Skin: warm, dry  Neurologic: alert, able to follow all commands, moves all 4 extremities  Psychiatric: brighter affect today       HISTORY:     Principal Problem:    Acute metabolic encephalopathy (72/0/6442)    Active Problems:    Dehydration (26/0/2624)      Metabolic acidosis (32/3/4794)      Severe pain (10/3/2018)      Dissociative episodes ()      Poor fluid intake ()      Neck pain ()      Mood disorder (HCC) ()      Past Medical History:   Diagnosis Date    Arrhythmia     A-FIB    Arthritis     CAD (coronary artery disease) 1989    MI    Congestive heart failure (CHF) (HCC)     GERD (gastroesophageal reflux disease)     Gluten intolerance     Heart failure ([address]     CHF    Polymyalgia ([address]       Past Surgical History:   Procedure Laterality Date    CARDIAC SURG PROCEDURE UNLIST  1980'S    CARDIAC CATH    HX BACK SURGERY  1998, 2006, 2013    LUMBAR SPINE    HX CHOLECYSTECTOMY      [place] HIP REPLACEMENT Right 5/2015    Doron Sands HX TONSILLECTOMY  CHILD    HX UROLOGICAL      BLADDER SUSPENSION - MESH      Family History   Problem Relation Age of Onset    Other Mother      Intestinal obstruction    Cancer Father     Stroke Father     Heart Attack Brother     Cancer Brother     Cancer Brother     Anesth Problems Neg Hx       History reviewed, no pertinent family history.   Social History   Substance Use Topics    Smoking status: Never Smoker    Smokeless tobacco: Never Used    Alcohol use No     Allergies   Allergen Reactions    Phenergan [Promethazine] Other (comments)     \"loose my wits i go crazy\"    Nsaids (Non-Steroidal Anti-Inflammatory Drug) Nausea and Vomiting    Adhesive Tape-Silicones Other (comments)     BLISTERS    Atenolol Nausea and Vomiting    Cymbalta [Duloxetine] Other (comments)     CONFUSION      Digoxin Nausea and Vomiting    Gluten Other (comments)     GLUTEN INTOLERANCE  Macrobid [Nitrofurantoin Monohyd/M-Cryst] Nausea and Vomiting    Novocain [Procaine] Other (comments)     Paralysis     Sulfa (Sulfonamide Antibiotics) Nausea and Vomiting    Codeine Other (comments)     Unable to swallow      Current Facility-Administered Medications   Medication Dose Route Frequency    predniSONE (DELTASONE) tablet 10 mg  10 mg Oral DAILY    acetaminophen (TYLENOL) tablet 650 mg  650 mg Oral TID    fentaNYL (DURAGESIC) 12 mcg/hr patch 1 Patch  1 Patch TransDERmal Q72H    lidocaine (LIDODERM) 5 % patch 1 Patch  1 Patch TransDERmal Q24H    sodium chloride (NS) flush 5-10 mL  5-10 mL IntraVENous Q8H    sodium chloride (NS) flush 5-10 mL  5-10 mL IntraVENous PRN    ondansetron (ZOFRAN) injection 4 mg  4 mg IntraVENous Q4H PRN    enoxaparin (LOVENOX) injection 40 mg  40 mg SubCUTAneous Q24H    gabapentin (NEURONTIN) capsule 600 mg  600 mg Oral TID    traMADol (ULTRAM) tablet 50 mg  50 mg Oral Q12H PRN    polyethylene glycol (MIRALAX) packet 17 g  17 g Oral DAILY PRN    nitroglycerin (NITROSTAT) tablet 0.4 mg  0.4 mg SubLINGual Q5MIN PRN    cyanocobalamin (VITAMIN B12) tablet 1,000 mcg  1,000 mcg Oral DAILY    topiramate (TOPAMAX) tablet 25 mg  25 mg Oral DAILY WITH BREAKFAST    dextrose 5% and 0.9% NaCl infusion  100 mL/hr IntraVENous CONTINUOUS    influenza vaccine 2018-19 (6 mos+)(PF) (FLUARIX QUAD/FLULAVAL QUAD) injection 0.5 mL  0.5 mL IntraMUSCular PRIOR TO DISCHARGE    metoprolol succinate (TOPROL-XL) XL tablet 25 mg  25 mg Oral DAILY    And    metoprolol succinate (TOPROL-XL) XL tablet 50 mg  50 mg Oral DAILY    pantoprazole (PROTONIX) tablet 40 mg  40 mg Oral ACB    lactobac ac& pc-s.therm-b.anim (PETER Q/RISAQUAD)  1 Cap Oral DAILY    ondansetron (ZOFRAN ODT) tablet 8 mg  8 mg Oral Q8H PRN          LAB AND IMAGING FINDINGS:     Lab Results   Component Value Date/Time    WBC 3.6 10/08/2018 04:56 AM    HGB 9.6 (L) 10/08/2018 04:56 AM    PLATELET 118 96/65/5584 04:56 AM     Lab Results   Component Value Date/Time    Sodium 141 10/08/2018 04:53 AM    Potassium 3.2 (L) 10/08/2018 04:53 AM    Chloride 105 10/08/2018 04:53 AM    CO2 28 10/08/2018 04:53 AM    BUN 11 10/08/2018 04:53 AM    Creatinine 0.40 (L) 10/08/2018 04:53 AM    Calcium 8.5 10/08/2018 04:53 AM    Magnesium 1.7 10/03/2018 04:58 AM    Phosphorus 3.1 10/03/2018 04:58 AM      Lab Results   Component Value Date/Time    AST (SGOT) 36 10/08/2018 04:53 AM    Alk. phosphatase 80 10/08/2018 04:53 AM    Protein, total 5.2 (L) 10/08/2018 04:53 AM    Albumin 1.9 (L) 10/08/2018 04:53 AM    Globulin 3.3 10/08/2018 04:53 AM     Lab Results   Component Value Date/Time    INR 1.1 10/03/2018 08:20 AM    Prothrombin time 10.9 10/03/2018 08:20 AM    aPTT 26.7 10/03/2018 08:20 AM      No results found for: IRON, FE, TIBC, IBCT, PSAT, FERR   No results found for: PH, PCO2, PO2  No components found for: [place]   Lab Results   Component Value Date/Time     (H) 05/25/2015 04:32 AM    CK - MB 3.1 05/25/2015 04:32 AM                Total time: 40  Counseling / coordination time, spent as noted above:  20  > 50% counseling / coordination?:  Yes. Discussion with gene Tsai re: history and course of illness and counseling re: plan of care. Coordination with primary team and specialist.       Prolonged service was provided for  []30 min   []75 min in face to face time in the presence of the patient, spent as noted above. Time Start:   Time End:   Note: this can only be billed with 40368 (initial) or 89291 (follow up). If multiple start / stop times, list each separately.

## 2018-10-08 NOTE — ACP (ADVANCE CARE PLANNING)
GOALS OF CARE:  Full care escalation       TREATMENT PREFERENCES:   Code Status: DNR    Advance Care Planning:  Advance Care Planning 10/8/2018   Patient's Healthcare Decision Maker is: Named in scanned ACP document   Primary Decision Maker Name Job Reyes   Primary Decision Maker Phone Number [phone]   Primary Decision Maker Relationship to Patient Adult child   Secondary Decision Maker Name -   Secondary Decision Maker Phone Number -   Secondary Decision Maker Relationship to Patient -   Confirm Advance Directive None   Does the patient have other document types Do Not Resuscitate

## 2018-10-08 NOTE — PROGRESS NOTES
Problem: Falls - Risk of 
Goal: *Absence of Falls Document Yudi Olivo Fall Risk and appropriate interventions in the flowsheet. Outcome: Progressing Towards Goal 
Fall Risk Interventions: 
Mobility Interventions: Patient to call before getting OOB, Communicate number of staff needed for ambulation/transfer Mentation Interventions: Adequate sleep, hydration, pain control, Evaluate medications/consider consulting pharmacy, Door open when patient unattended Medication Interventions: Patient to call before getting OOB Elimination Interventions: Call light in reach

## 2018-10-09 ENCOUNTER — TELEPHONE (OUTPATIENT)
Dept: FAMILY MEDICINE CLINIC | Age: 83
End: 2018-10-09

## 2018-10-09 VITALS
WEIGHT: 128.8 LBS | TEMPERATURE: 98.5 F | BODY MASS INDEX: 25.29 KG/M2 | HEIGHT: 60 IN | HEART RATE: 78 BPM | SYSTOLIC BLOOD PRESSURE: 116 MMHG | OXYGEN SATURATION: 96 % | DIASTOLIC BLOOD PRESSURE: 69 MMHG | RESPIRATION RATE: 16 BRPM

## 2018-10-09 DIAGNOSIS — F33.2 SEVERE EPISODE OF RECURRENT MAJOR DEPRESSIVE DISORDER, WITHOUT PSYCHOTIC FEATURES (HCC): Primary | ICD-10-CM

## 2018-10-09 PROBLEM — E86.0 DEHYDRATION: Status: RESOLVED | Noted: 2018-10-03 | Resolved: 2018-10-09

## 2018-10-09 PROBLEM — E87.20 METABOLIC ACIDOSIS: Status: RESOLVED | Noted: 2018-10-03 | Resolved: 2018-10-09

## 2018-10-09 PROBLEM — R52 SEVERE PAIN: Status: RESOLVED | Noted: 2018-10-03 | Resolved: 2018-10-09

## 2018-10-09 PROBLEM — G93.41 ACUTE METABOLIC ENCEPHALOPATHY: Status: RESOLVED | Noted: 2018-10-03 | Resolved: 2018-10-09

## 2018-10-09 LAB
ANION GAP SERPL CALC-SCNC: 10 MMOL/L (ref 5–15)
BASOPHILS # BLD: 0 K/UL (ref 0–0.1)
BASOPHILS NFR BLD: 0 % (ref 0–1)
BUN SERPL-MCNC: 13 MG/DL (ref 6–20)
BUN/CREAT SERPL: 25 (ref 12–20)
CALCIUM SERPL-MCNC: 8.7 MG/DL (ref 8.5–10.1)
CHLORIDE SERPL-SCNC: 109 MMOL/L (ref 97–108)
CO2 SERPL-SCNC: 26 MMOL/L (ref 21–32)
CREAT SERPL-MCNC: 0.53 MG/DL (ref 0.55–1.02)
DIFFERENTIAL METHOD BLD: ABNORMAL
EOSINOPHIL # BLD: 0.1 K/UL (ref 0–0.4)
EOSINOPHIL NFR BLD: 3 % (ref 0–7)
ERYTHROCYTE [DISTWIDTH] IN BLOOD BY AUTOMATED COUNT: 15.7 % (ref 11.5–14.5)
GLUCOSE SERPL-MCNC: 96 MG/DL (ref 65–100)
HCT VFR BLD AUTO: 32.2 % (ref 35–47)
HGB BLD-MCNC: 9.7 G/DL (ref 11.5–16)
IMM GRANULOCYTES # BLD: 0 K/UL
IMM GRANULOCYTES NFR BLD AUTO: 0 %
LYMPHOCYTES # BLD: 0.7 K/UL (ref 0.8–3.5)
LYMPHOCYTES NFR BLD: 30 % (ref 12–49)
MCH RBC QN AUTO: 28.6 PG (ref 26–34)
MCHC RBC AUTO-ENTMCNC: 30.1 G/DL (ref 30–36.5)
MCV RBC AUTO: 95 FL (ref 80–99)
MONOCYTES # BLD: 0.2 K/UL (ref 0–1)
MONOCYTES NFR BLD: 9 % (ref 5–13)
NEUTS SEG # BLD: 1.3 K/UL (ref 1.8–8)
NEUTS SEG NFR BLD: 58 % (ref 32–75)
NRBC # BLD: 0 K/UL (ref 0–0.01)
NRBC BLD-RTO: 0 PER 100 WBC
PLATELET # BLD AUTO: 211 K/UL (ref 150–400)
PLATELET COMMENTS,PCOM: ABNORMAL
PMV BLD AUTO: 9.8 FL (ref 8.9–12.9)
POTASSIUM SERPL-SCNC: 3.4 MMOL/L (ref 3.5–5.1)
RBC # BLD AUTO: 3.39 M/UL (ref 3.8–5.2)
RBC MORPH BLD: ABNORMAL
RBC MORPH BLD: ABNORMAL
SODIUM SERPL-SCNC: 145 MMOL/L (ref 136–145)
WBC # BLD AUTO: 2.3 K/UL (ref 3.6–11)

## 2018-10-09 PROCEDURE — 36415 COLL VENOUS BLD VENIPUNCTURE: CPT | Performed by: INTERNAL MEDICINE

## 2018-10-09 PROCEDURE — 80048 BASIC METABOLIC PNL TOTAL CA: CPT | Performed by: INTERNAL MEDICINE

## 2018-10-09 PROCEDURE — 74011250637 HC RX REV CODE- 250/637: Performed by: HOSPITALIST

## 2018-10-09 PROCEDURE — 85025 COMPLETE CBC W/AUTO DIFF WBC: CPT | Performed by: INTERNAL MEDICINE

## 2018-10-09 PROCEDURE — 74011250637 HC RX REV CODE- 250/637: Performed by: INTERNAL MEDICINE

## 2018-10-09 PROCEDURE — 74011000250 HC RX REV CODE- 250: Performed by: HOSPITALIST

## 2018-10-09 PROCEDURE — 74011250636 HC RX REV CODE- 250/636: Performed by: HOSPITALIST

## 2018-10-09 PROCEDURE — 74011636637 HC RX REV CODE- 636/637: Performed by: INTERNAL MEDICINE

## 2018-10-09 RX ORDER — MIRTAZAPINE 15 MG/1
15 TABLET, FILM COATED ORAL
Qty: 30 TAB | Refills: 1 | Status: SHIPPED | OUTPATIENT
Start: 2018-10-09 | End: 2019-01-02 | Stop reason: SDUPTHER

## 2018-10-09 RX ORDER — POTASSIUM CHLORIDE 20MEQ/15ML
40 LIQUID (ML) ORAL
Status: COMPLETED | OUTPATIENT
Start: 2018-10-09 | End: 2018-10-09

## 2018-10-09 RX ORDER — GABAPENTIN 400 MG/1
400 CAPSULE ORAL 3 TIMES DAILY
Qty: 90 CAP | Refills: 0 | Status: SHIPPED | OUTPATIENT
Start: 2018-10-09 | End: 2018-10-12 | Stop reason: DRUGHIGH

## 2018-10-09 RX ORDER — MIRTAZAPINE 15 MG/1
15 TABLET, FILM COATED ORAL
Qty: 30 TAB | Refills: 0 | Status: SHIPPED | OUTPATIENT
Start: 2018-10-09 | End: 2018-10-09

## 2018-10-09 RX ORDER — PREDNISONE 5 MG/1
5 TABLET ORAL DAILY
Qty: 30 TAB | Refills: 0 | Status: SHIPPED | OUTPATIENT
Start: 2018-10-10 | End: 2018-10-12 | Stop reason: DRUGHIGH

## 2018-10-09 RX ADMIN — TOPIRAMATE 25 MG: 25 TABLET, FILM COATED ORAL at 07:05

## 2018-10-09 RX ADMIN — PANTOPRAZOLE SODIUM 40 MG: 40 TABLET, DELAYED RELEASE ORAL at 07:05

## 2018-10-09 RX ADMIN — GABAPENTIN 400 MG: 400 CAPSULE ORAL at 08:12

## 2018-10-09 RX ADMIN — ACETAMINOPHEN 650 MG: 325 TABLET ORAL at 08:12

## 2018-10-09 RX ADMIN — POTASSIUM CHLORIDE 40 MEQ: 20 SOLUTION ORAL at 10:32

## 2018-10-09 RX ADMIN — Medication 1 CAPSULE: at 08:12

## 2018-10-09 RX ADMIN — CYANOCOBALAMIN TAB 500 MCG 1000 MCG: 500 TAB at 08:12

## 2018-10-09 RX ADMIN — Medication 5 ML: at 06:00

## 2018-10-09 RX ADMIN — PREDNISONE 5 MG: 5 TABLET ORAL at 08:12

## 2018-10-09 RX ADMIN — METOPROLOL SUCCINATE 25 MG: 25 TABLET, EXTENDED RELEASE ORAL at 08:11

## 2018-10-09 NOTE — PROGRESS NOTES
Bedside and Verbal shift change report given to [employer] (oncoming nurse) by Russell Chaudhry RN (offgoing nurse). Report given with David LAWSON and MAR.

## 2018-10-09 NOTE — PROGRESS NOTES
Patient unable to sign because of dementia. Patient's son is waiting at home. Patient was sent with instructions.

## 2018-10-09 NOTE — PROGRESS NOTES
Discharge order and [place] orders noted. CRM sent home care orders to [facility] via [employer] per the son's request. CRM working on Transport home today. AYAH Sadler accepted the case. The patient is open to them and will resume care. 3pm [facility] time confirmed requested by the son. He will be at the house when the patient arrives.  AYAH

## 2018-10-09 NOTE — PROGRESS NOTES
Pt does show more appetite, taking medication, friendly with staff. Conversation generally pleasant, better cognitive function (she remembered my dog's name and recognized a different picture of the dog who she only saw briefly yesterday and recalled a story I told her. Dx: Cognitive impairment less  Significant dementia. .  Depression not a major component either at this time. For now would continue to observe and see if better function is momentary.

## 2018-10-09 NOTE — DISCHARGE INSTRUCTIONS
Discharge Instructions       PATIENT ID: Shanel Frias  MRN: [id number]   YOB: 1925    DATE OF ADMISSION: 10/3/2018  3:29 AM    DATE OF DISCHARGE: 10/9/2018    PRIMARY CARE PROVIDER: Dora Reilly NP     ATTENDING PHYSICIAN: Laurie Adams MD  DISCHARGING PROVIDER: Laurie Adams MD    To contact this individual call [phone] and ask the  to page. If unavailable ask to be transferred the Adult Hospitalist Department. DISCHARGE DIAGNOSES Dehydration    CONSULTATIONS: IP CONSULT TO GENERAL SURGERY  IP CONSULT TO HOSPITALIST  IP CONSULT TO PALLIATIVE CARE - PROVIDER  IP CONSULT TO PALLIATIVE CARE - PROVIDER  IP CONSULT TO PSYCHIATRY    PROCEDURES/SURGERIES: * No surgery found *    PENDING TEST RESULTS:   At the time of discharge the following test results are still pending:     FOLLOW UP APPOINTMENTS:   Follow-up Information     Follow up With Details Comments Contact Info    Dora Reilly NP In 1 week  [id number] [address]  [phone]             ADDITIONAL CARE RECOMMENDATIONS:     DIET: Regular Diet and Cardiac Diet    ACTIVITY: Activity as tolerated    WOUND CARE:     EQUIPMENT needed:       DISCHARGE MEDICATIONS:   See Medication Reconciliation Form    · It is important that you take the medication exactly as they are prescribed. · Keep your medication in the bottles provided by the pharmacist and keep a list of the medication names, dosages, and times to be taken in your wallet. · Do not take other medications without consulting your doctor. NOTIFY YOUR PHYSICIAN FOR ANY OF THE FOLLOWING:   Fever over 101 degrees for 24 hours. Chest pain, shortness of breath, fever, chills, nausea, vomiting, diarrhea, change in mentation, falling, weakness, bleeding. Severe pain or pain not relieved by medications. Or, any other signs or symptoms that you may have questions about.       DISPOSITION:  x  Home With:   OT  PT  [facility]  RN SNF/Inpatient Rehab/LTAC    Independent/assisted living    Hospice    Other:     CDMP Checked:   Yes x     PROBLEM LIST Updated:  Yes x       Signed:   Arsenio Fritz MD  10/9/2018  8:45 AM

## 2018-10-09 NOTE — PROGRESS NOTES
Hospitalist Progress Note Taiwo Kirk MD 
Answering service: [phone] OR [phone] from in house phone Date of Service:  10/9/2018 NAME:  Becka Cho :  1925 MRN:  [id number] Admission Summary: Ms. Tushar Rivas was brought from home for nausea, severe pain, and altered mental status. Interval history / Subjective: Pt was AAO x 1/2 this morning , she lives alone , she has no complains, just told me I am taking too many medications makes me sick Assessment & Plan:  
 
Acute metabolic encephalopathy in the setting of underlying dementia  
- Head CT was negative - Patient alert and oriented to self and place on my encounter this AM. Responded to questions appropriately - Resolved 
  
Dehydration with anion gap metabolic acidosis due to absent oral intake 
- resolved 
- encourage PO intake 
  
Abdominal pain, ? Resolved 
-There was question of pneumoperitoneum as reported by CT abdomen and pelvis and surgery was consulted 
-CT reported was addended - there is no evidence of pneumoperitoneum - abd soft BS+ 
  
Severe pain, diffuse: Patient has a history of rheumatoid arthritis and polymyalgia rheumatica. -The PMR polymyalgia rheumatica was diagnosed in [place]. ? Her local rheumatologist here could not verify that diagnosis. -Patient at one point was said to have been on heavy doses of hydrocodone and morphine but was weaned off to only a fentanyl patch, tramadol, and gabapentin. She has seen a local pain specialist about 3 years ago. Currently, her primary care doctors are managing that.  
-Palliative now weaning some meds - could have been a factor in her presenting delirium - appreciate recs 
-CT spine 3 mm chronic appearing anterolisthesis of C4 with respect to C5. No acute fracture. 
  
Tachycardia, sinus.    
Patient has history of AFib and congestive heart failure according to her son 
-resolved 
  
 Chronic Pain/PMR 
-Patient's home dose of steroid was increased from 2.5 mg to 10 mg. Will decrease to 5 mg today and monitor 
  
History of migraine: She is on Topamax for migraine prophylaxis 
  
GERD:  She is on  a PPI. Code status:DNR 
DVT prophylaxis: [facility] Care Plan discussed with: Patient/Family and Nurse Disposition: TBD Hospital Problems  Date Reviewed: 10/4/2018 Codes Class Noted POA Vascular dementia without behavioral disturbance ICD-10-CM: F01.50 ICD-9-CM: 290.40  Unknown Unknown Debility ICD-10-CM: R53.81 ICD-9-CM: 799.3  Unknown Unknown Decreased oral intake ICD-10-CM: R63.8 ICD-9-CM: 783.9  Unknown Unknown Allodynia ICD-10-CM: R20.8 ICD-9-CM: 782.0  Unknown Unknown Dissociative episodes ICD-10-CM: F44.9 ICD-9-CM: 300.15  Unknown Unknown Poor fluid intake ICD-10-CM: R63.8 ICD-9-CM: 783.9  Unknown Unknown Neck pain ICD-10-CM: M54.2 ICD-9-CM: 723.1  Unknown Unknown Depression ICD-10-CM: F32.9 ICD-9-CM: 311  Unknown Unknown Dehydration ICD-10-CM: E86.0 ICD-9-CM: 276.51  10/3/2018 Unknown Metabolic acidosis MWP-21-XL: E87.2 ICD-9-CM: 276.2  10/3/2018 Unknown Severe pain ICD-10-CM: R08 ICD-9-CM: 780.96  10/3/2018 Unknown * (Principal)Acute metabolic encephalopathy DGR-52-CP: G93.41 
ICD-9-CM: 348.31  10/3/2018 Unknown Review of Systems: A comprehensive review of systems was negative. Vital Signs:  
 Last 24hrs VS reviewed since prior progress note. Most recent are: 
Visit Vitals  /74 (BP 1 Location: Left arm, BP Patient Position: At rest)  Pulse 70  Temp 98.3 °F (36.8 °C)  Resp 16  
 Ht 5' (1.524 m)  Wt 58.4 kg (128 lb 12.8 oz)  SpO2 97%  Breastfeeding No  
 BMI 25.15 kg/m2 Intake/Output Summary (Last 24 hours) at 10/09/18 8182 Last data filed at 10/08/18 1343 Gross per 24 hour Intake              450 ml Output                0 ml Net              450 ml Physical Examination:  
 
 
     
Constitutional:  No acute distress, ENT:  Oral mucous moist, Resp:  CTA bilaterally. No wheezing/rhonchi/rales. CV:  Regular rhythm, normal rate, GI:  Soft, non distended, non tender. BS+ Musculoskeletal:  No edema, warm, 2+ pulses throughout Neurologic:  Moves all extremities. AAOx3, CN II-XII reviewed Psych:  Good insight, Not anxious nor agitated. Data Review:  
 I personally reviewed  Image and LABS Labs:  
 
Recent Labs 10/09/18 
 9839  10/08/18 
 5798 WBC  2.3*  3.6 HGB  9.7*  9.6* HCT  32.2*  30.9* PLT  211  168 Recent Labs 10/09/18 
 5595  10/08/18 
 0865 NA  145  141  
K  3.4*  3.2*  
CL  109*  105 CO2  26  28 BUN  13  11 CREA  0.53*  0.40* GLU  96  100 CA  8.7  8.5 Recent Labs 10/08/18 
 0679 SGOT  36 ALT  24 AP  80 TBILI  0.3 TP  5.2* ALB  1.9*  
GLOB  3.3 No results for input(s): INR, PTP, APTT in the last 72 hours. No lab exists for component: INREXT No results for input(s): FE, TIBC, PSAT, FERR in the last 72 hours. No results found for: FOL, RBCF No results for input(s): PH, PCO2, PO2 in the last 72 hours. No results for input(s): CPK, CKNDX, TROIQ in the last 72 hours. No lab exists for component: CPKMB No results found for: CHOL, CHOLX, CHLST, CHOLV, HDL, LDL, LDLC, DLDLP, TGLX, TRIGL, TRIGP, CHHD, CHHDX Lab Results Component Value Date/Time Glucose (POC) 89 10/03/2018 07:30 AM  
 
Lab Results Component Value Date/Time  Color YELLOW/STRAW 10/03/2018 06:18 AM  
 Appearance CLEAR 10/03/2018 06:18 AM  
 Specific gravity 1.025 10/03/2018 06:18 AM  
 Specific gravity 1.012 05/14/2015 12:34 PM  
 pH (UA) 6.0 10/03/2018 06:18 AM  
 Protein NEGATIVE  10/03/2018 06:18 AM  
 Glucose NEGATIVE  10/03/2018 06:18 AM  
 Ketone >80 (A) 10/03/2018 06:18 AM  
 Bilirubin NEGATIVE  10/03/2018 06:18 AM  
 Urobilinogen 0.2 10/03/2018 06:18 AM  
 Nitrites NEGATIVE  10/03/2018 06:18 AM  
 Leukocyte Esterase NEGATIVE  10/03/2018 06:18 AM  
 Epithelial cells FEW 10/03/2018 06:18 AM  
 Bacteria NEGATIVE  10/03/2018 06:18 AM  
 WBC 0-4 10/03/2018 06:18 AM  
 RBC 0-5 10/03/2018 06:18 AM  
 
 
 
Medications Reviewed:  
 
Current Facility-Administered Medications Medication Dose Route Frequency  gabapentin (NEURONTIN) capsule 400 mg  400 mg Oral TID  mirtazapine (REMERON) tablet 15 mg  15 mg Oral QHS  predniSONE (DELTASONE) tablet 5 mg  5 mg Oral DAILY  acetaminophen (TYLENOL) tablet 650 mg  650 mg Oral TID  fentaNYL (DURAGESIC) 12 mcg/hr patch 1 Patch  1 Patch TransDERmal Q72H  lidocaine (LIDODERM) 5 % patch 1 Patch  1 Patch TransDERmal Q24H  
 sodium chloride (NS) flush 5-10 mL  5-10 mL IntraVENous Q8H  
 sodium chloride (NS) flush 5-10 mL  5-10 mL IntraVENous PRN  
 ondansetron (ZOFRAN) injection 4 mg  4 mg IntraVENous Q4H PRN  
 enoxaparin (LOVENOX) injection 40 mg  40 mg SubCUTAneous Q24H  
 traMADol (ULTRAM) tablet 50 mg  50 mg Oral Q12H PRN  polyethylene glycol (MIRALAX) packet 17 g  17 g Oral DAILY PRN  
 nitroglycerin (NITROSTAT) tablet 0.4 mg  0.4 mg SubLINGual Q5MIN PRN  
 cyanocobalamin (VITAMIN B12) tablet 1,000 mcg  1,000 mcg Oral DAILY  topiramate (TOPAMAX) tablet 25 mg  25 mg Oral DAILY WITH BREAKFAST  influenza vaccine 2018-19 (6 mos+)(PF) (FLUARIX QUAD/FLULAVAL QUAD) injection 0.5 mL  0.5 mL IntraMUSCular PRIOR TO DISCHARGE  metoprolol succinate (TOPROL-XL) XL tablet 25 mg  25 mg Oral DAILY And  
 metoprolol succinate (TOPROL-XL) XL tablet 50 mg  50 mg Oral DAILY  pantoprazole (PROTONIX) tablet 40 mg  40 mg Oral ACB  lactobac ac& pc-s.therm-b.anim (PETER Q/RISAQUAD)  1 Cap Oral DAILY  ondansetron (ZOFRAN ODT) tablet 8 mg  8 mg Oral Q8H PRN  
 
______________________________________________________________________ EXPECTED LENGTH OF STAY: 3d 7h 
 ACTUAL LENGTH OF STAY:          6 Keith Spencer MD

## 2018-10-09 NOTE — DISCHARGE SUMMARY
Discharge Summary       PATIENT ID: Rodolfo Sotelo  MRN: [id number]   YOB: 1925    DATE OF ADMISSION: 10/3/2018  3:29 AM    DATE OF DISCHARGE: 10/9/18   PRIMARY CARE PROVIDER: Moustapha Hurtado NP     ATTENDING PHYSICIAN: Chetna Valero  DISCHARGING PROVIDER: Dedrick Crews MD    To contact this individual call [phone] and ask the  to page. If unavailable ask to be transferred the Adult Hospitalist Department. CONSULTATIONS: IP CONSULT TO GENERAL SURGERY  IP CONSULT TO HOSPITALIST  IP CONSULT TO PALLIATIVE CARE - PROVIDER  IP CONSULT TO PALLIATIVE CARE - PROVIDER  IP CONSULT TO PSYCHIATRY    PROCEDURES/SURGERIES: * No surgery found *    ADMITTING DIAGNOSES & HOSPITAL COURSE:   Ms. Manning was brought from home for nausea, severe pain, and altered mental status.      Assessment & Plan:      Acute metabolic encephalopathy in the setting of underlying dementia   - Head CT was negative  - Patient alert and oriented to self and place on my encounter this AM. Responded to questions appropriately   - Resolved      Dehydration with anion gap metabolic acidosis due to absent oral intake  - resolved  - encourage PO intake      Abdominal pain,   - Resolved  -There was question of pneumoperitoneum as reported by CT abdomen and pelvis and surgery was consulted  -CT reported was addended - there is no evidence of pneumoperitoneum  - abd soft BS+      Severe pain, diffuse: Patient has a history of rheumatoid arthritis and polymyalgia rheumatica.    -The PMR polymyalgia rheumatica was diagnosed in [place]. ? Her local rheumatologist here could not verify that diagnosis.    -Patient at one point was said to have been on heavy doses of hydrocodone and morphine but was weaned off to only a fentanyl patch, tramadol, and gabapentin.  She has seen a local pain specialist about 3 years ago.  Currently, her primary care doctors are managing that.   -Palliative now weaning some meds - could have been a factor in her presenting delirium - appreciate recs  -CT spine 3 mm chronic appearing anterolisthesis of C4 with respect to C5. No acute fracture.      Tachycardia, sinus.    Patient has history of AFib and congestive heart failure according to her son  -resolved      Chronic Pain/PMR  -Patient's home dose of steroid was increased from 2.5 mg to 10 mg. Will decrease to 5 mg today and monitor      History of migraine: She is on Topamax for migraine prophylaxis      GERD:  She is on  a PPI. PENDING TEST RESULTS:   At the time of discharge the following test results are still pending:     FOLLOW UP APPOINTMENTS:    Follow-up Information     Follow up With Details Comments 5 [facility], NP In 1 week  [address] [address]  [phone]             ADDITIONAL CARE RECOMMENDATIONS:     DIET: Regular Diet and Cardiac Diet    ACTIVITY: Activity as tolerated    WOUND CARE:     EQUIPMENT needed:       DISCHARGE MEDICATIONS:  Current Discharge Medication List      START taking these medications    Details   mirtazapine (REMERON) 15 mg tablet Take 1 Tab by mouth nightly for 30 days. Qty: 30 Tab, Refills: 0         CONTINUE these medications which have CHANGED    Details   gabapentin (NEURONTIN) 400 mg capsule Take 1 Cap by mouth three (3) times daily for 30 days. Qty: 90 Cap, Refills: 0      predniSONE (DELTASONE) 5 mg tablet Take 1 Tab by mouth daily for 30 days. Qty: 30 Tab, Refills: 0         CONTINUE these medications which have NOT CHANGED    Details   Bifidobacterium Infantis (ALIGN) 4 mg cap Take 4 mg by mouth daily. fentaNYL (DURAGESIC) 25 mcg/hr PATCH 1 Patch by TransDERmal route every seventy-two (72) hours. !! metoprolol succinate (TOPROL XL) 25 mg XL tablet Take 25 mg by mouth daily.  (25 mg in the morning; 50 mg in the evening)      !! metoprolol succinate (TOPROL-XL) 25 mg XL tablet Take 50 mg by mouth every evening. (25 mg in the morning; 50 mg in the evening)      traMADol (ULTRAM) 50 mg tablet Take 50 mg by mouth two (2) times daily as needed for Pain. ondansetron (ZOFRAN ODT) 8 mg disintegrating tablet Take 1 Tab by mouth every eight (8) hours. Qty: 90 Tab, Refills: 2    Associated Diagnoses: Nausea      polyethylene glycol (MIRALAX) 17 gram/dose powder Take 17 g by mouth daily as needed. spironolactone (ALDACTONE) 25 mg tablet Take 0.5 Tabs by mouth daily. Qty: 60 Tab, Refills: 3      topiramate (TOPAMAX) 25 mg tablet Take 1 Tab by mouth daily (with breakfast). Qty: 90 Tab, Refills: 3      senna-docusate (SENNA PLUS) 8.6-50 mg per tablet Take 1 Tab by mouth daily. cyanocobalamin 1,000 mcg tablet Take 1,000 mcg by mouth daily. nitroglycerin (NITROSTAT) 0.4 mg SL tablet 1 Tab by SubLINGual route every five (5) minutes as needed for Chest Pain. Qty: 50 Tab, Refills: 3      cholecalciferol (VITAMIN D3) 1,000 unit tablet Take 1,000 Units by mouth daily. esomeprazole (NEXIUM) 40 mg capsule Take 40 mg by mouth Daily (before breakfast). !! - Potential duplicate medications found. Please discuss with provider. NOTIFY YOUR PHYSICIAN FOR ANY OF THE FOLLOWING:   Fever over 101 degrees for 24 hours. Chest pain, shortness of breath, fever, chills, nausea, vomiting, diarrhea, change in mentation, falling, weakness, bleeding. Severe pain or pain not relieved by medications. Or, any other signs or symptoms that you may have questions about.     DISPOSITION:  x  Home With:   OT  PT  HH  RN       Long term SNF/Inpatient Rehab    Independent/assisted living    Hospice    Other:       PATIENT CONDITION AT DISCHARGE:     Functional status   x Poor     Deconditioned     Independent      Cognition   x  Lucid    x Forgetful     Dementia      Catheters/lines (plus indication)    [place]     PICC     PEG    x None      Code status     Full code    x DNR      PHYSICAL EXAMINATION AT DISCHARGE:   Refer to Progress Note      CHRONIC MEDICAL DIAGNOSES:  Problem List as of 10/9/2018  Date Reviewed: 10/9/2018          Codes Class Noted - Resolved    Vascular dementia without behavioral disturbance ICD-10-CM: F01.50  ICD-9-CM: 290.40  Unknown - Present        Polyneuropathy associated with underlying disease ([facility] ICD-10-CM: G63  ICD-9-CM: 357.4  8/1/2018 - Present        Dependence on continuous supplemental oxygen ICD-10-CM: Z99.81  ICD-9-CM: V46.2  7/18/2018 - Present        Anemia associated with acute blood loss ICD-10-CM: D62  ICD-9-CM: 285.1  6/19/2018 - Present        Long-term use of immunosuppressant medication ICD-10-CM: Z79.899  ICD-9-CM: V58.69  11/20/2017 - Present        CKD (chronic kidney disease) stage 2, GFR 60-89 ml/min ICD-10-CM: N18.2  ICD-9-CM: 585.2  10/24/2017 - Present        Long term current use of systemic steroids ICD-10-CM: Z79.52  ICD-9-CM: V58.65  10/24/2017 - Present        Seronegative rheumatoid arthritis of multiple sites [facility]) ICD-10-CM: M06.09  ICD-9-CM: 714.0  10/24/2017 - Present        PMR (polymyalgia rheumatica) ([facility] ICD-10-CM: M35.3  ICD-9-CM: 638  10/4/2017 - Present        Lower extremity edema ICD-10-CM: R60.0  ICD-9-CM: 782.3  10/4/2017 - Present        Advance directive on file ICD-10-CM: Z78.9  ICD-9-CM: V49.89  5/10/2017 - Present        Paroxysmal atrial fibrillation ([facility] ICD-10-CM: I48.0  ICD-9-CM: 427.31  3/23/2016 - Present        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 ([facility] ICD-10-CM: I25.111  ICD-9-CM: 414.01, 413.9  10/20/2015 - Present        CHF, stage C ([facility] ICD-10-CM: I50.9  ICD-9-CM: 428.0  10/20/2015 - Present        Lumbar spinal stenosis ICD-10-CM: M48.061  ICD-9-CM: 724.02  10/20/2015 - Present        Osteoarthritis ICD-10-CM: M19.90  ICD-9-CM: 715.90  5/22/2015 - Present        RESOLVED: Debility ICD-10-CM: R53.81  ICD-9-CM: 799.3  Unknown - 10/9/2018        RESOLVED: Decreased oral intake ICD-10-CM: R63.8  ICD-9-CM: 783.9  Unknown - 10/9/2018        RESOLVED: Allodynia ICD-10-CM: R20.8  ICD-9-CM: 782.0  Unknown - 10/9/2018        RESOLVED: Dissociative episodes ICD-10-CM: F44.9  ICD-9-CM: 300.15  Unknown - 10/9/2018        RESOLVED: Poor fluid intake ICD-10-CM: R63.8  ICD-9-CM: 783.9  Unknown - 10/9/2018        RESOLVED: Neck pain ICD-10-CM: M54.2  ICD-9-CM: 723.1  Unknown - 10/9/2018        RESOLVED: Depression ICD-10-CM: F32.9  ICD-9-CM: 127  Unknown - 10/9/2018        RESOLVED: Dehydration ICD-10-CM: E86.0  ICD-9-CM: 276.51  10/3/2018 - 10/9/2018        RESOLVED: Metabolic acidosis DYD-81-KU: E87.2  ICD-9-CM: 276.2  10/3/2018 - 10/9/2018        RESOLVED: Severe pain ICD-10-CM: R52  ICD-9-CM: 780.96  10/3/2018 - 10/9/2018        * (Principal)RESOLVED: Acute metabolic encephalopathy TUI-58-RU: G93.41  ICD-9-CM: 348.31  10/3/2018 - 10/9/2018        RESOLVED: Long term (current) use of systemic steroids ICD-10-CM: Z79.52  ICD-9-CM: V58.65  10/4/2017 - 11/20/2017              Greater than 30  minutes were spent with the patient on counseling and coordination of care    Signed:   Marco Antonio Fuentes MD  10/9/2018  8:47 AM

## 2018-10-09 NOTE — PROGRESS NOTES
Bedside shift change report given to Manuel Pichardo RN (oncoming nurse) by [facility], RN (offgoing nurse). Report included the following information SBAR.

## 2018-10-10 ENCOUNTER — HOME VISIT (OUTPATIENT)
Dept: FAMILY MEDICINE CLINIC | Age: 83
End: 2018-10-10

## 2018-10-10 ENCOUNTER — TELEPHONE (OUTPATIENT)
Dept: FAMILY MEDICINE CLINIC | Age: 83
End: 2018-10-10

## 2018-10-10 VITALS
DIASTOLIC BLOOD PRESSURE: 70 MMHG | OXYGEN SATURATION: 94 % | TEMPERATURE: 97.8 F | SYSTOLIC BLOOD PRESSURE: 129 MMHG | RESPIRATION RATE: 16 BRPM | HEART RATE: 87 BPM

## 2018-10-10 DIAGNOSIS — M54.50 CHRONIC MIDLINE LOW BACK PAIN WITHOUT SCIATICA: ICD-10-CM

## 2018-10-10 DIAGNOSIS — I48.0 PAROXYSMAL ATRIAL FIBRILLATION (HCC): ICD-10-CM

## 2018-10-10 DIAGNOSIS — I10 ESSENTIAL HYPERTENSION: Primary | ICD-10-CM

## 2018-10-10 DIAGNOSIS — G89.29 CHRONIC MIDLINE LOW BACK PAIN WITHOUT SCIATICA: ICD-10-CM

## 2018-10-10 DIAGNOSIS — I50.9 CHF, STAGE C (HCC): ICD-10-CM

## 2018-10-10 DIAGNOSIS — M35.3 PMR (POLYMYALGIA RHEUMATICA) (HCC): ICD-10-CM

## 2018-10-10 DIAGNOSIS — M06.09 SERONEGATIVE RHEUMATOID ARTHRITIS OF MULTIPLE SITES (HCC): ICD-10-CM

## 2018-10-10 DIAGNOSIS — M48.061 SPINAL STENOSIS OF LUMBAR REGION, UNSPECIFIED WHETHER NEUROGENIC CLAUDICATION PRESENT: ICD-10-CM

## 2018-10-10 DIAGNOSIS — R41.0 DELIRIUM: ICD-10-CM

## 2018-10-10 RX ORDER — METOPROLOL TARTRATE 50 MG/1
75 TABLET ORAL 2 TIMES DAILY
Qty: 135 TAB | Refills: 1 | Status: SHIPPED | OUTPATIENT
Start: 2018-10-10 | End: 2018-12-07 | Stop reason: SDUPTHER

## 2018-10-10 NOTE — PHYSICIAN ADVISORY
Peer to peer review setup for Dr. Earl Rico  
10/10/18 145p Unique Moise MD MPH FACP Physician [address] [place] [address] [place] [address] [address] [facility]  [phone]   
 
[phone]

## 2018-10-10 NOTE — MR AVS SNAPSHOT
[address] [facility], [address] [address] 
[phone] Patient: Dipak Ramirez MRN: [id number] Visit Information Date & Time Provider Department Dept. Phone Encounter #  
 10/10/2018 12:00 PM Nehemiah Giang NP [address] [facility] [phone] [id number] Follow-up Instructions Return in about 2 days (around 10/12/2018). Follow-up and Disposition History Upcoming Health Maintenance Date Due Shingrix Vaccine Age 50> (1 of 2) 1975 Pneumococcal 65+ Low/Medium Risk (2 of 2 - PPSV23) 2017 Influenza Age 5 to Adult 2018 GLAUCOMA SCREENING Q2Y 2018 Bone Densitometry (Dexa) Screening 2020* DTaP/Tdap/Td series (1 - Tdap) 2020* MEDICARE YEARLY EXAM 2019 *Topic was postponed. The date shown is not the original due date. Allergies as of 10/10/2018  Review Complete On: 10/10/2018 By: Eugenie Knapp RN Severity Noted Reaction Type Reactions Phenergan [Promethazine] High 2015   Systemic Other (comments) \"loose my wits i go crazy\" Nsaids (Non-steroidal Anti-inflammatory Drug) Medium 2015   Systemic Nausea and Vomiting Adhesive Tape-silicones      Other (comments) BLISTERS Atenolol  2015    Nausea and Vomiting Cymbalta [Duloxetine]  2015    Other (comments) CONFUSION Digoxin  2015    Nausea and Vomiting Gluten  2015    Other (comments) GLUTEN INTOLERANCE Macrobid [Nitrofurantoin Monohyd/m-cryst]  2015    Nausea and Vomiting Novocain [Procaine]  10/03/2018    Other (comments) Paralysis Sulfa (Sulfonamide Antibiotics)  2015    Nausea and Vomiting Codeine Low 2015   Systemic Other (comments) Unable to swallow Current Immunizations  Reviewed on 10/12/2018 Name Date Influenza High Dose Vaccine PF 10/10/2017, 10/12/2016, 10/20/2015 11:52 AM  
 Pneumococcal Conjugate (PCV-13) 12/7/2016 Zoster Vaccine, Live 3/22/2012 Not reviewed this visit You Were Diagnosed With   
  
 Codes Comments Essential hypertension    -  Primary ICD-10-CM: I10 
ICD-9-CM: 401.9 Delirium     ICD-10-CM: R41.0 ICD-9-CM: 780.09 Chronic midline low back pain without sciatica     ICD-10-CM: M54.5, G89.29 ICD-9-CM: 724.2, 338.29 Spinal stenosis of lumbar region, unspecified whether neurogenic claudication present     ICD-10-CM: M48.061 
ICD-9-CM: 724.02   
 CHF, stage C ([address]     ICD-10-CM: I50.9 ICD-9-CM: 428.0 PMR (polymyalgia rheumatica) (HCC)     ICD-10-CM: M35.3 ICD-9-CM: 134 Seronegative rheumatoid arthritis of multiple sites [facility])     ICD-10-CM: M06.09 
ICD-9-CM: 714.0 Paroxysmal atrial fibrillation (HCC)     ICD-10-CM: I48.0 ICD-9-CM: 427.31 Vitals BP Pulse Temp Resp SpO2 OB Status 129/70 (BP 1 Location: Left arm, BP Patient Position: Supine) 87 97.8 °F (36.6 °C) (Temporal) 16 94% Postmenopausal  
 Smoking Status Never Smoker Preferred Pharmacy Pharmacy Name Phone [facility]/PHARMACY #[id number] [phone] Your Updated Medication List  
  
   
This list is accurate as of 10/10/18 11:59 PM.  Always use your most recent med list.  
  
  
  
  
 ALIGN 4 mg Cap Generic drug:  Bifidobacterium Infantis Take 4 mg by mouth daily. cyanocobalamin 1,000 mcg tablet Take 1,000 mcg by mouth daily. fentaNYL 25 mcg/hr PATCH Commonly known as:  DURAGESIC  
1 Patch by TransDERmal route every seventy-two (72) hours. gabapentin 400 mg capsule Commonly known as:  NEURONTIN Take 1 Cap by mouth three (3) times daily for 30 days. metoprolol tartrate 50 mg tablet Commonly known as:  LOPRESSOR Take 1.5 Tabs by mouth two (2) times a day. MIRALAX 17 gram/dose powder Generic drug:  polyethylene glycol Take 17 g by mouth daily as needed. mirtazapine 15 mg tablet Commonly known as:  Lambert Earthly Take 1 Tab by mouth nightly. Indications: major depressive disorder NexIUM 40 mg capsule Generic drug:  esomeprazole Take 40 mg by mouth Daily (before breakfast). nitroglycerin 0.4 mg SL tablet Commonly known as:  NITROSTAT  
1 Tab by SubLINGual route every five (5) minutes as needed for Chest Pain. ondansetron 8 mg disintegrating tablet Commonly known as:  ZOFRAN ODT Take 1 Tab by mouth every eight (8) hours. predniSONE 5 mg tablet Commonly known as:  Ave Slipper Take 1 Tab by mouth daily for 30 days. SENNA PLUS 8.6-50 mg per tablet Generic drug:  senna-docusate Take 1 Tab by mouth daily. spironolactone 25 mg tablet Commonly known as:  ALDACTONE Take 0.5 Tabs by mouth daily. topiramate 25 mg tablet Commonly known as:  TOPAMAX Take 1 Tab by mouth daily (with breakfast). traMADol 50 mg tablet Commonly known as:  ULTRAM  
Take 50 mg by mouth two (2) times daily as needed for Pain. VITAMIN D3 1,000 unit tablet Generic drug:  cholecalciferol Take 1,000 Units by mouth daily. Prescriptions Sent to Pharmacy Refills  
 metoprolol tartrate (LOPRESSOR) 50 mg tablet 1 Sig: Take 1.5 Tabs by mouth two (2) times a day. Class: Normal  
 Pharmacy: [facility]/pharmacy [address] [place] [address] Ph #: [phone] Route: Oral  
  
Follow-up Instructions Return in about 2 days (around 10/12/2018). Patient Instructions Dear Brenda Oneil and Lisbeth Gonzalez, It was a pleasure seeing you at home today with [facility] [address] [place] [address] ([phone]) Your stated goal: To get back to walking again. This is what we talked about: 1. Altered Mental Status/Delirium 
-This could be due to multiple medical issues -We discussed that this has occurred 4 times in the past year and is likely to occur again 
-She was treated for depression but we feel she likely has a medical cause for this as well that will be difficult to diagnose 
-I am drawing stat labs today and will call you with results 
-Call us if she is worsening  
-I am concerned she will need another hospitalization 2. Chronic pain secondary to lumbar stenosis 
-She is screaming in pain so will continue 25 mcg Fentanyl patch at this time and I will follow-up in two days 
-We reviewed positioning for comfort in bed 3. Congestive heart failure 
-This is currently stable 
-Let us know if she develops edema in her legs 4. Atrial fibrillation 
-The rate is below 100 which is the goal 
-We switched her metoprolol to regular release so you can crush her medications 5. Difficulty giving medications 
-Everything is crushable now that the metoprolol is changed 
-Crush the medications and put in a small amount of applesauce, pudding, yogurt or oatmeal 
 
6. Health Maintenance -[facility] will be coming to give your pneumonia vaccine (Prevnar 13, the newer one) and the flu vaccine Health Maintenance Due Topic Date Due  Shingrix Vaccine Age 50> (1 of 2) 07/27/1975  Pneumococcal 65+ Low/Medium Risk (2 of 2 - PPSV23) 12/07/2017  Influenza Age 5 to Adult  08/01/2018  GLAUCOMA SCREENING Q2Y  08/09/2018 7. Advance Care Planning - A Durable Do Not Resusitate (DDNR) is on file - An Advance Directive is already on file. This is what you have shared with us about Advance Care Planning Advance Care Planning 10/12/2018 Patient's Healthcare Decision Maker is: Named in scanned ACP document Primary Decision Maker Name Treasure Watson Primary Decision Maker Phone Number [phone] Primary Decision Maker Relationship to Patient Adult child Secondary Decision Maker Name - Secondary Decision Maker Phone Number -  
 Secondary Decision Maker Relationship to Patient -  
Confirm Advance Directive Yes, on file Does the patient have other document types Do Not Resuscitate Someone from the Home Based Primary Care Team will see you again in 2 days. If there are any concerns before that time, such as medication questions, worsening symptoms or a need to see a physician for an urgent or emergent situation; please call [phone]. A physician is also on call after our normal business hours of 8am to 5pm.  
 
In order to serve you better, please allow up to 48 hours for prescription refills to be processed. Certain medications may require more paperwork or a written prescription that you may need to  from the office. We appreciate you letting us know of any refill requests as soon as possible. Sincerely, The [facility] Primary Care Team 
MD Jacques Crisostomo, CRISTIN Kahn, CRISTIN Mendoza, JUAN PABLO Hicks RN Altered Mental Status: Care Instructions Your Care Instructions Altered mental status is a change in how well your brain is working. As a result, you may be confused, be less alert than usual, or act in odd ways. This may include seeing or hearing things that aren't really there (hallucinations). A mental status change has many possible causes. For example, it may be the result of an infection, an imbalance of chemicals in the body, or a chronic disease such as diabetes or COPD. It can also be caused by things such as a head injury, taking certain medicines, or using alcohol or drugs. The doctor may do tests to look for the cause. These tests may include urine tests, blood tests, and imaging tests such as a CT scan. Sometimes a clear cause isn't found. But tests can help the doctor rule out a serious cause of your symptoms. A change in mental status can be scary. But mental status will often return to normal when the cause is treated.  So it is important to get any follow-up testing or treatment the doctor has suggested. The doctor has checked you carefully, but problems can develop later. If you notice any problems or new symptoms, get medical treatment right away. Follow-up care is a key part of your treatment and safety. Be sure to make and go to all appointments, and call your doctor if you are having problems. It's also a good idea to know your test results and keep a list of the medicines you take. How can you care for yourself at home? · Be safe with medicines. Take your medicines exactly as prescribed. Call your doctor if you think you are having a problem with your medicine. · Have another adult stay with you until you are better. This can help keep you safe. Ask that person to watch for signs that your mental status is getting worse. When should you call for help? Call 911 anytime you think you may need emergency care. For example, call if: 
  · You passed out (lost consciousness).  
 Call your doctor now or seek immediate medical care if: 
  · Your mental status is getting worse.  
  · You have new symptoms, such as a fever, chills, or shortness of breath.  
  · You do not feel safe.  
 Watch closely for changes in your health, and be sure to contact your doctor if: 
  · You do not get better as expected. Where can you learn more? Go to http://anna-sampson.info/. Enter L405 in the search box to learn more about \"Altered Mental Status: Care Instructions. \" Current as of: October 9, 2017 Content Version: 11.7 © 4993-0906 [facility] [employer]. Care instructions adapted under license by [employer] (which disclaims liability or warranty for this information). If you have questions about a medical condition or this instruction, always ask your healthcare professional. [place] [address] any warranty or liability for your use of this information. Patient Instructions History Introducing [facility]! Dear Lawrence Abbasi: 
Thank you for requesting a [employer] account. Our records indicate that you already have an active [employer] account. You can access your account anytime at [email or web address] Did you know that you can access your hospital and ER discharge instructions at any time in [employer]ro? You can also review all of your test results from your hospital stay or ER visit. Additional Information If you have questions, please visit the Frequently Asked Questions section of the [employer] website at [email or web address] Remember, [employer] is NOT to be used for urgent needs. For medical emergencies, dial 911. Now available from your iPhone and Android! Please provide this summary of care documentation to your next provider. Your primary care clinician is listed as ALOSKO. If you have any questions after today's visit, please call [phone].

## 2018-10-10 NOTE — TELEPHONE ENCOUNTER
SW called and spoke with pt's son, scheduled initial visit to the home to assess pt safety, psychosocial concerns.  Visit scheduled for Tuesday 10/16 @ 12pm

## 2018-10-10 NOTE — PROGRESS NOTES
Home Based [address] [facility]  
[phone] Date of Current Visit: 10/10/18 MARLENY Visit Day 1 [facility] 10/3/2018 to 10/9/2018 SUMMARY:  
80 yr female referred to [facility] by Dr. John Vazquez. She has a significant hx for seronegative RA, PMR of multiple joints, CAD, old MI, GERD and Afib. Due to this, Ms. Baldo Herrera is unable to see a community PCP without significant taxing effort. Patient was sent to the hospital at [facility] and admitted through the ER for AMS, dehydration, poor po intake, and severe pain. She was seen by palliative care during her hospitalization and had a psyc consult. No medical cause was found for her symptoms and this is her fourth such episode in a year. She is out of skilled days and had to return home. Remeron was started for depression. Her fentanyl patch was increased to 25 mcg for pain. By discharge she was more alert mentally and he rpain was better controlled. She was also able to maintain hydration status. She r/o'ed for infection and a head CT was wnl. She was seen by surgery for a questionnable pneumoperitoneum on CT but this was r/o'ed. GOALS OF CARE / TREATMENT PREFERENCES:  
GOALS OF CARE: 
Patient / health care proxy stated goals: To return to her cognitive and functional baseline. TREATMENT PREFERENCES:  
Code Status:  [] Attempt Resuscitation       [x] Do Not Attempt Resuscitation Advance Care Planning: 
Advance Care Planning 10/12/2018 Patient's Healthcare Decision Maker is: Named in scanned ACP document Primary Decision Maker Name Antonio Frias Primary Decision Maker Phone Number [phone] Primary Decision Maker Relationship to Patient Adult child Secondary Decision Maker Name - Secondary Decision Maker Phone Number - Secondary Decision Maker Relationship to Patient -  
Confirm Advance Directive Yes, on file Does the patient have other document types Do Not Resuscitate DIAGNOSES:  
 
  ICD-10-CM ICD-9-CM 1. Essential hypertension I10 401.9 metoprolol tartrate (LOPRESSOR) 50 mg tablet 2. Delirium R41.0 780.09   
3. Chronic midline low back pain without sciatica M54.5 724.2 G89.29 338.29   
4. Spinal stenosis of lumbar region, unspecified whether neurogenic claudication present M48.061 724.02   
5. CHF, stage C ([facility]) I50.9 428.0 6. PMR (polymyalgia rheumatica) ([facility]) M35.3 725   
7. Seronegative rheumatoid arthritis of multiple sites ([facility] M06.09 714.0 8. Paroxysmal atrial fibrillation ([facility]) I48.0 427.31 PLAN:  
Patient Instructions Dear Rosy Browne and Ruth Patel, It was a pleasure seeing you at home today with Home Based Xander Perkins ([phone]) Your stated goal: To get back to walking again. This is what we talked about: 1. Altered Mental Status/Delirium 
-This could be due to multiple medical issues 
-We discussed that this has occurred 4 times in the past year and is likely to occur again 
-She was treated for depression but we feel she likely has a medical cause for this as well that will be difficult to diagnose 
-I am drawing stat labs today and will call you with results 
-Call us if she is worsening  
-I am concerned she will need another hospitalization 2. Chronic pain secondary to lumbar stenosis 
-She is screaming in pain so will continue 25 mcg Fentanyl patch at this time and I will follow-up in two days 
-We reviewed positioning for comfort in bed 3. Congestive heart failure 
-This is currently stable 
-Let us know if she develops edema in her legs 4. Atrial fibrillation 
-The rate is below 100 which is the goal 
-We switched her metoprolol to regular release so you can crush her medications 5. Difficulty giving medications 
-Everything is crushable now that the metoprolol is changed 
-Crush the medications and put in a small amount of applesauce, pudding, yogurt or oatmeal 
 
6. Health Maintenance -Kati Baker will be coming to give your pneumonia vaccine (Prevnar 13, the newer one) and the flu vaccine Health Maintenance Due Topic Date Due  Shingrix Vaccine Age 50> (1 of 2) 07[birth year]/1975  Pneumococcal 65+ Low/Medium Risk (2 of 2 - PPSV23) 12/07/2017  Influenza Age 5 to Adult  08/01/2018  GLAUCOMA SCREENING Q2Y  08/09/2018 7. Advance Care Planning - A Durable Do Not Resusitate (DDNR) is on file - An Advance Directive is already on file. This is what you have shared with us about Advance Care Planning Advance Care Planning 10/12/2018 Patient's Healthcare Decision Maker is: Named in scanned ACP document Primary Decision Maker Name Jack Servinnstein Primary Decision Maker Phone Number [phone] Primary Decision Maker Relationship to Patient Adult child Secondary Decision Maker Name - Secondary Decision Maker Phone Number - Secondary Decision Maker Relationship to Patient -  
Confirm Advance Directive Yes, on file Does the patient have other document types Do Not Resuscitate Someone from the Home Based Primary Care Team will see you again in 2 days. If there are any concerns before that time, such as medication questions, worsening symptoms or a need to see a physician for an urgent or emergent situation; please call [phone]. A physician is also on call after our normal business hours of 8am to 5pm.  
 
In order to serve you better, please allow up to 48 hours for prescription refills to be processed. Certain medications may require more paperwork or a written prescription that you may need to  from the office. We appreciate you letting us know of any refill requests as soon as possible. Sincerely, The Home Based Primary Care Team 
MD Tiffanie Rodriguez NP Margarett Pares, NP Youlanda Casa, JUAN PABLO Kilgore RN Altered Mental Status: Care Instructions Your Care Instructions Altered mental status is a change in how well your brain is working. As a result, you may be confused, be less alert than usual, or act in odd ways. This may include seeing or hearing things that aren't really there (hallucinations). A mental status change has many possible causes. For example, it may be the result of an infection, an imbalance of chemicals in the body, or a chronic disease such as diabetes or COPD. It can also be caused by things such as a head injury, taking certain medicines, or using alcohol or drugs. The doctor may do tests to look for the cause. These tests may include urine tests, blood tests, and imaging tests such as a CT scan. Sometimes a clear cause isn't found. But tests can help the doctor rule out a serious cause of your symptoms. A change in mental status can be scary. But mental status will often return to normal when the cause is treated. So it is important to get any follow-up testing or treatment the doctor has suggested. The doctor has checked you carefully, but problems can develop later. If you notice any problems or new symptoms, get medical treatment right away. Follow-up care is a key part of your treatment and safety. Be sure to make and go to all appointments, and call your doctor if you are having problems. It's also a good idea to know your test results and keep a list of the medicines you take. How can you care for yourself at home? · Be safe with medicines. Take your medicines exactly as prescribed. Call your doctor if you think you are having a problem with your medicine. · Have another adult stay with you until you are better. This can help keep you safe. Ask that person to watch for signs that your mental status is getting worse. When should you call for help? Call 911 anytime you think you may need emergency care. For example, call if: 
  · You passed out (lost consciousness).  
 Call your doctor now or seek immediate medical care if:   · Your mental status is getting worse.  
  · You have new symptoms, such as a fever, chills, or shortness of breath.  
  · You do not feel safe.  
 Watch closely for changes in your health, and be sure to contact your doctor if: 
  · You do not get better as expected. Where can you learn more? Go to http://anna-sampson.info/. Enter E870 in the search box to learn more about \"Altered Mental Status: Care Instructions. \" Current as of: October 9, 2017 Content Version: 11.7 © 6860-6756 [employer]. Care instructions adapted under license by [employer] (which disclaims liability or warranty for this information). If you have questions about a medical condition or this instruction, always ask your healthcare professional. [address] any warranty or liability for your use of this information. PRESCRIPTIONS GIVEN:  
 
Medications Ordered Today Medications  metoprolol tartrate (LOPRESSOR) 50 mg tablet Sig: Take 1.5 Tabs by mouth two (2) times a day. Dispense:  135 Tab Refill:  1 HISTORY:  
Please see RN note from this current visit; history taken together in presence of patient/family in the home. CHIEF COMPLAINT:  
Chief Complaint Patient presents with  Transitions Of Care HPI/SUBJECTIVE: The patient is: [x] Verbal / [] Nonverbal  
 
Mrs. Valerie Lewis was released from [facility] yesterday. Her son was ill equipped to care for her. She cannot get out of bed and has diapers. She is unable to feed herself, turn herself, and screams every time she is turned or touched. She had several BM's this am, not diarrhea. She had not nothing po today and refused her medications. Her son needs help learning to put on a diaper in a bed ridden patient and learning to turn and position her. NITA, Deepali, has not called yet to say when they are coming.    
 
REVIEW OF SYSTEMS:  
 
 The following systems were [] reviewed / [x] unable to be reviewed Systems: constitutional, ears/nose/mouth/throat, respiratory, gastrointestinal, genitourinary, musculoskeletal, integumentary, neurologic, psychiatric, endocrine. Positive findings noted: patient can give no ROS today. FUNCTIONAL ASSESSMENT:  
 
Palliative Performance Scale (PPS): 20% PSYCHOSOCIAL/SPIRITUAL SCREENING:  
  
Any spiritual / Mu-ism concerns: [] Yes /  [x] No Bahai, no longer goes to [employer] because can't get out of home; Confucianism does not come to her, may be willing to reconnect Caregiver Burnout: [] Yes /  [x] No /  [] No Caregiver Present PHYSICAL EXAM:  
 
Wt Readings from Last 3 Encounters:  
10/14/18 128 lb 1.4 oz (58.1 kg) 10/09/18 128 lb 12.8 oz (58.4 kg) 06/05/18 120 lb 9.6 oz (54.7 kg) Blood pressure 129/70, pulse 87, temperature 97.8 °F (36.6 °C), temperature source Temporal, resp. rate 16, SpO2 94 %. Last bowel movement:  Two days ago Constitutional:  Frail, elderly female lying in bed, moaning, not interactive, appears acutely ill Eyes: pupils equal, anicteric, conjunctiva are pink ENMT: no nasal discharge, moist mucous membranes and lips. Nasal mucosa pink without discharge Cardiovascular: regularly, irregular rhythm, distal pulses intact, edema +1 LE's with tubigrip in place Respiratory: breathing not labored, symmetric, CTA A&P Gastrointestinal: soft, screams when she is touched anywhere, +bowel sounds. No TTP HSM, mass R, G or R Musculoskeletal: no deformity, no tenderness to palpation but screams that neck hurts Skin: warm, dry Neurologic: oriented X 0, screaming when touched anywhere Psychiatric: Very agitated HISTORY:  
 
Past Medical and Surgical History reviewed in [facility] on date of initial visit Patient Active Problem List  
Diagnosis Code  Osteoarthritis M19.90  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 ([facility] I25.111  
 CHF, stage C ([facility] I50.9  Lumbar spinal stenosis M48.061  
 Paroxysmal atrial fibrillation ([facility]) I48.0  Advance directive on file V31.1  PMR (polymyalgia rheumatica) ([facility]) M35.3  Lower extremity edema R60.0  CKD (chronic kidney disease) stage 2, GFR 60-89 ml/min N18.2  Long term current use of systemic steroids Z79.52  Seronegative rheumatoid arthritis of multiple sites ([facility] M06.09  
 Long-term use of immunosuppressant medication Z79.899  Anemia associated with acute blood loss D62  Dependence on continuous supplemental oxygen Z99.81  
 Polyneuropathy associated with underlying disease ([facility] G63  Vascular dementia without behavioral disturbance F01.50  Acute respiratory distress R06.03  
 Chronic pain syndrome G89.4  Dementia without behavioral disturbance F03.90  
 Goals of care, counseling/discussion Z71.89 Family History Problem Relation Age of Onset Arvil Fitch Other Mother Intestinal obstruction  Cancer Father  Stroke Father  Heart Attack Brother  Cancer Brother  Cancer Brother  Anesth Problems Neg Hx History reviewed, no pertinent family history. Social History Substance Use Topics  Smoking status: Never Smoker  Smokeless tobacco: Never Used  Alcohol use No  
 
Allergies Allergen Reactions  Phenergan [Promethazine] Other (comments) \"loose my wits i go crazy\"  Nsaids (Non-Steroidal Anti-Inflammatory Drug) Nausea and Vomiting  Adhesive Tape-Silicones Other (comments) BLISTERS  Aspirin Other (comments) Per EMS patient is allergic, patient agrees but is confused.  Atenolol Nausea and Vomiting  Cymbalta [Duloxetine] Other (comments) CONFUSION 
  
 Digoxin Nausea and Vomiting  Gluten Other (comments) GLUTEN INTOLERANCE  
 Macrobid [Nitrofurantoin Monohyd/M-Cryst] Nausea and Vomiting  Novocain [Procaine] Other (comments) Paralysis  Sulfa (Sulfonamide Antibiotics) Nausea and Vomiting  Codeine Other (comments) Unable to swallow Current Outpatient Prescriptions Medication Sig  
 metoprolol tartrate (LOPRESSOR) 50 mg tablet Take 1.5 Tabs by mouth two (2) times a day.  [START ON 10/15/2018] aspirin 81 mg chewable tablet Take 1 Tab by mouth daily.  [START ON 10/15/2018] atorvastatin (LIPITOR) 40 mg tablet Take 1 Tab by mouth daily.  gabapentin (NEURONTIN) 300 mg capsule Take 600 mg by mouth two (2) times a day.  predniSONE (DELTASONE) 10 mg tablet Take 10 mg by mouth every evening.  mirtazapine (REMERON) 15 mg tablet Take 1 Tab by mouth nightly. Indications: major depressive disorder  Bifidobacterium Infantis (ALIGN) 4 mg cap Take 4 mg by mouth daily.  fentaNYL (DURAGESIC) 25 mcg/hr PATCH 1 Patch by TransDERmal route every seventy-two (72) hours.  traMADol (ULTRAM) 50 mg tablet Take 50 mg by mouth two (2) times daily as needed for Pain.  ondansetron (ZOFRAN ODT) 8 mg disintegrating tablet Take 1 Tab by mouth every eight (8) hours.  polyethylene glycol (MIRALAX) 17 gram/dose powder Take 17 g by mouth daily as needed.  topiramate (TOPAMAX) 25 mg tablet Take 1 Tab by mouth daily (with breakfast).  senna-docusate (SENNA PLUS) 8.6-50 mg per tablet Take 1 Tab by mouth daily.  cyanocobalamin 1,000 mcg tablet Take 1,000 mcg by mouth daily.  nitroglycerin (NITROSTAT) 0.4 mg SL tablet 1 Tab by SubLINGual route every five (5) minutes as needed for Chest Pain.  cholecalciferol (VITAMIN D3) 1,000 unit tablet Take 1,000 Units by mouth daily.  esomeprazole (NEXIUM) 40 mg capsule Take 40 mg by mouth Daily (before breakfast). No current facility-administered medications for this visit. Facility-Administered Medications Ordered in Other Visits Medication Dose Route Frequency  [START ON 10/15/2018] atorvastatin (LIPITOR) tablet 40 mg  40 mg Oral DAILY  0.9% sodium chloride infusion  75 mL/hr IntraVENous CONTINUOUS  
 nitroglycerin (NITROBID) 2 % ointment 1 Inch  1 Inch Topical Q6H  
 metoprolol tartrate (LOPRESSOR) tablet 12.5 mg  12.5 mg Oral Q12H  cyanocobalamin (VITAMIN B12) tablet 1,000 mcg  1,000 mcg Oral DAILY  fentaNYL (DURAGESIC) 25 mcg/hr patch 1 Patch  1 Patch TransDERmal Q72H  mirtazapine (REMERON) tablet 15 mg  15 mg Oral QHS  nitroglycerin (NITROSTAT) tablet 0.4 mg  0.4 mg SubLINGual Q5MIN PRN  polyethylene glycol (MIRALAX) packet 17 g  17 g Oral DAILY PRN  predniSONE (DELTASONE) tablet 10 mg  10 mg Oral QPM  
 topiramate (TOPAMAX) tablet 25 mg  25 mg Oral DAILY WITH BREAKFAST  sodium chloride (NS) flush 5-10 mL  5-10 mL IntraVENous Q8H  
 sodium chloride (NS) flush 5-10 mL  5-10 mL IntraVENous PRN  
 0.9% sodium chloride infusion  50 mL/hr IntraVENous CONTINUOUS  
 aspirin chewable tablet 81 mg  81 mg Oral DAILY  pantoprazole (PROTONIX) tablet 40 mg  40 mg Oral ACB  
 
 
 LAB DATA REVIEWED:  
 
Lab Results Component Value Date/Time Hemoglobin A1c 5.6 09/20/2018 11:59 AM  
 
No results found for: [place], MCA2, MCA3, MCAU, MCAU2, MCALPOCT Lab Results Component Value Date/Time TSH 1.560 11/20/2017 02:40 PM  
 TSH 0.86 05/25/2015 04:32 AM  
 
Lab Results Component Value Date/Time VITAMIN D, 25-HYDROXY 43.1 10/04/2017 02:52 PM  
   
 
Lab Results Component Value Date/Time WBC 5.6 10/14/2018 02:27 AM  
 HGB 9.0 (L) 10/14/2018 02:27 AM  
 PLATELET 036 66/36/7085 02:27 AM  
 
Lab Results Component Value Date/Time Sodium 140 10/14/2018 02:27 AM  
 Potassium 4.8 10/14/2018 02:27 AM  
 Chloride 110 (H) 10/14/2018 02:27 AM  
 CO2 22 10/14/2018 02:27 AM  
 BUN 28 (H) 10/14/2018 02:27 AM  
 Creatinine 0.86 10/14/2018 02:27 AM  
 Calcium 8.4 (L) 10/14/2018 02:27 AM  
 Magnesium 1.7 10/03/2018 04:58 AM  
 Phosphorus 3.1 10/03/2018 04:58 AM  
  
Lab Results Component Value Date/Time AST (SGOT) 47 (H) 10/12/2018 01:32 PM  
 Alk. phosphatase 132 (H) 10/12/2018 01:32 PM  
 Protein, total 6.2 (L) 10/12/2018 01:32 PM  
 Albumin 2.7 (L) 10/12/2018 01:32 PM  
 Globulin 3.5 10/12/2018 01:32 PM  
 
No results found for: IRON, FE, TIBC, IBCT, PSAT, FERR Total time: 100 minutes Counseling / coordination time: 55 min on how to change a patient and clean, how to turn and position in bed. How to crush medications and administer. Reviewed goals of care. > 50% counseling / coordination?: yes

## 2018-10-10 NOTE — PROGRESS NOTES
[facility] [address] [place] [address] Chaya Aguilar [address] 
[phone] RN Nursing Visit Note Date of current Visit: 10/10/18 Date of last visit: 10/2/18 Date of initial visit: 5/2/18 Hospital Admission: 10/2/18 [facility]: 10/9/18 Acute metabolic encephalopathy in the setting of underlying dementia  
- Head CT was negative - Patient alert and oriented to self and place on my encounter this AM. Responded to questions appropriately - Resolved 
   
Dehydration with anion gap metabolic acidosis due to absent oral intake 
- resolved 
- encourage PO intake 
   
Abdominal pain,  
- Resolved 
-There was question of pneumoperitoneum as reported by CT abdomen and pelvis and surgery was consulted 
-CT reported was addended - there is no evidence of pneumoperitoneum - abd soft BS+ 
   
Severe pain, diffuse: Patient has a history of rheumatoid arthritis and polymyalgia rheumatica.   
-The PMR polymyalgia rheumatica was diagnosed in [place]. ? Her local rheumatologist here could not verify that diagnosis.   
-Patient at one point was said to have been on heavy doses of hydrocodone and morphine but was weaned off to only a fentanyl patch, tramadol, and gabapentin.  She has seen a local pain specialist about 3 years ago.  Currently, her primary care doctors are managing that.  
-Palliative now weaning some meds - could have been a factor in her presenting delirium - appreciate recs 
-CT spine 3 mm chronic appearing anterolisthesis of C4 with respect to C5. No acute fracture. 
   
Tachycardia, sinus.   
Patient has history of AFib and congestive heart failure according to her son 
-resolved 
   
Chronic Pain/PMR 
-Patient's home dose of steroid was increased from 2.5 mg to 10 mg. Will decrease to 5 mg today and monitor Diagnosis: Enteritis, diverticulosis of sigmoid colon,  a-fib, rheumatoid arthritis, polymyalgia rheumatica, CAD, debility ACP: 
 Advance Care Planning 10/8/2018 Patient's Healthcare Decision Maker is: Named in scanned ACP document Primary Decision Maker Name Kely De La Rosa Primary Decision Maker Phone Number [phone] Primary Decision Maker Relationship to Patient Adult child Secondary Decision Maker Name - Secondary Decision Maker Phone Number - Secondary Decision Maker Relationship to Patient -  
Confirm Advance Directive None Does the patient have other document types Do Not Resuscitate Primary Caregiver: 
[facility]: 
[address] - [phone] Oxygen - [facility] 
 
Nutrition: - Regular textured food Wt Readings from Last 3 Encounters:  
10/09/18 128 lb 12.8 oz (58.4 kg) 06/05/18 120 lb 9.6 oz (54.7 kg) 11/20/17 131 lb (59.4 kg) Visit Vitals  /70 (BP 1 Location: Left arm, BP Patient Position: Supine)  Pulse 87  Temp 97.8 °F (36.6 °C) (Temporal)  Resp 16  SpO2 94% MUAC 
RA - 24 cm 
LA - 24 cm Pill Count: 
Tramadol 50 mg # 79 on hand today - last filled on 9/10/18for # 60 Plan: 1. Patient will start Remeron 15 mg at night as prescribed 2. Increase Prednisone to 10 mg daily 3. Hold the following medications: Align, B12, Vitamin D and Aldactone 4. Ok to crush and administer all other medications 5. Lopressor 75 mg will sent to local pharmacy 6. HH will resume care today Follow up  
2 day - 10/12/18

## 2018-10-11 ENCOUNTER — TELEPHONE (OUTPATIENT)
Dept: PALLATIVE CARE | Age: 83
End: 2018-10-11

## 2018-10-11 NOTE — TELEPHONE ENCOUNTER
Returned call to Jesús Yañez with [facility] and clarified what medication son was advised patient can hold (informed her about our notebook in the home as well). Hai Cronin verbalized understanding - nursing will be seeing patient twice weekly, and an aide 2 days a week (on the days that personal aide) is not in the home.

## 2018-10-12 ENCOUNTER — TELEPHONE (OUTPATIENT)
Dept: FAMILY MEDICINE CLINIC | Age: 83
End: 2018-10-12

## 2018-10-12 ENCOUNTER — HOSPITAL ENCOUNTER (INPATIENT)
Age: 83
LOS: 2 days | Discharge: HOME HEALTH | DRG: 281 | End: 2018-10-14
Attending: EMERGENCY MEDICINE | Admitting: FAMILY MEDICINE
Payer: MEDICARE

## 2018-10-12 DIAGNOSIS — I21.19 ACUTE MYOCARDIAL INFARCTION OF INFERIOR WALL, INITIAL EPISODE OF CARE (HCC): Primary | ICD-10-CM

## 2018-10-12 PROBLEM — I21.3 STEMI (ST ELEVATION MYOCARDIAL INFARCTION) (HCC): Status: ACTIVE | Noted: 2018-10-12

## 2018-10-12 LAB
ALBUMIN SERPL-MCNC: 2.7 G/DL (ref 3.5–5)
ALBUMIN/GLOB SERPL: 0.8 {RATIO} (ref 1.1–2.2)
ALP SERPL-CCNC: 132 U/L (ref 45–117)
ALT SERPL-CCNC: 36 U/L (ref 12–78)
ANION GAP BLD CALC-SCNC: 13 MMOL/L (ref 10–20)
ANION GAP SERPL CALC-SCNC: 9 MMOL/L (ref 5–15)
AST SERPL-CCNC: 47 U/L (ref 15–37)
BASOPHILS # BLD: 0 K/UL (ref 0–0.1)
BASOPHILS NFR BLD: 0 % (ref 0–1)
BILIRUB SERPL-MCNC: 0.4 MG/DL (ref 0.2–1)
BUN BLD-MCNC: 24 MG/DL (ref 9–20)
BUN SERPL-MCNC: 24 MG/DL (ref 6–20)
BUN/CREAT SERPL: 21 (ref 12–20)
CA-I BLD-MCNC: 1.12 MMOL/L (ref 1.12–1.32)
CALCIUM SERPL-MCNC: 9.1 MG/DL (ref 8.5–10.1)
CHLORIDE BLD-SCNC: 107 MMOL/L (ref 98–107)
CHLORIDE SERPL-SCNC: 105 MMOL/L (ref 97–108)
CK SERPL-CCNC: 102 U/L (ref 26–192)
CO2 BLD-SCNC: 22 MMOL/L (ref 21–32)
CO2 SERPL-SCNC: 23 MMOL/L (ref 21–32)
CREAT BLD-MCNC: 1.2 MG/DL (ref 0.6–1.3)
CREAT SERPL-MCNC: 1.16 MG/DL (ref 0.55–1.02)
DIFFERENTIAL METHOD BLD: ABNORMAL
EOSINOPHIL # BLD: 0 K/UL (ref 0–0.4)
EOSINOPHIL NFR BLD: 0 % (ref 0–7)
ERYTHROCYTE [DISTWIDTH] IN BLOOD BY AUTOMATED COUNT: 15.9 % (ref 11.5–14.5)
GLOBULIN SER CALC-MCNC: 3.5 G/DL (ref 2–4)
GLUCOSE BLD-MCNC: 191 MG/DL (ref 65–100)
GLUCOSE SERPL-MCNC: 160 MG/DL (ref 65–100)
HCT VFR BLD AUTO: 35.2 % (ref 35–47)
HCT VFR BLD CALC: 37 % (ref 35–47)
HGB BLD-MCNC: 10.8 G/DL (ref 11.5–16)
IMM GRANULOCYTES # BLD: 0.1 K/UL (ref 0–0.04)
IMM GRANULOCYTES NFR BLD AUTO: 1 % (ref 0–0.5)
LYMPHOCYTES # BLD: 0.6 K/UL (ref 0.8–3.5)
LYMPHOCYTES NFR BLD: 5 % (ref 12–49)
MCH RBC QN AUTO: 29 PG (ref 26–34)
MCHC RBC AUTO-ENTMCNC: 30.7 G/DL (ref 30–36.5)
MCV RBC AUTO: 94.4 FL (ref 80–99)
MONOCYTES # BLD: 1 K/UL (ref 0–1)
MONOCYTES NFR BLD: 9 % (ref 5–13)
NEUTS SEG # BLD: 9.6 K/UL (ref 1.8–8)
NEUTS SEG NFR BLD: 85 % (ref 32–75)
NRBC # BLD: 0 K/UL (ref 0–0.01)
NRBC BLD-RTO: 0 PER 100 WBC
PLATELET # BLD AUTO: 220 K/UL (ref 150–400)
PMV BLD AUTO: 9.8 FL (ref 8.9–12.9)
POTASSIUM BLD-SCNC: 5 MMOL/L (ref 3.5–5.1)
POTASSIUM SERPL-SCNC: 4.7 MMOL/L (ref 3.5–5.1)
PROT SERPL-MCNC: 6.2 G/DL (ref 6.4–8.2)
RBC # BLD AUTO: 3.73 M/UL (ref 3.8–5.2)
RBC MORPH BLD: ABNORMAL
RBC MORPH BLD: ABNORMAL
SERVICE CMNT-IMP: ABNORMAL
SODIUM BLD-SCNC: 136 MMOL/L (ref 136–145)
SODIUM SERPL-SCNC: 137 MMOL/L (ref 136–145)
TROPONIN I BLD-MCNC: 0.91 NG/ML (ref 0–0.08)
TROPONIN I SERPL-MCNC: 1.06 NG/ML
WBC # BLD AUTO: 11.3 K/UL (ref 3.6–11)

## 2018-10-12 PROCEDURE — 94762 N-INVAS EAR/PLS OXIMTRY CONT: CPT

## 2018-10-12 PROCEDURE — 74011250637 HC RX REV CODE- 250/637: Performed by: EMERGENCY MEDICINE

## 2018-10-12 PROCEDURE — 80047 BASIC METABLC PNL IONIZED CA: CPT

## 2018-10-12 PROCEDURE — 74011250636 HC RX REV CODE- 250/636: Performed by: INTERNAL MEDICINE

## 2018-10-12 PROCEDURE — 82550 ASSAY OF CK (CPK): CPT | Performed by: EMERGENCY MEDICINE

## 2018-10-12 PROCEDURE — 93005 ELECTROCARDIOGRAM TRACING: CPT

## 2018-10-12 PROCEDURE — 99285 EMERGENCY DEPT VISIT HI MDM: CPT

## 2018-10-12 PROCEDURE — 74011250636 HC RX REV CODE- 250/636: Performed by: FAMILY MEDICINE

## 2018-10-12 PROCEDURE — 85025 COMPLETE CBC W/AUTO DIFF WBC: CPT | Performed by: EMERGENCY MEDICINE

## 2018-10-12 PROCEDURE — 80053 COMPREHEN METABOLIC PANEL: CPT | Performed by: EMERGENCY MEDICINE

## 2018-10-12 PROCEDURE — 74011250637 HC RX REV CODE- 250/637: Performed by: INTERNAL MEDICINE

## 2018-10-12 PROCEDURE — 84484 ASSAY OF TROPONIN QUANT: CPT | Performed by: EMERGENCY MEDICINE

## 2018-10-12 PROCEDURE — 74011636637 HC RX REV CODE- 636/637: Performed by: FAMILY MEDICINE

## 2018-10-12 PROCEDURE — 74011250637 HC RX REV CODE- 250/637: Performed by: FAMILY MEDICINE

## 2018-10-12 PROCEDURE — 77010033678 HC OXYGEN DAILY

## 2018-10-12 PROCEDURE — 65660000000 HC RM CCU STEPDOWN

## 2018-10-12 PROCEDURE — 99222 1ST HOSP IP/OBS MODERATE 55: CPT | Performed by: INTERNAL MEDICINE

## 2018-10-12 RX ORDER — NITROGLYCERIN 0.4 MG/1
TABLET SUBLINGUAL
Status: DISPENSED
Start: 2018-10-12 | End: 2018-10-13

## 2018-10-12 RX ORDER — NITROGLYCERIN 0.4 MG/1
0.4 TABLET SUBLINGUAL
Status: DISCONTINUED | OUTPATIENT
Start: 2018-10-12 | End: 2018-10-14 | Stop reason: HOSPADM

## 2018-10-12 RX ORDER — NITROGLYCERIN 0.4 MG/1
0.4 TABLET SUBLINGUAL ONCE
Status: COMPLETED | OUTPATIENT
Start: 2018-10-12 | End: 2018-10-12

## 2018-10-12 RX ORDER — PREDNISONE 10 MG/1
10 TABLET ORAL EVERY EVENING
COMMUNITY
End: 2018-12-07 | Stop reason: SDUPTHER

## 2018-10-12 RX ORDER — PANTOPRAZOLE SODIUM 40 MG/1
40 TABLET, DELAYED RELEASE ORAL
Status: DISCONTINUED | OUTPATIENT
Start: 2018-10-13 | End: 2018-10-14 | Stop reason: HOSPADM

## 2018-10-12 RX ORDER — SODIUM CHLORIDE 0.9 % (FLUSH) 0.9 %
5-10 SYRINGE (ML) INJECTION EVERY 8 HOURS
Status: DISCONTINUED | OUTPATIENT
Start: 2018-10-12 | End: 2018-10-14 | Stop reason: HOSPADM

## 2018-10-12 RX ORDER — METOPROLOL TARTRATE 25 MG/1
12.5 TABLET, FILM COATED ORAL EVERY 12 HOURS
Status: DISCONTINUED | OUTPATIENT
Start: 2018-10-12 | End: 2018-10-14 | Stop reason: HOSPADM

## 2018-10-12 RX ORDER — SODIUM CHLORIDE 9 MG/ML
75 INJECTION, SOLUTION INTRAVENOUS CONTINUOUS
Status: DISCONTINUED | OUTPATIENT
Start: 2018-10-12 | End: 2018-10-14 | Stop reason: HOSPADM

## 2018-10-12 RX ORDER — FENTANYL 25 UG/1
1 PATCH TRANSDERMAL
Status: DISCONTINUED | OUTPATIENT
Start: 2018-10-12 | End: 2018-10-14 | Stop reason: HOSPADM

## 2018-10-12 RX ORDER — PREDNISONE 10 MG/1
10 TABLET ORAL EVERY EVENING
Status: DISCONTINUED | OUTPATIENT
Start: 2018-10-12 | End: 2018-10-14 | Stop reason: HOSPADM

## 2018-10-12 RX ORDER — SODIUM CHLORIDE 9 MG/ML
50 INJECTION, SOLUTION INTRAVENOUS CONTINUOUS
Status: DISCONTINUED | OUTPATIENT
Start: 2018-10-12 | End: 2018-10-14 | Stop reason: HOSPADM

## 2018-10-12 RX ORDER — POLYETHYLENE GLYCOL 3350 17 G/17G
17 POWDER, FOR SOLUTION ORAL
Status: DISCONTINUED | OUTPATIENT
Start: 2018-10-12 | End: 2018-10-14 | Stop reason: HOSPADM

## 2018-10-12 RX ORDER — GUAIFENESIN 100 MG/5ML
81 LIQUID (ML) ORAL DAILY
Status: DISCONTINUED | OUTPATIENT
Start: 2018-10-13 | End: 2018-10-14 | Stop reason: HOSPADM

## 2018-10-12 RX ORDER — MIRTAZAPINE 15 MG/1
15 TABLET, FILM COATED ORAL
Status: DISCONTINUED | OUTPATIENT
Start: 2018-10-12 | End: 2018-10-14 | Stop reason: HOSPADM

## 2018-10-12 RX ORDER — TOPIRAMATE 25 MG/1
25 TABLET ORAL
Status: DISCONTINUED | OUTPATIENT
Start: 2018-10-13 | End: 2018-10-14 | Stop reason: HOSPADM

## 2018-10-12 RX ORDER — ESOMEPRAZOLE MAGNESIUM 40 MG/1
40 CAPSULE, DELAYED RELEASE ORAL
Status: DISCONTINUED | OUTPATIENT
Start: 2018-10-13 | End: 2018-10-12 | Stop reason: CLARIF

## 2018-10-12 RX ORDER — GABAPENTIN 300 MG/1
600 CAPSULE ORAL 3 TIMES DAILY
COMMUNITY
End: 2019-04-26

## 2018-10-12 RX ORDER — LANOLIN ALCOHOL/MO/W.PET/CERES
1000 CREAM (GRAM) TOPICAL DAILY
Status: DISCONTINUED | OUTPATIENT
Start: 2018-10-13 | End: 2018-10-14 | Stop reason: HOSPADM

## 2018-10-12 RX ORDER — SODIUM CHLORIDE 0.9 % (FLUSH) 0.9 %
5-10 SYRINGE (ML) INJECTION AS NEEDED
Status: DISCONTINUED | OUTPATIENT
Start: 2018-10-12 | End: 2018-10-14 | Stop reason: HOSPADM

## 2018-10-12 RX ADMIN — Medication 10 ML: at 21:16

## 2018-10-12 RX ADMIN — PREDNISONE 10 MG: 10 TABLET ORAL at 21:15

## 2018-10-12 RX ADMIN — NITROGLYCERIN 0.4 MG: 0.4 TABLET, ORALLY DISINTEGRATING SUBLINGUAL at 12:37

## 2018-10-12 RX ADMIN — SODIUM CHLORIDE 50 ML/HR: 900 INJECTION, SOLUTION INTRAVENOUS at 20:27

## 2018-10-12 RX ADMIN — NITROGLYCERIN 1 INCH: 20 OINTMENT TOPICAL at 19:56

## 2018-10-12 RX ADMIN — SODIUM CHLORIDE 75 ML/HR: 900 INJECTION, SOLUTION INTRAVENOUS at 15:17

## 2018-10-12 RX ADMIN — METOPROLOL TARTRATE 12.5 MG: 25 TABLET ORAL at 15:05

## 2018-10-12 RX ADMIN — MIRTAZAPINE 15 MG: 15 TABLET, FILM COATED ORAL at 21:15

## 2018-10-12 RX ADMIN — NITROGLYCERIN 1 INCH: 20 OINTMENT TOPICAL at 15:07

## 2018-10-12 NOTE — CONSULTS
[address]    Sam Lopez  MR#: [id number]  : 1925  ACCOUNT #: [de-identified]   DATE OF SERVICE: 10/12/2018    HISTORY OF PRESENT ILLNESS:  This 51-year-old woman was brought to the ER with limited responsiveness. She was more awake on arrival.  EKG showed some subtle but not quite a millimeter of ST elevation with new T-wave inversions in the inferolateral leads. She has pain and aching everywhere. She has had apparent multiple recent admissions for generalized aching and altered mental status. She thinks she is in [place], does not recognize she is in the hospital and has a durable DNR status. We are awaiting her son. She was discharged from hospital on 10/03/2018, followed by palliative care. She had diffuse pain, mental status changes and has a generally positive review of systems to all questions regarding pain. She is not in any distress. Her speech is fluent, but she is not oriented to time, place or situation. This is in review of the chart a chronic situation. PAST MEDICAL HISTORY:  Polymyalgia rheumatica with question of serum negative rheumatoid arthritis, heart failure is noted in the chart. Last hospitalization was due to acute metabolic encephalopathy with underlying dementia with dehydration, abdominal pain with negative CT of the abdomen and surgical evaluation, sinus tachycardia with a history of AFib and again chronic pain, history of migraine, gastroesophageal reflux. Apparently had a cardiac catheterization in the . We do not have any information. PAST SURGICAL HISTORY:  Bladder suspension, tonsillectomy in childhood, right hip replacement in , cholecystectomy, back surgeries , , and .     MEDICATIONS:  Align, cholecalciferol, cyanocobalamin, Nexium 40 mg daily, fentanyl, Duragesic patch every 72 hours, Neurontin 400 three times a day, metoprolol 50 at 1-1/2 tablets twice daily, Remeron 15 mg at night, nitroglycerin 0.4 sublingual p.r.n.,  Zofran 8 mg as needed, MiraLax daily, prednisone 5 mg a day, Senna daily, spironolactone 0.5 daily, Topamax 25 mg daily, tramadol 50 twice a day as needed. ALLERGIES:  PHENERGAN, NSAIDs, ADHESIVE TAPE AND SILICONE, ASPIRIN, ATENOLOL, CYMBALTA, DIGOXIN, GLUTEN, MACROBID, NOVOCAIN, SULFA, CODEINE. FAMILY HISTORY:  Positive for cancer, stroke and heart attack. 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 Never smoked. No alcohol. REVIEW OF SYSTEMS:  Not reliable. PHYSICAL EXAMINATION:  GENERAL:  No acute distress. VITAL SIGNS:  Blood pressure 134/56, pulse 92 and regular, afebrile, sats are 90% nasal cannula 2 liters. HEENT:  Pupils equal, round, react to light and accommodation. Extraocular muscles intact, no nystagmus. Sclerae clear. No adenopathy. LUNGS:  Showed distant breath sounds, but are clear. HEART:  Regular rate and rhythm, distant tones. No murmur, rub, gallop or click. ABDOMEN:  Soft, nontender, without masses or organomegaly. EXTREMITIES:  1+ edema. Can feel pedal pulses. SKIN:  Intact. No skeletal deformities. NEUROLOGIC:  Cannot lift her legs. She indicates that she is wheelchair bound. She can wiggle her toes, moving her arms. Her speech is fluent. DIAGNOSTIC DATA:  EKG shows sinus rhythm, slight inferior 0.5 to 1 mm of ST elevation with T-wave inversions which are new. Point of care troponin is 0.91. PROBLEMS:  1. Acute inferior myocardial infarction, clinically stable. In light of her multiplicity of problems and her stable DNR status, I feel that she is not a candidate for aggressive measures. 2.  Dementia. 3.  Chronic pain syndrome. 4.  Polymyalgia rheumatica. 5.  Gastroesophageal reflux disorder. 6.  Degenerative arthritis. 7.  Marked debility, wheelchair bound, etiology unclear. PLAN:  Support medically, get serial enzymes.   Echo, but no aggressive measures.       MD Ricardo Warren michelle [address] / DN  D: 10/12/2018 13:02     T: 10/12/2018 14:19  JOB #: [id number]  CC: Emelyn Da Silva MD  [address] [facility] [place], [place] [address]  CC: Effie Nelson MD

## 2018-10-12 NOTE — PROGRESS NOTES
Admission Medication Reconciliation: 
 
Information obtained from: Patient's visitor, Rx query Significant PMH/Disease States:  
Past Medical History:  
Diagnosis Date  Arrhythmia A-FIB  Arthritis  CAD (coronary artery disease)  MI  
 [facility] failure [facility]F) [facility]  GERD (gastroesophageal reflux disease)  Gluten intolerance  Heart failure ([facility] CHF  Polymyalgia ([facility] Chief Complaint for this Admission: Chief Complaint Patient presents with  Chest Pain Allergies:  Phenergan [promethazine]; Nsaids (non-steroidal anti-inflammatory drug); Adhesive tape-silicones; Aspirin; Atenolol; Cymbalta [duloxetine]; Digoxin; Gluten; Macrobid [nitrofurantoin monohyd/m-cryst]; Novocain [procaine]; Sulfa (sulfonamide antibiotics); and Codeine Prior to Admission Medications:  
Prior to Admission Medications Prescriptions Last Dose Informant Patient Reported? Taking? Bifidobacterium Infantis (ALIGN) 4 mg cap Not Taking at HOLD x 1 DAY  Yes No  
Sig: Take 4 mg by mouth daily. cholecalciferol (VITAMIN D3) 1,000 unit tablet Not Taking at HOLD x 1 DAY  Yes No  
Sig: Take 1,000 Units by mouth daily. cyanocobalamin 1,000 mcg tablet Not Taking at HOLD x 1 DAY  Yes No  
Sig: Take 1,000 mcg by mouth daily. esomeprazole (NEXIUM) 40 mg capsule 10/12/2018 at Unknown time  Yes Yes Sig: Take 40 mg by mouth Daily (before breakfast). fentaNYL (DURAGESIC) 25 mcg/hr PATCH 10/9/2018 at due for new patch 10/12/18  Yes Yes Si Patch by TransDERmal route every seventy-two (72) hours. gabapentin (NEURONTIN) 300 mg capsule 10/12/2018 at AM  Yes Yes Sig: Take 600 mg by mouth two (2) times a day. metoprolol tartrate (LOPRESSOR) 50 mg tablet 10/12/2018 at 12.5 mg at 1505 in hospital  No Yes Sig: Take 1.5 Tabs by mouth two (2) times a day. mirtazapine (REMERON) 15 mg tablet 10/11/2018 at [facility]  No Yes Sig: Take 1 Tab by mouth nightly. Indications: major depressive disorder  
nitroglycerin (NITROSTAT) 0.4 mg SL tablet 10/12/2018 at 2 tab this AM  No Yes Si Tab by SubLINGual route every five (5) minutes as needed for Chest Pain. ondansetron (ZOFRAN ODT) 8 mg disintegrating tablet 10/12/2018 at AM  No Yes Sig: Take 1 Tab by mouth every eight (8) hours. polyethylene glycol (MIRALAX) 17 gram/dose powder   Yes Yes Sig: Take 17 g by mouth daily as needed. predniSONE (DELTASONE) 10 mg tablet 10/11/2018 at [address] [place] [address]  Yes Yes Sig: Take 10 mg by mouth every evening. senna-docusate (SENNA PLUS) 8.6-50 mg per tablet 10/12/2018 at Unknown time  Yes Yes Sig: Take 1 Tab by mouth daily. spironolactone (ALDACTONE) 25 mg tablet Not Taking at HOLD  No No  
Sig: Take 0.5 Tabs by mouth daily. topiramate (TOPAMAX) 25 mg tablet 10/12/2018 at Unknown time  No Yes Sig: Take 1 Tab by mouth daily (with breakfast). traMADol (ULTRAM) 50 mg tablet   Yes Yes Sig: Take 50 mg by mouth two (2) times daily as needed for Pain. Facility-Administered Medications: None Comments/Recommendations:  
 
Spoke with patient's visitor regarding patient's allergies and PTA medications. 1) Reviewed and confirmed allergies. 2) Updated PTA medication list. Modified gabapentin (changed from 400 mg TID to 600 mg BID) and prednisone (changed from 5 mg daily to 10 mg QPM). Of note, the patient's visitor states Align, vitamin D, vitamin B12, and spironolactone are currently on hold per patient's primary care physician. Mirtazapine is a new medication and prednisone was recently dose increased to 10 mg yesterday. Last dose information listed in table above. Cinthia Morris, PharmD

## 2018-10-12 NOTE — ED NOTES
TRANSFER - OUT REPORT: 
 
Verbal report given to Ken Dinh RN(name) on Andi Marte  being transferred to [facility](unit) for routine progression of care Report consisted of patients Situation, Background, Assessment and  
Recommendations(SBAR). Information from the following report(s) SBAR, ED Summary, Intake/Output, MAR, Recent Results and Cardiac Rhythm SR was reviewed with the receiving nurse. Lines:  
Peripheral IV 10/12/18 Left Hand (Active) Opportunity for questions and clarification was provided. Patient transported with: 
 [employer] Charge RN requesting additional information on necessity of telemetry unit. MD notified, per MD patient needs to be on [facility]. RN provided with MD phone number if has any additional questions about level of care.

## 2018-10-12 NOTE — PROGRESS NOTES
TRANSFER - IN REPORT: 
 
Verbal report received from [facility]) on Lida Graff  being received from ED (unit) for routine progression of care Report consisted of patients Situation, Background, Assessment and  
Recommendations(SBAR). Information from the following report(s) SBAR, Kardex, Recent Results and Cardiac Rhythm NSR was reviewed with the receiving nurse. Opportunity for questions and clarification was provided. Assessment completed upon patients arrival to unit and care assumed.

## 2018-10-12 NOTE — ED TRIAGE NOTES
Arrived via EMS from home for complaints of unresponsiveness. Upon EMS arrival patient was able to answer questions correctly. Patient is on 2L NC at baseline and was placed on a non rebreather for questionable O2 saturation. Patient complaint of chest pain to EMS, is allergic to aspirin so was not given aspirin but was potentially given nitro? EKG showed potential STEMI vs BBB.

## 2018-10-12 NOTE — ED PROVIDER NOTES
HPI Comments: 80 y.o. female with past medical history significant for CAD, GERD, CHF, afib, arthritis, who presents from EMS with chief complaint of chest pain. Pt has onset today of CP after being found \"unresponsive. \" EMS was called to the scene and report possible STEMI on EKG en route. S/p arrival to ED, pt also c/o generalized pain and aches. ASA allergy. Unable to ambulate at baseline. There are no other acute medical concerns at this time. PCP: Samir Giles NP Full history, physical exam, and ROS unable to be obtained due to:  Poor historian. Note written by Becky Tamayo, as dictated by Liset Leal MD 12:28 PM 
 
The history is provided by the patient and the EMS personnel. History limited by: poor historian. No  was used. Past Medical History:  
Diagnosis Date  Arrhythmia A-FIB  Arthritis  CAD (coronary artery disease) 1989 MI  
 Congestive heart failure (CHF) ([address]  GERD (gastroesophageal reflux disease)  Gluten intolerance  Heart failure ([address] CHF  Polymyalgia ([address] Past Surgical History:  
Procedure Laterality Date  CARDIAC SURG PROCEDURE UNLIST  1980'S CARDIAC CATH  
[address], 2006, 2013 LUMBAR SPINE  
 HX CHOLECYSTECTOMY  HX HIP REPLACEMENT Right 5/2015 Will Stade  HX TONSILLECTOMY  CHILD  
 HX UROLOGICAL BLADDER SUSPENSION - MESH Family History:  
Problem Relation Age of Onset [facility] Other Mother Intestinal obstruction  Cancer Father  Stroke Father  Heart Attack Brother  Cancer Brother  Cancer Brother  Anesth Problems Neg Hx Social History Social History  Marital status: SINGLE Spouse name: N/A  
 Number of children: N/A  
 Years of education: N/A Occupational History  Not on file. Social History Main Topics  Smoking status: Never Smoker  Smokeless tobacco: Never Used  Alcohol use No  
 Drug use:  No  
  Sexual activity: No  
 
Other Topics Concern  Not on file Social History Narrative ALLERGIES: Phenergan [promethazine]; Nsaids (non-steroidal anti-inflammatory drug); Adhesive tape-silicones; Atenolol; Cymbalta [duloxetine]; Digoxin; Gluten; Macrobid [nitrofurantoin monohyd/m-cryst]; Novocain [procaine]; Sulfa (sulfonamide antibiotics); and Codeine Review of Systems Constitutional: Negative for appetite change, chills and fever. HENT: Negative for rhinorrhea, sore throat and trouble swallowing. Eyes: Negative for photophobia. Respiratory: Negative for cough and shortness of breath. Cardiovascular: Positive for chest pain. Negative for palpitations. Gastrointestinal: Negative for abdominal pain, nausea and vomiting. Genitourinary: Negative for dysuria, frequency and hematuria. Musculoskeletal: Negative for arthralgias. Neurological: Negative for dizziness, syncope and weakness. Psychiatric/Behavioral: Negative for behavioral problems. The patient is not nervous/anxious. All other systems reviewed and are negative. Vitals:  
 10/12/18 1231 BP: 134/56 Pulse: 91  
Resp: 24 Temp: 98.2 °F (36.8 °C) Physical Exam  
Constitutional: She appears well-developed and well-nourished. No distress. Appears to be non-ambulatory. elderly, frail HENT:  
Head: Normocephalic and atraumatic. Mouth/Throat: Oropharynx is clear and moist.  
Eyes: EOM are normal. Pupils are equal, round, and reactive to light. Neck: Normal range of motion. Neck supple. Cardiovascular: Normal rate, regular rhythm, normal heart sounds and intact distal pulses. Exam reveals no gallop and no friction rub. No murmur heard. Pulmonary/Chest: Effort normal. No respiratory distress. She has no wheezes. She has no rales. Abdominal: Soft. There is no tenderness. There is no rebound. Musculoskeletal: Normal range of motion. She exhibits no tenderness. Neurological: She is alert. No cranial nerve deficit. Motor; symmetric. disoriented to year and city. Skin: No erythema. Psychiatric: She has a normal mood and affect. Her behavior is normal.  
Nursing note and vitals reviewed. Note written by Becky Ordonez, as dictated by El Navarrete MD 12:28 PM 
 
MDM Number of Diagnoses or Management Options Acute myocardial infarction of inferior wall, initial episode of care [facility]):  
Critical Care Total time providing critical care: 30-74 minutes Total critical care time spent exclusive of procedures:  40 minutes ED EKG interpretation: # 1 Rhythm: normal sinus rhythm; and regular . Rate (approx.): 90; Axis: normal; P wave: normal; QRS interval: normal ; ST/T wave: < 1 mm ST elevation; in  Lead: II , III  and aVF ; Other findings:RBBB; left ventricular hypertrophy. This EKG was interpreted by El Navarrete MD,ED Provider. 3:31 PM 
 
 
 
 
 
 
ED Course CONSULT NOTE: 
12:41 PM El Navarrete MD spoke with Dr. Abdelrahman Espinosa, Consult for Cardiology. Discussed available diagnostic tests and clinical findings. Procedures ED EKG interpretation:# 2 Rhythm: normal sinus rhythm and PAC's; and regular . Rate (approx.): 90; Axis: normal; P wave: normal; QRS interval: normal ; ST/T wave: elevated ; in  Lead: II , III  and aVF ; Other findings: left ventricular hypertrophy. This EKG was interpreted by El Navarrete MD,ED Provider.  3:32 PM

## 2018-10-12 NOTE — TELEPHONE ENCOUNTER
Physical therapist from [facility] [place] called and reported that she is seeing patient in the home, and noted that her oxygen level is at 79% on room air, and she is hard to arouse - she stated that the only thing that she could get out of patient is that she is having chest pain. Per David Fear she put patient on her oxygen and her O2 sats came up to 93-95% but she is still not looking well.       Advised that NP should be in route for visit, but I will have her give her a call to evaluate

## 2018-10-12 NOTE — PROGRESS NOTES
Spiritual Care Assessment/Progress Note [address] 
 
 
NAME: Adrian Neri      MRN: [id number] AGE: 80 y.o. SEX: female Voodoo Affiliation: Baptist  
Language: English  
 
10/12/2018     Total Time (in minutes): 20 Spiritual Assessment begun in [address] [facility] DEP through conversation with: 
  
    [x]Patient        [] Family    [] Friend(s) Reason for Consult: Crisis, Stemi, Initial visit Spiritual beliefs: (Please include comment if needed) 
   [] Identifies with a cliff tradition:     
   [] Supported by a cliff community:        
   [] Claims no spiritual orientation:       
   [] Seeking spiritual identity:            
   [] Adheres to an individual form of spirituality:       
   [x] Not able to assess:                   
 
    
Identified resources for coping:  
   [] Prayer                           
   [] Music                  [] Guided Imagery 
   [] Family/friends                 [] Pet visits [] Devotional reading                         [x] Unknown 
   [] Other:                                          
 
 
Interventions offered during this visit: (See comments for more details) Patient Interventions: Crisis, Stemi, Initial visit, Prayer (assurance of) Plan of Care: 
 
 [] Support spiritual and/or cultural needs  
 [] Support AMD and/or advance care planning process    
 [] Support grieving process 
 [] Coordinate Rites and/or Rituals  
 [] Coordination with community clergy [] No spiritual needs identified at this time 
 [] Detailed Plan of Care below (See Comments)  [] Make referral to Music Therapy 
[] Make referral to Pet Therapy    
[] Make referral to Addiction services 
[] Make referral to [facility] 
[] Make referral to Spiritual Care Partner 
[] No future visits requested       
[x] Follow up visits as needed Comments: Responded to Code Stemi. Pt being Attended to.   No family present. Offered silent prayer. Spoke with nurse, asked to be advised when family arrives. Offered continued prayer and support. Solange Lemon,  Intern, MDiv 
[phone])

## 2018-10-12 NOTE — IP AVS SNAPSHOT
[address] 
[phone] Patient: Buffy Allen MRN: [id number] :1925 A check bethanie indicates which time of day the medication should be taken. My Medications START taking these medications Instructions Each Dose to Equal  
 Morning Noon Evening Bedtime  
 aspirin 81 mg chewable tablet Start taking on:  10/15/2018 Your last dose was: Your next dose is: Take 1 Tab by mouth daily. 81 mg  
    
   
   
   
  
 atorvastatin 40 mg tablet Commonly known as:  LIPITOR Start taking on:  10/15/2018 Your last dose was: Your next dose is: Take 1 Tab by mouth daily. 40 mg CHANGE how you take these medications Instructions Each Dose to Equal  
 Morning Noon Evening Bedtime  
 gabapentin 300 mg capsule Commonly known as:  NEURONTIN What changed:  Another medication with the same name was removed. Continue taking this medication, and follow the directions you see here. Your last dose was: Your next dose is: Take 600 mg by mouth two (2) times a day. 600 mg  
    
   
   
   
  
 predniSONE 10 mg tablet Commonly known as:  Sabrina Boxer What changed:  Another medication with the same name was removed. Continue taking this medication, and follow the directions you see here. Your last dose was: Your next dose is: Take 10 mg by mouth every evening. 10 mg CONTINUE taking these medications Instructions Each Dose to Equal  
 Morning Noon Evening Bedtime ALIGN 4 mg Cap Generic drug:  Bifidobacterium Infantis Your last dose was: Your next dose is: Take 4 mg by mouth daily. 4 mg  
    
   
   
   
  
 cyanocobalamin 1,000 mcg tablet Your last dose was: Your next dose is: Take 1,000 mcg by mouth daily. 1000 mcg  
    
   
   
   
  
 fentaNYL 25 mcg/hr PATCH Commonly known as:  Kayley Hoang Your last dose was: Your next dose is:    
   
   
 1 Patch by TransDERmal route every seventy-two (72) hours. 1 Patch  
    
   
   
   
  
 metoprolol tartrate 50 mg tablet Commonly known as:  LOPRESSOR Your last dose was: Your next dose is: Take 1.5 Tabs by mouth two (2) times a day. 75 mg MIRALAX 17 gram/dose powder Generic drug:  polyethylene glycol Your last dose was: Your next dose is: Take 17 g by mouth daily as needed. 17 g  
    
   
   
   
  
 mirtazapine 15 mg tablet Commonly known as:  Hortencia Mariusz Your last dose was: Your next dose is: Take 1 Tab by mouth nightly. Indications: major depressive disorder 15 mg NexIUM 40 mg capsule Generic drug:  esomeprazole Your last dose was: Your next dose is: Take 40 mg by mouth Daily (before breakfast). 40 mg  
    
   
   
   
  
 nitroglycerin 0.4 mg SL tablet Commonly known as:  NITROSTAT Your last dose was: Your next dose is:    
   
   
 1 Tab by SubLINGual route every five (5) minutes as needed for Chest Pain. 0.4 mg  
    
   
   
   
  
 ondansetron 8 mg disintegrating tablet Commonly known as:  ZOFRAN ODT Your last dose was: Your next dose is: Take 1 Tab by mouth every eight (8) hours. 8 mg SENNA PLUS 8.6-50 mg per tablet Generic drug:  senna-docusate Your last dose was: Your next dose is: Take 1 Tab by mouth daily. 1 Tab  
    
   
   
   
  
 topiramate 25 mg tablet Commonly known as:  TOPAMAX Your last dose was: Your next dose is: Take 1 Tab by mouth daily (with breakfast).   
 25 mg  
    
   
   
   
  
 traMADol 50 mg tablet Commonly known as:  ULTRAM  
   
Your last dose was: Your next dose is: Take 50 mg by mouth two (2) times daily as needed for Pain. 50 mg  
    
   
   
   
  
 VITAMIN D3 1,000 unit tablet Generic drug:  cholecalciferol Your last dose was: Your next dose is: Take 1,000 Units by mouth daily. 1000 Units STOP taking these medications   
 spironolactone 25 mg tablet Commonly known as:  ALDACTONE Where to Get Your Medications Information on where to get these meds will be given to you by the nurse or doctor. ! Ask your nurse or doctor about these medications  
  aspirin 81 mg chewable tablet  
 atorvastatin 40 mg tablet

## 2018-10-12 NOTE — PROGRESS NOTES
Spiritual Care Assessment/Progress Note [address] 
 
 
NAME: Petrona Garrido      MRN: [id number] AGE: 80 y.o. SEX: female Pentecostal Affiliation: Scientologist  
Language: English  
 
10/12/2018     Total Time (in minutes): 14 Spiritual Assessment begun in [address] [facility] DEP through conversation with: 
  
    [x]Patient        [] Family    [] Friend(s) Reason for Consult: Palliative Care, Initial/Spiritual Assessment Spiritual beliefs: (Please include comment if needed) [x] Identifies with a cliff tradition:     
   [] Supported by a cliff community:        
   [] Claims no spiritual orientation:       
   [] Seeking spiritual identity:            
   [] Adheres to an individual form of spirituality:       
   [] Not able to assess:                   
 
    
Identified resources for coping:  
   [x] Prayer                           
   [] Music                  [] Guided Imagery 
   [] Family/friends                 [] Pet visits [] Devotional reading                         [] Unknown 
   [] Other Interventions offered during this visit: (See comments for more details) Patient Interventions: Catharsis/review of pertinent events in supportive environment, Iconic (affirming the presence of God/Higher Power), Initial/Spiritual assessment, patient floor, Prayer (actual), Prayer (assurance of) Plan of Care: 
 
 [] Support spiritual and/or cultural needs  
 [] Support AMD and/or advance care planning process    
 [] Support grieving process 
 [] Coordinate Rites and/or Rituals  
 [] Coordination with community clergy [] No spiritual needs identified at this time 
 [] Detailed Plan of Care below (See Comments)  [] Make referral to Music Therapy 
[] Make referral to Pet Therapy    
[] Make referral to Addiction services 
[] Make referral to [facility] 
[] Make referral to Spiritual Care Partner [] No future visits requested       
[x] Follow up visits as needed Comments: Visited Ms. Condon Monday in ER with Palliative Dr Zach Madsen. Offered support to Ms. Condon Monday who appeared to be coping okay at this time. No family was present at bedside. She accepted my offer of prayer, and pt indicated that it is helpful to have someone pray with her. Explored any worries or concerns - none expressed at this time. Ms. Condon Monday is known to palliative team from previous hospitalization, and Dr. Zach Madsen plans to reach out to pt's son by phone. It appears pt will be admitted. Chaplains are available for continued support as needed. Yvonne Valente, Palliative

## 2018-10-12 NOTE — Clinical Note
Status[de-identified] INPATIENT [101] Type of Bed: Telemetry [19] Inpatient Hospitalization Certified Necessary for the Following Reasons: 9. Other (further clarification in H&P documentation) Admitting Diagnosis: STEMI (ST elevation myocardial infarction) ([facility] [[id number]] Admitting Physician: Dawit East [[id number]] Attending Physician: Marcel Hurst Estimated Length of Stay: 3-4 Midnights Discharge Plan[de-identified] Home with Office Follow-up

## 2018-10-12 NOTE — ED NOTES
1240: Code Stemi called. 1242: Cardiology  at bedside 1249: Patient O2 desaturation to 83%, O2 increased to 4L without change, placed on non rebreather. 1250: Patient is difficult IV stick, ultrasound IV needed. 1256: POC troponin resulted.

## 2018-10-12 NOTE — CONSULTS
Palliative Medicine Consult  Adonis: [phone] ([phone])    Patient Name: Ethan Carroll  YOB: 1925    Date of Initial Consult: 10/12/2018  Reason for Consult: Care decisions  Requesting Provider: ED - Dr. Alexandr Fields    Primary Care Physician: Lorraine Li NP     SUMMARY:   Ethan Carroll is a 80 y.o. 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  She is known to me from prior admission 10/3-10/9 for which she was admitted with limited responsiveness and poor oral intake. Definitive diagnosis for her mental status changes and refusal to eat or move was not clear, ie no source of infection, acute changes in head imaging or other findings to suggest why such a change from baseline. This was her fourth episode in one year requiring admission. She went home as opposed to rehab as she had no further SNF rehab bed days. Now presenting to ED after being found unresponsive at home, hypoxic to 70s-80s and w/ mumbled c/o chest pain. EKG changes and elevated troponin have prompted cardiology consult. Current medical issues leading to Palliative Medicine involvement include:  Recurrent presentation to healthcare facility (possible admit) in frail elderly woman, known to palliative medicine and home based primary care. Need for future care decisions. PALLIATIVE DIAGNOSES:   1. Dyspnea  2. Dementia  3. H/o delirium  4. Goals of care discussions         PLAN:     1. Ms. Luca Sosa is alert at time of assessment 14:00 - more conversant than on prior admit, pleasant and oriented to person/place/time. 2. She c/o dyspnea which is new, on Venti mask as well as pain in her hands which is chronic. 3. Spoke with son Mehreen Anderson, he states she was doing very well the past few days. Perked up significantly and had a good appetite again, things were trending up.   Today was an acute change and he felt she was slipping back into what we had seen several weeks ago. 4. Given EKG changes/elevated troponin admission is being requested. 11. Mavis Ayon is amenable to her being admitted for further assessment and evaluation. He states at present he would like her care to escalate if necessary, ie use of NIV or pressor support if she declines. 6. He recognizes her wishes if death were imminent, would not be to have life prolonging measures. 7. Chronic pain:  Etiology thought arthritic. Location varies, but lately neck/arms/hands biggest complaints. Was on fentanyl TD 12 mcg last admit. 8. Will f/u on Monday. Please call with questions. 9. Communicated plan of care with: Palliative IDT, discussed case with Dr. Amadou Murillo in ED. GOALS OF CARE / TREATMENT PREFERENCES:     GOALS OF CARE:  Patient/Health Care Proxy Stated Goals: Prolong life      TREATMENT PREFERENCES:   Code Status: Prior    Advance Care Planning:  Advance Care Planning 10/12/2018   Patient's Healthcare Decision Maker is: Named in scanned ACP document   Primary Decision Maker Name Darell Renee   Primary Decision Maker Phone Number [phone]   Primary Decision Maker Relationship to Patient Adult child   Secondary Decision Maker Name -   Secondary Decision [address] Phone Number -   Secondary Decision Maker Relationship to Patient -   Confirm Advance Directive Yes, on file   Does the patient have other document types Do Not Resuscitate       Medical Interventions: Limited additional interventions (DNR/I - but ok with Bipap/Cpap or pressor support . Discussed with Dariel Funes 10/12/18)   Other Instructions: Other:    As far as possible, the palliative care team has discussed with patient / health care proxy about goals of care / treatment preferences for patient.            HISTORY:     History obtained from:   Patient, son, EMR    CHIEF COMPLAINT: shortness of breath    HPI/SUBJECTIVE:    The patient is:   [x] Verbal and participatory  [] Non-participatory due to:     Ambulance called today for Ms. Shy Gunter in setting of minimal resopnsiveness at home with hypoxia. Reported chest pain when aroused. Per son Jude Barclay has been doing well past few days, jovial, eating and drinking \"like a horse. \"  Now c/o dyspnea on Venti mask. Pain in hands which is chronic, same complaint last admit. Happy to receive prayer, \"I need it. \"     Clinical Pain Assessment (nonverbal scale for severity on nonverbal patients):   Clinical Pain Assessment  Severity: 3  Location: bl hands, sometimes \"all over\"  Character: unable to describe  Duration: chronic          Duration: for how long has pt been experiencing pain (e.g., 2 days, 1 month, years)  Frequency: how often pain is an issue (e.g., several times per day, once every few days, constant)     FUNCTIONAL ASSESSMENT:     Palliative Performance Scale (PPS):  PPS: 50       PSYCHOSOCIAL/SPIRITUAL SCREENING:     Palliative IDT has assessed this patient for cultural preferences / practices and a referral made as appropriate to needs (Cultural Services, Patient Advocacy, Ethics, etc.)    Advance Care Planning:  Advance Care Planning 10/12/2018   Patient's Healthcare Decision Maker is: Named in scanned ACP document   Primary Decision Maker Name Liz Dasilva   Primary Decision Maker Phone Number [phone]   Primary Decision Maker Relationship to Patient Adult child   Secondary Decision Maker Name -   Secondary Decision Maker Phone Number -   Secondary Decision Maker Relationship to Patient -   Confirm Advance Directive Yes, on file   Does the patient have other document types Do Not Resuscitate       Any spiritual / Zoroastrian concerns:  [] Yes /  [x] No    Caregiver Burnout:  [] Yes /  [x] No /  [] No Caregiver Present      Anticipatory grief assessment:   [] Normal  / [] Maladaptive       ESAS Anxiety: Anxiety: 0    ESAS Depression: Depression: 0        REVIEW OF SYSTEMS:     Positive and pertinent negative findings in ROS are noted above in HPI. The following systems were [x] reviewed / [] unable to be reviewed as noted in HPI  Other findings are noted below. Systems: constitutional, ears/nose/mouth/throat, respiratory, gastrointestinal, genitourinary, musculoskeletal, integumentary, neurologic, psychiatric, endocrine. Positive findings noted below. Modified ESAS Completed by: provider   Fatigue: 2 Drowsiness: 1   Depression: 0 Pain: 3   Anxiety: 0 Nausea: 0   Anorexia: 0 Dyspnea: 6                    PHYSICAL EXAM:     From RN flowsheet:  Wt Readings from Last 3 Encounters:   10/09/18 58.4 kg (128 lb 12.8 oz)   06/05/18 54.7 kg (120 lb 9.6 oz)   11/20/17 59.4 kg (131 lb)     Blood pressure (!) 144/98, pulse 82, temperature 98.2 °F (36.8 °C), resp. rate 24, SpO2 100 %. Pain Scale 1: FACES                    Last bowel movement, if known:     Constitutional: frail elderly  female lying in ED stretcher on venti mask  Eyes: pupils equal, anicteric  Cardiovascular: regular rhythm, distal pulses intact  Respiratory: mild inc wob, bs clear anteriorly  Gastrointestinal: soft non-tender, +bowel sounds  Musculoskeletal: no deformity, no tenderness to palpation  Skin: warm, dry  Neurologic: following commands, moving all extremities  Psychiatric: full affect, no hallucinations or agitation       HISTORY:     Active Problems:    * No active hospital problems.  *    Past Medical History:   Diagnosis Date    Arrhythmia     A-FIB    Arthritis     CAD (coronary artery disease) 1989    MI    Congestive heart failure (CHF) (HCC)     GERD (gastroesophageal reflux disease)     Gluten intolerance     Heart failure (HCC)     CHF    Polymyalgia [facility])       Past Surgical History:   Procedure Laterality Date   [facility] CARDIAC SURG PROCEDURE UNLIST  1980'S    CARDIAC CATH    HX BACK SURGERY  1998, 2006, 2013    LUMBAR SPINE    [place] CHOLECY[place]      [place] HIP REPLACEMENT Right 5/2015    [place]    HX TONSILLECTOMY  CHILD    HX UROLOGICAL BLADDER SUSPENSION - MESH      Family History   Problem Relation Age of Onset    Other Mother      Intestinal obstruction    Cancer Father     Stroke Father     Heart Attack Brother     Cancer Brother     Cancer Brother     Anesth Problems Neg Hx       History reviewed, no pertinent family history. Social History   Substance Use Topics    Smoking status: Never Smoker    Smokeless tobacco: Never Used    Alcohol use No     Allergies   Allergen Reactions    Phenergan [Promethazine] Other (comments)     \"loose my wits i go crazy\"    Nsaids (Non-Steroidal Anti-Inflammatory Drug) Nausea and Vomiting    Adhesive Tape-Silicones Other (comments)     BLISTERS    Aspirin Other (comments)     Per EMS patient is allergic, patient agrees but is confused.  Atenolol Nausea and Vomiting    Cymbalta [Duloxetine] Other (comments)     CONFUSION      Digoxin Nausea and Vomiting    Gluten Other (comments)     GLUTEN INTOLERANCE    Macrobid [Nitrofurantoin Monohyd/M-Cryst] Nausea and Vomiting    Novocain [Procaine] Other (comments)     Paralysis     Sulfa (Sulfonamide Antibiotics) Nausea and Vomiting    Codeine Other (comments)     Unable to swallow      Current Facility-Administered Medications   Medication Dose Route Frequency    0.9% sodium chloride infusion  75 mL/hr IntraVENous CONTINUOUS    nitroglycerin (NITROBID) 2 % ointment 1 Inch  1 Inch Topical Q6H    metoprolol tartrate (LOPRESSOR) tablet 12.5 mg  12.5 mg Oral Q12H     Current Outpatient Prescriptions   Medication Sig    metoprolol tartrate (LOPRESSOR) 50 mg tablet Take 1.5 Tabs by mouth two (2) times a day.  gabapentin (NEURONTIN) 400 mg capsule Take 1 Cap by mouth three (3) times daily for 30 days.  predniSONE (DELTASONE) 5 mg tablet Take 1 Tab by mouth daily for 30 days.  mirtazapine (REMERON) 15 mg tablet Take 1 Tab by mouth nightly.  Indications: major depressive disorder    Bifidobacterium Infantis (ALIGN) 4 mg cap Take 4 mg by mouth daily.  fentaNYL (DURAGESIC) 25 mcg/hr PATCH 1 Patch by TransDERmal route every seventy-two (72) hours.  traMADol (ULTRAM) 50 mg tablet Take 50 mg by mouth two (2) times daily as needed for Pain.  ondansetron (ZOFRAN ODT) 8 mg disintegrating tablet Take 1 Tab by mouth every eight (8) hours.  polyethylene glycol (MIRALAX) 17 gram/dose powder Take 17 g by mouth daily as needed.  spironolactone (ALDACTONE) 25 mg tablet Take 0.5 Tabs by mouth daily.  topiramate (TOPAMAX) 25 mg tablet Take 1 Tab by mouth daily (with breakfast).  senna-docusate (SENNA PLUS) 8.6-50 mg per tablet Take 1 Tab by mouth daily.  cyanocobalamin 1,000 mcg tablet Take 1,000 mcg by mouth daily.  nitroglycerin (NITROSTAT) 0.4 mg SL tablet 1 Tab by SubLINGual route every five (5) minutes as needed for Chest Pain.  cholecalciferol (VITAMIN D3) 1,000 unit tablet Take 1,000 Units by mouth daily.  esomeprazole (NEXIUM) 40 mg capsule Take 40 mg by mouth Daily (before breakfast). LAB AND IMAGING FINDINGS:     Lab Results   Component Value Date/Time    WBC 11.3 (H) 10/12/2018 01:32 PM    HGB 10.8 (L) 10/12/2018 01:32 PM    PLATELET 738 63/73/5046 01:32 PM     Lab Results   Component Value Date/Time    Sodium 145 10/09/2018 05:09 AM    Potassium 3.4 (L) 10/09/2018 05:09 AM    Chloride 109 (H) 10/09/2018 05:09 AM    CO2 26 10/09/2018 05:09 AM    BUN 13 10/09/2018 05:09 AM    Creatinine 0.53 (L) 10/09/2018 05:09 AM    Calcium 8.7 10/09/2018 05:09 AM    Magnesium 1.7 10/03/2018 04:58 AM    Phosphorus 3.1 10/03/2018 04:58 AM      Lab Results   Component Value Date/Time    AST (SGOT) 36 10/08/2018 04:53 AM    Alk.  phosphatase 80 10/08/2018 04:53 AM    Protein, total 5.2 (L) 10/08/2018 04:53 AM    Albumin 1.9 (L) 10/08/2018 04:53 AM    Globulin 3.3 10/08/2018 04:53 AM     Lab Results   Component Value Date/Time    INR 1.1 10/03/2018 08:20 AM    Prothrombin time 10.9 10/03/2018 08:20 AM    aPTT 26.7 10/03/2018 08:20 AM No results found for: IRON, FE, TIBC, IBCT, PSAT, FERR   No results found for: PH, PCO2, PO2  No components found for: [place]   Lab Results   Component Value Date/Time     (H) 05/25/2015 04:32 AM    CK - MB 3.1 05/25/2015 04:32 AM                Total time:  50  Counseling / coordination time, spent as noted above: 25  > 50% counseling / coordination?:  Yes    Prolonged service was provided for  []30 min   []75 min in face to face time in the presence of the patient, spent as noted above. Time Start:   Time End:   Note: this can only be billed with 66912 (initial) or 23915 (follow up). If multiple start / stop times, list each separately.

## 2018-10-12 NOTE — PROGRESS NOTES
Date of previous inpatient admission/ ED visit?  10/3-10/9/2018 What brought the patient back to ED? Unresponsive, chest pain Did patient decline recommended services during last admission/ ED visit (if yes, what)? No, pt is open to [facility] for skilled nursing and home aid services and is also followed by Lokesh Guzman NP and Dr. Reg Mejia from palliative care team (home based primary care). Has patient seen a provider since their last inpatient admission/ED visit (if yes, when)? Pt has been seen by home health providers in the home. CM Interventions: 
From previous inpatient admission/ED visit: skilled services with [facility] home health as well as palliative services, home via [facility] (stretcher) From current inpatient admission/ED visit:  Pending medical evaluation, likely admission based on cardiac enzymes/testing.  
 
DOUG Huggins

## 2018-10-12 NOTE — IP AVS SNAPSHOT
[address] 
[phone] Patient: Yeison Rose MRN: [id number] About your hospitalization You were admitted on:  2018 You last received care in the:  [facility] [place] [facility] SURG/BARIATRICS You were discharged on:  2018 Why you were hospitalized Your primary diagnosis was:  Stemi (St Elevation Myocardial Infarction) (formerly [facility]) Follow-up Information Follow up With Details Comments Contact Info Elena Harp NP Schedule an appointment as soon as possible for a visit  [address] [place] [id number] 
[phone] Discharge Orders None A check bethanie indicates which time of day the medication should be taken. My Medications START taking these medications Instructions Each Dose to Equal  
 Morning Noon Evening Bedtime  
 aspirin 81 mg chewable tablet Start taking on:  10/15/2018 Your last dose was: Your next dose is: Take 1 Tab by mouth daily. 81 mg  
    
   
   
   
  
 atorvastatin 40 mg tablet Commonly known as:  LIPITOR Start taking on:  10/15/2018 Your last dose was: Your next dose is: Take 1 Tab by mouth daily. 40 mg CHANGE how you take these medications Instructions Each Dose to Equal  
 Morning Noon Evening Bedtime  
 gabapentin 300 mg capsule Commonly known as:  NEURONTIN What changed:  Another medication with the same name was removed. Continue taking this medication, and follow the directions you see here. Your last dose was: Your next dose is: Take 600 mg by mouth two (2) times a day. 600 mg  
    
   
   
   
  
 predniSONE 10 mg tablet Commonly known as:  Waqas Santiago What changed:  Another medication with the same name was removed.  Continue taking this medication, and follow the directions you see here. Your last dose was: Your next dose is: Take 10 mg by mouth every evening. 10 mg CONTINUE taking these medications Instructions Each Dose to Equal  
 Morning Noon Evening Bedtime ALIGN 4 mg Cap Generic drug:  Bifidobacterium Infantis Your last dose was: Your next dose is: Take 4 mg by mouth daily. 4 mg  
    
   
   
   
  
 cyanocobalamin 1,000 mcg tablet Your last dose was: Your next dose is: Take 1,000 mcg by mouth daily. 1000 mcg  
    
   
   
   
  
 fentaNYL 25 mcg/hr PATCH Commonly known as:  Lonzell Rm Your last dose was: Your next dose is:    
   
   
 1 Patch by TransDERmal route every seventy-two (72) hours. 1 Patch  
    
   
   
   
  
 metoprolol tartrate 50 mg tablet Commonly known as:  LOPRESSOR Your last dose was: Your next dose is: Take 1.5 Tabs by mouth two (2) times a day. 75 mg MIRALAX 17 gram/dose powder Generic drug:  polyethylene glycol Your last dose was: Your next dose is: Take 17 g by mouth daily as needed. 17 g  
    
   
   
   
  
 mirtazapine 15 mg tablet Commonly known as:  Victor Manuel Mcmanus Your last dose was: Your next dose is: Take 1 Tab by mouth nightly. Indications: major depressive disorder 15 mg NexIUM 40 mg capsule Generic drug:  esomeprazole Your last dose was: Your next dose is: Take 40 mg by mouth Daily (before breakfast). 40 mg  
    
   
   
   
  
 nitroglycerin 0.4 mg SL tablet Commonly known as:  NITROSTAT Your last dose was: Your next dose is:    
   
   
 1 Tab by SubLINGual route every five (5) minutes as needed for Chest Pain.   
 0.4 mg  
    
   
   
   
  
 ondansetron 8 mg disintegrating tablet Commonly known as:  ZOFRAN ODT Your last dose was: Your next dose is: Take 1 Tab by mouth every eight (8) hours. 8 mg SENNA PLUS 8.6-50 mg per tablet Generic drug:  senna-docusate Your last dose was: Your next dose is: Take 1 Tab by mouth daily. 1 Tab  
    
   
   
   
  
 topiramate 25 mg tablet Commonly known as:  TOPAMAX Your last dose was: Your next dose is: Take 1 Tab by mouth daily (with breakfast). 25 mg  
    
   
   
   
  
 traMADol 50 mg tablet Commonly known as:  ULTRAM  
   
Your last dose was: Your next dose is: Take 50 mg by mouth two (2) times daily as needed for Pain. 50 mg  
    
   
   
   
  
 VITAMIN D3 1,000 unit tablet Generic drug:  cholecalciferol Your last dose was: Your next dose is: Take 1,000 Units by mouth daily. 1000 Units STOP taking these medications   
 spironolactone 25 mg tablet Commonly known as:  ALDACTONE Where to Get Your Medications Information on where to get these meds will be given to you by the nurse or doctor. ! Ask your nurse or doctor about these medications  
  aspirin 81 mg chewable tablet  
 atorvastatin 40 mg tablet Opioid Education Prescription Opioids: What You Need to Know: 
 
 
 
F-face looks uneven A-arms unable to move or move unevenly S-speech slurred or non-existent T-time-call 911 as soon as signs and symptoms begin-DO NOT go Back to bed or wait to see if you get better-TIME IS BRAIN. Warning Signs of HEART ATTACK Call 911 if you have these symptoms: 
? Chest discomfort. Most heart attacks involve discomfort in the center of the chest that lasts more than a few minutes, or that goes away and comes back. It can feel like uncomfortable pressure, squeezing, fullness, or pain. ? Discomfort in other areas of the upper body. Symptoms can include pain or discomfort in one or both arms, the back, neck, jaw, or stomach. ? Shortness of breath with or without chest discomfort. ? Other signs may include breaking out in a cold sweat, nausea, or lightheadedness. Don't wait more than five minutes to call [address]! Fast action can save your life. Calling 911 is almost always the fastest way to get lifesaving treatment. Emergency Medical Services staff can begin treatment when they arrive  up to an hour sooner than if someone gets to the hospital by car. The discharge information has been reviewed with the {PATIENT PARENT GUARDIAN:48696}. The {PATIENT PARENT GUARDIAN:14042} verbalized understanding. Discharge medications reviewed with the {Dishcarge meds reviewed MZKJ:72079} and appropriate educational materials and side effects teaching were provided. ___________________________________________________________________________________________________________________________________ Discharge Instructions PATIENT ID: Buffy Allen MRN: [id number] YOB: 1925 DATE OF ADMISSION: 10/12/2018 12:19 PM   
DATE OF DISCHARGE: 10/14/2018 PRIMARY CARE PROVIDER: Ela Howard NP  
 
ATTENDING PHYSICIAN: Alen Santos MD 
DISCHARGING PROVIDER: Lea Aguilar MD   
To contact this individual call [phone] and ask the  to page. If unavailable ask to be transferred the Adult Hospitalist Department. DISCHARGE DIAGNOSES heart attack CONSULTATIONS: IP CONSULT TO PALLIATIVE CARE - PROVIDER PROCEDURES/SURGERIES: * No surgery found * PENDING TEST RESULTS:  
At the time of discharge the following test results are still pending: None FOLLOW UP APPOINTMENTS:  
Follow-up Information Follow up With Details Comments Contact Info Garfield Galvez NP Schedule an appointment as soon as possible for a visit  [address] [facility] [place] [id number] [phone] ADDITIONAL CARE RECOMMENDATIONS:  
 
You came in with chest pain and the Cardiologist evaluated you. They want you to continue to take medicines for heart health, but did not recommend any aggressive interventions at this time. You are now feeling much better and we think it is safe for you to leave the hospital. 
 
DIET: Cardiac Diet ACTIVITY: Activity as tolerated DISCHARGE MEDICATIONS: 
 See Medication Reconciliation Form · It is important that you take the medication exactly as they are prescribed. · Keep your medication in the bottles provided by the pharmacist and keep a list of the medication names, dosages, and times to be taken in your wallet. · Do not take other medications without consulting your doctor. NOTIFY YOUR PHYSICIAN FOR ANY OF THE FOLLOWING:  
Fever over 101 degrees for 24 hours. Chest pain, shortness of breath, fever, chills, nausea, vomiting, diarrhea, change in mentation, falling, weakness, bleeding. Severe pain or pain not relieved by medications. Or, any other signs or symptoms that you may have questions about. Signed:  
Yen Bright MD 
10/14/2018 11:18 AM  
Heart Attack: Care Instructions Your Care Instructions A heart attack (myocardial infarction, or MI) occurs when one or more of the coronary arteries, which supply the heart with oxygen-rich blood, is blocked.  A blockage usually occurs when plaque inside the artery breaks open and a blood clot forms in the artery. After a heart attack, you may be worried about your future. Over the next several weeks, your heart will start to heal. Though it can be hard to break old habits, you can prevent another heart attack by making some lifestyle changes and by taking medicines. You may use this information for ideas about what to do at home to speed your recovery. Follow-up care is a key part of your treatment and safety. Be sure to make and go to all appointments, and call your doctor if you are having problems. It's also a good idea to know your test results and keep a list of the medicines you take. How can you care for yourself at home? Activity 
  · Until your doctor says it is okay, do not do strenuous exercise. And do not lift, pull, or push anything heavy. Ask your doctor what types of activities are safe for you.  
  · If your doctor has not set you up with a cardiac rehabilitation (rehab) program, talk to him or her about whether that is right for you. Cardiac rehab includes supervised exercise. It also includes help with diet and lifestyle changes and emotional support. It may reduce your risk of future heart problems.  
  · Increase your activities slowly. Take short rest breaks when you get tired.  
  · If your doctor recommends it, get more exercise. Walking is a good choice. Bit by bit, increase the amount you walk every day. Try for at least 30 minutes on most days of the week. You also may want to swim, bike, or do other activities. Talk with your doctor before you start an exercise program to make sure it is safe for you.  
  · Ask your doctor when you can drive, go back to work, and do other daily activities again.  
  · You can have sex as soon as you feel ready for it. Often this means when you can easily walk around or climb stairs. Talk with your doctor if you have any concerns.  If you are taking nitroglycerin, do not take erection-enhancing medicine such as sildenafil (Viagra), tadalafil (Cialis), or vardenafil (Levitra) .  
 Lifestyle changes 
  · Do not smoke. Smoking increases your risk of another heart attack. If you need help quitting, talk to your doctor about stop-smoking programs and medicines. These can increase your chances of quitting for good.  
  · Eat a heart-healthy diet that is low in saturated fat and salt, and is full of fruits, vegetables and whole-grains. Eat at least two servings of fish each week. You may get more details about how to eat healthy. But these tips can help you get started.  
  · Stay at a healthy weight, or lose weight if you need to. Medicines 
  · Be safe with medicines. Take your medicines exactly as prescribed. Call your doctor if you think you are having a problem with your medicine. You will get more details on the specific medicines your doctor prescribes. Do not stop taking your medicine unless your doctor tells you to. Not taking your medicine might raise your risk of having another heart attack.  
  · You may need several medicines to help lower your risk of another heart attack. These include: ¨ Blood pressure medicines such as angiotensin-converting enzyme (ACE) inhibitors, ARBs (angiotensin II receptor blockers), and beta-blockers. ¨ Cholesterol medicine called statins. ¨ Aspirin and other blood thinners. These prevent blood clots that can cause a heart attack.  
  · If your doctor has given you nitroglycerin, keep it with you at all times. If you have angina symptoms, such as chest pain or pressure, sit down and rest. Take the first dose of nitroglycerin as directed. If symptoms get worse or are not getting better within 5 minutes, call 911 right away. Stay on the phone. The emergency  will tell you what to do.  
  · Do not take any over-the-counter medicines, vitamins, or herbal products without talking to your doctor first.  
Phyllis jaimes   · Manage other health conditions such as high blood pressure and diabetes.  
  · Avoid colds and flu. Get a pneumococcal vaccine shot. If you have had one before, ask your doctor whether you need another dose. Get the flu vaccine every year. If you must be around people with colds or flu, wash your hands often.  
  · Be sure to tell your doctor about any angina symptoms you have had, even if they went away. Pay attention to your angina symptoms. Know what is typical for you and learn how to control it. Know when to call for help.  
  · Talk to your family, friends, or a counselor about your feelings. It is normal to feel frightened, angry, hopeless, helpless, and even guilty. Talking openly about bad feelings can help you cope. If you have symptoms of depression, talk to your doctor. When should you call for help? Call 911 anytime you think you may need emergency care. For example, call if: 
  · You have symptoms of a heart attack. These may include: ¨ Chest pain or pressure, or a strange feeling in the chest. 
¨ Sweating. ¨ Shortness of breath. ¨ Nausea or vomiting. ¨ Pain, pressure, or a strange feeling in the back, neck, jaw, or upper belly or in one or both shoulders or arms. ¨ Lightheadedness or sudden weakness. ¨ A fast or irregular heartbeat. After you call 911, the  may tell you to chew 1 adult-strength or 2 to 4 low-dose aspirin. Wait for an ambulance.  Do not try to drive yourself.  
  · You have angina symptoms (such as chest pain or pressure) that do not go away with rest or are not getting better within 5 minutes after you take a dose of nitroglycerin.  
  · You passed out (lost consciousness).  
  · You feel like you are having another heart attack.  
 Call your doctor now or seek immediate medical care if: 
  · You are having angina symptoms, such as chest pain or pressure, more often than usual, or the symptoms are different or worse than usual.  
   · You have new or increased shortness of breath.  
  · You are dizzy or lightheaded, or you feel like you may faint.  
 Watch closely for changes in your health, and be sure to contact your doctor if you have any problems. Where can you learn more? Go to http://anna-sampson.info/. Enter [id number] in the search box to learn more about \"Heart Attack: Care Instructions. \" Current as of: December 6, 2017 Content Version: 11.8 © 6445-0757 [employer]uli. Care instructions adapted under license by [employer] (which disclaims liability or warranty for this information). If you have questions about a medical condition or this instruction, always ask your healthcare professional. [address] any warranty or liability for your use of this information. [employer] Announcement We are excited to announce that we are making your provider's discharge notes available to you in [employer]. You will see these notes when they are completed and signed by the physician that discharged you from your recent hospital stay. If you have any questions or concerns about any information you see in [employer], please call the Health Information Department where you were seen or reach out to your Primary Care Provider for more information about your plan of care. Introducing [facility]! Dear Mari Gunter: 
Thank you for requesting a [employer] account. Our records indicate that you already have an active [employer] account. You can access your account anytime at [email or web address] Trading Did you know that you can access your hospital and ER discharge instructions at any time in [employer]? You can also review all of your test results from your hospital stay or ER visit. Additional Information If you have questions, please visit the Frequently Asked Questions section of the [employer] website at [email or web address] Remember, MyChart is NOT to be used for urgent needs. For medical emergencies, dial 911. Now available from your iPhone and Android! Introducing Gideon Kearns As a [facility] patient, I wanted to make you aware of our electronic visit tool called Gideon Kearns. [employer]/Chegue.lÃ¡ allows you to connect within minutes with a medical provider 24 hours a day, seven days a week via a mobile device or tablet or logging into a secure website from your computer. You can access Gideon Kearns from anywhere in the [place]. A virtual visit might be right for you when you have a simple condition and feel like you just dont want to get out of bed, or cant get away from work for an appointment, when your regular [facility] provider is not available (evenings, weekends or holidays), or when youre out of town and need minor care. Electronic visits cost only $49 and if the [employer]/Chegue.lÃ¡ provider determines a prescription is needed to treat your condition, one can be electronically transmitted to a nearby pharmacy*. Please take a moment to enroll today if you have not already done so. The enrollment process is free and takes just a few minutes. To enroll, please download the [employer]/Chegue.lÃ¡ natasha to your tablet or phone, or visit [email or web address] to enroll on your computer. And, as an [address] [place] [address] patient with a [employer] account, the results of your visits will be scanned into your electronic medical record and your primary care provider will be able to view the scanned results. We urge you to continue to see your regular [facility] provider for your ongoing medical care. And while your primary care provider may not be the one available when you seek a Gideon Kearns virtual visit, the peace of mind you get from getting a real diagnosis real time can be priceless.    
 
For more information on Gideon Kearns, view our Frequently Asked Questions (FAQs) at [email or web address]. Sincerely, 
 
Keila Yates MD 
Chief Medical Officer Jed Han *:  certain medications cannot be prescribed via [facility] Seen During Your Hospitalization Provider Specialty Primary office phone Colette Lambert MD Emergency Medicine [phone] Ramon Reyes MD Hospitalist [phone] Your Primary Care Physician (PCP) Primary Care Physician Office Phone Office Fax [address] [phone] You are allergic to the following Allergen Reactions Phenergan (Promethazine) Other (comments) \"loose my wits i go crazy\" Nsaids (Non-Steroidal Anti-Inflammatory Drug) Nausea and Vomiting Adhesive Tape-Silicones Other (comments) BLISTERS Aspirin Other (comments) Per EMS patient is allergic, patient agrees but is confused. Atenolol Nausea and Vomiting Cymbalta (Duloxetine) Other (comments) CONFUSION Digoxin Nausea and Vomiting Gluten Other (comments) GLUTEN INTOLERANCE Macrobid (Nitrofurantoin Monohyd/M-Cryst) Nausea and Vomiting Novocain (Procaine) Other (comments) Paralysis Sulfa (Sulfonamide Antibiotics) Nausea and Vomiting Codeine Other (comments) Unable to swallow Recent Documentation Height Weight BMI OB Status Smoking Status 1.524 m 58.1 kg 25.02 kg/m2 Postmenopausal Never Smoker Emergency Contacts Name Discharge Info Relation Home Work Mobile [address] CAREGIVER [3] Child [2] [phone] Sg Martins     [phone] Patient Belongings The following personal items are in your possession at time of discharge: 
     Visual Aid: Glasses Discharge Instructions Attachments/References ASPIRIN (BY MOUTH) (ENGLISH) MEFS - ATORVASTATIN (LIPITOR) - (BY MOUTH) (ENGLISH) Patient Handouts Aspirin (By mouth) Aspirin (AS-pir-in) Treats pain, fever, and inflammation. May lower risk of heart attack and stroke. Brand Name(s): Ascriptin Regular Strength, Aspergum, Aspir Low, Aspirin Adult Low Dose, Aspirin Low Dose, Daja Aspirin Children's, Daja Aspirin Regimen, Daja Extra Strength, Daja Genuine Aspirin, Daja Low Dose, Bufferin, Bufferin Low Dose, Durlaza, Ecotrin, Ecpirin There may be other brand names for this medicine. When This Medicine Should Not Be Used: This medicine is not right for everyone. Do not use it if you had an allergic reaction to aspirin or other NSAIDs, or if you have a history of asthma with nasal polyps and rhinitis. How to Use This Medicine:  
Delayed Release Capsule, Long Acting Capsule, Gum, Tablet, Chewable Tablet, Fizzy Tablet, Coated Tablet, Long Acting Tablet, 24 Hour Capsule · Your doctor will tell you how much medicine to use. Do not use more than directed. · It is best to take this medicine with food or milk. · Capsule, tablet, or coated tablet: Swallow whole. Do not crush, break, or chew it. · Chewable tablet: You may chew it completely or swallow it whole. · Gum: Chew completely to make sure you get as much medicine as possible. Drink a full glass (8 ounces) of water after chewing the gum. · Swallow the extended-release capsule whole. Do not crush, break, or chew it. Take the capsule with a full glass of water at the same time each day. · Follow the instructions on the medicine label if you are using this medicine without a prescription. · Missed dose: If you miss a dose of Durlaza, skip the missed dose and go back to your regular dosing schedule. Do not take extra medicine to make up for a missed dose. · Store the medicine in a closed container at room temperature, away from heat, moisture, and direct light. Drugs and Foods to Avoid: Ask your doctor or pharmacist before using any other medicine, including over-the-counter medicines, vitamins, and herbal products. · Some foods and medicines can affect how aspirin works. Tell your doctor if you are using any of the following: ¨ Dipyridamole, methotrexate, probenecid, sulfinpyrazone, ticlopidine ¨ Blood thinner (including clopidogrel, prasugrel, ticagrelor, warfarin) ¨ Blood pressure medicine ¨ Medicine to treat seizures (including phenytoin, valproic acid) ¨ NSAID pain or arthritis medicine (including celecoxib, diclofenac, ibuprofen, naproxen) ¨ Steroid medicine (including dexamethasone, hydrocortisone, methylprednisolone, prednisolone, prednisone) · Do not take Durlaza 2 hours before or 1 hour after you drink alcohol or take medicines that contain alcohol. Warnings While Using This Medicine: · Tell your doctor if you are pregnant or breastfeeding. Do not use this medicine during the later part of a pregnancy unless your doctor tells you to. · Tell your doctor if you have kidney disease, liver disease, high blood pressure, heart disease, or a history of stomach bleeding or ulcers. · This medicine may increase your risk for bleeding, including stomach ulcers. · Do not give aspirin to a child or teenager who has chickenpox or flu symptoms, unless the doctor says it is okay. Aspirin can cause a life-threatening reaction called Reye syndrome. · Tell any doctor or dentist who treats you that you are using this medicine. This medicine may affect certain medical test results. · Keep all medicine out of the reach of children. Never share your medicine with anyone. Possible Side Effects While Using This Medicine:  
Call your doctor right away if you notice any of these side effects: · Allergic reaction: Itching or hives, swelling in your face or hands, swelling or tingling in your mouth or throat, chest tightness, trouble breathing · Bloody or black stools, bloody vomit or vomit that looks like coffee grounds · Chest tightness, wheezing · Ringing in the ears · Severe stomach pain · Unusual bleeding, bruising, or weakness If you notice other side effects that you think are caused by this medicine, tell your doctor. Call your doctor for medical advice about side effects. You may report side effects to FDA at 2-764-FDA-9740 © 2017 [address] Information is for End User's use only and may not be sold, redistributed or otherwise used for commercial purposes. The above information is an  only. It is not intended as medical advice for individual conditions or treatments. Talk to your doctor, nurse or pharmacist before following any medical regimen to see if it is safe and effective for you. Atorvastatin (Lipitor) - (By mouth) Why this medicine is used:  
Treats high cholesterol and triglyceride levels. Reduces the risk of angina, stroke, heart attack, or certain heart and blood vessel problems. Contact a nurse or doctor right away if you have: · Severe headache, confusion, trouble speaking · Dark urine or pale stools · Yellow skin or eyes · Nausea, vomiting, loss of appetite, stomach pain · Muscle pain, tenderness, or weakness; unusual tiredness Common side effects: 
· Diarrhea · Joint pain © 2017 [address] Information is for End User's use only and may not be sold, redistributed or otherwise used for commercial purposes. Please provide this summary of care documentation to your next provider. Signatures-by signing, you are acknowledging that this After Visit Summary has been reviewed with you and you have received a copy. Patient Signature:  ____________________________________________________________ Date:  ____________________________________________________________  
  
Shelbi Gardner Provider Signature:  ____________________________________________________________ Date:  ____________________________________________________________

## 2018-10-12 NOTE — TELEPHONE ENCOUNTER
Outgoing call placed to Sergio Galvin, he said patient is not doing well this morning. He reported that patient is complaining of increase pain in groin area, and nausea.     NP will see patient today for evaluation    No power outage reported

## 2018-10-13 LAB
ATRIAL RATE: 90 BPM
ATRIAL RATE: 91 BPM
BASOPHILS # BLD: 0 K/UL (ref 0–0.1)
BASOPHILS NFR BLD: 0 % (ref 0–1)
CALCULATED P AXIS, ECG09: 14 DEGREES
CALCULATED P AXIS, ECG09: 4 DEGREES
CALCULATED R AXIS, ECG10: 11 DEGREES
CALCULATED R AXIS, ECG10: 26 DEGREES
CALCULATED T AXIS, ECG11: -14 DEGREES
CALCULATED T AXIS, ECG11: -9 DEGREES
DIAGNOSIS, 93000: NORMAL
DIAGNOSIS, 93000: NORMAL
DIFFERENTIAL METHOD BLD: ABNORMAL
EOSINOPHIL # BLD: 0 K/UL (ref 0–0.4)
EOSINOPHIL NFR BLD: 0 % (ref 0–7)
ERYTHROCYTE [DISTWIDTH] IN BLOOD BY AUTOMATED COUNT: 15.9 % (ref 11.5–14.5)
HCT VFR BLD AUTO: 31.4 % (ref 35–47)
HGB BLD-MCNC: 9.6 G/DL (ref 11.5–16)
IMM GRANULOCYTES # BLD: 0 K/UL (ref 0–0.04)
IMM GRANULOCYTES NFR BLD AUTO: 1 % (ref 0–0.5)
INR PPP: 1.1 (ref 0.9–1.1)
LYMPHOCYTES # BLD: 0.5 K/UL (ref 0.8–3.5)
LYMPHOCYTES NFR BLD: 8 % (ref 12–49)
MCH RBC QN AUTO: 29.1 PG (ref 26–34)
MCHC RBC AUTO-ENTMCNC: 30.6 G/DL (ref 30–36.5)
MCV RBC AUTO: 95.2 FL (ref 80–99)
MONOCYTES # BLD: 0.5 K/UL (ref 0–1)
MONOCYTES NFR BLD: 9 % (ref 5–13)
NEUTS SEG # BLD: 5 K/UL (ref 1.8–8)
NEUTS SEG NFR BLD: 82 % (ref 32–75)
NRBC # BLD: 0 K/UL (ref 0–0.01)
NRBC BLD-RTO: 0 PER 100 WBC
P-R INTERVAL, ECG05: 146 MS
P-R INTERVAL, ECG05: 148 MS
PLATELET # BLD AUTO: 180 K/UL (ref 150–400)
PMV BLD AUTO: 9.9 FL (ref 8.9–12.9)
PROTHROMBIN TIME: 11.3 SEC (ref 9–11.1)
Q-T INTERVAL, ECG07: 388 MS
Q-T INTERVAL, ECG07: 396 MS
QRS DURATION, ECG06: 92 MS
QRS DURATION, ECG06: 92 MS
QTC CALCULATION (BEZET), ECG08: 477 MS
QTC CALCULATION (BEZET), ECG08: 484 MS
RBC # BLD AUTO: 3.3 M/UL (ref 3.8–5.2)
TROPONIN I SERPL-MCNC: 0.75 NG/ML
VENTRICULAR RATE, ECG03: 90 BPM
VENTRICULAR RATE, ECG03: 91 BPM
WBC # BLD AUTO: 6.1 K/UL (ref 3.6–11)

## 2018-10-13 PROCEDURE — 85025 COMPLETE CBC W/AUTO DIFF WBC: CPT | Performed by: FAMILY MEDICINE

## 2018-10-13 PROCEDURE — 65660000000 HC RM CCU STEPDOWN

## 2018-10-13 PROCEDURE — 74011250637 HC RX REV CODE- 250/637: Performed by: FAMILY MEDICINE

## 2018-10-13 PROCEDURE — 74011250637 HC RX REV CODE- 250/637: Performed by: INTERNAL MEDICINE

## 2018-10-13 PROCEDURE — 36415 COLL VENOUS BLD VENIPUNCTURE: CPT | Performed by: FAMILY MEDICINE

## 2018-10-13 PROCEDURE — 85610 PROTHROMBIN TIME: CPT | Performed by: FAMILY MEDICINE

## 2018-10-13 PROCEDURE — 74011636637 HC RX REV CODE- 636/637: Performed by: FAMILY MEDICINE

## 2018-10-13 PROCEDURE — 84484 ASSAY OF TROPONIN QUANT: CPT | Performed by: INTERNAL MEDICINE

## 2018-10-13 PROCEDURE — 93306 TTE W/DOPPLER COMPLETE: CPT

## 2018-10-13 RX ADMIN — ASPIRIN 81 MG CHEWABLE TABLET 81 MG: 81 TABLET CHEWABLE at 08:33

## 2018-10-13 RX ADMIN — PANTOPRAZOLE SODIUM 40 MG: 40 TABLET, DELAYED RELEASE ORAL at 08:33

## 2018-10-13 RX ADMIN — MIRTAZAPINE 15 MG: 15 TABLET, FILM COATED ORAL at 22:39

## 2018-10-13 RX ADMIN — NITROGLYCERIN 1 INCH: 20 OINTMENT TOPICAL at 01:14

## 2018-10-13 RX ADMIN — PREDNISONE 10 MG: 10 TABLET ORAL at 18:32

## 2018-10-13 RX ADMIN — CYANOCOBALAMIN TAB 500 MCG 1000 MCG: 500 TAB at 08:33

## 2018-10-13 RX ADMIN — Medication 5 ML: at 22:00

## 2018-10-13 RX ADMIN — Medication 10 ML: at 05:37

## 2018-10-13 RX ADMIN — METOPROLOL TARTRATE 12.5 MG: 25 TABLET ORAL at 15:10

## 2018-10-13 RX ADMIN — METOPROLOL TARTRATE 12.5 MG: 25 TABLET ORAL at 03:18

## 2018-10-13 RX ADMIN — TOPIRAMATE 25 MG: 25 TABLET, FILM COATED ORAL at 08:33

## 2018-10-13 NOTE — H&P
[address]  HISTORY AND PHYSICAL Angeline Leblanc 
MR#: [id number] : 1925 ACCOUNT #: [de-identified] ADMIT DATE: 10/12/2018 CHIEF COMPLAINT:  Chest pain. HISTORY OF PRESENT ILLNESS:  The patient is a 57-year-old female with a past medical history of vascular dementia, polyneuropathy, chronic respiratory failure dependent on supplemental oxygen, anemia, chronic kidney disease stage II, seronegative rheumatoid arthritis at multiple sites, DNR, lower extremity edema, paroxysmal atrial fibrillation, coronary artery disease, CHF NYHA class 3, lumbar spinal stenosis, osteoarthritis, who presents to the hospital accompanied by her son. The patient is awake, but appears to have significant dementia and thus no history could be obtained from the patient. History was obtained from the son, Alex Keyes, who is present at the bedside. Alex Keyes reports that the patient was discharged from the hospital just recently on 10/09/2018. She was doing fairly well at home, but started complaining of some chest pain this morning. He also reports that there was aching everywhere. She has had multiple recent admissions because of altered mental status and metabolic encephalopathy. Patient was given some nitroglycerin and continued to have pain. The patient's son called home health care physician who advised him to call . EMS arrived and found that the patient had some ST elevations on the EKG. Patient was brought to the hospital as a \"STEMI. \"  Patient was seen by cardiology. No acute intervention was recommended and the patient was requested to be admitted under the hospitalist service. Currently, the patient is resting in bed and appears to be comfortable. Alex Keyes denies the patient having any cough, fever, chills, abdominal pain, shortness of breath, falls or injuries.   Complains of headache, blurry vision, sore throat, trouble swallowing, trouble with speech, any hematemesis, melena, hemoptysis or any concerns. No further history could be obtained from the patient as well as patient's son. PAST MEDICAL HISTORY:  See above. HOME MEDICATIONS:  Gabapentin 600 mg b.i.d., prednisone 10 mg every evening, metoprolol 25 mg b.i.d., Remeron 15 mg nightly, Duragesic 25 mcg per hour patch every 72 hours, tramadol, Zofran, MiraLax 17 grams p.o. daily as needed, Topamax 25 mg daily, nitroglycerin 0.4 mg every 5 minutes as needed, Nexium 40 mg daily, cyanocobalamin 1000 mcg daily, cholecalciferol every day. SOCIAL HISTORY:  No alcohol. No IV drug abuse. Lives at home. ALLERGIES:  PHENERGAN, NSAIDs, ADHESIVE TAPE, ASPIRIN, ATENOLOL, CYMBALTA, DIGOXIN, GLUTEN, MACROBID, NOVOCAIN, SULFA, CODEINE. REVIEW OF SYSTEMS:  Cannot be obtained from the patient due to the dementia. FAMILY HISTORY:  Father had history of unknown cancer, stroke. Brother with history of heart attack. PHYSICAL EXAMINATION: 
VITAL SIGNS:  Temperature 99.1, pulse 76, respiratory rate 22, blood pressure 106/56, pulse ox 100% on room air. GENERAL:  Alert x1, awake, mildly distressed pleasant female who appears to be stated age. HEENT:  Pupils equal and reactive to light. Dry mucous membranes. Tympanic membranes are clear. NECK:  Supple. LUNGS:  Clear to auscultation bilaterally. HEART:  S1, S2 were heard. ABDOMEN:  Soft, nontender, nondistended. Bowel sounds are physiologic. EXTREMITIES:  With trace edema, no cyanosis, no edema. NEUROPSYCHIATRIC:  Limited exam.  The patient not following instructions. Appears to move all 4. Strength could not be tested. Cranial nerves and sensory could not be tested. SKIN:  Warm. LABORATORY DATA:  White count 11.3, hemoglobin 10.8, hematocrit 35.2, platelets 268.   Sodium 137, potassium 4.7, chloride 105, bicarbonate 23, anion gap 9, glucose 160, BUN 24, creatinine 1.16, calcium 9.1, bilirubin total 0.4, ALT 36, AST 47, alkaline phosphatase 132. , troponin 1.06, glucose 106. EKG shows ST elevation in the inferior leads. ASSESSMENT AND PLAN: 
1. Acute inferior ST elevation myocardial infarction. The patient will be admitted on a telemetry bed. Patient was called as a code stroke, but cardiology arrived at the bedside, Dr. Jo nAn Lazcano evaluated the patient and had a discussion with the son. At this point in time cardiology and patient's power of  felt that no aggressive intervention is necessary. A catheterization was discussed and it was decided not to perform a cardiac catheterization at this point in time. Patient will only be medically managed and that also not aggressively. I spoke with the patient's son and he reports that the patient is able to take aspirin. He also reports that he would not want the patient to be on any anticoagulation at all. Patient also is a DNR at this point in time. Just continue aspirin, metoprolol. The patient had nitroglycerin paste placed. Palliative care evaluated the patient and the family understands that the patient has a very high risk for decompensation and this could result in mortality for the patient and he is aware of the same. We will provide supportive care, oxygen support and telemetry monitoring and continue to monitor. Further intervention will be per hospital course. Reassess as needed. 2.  History of polymyalgia rheumatica. Continue home medications and continue to monitor. 3.  History of vascular dementia. Supportive care. Fall precautions. Continue to monitor. 4.  Gastroesophageal reflux  disease. Continue home medications. 5.  Deep venous thrombosis prophylaxis. The patient will be on sequential compression devices. Lea Johnson MD MM/SANJIV 
D: 10/12/2018 17:08    
T: 10/12/2018 18:38 JOB #: [id number]

## 2018-10-13 NOTE — PROGRESS NOTES
Bedside shift change report given to [address] (oncoming nurse) by Dane Day (offgoing nurse). Report included the following information SBAR, Kardex, MAR and Recent Results.

## 2018-10-13 NOTE — PROGRESS NOTES
Bedside shift change report given to JUAN PABLO Hernandez (oncoming nurse) by Smith Rico RN (offgoing nurse). Report included the following information SBAR, Kardex, MAR and Recent Results.

## 2018-10-13 NOTE — PROGRESS NOTES
0500: Patient refusing to allow this nurse to draw labs at this time. Patient states \"I've been stuck too many times. \"  Hospitalist paged. 1: Hospitalist paged again. 0604: No response from hospitalist as of this time.

## 2018-10-13 NOTE — PROGRESS NOTES
Problem: Falls - Risk of 
Goal: *Absence of Falls Document Shannan Domínguez Fall Risk and appropriate interventions in the flowsheet. Outcome: Progressing Towards Goal 
Fall Risk Interventions: 
  
 
Mentation Interventions: Adequate sleep, hydration, pain control, Door open when patient unattended, More frequent rounding, Toileting rounds, Update white board Medication Interventions: Patient to call before getting OOB, Teach patient to arise slowly Elimination Interventions: Call light in reach, Patient to call for help with toileting needs Problem: Pressure Injury - Risk of 
Goal: *Prevention of pressure injury Document Marcelo Scale and appropriate interventions in the flowsheet. Outcome: Progressing Towards Goal 
Pressure Injury Interventions: 
Sensory Interventions: Assess changes in LOC, Assess need for specialty bed, Avoid rigorous massage over bony prominences, Float heels, Keep linens dry and wrinkle-free, Minimize linen layers, Monitor skin under medical devices, Pressure redistribution bed/mattress (bed type), Turn and reposition approx. every two hours (pillows and wedges if needed) Moisture Interventions: Absorbent underpads, Apply protective barrier, creams and emollients, Check for incontinence Q2 hours and as needed, Maintain skin hydration (lotion/cream), Minimize layers, Moisture barrier, Offer toileting Q_hr Activity Interventions: Pressure redistribution bed/mattress(bed type) Mobility Interventions: HOB 30 degrees or less, Pressure redistribution bed/mattress (bed type), Turn and reposition approx. every two hours(pillow and wedges) Nutrition Interventions: Document food/fluid/supplement intake, Offer support with meals,snacks and hydration Friction and Shear Interventions: Apply protective barrier, creams and emollients, Foam dressings/transparent film/skin sealants, HOB 30 degrees or less, Lift sheet

## 2018-10-13 NOTE — PROGRESS NOTES
Problem: Falls - Risk of 
Goal: *Absence of Falls Document Hernando Quinonez Fall Risk and appropriate interventions in the flowsheet. Outcome: Progressing Towards Goal 
Fall Risk Interventions: 
  
 
  
 
Medication Interventions: Patient to call before getting OOB, Teach patient to arise slowly Elimination Interventions: Call light in reach

## 2018-10-13 NOTE — PROGRESS NOTES
Cardiology Progress Note 10/13/2018     Admit Date: 10/12/2018 Admit Diagnosis: STEMI (ST [facility] STEMI (ST elevation myocardial infarction) ([facility]  CC: none currently Assessment:  
Principal Problem: STEMI (ST elevation myocardial infarction) ([facility] (10/12/2018) Plan:  
 
Troponin peaked, no more checks Echo pending Cont current cardiac meds - on ASA, BB Subjective:   
 
Nickie Villalba has no c/o this AM, in good spirits Objective:  
 Physical Exam: 
Overall VSSAF;   
Visit Vitals  BP 98/61 (BP 1 Location: Left arm, BP Patient Position: At rest)  Pulse 77  Temp 97.4 °F (36.3 °C)  Resp 20  
 Ht 5' (1.524 m)  Wt 60.4 kg (133 lb 2.5 oz)  SpO2 94%  BMI 26.01 kg/m2 Temp (24hrs), Av.2 °F (36.8 °C), Min:97.4 °F (36.3 °C), Max:99.1 °F (37.3 °C) Patient Vitals for the past 8 hrs: 
 Pulse 10/13/18 0754 77  
10/13/18 0312 74 Patient Vitals for the past 8 hrs: 
 Resp 10/13/18 0754 20  
10/13/18 0312 22 Patient Vitals for the past 8 hrs: 
 BP  
10/13/18 0754 98/61  
10/13/18 0312 111/73  
  
10/11 1901 - 10/13 0700 In: 654.2 [I.V.:654.2] Out: - General Appearance: Well developed, well nourished, no acute distress. Ears/Nose/Mouth/Throat:   Normal MM; anicteric. JVP: WNL Resp:   Lungs clear to auscultation bilaterally. Nl resp effort. Cardiovascular:  Irreg Abdomen:   Soft, non-tender, bowel sounds are present. Extremities: No edema bilaterally. Skin: 
Neuro: Warm and dry. Alert Data Review:    
 
Labs:  
Recent Results (from the past 24 hour(s)) POC TROPONIN-I Collection Time: 10/12/18 12:46 PM  
Result Value Ref Range Troponin-I (POC) 0.91 (H) 0.00 - 0.08 ng/mL EKG, 12 LEAD, INITIAL Collection Time: 10/12/18 12:46 PM  
Result Value Ref Range Ventricular Rate 90 BPM  
 Atrial Rate 90 BPM  
 P-R Interval 148 ms QRS Duration 92 ms  Q-T Interval 396 ms  
 QTC Calculation (Bezet) 484 ms Calculated P Axis 14 degrees Calculated R Axis 26 degrees Calculated T Axis -9 degrees Diagnosis Sinus rhythm with premature atrial complexes Inferior infarct , age undetermined ST & T wave abnormality, consider anterolateral ischemia When compared with ECG of 03-OCT-2018 04:12, 
T wave inversion now evident in Inferior leads T wave inversion more evident in Anterolateral leads POC CHEM8 Collection Time: 10/12/18 12:52 PM  
Result Value Ref Range Calcium, ionized (POC) 1.12 1.12 - 1.32 mmol/L Sodium (POC) 136 136 - 145 mmol/L Potassium (POC) 5.0 3.5 - 5.1 mmol/L Chloride (POC) 107 98 - 107 mmol/L  
 CO2 (POC) 22 21 - 32 mmol/L Anion gap (POC) 13 10 - 20 mmol/L Glucose (POC) 191 (H) 65 - 100 mg/dL BUN (POC) 24 (H) 9 - 20 mg/dL Creatinine (POC) 1.2 0.6 - 1.3 mg/dL GFRAA, POC 51 (L) >60 ml/min/1.73m2 GFRNA, POC 42 (L) >60 ml/min/1.73m2 Hematocrit (POC) 37 35.0 - 47.0 % Comment Comment Not Indicated. CBC WITH AUTOMATED DIFF Collection Time: 10/12/18  1:32 PM  
Result Value Ref Range WBC 11.3 (H) 3.6 - 11.0 K/uL  
 RBC 3.73 (L) 3.80 - 5.20 M/uL  
 HGB 10.8 (L) 11.5 - 16.0 g/dL HCT 35.2 35.0 - 47.0 % MCV 94.4 80.0 - 99.0 FL  
 MCH 29.0 26.0 - 34.0 PG  
 MCHC 30.7 30.0 - 36.5 g/dL  
 RDW 15.9 (H) 11.5 - 14.5 % PLATELET 878 474 - 728 K/uL MPV 9.8 8.9 - 12.9 FL  
 NRBC 0.0 0  WBC ABSOLUTE NRBC 0.00 0.00 - 0.01 K/uL NEUTROPHILS 85 (H) 32 - 75 % LYMPHOCYTES 5 (L) 12 - 49 % MONOCYTES 9 5 - 13 % EOSINOPHILS 0 0 - 7 % BASOPHILS 0 0 - 1 % IMMATURE GRANULOCYTES 1 (H) 0.0 - 0.5 % ABS. NEUTROPHILS 9.6 (H) 1.8 - 8.0 K/UL  
 ABS. LYMPHOCYTES 0.6 (L) 0.8 - 3.5 K/UL  
 ABS. MONOCYTES 1.0 0.0 - 1.0 K/UL  
 ABS. EOSINOPHILS 0.0 0.0 - 0.4 K/UL  
 ABS. BASOPHILS 0.0 0.0 - 0.1 K/UL  
 ABS. IMM.  GRANS. 0.1 (H) 0.00 - 0.04 K/UL  
 DF SMEAR SCANNED    
 RBC COMMENTS ANISOCYTOSIS 
1+ 
    
 RBC COMMENTS OVALOCYTES PRESENT 
    
METABOLIC PANEL, COMPREHENSIVE Collection Time: 10/12/18  1:32 PM  
Result Value Ref Range Sodium 137 136 - 145 mmol/L Potassium 4.7 3.5 - 5.1 mmol/L Chloride 105 97 - 108 mmol/L  
 CO2 23 21 - 32 mmol/L Anion gap 9 5 - 15 mmol/L Glucose 160 (H) 65 - 100 mg/dL BUN 24 (H) 6 - 20 MG/DL Creatinine 1.16 (H) 0.55 - 1.02 MG/DL  
 BUN/Creatinine ratio 21 (H) 12 - 20 GFR est AA 53 (L) >60 ml/min/1.73m2 GFR est non-AA 44 (L) >60 ml/min/1.73m2 Calcium 9.1 8.5 - 10.1 MG/DL Bilirubin, total 0.4 0.2 - 1.0 MG/DL  
 ALT (SGPT) 36 12 - 78 U/L  
 AST (SGOT) 47 (H) 15 - 37 U/L Alk. phosphatase 132 (H) 45 - 117 U/L Protein, total 6.2 (L) 6.4 - 8.2 g/dL Albumin 2.7 (L) 3.5 - 5.0 g/dL Globulin 3.5 2.0 - 4.0 g/dL A-G Ratio 0.8 (L) 1.1 - 2.2 CK W/ REFLX CKMB Collection Time: 10/12/18  1:32 PM  
Result Value Ref Range  26 - 192 U/L  
TROPONIN I Collection Time: 10/12/18  1:32 PM  
Result Value Ref Range Troponin-I, Qt. 1.06 (H) <0.05 ng/mL CBC WITH AUTOMATED DIFF Collection Time: 10/13/18  8:44 AM  
Result Value Ref Range WBC 6.1 3.6 - 11.0 K/uL  
 RBC 3.30 (L) 3.80 - 5.20 M/uL HGB 9.6 (L) 11.5 - 16.0 g/dL HCT 31.4 (L) 35.0 - 47.0 % MCV 95.2 80.0 - 99.0 FL  
 MCH 29.1 26.0 - 34.0 PG  
 MCHC 30.6 30.0 - 36.5 g/dL  
 RDW 15.9 (H) 11.5 - 14.5 % PLATELET 645 530 - 543 K/uL MPV 9.9 8.9 - 12.9 FL  
 NRBC 0.0 0  WBC ABSOLUTE NRBC 0.00 0.00 - 0.01 K/uL NEUTROPHILS 82 (H) 32 - 75 % LYMPHOCYTES 8 (L) 12 - 49 % MONOCYTES 9 5 - 13 % EOSINOPHILS 0 0 - 7 % BASOPHILS 0 0 - 1 % IMMATURE GRANULOCYTES 1 (H) 0.0 - 0.5 % ABS. NEUTROPHILS 5.0 1.8 - 8.0 K/UL  
 ABS. LYMPHOCYTES 0.5 (L) 0.8 - 3.5 K/UL  
 ABS. MONOCYTES 0.5 0.0 - 1.0 K/UL  
 ABS. EOSINOPHILS 0.0 0.0 - 0.4 K/UL  
 ABS. BASOPHILS 0.0 0.0 - 0.1 K/UL  
 ABS. IMM. GRANS. 0.0 0.00 - 0.04 K/UL  
 DF AUTOMATED PROTHROMBIN TIME + INR  
 Collection Time: 10/13/18  8:44 AM  
Result Value Ref Range INR 1.1 0.9 - 1.1 Prothrombin time 11.3 (H) 9.0 - 11.1 sec TROPONIN I Collection Time: 10/13/18  8:44 AM  
Result Value Ref Range Troponin-I, Qt. 0.75 (H) <0.05 ng/mL Current medications reviewed Jasmeet Mendez MD

## 2018-10-13 NOTE — ROUTINE PROCESS
Primary RN asked for assistance with obtaining labs. Patient stated \"the next person to stick me is getting a sock in the nose. \" She refused another stick. Primary RN notified. Will hold off until am labs and obtain assistance from CC transport team/art stick

## 2018-10-13 NOTE — PROGRESS NOTES
Bedside verbal shift change report given to oncoming nurse Ayesha Alonso R.N. Opportunity for questions and clarifications provided.

## 2018-10-13 NOTE — PROGRESS NOTES
Problem: Pressure Injury - Risk of 
Goal: *Prevention of pressure injury Document Marcelo Scale and appropriate interventions in the flowsheet. Outcome: Not Progressing Towards Goal 
Pressure Injury Interventions: 
Sensory Interventions: Assess changes in LOC, Discuss PT/OT consult with provider, Float heels Moisture Interventions: Absorbent underpads, Apply protective barrier, creams and emollients, Check for incontinence Q2 hours and as needed Activity Interventions: Increase time out of bed, PT/OT evaluation Mobility Interventions: HOB 30 degrees or less, Pressure redistribution bed/mattress (bed type) Nutrition Interventions: Document food/fluid/supplement intake, Discuss nutritional consult with provider Friction and Shear Interventions: HOB 30 degrees or less, Lift sheet

## 2018-10-13 NOTE — PROGRESS NOTES
Hospitalist Progress Note Margarita Cook MD 
Answering service: [phone] OR [phone] from in house phone Cell: [phone] Date of Service:  10/13/2018 NAME:  Carlos A De Santiago :  1925 MRN:  [id number] Admission Summary:  
 
45-year-old female with a past medical history of vascular dementia, polyneuropathy, chronic respiratory failure dependent on supplemental oxygen, anemia, chronic kidney disease stage II, seronegative rheumatoid arthritis at multiple sites, DNR, lower extremity edema, paroxysmal atrial fibrillation, coronary artery disease, CHF NYHA class 3, lumbar spinal stenosis, osteoarthritis, who presents to the hospital accompanied by her son. The patient is awake, but appears to have significant dementia and thus no history could be obtained from the patient. History was obtained from the son, Tito Yanes, who is present at the bedside. Tito Yanes reports that the patient was discharged from the hospital just recently on 10/09/2018. She was doing fairly well at home, but started complaining of some chest pain this morning. He also reports that there was aching everywhere. She has had multiple recent admissions because of altered mental status and metabolic encephalopathy. Patient was given some nitroglycerin and continued to have pain. The patient's son called home health care physician who advised him to call . EMS arrived and found that the patient had some ST elevations on the EKG. Patient was brought to the hospital as a \"STEMI. \"  Patient was seen by cardiology. No acute intervention was recommended. Interval history / Subjective:  
 
Patient stating she has intermittent CP, no SOB. Her chest pain is worse with palpation. Seen with ECHO tech at the bedside. No new complaints. Assessment & Plan: ST elevation myocardial infarction: 
-patient -admitted to telemetry bed -Dr. Sidney Colindres evaluated the patient and had a discussion with the son. At this point in time cardiology and patient's power of  felt that no aggressive intervention is necessary. A catheterization was discussed and it was decided not to perform a cardiac catheterization at this point in time.  
-troponin peaked at 1.06 
-TTE pending 
-continue aspirin, metoprolol 
-PT/OT History of polymyalgia rheumatica: Continue home medications. History of vascular dementia: 
-Supportive care 
-Fall precautions. Gastroesophageal reflux disease: Continue home medications. 
  
Code status: DNR 
DVT prophylaxis: SCDs Care Plan discussed with: Patient/Family Disposition: D Hospital Problems  Date Reviewed: 10/12/2018 Codes Class Noted POA * (Principal)[facility]ST [facility] ICD-10-CM: I21.3 ICD-9-CM: 410.90  10/12/2018 Unknown Review of Systems: A comprehensive review of systems was negative except for that written in the HPI. Vital Signs:  
 Last 24hrs VS reviewed since prior progress note. Most recent are: 
Visit Vitals  /76 (BP 1 Location: Left arm, BP Patient Position: At rest)  Pulse 79  Temp 98.1 °F (36.7 °C)  Resp 20  
 Ht 5' (1.524 m)  Wt 60.4 kg (133 lb 2.5 oz)  SpO2 93%  BMI 26.01 kg/m2 Intake/Output Summary (Last 24 hours) at 10/13/18 1402 Last data filed at 10/13/18 0800 Gross per 24 hour Intake           704.17 ml Output                0 ml Net           704.17 ml Physical Examination:  
 
 
ENT:  Neck supple Resp:  CTA bilaterally. No accessory muscle use CV:  Irregular rhythm, normal rate, no murmur GI:  Soft, non distended, non tender, normoactive bowel sounds Musculoskeletal:  No edema, warm Neurologic:  Moves all extremities. AAOx3 Data Review:  
 Review and/or order of clinical lab test 
Review and/or order of tests in the radiology section of CPT Review and/or order of tests in the medicine section of [facility] Labs:  
 
Recent Labs 10/13/18 
 0844  10/12/18 
 1332 WBC  6.1  11.3* HGB  9.6*  10.8* HCT  31.4*  35.2 PLT  180  220 Recent Labs 10/12/18 
 1332 NA  137  
K  4.7 CL  105 CO2  23 BUN  24* CREA  1.16* GLU  160* CA  9.1 Recent Labs 10/12/18 
 1332 SGOT  47* ALT  36 AP  132* TBILI  0.4 TP  6.2* ALB  2.7*  
GLOB  3.5 Recent Labs 10/13/18 
 4054 INR  1.1 PTP  11.3* No results for input(s): FE, TIBC, PSAT, FERR in the last 72 hours. No results found for: FOL, RBCF No results for input(s): PH, PCO2, PO2 in the last 72 hours. Recent Labs 10/13/18 
 0844  10/12/18 
 1332 TROIQ  0.75*  1.06* No results found for: CHOL, CHOLX, CHLST, CHOLV, HDL, LDL, LDLC, DLDLP, TGLX, TRIGL, TRIGP, CHHD, CHHDX Lab Results Component Value Date/Time Glucose (POC) 191 (H) 10/12/2018 12:52 PM  
 Glucose (POC) 89 10/03/2018 07:30 AM  
 
Lab Results Component Value Date/Time Color YELLOW/STRAW 10/03/2018 06:18 AM  
 Appearance CLEAR 10/03/2018 06:18 AM  
 Specific gravity 1.025 10/03/2018 06:18 AM  
 Specific gravity 1.012 05/14/2015 12:34 PM  
 pH (UA) 6.0 10/03/2018 06:18 AM  
 Protein NEGATIVE  10/03/2018 06:18 AM  
 Glucose NEGATIVE  10/03/2018 06:18 AM  
 Ketone >80 (A) 10/03/2018 06:18 AM  
 Bilirubin NEGATIVE  10/03/2018 06:18 AM  
 Urobilinogen 0.2 10/03/2018 06:18 AM  
 Nitrites NEGATIVE  10/03/2018 06:18 AM  
 Leukocyte Esterase NEGATIVE  10/03/2018 06:18 AM  
 Epithelial cells FEW 10/03/2018 06:18 AM  
 Bacteria NEGATIVE  10/03/2018 06:18 AM  
 WBC 0-4 10/03/2018 06:18 AM  
 RBC 0-5 10/03/2018 06:18 AM  
 
 
 
Medications Reviewed:  
 
Current Facility-Administered Medications Medication Dose Route Frequency  0.9% sodium chloride infusion  75 mL/hr IntraVENous CONTINUOUS  
 nitroglycerin (NITROBID) 2 % ointment 1 Inch  1 Inch Topical Q6H  
  metoprolol tartrate (LOPRESSOR) tablet 12.5 mg  12.5 mg Oral Q12H  cyanocobalamin (VITAMIN B12) tablet 1,000 mcg  1,000 mcg Oral DAILY  fentaNYL (DURAGESIC) 25 mcg/hr patch 1 Patch  1 Patch TransDERmal Q72H  mirtazapine (REMERON) tablet 15 mg  15 mg Oral QHS  nitroglycerin (NITROSTAT) tablet 0.4 mg  0.4 mg SubLINGual Q5MIN PRN  polyethylene glycol (MIRALAX) packet 17 g  17 g Oral DAILY PRN  predniSONE (DELTASONE) tablet 10 mg  10 mg Oral QPM  
 topiramate (TOPAMAX) tablet 25 mg  25 mg Oral DAILY WITH BREAKFAST  sodium chloride (NS) flush 5-10 mL  5-10 mL IntraVENous Q8H  
 sodium chloride (NS) flush 5-10 mL  5-10 mL IntraVENous PRN  
 0.9% sodium chloride infusion  50 mL/hr IntraVENous CONTINUOUS  
 aspirin chewable tablet 81 mg  81 mg Oral DAILY  pantoprazole (PROTONIX) tablet 40 mg  40 mg Oral ACB  
 
______________________________________________________________________ EXPECTED LENGTH OF STAY: - - - 
ACTUAL LENGTH OF STAY:          1 Chalo Trejo MD

## 2018-10-14 VITALS
WEIGHT: 128.09 LBS | BODY MASS INDEX: 25.15 KG/M2 | HEIGHT: 60 IN | RESPIRATION RATE: 18 BRPM | TEMPERATURE: 98.8 F | DIASTOLIC BLOOD PRESSURE: 71 MMHG | SYSTOLIC BLOOD PRESSURE: 118 MMHG | HEART RATE: 84 BPM | OXYGEN SATURATION: 97 %

## 2018-10-14 PROBLEM — I21.3 STEMI (ST ELEVATION MYOCARDIAL INFARCTION) (HCC): Status: RESOLVED | Noted: 2018-10-12 | Resolved: 2018-10-14

## 2018-10-14 LAB
ANION GAP SERPL CALC-SCNC: 8 MMOL/L (ref 5–15)
BASOPHILS # BLD: 0 K/UL (ref 0–0.1)
BASOPHILS NFR BLD: 0 % (ref 0–1)
BUN SERPL-MCNC: 28 MG/DL (ref 6–20)
BUN/CREAT SERPL: 33 (ref 12–20)
CALCIUM SERPL-MCNC: 8.4 MG/DL (ref 8.5–10.1)
CHLORIDE SERPL-SCNC: 110 MMOL/L (ref 97–108)
CO2 SERPL-SCNC: 22 MMOL/L (ref 21–32)
CREAT SERPL-MCNC: 0.86 MG/DL (ref 0.55–1.02)
DIFFERENTIAL METHOD BLD: ABNORMAL
EOSINOPHIL # BLD: 0 K/UL (ref 0–0.4)
EOSINOPHIL NFR BLD: 0 % (ref 0–7)
ERYTHROCYTE [DISTWIDTH] IN BLOOD BY AUTOMATED COUNT: 16 % (ref 11.5–14.5)
GLUCOSE SERPL-MCNC: 163 MG/DL (ref 65–100)
HCT VFR BLD AUTO: 30.9 % (ref 35–47)
HGB BLD-MCNC: 9 G/DL (ref 11.5–16)
IMM GRANULOCYTES # BLD: 0.1 K/UL (ref 0–0.04)
IMM GRANULOCYTES NFR BLD AUTO: 1 % (ref 0–0.5)
LYMPHOCYTES # BLD: 0.4 K/UL (ref 0.8–3.5)
LYMPHOCYTES NFR BLD: 7 % (ref 12–49)
MCH RBC QN AUTO: 28.9 PG (ref 26–34)
MCHC RBC AUTO-ENTMCNC: 29.1 G/DL (ref 30–36.5)
MCV RBC AUTO: 99.4 FL (ref 80–99)
MONOCYTES # BLD: 0.3 K/UL (ref 0–1)
MONOCYTES NFR BLD: 5 % (ref 5–13)
NEUTS SEG # BLD: 4.8 K/UL (ref 1.8–8)
NEUTS SEG NFR BLD: 87 % (ref 32–75)
NRBC # BLD: 0 K/UL (ref 0–0.01)
NRBC BLD-RTO: 0 PER 100 WBC
PLATELET # BLD AUTO: 188 K/UL (ref 150–400)
PLATELET COMMENTS,PCOM: ABNORMAL
POTASSIUM SERPL-SCNC: 4.8 MMOL/L (ref 3.5–5.1)
RBC # BLD AUTO: 3.11 M/UL (ref 3.8–5.2)
RBC MORPH BLD: ABNORMAL
RBC MORPH BLD: ABNORMAL
SODIUM SERPL-SCNC: 140 MMOL/L (ref 136–145)
WBC # BLD AUTO: 5.6 K/UL (ref 3.6–11)

## 2018-10-14 PROCEDURE — 85025 COMPLETE CBC W/AUTO DIFF WBC: CPT | Performed by: INTERNAL MEDICINE

## 2018-10-14 PROCEDURE — 74011636637 HC RX REV CODE- 636/637: Performed by: FAMILY MEDICINE

## 2018-10-14 PROCEDURE — 36415 COLL VENOUS BLD VENIPUNCTURE: CPT | Performed by: INTERNAL MEDICINE

## 2018-10-14 PROCEDURE — 94760 N-INVAS EAR/PLS OXIMETRY 1: CPT

## 2018-10-14 PROCEDURE — G8987 SELF CARE CURRENT STATUS: HCPCS

## 2018-10-14 PROCEDURE — 74011250637 HC RX REV CODE- 250/637: Performed by: FAMILY MEDICINE

## 2018-10-14 PROCEDURE — 77010033678 HC OXYGEN DAILY

## 2018-10-14 PROCEDURE — G8988 SELF CARE GOAL STATUS: HCPCS

## 2018-10-14 PROCEDURE — 74011250637 HC RX REV CODE- 250/637: Performed by: INTERNAL MEDICINE

## 2018-10-14 PROCEDURE — 97535 SELF CARE MNGMENT TRAINING: CPT

## 2018-10-14 PROCEDURE — 97530 THERAPEUTIC ACTIVITIES: CPT

## 2018-10-14 PROCEDURE — 97165 OT EVAL LOW COMPLEX 30 MIN: CPT

## 2018-10-14 PROCEDURE — 74011250636 HC RX REV CODE- 250/636: Performed by: FAMILY MEDICINE

## 2018-10-14 PROCEDURE — 80048 BASIC METABOLIC PNL TOTAL CA: CPT | Performed by: INTERNAL MEDICINE

## 2018-10-14 RX ORDER — GUAIFENESIN 100 MG/5ML
81 LIQUID (ML) ORAL DAILY
Qty: 30 TAB | Refills: 0 | Status: SHIPPED | OUTPATIENT
Start: 2018-10-15

## 2018-10-14 RX ORDER — ATORVASTATIN CALCIUM 40 MG/1
40 TABLET, FILM COATED ORAL DAILY
Status: DISCONTINUED | OUTPATIENT
Start: 2018-10-15 | End: 2018-10-14 | Stop reason: HOSPADM

## 2018-10-14 RX ORDER — ATORVASTATIN CALCIUM 40 MG/1
40 TABLET, FILM COATED ORAL DAILY
Qty: 30 TAB | Refills: 0 | Status: SHIPPED | OUTPATIENT
Start: 2018-10-15 | End: 2018-11-06 | Stop reason: SDUPTHER

## 2018-10-14 RX ADMIN — Medication 10 ML: at 13:36

## 2018-10-14 RX ADMIN — NITROGLYCERIN 1 INCH: 20 OINTMENT TOPICAL at 08:55

## 2018-10-14 RX ADMIN — ASPIRIN 81 MG CHEWABLE TABLET 81 MG: 81 TABLET CHEWABLE at 08:55

## 2018-10-14 RX ADMIN — PANTOPRAZOLE SODIUM 40 MG: 40 TABLET, DELAYED RELEASE ORAL at 06:58

## 2018-10-14 RX ADMIN — METOPROLOL TARTRATE 12.5 MG: 25 TABLET ORAL at 03:37

## 2018-10-14 RX ADMIN — SODIUM CHLORIDE 50 ML/HR: 900 INJECTION, SOLUTION INTRAVENOUS at 06:20

## 2018-10-14 RX ADMIN — TOPIRAMATE 25 MG: 25 TABLET, FILM COATED ORAL at 08:55

## 2018-10-14 RX ADMIN — NITROGLYCERIN 1 INCH: 20 OINTMENT TOPICAL at 13:34

## 2018-10-14 RX ADMIN — PREDNISONE 10 MG: 10 TABLET ORAL at 17:39

## 2018-10-14 RX ADMIN — NITROGLYCERIN 1 INCH: 20 OINTMENT TOPICAL at 02:18

## 2018-10-14 RX ADMIN — METOPROLOL TARTRATE 12.5 MG: 25 TABLET ORAL at 14:44

## 2018-10-14 RX ADMIN — CYANOCOBALAMIN TAB 500 MCG 1000 MCG: 500 TAB at 08:55

## 2018-10-14 NOTE — PROGRESS NOTES
Physical Therapy 10/14/2018 Orders received, acknowledged, and appreciated. Chart reviewed. Patient re-admitted for STEMI. Per chart review, patient's son his her primary caregiver. Patient does not have SNF coverage for this calendar year and has limited funds for private aid duty. Patient owns a wheelchair, walker, and hospital bed. Patient's mPOA has declined cardiac catheterization. Patient is at her functional baseline and has no DME needs. Acute PT services to be discontinued. Recommend resumption of home health services. Thank you. Princess Cota, PT, DPT Time Spent: 10 minutes

## 2018-10-14 NOTE — PROGRESS NOTES
Bedside shift change report given to JUAN PABLO Chambers (oncoming nurse) by Aurelio Spencer RN (offgoing nurse). Report included the following information SBAR, MAR and Cardiac Rhythm NSR.

## 2018-10-14 NOTE — ADVANCED PRACTICE NURSE
1740:  Patient all dressed and waiting for transportation for ride home. 1805:  Spoke with son, Gurmeet Beach, to notified him that  Ambulance staff is running late. I will update him once transport has transported his mom out of the hospital.  Update him discharge paper work is in welcome packet. 1956:  Notified Gurmeet Beach that his mom has left for home.

## 2018-10-14 NOTE — DISCHARGE SUMMARY
Discharge Summary       PATIENT ID: Asmita Mendez  MRN: [id number]   YOB: 1925    DATE OF ADMISSION: 10/12/2018 12:19 PM    DATE OF DISCHARGE: 10/14/2018  PRIMARY CARE PROVIDER: Melissa Han NP     DISCHARGING PROVIDER: Phillip Horner MD    To contact this individual call [phone] and ask the  to page. If unavailable ask to be transferred the Adult Hospitalist Department. CONSULTATIONS: IP CONSULT TO PALLIATIVE CARE - PROVIDER    PROCEDURES/SURGERIES: * No surgery found *    ADMITTING DIAGNOSES & HOSPITAL COURSE:     77-year-old female with a past medical history of vascular dementia, polyneuropathy, chronic respiratory failure dependent on supplemental oxygen, anemia, chronic kidney disease stage II, seronegative rheumatoid arthritis at multiple sites, DNR, lower extremity edema, paroxysmal atrial fibrillation, coronary artery disease, CHF NYHA class 3, lumbar spinal stenosis, osteoarthritis, who presents to the hospital accompanied by her son. The patient is awake, but appears to have significant dementia and thus no history could be obtained from the patient. Chitra Yeung was obtained from the son, Darrian Gonsalez, who is present at the bedside. Manual Everetts reports that the patient was discharged from the hospital just recently on 10/09/2018. Andreia Shane was doing fairly well at home, but started complaining of some chest pain this morning.  He also reports that there was aching everywhere. She has had multiple recent admissions because of altered mental status and metabolic encephalopathy. Patient was given some nitroglycerin and continued to have pain.  The patient's son called home health care physician who advised him to call 9-1-1. EMS arrived and found that the patient had some ST elevations on the EKG.  Patient was brought to the hospital as a code \"STEMI. \" Patient was seen by Cardiology.  No acute intervention was recommended.     DISCHARGE DIAGNOSES / PLAN:      ST elevation myocardial infarction:  -patient was admitted to telemetry bed  -Dr. Jo Ann Lazcano evaluated the patient and had a discussion with the son. Emeli Dominguez this point in time cardiology and patient's power of  felt that no aggressive intervention is necessary. A catheterization was discussed and it was decided not to perform a cardiac catheterization at this point in time.   -troponin peaked at 1.06  -TTE with normal EF and no wall motion abnormalities  -continue aspirin, metoprolol, statin  -PCP follow-up     History of polymyalgia rheumatica: Continue home medications/chronic pain meds. History of vascular dementia:  -Supportive care  -Fall precautions     Gastroesophageal reflux disease: Continue home medications. PENDING TEST RESULTS:   At the time of discharge the following test results are still pending: None    FOLLOW UP APPOINTMENTS:    Follow-up Information     Follow up With Details Comments [address] [place] [address], NP Schedule an appointment as soon as possible for a visit  [address] [place] [address]  [phone]           ADDITIONAL CARE RECOMMENDATIONS:     DIET: Cardiac Diet    ACTIVITY: Activity as tolerated    DISCHARGE MEDICATIONS:  Current Discharge Medication List      START taking these medications    Details   aspirin 81 mg chewable tablet Take 1 Tab by mouth daily. Qty: 30 Tab, Refills: 0      atorvastatin (LIPITOR) 40 mg tablet Take 1 Tab by mouth daily. Qty: 30 Tab, Refills: 0         CONTINUE these medications which have NOT CHANGED    Details   gabapentin (NEURONTIN) 300 mg capsule Take 600 mg by mouth two (2) times a day. predniSONE (DELTASONE) 10 mg tablet Take 10 mg by mouth every evening. metoprolol tartrate (LOPRESSOR) 50 mg tablet Take 1.5 Tabs by mouth two (2) times a day. Qty: 135 Tab, Refills: 1    Associated Diagnoses: Essential hypertension      mirtazapine (REMERON) 15 mg tablet Take 1 Tab by mouth nightly.  Indications: major depressive disorder  Qty: 30 Tab, Refills: 1    Associated Diagnoses: Severe episode of recurrent major depressive disorder, without psychotic features (HCC)      fentaNYL (DURAGESIC) 25 mcg/hr PATCH 1 Patch by TransDERmal route every seventy-two (72) hours. traMADol (ULTRAM) 50 mg tablet Take 50 mg by mouth two (2) times daily as needed for Pain. ondansetron (ZOFRAN ODT) 8 mg disintegrating tablet Take 1 Tab by mouth every eight (8) hours. Qty: 90 Tab, Refills: 2    Associated Diagnoses: Nausea      polyethylene glycol (MIRALAX) 17 gram/dose powder Take 17 g by mouth daily as needed. topiramate (TOPAMAX) 25 mg tablet Take 1 Tab by mouth daily (with breakfast). Qty: 90 Tab, Refills: 3      senna-docusate (SENNA PLUS) 8.6-50 mg per tablet Take 1 Tab by mouth daily. nitroglycerin (NITROSTAT) 0.4 mg SL tablet 1 Tab by SubLINGual route every five (5) minutes as needed for Chest Pain. Qty: 50 Tab, Refills: 3      esomeprazole (NEXIUM) 40 mg capsule Take 40 mg by mouth Daily (before breakfast). Bifidobacterium Infantis (ALIGN) 4 mg cap Take 4 mg by mouth daily. cyanocobalamin 1,000 mcg tablet Take 1,000 mcg by mouth daily. cholecalciferol (VITAMIN D3) 1,000 unit tablet Take 1,000 Units by mouth daily. STOP taking these medications       spironolactone (ALDACTONE) 25 mg tablet Comments:   Reason for Stopping:             NOTIFY YOUR PHYSICIAN FOR ANY OF THE FOLLOWING:   Fever over 101 degrees for 24 hours. Chest pain, shortness of breath, fever, chills, nausea, vomiting, diarrhea, change in mentation, falling, weakness, bleeding. Severe pain or pain not relieved by medications. Or, any other signs or symptoms that you may have questions about.     DISPOSITION:    Home With 24 hour care   OT  PT  HH  RN       Long term SNF/Inpatient Rehab    Independent/assisted living    Hospice    Other:       PATIENT CONDITION AT DISCHARGE:     Functional status    Poor     Deconditioned Independent      Cognition     Lucid     Forgetful     Dementia      Catheters/lines (plus indication)    [place]erson     PICC     PEG     None      Code status     Full code     DNR      PHYSICAL EXAMINATION AT DISCHARGE:    ENT:  Neck supple   Resp:  CTA bilaterally. No accessory muscle use   CV:  Irregular rhythm, normal rate, no murmur    GI:  Soft, non distended, non tender, normoactive bowel sounds    Musculoskeletal:  No edema, warm    Neurologic:  Moves all extremities.   AAOx3       CHRONIC MEDICAL DIAGNOSES:  Problem List as of 10/14/2018  Date Reviewed: 10/12/2018          Codes Class Noted - Resolved    Acute respiratory distress ICD-10-CM: R06.03  ICD-9-CM: 518.82  Unknown - Present        Chronic pain syndrome ICD-10-CM: G89.4  ICD-9-CM: 338.4  Unknown - Present        Dementia without behavioral disturbance ICD-10-CM: F03.90  ICD-9-CM: 294.20  Unknown - Present        Goals of care, counseling/discussion ICD-10-CM: Z71.89  ICD-9-CM: V65.49  Unknown - Present        Vascular dementia without behavioral disturbance ICD-10-CM: F01.50  ICD-9-CM: 290.40  Unknown - Present        Polyneuropathy associated with underlying disease ([facility] ICD-10-CM: G63  ICD-9-CM: 357.4  8/1/2018 - Present        Dependence on continuous supplemental oxygen ICD-10-CM: Z99.81  ICD-9-CM: V46.2  7/18/2018 - Present        Anemia associated with acute blood loss ICD-10-CM: D62  ICD-9-CM: 285.1  6/19/2018 - Present        Long-term use of immunosuppressant medication ICD-10-CM: Z79.899  ICD-9-CM: V58.69  11/20/2017 - Present        CKD (chronic kidney disease) stage 2, GFR 60-89 ml/min ICD-10-CM: N18.2  ICD-9-CM: 585.2  10/24/2017 - Present        Long term current use of systemic steroids ICD-10-CM: Z79.52  ICD-9-CM: V58.65  10/24/2017 - Present        Seronegative rheumatoid arthritis of multiple sites [facility]) ICD-10-CM: M06.09  ICD-9-CM: 714.0  10/24/2017 - Present        PMR (polymyalgia rheumatica) ([facility] ICD-10-CM: M35.3  ICD-9-CM: 819 10/4/2017 - Present        Lower extremity edema ICD-10-CM: R60.0  ICD-9-CM: 782.3  10/4/2017 - Present        Advance directive on file ICD-10-CM: Z78.9  ICD-9-CM: V49.89  5/10/2017 - Present        Paroxysmal atrial fibrillation ([facility] ICD-10-CM: I48.0  ICD-9-CM: 427.31  3/23/2016 - Present        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 [facility]) ICD-10-CM: I25.111  ICD-9-CM: 414.01, 413.9  10/20/2015 - Present        CHF, stage C ([facility] ICD-10-CM: I50.9  ICD-9-CM: 428.0  10/20/2015 - Present        Lumbar spinal stenosis ICD-10-CM: M48.061  ICD-9-CM: 724.02  10/20/2015 - Present        Osteoarthritis ICD-10-CM: M19.90  ICD-9-CM: 715.90  5/22/2015 - Present        * (Principal)RESOLVED: STEMI (ST elevation myocardial infarction) ([facility] ICD-10-CM: I21.3  ICD-9-CM: 410.90  10/12/2018 - 10/14/2018        RESOLVED: Debility ICD-10-CM: R53.81  ICD-9-CM: 799.3  Unknown - 10/9/2018        RESOLVED: Decreased oral intake ICD-10-CM: R63.8  ICD-9-CM: 783.9  Unknown - 10/9/2018        RESOLVED: Allodynia ICD-10-CM: R20.8  ICD-9-CM: 782.0  Unknown - 10/9/2018        RESOLVED: Dissociative episodes ICD-10-CM: F44.9  ICD-9-CM: 300.15  Unknown - 10/9/2018        RESOLVED: Poor fluid intake ICD-10-CM: R63.8  ICD-9-CM: 783.9  Unknown - 10/9/2018        RESOLVED: Neck pain ICD-10-CM: M54.2  ICD-9-CM: 723.1  Unknown - 10/9/2018        RESOLVED: Depression ICD-10-CM: F32.9  ICD-9-CM: 959  Unknown - 10/9/2018        RESOLVED: Dehydration ICD-10-CM: E86.0  ICD-9-CM: 276.51  10/3/2018 - 10/9/2018        RESOLVED: Metabolic acidosis MARIA T-95-CR: E87.2  ICD-9-CM: 276.2  10/3/2018 - 10/9/2018        RESOLVED: Severe pain ICD-10-CM: R52  ICD-9-CM: 780.96  10/3/2018 - 10/9/2018        RESOLVED: Acute metabolic encephalopathy POV-91-OZ: G93.41  ICD-9-CM: 348.31  10/3/2018 - 10/9/2018        RESOLVED: Long term (current) use of systemic steroids ICD-10-CM: Z79.52  ICD-9-CM: V58.65  10/4/2017 - 11/20/2017              Greater than 30 minutes were spent with the patient on counseling and coordination of care    Signed:   Mallorie Mcdaniels MD  10/14/2018  1:37 PM

## 2018-10-14 NOTE — PROGRESS NOTES
This RN spoke to the patient's son. The patient came in to [facility] with only a pajama top and socks, no pants. I called to ask if son was ok with the staff putting her in briefs, her pajama top, socks and a paper gown to return home by EMS services as she will be covered up by blankets and we are not supposed to send patients home in a hospital gown. The son confirmed he was ok with this plan.

## 2018-10-14 NOTE — PROGRESS NOTES
Problem: Self Care Deficits Care Plan (Adult) Goal: *Acute Goals and Plan of Care (Insert Text) Occupational Therapy Goals Initiated 10/14/2018 1. Patient will perform grooming standing at sink with RW with minimal assistance within 7 day(s). 2.  Patient will perform lower body dressing with moderate assistance  within 7 day(s). 3.  Patient will perform upper body dressing with supervision/set-up within 7 day(s). 4.  Patient will perform toilet transfers with moderate assistance  within 7 day(s). 5.  Patient will perform all aspects of toileting with moderate assistance  within 7 day(s). 6.  Patient will participate in upper extremity therapeutic exercise/activities with supervision/set-up for 5 minutes within 7 day(s). 7.  Patient will utilize energy conservation techniques during functional activities with verbal cues within 7 day(s). Occupational Therapy EVALUATION Patient: Liberty King (16 y.o. female) Date: 10/14/2018 Primary Diagnosis: STEMI (ST elevation myocardial infarction) ([facility] STEMI (ST elevation myocardial infarction) ([facility] Precautions: fall ASSESSMENT : 
Based on the objective data described below, the patient presents with debility, BLE and sacral pain, and impaired activity tolerance resulting in impaired functional mobility and impaired ADL independence s/p readmission for STEMI. Patient received supine in bed, alert and Ox4 (although with some confusion for year, stating the year the \"18th\"), and agreeable to therapy with encouragement. Required additional time and max-A for supine-sit (allows assistance shifting BLE at heels only). Patient able to maintain unsupported sit with occasional L lateral lean due to fatigue/ weakness. Washed face with set-up.   Patient able to perform knee extension/ kicks with each LE but became tearful/ adamantly declined sit-stand, citing plantar fascitis and states she only stands with her shoes, which are not present. Patient reports at baseline, she requires assistance only for bathing and meal preparation, reports she performs sit-stand/ ambulates to/from bathroom with walker, and does not require assistance for toileting and dressing. Currently, patient is significantly below this baseline and requires overall set-up to max-A for UB ADLs, total-A for LB ADLs (inferred), max-A for bed mobility, and unable to stand due to pain. Patient reports she lives with her son, who is able to provide assistance, but also works as  and takes classes. Patient would benefit from SNF if possible. If not, recommend HHOT and 24/7 supervision/ assistance. Patient will benefit from skilled intervention to address the above impairments. Patients rehabilitation potential is considered to be Fair Factors which may influence rehabilitation potential include:  
[]             None noted []             Mental ability/status []             Medical condition []             Home/family situation and support systems []             Safety awareness [x]             Pain tolerance/management 
[]             Other: PLAN : 
Recommendations and Planned Interventions: 
[x]               Self Care Training                  [x]        Therapeutic Activities [x]               Functional Mobility Training    []        Cognitive Retraining 
[x]               Therapeutic Exercises           [x]        Endurance Activities [x]               Balance Training                   []        Neuromuscular Re-Education []               Visual/Perceptual Training     [x]   Home Safety Training 
[x]               Patient Education                 [x]        Family Training/Education []               Other (comment): Frequency/Duration: Patient will be followed by occupational therapy 3 times a week to address goals. Discharge Recommendations: [facility] if possible.   If not, HHOT and 24/7 supervision/ assistance Further Equipment Recommendations for Discharge: none SUBJECTIVE:  
Patient stated I normally walk to the bathroom with my walker.  OBJECTIVE DATA SUMMARY:  
HISTORY:  
Past Medical History:  
Diagnosis Date  Arrhythmia A-FIB  Arthritis  CAD (coronary artery disease) 1989 MI  
 Congestive heart failure (CHF) ([address]  GERD (gastroesophageal reflux disease)  Gluten intolerance  Heart failure ([address] CHF  Polymyalgia ([facility] Past Surgical History:  
Procedure Laterality Date  CARDIAC SURG PROCEDURE UNLIST  1980'S CARDIAC CATH  
[address], 2006, 2013 LUMBAR SPINE  
 HX CHOLECYSTECTOMY  HX HIP REPLACEMENT Right 5/2015 [employer]  HX TONSILLECTOMY  CHILD  
 HX UROLOGICAL BLADDER SUSPENSION - MESH Prior Level of Function/Environment/Context: Patient reports at baseline, she requires assistance only for bathing and meal preparation, reports she performs sit-stand/ ambulates to/from bathroom with walker, and does not require assistance for toileting and dressing. Has a walker, w/c, hospital bed, and BSC EXAMINATION OF PERFORMANCE DEFICITS: 
Cognitive/Behavioral Status: 
Neurologic State: Alert Orientation Level: Oriented X4 (states the year is the \"18th\") Cognition: Follows commands; Appropriate for age attention/concentration Perception: Appears intact Perseveration: No perseveration noted Safety/Judgement: Awareness of environment Skin: reddened sacrum, RN aware Edema:none noted Hearing: Auditory Auditory Impairment: Hard of hearing, bilateral 
Vision/Perceptual:   
    
    
    
  
    
Acuity: Within Defined Limits;Able to read clock/calendar on wall without difficulty; Able to read employee name badge without difficulty Corrective Lenses: Glasses Range of Motion: 
 
AROM: Generally decreased, functional 
  
  
  
  
  
  
  
Strength: 
 
 Strength: Generally decreased, functional 
  
  
  
  
Coordination: 
Coordination: Generally decreased, functional 
Fine Motor Skills-Upper: Left Impaired;Right Impaired Gross Motor Skills-Upper: Left Impaired;Right Impaired Tone & Sensation: 
 
Tone: Normal 
Sensation: Intact Balance: 
Sitting: Impaired Sitting - Static: Good (unsupported) Sitting - Dynamic: Fair (occasional) Functional Mobility and Transfers for ADLs:Bed Mobility: 
Rolling: Maximum assistance Supine to Sit: Maximum assistance; Additional time (assistance scooting BLE and trunk righting) Sit to Supine: Maximum assistance; Additional time (assistance lifting BLE into bed) Transfers: 
 Unable to perform sit-stand due to pain ADL Assessment: 
Feeding: Setup (inferred) Oral Facial Hygiene/Grooming: Setup (washed face sitting EOB) Bathing: Maximum assistance (inferred d/t pain, ROM, activity tolerance) Upper Body Dressing: Maximum assistance (inferred d/t pain, ROM, activity tolerance) Lower Body Dressing: Total assistance (inferred d/t pain, ROM, activity tolerance) Toileting: Total assistance (inferred) ADL Intervention and task modifications: 
  
 
   
 
Cognitive Retraining Safety/Judgement: Awareness of environment Functional Measure: 
Barthel Index: 
 
Bathin Bladder: 5 Bowels: 10 
Groomin Dressin Feedin Mobility: 0 Stairs: 0 Toilet Use: 0 Transfer (Bed to Chair and Back): 0 Total: 30 Barthel and G-code impairment scale: 
Percentage of impairment CH 
0% CI 
1-19% CJ 
20-39% CK 
40-59% CL 
60-79% CM 
80-99% CN 
100% Barthel Score 0-100 100 99-80 79-60 59-40 20-39 1-19 
 0 Barthel Score 0-20 20 17-19 13-16 9-12 5-8 1-4 0 The Barthel ADL Index: Guidelines 1. The index should be used as a record of what a patient does, not as a record of what a patient could do.  
2. The main aim is to establish degree of independence from any help, physical or verbal, however minor and for whatever reason. 3. The need for supervision renders the patient not independent. 4. A patient's performance should be established using the best available evidence. Asking the patient, friends/relatives and nurses are the usual sources, but direct observation and common sense are also important. However direct testing is not needed. 5. Usually the patient's performance over the preceding 24-48 hours is important, but occasionally longer periods will be relevant. 6. Middle categories imply that the patient supplies over 50 per cent of the effort. 7. Use of aids to be independent is allowed. Isacc Khalil., Barthel, DCARMELLA. (2036). Functional evaluation: the Barthel Index. [facility] (14)2. Vladimir Lainez ha GIANNA Mcintyre, Pako Moreno., [facility] [place], [address] (1999). Measuring the change indisability after inpatient rehabilitation; comparison of the responsiveness of the Barthel Index and Functional [place] Measure. Journal of Neurology, Neurosurgery, and Psychiatry, 66(4), 333-601. Ardean Prader, N.J.A, MEKHI Nascimento, & Augusta Bumpers, M.A. (2004.) Assessment of post-stroke quality of life in cost-effectiveness studies: The usefulness of the Barthel Index and the EuroQoL-5D. [facility], 13, 952-55 G codes: In compliance with CMSs Claims Based Outcome Reporting, the following G-code set was chosen for this patient based on their primary functional limitation being treated: The outcome measure chosen to determine the severity of the functional limitation was the Barthel Index with a score of 30/100 which was correlated with the impairment scale. ? Self Care:  
  - CURRENT STATUS: CL - 60%-79% impaired, limited or restricted  - GOAL STATUS: CJ - 20%-39% impaired, limited or restricted  - D/C STATUS:  ---------------To be determined--------------- Occupational Therapy Evaluation Charge Determination History Examination Decision-Making LOW Complexity : Brief history review  MEDIUM Complexity : 3-5 performance deficits relating to physical, cognitive , or psychosocial skils that result in activity limitations and / or participation restrictions MEDIUM Complexity : Patient may present with comorbidities that affect occupational performnce. Miniml to moderate modification of tasks or assistance (eg, physical or verbal ) with assesment(s) is necessary to enable patient to complete evaluation Based on the above components, the patient evaluation is determined to be of the following complexity level: LOW Pain: 
Pain Scale 1: Numeric (0 - 10) Pain Intensity 1: 0 Pain Location 1: Coccyx; Foot;Leg (bilateral foot/leg pain) Pain Orientation 1: Other (comment); Posterior (bilateral foot/leg pain) Pain Description 1: Aching Pain Intervention(s) 1: Repositioned Activity Tolerance:  
Sitting EOB: /92, HR 85, SPO2 94% on 2L After treatment:  
[] Patient left in no apparent distress sitting up in chair 
[x] Patient left in no apparent distress in bed 
[x] Call bell left within reach [x] Nursing notified 
[] Caregiver present 
[] Bed alarm activated COMMUNICATION/EDUCATION:  
The patients plan of care was discussed with: Physical Therapist and Registered Nurse. [x] Home safety education was provided and the patient/caregiver indicated understanding. [x] Patient/family have participated as able in goal setting and plan of care. [x] Patient/family agree to work toward stated goals and plan of care. [] Patient understands intent and goals of therapy, but is neutral about his/her participation. [] Patient is unable to participate in goal setting and plan of care. This patients plan of care is appropriate for delegation to [facility]. Thank you for this referral. 
Stephania Santiago OT Time Calculation: 42 mins

## 2018-10-14 NOTE — PROGRESS NOTES
Problem: Falls - Risk of 
Goal: *Absence of Falls Document Rc Larsen Fall Risk and appropriate interventions in the flowsheet. Outcome: Progressing Towards Goal 
Fall Risk Interventions: 
  
 
Mentation Interventions: More frequent rounding, Reorient patient Medication Interventions: Patient to call before getting OOB, Teach patient to arise slowly Elimination Interventions: Call light in reach

## 2018-10-14 NOTE — PROGRESS NOTES
Patient reported to nursing student pain at level of 9 out of 10. This RN stepped in to assess pain and patient stated she \"really did not feel pain\" and she stated \"my legs and feet hurt a little on the outside\". This RN then asked the patient to rate her pain on a pain scale of 0-10 and the patient stated a \"2\". This RN then repositioned the patients legs on a pillow for comfort.

## 2018-10-14 NOTE — PROGRESS NOTES
Cm received a page from the staff nurse that the patient will need ambulance transportation home. Cm sent the referral via [employer] to [facility] for a 5:30 pm transport time. Packet was placed on patients thin chart. No other needs identified at this time.

## 2018-10-14 NOTE — DISCHARGE INSTRUCTIONS
DISCHARGE SUMMARY from Nurse    PATIENT INSTRUCTIONS:    After general anesthesia or intravenous sedation, for 24 hours or while taking prescription Narcotics:  · Limit your activities  · Do not drive and operate hazardous machinery  · Do not make important personal or business decisions  · Do  not drink alcoholic beverages  · If you have not urinated within 8 hours after discharge, please contact your surgeon on call. Report the following to your surgeon:  · Excessive pain, swelling, redness or odor of or around the surgical area  · Temperature over 100.5  · Nausea and vomiting lasting longer than 4 hours or if unable to take medications  · Any signs of decreased circulation or nerve impairment to extremity: change in color, persistent  numbness, tingling, coldness or increase pain  · Any questions    *  Please give a list of your current medications to your Primary Care Provider. *  Please update this list whenever your medications are discontinued, doses are      changed, or new medications (including over-the-counter products) are added. *  Please carry medication information at all times in case of emergency situations. These are general instructions for a healthy lifestyle:    No smoking/ No tobacco products/ Avoid exposure to second hand smoke  Surgeon General's Warning:  Quitting smoking now greatly reduces serious risk to your health. Obesity, smoking, and sedentary lifestyle greatly increases your risk for illness    A healthy diet, regular physical exercise & weight monitoring are important for maintaining a healthy lifestyle    You may be retaining fluid if you have a history of heart failure or if you experience any of the following symptoms:  Weight gain of 3 pounds or more overnight or 5 pounds in a week, increased swelling in our hands or feet or shortness of breath while lying flat in bed.   Please call your doctor as soon as you notice any of these symptoms; do not wait until your next office visit. Recognize signs and symptoms of STROKE:    F-face looks uneven    A-arms unable to move or move unevenly    S-speech slurred or non-existent    T-time-call 911 as soon as signs and symptoms begin-DO NOT go       Back to bed or wait to see if you get better-TIME IS BRAIN. Warning Signs of HEART ATTACK     Call 911 if you have these symptoms:   Chest discomfort. Most heart attacks involve discomfort in the center of the chest that lasts more than a few minutes, or that goes away and comes back. It can feel like uncomfortable pressure, squeezing, fullness, or pain.  Discomfort in other areas of the upper body. Symptoms can include pain or discomfort in one or both arms, the back, neck, jaw, or stomach.  Shortness of breath with or without chest discomfort.  Other signs may include breaking out in a cold sweat, nausea, or lightheadedness. Don't wait more than five minutes to call 911 - MINUTES MATTER! Fast action can save your life. Calling 911 is almost always the fastest way to get lifesaving treatment. Emergency Medical Services staff can begin treatment when they arrive -- up to an hour sooner than if someone gets to the hospital by car. The discharge information has been reviewed with the patient and caregiver. The patient and caregiver verbalized understanding. Discharge medications reviewed with the patient and caregiver and appropriate educational materials and side effects teaching were provided.   ___________________________________________________________________________________________________________________________________   Discharge Instructions       PATIENT ID: Lida Graff  MRN: [id number]   YOB: 1925    DATE OF ADMISSION: 10/12/2018 12:19 PM    DATE OF DISCHARGE: 10/14/2018    PRIMARY CARE PROVIDER: Sally Che NP     ATTENDING PHYSICIAN: Nirali Magallanes MD  DISCHARGING PROVIDER: Rico Laguna MD    To contact this individual call [phone] and ask the  to page. If unavailable ask to be transferred the Adult Hospitalist Department. DISCHARGE DIAGNOSES heart attack    CONSULTATIONS: IP CONSULT TO PALLIATIVE CARE - PROVIDER    PROCEDURES/SURGERIES: * No surgery found *    PENDING TEST RESULTS:   At the time of discharge the following test results are still pending: None    FOLLOW UP APPOINTMENTS:   Follow-up Information     Follow up With Details Comments Contact Radha Pizarro NP Schedule an appointment as soon as possible for a visit  [address] [place] [address]  [phone]             ADDITIONAL CARE RECOMMENDATIONS:     You came in with chest pain and the Cardiologist evaluated you. They want you to continue to take medicines for heart health, but did not recommend any aggressive interventions at this time. You are now feeling much better and we think it is safe for you to leave the hospital.    DIET: Cardiac Diet    ACTIVITY: Activity as tolerated    DISCHARGE MEDICATIONS:   See Medication Reconciliation Form    · It is important that you take the medication exactly as they are prescribed. · Keep your medication in the bottles provided by the pharmacist and keep a list of the medication names, dosages, and times to be taken in your wallet. · Do not take other medications without consulting your doctor. NOTIFY YOUR PHYSICIAN FOR ANY OF THE FOLLOWING:   Fever over 101 degrees for 24 hours. Chest pain, shortness of breath, fever, chills, nausea, vomiting, diarrhea, change in mentation, falling, weakness, bleeding. Severe pain or pain not relieved by medications. Or, any other signs or symptoms that you may have questions about.     Signed:   Marcus Garcia MD  10/14/2018  11:18 AM   Heart Attack: Care Instructions  Your Care Instructions    A heart attack (myocardial infarction, or MI) occurs when one or more of the coronary arteries, which supply the heart with oxygen-rich blood, is blocked. A blockage usually occurs when plaque inside the artery breaks open and a blood clot forms in the artery. After a heart attack, you may be worried about your future. Over the next several weeks, your heart will start to heal. Though it can be hard to break old habits, you can prevent another heart attack by making some lifestyle changes and by taking medicines. You may use this information for ideas about what to do at home to speed your recovery. Follow-up care is a key part of your treatment and safety. Be sure to make and go to all appointments, and call your doctor if you are having problems. It's also a good idea to know your test results and keep a list of the medicines you take. How can you care for yourself at home? Activity    · Until your doctor says it is okay, do not do strenuous exercise. And do not lift, pull, or push anything heavy. Ask your doctor what types of activities are safe for you.     · If your doctor has not set you up with a cardiac rehabilitation (rehab) program, talk to him or her about whether that is right for you. Cardiac rehab includes supervised exercise. It also includes help with diet and lifestyle changes and emotional support. It may reduce your risk of future heart problems.     · Increase your activities slowly. Take short rest breaks when you get tired.     · If your doctor recommends it, get more exercise. Walking is a good choice. Bit by bit, increase the amount you walk every day. Try for at least 30 minutes on most days of the week. You also may want to swim, bike, or do other activities. Talk with your doctor before you start an exercise program to make sure it is safe for you.     · Ask your doctor when you can drive, go back to work, and do other daily activities again.     · You can have sex as soon as you feel ready for it. Often this means when you can easily walk around or climb stairs. Talk with your doctor if you have any concerns.  If you are taking nitroglycerin, do not take erection-enhancing medicine such as sildenafil (Viagra), tadalafil (Cialis), or vardenafil (Levitra) .    Lifestyle changes    · Do not smoke. Smoking increases your risk of another heart attack. If you need help quitting, talk to your doctor about stop-smoking programs and medicines. These can increase your chances of quitting for good.     · Eat a heart-healthy diet that is low in saturated fat and salt, and is full of fruits, vegetables and whole-grains. Eat at least two servings of fish each week. You may get more details about how to eat healthy. But these tips can help you get started.     · Stay at a healthy weight, or lose weight if you need to. Medicines    · Be safe with medicines. Take your medicines exactly as prescribed. Call your doctor if you think you are having a problem with your medicine. You will get more details on the specific medicines your doctor prescribes. Do not stop taking your medicine unless your doctor tells you to. Not taking your medicine might raise your risk of having another heart attack.     · You may need several medicines to help lower your risk of another heart attack. These include:  ¨ Blood pressure medicines such as angiotensin-converting enzyme (ACE) inhibitors, ARBs (angiotensin II receptor blockers), and beta-blockers. ¨ Cholesterol medicine called statins. ¨ Aspirin and other blood thinners. These prevent blood clots that can cause a heart attack.     · If your doctor has given you nitroglycerin, keep it with you at all times. If you have angina symptoms, such as chest pain or pressure, sit down and rest. Take the first dose of nitroglycerin as directed. If symptoms get worse or are not getting better within 5 minutes, call 911 right away. Stay on the phone.  The emergency  will tell you what to do.     · Do not take any over-the-counter medicines, vitamins, or herbal products without talking to your doctor first.  Staying healthy    · Manage other health conditions such as high blood pressure and diabetes.     · Avoid colds and flu. Get a pneumococcal vaccine shot. If you have had one before, ask your doctor whether you need another dose. Get the flu vaccine every year. If you must be around people with colds or flu, wash your hands often.     · Be sure to tell your doctor about any angina symptoms you have had, even if they went away. Pay attention to your angina symptoms. Know what is typical for you and learn how to control it. Know when to call for help.     · Talk to your family, friends, or a counselor about your feelings. It is normal to feel frightened, angry, hopeless, helpless, and even guilty. Talking openly about bad feelings can help you cope. If you have symptoms of depression, talk to your doctor. When should you call for help? Call 911 anytime you think you may need emergency care. For example, call if:    · You have symptoms of a heart attack. These may include:  ¨ Chest pain or pressure, or a strange feeling in the chest.  ¨ Sweating. ¨ Shortness of breath. ¨ Nausea or vomiting. ¨ Pain, pressure, or a strange feeling in the back, neck, jaw, or upper belly or in one or both shoulders or arms. ¨ Lightheadedness or sudden weakness. ¨ A fast or irregular heartbeat. After you call 911, the  may tell you to chew 1 adult-strength or 2 to 4 low-dose aspirin. Wait for an ambulance.  Do not try to drive yourself.     · You have angina symptoms (such as chest pain or pressure) that do not go away with rest or are not getting better within 5 minutes after you take a dose of nitroglycerin.     · You passed out (lost consciousness).     · You feel like you are having another heart attack.    Call your doctor now or seek immediate medical care if:    · You are having angina symptoms, such as chest pain or pressure, more often than usual, or the symptoms are different or worse than usual.     · You have new or increased shortness of breath.     · You are dizzy or lightheaded, or you feel like you may faint.    Watch closely for changes in your health, and be sure to contact your doctor if you have any problems. Where can you learn more? Go to http://anna-sampson.info/. Enter [id number] in the search box to learn more about \"Heart Attack: Care Instructions. \"  Current as of: December 6, 2017  Content Version: 11.8  © 9123-0579 Sincerely. Care instructions adapted under license by [employer] (which disclaims liability or warranty for this information). If you have questions about a medical condition or this instruction, always ask your healthcare professional. [address] any warranty or liability for your use of this information.

## 2018-10-14 NOTE — PROGRESS NOTES
Problem: Pressure Injury - Risk of 
Goal: *Prevention of pressure injury Document Marcelo Scale and appropriate interventions in the flowsheet. Outcome: Progressing Towards Goal 
Pressure Injury Interventions: 
Sensory Interventions: Discuss PT/OT consult with provider, Float heels, Minimize linen layers, Assess changes in LOC Moisture Interventions: Check for incontinence Q2 hours and as needed, Limit adult briefs, Minimize layers, Absorbent underpads Activity Interventions: Increase time out of bed, Pressure redistribution bed/mattress(bed type), PT/OT evaluation Mobility Interventions: HOB 30 degrees or less, Float heels, Pressure redistribution bed/mattress (bed type) Nutrition Interventions: Document food/fluid/supplement intake Friction and Shear Interventions: Minimize layers, Lift sheet, HOB 30 degrees or less

## 2018-10-15 ENCOUNTER — DOCUMENTATION ONLY (OUTPATIENT)
Dept: FAMILY MEDICINE CLINIC | Age: 83
End: 2018-10-15

## 2018-10-15 NOTE — PATIENT INSTRUCTIONS
Dear Ethan Carroll and Mehreen Anderson, It was a pleasure seeing you at home today with [facility] ([phone]) Your stated goal: To get back to walking again. This is what we talked about: 1. Altered Mental Status/Delirium 
-This could be due to multiple medical issues 
-We discussed that this has occurred 4 times in the past year and is likely to occur again 
-She was treated for depression but we feel she likely has a medical cause for this as well that will be difficult to diagnose 
-I am drawing stat labs today and will call you with results 
-Call us if she is worsening  
-I am concerned she will need another hospitalization 2. Chronic pain secondary to lumbar stenosis 
-She is screaming in pain so will continue 25 mcg Fentanyl patch at this time and I will follow-up in two days 
-We reviewed positioning for comfort in bed 3. Congestive heart failure 
-This is currently stable 
-Let us know if she develops edema in her legs 4. Atrial fibrillation 
-The rate is below 100 which is the goal 
-We switched her metoprolol to regular release so you can crush her medications 5. Difficulty giving medications 
-Everything is crushable now that the metoprolol is changed 
-Crush the medications and put in a small amount of applesauce, pudding, yogurt or oatmeal 
 
6. Health Maintenance -Mickle Buerger will be coming to give your pneumonia vaccine (Prevnar 13, the newer one) and the flu vaccine Health Maintenance Due Topic Date Due  Shingrix Vaccine Age 50> (1 of 2) 07/27/1975  Pneumococcal 65+ Low/Medium Risk (2 of 2 - PPSV23) 12/07/2017  Influenza Age 5 to Adult  08/01/2018  GLAUCOMA SCREENING Q2Y  08/09/2018 7. Advance Care Planning - A Durable Do Not Resusitate (DDNR) is on file - An Advance Directive is already on file. This is what you have shared with us about Advance Care Planning Advance Care Planning 10/12/2018 Patient's Healthcare Decision Maker is: Named in scanned ACP document Primary Decision Maker Name Jolayne Dancer Primary Decision Maker Phone Number [phone] Primary Decision Maker Relationship to Patient Adult child Secondary Decision Maker Name - Secondary Decision Maker Phone Number - Secondary Decision Maker Relationship to Patient -  
Confirm Advance Directive Yes, on file Does the patient have other document types Do Not Resuscitate Someone from the Home Based Primary Care Team will see you again in 2 days. If there are any concerns before that time, such as medication questions, worsening symptoms or a need to see a physician for an urgent or emergent situation; please call [phone]. A physician is also on call after our normal business hours of 8am to 5pm.  
 
In order to serve you better, please allow up to 48 hours for prescription refills to be processed. Certain medications may require more paperwork or a written prescription that you may need to  from the office. We appreciate you letting us know of any refill requests as soon as possible. Sincerely, The [facility] 
MD George Sheets NP Margret Bad, NP Justo Nan, JUAN PABLO Desouza RN Altered Mental Status: Care Instructions Your Care Instructions Altered mental status is a change in how well your brain is working. As a result, you may be confused, be less alert than usual, or act in odd ways. This may include seeing or hearing things that aren't really there (hallucinations). A mental status change has many possible causes. For example, it may be the result of an infection, an imbalance of chemicals in the body, or a chronic disease such as diabetes or COPD. It can also be caused by things such as a head injury, taking certain medicines, or using alcohol or drugs. The doctor may do tests to look for the cause.  These tests may include urine tests, blood tests, and imaging tests such as a CT scan. Sometimes a clear cause isn't found. But tests can help the doctor rule out a serious cause of your symptoms. A change in mental status can be scary. But mental status will often return to normal when the cause is treated. So it is important to get any follow-up testing or treatment the doctor has suggested. The doctor has checked you carefully, but problems can develop later. If you notice any problems or new symptoms, get medical treatment right away. Follow-up care is a key part of your treatment and safety. Be sure to make and go to all appointments, and call your doctor if you are having problems. It's also a good idea to know your test results and keep a list of the medicines you take. How can you care for yourself at home? · Be safe with medicines. Take your medicines exactly as prescribed. Call your doctor if you think you are having a problem with your medicine. · Have another adult stay with you until you are better. This can help keep you safe. Ask that person to watch for signs that your mental status is getting worse. When should you call for help? Call 911 anytime you think you may need emergency care. For example, call if: 
  · You passed out (lost consciousness).  
 Call your doctor now or seek immediate medical care if: 
  · Your mental status is getting worse.  
  · You have new symptoms, such as a fever, chills, or shortness of breath.  
  · You do not feel safe.  
 Watch closely for changes in your health, and be sure to contact your doctor if: 
  · You do not get better as expected. Where can you learn more? Go to http://anna-sampson.info/. Enter K182 in the search box to learn more about \"Altered Mental Status: Care Instructions. \" Current as of: October 9, 2017 Content Version: 11.7 © 6644-0023 [employer].  Care instructions adapted under license by [address] (which disclaims liability or warranty for this information). If you have questions about a medical condition or this instruction, always ask your healthcare professional. [address] any warranty or liability for your use of this information.

## 2018-10-16 ENCOUNTER — HOME VISIT (OUTPATIENT)
Dept: FAMILY MEDICINE CLINIC | Age: 83
End: 2018-10-16

## 2018-10-16 VITALS
RESPIRATION RATE: 18 BRPM | SYSTOLIC BLOOD PRESSURE: 135 MMHG | DIASTOLIC BLOOD PRESSURE: 72 MMHG | OXYGEN SATURATION: 94 % | TEMPERATURE: 98 F | HEART RATE: 78 BPM

## 2018-10-16 DIAGNOSIS — G89.29 CHRONIC MIDLINE LOW BACK PAIN WITHOUT SCIATICA: ICD-10-CM

## 2018-10-16 DIAGNOSIS — M54.50 CHRONIC MIDLINE LOW BACK PAIN WITHOUT SCIATICA: ICD-10-CM

## 2018-10-16 DIAGNOSIS — I25.10 CORONARY ARTERY DISEASE INVOLVING NATIVE CORONARY ARTERY OF NATIVE HEART WITHOUT ANGINA PECTORIS: Primary | ICD-10-CM

## 2018-10-16 DIAGNOSIS — N18.2 CKD (CHRONIC KIDNEY DISEASE) STAGE 2, GFR 60-89 ML/MIN: ICD-10-CM

## 2018-10-16 DIAGNOSIS — R41.0 DELIRIUM: ICD-10-CM

## 2018-10-16 DIAGNOSIS — I50.9 CHF, STAGE C (HCC): ICD-10-CM

## 2018-10-16 DIAGNOSIS — I10 ESSENTIAL HYPERTENSION: ICD-10-CM

## 2018-10-16 DIAGNOSIS — M48.061 SPINAL STENOSIS OF LUMBAR REGION, UNSPECIFIED WHETHER NEUROGENIC CLAUDICATION PRESENT: ICD-10-CM

## 2018-10-16 NOTE — PATIENT INSTRUCTIONS
Dear Radha Ruiz and Claire Bonner, It was a pleasure seeing you at home today with Home Based [facility] ([phone]) Your stated goal: To get back to walking again. This is what we talked about: 1. Altered Mental Status/Delirium 
-This is improved today 
-Continue the remeron and we will follow her 2. Chronic pain secondary to lumbar stenosis 
-Her pain was well controlled on the Fentanyl patch today 
-Continue the ultram as needed for breakthrough pain 
-Let us know if she has constipation 3. Congestive heart failure 
-This is currently stable 
-Let us know if she develops edema in her legs 4. Coronary Artery Disease/heart attack 
-Try to have her wear her oxygen all the time 
-Her saturation was 89% off the oxygen today and this puts a strain on her heart 5. Chronic kidney disease 
-This has been stable 
-Keep her well hydrated 6. Health Maintenance -Linda Baez will be coming to give your pneumonia vaccine (Prevnar 13, the newer one) and the flu vaccine Health Maintenance Due Topic Date Due  Shingrix Vaccine Age 50> (1 of 2) 07/27/1975  Pneumococcal 65+ Low/Medium Risk (2 of 2 - PPSV23) 12/07/2017  Influenza Age 5 to Adult  08/01/2018  GLAUCOMA SCREENING Q2Y  08/09/2018 7. Advance Care Planning - A Durable Do Not Resusitate (DDNR) is on file - An Advance Directive is already on file. This is what you have shared with us about Advance Care Planning Advance Care Planning 10/12/2018 Patient's Healthcare Decision Maker is: Named in scanned ACP document Primary Decision Maker Name Jules Constantino Primary Decision Maker Phone Number [phone] Primary Decision Maker Relationship to Patient Adult child Secondary Decision Maker Name - Secondary Decision Maker Phone Number - Secondary Decision Maker Relationship to Patient -  
Confirm Advance Directive Yes, on file Does the patient have other document types Do Not Resuscitate Someone from the Home Based Primary Care Team will see you again in 1 week. If there are any concerns before that time, such as medication questions, worsening symptoms or a need to see a physician for an urgent or emergent situation; please call [phone]. A physician is also on call after our normal business hours of 8am to 5pm.  
 
In order to serve you better, please allow up to 48 hours for prescription refills to be processed. Certain medications may require more paperwork or a written prescription that you may need to  from the office. We appreciate you letting us know of any refill requests as soon as possible. Sincerely, The Home Based Primary Care Team 
MD John Escobedo NP Lynett Ferri, NP Heriberto Jean, RN Enolia Reas, RN Learning About Coronary Artery Disease (CAD) What is coronary artery disease? Coronary artery disease (CAD) occurs when plaque builds up in the arteries that bring oxygen-rich blood to your heart. Plaque is a fatty substance made of cholesterol, calcium, and other substances in the blood. This process is called hardening of the arteries, or atherosclerosis. What happens when you have coronary artery disease? · Plaque may narrow the coronary arteries. Narrowed arteries cause poor blood flow. This can lead to angina symptoms such as chest pain or discomfort. If blood flow is completely blocked, you could have a heart attack. · You can slow CAD and reduce the risk of future problems by making changes in your lifestyle. These include quitting smoking and eating heart-healthy foods. · Treatments for CAD, along with changes in your lifestyle, can help you live a longer and healthier life. How can you prevent coronary artery disease? · Do not smoke. It may be the best thing you can do to prevent heart disease.  If you need help quitting, talk to your doctor about stop-smoking programs and medicines. These can increase your chances of quitting for good. · Be active. Get at least 30 minutes of exercise on most days of the week. Walking is a good choice. You also may want to do other activities, such as running, swimming, cycling, or playing tennis or team sports. · Eat heart-healthy foods. Eat more fruits and vegetables and less foods that contain saturated and trans fats. Limit alcohol, sodium, and sweets. · Stay at a healthy weight. Lose weight if you need to. · Manage other health problems such as diabetes, high blood pressure, and high cholesterol. · Manage stress. Stress can hurt your heart. To keep stress low, talk about your problems and feelings. Don't keep your feelings hidden. · If you have talked about it with your doctor, take a low-dose aspirin every day. Aspirin can help certain people lower their risk of a heart attack or stroke. But taking aspirin isn't right for everyone, because it can cause serious bleeding. Do not start taking daily aspirin unless your doctor knows about it. How is coronary artery disease treated? · Your doctor will suggest that you make lifestyle changes. For example, your doctor may ask you to eat healthy foods, quit smoking, lose extra weight, and be more active. · You will have to take medicines. · Your doctor may suggest a procedure to open narrowed or blocked arteries. This is called angioplasty. Or your doctor may suggest using healthy blood vessels to create detours around narrowed or blocked arteries. This is called bypass surgery. Follow-up care is a key part of your treatment and safety. Be sure to make and go to all appointments, and call your doctor if you are having problems. It's also a good idea to know your test results and keep a list of the medicines you take. Where can you learn more? Go to http://anna-sampson.info/.  
Enter [phone] in the search box to learn more about \"Learning About Coronary Artery Disease (CAD). \" Current as of: December 6, 2017 Content Version: 11.8 © 2861-6469 Healthwise, Incorporated. Care instructions adapted under license by [employer] (which disclaims liability or warranty for this information). If you have questions about a medical condition or this instruction, always ask your healthcare professional. [place] [address] any warranty or liability for your use of this information.

## 2018-10-16 NOTE — PROGRESS NOTES
Certification approval    Initial certification: 6/6/79    Certification period: 8/3/18 to 10/1/18    CCN: Maritza Tanner    Diagnosis code:I48.0    I have reviewed and agree with plan of care.

## 2018-10-16 NOTE — PROGRESS NOTES
Home Based [address]  
[phone] Date of Current Visit: 10/16/18 SUMMARY:  
80 yr female referred to [facility] by Dr. Becka De Souza. She has a significant hx for seronegative RA, PMR of multiple joints, CAD, old MI, GERD and Afib. Due to this, Ms. Katherine Gaytan is unable to see a community PCP without significant taxing effort. Patient was sent to the hospital at [facility] and admitted through the ER for AMS, dehydration, poor po intake, and severe pain. She was seen by palliative care during her hospitalization and had a psyc consult. No medical cause was found for her symptoms and this is her fourth such episode in a year. She is out of skilled days and had to return home. Remeron was started for depression. Her fentanyl patch was increased to 25 mcg for pain. By discharge she was more alert mentally and he rpain was better controlled. She was also able to maintain hydration status. She r/o'ed for infection and a head CT was wnl. She was seen by surgery for a questionnable pneumoperitoneum on CT but this was r/o'ed. She was readmitted again on 10/12/18 and ruled in for a STEMI. She was discharged after declining cardiac catheterization on 12/14/2018. GOALS OF CARE / TREATMENT PREFERENCES:  
GOALS OF CARE: 
Patient / health care proxy stated goals: To return to her cognitive and functional baseline. TREATMENT PREFERENCES:  
Code Status:  [] Attempt Resuscitation       [x] Do Not Attempt Resuscitation Advance Care Planning: 
Advance Care Planning 10/12/2018 Patient's Healthcare Decision Maker is: Named in scanned ACP document Primary Decision Maker Name Nakul Morales Primary Decision Maker Phone Number [phone] Primary Decision Maker Relationship to Patient Adult child Secondary Decision Maker Name - Secondary Decision Maker Phone Number - Secondary Decision Maker Relationship to Patient -  
Confirm Advance Directive Yes, on file Does the patient have other document types Do Not Resuscitate DIAGNOSES:  
 
  ICD-10-CM ICD-9-CM 1. Coronary artery disease involving native coronary artery of native heart without angina pectoris I25.10 414.01   
2. Delirium R41.0 780.09   
3. Essential hypertension I10 401.9 4. Spinal stenosis of lumbar region, unspecified whether neurogenic claudication present M48.061 724.02   
5. Chronic midline low back pain without sciatica M54.5 724.2 G89.29 338.29   
6. CHF, stage C (HCC) I50.9 428.0 7. CKD (chronic kidney disease) stage 2, GFR 60-89 ml/min N18.2 585.2 PLAN:  
Patient Instructions Dear Yeison Rose and Patricia Bazea, It was a pleasure seeing you at home today with Home Based [facility] ([phone]) Your stated goal: To get back to walking again. This is what we talked about: 1. Altered Mental Status/Delirium 
-This is improved today 
-Continue the remeron and we will follow her 2. Chronic pain secondary to lumbar stenosis 
-Her pain was well controlled on the Fentanyl patch today 
-Continue the ultram as needed for breakthrough pain 
-Let us know if she has constipation 3. Congestive heart failure 
-This is currently stable 
-Let us know if she develops edema in her legs 4. Coronary Artery Disease/heart attack 
-Try to have her wear her oxygen all the time 
-Her saturation was 89% off the oxygen today and this puts a strain on her heart 5. Chronic kidney disease 
-This has been stable 
-Keep her well hydrated 6. Health Maintenance -Evangelina Ho will be coming to give your pneumonia vaccine (Prevnar 13, the newer one) and the flu vaccine Health Maintenance Due Topic Date Due  Shingrix Vaccine Age 50> (1 of 2) 07/27/1975  Pneumococcal 65+ Low/Medium Risk (2 of 2 - PPSV23) 12/07/2017  Influenza Age 5 to Adult  08/01/2018  GLAUCOMA SCREENING Q2Y  08/09/2018 7. Advance Care Planning - A Durable Do Not Resusitate (DDNR) is on file - An Advance Directive is already on file. This is what you have shared with us about Advance Care Planning Advance Care Planning 10/12/2018 Patient's Healthcare Decision Maker is: Named in scanned ACP document Primary Decision Maker Name Alyssa Macias Primary Decision Maker Phone Number [phone] Primary Decision Maker Relationship to Patient Adult child Secondary Decision Maker Name - Secondary Decision Maker Phone Number - Secondary Decision Maker Relationship to Patient -  
Confirm Advance Directive Yes, on file Does the patient have other document types Do Not Resuscitate Someone from the Home Based Primary Care Team will see you again in 1 week. If there are any concerns before that time, such as medication questions, worsening symptoms or a need to see a physician for an urgent or emergent situation; please call [phone]. A physician is also on call after our normal business hours of 8am to 5pm.  
 
In order to serve you better, please allow up to 48 hours for prescription refills to be processed. Certain medications may require more paperwork or a written prescription that you may need to  from the office. We appreciate you letting us know of any refill requests as soon as possible. Sincerely, The Home Based Primary Care Team 
MD Vitaly Marinelli NP Lana Casino, NP Jaymes Golden, RN Illa Mems, RN Learning About Coronary Artery Disease (CAD) What is coronary artery disease? Coronary artery disease (CAD) occurs when plaque builds up in the arteries that bring oxygen-rich blood to your heart. Plaque is a fatty substance made of cholesterol, calcium, and other substances in the blood. This process is called hardening of the arteries, or atherosclerosis. What happens when you have coronary artery disease? · Plaque may narrow the coronary arteries.  Narrowed arteries cause poor blood flow. This can lead to angina symptoms such as chest pain or discomfort. If blood flow is completely blocked, you could have a heart attack. · You can slow CAD and reduce the risk of future problems by making changes in your lifestyle. These include quitting smoking and eating heart-healthy foods. · Treatments for CAD, along with changes in your lifestyle, can help you live a longer and healthier life. How can you prevent coronary artery disease? · Do not smoke. It may be the best thing you can do to prevent heart disease. If you need help quitting, talk to your doctor about stop-smoking programs and medicines. These can increase your chances of quitting for good. · Be active. Get at least 30 minutes of exercise on most days of the week. Walking is a good choice. You also may want to do other activities, such as running, swimming, cycling, or playing tennis or team sports. · Eat heart-healthy foods. Eat more fruits and vegetables and less foods that contain saturated and trans fats. Limit alcohol, sodium, and sweets. · Stay at a healthy weight. Lose weight if you need to. · Manage other health problems such as diabetes, high blood pressure, and high cholesterol. · Manage stress. Stress can hurt your heart. To keep stress low, talk about your problems and feelings. Don't keep your feelings hidden. · If you have talked about it with your doctor, take a low-dose aspirin every day. Aspirin can help certain people lower their risk of a heart attack or stroke. But taking aspirin isn't right for everyone, because it can cause serious bleeding. Do not start taking daily aspirin unless your doctor knows about it. How is coronary artery disease treated? · Your doctor will suggest that you make lifestyle changes. For example, your doctor may ask you to eat healthy foods, quit smoking, lose extra weight, and be more active. · You will have to take medicines. · Your doctor may suggest a procedure to open narrowed or blocked arteries. This is called angioplasty. Or your doctor may suggest using healthy blood vessels to create detours around narrowed or blocked arteries. This is called bypass surgery. Follow-up care is a key part of your treatment and safety. Be sure to make and go to all appointments, and call your doctor if you are having problems. It's also a good idea to know your test results and keep a list of the medicines you take. Where can you learn more? Go to http://anna-sampson.info/. Enter ([phone] in the search box to learn more about \"Learning About Coronary Artery Disease (CAD). \" Current as of: December 6, 2017 Content Version: 11.8 © 5132-0906 [employer]. Care instructions adapted under license by [employer] (which disclaims liability or warranty for this information). If you have questions about a medical condition or this instruction, always ask your healthcare professional. [place] [address] any warranty or liability for your use of this information. PRESCRIPTIONS GIVEN:  
 
No orders of the defined types were placed in this encounter. HISTORY:  
Please see RN note from this current visit; history taken together in presence of patient/family in the home. CHIEF COMPLAINT:  
Chief Complaint Patient presents with  Coronary Artery Disease  
  released from hospital two days ago after MI  
 Altered mental status  Extremity Weakness HPI/SUBJECTIVE: The patient is: [x] Verbal / [] Nonverbal  
 
Mrs. Bette Cancino was released from [facility] on 12/14/2018 after being admitted on 12/12/18 with a STEMI. Patient elected not to have a cardiac catheterization. There were no medication changes. She has improved since this second discharge within two weeks and is now eating and drinking well, drawing, sitting up in her chair, and using the [facility].   She still requires a fair amount of care from Jairo Nunez who could not be present at this visit. The patient denies SOB, CP, and reports back pain is 2/10 on the Fentanyl patch. REVIEW OF SYSTEMS:  
 
The following systems were [] reviewed / [x] unable to be reviewed Systems: constitutional, ears/nose/mouth/throat, respiratory, gastrointestinal, genitourinary, musculoskeletal, integumentary, neurologic, psychiatric, endocrine. Positive findings noted: patient can give no ROS today. FUNCTIONAL ASSESSMENT:  
 
Palliative Performance Scale (PPS): 20% PSYCHOSOCIAL/SPIRITUAL SCREENING:  
  
Any spiritual / Sabianist concerns: [] Yes /  [x] No Confucianism, no longer goes to Judaism because can't get out of home; Judaism does not come to her, may be willing to reconnect Caregiver Burnout: [] Yes /  [x] No /  [] No Caregiver Present PHYSICAL EXAM:  
 
Wt Readings from Last 3 Encounters:  
10/14/18 128 lb 1.4 oz (58.1 kg) 10/09/18 128 lb 12.8 oz (58.4 kg) 06/05/18 120 lb 9.6 oz (54.7 kg) Blood pressure 135/72, pulse 78, temperature 98 °F (36.7 °C), temperature source Oral, resp. rate 18, SpO2 94 %. Last bowel movement:  Two days ago Constitutional:  Frail, elderly female sitting in W/C reading and watching TV, NAD Eyes: pupils equal, anicteric, conjunctiva are pink. ENMT: no nasal discharge, moist mucous membranes and lips. Nasal mucosa pink without discharge Cardiovascular: regularly, irregular rhythm, distal pulses intact, edema +1 LE's with turbigrip in place Respiratory: breathing not labored, symmetric, CTA A&P Gastrointestinal: soft, +bowel sounds. No TTP HSM, mass R, G or R Musculoskeletal: no deformity, no tenderness to palpation Skin: warm, dry Neurologic: oriented X 2, no focal deficits, LE's with 2/5 strength, UE's 3/5 Psychiatric: No hallucinations, confused but improved, no agitation or anxiety  HISTORY:  
 
Past Medical and Surgical History reviewed in [facility] on date of initial visit Patient Active Problem List  
Diagnosis Code  Osteoarthritis M19.90  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 ([facility]) I25.111  
 CHF, stage C ([facility]) I50.9  Lumbar spinal stenosis M48.061  
 Paroxysmal atrial fibrillation ([facility]) I48.0  Advance directive on file K32.1  PMR (polymyalgia rheumatica) ([facility]) M35.3  Lower extremity edema R60.0  CKD (chronic kidney disease) stage 2, GFR 60-89 ml/min N18.2  Long term current use of systemic steroids Z79.52  Seronegative rheumatoid arthritis of multiple sites ([facility] M06.09  
 Long-term use of immunosuppressant medication Z79.899  Anemia associated with acute blood loss D62  Dependence on continuous supplemental oxygen Z99.81  
 Polyneuropathy associated with underlying disease ([facility] G63  Vascular dementia without behavioral disturbance F01.50  Acute respiratory distress R06.03  
 Chronic pain syndrome G89.4  Dementia without behavioral disturbance F03.90  
 Goals of care, counseling/discussion Z71.89 Family History Problem Relation Age of Onset [facility] [place] Other Mother Intestinal obstruction  Cancer Father  Stroke Father  Heart Attack Brother  Cancer Brother  Cancer Brother  Anesth Problems Neg Hx History reviewed, no pertinent family history. Social History Substance Use Topics  Smoking status: Never Smoker  Smokeless tobacco: Never Used  Alcohol use No  
 
Allergies Allergen Reactions  Phenergan [Promethazine] Other (comments) \"loose my wits i go crazy\"  Nsaids (Non-Steroidal Anti-Inflammatory Drug) Nausea and Vomiting  Adhesive Tape-Silicones Other (comments) BLISTERS  Aspirin Other (comments) Per EMS patient is allergic, patient agrees but is confused.  Atenolol Nausea and Vomiting  Cymbalta [Duloxetine] Other (comments) CONFUSION 
  
 Digoxin Nausea and Vomiting  Gluten Other (comments) GLUTEN INTOLERANCE  
 Macrobid [Nitrofurantoin Monohyd/M-Cryst] Nausea and Vomiting  Novocain [Procaine] Other (comments) Paralysis  Sulfa (Sulfonamide Antibiotics) Nausea and Vomiting  Codeine Other (comments) Unable to swallow Current Outpatient Prescriptions Medication Sig  
 aspirin 81 mg chewable tablet Take 1 Tab by mouth daily.  atorvastatin (LIPITOR) 40 mg tablet Take 1 Tab by mouth daily.  gabapentin (NEURONTIN) 300 mg capsule Take 600 mg by mouth two (2) times a day.  predniSONE (DELTASONE) 10 mg tablet Take 10 mg by mouth every evening.  metoprolol tartrate (LOPRESSOR) 50 mg tablet Take 1.5 Tabs by mouth two (2) times a day.  mirtazapine (REMERON) 15 mg tablet Take 1 Tab by mouth nightly. Indications: major depressive disorder  Bifidobacterium Infantis (ALIGN) 4 mg cap Take 4 mg by mouth daily.  fentaNYL (DURAGESIC) 25 mcg/hr PATCH 1 Patch by TransDERmal route every seventy-two (72) hours.  polyethylene glycol (MIRALAX) 17 gram/dose powder Take 17 g by mouth daily as needed.  topiramate (TOPAMAX) 25 mg tablet Take 1 Tab by mouth daily (with breakfast).  senna-docusate (SENNA PLUS) 8.6-50 mg per tablet Take 1 Tab by mouth daily.  cyanocobalamin 1,000 mcg tablet Take 1,000 mcg by mouth daily.  cholecalciferol (VITAMIN D3) 1,000 unit tablet Take 1,000 Units by mouth daily.  esomeprazole (NEXIUM) 40 mg capsule Take 40 mg by mouth Daily (before breakfast).  traMADol (ULTRAM) 50 mg tablet Take 50 mg by mouth two (2) times daily as needed for Pain.  ondansetron (ZOFRAN ODT) 8 mg disintegrating tablet Take 1 Tab by mouth every eight (8) hours.  nitroglycerin (NITROSTAT) 0.4 mg SL tablet 1 Tab by SubLINGual route every five (5) minutes as needed for Chest Pain. No current facility-administered medications for this visit. LAB DATA REVIEWED:  
 
Lab Results Component Value Date/Time Hemoglobin A1c 5.6 09/20/2018 11:59 AM  
 
No results found for: [place], MCA2, MCA3, MCAU, MCAU2, MCALPOCT Lab Results Component Value Date/Time TSH 1.560 11/20/2017 02:40 PM  
 TSH 0.86 05/25/2015 04:32 AM  
 
Lab Results Component Value Date/Time VITAMIN D, 25-HYDROXY 43.1 10/04/2017 02:52 PM  
   
 
Lab Results Component Value Date/Time WBC 5.6 10/14/2018 02:27 AM  
 HGB 9.0 (L) 10/14/2018 02:27 AM  
 PLATELET 950 74/82/8981 02:27 AM  
 
Lab Results Component Value Date/Time Sodium 140 10/14/2018 02:27 AM  
 Potassium 4.8 10/14/2018 02:27 AM  
 Chloride 110 (H) 10/14/2018 02:27 AM  
 CO2 22 10/14/2018 02:27 AM  
 BUN 28 (H) 10/14/2018 02:27 AM  
 Creatinine 0.86 10/14/2018 02:27 AM  
 Calcium 8.4 (L) 10/14/2018 02:27 AM  
 Magnesium 1.7 10/03/2018 04:58 AM  
 Phosphorus 3.1 10/03/2018 04:58 AM  
  
Lab Results Component Value Date/Time AST (SGOT) 47 (H) 10/12/2018 01:32 PM  
 Alk. phosphatase 132 (H) 10/12/2018 01:32 PM  
 Protein, total 6.2 (L) 10/12/2018 01:32 PM  
 Albumin 2.7 (L) 10/12/2018 01:32 PM  
 Globulin 3.5 10/12/2018 01:32 PM  
 
No results found for: IRON, FE, TIBC, IBCT, PSAT, FERR Total time:  45  minutes Counseling / coordination time: 20 min on care coordination with cardiology, called son and discussed visit 
> 50% counseling / coordination?: No

## 2018-10-18 ENCOUNTER — TELEPHONE (OUTPATIENT)
Dept: FAMILY MEDICINE CLINIC | Age: 83
End: 2018-10-18

## 2018-10-18 ENCOUNTER — DOCUMENTATION ONLY (OUTPATIENT)
Dept: FAMILY MEDICINE CLINIC | Age: 83
End: 2018-10-18

## 2018-10-19 NOTE — PROGRESS NOTES
Home Based Primary Care Social Work Note- Initial Assessment    Reason for Assessment: Initial SW visit, introduction and psychosocial assessment    Sources of Information: Pt, son eLvi Jacome, zuleima Vasquez    Mental Status: Alert and oriented    Relationship Status:     Living Circumstances: Pt lives in home where she resides with son Levi Jacome and zuleima Vasquez (Zacherys wifes son)    Support System: Pt has close relationship to her son Levi Jacome. Pt had 2 other children. 1 child passed away from suicide, another child is not involved in pts life, and it appeared that pt did not wish to discuss this further with SW today. She vaguely said some things happened but did not elaborate. Pt socially isolated, stated she does not leave the home. She enjoys reading magazines, watching the news, sewing and stitching. Financial/Legal Concerns: Social Security income. No financial concerns noted at this time. Medicare and Medicare supplement plan. Food Security: Pt has reliable food source via son, no issues or concerns at this time    Narrative: Initial SW visit to the home. SW recd referral from CRISTIN Lyn due to potential caregiver burden as pts care needs have increased. SW met with son Levi Jacome and pt in the home today. Pt was alert and oriented, sitting in wheelchair in her bedroom. Pt was open to visit and conversation with SW today. 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 She has a Masters degree in [employer]. She appeared to enjoy sharing stories of her education and career with this SW. She said that she was in graduate school while her son Levi Jacome was in undergraduate school, at the same college and in the same department. Pt expressed feeling bored.  She said she has always been a very busy person, raising children while working and also going to school. She has a hard time sitting around the house and not having something to do. She still tries to do some craft projects, but it takes her more time than she is used to. She showed this SW a porcelain doll she was working on. She said when she was growing up with 3 brothers, she never got the [address] that she always wanted. So, she decided, as an adult, that she would make herself the doll that she never got as a child. She is now in the process of finishing the doll clothing. Son stated that mother is doing much better than previous weeks. He feels comfortable and confident with the caregiving responsibilities. He takes a class on Mondays and Wednesdays, and he said when he leaves, his Fish [employer]son is home with pt. He also said that at this time, he feels comfortable leaving her by herself for short periods of time. Pt has a Life Alert button she wears on a necklace chain. SW expressed safety concerns as there is no ramp in/out of the home. There are about 3 steps to get in and out of the front door, and steps to go out the back door. He said that he is going to have a ramp put on the back of the house. At this time, pt is unable to leave the home safely in her wheelchair due to no ramp. Plan: SW will follow up with pt/son monthly and reassess psychosocial needs, caregiver risk for burnout. [facility] will continue to collaborate with [address] [facility] team and provide additional support and resources as appropriate.      Sheri Castellanos, DOUG, TESSW    Home Based Primary Care  [phone]

## 2018-10-23 ENCOUNTER — HOME VISIT (OUTPATIENT)
Dept: FAMILY MEDICINE CLINIC | Age: 83
End: 2018-10-23

## 2018-10-23 VITALS
HEART RATE: 70 BPM | SYSTOLIC BLOOD PRESSURE: 112 MMHG | OXYGEN SATURATION: 93 % | RESPIRATION RATE: 18 BRPM | DIASTOLIC BLOOD PRESSURE: 72 MMHG | TEMPERATURE: 98.1 F

## 2018-10-23 DIAGNOSIS — I50.9 CHF, STAGE C (HCC): ICD-10-CM

## 2018-10-23 DIAGNOSIS — I10 ESSENTIAL HYPERTENSION: ICD-10-CM

## 2018-10-23 DIAGNOSIS — I25.10 CORONARY ARTERY DISEASE INVOLVING NATIVE CORONARY ARTERY OF NATIVE HEART WITHOUT ANGINA PECTORIS: ICD-10-CM

## 2018-10-23 DIAGNOSIS — R53.1 WEAKNESS ACQUIRED IN ICU: Primary | ICD-10-CM

## 2018-10-23 NOTE — PROGRESS NOTES
Home Based [address] [facility]  
[phone] Date of Current Visit: 10/16/18 SUMMARY:  
80 yr female referred to [facility] by Dr. Gloria Melgoza. She has a significant hx for seronegative RA, PMR of multiple joints, CAD, old MI, GERD and Afib. Due to this, Ms. Tania Correia is unable to see a community PCP without significant taxing effort. Patient was sent to the hospital at [facility] and admitted through the ER for AMS, dehydration, poor po intake, and severe pain. She was seen by palliative care during her hospitalization and had a psyc consult. No medical cause was found for her symptoms and this is her fourth such episode in a year. She is out of skilled days and had to return home. Remeron was started for depression. Her fentanyl patch was increased to 25 mcg for pain. By discharge she was more alert mentally and he rpain was better controlled. She was also able to maintain hydration status. She r/o'ed for infection and a head CT was wnl. She was seen by surgery for a questionnable pneumoperitoneum on CT but this was r/o'ed. She was readmitted again on 10/12/18 and ruled in for a STEMI. She was discharged after declining cardiac catheterization on 12/14/2018. GOALS OF CARE / TREATMENT PREFERENCES:  
GOALS OF CARE: 
Patient / health care proxy stated goals: To return to her cognitive and functional baseline. TREATMENT PREFERENCES:  
Code Status:  [] Attempt Resuscitation       [x] Do Not Attempt Resuscitation Advance Care Planning: 
Advance Care Planning 10/12/2018 Patient's Healthcare Decision Maker is: Named in scanned ACP document Primary Decision Maker Name Jaz Triana Primary Decision Maker Phone Number [phone] Primary Decision Maker Relationship to Patient Adult child Secondary Decision Maker Name - Secondary Decision Maker Phone Number - Secondary Decision Maker Relationship to Patient -  
Confirm Advance Directive Yes, on file Does the patient have other document types Do Not Resuscitate DIAGNOSES:  
 
  ICD-10-CM ICD-9-CM 1. Weakness acquired in ICU R53.1 780.79 [facility] 2. Coronary artery disease involving native coronary artery of native heart without angina pectoris I25.10 414.01   
3. Essential hypertension I10 401.9 4. CHF, stage C (HCC) I50.9 428.0 PLAN:  
Patient Instructions Dear Dipak Ramirez and Jude Barclay, It was a pleasure seeing you at home today with Home Based [facility] ([phone]) Your stated goal: To get back to walking again. This is what we talked about: 1. Altered Mental Status/Delirium 
-This is markedly improved today 
-Continue the remeron and we will follow her 2. Weakness after hospitalization/illness 
-An order physical and occupational therapy was placed with home health 
-Keyshawn Chavez will provide the services 3. Congestive heart failure 
-This is currently stable 
-Let us know if she develops edema in her legs 4. Coronary Artery Disease/heart attack 
-Try to have her wear her oxygen all the time 
-You have opted not to have an invasive cardiac work-up, so we will manage her symptoms medically 5. Chronic kidney disease 
-This has been stable 
-Keep her well hydrated 6. [facility] (Prevnar 13, the newer one) and the flu vaccine Health Maintenance Due Topic Date Due  Shingrix Vaccine Age 50> (1 of 2) 07/27/1975  Pneumococcal 65+ Low/Medium Risk (2 of 2 - PPSV23) 12/07/2017  Influenza Age 5 to Adult  08/01/2018  GLAUCOMA SCREENING Q2Y  08/09/2018 7. Advance Care Planning - A Durable Do Not Resusitate (DDNR) is on file - An Advance Directive is already on file. This is what you have shared with us about Advance Care Planning Advance Care Planning 10/12/2018 Patient's Healthcare Decision Maker is: Named in scanned ACP document Primary Decision Maker Name Tracey Weiner Primary Decision Maker Phone Number [phone] Primary Decision Maker Relationship to Patient Adult child Secondary Decision Maker Name - Secondary Decision Maker Phone Number - Secondary Decision Maker Relationship to Patient -  
Confirm Advance Directive Yes, on file Does the patient have other document types Do Not Resuscitate Someone from the Home Based Primary Care Team will see you again in 2 weeks. If there are any concerns before that time, such as medication questions, worsening symptoms or a need to see a physician for an urgent or emergent situation; please call [phone]. A physician is also on call after our normal business hours of 8am to 5pm.  
 
In order to serve you better, please allow up to 48 hours for prescription refills to be processed. Certain medications may require more paperwork or a written prescription that you may need to  from the office. We appreciate you letting us know of any refill requests as soon as possible. Sincerely, The Home Based Primary Care Team 
Christain Kass, MD Sherry Ahumada, NP Celso Banks, NP Yvette Furbish, RN Merry Clink, RN Learning About Coronary Artery Disease (CAD) What is coronary artery disease? Coronary artery disease (CAD) occurs when plaque builds up in the arteries that bring oxygen-rich blood to your heart. Plaque is a fatty substance made of cholesterol, calcium, and other substances in the blood. This process is called hardening of the arteries, or atherosclerosis. What happens when you have coronary artery disease? · Plaque may narrow the coronary arteries. Narrowed arteries cause poor blood flow. This can lead to angina symptoms such as chest pain or discomfort. If blood flow is completely blocked, you could have a heart attack.  
· You can slow CAD and reduce the risk of future problems by making changes in your lifestyle. These include quitting smoking and eating heart-healthy foods. · Treatments for CAD, along with changes in your lifestyle, can help you live a longer and healthier life. How can you prevent coronary artery disease? · Do not smoke. It may be the best thing you can do to prevent heart disease. If you need help quitting, talk to your doctor about stop-smoking programs and medicines. These can increase your chances of quitting for good. · Be active. Get at least 30 minutes of exercise on most days of the week. Walking is a good choice. You also may want to do other activities, such as running, swimming, cycling, or playing tennis or team sports. · Eat heart-healthy foods. Eat more fruits and vegetables and less foods that contain saturated and trans fats. Limit alcohol, sodium, and sweets. · Stay at a healthy weight. Lose weight if you need to. · Manage other health problems such as diabetes, high blood pressure, and high cholesterol. · Manage stress. Stress can hurt your heart. To keep stress low, talk about your problems and feelings. Don't keep your feelings hidden. · If you have talked about it with your doctor, take a low-dose aspirin every day. Aspirin can help certain people lower their risk of a heart attack or stroke. But taking aspirin isn't right for everyone, because it can cause serious bleeding. Do not start taking daily aspirin unless your doctor knows about it. How is coronary artery disease treated? · Your doctor will suggest that you make lifestyle changes. For example, your doctor may ask you to eat healthy foods, quit smoking, lose extra weight, and be more active. · You will have to take medicines. · Your doctor may suggest a procedure to open narrowed or blocked arteries. This is called angioplasty. Or your doctor may suggest using healthy blood vessels to create detours around narrowed or blocked arteries. This is called bypass surgery. Follow-up care is a key part of your treatment and safety. Be sure to make and go to all appointments, and call your doctor if you are having problems. It's also a good idea to know your test results and keep a list of the medicines you take. Where can you learn more? Go to http://anna-sampson.info/. Enter ([phone] in the search box to learn more about \"Learning About Coronary Artery Disease (CAD). \" Current as of: December 6, 2017 Content Version: 11.8 © 1266-0505 [employer]. Care instructions adapted under license by [employer] (which disclaims liability or warranty for this information). If you have questions about a medical condition or this instruction, always ask your healthcare professional. [place] [address] any warranty or liability for your use of this information. PRESCRIPTIONS GIVEN:  
 
No orders of the defined types were placed in this encounter. HISTORY:  
Please see RN note from this current visit; history taken together in presence of patient/family in the home. CHIEF COMPLAINT:  
Chief Complaint Patient presents with  Coronary Artery Disease  Extremity Weakness HPI/SUBJECTIVE: The patient is: [x] Verbal / [] Nonverbal  
 
Mrs. Joby Norris was released from [facility] on 12/14/2018 after being admitted on 12/12/18 with a STEMI. Patient elected not to have a cardiac catheterization. There were no medication changes. She has improved since this second discharge within two weeks and is now eating and drinking well, drawing, sitting up in her chair, and using the [facility]. She still requires a fair amount of care from Ammy who could not be present at this visit. The patient denies SOB, CP, and reports back pain is 2/10 on the Fentanyl patch. REVIEW OF SYSTEMS:  
 
The following systems were [x] reviewed / [x] unable to be reviewed Systems: constitutional, ears/nose/mouth/throat, respiratory, gastrointestinal, genitourinary, musculoskeletal, integumentary, neurologic, psychiatric, endocrine. Positive findings noted: patient denies pain or sob. Rest of ROS negative FUNCTIONAL ASSESSMENT:  
 
Palliative Performance Scale (PPS): 40% PSYCHOSOCIAL/SPIRITUAL SCREENING:  
  
Any spiritual / Mu-ism concerns: [] Yes /  [x] No Jainism, no longer goes to [employer] because can't get out of home; [employer] does not come to her, may be willing to reconnect Caregiver Burnout: [] Yes /  [x] No /  [] No Caregiver Present PHYSICAL EXAM:  
 
Wt Readings from Last 3 Encounters:  
10/14/18 128 lb 1.4 oz (58.1 kg) 10/09/18 128 lb 12.8 oz (58.4 kg) 06/05/18 120 lb 9.6 oz (54.7 kg) Blood pressure 112/72, pulse 70, temperature 98.1 °F (36.7 °C), temperature source Oral, resp. rate 18, SpO2 93 %. Last bowel movement:  one days ago Constitutional:  Frail, elderly female asleep in bed, answers to name call. NAD Eyes: pupils equal, anicteric, conjunctiva are pink. ENMT: no nasal discharge, moist mucous membranes and lips. Nasal mucosa pink without discharge Cardiovascular: regularly, irregular rhythm, distal pulses intact, edema +1 LE's with turbigrip in place Respiratory: breathing not labored, symmetric, CTA A&P Gastrointestinal: soft, +bowel sounds. No TTP HSM, mass R, G or R Musculoskeletal: no deformity, no tenderness to palpation Skin: warm, dry Neurologic: oriented X 2, no focal deficits, LE's with 2/5 strength, UE's 3/5 Psychiatric: No hallucinations, confused but improved, no agitation or anxiety HISTORY:  
 
Past Medical and Surgical History reviewed in [facility] on date of initial visit Patient Active Problem List  
Diagnosis Code  Osteoarthritis M19.90  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 ([facility]) I25.111  
 CHF, stage C ([facility]) I50.9  Lumbar spinal stenosis M48.061  
  Paroxysmal atrial fibrillation ([facility]) I48.0  Advance directive on file V75.3  PMR (polymyalgia rheumatica) ([facility]) M35.3  Lower extremity edema R60.0  CKD (chronic kidney disease) stage 2, GFR 60-89 ml/min N18.2  Long term current use of systemic steroids Z79.52  Seronegative rheumatoid arthritis of multiple sites ([facility] M06.09  
 Long-term use of immunosuppressant medication Z79.899  Anemia associated with acute blood loss D62  Dependence on continuous supplemental oxygen Z99.81  
 Polyneuropathy associated with underlying disease ([facility] G63  Vascular dementia without behavioral disturbance F01.50  Acute respiratory distress R06.03  
 Chronic pain syndrome G89.4  Dementia without behavioral disturbance F03.90  
 Goals of care, counseling/discussion Z71.89 Family History Problem Relation Age of Onset [facility] Other Mother Intestinal obstruction  Cancer Father  Stroke Father  Heart Attack Brother  Cancer Brother  Cancer Brother  Anesth Problems Neg Hx History reviewed, no pertinent family history. Social History Tobacco Use  Smoking status: Never Smoker  Smokeless tobacco: Never Used Substance Use Topics  Alcohol use: No  
  Alcohol/week: 0.0 oz Allergies Allergen Reactions  Phenergan [Promethazine] Other (comments) \"loose my wits i go crazy\"  Nsaids (Non-Steroidal Anti-Inflammatory Drug) Nausea and Vomiting  Adhesive Tape-Silicones Other (comments) BLISTERS  Aspirin Other (comments) Per EMS patient is allergic, patient agrees but is confused.  Atenolol Nausea and Vomiting  Cymbalta [Duloxetine] Other (comments) CONFUSION 
  
 Digoxin Nausea and Vomiting  Gluten Other (comments) GLUTEN INTOLERANCE  
 Macrobid [Nitrofurantoin Monohyd/M-Cryst] Nausea and Vomiting  Novocain [Procaine] Other (comments) Paralysis  Sulfa (Sulfonamide Antibiotics) Nausea and Vomiting  Codeine Other (comments) Unable to swallow Current Outpatient Medications Medication Sig  
 aspirin 81 mg chewable tablet Take 1 Tab by mouth daily.  atorvastatin (LIPITOR) 40 mg tablet Take 1 Tab by mouth daily.  gabapentin (NEURONTIN) 300 mg capsule Take 600 mg by mouth two (2) times a day.  predniSONE (DELTASONE) 10 mg tablet Take 10 mg by mouth every evening.  metoprolol tartrate (LOPRESSOR) 50 mg tablet Take 1.5 Tabs by mouth two (2) times a day.  mirtazapine (REMERON) 15 mg tablet Take 1 Tab by mouth nightly. Indications: major depressive disorder  Bifidobacterium Infantis (ALIGN) 4 mg cap Take 4 mg by mouth daily.  fentaNYL (DURAGESIC) 25 mcg/hr PATCH 1 Patch by TransDERmal route every seventy-two (72) hours.  traMADol (ULTRAM) 50 mg tablet Take 50 mg by mouth two (2) times daily as needed for Pain.  polyethylene glycol (MIRALAX) 17 gram/dose powder Take 17 g by mouth daily as needed.  topiramate (TOPAMAX) 25 mg tablet Take 1 Tab by mouth daily (with breakfast).  senna-docusate (SENNA PLUS) 8.6-50 mg per tablet Take 1 Tab by mouth daily.  cyanocobalamin 1,000 mcg tablet Take 1,000 mcg by mouth daily.  nitroglycerin (NITROSTAT) 0.4 mg SL tablet 1 Tab by SubLINGual route every five (5) minutes as needed for Chest Pain.  cholecalciferol (VITAMIN D3) 1,000 unit tablet Take 1,000 Units by mouth daily.  esomeprazole (NEXIUM) 40 mg capsule Take 40 mg by mouth Daily (before breakfast).  ondansetron (ZOFRAN ODT) 8 mg disintegrating tablet Take 1 Tab by mouth every eight (8) hours. No current facility-administered medications for this visit. LAB DATA REVIEWED:  
 
Lab Results Component Value Date/Time Hemoglobin A1c 5.6 09/20/2018 11:59 AM  
 
No results found for: Malu Teixeira, MCA2, MCA3, MCAU, MCAU2, MCALPOCT Lab Results Component Value Date/Time TSH 1.560 11/20/2017 02:40 PM  
 TSH 0.86 05/25/2015 04:32 AM  
 
Lab Results Component Value Date/Time VITAMIN D, 25-HYDROXY 43.1 10/04/2017 02:52 PM  
   
 
Lab Results Component Value Date/Time WBC 5.6 10/14/2018 02:27 AM  
 HGB 9.0 (L) 10/14/2018 02:27 AM  
 PLATELET 231 45/97/3280 02:27 AM  
 
Lab Results Component Value Date/Time Sodium 140 10/14/2018 02:27 AM  
 Potassium 4.8 10/14/2018 02:27 AM  
 Chloride 110 (H) 10/14/2018 02:27 AM  
 CO2 22 10/14/2018 02:27 AM  
 BUN 28 (H) 10/14/2018 02:27 AM  
 Creatinine 0.86 10/14/2018 02:27 AM  
 Calcium 8.4 (L) 10/14/2018 02:27 AM  
 Magnesium 1.7 10/03/2018 04:58 AM  
 Phosphorus 3.1 10/03/2018 04:58 AM  
  
Lab Results Component Value Date/Time AST (SGOT) 47 (H) 10/12/2018 01:32 PM  
 Alk. phosphatase 132 (H) 10/12/2018 01:32 PM  
 Protein, total 6.2 (L) 10/12/2018 01:32 PM  
 Albumin 2.7 (L) 10/12/2018 01:32 PM  
 Globulin 3.5 10/12/2018 01:32 PM  
 
No results found for: IRON, FE, TIBC, IBCT, PSAT, FERR Total time:  30  minutes Counseling / coordination time: 15 min on home health referral, CAD education 
> 50% counseling / coordination?: No

## 2018-10-23 NOTE — PATIENT INSTRUCTIONS
Dear Gwendolyn Coleman and Sailaja Logan, It was a pleasure seeing you at home today with Home Based [facility] ([phone]) Your stated goal: To get back to walking again. This is what we talked about: 1. Altered Mental Status/Delirium 
-This is markedly improved today 
-Continue the remeron and we will follow her 2. Weakness after hospitalization/illness 
-An order physical and occupational therapy was placed with home health 
-Joceiwona Hiro will provide the services 3. Congestive heart failure 
-This is currently stable 
-Let us know if she develops edema in her legs 4. Coronary Artery Disease/heart attack 
-Try to have her wear her oxygen all the time 
-You have opted not to have an invasive cardiac work-up, so we will manage her symptoms medically 5. Chronic kidney disease 
-This has been stable 
-Keep her well hydrated 6. Health Maintenance -Milo Arrieta will be coming to give your pneumonia vaccine (Prevnar 13, the newer one) and the flu vaccine Health Maintenance Due Topic Date Due  Shingrix Vaccine Age 50> (1 of 2) 07/27/1975  Pneumococcal 65+ Low/Medium Risk (2 of 2 - PPSV23) 12/07/2017  Influenza Age 5 to Adult  08/01/2018  GLAUCOMA SCREENING Q2Y  08/09/2018 7. Advance Care Planning - A Durable Do Not Resusitate (DDNR) is on file - An Advance Directive is already on file. This is what you have shared with us about Advance Care Planning Advance Care Planning 10/12/2018 Patient's Healthcare Decision Maker is: Named in scanned ACP document Primary Decision Maker Name Tracey Weiner Primary Decision Maker Phone Number [phone] Primary Decision Maker Relationship to Patient Adult child Secondary Decision Maker Name - Secondary Decision Maker Phone Number - Secondary Decision Maker Relationship to Patient -  
Confirm Advance Directive Yes, on file Does the patient have other document types Do Not Resuscitate Someone from the Home Based Primary Care Team will see you again in 2 weeks. If there are any concerns before that time, such as medication questions, worsening symptoms or a need to see a physician for an urgent or emergent situation; please call [phone]. A physician is also on call after our normal business hours of 8am to 5pm.  
 
In order to serve you better, please allow up to 48 hours for prescription refills to be processed. Certain medications may require more paperwork or a written prescription that you may need to  from the office. We appreciate you letting us know of any refill requests as soon as possible. Sincerely, The Home Based Primary Care Team 
MD John Escobedo NP Lynett Ferri, NP Heriberto Jean, RN Enolia Reas, RN Learning About Coronary Artery Disease (CAD) What is coronary artery disease? Coronary artery disease (CAD) occurs when plaque builds up in the arteries that bring oxygen-rich blood to your heart. Plaque is a fatty substance made of cholesterol, calcium, and other substances in the blood. This process is called hardening of the arteries, or atherosclerosis. What happens when you have coronary artery disease? · Plaque may narrow the coronary arteries. Narrowed arteries cause poor blood flow. This can lead to angina symptoms such as chest pain or discomfort. If blood flow is completely blocked, you could have a heart attack. · You can slow CAD and reduce the risk of future problems by making changes in your lifestyle. These include quitting smoking and eating heart-healthy foods. · Treatments for CAD, along with changes in your lifestyle, can help you live a longer and healthier life. How can you prevent coronary artery disease? · Do not smoke. It may be the best thing you can do to prevent heart disease.  If you need help quitting, talk to your doctor about stop-smoking programs and medicines. These can increase your chances of quitting for good. · Be active. Get at least 30 minutes of exercise on most days of the week. Walking is a good choice. You also may want to do other activities, such as running, swimming, cycling, or playing tennis or team sports. · Eat heart-healthy foods. Eat more fruits and vegetables and less foods that contain saturated and trans fats. Limit alcohol, sodium, and sweets. · Stay at a healthy weight. Lose weight if you need to. · Manage other health problems such as diabetes, high blood pressure, and high cholesterol. · Manage stress. Stress can hurt your heart. To keep stress low, talk about your problems and feelings. Don't keep your feelings hidden. · If you have talked about it with your doctor, take a low-dose aspirin every day. Aspirin can help certain people lower their risk of a heart attack or stroke. But taking aspirin isn't right for everyone, because it can cause serious bleeding. Do not start taking daily aspirin unless your doctor knows about it. How is coronary artery disease treated? · Your doctor will suggest that you make lifestyle changes. For example, your doctor may ask you to eat healthy foods, quit smoking, lose extra weight, and be more active. · You will have to take medicines. · Your doctor may suggest a procedure to open narrowed or blocked arteries. This is called angioplasty. Or your doctor may suggest using healthy blood vessels to create detours around narrowed or blocked arteries. This is called bypass surgery. Follow-up care is a key part of your treatment and safety. Be sure to make and go to all appointments, and call your doctor if you are having problems. It's also a good idea to know your test results and keep a list of the medicines you take. Where can you learn more? Go to http://anna-sampson.info/.  
Enter [phone] in the search box to learn more about \"Learning About Coronary Artery Disease (CAD). \" Current as of: December 6, 2017 Content Version: 11.8 © 1337-9203 Healthwise, Incorporated. Care instructions adapted under license by [employer] (which disclaims liability or warranty for this information). If you have questions about a medical condition or this instruction, always ask your healthcare professional. [place] [address] any warranty or liability for your use of this information.

## 2018-11-06 ENCOUNTER — APPOINTMENT (OUTPATIENT)
Dept: GENERAL RADIOLOGY | Age: 83
End: 2018-11-06
Attending: EMERGENCY MEDICINE
Payer: MEDICARE

## 2018-11-06 ENCOUNTER — HOSPITAL ENCOUNTER (EMERGENCY)
Age: 83
Discharge: HOME | End: 2018-11-06
Attending: EMERGENCY MEDICINE
Payer: MEDICARE

## 2018-11-06 ENCOUNTER — TELEPHONE (OUTPATIENT)
Dept: FAMILY MEDICINE CLINIC | Age: 83
End: 2018-11-06

## 2018-11-06 VITALS
HEART RATE: 57 BPM | WEIGHT: 128 LBS | HEIGHT: 60 IN | SYSTOLIC BLOOD PRESSURE: 131 MMHG | DIASTOLIC BLOOD PRESSURE: 53 MMHG | OXYGEN SATURATION: 99 % | BODY MASS INDEX: 25.13 KG/M2 | RESPIRATION RATE: 17 BRPM | TEMPERATURE: 98.6 F

## 2018-11-06 DIAGNOSIS — Z99.81 O2 DEPENDENT: ICD-10-CM

## 2018-11-06 DIAGNOSIS — R09.02 HYPOXIA: Primary | ICD-10-CM

## 2018-11-06 LAB
ALBUMIN SERPL-MCNC: 3 G/DL (ref 3.5–5)
ALBUMIN/GLOB SERPL: 1 {RATIO} (ref 1.1–2.2)
ALP SERPL-CCNC: 90 U/L (ref 45–117)
ALT SERPL-CCNC: 20 U/L (ref 12–78)
ANION GAP SERPL CALC-SCNC: 7 MMOL/L (ref 5–15)
AST SERPL-CCNC: 17 U/L (ref 15–37)
ATRIAL RATE: 65 BPM
BASOPHILS # BLD: 0 K/UL (ref 0–0.1)
BASOPHILS NFR BLD: 1 % (ref 0–1)
BILIRUB SERPL-MCNC: 0.3 MG/DL (ref 0.2–1)
BUN SERPL-MCNC: 15 MG/DL (ref 6–20)
BUN/CREAT SERPL: 16 (ref 12–20)
CALCIUM SERPL-MCNC: 8.8 MG/DL (ref 8.5–10.1)
CALCULATED P AXIS, ECG09: 51 DEGREES
CALCULATED R AXIS, ECG10: 29 DEGREES
CALCULATED T AXIS, ECG11: -5 DEGREES
CHLORIDE SERPL-SCNC: 109 MMOL/L (ref 97–108)
CO2 SERPL-SCNC: 30 MMOL/L (ref 21–32)
CREAT SERPL-MCNC: 0.94 MG/DL (ref 0.55–1.02)
DIAGNOSIS, 93000: NORMAL
DIFFERENTIAL METHOD BLD: ABNORMAL
EOSINOPHIL # BLD: 0.2 K/UL (ref 0–0.4)
EOSINOPHIL NFR BLD: 5 % (ref 0–7)
ERYTHROCYTE [DISTWIDTH] IN BLOOD BY AUTOMATED COUNT: 17.9 % (ref 11.5–14.5)
GLOBULIN SER CALC-MCNC: 3.1 G/DL (ref 2–4)
GLUCOSE SERPL-MCNC: 79 MG/DL (ref 65–100)
HCT VFR BLD AUTO: 35.6 % (ref 35–47)
HGB BLD-MCNC: 10.4 G/DL (ref 11.5–16)
IMM GRANULOCYTES # BLD: 0 K/UL (ref 0–0.04)
IMM GRANULOCYTES NFR BLD AUTO: 0 % (ref 0–0.5)
LYMPHOCYTES # BLD: 0.8 K/UL (ref 0.8–3.5)
LYMPHOCYTES NFR BLD: 18 % (ref 12–49)
MCH RBC QN AUTO: 29.4 PG (ref 26–34)
MCHC RBC AUTO-ENTMCNC: 29.2 G/DL (ref 30–36.5)
MCV RBC AUTO: 100.6 FL (ref 80–99)
MONOCYTES # BLD: 0.6 K/UL (ref 0–1)
MONOCYTES NFR BLD: 14 % (ref 5–13)
NEUTS SEG # BLD: 2.8 K/UL (ref 1.8–8)
NEUTS SEG NFR BLD: 63 % (ref 32–75)
NRBC # BLD: 0 K/UL (ref 0–0.01)
NRBC BLD-RTO: 0 PER 100 WBC
P-R INTERVAL, ECG05: 146 MS
PLATELET # BLD AUTO: 183 K/UL (ref 150–400)
PMV BLD AUTO: 9.8 FL (ref 8.9–12.9)
POTASSIUM SERPL-SCNC: 4.1 MMOL/L (ref 3.5–5.1)
PROT SERPL-MCNC: 6.1 G/DL (ref 6.4–8.2)
Q-T INTERVAL, ECG07: 420 MS
QRS DURATION, ECG06: 86 MS
QTC CALCULATION (BEZET), ECG08: 436 MS
RBC # BLD AUTO: 3.54 M/UL (ref 3.8–5.2)
SODIUM SERPL-SCNC: 146 MMOL/L (ref 136–145)
TROPONIN I SERPL-MCNC: <0.05 NG/ML
VENTRICULAR RATE, ECG03: 65 BPM
WBC # BLD AUTO: 4.4 K/UL (ref 3.6–11)

## 2018-11-06 PROCEDURE — 80053 COMPREHEN METABOLIC PANEL: CPT | Performed by: EMERGENCY MEDICINE

## 2018-11-06 PROCEDURE — 99285 EMERGENCY DEPT VISIT HI MDM: CPT

## 2018-11-06 PROCEDURE — 71045 X-RAY EXAM CHEST 1 VIEW: CPT

## 2018-11-06 PROCEDURE — 93005 ELECTROCARDIOGRAM TRACING: CPT

## 2018-11-06 PROCEDURE — 85025 COMPLETE CBC W/AUTO DIFF WBC: CPT | Performed by: EMERGENCY MEDICINE

## 2018-11-06 PROCEDURE — 84484 ASSAY OF TROPONIN QUANT: CPT | Performed by: EMERGENCY MEDICINE

## 2018-11-06 PROCEDURE — 36415 COLL VENOUS BLD VENIPUNCTURE: CPT | Performed by: EMERGENCY MEDICINE

## 2018-11-06 RX ORDER — ATORVASTATIN CALCIUM 40 MG/1
40 TABLET, FILM COATED ORAL DAILY
Qty: 30 TAB | Refills: 0 | Status: SHIPPED | OUTPATIENT
Start: 2018-11-06 | End: 2018-12-07 | Stop reason: SDUPTHER

## 2018-11-06 NOTE — ED PROVIDER NOTES
80 y.o. female with past medical history significant for CHF, CAD, a-fib, polymyalgia, GERD, gluten intolerance, and arthritis who presents from home via EMS for evaluation of low O2 saturation. Per EMS, the pt's home health nurse noticed that she had O2 saturations in the mid 80s while on 2L NC. She was started on 4L NC and her sats increased to 98%. The pt also states that the nurse noted a \"dysrhythmia\" at that time. She states that she is currently asymptomatic. Pt was started on 2L O2 last week for CHF. Pt also has a recent h/o admissions for MI and AMS last month. Pt is on 10mg chronic Prednisone. Pt has a h/o well controled a-fib. She denies any recent changes in medications. Pt denies fever or chills. There are no other acute medical concerns at this time. Chart Review: Pt was admitted 10/12/18-10/14/18 for ST elevation and MI. Cardiology and POA felt no aggressive intervention is necessary. Decided againast a cardiac cath. Troponin peaked at 1.06. TTE with normal EF. Social hx: no tobacco use; no EtOH use PCP: Jackquelyn Gottron, NP Note written by Becky Burgess, as dictated by Chas Mckee MD 1:30 PM 
 
 
The history is provided by the patient. No  was used. Past Medical History:  
Diagnosis Date  Arrhythmia A-FIB  Arthritis  CAD (coronary artery disease) 1989 MI  
 Congestive heart failure (CHF) ([address]  GERD (gastroesophageal reflux disease)  Gluten intolerance  Heart failure ([address] CHF  Polymyalgia ([address] Past Surgical History:  
Procedure Laterality Date  CARDIAC SURG PROCEDURE UNLIST  1980'S CARDIAC CATH  
[address], 2006, 2013 LUMBAR SPINE  
 HX CHOLECYSTECTOMY  HX HIP REPLACEMENT Right 5/2015 [employer]  HX TONSILLECTOMY  CHILD  
 HX UROLOGICAL BLADDER SUSPENSION - MESH Family History:  
Problem Relation Age of Onset 24 [facility] Other Mother Intestinal obstruction  Cancer Father  Stroke Father  Heart Attack Brother  Cancer Brother  Cancer Brother  Anesth Problems Neg Hx Social History Socioeconomic History  Marital status: SINGLE Spouse name: Not on file  Number of children: Not on file  Years of education: Not on file  Highest education level: Not on file Social Needs  Financial resource strain: Not on file  Food insecurity - worry: Not on file  Food insecurity - inability: Not on file  Transportation needs - medical: Not on file  Transportation needs - non-medical: Not on file Occupational History  Not on file Tobacco Use  Smoking status: Never Smoker  Smokeless tobacco: Never Used Substance and Sexual Activity  Alcohol use: No  
  Alcohol/week: 0.0 oz  Drug use: No  
 Sexual activity: No  
Other Topics Concern  Not on file Social History Narrative  Not on file ALLERGIES: Phenergan [promethazine]; Nsaids (non-steroidal anti-inflammatory drug); Adhesive tape-silicones; Aspirin; Atenolol; Cymbalta [duloxetine]; Digoxin; Gluten; Macrobid [nitrofurantoin monohyd/m-cryst]; Novocain [procaine]; Sulfa (sulfonamide antibiotics); and Codeine Review of Systems Constitutional: Negative for chills and fever. Respiratory: Negative for shortness of breath. Cardiovascular: Negative for chest pain. Gastrointestinal: Negative for abdominal pain. Musculoskeletal: Negative for myalgias. All other systems reviewed and are negative. Vitals:  
 11/06/18 1334 11/06/18 1400 BP: 128/42 134/49 Pulse: 63 63 Resp: 18 22 Temp: 98.9 °F (37.2 °C) 98.6 °F (37 °C) SpO2: 99% 98% Weight: 58.1 kg (128 lb) Height: 5' (1.524 m) Physical Exam  
Nursing note and vitals reviewed. Constitutional: appears well-developed and well-nourished. No distress. WITTY, SMILING, ELDERLY FEMALE.   
HENT:  
 Head: Normocephalic and atraumatic. Sclera anicteric Nose: No rhinorrhea Mouth/Throat: Oropharynx is clear and moist. Pharynx normal 
Eyes: Conjunctivae are normal. Pupils are equal, round, and reactive to light. Right eye exhibits no discharge. Left eye exhibits no discharge. No scleral icterus. Neck: Painless normal range of motion. Supple Cardiovascular: Normal rate, regular rhythm, normal heart sounds and intact distal pulses. Exam reveals no gallop and no friction rub. No murmur heard. Pulmonary/Chest: Effort normal. No respiratory distress. no wheezes. no rales. MILD CRACKLES AT BASE. Abdominal: Soft. Bowel sounds are normal. Exhibits no distension and no mass. No tenderness. No guarding. Musculoskeletal: Normal range of motion. no tenderness. MILD TRACE BILATERAL LOWER EXTREMITY EDEMA. Lymphadenopathy:   No cervical adenopathy. Neurological:  Alert and oriented to person, place, and time. Coordination normal. CN 2-12 intact. Moving all extremities. Skin: Skin is warm and dry. No rash noted. No pallor. Note written by Becky Linton, as dictated by Christos Cox MD 1:30 PM 
 
[facility] 
  
[address] female on home oxygen with recent admission for MI without catheterization after discussion with the family and patient here with reported hypoxia on oxygen at home.  Patient reportedly on 2 L at home and was increased to 4 L by EMS.  Patient on 2 L of oxygen here saturating in the upper 90s.  Patient is not hypoxic upon arrival to the emergency room on her baseline oxygen.  Will check chest x-ray and labs.  If patient remains stable and lab work unremarkable, will discharge back home.  Confirmed with patient that she is DNR and DNI. Procedures ED EKG interpretation: 
Rhythm: sinus with PAC; and regular . Rate (approx.): 65; T wave inversion, 3 in AVF, V1-V3.   
Note written by Becky Linton, as dictated by Christos Cox MD 1:47 PM 
 
PROGRESS NOTE: 
 1:46 PM 
Pt confirmed DNR, DNI status. PROGRESS NOTE: 
3:01 PM 
O2 sats in the upper 90s during entire ED stay on 2L of O2. 
 
CONSULT NOTE: 
3:07 PM Nani Anderson MD spoke with Dr. Storm Nielsen for [facility]. Discussed available diagnostic tests and clinical findings. Dr. Sullivan Nurse agrees with discharge and will arrange for someone to see her tomorrow. 3:08 PM 
Patient's results have been reviewed with them. SON AT BEDSIDE AND AGREES WITH DISCHARGE. Patient and/or family have verbally conveyed their understanding and agreement of the patient's signs, symptoms, diagnosis, treatment and prognosis and additionally agree to follow up as recommended or return to the Emergency Room should their condition change prior to follow-up. Discharge instructions have also been provided to the patient with some educational information regarding their diagnosis as well a list of reasons why they would want to return to the ER prior to their follow-up appointment should their condition change. Results: 
Labs: 
Recent Results (from the past 24 hour(s)) CBC WITH AUTOMATED DIFF Collection Time: 11/06/18  1:39 PM  
Result Value Ref Range WBC 4.4 3.6 - 11.0 K/uL  
 RBC 3.54 (L) 3.80 - 5.20 M/uL  
 HGB 10.4 (L) 11.5 - 16.0 g/dL HCT 35.6 35.0 - 47.0 % .6 (H) 80.0 - 99.0 FL  
 MCH 29.4 26.0 - 34.0 PG  
 MCHC 29.2 (L) 30.0 - 36.5 g/dL  
 RDW 17.9 (H) 11.5 - 14.5 % PLATELET 198 899 - 175 K/uL MPV 9.8 8.9 - 12.9 FL  
 NRBC 0.0 0  WBC ABSOLUTE NRBC 0.00 0.00 - 0.01 K/uL NEUTROPHILS 63 32 - 75 % LYMPHOCYTES 18 12 - 49 % MONOCYTES 14 (H) 5 - 13 % EOSINOPHILS 5 0 - 7 % BASOPHILS 1 0 - 1 % IMMATURE GRANULOCYTES 0 0.0 - 0.5 % ABS. NEUTROPHILS 2.8 1.8 - 8.0 K/UL  
 ABS. LYMPHOCYTES 0.8 0.8 - 3.5 K/UL  
 ABS. MONOCYTES 0.6 0.0 - 1.0 K/UL  
 ABS. EOSINOPHILS 0.2 0.0 - 0.4 K/UL  
 ABS. BASOPHILS 0.0 0.0 - 0.1 K/UL  
 ABS. IMM. GRANS. 0.0 0.00 - 0.04 K/UL DF AUTOMATED METABOLIC PANEL, COMPREHENSIVE Collection Time: 11/06/18  1:39 PM  
Result Value Ref Range Sodium 146 (H) 136 - 145 mmol/L Potassium 4.1 3.5 - 5.1 mmol/L Chloride 109 (H) 97 - 108 mmol/L  
 CO2 30 21 - 32 mmol/L Anion gap 7 5 - 15 mmol/L Glucose 79 65 - 100 mg/dL BUN 15 6 - 20 MG/DL Creatinine 0.94 0.55 - 1.02 MG/DL  
 BUN/Creatinine ratio 16 12 - 20 GFR est AA >60 >60 ml/min/1.73m2 GFR est non-AA 56 (L) >60 ml/min/1.73m2 Calcium 8.8 8.5 - 10.1 MG/DL Bilirubin, total 0.3 0.2 - 1.0 MG/DL  
 ALT (SGPT) 20 12 - 78 U/L  
 AST (SGOT) 17 15 - 37 U/L Alk. phosphatase 90 45 - 117 U/L Protein, total 6.1 (L) 6.4 - 8.2 g/dL Albumin 3.0 (L) 3.5 - 5.0 g/dL Globulin 3.1 2.0 - 4.0 g/dL A-G Ratio 1.0 (L) 1.1 - 2.2    
TROPONIN I Collection Time: 11/06/18  1:39 PM  
Result Value Ref Range Troponin-I, Qt. <0.05 <0.05 ng/mL EKG, 12 LEAD, INITIAL Collection Time: 11/06/18  1:39 PM  
Result Value Ref Range Ventricular Rate 65 BPM  
 Atrial Rate 65 BPM  
 P-R Interval 146 ms  
 QRS Duration 86 ms  
 Q-T Interval 420 ms QTC Calculation (Bezet) 436 ms Calculated P Axis 51 degrees Calculated R Axis 29 degrees Calculated T Axis -5 degrees Diagnosis Sinus rhythm with premature atrial complexes T wave abnormality, consider inferior ischemia T wave abnormality, consider anterior ischemia When compared with ECG of 12-OCT-2018 12:46, 
Criteria for Inferior infarct are no longer present T wave inversion no longer evident in Lateral leads Imaging: Xr Chest [facility] Result Date: 11/6/2018 EXAM:  XR CHEST PORT INDICATION:  sob COMPARISON:  10/3/2018 FINDINGS: A portable AP radiograph of the chest was obtained at 1422 hours. Low lung volumes are again shown. Left midlung scarring is again demonstrated. No consolidation or pulmonary edema is demonstrated.  There is no pneumothorax or pleural effusion. Cardiac, mediastinal and hilar contours are stable. IMPRESSION: No acute findings.

## 2018-11-06 NOTE — DISCHARGE INSTRUCTIONS
Oxygen Therapy: Care Instructions  Your Care Instructions    Oxygen therapy helps you get more oxygen into your lungs and bloodstream. You may use it if you have a disease that makes it hard to breathe, such as COPD, pulmonary fibrosis (scarring of the lungs), or heart failure. Oxygen therapy can make it easier for you to breathe and can reduce your heart's workload. Some people need extra oxygen all the time. Others need it from time to time throughout the day or overnight. A doctor will prescribe how much oxygen you need and how often to use it. To breathe the oxygen, most people use a nasal cannula (say \"SHELLI-yuh-dot\"). This is a thin tube with two prongs that fit just inside your nose. People who need a lot of oxygen may need to use a mask that fits over the nose and mouth. Follow-up care is a key part of your treatment and safety. Be sure to make and go to all appointments, and call your doctor if you are having problems. It's also a good idea to know your test results and keep a list of the medicines you take. How can you care for yourself at home? To help yourself  · Using oxygen may dry out your nose or lips. Use water-based lubricants on your lips or nostrils. Do not use an oil-based product like petroleum jelly. · If you use a nasal cannula, the tubing may rub under your nostrils and around your ears. To keep your skin from getting sore, tuck some gauze under the tubing. Use a water-based lotion on rubbed areas. · Do not use alcohol or take drugs that relax you, because they will slow your breathing rate. · Keep track of how much oxygen is in the tank, and reorder before it runs out. If a holiday is coming up or you expect bad weather, order in advance or make your regular order larger. · You may need extra oxygen when you travel to high altitudes or travel by plane. Ask your doctor about this.   · If you are getting oxygen directly to your windpipe through an opening in your neck, your doctor will teach you how to care for the equipment. To make sure oxygen is flowing  · Check the flow by holding your mask or cannula up to your ear and listening for the \"hiss\" of airflow. · If you have a nasal cannula, dip the prongs in a glass of water. If you see bubbles, oxygen is coming through. · Check your pressure gauge or contents indicator. · If you use an oxygen concentrator, make sure it is turned on and plugged in. If you use a cylinder, make sure the valve is open. · Look for kinks, blockages, or water in the tubing. Be sure the tubing is connected to the oxygen source. · Do not change your oxygen flow rate. Your doctor sets this at the correct level. Higher flow rates usually do not help and can increase the risk of harmful carbon dioxide buildup in the blood. To be safe  · Do not leave cords or tubing running across an area where you or someone else may trip on it. · Do not let oxygen containers get hot. Store them in a cool place where there is airflow. Do not leave them in a car trunk or a hot vehicle. · Keep oxygen containers upright. Make sure they do not fall over and get damaged. Try securing the tanks in a sturdy container or securing them with a rope or a chain. · Watch for signs of oxygen leaks. If you hear a loud hissing from your container or if it empties too fast, stay away from the container. Open windows right away and call the company that brought the oxygen system to your home. · Do not use oxygen around anything that could spark or easily cause a fire. ? Do not smoke or let others smoke while you are using oxygen. Put up \"no smoking\" signs in your home. ? Do not use oxygen near open flames, such as candles, fireplaces, gas stoves, or hot water heaters. Do not use it near electric razors, hair dryers, heating pads, or anything that may spark. ?  Keep a working fire extinguisher in your home where it is easy to get to.  ? If a fire starts, turn off the oxygen right away and leave the house. ? If you have an oxygen concentrator, do not use it if the cord looks damaged. Do not use an extension cord to plug it in. Do not plug it into an outlet that has other appliances plugged into it. To care for the equipment  · Follow the directions that come with the equipment for using and caring for it. · Wash your cannula or mask with a liquid soap and warm water 1 or 2 times a week. Replace them every 2 to 4 weeks. · If you have a cold, change the nasal prongs when your cold symptoms are done. · If you have an oxygen concentrator, unplug the unit and wipe down the cabinet with a damp cloth daily. Clean the air filter at least 2 times a week. Where can you learn more? Go to http://anna-sampson.info/. Enter E117 in the search box to learn more about \"Oxygen Therapy: Care Instructions. \"  Current as of: December 6, 2017  Content Version: 11.8  © 7901-0675 [employer]. Care instructions adapted under license by [employer] (which disclaims liability or warranty for this information). If you have questions about a medical condition or this instruction, always ask your healthcare professional. [place] [address] any warranty or liability for your use of this information. Learning About Hypoxemia  What is hypoxemia? Hypoxemia means that you don't have enough oxygen in your blood. It's a result of diseases that affect your heart or lungs. These include heart failure, COPD, and pulmonary fibrosis (scarring of the lungs). Being at high altitudes can also lead to hypoxemia. What happens when you have hypoxemia? Oxygen gets into your blood through your lungs. Your blood carries the oxygen to all parts of your body. When you have too little oxygen in your blood, your body doesn't get enough of it. With too little oxygen, your heart and other parts of your body don't work very well. What are the symptoms?   In addition to the symptoms of whatever is causing your hypoxemia, you may:  · Get tired quickly. · Be short of breath when you are active. · Feel like your heart is pounding or racing. · Feel weak or dizzy. · Become confused. How is hypoxemia treated? Your doctor will do tests to find out how much oxygen is in your blood. He or she will look for the cause of your hypoxemia and treat that problem. For example, if you have heart failure, you may need medicines that help your heart pump better. · If your hypoxemia is not severe, your doctor may give you oxygen through a mask or nasal cannula (say \"SHELLI-kaelah-dot\"). A cannula is a thin tube with two openings that fit just inside your nose. · If your hypoxemia is severe, you may have a breathing tube put into your windpipe. The breathing tube is attached to a machine that pushes air into your lungs. This machine is called a ventilator. · If you have a long-term problem with hypoxemia, your doctor may recommend that you use oxygen regularly. Some people need it all the time. Others need it from time to time throughout the day or overnight. Your doctor will tell you how much oxygen you need and how often to use it. Follow-up care is a key part of your treatment and safety. Be sure to make and go to all appointments, and call your doctor if you are having problems. It's also a good idea to know your test results and keep a list of the medicines you take. Where can you learn more? Go to http://anna-sampson.info/. Enter M375 in the search box to learn more about \"Learning About Hypoxemia. \"  Current as of: December 6, 2017  Content Version: 11.8  © 7223-6745 [employer]. Care instructions adapted under license by [employer] (which disclaims liability or warranty for this information).  If you have questions about a medical condition or this instruction, always ask your healthcare professional. Seymour Davis disclaims any warranty or liability for your use of this information.

## 2018-11-06 NOTE — PROGRESS NOTES
Date of previous inpatient admission/ ED visit? 
14/5/14-67/7/18 Acute Metabolic encephalopathy 10/12/18-10/14/18 STEMI What brought the patient back to ED? Evaluation of low oxygen sats, sats in mid 80's while on 02 2lpm at home. Did patient decline recommended services during last admission/ ED visit (if yes, what)? Yes open to [facility]/RN/PT/OT and Palliative Medicine. Has patient seen a provider since their last inpatient admission/ED visit (if yes, when)? Yes, 10/23/18 CM Interventions: 
 
Care Management Interventions PCP Verified by CM: Yes 
Palliative Care Criteria Met (RRAT>21 & CHF Dx)?: Yes(Palliative following) Transition of Care Consult (CM Consult): Discharge Planning(RRAT 43/1/1, [facility], Readmission  10/3/18-10/9-18 and 10/12/18-10/14/18) MyChart Signup: No 
Discharge Durable Medical Equipment: No(Oxygen, portable and concentrator) Health Maintenance Reviewed: Yes Physical Therapy Consult: No 
Occupational Therapy Consult: No 
Speech Therapy Consult: No 
Current Support Network: Lives with Caregiver(Lives with sonNakul in [facility]. Open to [facility] for PT/OT/RN ) Plan discussed with Pt/Family/Caregiver: Yes(Met with patient in ED/19, discussed discharge planning with son.) Discharge Location Discharge Placement: Home with home health(Discharge plan to home with home health.) Tangela Minors to [address] [place] [address] ED with low sats, testing in ED completed. Medically stable for discharge. Son Edy Jackson aware and in agreement. Lida Ignacio, RN, BSN, [facility] 
ED Care Management [phone]

## 2018-11-06 NOTE — ED NOTES
[facility] called for patient transport home. ETA within the hour. Patient made aware. RN reviewed discharge instructions with patient. Patient verbalized understanding. Time allotted for questions. A&O at time of discharge. VSS.

## 2018-11-07 DIAGNOSIS — R11.2 NON-INTRACTABLE VOMITING WITH NAUSEA, UNSPECIFIED VOMITING TYPE: Primary | ICD-10-CM

## 2018-11-07 RX ORDER — ONDANSETRON 8 MG/1
8 TABLET, ORALLY DISINTEGRATING ORAL
Qty: 270 TAB | Refills: 1 | Status: SHIPPED | OUTPATIENT
Start: 2018-11-07 | End: 2019-05-22 | Stop reason: SDUPTHER

## 2018-11-09 ENCOUNTER — HOME VISIT (OUTPATIENT)
Dept: FAMILY MEDICINE CLINIC | Age: 83
End: 2018-11-09

## 2018-11-09 VITALS
OXYGEN SATURATION: 98 % | DIASTOLIC BLOOD PRESSURE: 74 MMHG | HEART RATE: 62 BPM | SYSTOLIC BLOOD PRESSURE: 122 MMHG | TEMPERATURE: 98 F | RESPIRATION RATE: 18 BRPM

## 2018-11-09 DIAGNOSIS — I10 ESSENTIAL HYPERTENSION: ICD-10-CM

## 2018-11-09 DIAGNOSIS — I25.10 CORONARY ARTERY DISEASE INVOLVING NATIVE CORONARY ARTERY OF NATIVE HEART WITHOUT ANGINA PECTORIS: Primary | ICD-10-CM

## 2018-11-09 DIAGNOSIS — I50.9 CHF, STAGE C (HCC): ICD-10-CM

## 2018-11-09 DIAGNOSIS — M48.061 SPINAL STENOSIS OF LUMBAR REGION WITHOUT NEUROGENIC CLAUDICATION: ICD-10-CM

## 2018-11-09 NOTE — PATIENT INSTRUCTIONS
Dear Yeison Rose and Patricia Baeza, It was a pleasure seeing you at home today with Home Based [facility] ([phone]) Your stated goal: To get back to walking again. This is what we talked about: 1. Recent heart attack 
-You have had no chest pain 
-It is important to keep the oxygen on at all times to avoid stress on the heart 2. Weakness after hospitalization/illness 
-Continue to work with physical therapy to increase your strength and safety with transfers 3. Congestive heart failure 
-This is currently stable 
-Let us know if she develops edema in her legs 4. Chronic kidney disease 
-This has been stable 
-Keep her well hydrated 
-Avoid nonsteroidal antiinflammatories such as ibuprofen, advil, motrin, aleve, and naprosyn 6. Health Maintenance None is due Health Maintenance Due Topic Date Due  Shingrix Vaccine Age 50> (1 of 2) 07/27/1975  Pneumococcal 65+ Low/Medium Risk (2 of 2 - PPSV23) 12/07/2017  Influenza Age 5 to Adult  08/01/2018  GLAUCOMA SCREENING Q2Y  08/09/2018 7. Advance Care Planning - A Durable Do Not Resusitate (DDNR) is on file - An Advance Directive is already on file. This is what you have shared with us about Advance Care Planning Advance Care Planning 10/12/2018 Patient's Healthcare Decision Maker is: Named in scanned ACP document Primary Decision Maker Name Norrine Sacks Primary Decision Maker Phone Number [phone] Primary Decision Maker Relationship to Patient Adult child Secondary Decision Maker Name - Secondary Decision Maker Phone Number - Secondary Decision Maker Relationship to Patient -  
Confirm Advance Directive Yes, on file Does the patient have other document types Do Not Resuscitate Someone from the Home Based Primary Care Team will see you again in 2 weeks.  
 
If there are any concerns before that time, such as medication questions, worsening symptoms or a need to see a physician for an urgent or emergent situation; please call [phone]. A physician is also on call after our normal business hours of 8am to 5pm.  
 
In order to serve you better, please allow up to 48 hours for prescription refills to be processed. Certain medications may require more paperwork or a written prescription that you may need to  from the office. We appreciate you letting us know of any refill requests as soon as possible. Sincerely, The Home Based Primary Care Team 
MD Ankit Aggarwal, CRISTIN Shelton RN Vonzell Stands, RN Learning About Coronary Artery Disease (CAD) What is coronary artery disease? Coronary artery disease (CAD) occurs when plaque builds up in the arteries that bring oxygen-rich blood to your heart. Plaque is a fatty substance made of cholesterol, calcium, and other substances in the blood. This process is called hardening of the arteries, or atherosclerosis. What happens when you have coronary artery disease? · Plaque may narrow the coronary arteries. Narrowed arteries cause poor blood flow. This can lead to angina symptoms such as chest pain or discomfort. If blood flow is completely blocked, you could have a heart attack. · You can slow CAD and reduce the risk of future problems by making changes in your lifestyle. These include quitting smoking and eating heart-healthy foods. · Treatments for CAD, along with changes in your lifestyle, can help you live a longer and healthier life. How can you prevent coronary artery disease? · Do not smoke. It may be the best thing you can do to prevent heart disease. If you need help quitting, talk to your doctor about stop-smoking programs and medicines. These can increase your chances of quitting for good. · Be active. Get at least 30 minutes of exercise on most days of the week. Walking is a good choice. You also may want to do other activities, such as running, swimming, cycling, or playing tennis or team sports. · Eat heart-healthy foods. Eat more fruits and vegetables and less foods that contain saturated and trans fats. Limit alcohol, sodium, and sweets. · Stay at a healthy weight. Lose weight if you need to. · Manage other health problems such as diabetes, high blood pressure, and high cholesterol. · Manage stress. Stress can hurt your heart. To keep stress low, talk about your problems and feelings. Don't keep your feelings hidden. · If you have talked about it with your doctor, take a low-dose aspirin every day. Aspirin can help certain people lower their risk of a heart attack or stroke. But taking aspirin isn't right for everyone, because it can cause serious bleeding. Do not start taking daily aspirin unless your doctor knows about it. How is coronary artery disease treated? · Your doctor will suggest that you make lifestyle changes. For example, your doctor may ask you to eat healthy foods, quit smoking, lose extra weight, and be more active. · You will have to take medicines. · Your doctor may suggest a procedure to open narrowed or blocked arteries. This is called angioplasty. Or your doctor may suggest using healthy blood vessels to create detours around narrowed or blocked arteries. This is called bypass surgery. Follow-up care is a key part of your treatment and safety. Be sure to make and go to all appointments, and call your doctor if you are having problems. It's also a good idea to know your test results and keep a list of the medicines you take. Where can you learn more? Go to http://anna-sampson.info/. Enter ([phone] 1027 8702 in the search box to learn more about \"Learning About Coronary Artery Disease (CAD). \" Current as of: December 6, 2017 Content Version: 11.8 © 7716-1351 Healthwise, Incorporated.  Care instructions adapted under license by [address] (which disclaims liability or warranty for this information). If you have questions about a medical condition or this instruction, always ask your healthcare professional. [address] any warranty or liability for your use of this information.

## 2018-11-09 NOTE — PROGRESS NOTES
Home Based [facility]  
[phone] Date of Current Visit: 11/9/18 SUMMARY:  
80 yr female [facility] by Dr. Homa Jimenez. She has a significant hx for seronegative RA, PMR of multiple joints, CAD, old MI, GERD and Afib. Due to this, Ms. Bang is unable to see a community PCP without significant taxing effort. Patient was sent to the hospital at [facility] and admitted through the ER for AMS, dehydration, poor po intake, and severe pain. She was seen by palliative care during her hospitalization and had a psyc consult. No medical cause was found for her symptoms and this is her fourth such episode in a year. She is out of skilled days and had to return home. Remeron was started for depression. Her fentanyl patch was increased to 25 mcg for pain. By discharge she was more alert mentally and he rpain was better controlled. She was also able to maintain hydration status. She r/o'ed for infection and a head CT was wnl. She was seen by surgery for a questionnable pneumoperitoneum on CT but this was r/o'ed. She was readmitted again on 10/12/18 and ruled in for a STEMI. She was discharged after declining cardiac catheterization on 12/14/2018. GOALS OF CARE / TREATMENT PREFERENCES:  
GOALS OF CARE: 
Patient / health care proxy stated goals: To return to her cognitive and functional baseline. TREATMENT PREFERENCES:  
Code Status:  [] Attempt Resuscitation       [x] Do Not Attempt Resuscitation Advance Care Planning: 
Advance Care Planning 10/12/2018 Patient's Healthcare Decision Maker is: Named in scanned ACP document Primary Decision Maker Name Jona Johnson Primary Decision Maker Phone Number [phone] Primary Decision Maker Relationship to Patient Adult child Secondary Decision Maker Name - Secondary Decision Maker Phone Number - Secondary Decision Maker Relationship to Patient -  
Confirm Advance Directive Yes, on file Does the patient have other document types Do Not Resuscitate DIAGNOSES:  
 
  ICD-10-CM ICD-9-CM 1. Coronary artery disease involving native coronary artery of native heart without angina pectoris I25.10 414.01   
2. CHF, stage C (HCC) I50.9 428.0 3. Essential hypertension I10 401.9 4. Spinal stenosis of lumbar region without neurogenic claudication M48.061 724.02   
 
 
 PLAN:  
Patient Instructions Dear Buffy Allen and Jose Jimenez, It was a pleasure seeing you at home today with Home Based [facility] [address] ([phone]) Your stated goal: To get back to walking again. This is what we talked about: 1. Recent heart attack 
-You have had no chest pain 
-It is important to keep the oxygen on at all times to avoid stress on the heart 2. Weakness after hospitalization/illness 
-Continue to work with physical therapy to increase your strength and safety with transfers 3. Congestive heart failure 
-This is currently stable 
-Let us know if she develops edema in her legs 4. Chronic kidney disease 
-This has been stable 
-Keep her well hydrated 
-Avoid nonsteroidal antiinflammatories such as ibuprofen, advil, motrin, aleve, and naprosyn 6. Health Maintenance None is due Health Maintenance Due Topic Date Due  Shingrix Vaccine Age 50> (1 of 2) 07/27/1975  Pneumococcal 65+ Low/Medium Risk (2 of 2 - PPSV23) 12/07/2017  Influenza Age 5 to Adult  08/01/2018  GLAUCOMA SCREENING Q2Y  08/09/2018 7. Advance Care Planning - A Durable Do Not Resusitate (DDNR) is on file - An Advance Directive is already on file. This is what you have shared with us about Advance Care Planning Advance Care Planning 10/12/2018 Patient's Healthcare Decision Maker is: Named in scanned ACP document Primary Decision Maker Name Ernst Ny Primary Decision Maker Phone Number [phone] Primary Decision Maker Relationship to Patient Adult child Secondary Decision Maker Name - Secondary Decision Maker Phone Number - Secondary Decision Maker Relationship to Patient -  
Confirm Advance Directive Yes, on file Does the patient have other document types Do Not Resuscitate Someone from the Home Based Primary Care Team will see you again in 2 weeks. If there are any concerns before that time, such as medication questions, worsening symptoms or a need to see a physician for an urgent or emergent situation; please call [phone]. A physician is also on call after our normal business hours of 8am to 5pm.  
 
In order to serve you better, please allow up to 48 hours for prescription refills to be processed. Certain medications may require more paperwork or a written prescription that you may need to  from the office. We appreciate you letting us know of any refill requests as soon as possible. Sincerely, The Home Based Primary Care Team 
MD Tiffanie Rodriguez NP Margarett Pares, NP Youlanda Casa, JUAN PABLO Kilgore RN Learning About Coronary Artery Disease (CAD) What is coronary artery disease? Coronary artery disease (CAD) occurs when plaque builds up in the arteries that bring oxygen-rich blood to your heart. Plaque is a fatty substance made of cholesterol, calcium, and other substances in the blood. This process is called hardening of the arteries, or atherosclerosis. What happens when you have coronary artery disease? · Plaque may narrow the coronary arteries. Narrowed arteries cause poor blood flow. This can lead to angina symptoms such as chest pain or discomfort. If blood flow is completely blocked, you could have a heart attack. · You can slow CAD and reduce the risk of future problems by making changes in your lifestyle. These include quitting smoking and eating heart-healthy foods. · Treatments for CAD, along with changes in your lifestyle, can help you live a longer and healthier life. How can you prevent coronary artery disease? · Do not smoke. It may be the best thing you can do to prevent heart disease. If you need help quitting, talk to your doctor about stop-smoking programs and medicines. These can increase your chances of quitting for good. · Be active. Get at least 30 minutes of exercise on most days of the week. Walking is a good choice. You also may want to do other activities, such as running, swimming, cycling, or playing tennis or team sports. · Eat heart-healthy foods. Eat more fruits and vegetables and less foods that contain saturated and trans fats. Limit alcohol, sodium, and sweets. · Stay at a healthy weight. Lose weight if you need to. · Manage other health problems such as diabetes, high blood pressure, and high cholesterol. · Manage stress. Stress can hurt your heart. To keep stress low, talk about your problems and feelings. Don't keep your feelings hidden. · If you have talked about it with your doctor, take a low-dose aspirin every day. Aspirin can help certain people lower their risk of a heart attack or stroke. But taking aspirin isn't right for everyone, because it can cause serious bleeding. Do not start taking daily aspirin unless your doctor knows about it. How is coronary artery disease treated? · Your doctor will suggest that you make lifestyle changes. For example, your doctor may ask you to eat healthy foods, quit smoking, lose extra weight, and be more active. · You will have to take medicines. · Your doctor may suggest a procedure to open narrowed or blocked arteries. This is called angioplasty. Or your doctor may suggest using healthy blood vessels to create detours around narrowed or blocked arteries. This is called bypass surgery. Follow-up care is a key part of your treatment and safety. Be sure to make and go to all appointments, and call your doctor if you are having problems.  It's also a good idea to know your test results and keep a list of the medicines you take. Where can you learn more? Go to http://annaNetzVacationsampson.info/. Enter (27) 9049 9716 in the search box to learn more about \"Learning About Coronary Artery Disease (CAD). \" Current as of: December 6, 2017 Content Version: 11.8 © 9020-7147 [employer]. Care instructions adapted under license by [employer] (which disclaims liability or warranty for this information). If you have questions about a medical condition or this instruction, always ask your healthcare professional. [place] [address] any warranty or liability for your use of this information. PRESCRIPTIONS GIVEN:  
 
No orders of the defined types were placed in this encounter. HISTORY:  
Please see RN note from this current visit; history taken together in presence of patient/family in the home. CHIEF COMPLAINT:  
Chief Complaint Patient presents with  Follow Up Chronic Condition HPI/SUBJECTIVE: The patient is: [x] Verbal / [] Nonverbal  
 
Mrs. Joyb Norris is receiving physical therapy at home and continues to get stronger and improve. She is starting to read again. She is in her wheelchair most of the day. She is back at her cognitive baseline per her son. She is eating well, having a soft BM daily and drinking well. She reports her back pain is 3/10 and is using the fentanyl patches. REVIEW OF SYSTEMS:  
 
The following systems were [x] reviewed / [x] unable to be reviewed Systems: constitutional, ears/nose/mouth/throat, respiratory, gastrointestinal, genitourinary, musculoskeletal, integumentary, neurologic, psychiatric, endocrine. Positive findings noted:  Chronic back pain, weakness FUNCTIONAL ASSESSMENT:  
 
Palliative Performance Scale (PPS): 50%  PSYCHOSOCIAL/SPIRITUAL SCREENING:  
  
Any spiritual / Adventist concerns: [] Yes /  [x] No Pentecostalism, no longer goes to Islam because can't get out of home; Islam does not come to her, may be willing to reconnect Caregiver Burnout: [] Yes /  [x] No /  [] No Caregiver Present PHYSICAL EXAM:  
 
Wt Readings from Last 3 Encounters:  
11/06/18 128 lb (58.1 kg) 10/14/18 128 lb 1.4 oz (58.1 kg) 10/09/18 128 lb 12.8 oz (58.4 kg) Blood pressure 122/74, pulse 62, temperature 98 °F (36.7 °C), temperature source Oral, resp. rate 18, SpO2 98 %. Last bowel movement:  one day ago Constitutional:  Frail, elderly female in bed, NAD Eyes: pupils equal, anicteric, conjunctiva are pink. ENMT: no nasal discharge, moist mucous membranes and lips. Nasal mucosa pink without discharge Cardiovascular: regularly, irregular rhythm, distal pulses intact, edema +1 LE's with turbigrip in place Respiratory: breathing not labored, symmetric, CTA A&P Gastrointestinal: soft, +bowel sounds. No TTP HSM, mass R, G or R Musculoskeletal: no deformity, no tenderness to palpation Skin: warm, dry Neurologic: oriented X 2, no focal deficits, LE's with 3/5 strength, UE's 4/5 Psychiatric: No hallucinations, confused but improved, no agitation or anxiety HISTORY:  
 
Past Medical and Surgical History reviewed in [facility] on date of initial visit Patient Active Problem List  
Diagnosis Code  Osteoarthritis M19.90  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 ([facility]) I25.111  
 CHF, stage C ([facility]) I50.9  Lumbar spinal stenosis M48.061  
 Paroxysmal atrial fibrillation ([facility]) I48.0  Advance directive on file Y63.0  PMR (polymyalgia rheumatica) ([facility]) M35.3  Lower extremity edema R60.0  CKD (chronic kidney disease) stage 2, GFR 60-89 ml/min N18.2  Long term current use of systemic steroids Z79.52  Seronegative rheumatoid arthritis of multiple sites ([address] M06.09  
 Long-term use of immunosuppressant medication Z79.899  Anemia associated with acute blood loss D62  Dependence on continuous supplemental oxygen Z99.81  
 Polyneuropathy associated with underlying disease ([facility] G63  Vascular dementia without behavioral disturbance F01.50  Acute respiratory distress R06.03  
 Chronic pain syndrome G89.4  Dementia without behavioral disturbance F03.90  
 Goals of care, counseling/discussion Z71.89 Family History Problem Relation Age of Onset [facility] Other Mother Intestinal obstruction  Cancer Father  Stroke Father  Heart Attack Brother  Cancer Brother  Cancer Brother  Anesth Problems Neg Hx History reviewed, no pertinent family history. Social History Tobacco Use  Smoking status: Never Smoker  Smokeless tobacco: Never Used Substance Use Topics  Alcohol use: No  
  Alcohol/week: 0.0 oz Allergies Allergen Reactions  Phenergan [Promethazine] Other (comments) \"loose my wits i go crazy\"  Nsaids (Non-Steroidal Anti-Inflammatory Drug) Nausea and Vomiting  Adhesive Tape-Silicones Other (comments) BLISTERS  Aspirin Other (comments) Per EMS patient is allergic, patient agrees but is confused.  Atenolol Nausea and Vomiting  Cymbalta [Duloxetine] Other (comments) CONFUSION 
  
 Digoxin Nausea and Vomiting  Gluten Other (comments) GLUTEN INTOLERANCE  
 Macrobid [Nitrofurantoin Monohyd/M-Cryst] Nausea and Vomiting  Novocain [Procaine] Other (comments) Paralysis  Sulfa (Sulfonamide Antibiotics) Nausea and Vomiting  Codeine Other (comments) Unable to swallow Current Outpatient Medications Medication Sig  
 ondansetron (ZOFRAN ODT) 8 mg disintegrating tablet Take 1 Tab by mouth Before breakfast, lunch, and dinner.  atorvastatin (LIPITOR) 40 mg tablet Take 1 Tab by mouth daily for 90 days.  aspirin 81 mg chewable tablet Take 1 Tab by mouth daily.  gabapentin (NEURONTIN) 300 mg capsule Take 600 mg by mouth two (2) times a day.  predniSONE (DELTASONE) 10 mg tablet Take 10 mg by mouth every evening.  metoprolol tartrate (LOPRESSOR) 50 mg tablet Take 1.5 Tabs by mouth two (2) times a day.  mirtazapine (REMERON) 15 mg tablet Take 1 Tab by mouth nightly. Indications: major depressive disorder  Bifidobacterium Infantis (ALIGN) 4 mg cap Take 4 mg by mouth daily.  fentaNYL (DURAGESIC) 25 mcg/hr PATCH 1 Patch by TransDERmal route every seventy-two (72) hours.  polyethylene glycol (MIRALAX) 17 gram/dose powder Take 17 g by mouth daily as needed.  topiramate (TOPAMAX) 25 mg tablet Take 1 Tab by mouth daily (with breakfast).  senna-docusate (SENNA PLUS) 8.6-50 mg per tablet Take 1 Tab by mouth daily.  cyanocobalamin 1,000 mcg tablet Take 1,000 mcg by mouth daily.  nitroglycerin (NITROSTAT) 0.4 mg SL tablet 1 Tab by SubLINGual route every five (5) minutes as needed for Chest Pain.  cholecalciferol (VITAMIN D3) 1,000 unit tablet Take 1,000 Units by mouth daily.  esomeprazole (NEXIUM) 40 mg capsule Take 40 mg by mouth Daily (before breakfast).  traMADol (ULTRAM) 50 mg tablet Take 50 mg by mouth two (2) times daily as needed for Pain. No current facility-administered medications for this visit. LAB DATA REVIEWED:  
 
Lab Results Component Value Date/Time Hemoglobin A1c 5.6 09/20/2018 11:59 AM  
 
No results found for: Romana Peeks, MCA2, MCA3, MCAU, MCAU2, MCALPOCT Lab Results Component Value Date/Time TSH 1.560 11/20/2017 02:40 PM  
 TSH 0.86 05/25/2015 04:32 AM  
 
Lab Results Component Value Date/Time VITAMIN D, 25-HYDROXY 43.1 10/04/2017 02:52 PM  
   
 
Lab Results Component Value Date/Time WBC 4.4 11/06/2018 01:39 PM  
 HGB 10.4 (L) 11/06/2018 01:39 PM  
 PLATELET 284 71/49/5806 01:39 PM  
 
Lab Results Component Value Date/Time  Sodium 146 (H) 11/06/2018 01:39 PM  
 Potassium 4.1 11/06/2018 01:39 PM  
 Chloride 109 (H) 11/06/2018 01:39 PM  
 CO2 30 11/06/2018 01:39 PM  
 BUN 15 11/06/2018 01:39 PM  
 Creatinine 0.94 11/06/2018 01:39 PM  
 Calcium 8.8 11/06/2018 01:39 PM  
 Magnesium 1.7 10/03/2018 04:58 AM  
 Phosphorus 3.1 10/03/2018 04:58 AM  
  
Lab Results Component Value Date/Time AST (SGOT) 17 11/06/2018 01:39 PM  
 Alk. phosphatase 90 11/06/2018 01:39 PM  
 Protein, total 6.1 (L) 11/06/2018 01:39 PM  
 Albumin 3.0 (L) 11/06/2018 01:39 PM  
 Globulin 3.1 11/06/2018 01:39 PM  
 
No results found for: IRON, FE, TIBC, IBCT, PSAT, FERR Total time:  45  minutes Counseling / coordination time:  CAD education for 10 minutes 
> 50% counseling / coordination?: No

## 2018-11-16 ENCOUNTER — TELEPHONE (OUTPATIENT)
Dept: FAMILY MEDICINE CLINIC | Age: 83
End: 2018-11-16

## 2018-11-26 ENCOUNTER — HOME VISIT (OUTPATIENT)
Dept: FAMILY MEDICINE CLINIC | Age: 83
End: 2018-11-26

## 2018-11-26 VITALS
OXYGEN SATURATION: 98 % | DIASTOLIC BLOOD PRESSURE: 68 MMHG | HEART RATE: 90 BPM | RESPIRATION RATE: 18 BRPM | TEMPERATURE: 98.4 F | SYSTOLIC BLOOD PRESSURE: 108 MMHG

## 2018-11-26 DIAGNOSIS — N18.2 CKD (CHRONIC KIDNEY DISEASE) STAGE 2, GFR 60-89 ML/MIN: ICD-10-CM

## 2018-11-26 DIAGNOSIS — F01.50 VASCULAR DEMENTIA WITHOUT BEHAVIORAL DISTURBANCE (HCC): ICD-10-CM

## 2018-11-26 DIAGNOSIS — I10 ESSENTIAL HYPERTENSION: ICD-10-CM

## 2018-11-26 DIAGNOSIS — I48.0 PAROXYSMAL ATRIAL FIBRILLATION (HCC): ICD-10-CM

## 2018-11-26 DIAGNOSIS — I50.9 CHF, STAGE C (HCC): ICD-10-CM

## 2018-11-26 DIAGNOSIS — I25.10 CORONARY ARTERY DISEASE INVOLVING NATIVE CORONARY ARTERY OF NATIVE HEART WITHOUT ANGINA PECTORIS: Primary | ICD-10-CM

## 2018-11-26 NOTE — PROGRESS NOTES
Home Based [address] [employer]  
[phone] Date of Current Visit: 11/26/18 SUMMARY:  
80 yr female referred to [facility] by Dr. Evelyn Gan. She has a significant hx for seronegative RA, PMR of multiple joints, CAD, old MI, GERD and Afib. Due to this, Ms. Mami Raygoza is unable to see a community PCP without significant taxing effort. Patient was sent to the hospital at [facility] and admitted through the ER for AMS, dehydration, poor po intake, and severe pain. She was seen by palliative care during her hospitalization and had a psyc consult. No medical cause was found for her symptoms and this is her fourth such episode in a year. She is out of skilled days and had to return home. Remeron was started for depression. Her fentanyl patch was increased to 25 mcg for pain. By discharge she was more alert mentally and he rpain was better controlled. She was also able to maintain hydration status. She r/o'ed for infection and a head CT was wnl. She was seen by surgery for a questionnable pneumoperitoneum on CT but this was r/o'ed. She was readmitted again on 10/12/18 and ruled in for a STEMI. She was discharged after declining cardiac catheterization on 12/14/2018. GOALS OF CARE / TREATMENT PREFERENCES:  
GOALS OF CARE: 
Patient / health care proxy stated goals: To return to her cognitive and functional baseline. DIAGNOSES:  
 
  ICD-10-CM ICD-9-CM 1. Coronary artery disease involving native coronary artery of native heart without angina pectoris I25.10 414.01   
2. Essential hypertension I10 401.9 3. CHF, stage C (HCC) I50.9 428.0 4. Paroxysmal atrial fibrillation (HCC) I48.0 427.31   
5. Vascular dementia without behavioral disturbance F01.50 290.40 6. CKD (chronic kidney disease) stage 2, GFR 60-89 ml/min N18.2 585.2 PLAN:  
Patient Instructions Dear Di Rodney and Sergio Galvin, It was a pleasure seeing you at home today with [facility] ([phone]) Your stated goal: To get back to walking again. This is what we talked about: 1. Recent heart attack 
-You have had no chest pain 
-It is important to keep the oxygen on at all times to avoid stress on the heart 
-You are progressing with physical therapy and occupational therapy and have had no angina (chest pain related to your heart) 2. Weakness after hospitalization/illness 
-Continue to work with physical and occupational therapy to increase your strength and safety with transfers 3. Congestive heart failure 
-This is currently stable 
-Let us know if you develop edema in your legs 4. Chronic kidney disease 
-This has been stable 
-Keep well hydrated 
-Avoid nonsteroidal antiinflammatories such as ibuprofen, advil, motrin, aleve, and naprosyn 5. Wound on left lower extremity 
-We have contacted home health to come and dress the wound and help it heal 
-You already have padding on your wheelchair but have physical therapy assess to see if more is needed 6. Health Maintenance None is due Health Maintenance Due Topic Date Due  Shingrix Vaccine Age 50> (1 of 2) 07/27/1975  Pneumococcal 65+ Low/Medium Risk (2 of 2 - PPSV23) 12/07/2017  Influenza Age 5 to Adult  08/01/2018  GLAUCOMA SCREENING Q2Y  08/09/2018 7. Advance Care Planning - A Durable Do Not Resusitate (DDNR) is on file - An Advance Directive is already on file. This is what you have shared with us about Advance Care Planning Advance Care Planning 10/12/2018 Patient's Healthcare Decision Maker is: Named in scanned ACP document Primary Decision Maker Name Jona Johnson Primary Decision Maker Phone Number [phone] Primary Decision Maker Relationship to Patient Adult child Secondary Decision Maker Name - Secondary Decision Maker Phone Number - Secondary Decision Maker Relationship to Patient -  
Confirm Advance Directive Yes, on file Does the patient have other document types Do Not Resuscitate Someone from the Home Based Primary Care Team will see you again in 4 weeks. If there are any concerns before that time, such as medication questions, worsening symptoms or a need to see a physician for an urgent or emergent situation; please call [phone]. A physician is also on call after our normal business hours of 8am to 5pm.  
 
In order to serve you better, please allow up to 48 hours for prescription refills to be processed. Certain medications may require more paperwork or a written prescription that you may need to  from the office. We appreciate you letting us know of any refill requests as soon as possible. Sincerely, The Home Based Primary Care Team 
MD Ankit Aggarwal, CRISTIN Shelton RN Vonzell Stands, RN Learning About Coronary Artery Disease (CAD) What is coronary artery disease? Coronary artery disease (CAD) occurs when plaque builds up in the arteries that bring oxygen-rich blood to your heart. Plaque is a fatty substance made of cholesterol, calcium, and other substances in the blood. This process is called hardening of the arteries, or atherosclerosis. What happens when you have coronary artery disease? · Plaque may narrow the coronary arteries. Narrowed arteries cause poor blood flow. This can lead to angina symptoms such as chest pain or discomfort. If blood flow is completely blocked, you could have a heart attack. · You can slow CAD and reduce the risk of future problems by making changes in your lifestyle. These include quitting smoking and eating heart-healthy foods. · Treatments for CAD, along with changes in your lifestyle, can help you live a longer and healthier life. How can you prevent coronary artery disease? · Do not smoke.  It may be the best thing you can do to prevent heart disease. If you need help quitting, talk to your doctor about stop-smoking programs and medicines. These can increase your chances of quitting for good. · Be active. Get at least 30 minutes of exercise on most days of the week. Walking is a good choice. You also may want to do other activities, such as running, swimming, cycling, or playing tennis or team sports. · Eat heart-healthy foods. Eat more fruits and vegetables and less foods that contain saturated and trans fats. Limit alcohol, sodium, and sweets. · Stay at a healthy weight. Lose weight if you need to. · Manage other health problems such as diabetes, high blood pressure, and high cholesterol. · Manage stress. Stress can hurt your heart. To keep stress low, talk about your problems and feelings. Don't keep your feelings hidden. · If you have talked about it with your doctor, take a low-dose aspirin every day. Aspirin can help certain people lower their risk of a heart attack or stroke. But taking aspirin isn't right for everyone, because it can cause serious bleeding. Do not start taking daily aspirin unless your doctor knows about it. How is coronary artery disease treated? · Your doctor will suggest that you make lifestyle changes. For example, your doctor may ask you to eat healthy foods, quit smoking, lose extra weight, and be more active. · You will have to take medicines. · Your doctor may suggest a procedure to open narrowed or blocked arteries. This is called angioplasty. Or your doctor may suggest using healthy blood vessels to create detours around narrowed or blocked arteries. This is called bypass surgery. Follow-up care is a key part of your treatment and safety. Be sure to make and go to all appointments, and call your doctor if you are having problems. It's also a good idea to know your test results and keep a list of the medicines you take. Where can you learn more? Go to http://anan-sampson.info/. Enter ([phone] [phone] in the search box to learn more about \"Learning About Coronary Artery Disease (CAD). \" Current as of: December 6, 2017 Content Version: 11.8 © 4276-7340 [employer]. Care instructions adapted under license by [employer] (which disclaims liability or warranty for this information). If you have questions about a medical condition or this instruction, always ask your healthcare professional. [place] [address] any warranty or liability for your use of this information. PRESCRIPTIONS GIVEN:  
 
No orders of the defined types were placed in this encounter. HISTORY:  
Please see RN note from this current visit; history taken together in presence of patient/family in the home. CHIEF COMPLAINT:  
Chief Complaint Patient presents with  Follow Up Chronic Condition  
  recent MI, edema, HTN  
 
HPI/SUBJECTIVE: The patient is: [x] Verbal / [] Nonverbal  
 
Mrs. Keri Leger is receiving physical therapy at home and continues to get stronger and improve. She is eating well and back to her baseline cognitively per her son. She is getting close to her physical baseline. She denies chest pain or SOB above her baseline MOSS with chronic oxygen use. Has soft BM's almost daily. Pain well controlled at 4 to 5 on current pain regimen of tramadol and fentanyl. Wound on LLE that patient thinks she got on W/C. REVIEW OF SYSTEMS:  
 
The following systems were [x] reviewed / [x] unable to be reviewed Systems: constitutional, ears/nose/mouth/throat, respiratory, gastrointestinal, genitourinary, musculoskeletal, integumentary, neurologic, psychiatric, endocrine. Positive findings noted:  Chronic back pain, weakness, MOSS, wound LLE FUNCTIONAL ASSESSMENT:  
 
Palliative Performance Scale (PPS): 50%  PSYCHOSOCIAL/SPIRITUAL SCREENING:  
  
Any spiritual / Gnosticist concerns: [] Yes /  [x] No Mormonism, no longer goes to [facility] because can't get out of home; [facility] does not come to her, may be willing to reconnect Caregiver Burnout: [] Yes /  [x] No /  [] No Caregiver Present PHYSICAL EXAM:  
 
Wt Readings from Last 3 Encounters:  
11/06/18 128 lb (58.1 kg) 10/14/18 128 lb 1.4 oz (58.1 kg) 10/09/18 128 lb 12.8 oz (58.4 kg) Blood pressure 108/68, pulse 90, temperature 98.4 °F (36.9 °C), temperature source Oral, resp. rate 18, SpO2 98 %. Last bowel movement:  one day ago Constitutional:  Frail, elderly female seated in W/C, NAD Eyes: pupils equal, anicteric, conjunctiva are pink. ENMT: no nasal discharge, moist mucous membranes and lips. Nasal mucosa pink without discharge Cardiovascular: regularly, irregular rhythm, distal pulses intact, edema +1 LE's Respiratory: breathing not labored, symmetric, CTA A&P Gastrointestinal: soft, +bowel sounds. No TTP HSM, mass R, G or R Musculoskeletal: no deformity, no tenderness to palpation Skin: warm, dry. LLE, lateral anterior facility--open wound 3 X 5 cm's with skin tear on edge. Base macerated. No discharge. Neurologic: oriented X 2, no focal deficits, LE's with 3/5 strength, UE's 4/5 Psychiatric: No hallucinations, no agitation or anxiety HISTORY:  
 
Past Medical and Surgical History reviewed in [facility] on date of initial visit Patient Active Problem List  
Diagnosis Code  Osteoarthritis M19.90  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 ([facility]) I25.111  
 CHF, stage C ([facility]) I50.9  Lumbar spinal stenosis M48.061  
 Paroxysmal atrial fibrillation ([facility]) I48.0  Advance directive on file L55.5  PMR (polymyalgia rheumatica) ([facility]) M35.3  Lower extremity edema R60.0  CKD (chronic kidney disease) stage 2, GFR 60-89 ml/min N18.2  Long term current use of systemic steroids Z79.52  Seronegative rheumatoid arthritis of multiple sites ([facility] [address] M06.09  
  Long-term use of immunosuppressant medication Z79.899  Anemia associated with acute blood loss D62  Dependence on continuous supplemental oxygen Z99.81  
 Polyneuropathy associated with underlying disease ([facility] [address] G63  Vascular dementia without behavioral disturbance F01.50  Acute respiratory distress R06.03  
 Chronic pain syndrome G89.4  Dementia without behavioral disturbance F03.90  
 Goals of care, counseling/discussion Z71.89 Family History Problem Relation Age of Onset 24 [facility] Other Mother Intestinal obstruction  Cancer Father  Stroke Father  Heart Attack Brother  Cancer Brother  Cancer Brother  Anesth Problems Neg Hx History reviewed, no pertinent family history. Social History Tobacco Use  Smoking status: Never Smoker  Smokeless tobacco: Never Used Substance Use Topics  Alcohol use: No  
  Alcohol/week: 0.0 oz Allergies Allergen Reactions  Phenergan [Promethazine] Other (comments) \"loose my wits i go crazy\"  Nsaids (Non-Steroidal Anti-Inflammatory Drug) Nausea and Vomiting  Adhesive Tape-Silicones Other (comments) BLISTERS  Aspirin Other (comments) Per EMS patient is allergic, patient agrees but is confused.  Atenolol Nausea and Vomiting  Cymbalta [Duloxetine] Other (comments) CONFUSION 
  
 Digoxin Nausea and Vomiting  Gluten Other (comments) GLUTEN INTOLERANCE  
 Macrobid [Nitrofurantoin Monohyd/M-Cryst] Nausea and Vomiting  Novocain [Procaine] Other (comments) Paralysis  Sulfa (Sulfonamide Antibiotics) Nausea and Vomiting  Codeine Other (comments) Unable to swallow Current Outpatient Medications Medication Sig  
 ondansetron (ZOFRAN ODT) 8 mg disintegrating tablet Take 1 Tab by mouth Before breakfast, lunch, and dinner.  atorvastatin (LIPITOR) 40 mg tablet Take 1 Tab by mouth daily for 90 days.  aspirin 81 mg chewable tablet Take 1 Tab by mouth daily.  gabapentin (NEURONTIN) 300 mg capsule Take 600 mg by mouth two (2) times a day.  predniSONE (DELTASONE) 10 mg tablet Take 10 mg by mouth every evening.  metoprolol tartrate (LOPRESSOR) 50 mg tablet Take 1.5 Tabs by mouth two (2) times a day.  mirtazapine (REMERON) 15 mg tablet Take 1 Tab by mouth nightly. Indications: major depressive disorder  Bifidobacterium Infantis (ALIGN) 4 mg cap Take 4 mg by mouth daily.  fentaNYL (DURAGESIC) 25 mcg/hr PATCH 1 Patch by TransDERmal route every seventy-two (72) hours.  traMADol (ULTRAM) 50 mg tablet Take 50 mg by mouth two (2) times daily as needed for Pain.  polyethylene glycol (MIRALAX) 17 gram/dose powder Take 17 g by mouth daily as needed.  topiramate (TOPAMAX) 25 mg tablet Take 1 Tab by mouth daily (with breakfast).  senna-docusate (SENNA PLUS) 8.6-50 mg per tablet Take 1 Tab by mouth daily.  cyanocobalamin 1,000 mcg tablet Take 1,000 mcg by mouth daily.  cholecalciferol (VITAMIN D3) 1,000 unit tablet Take 1,000 Units by mouth daily.  esomeprazole (NEXIUM) 40 mg capsule Take 40 mg by mouth Daily (before breakfast).  nitroglycerin (NITROSTAT) 0.4 mg SL tablet 1 Tab by SubLINGual route every five (5) minutes as needed for Chest Pain. No current facility-administered medications for this visit. LAB DATA REVIEWED:  
 
Lab Results Component Value Date/Time Hemoglobin A1c 5.6 09/20/2018 11:59 AM  
 
No results found for: Ileana Meigs, MCA2, MCA3, MCAU, MCAU2, MCALPOCT Lab Results Component Value Date/Time TSH 1.560 11/20/2017 02:40 PM  
 TSH 0.86 05/25/2015 04:32 AM  
 
Lab Results Component Value Date/Time VITAMIN D, 25-HYDROXY 43.1 10/04/2017 02:52 PM  
   
 
Lab Results Component Value Date/Time WBC 4.4 11/06/2018 01:39 PM  
 HGB 10.4 (L) 11/06/2018 01:39 PM  
 PLATELET 078 48/45/5234 01:39 PM  
 
Lab Results Component Value Date/Time  Sodium 146 (H) 11/06/2018 01:39 PM  
 Potassium 4.1 11/06/2018 01:39 PM  
 Chloride 109 (H) 11/06/2018 01:39 PM  
 CO2 30 11/06/2018 01:39 PM  
 BUN 15 11/06/2018 01:39 PM  
 Creatinine 0.94 11/06/2018 01:39 PM  
 Calcium 8.8 11/06/2018 01:39 PM  
 Magnesium 1.7 10/03/2018 04:58 AM  
 Phosphorus 3.1 10/03/2018 04:58 AM  
  
Lab Results Component Value Date/Time AST (SGOT) 17 11/06/2018 01:39 PM  
 Alk. phosphatase 90 11/06/2018 01:39 PM  
 Protein, total 6.1 (L) 11/06/2018 01:39 PM  
 Albumin 3.0 (L) 11/06/2018 01:39 PM  
 Globulin 3.1 11/06/2018 01:39 PM  
 
No results found for: IRON, FE, TIBC, IBCT, PSAT, FERR At today's encounter I ordered Home Care for this patient. Primary Diagnosis for Home Care: Wound LLE Patient reason for decreased mobility:  Lumbar stenosis Patient requires what device(s) for mobility: W/C Home Care Needed: 
[x]  Skilled Nursing 
[]  Physical Therapy 
[]  Occupational Therapy 
[]  Other Other Home Care information (brief summary):                        Needs care for wound of LLE--please evaluate and treat. Total time:  45  minutes Counseling / coordination time:  10 minutes on CAD, PT, OT, wound care 
> 50% counseling / coordination?: No

## 2018-11-26 NOTE — PATIENT INSTRUCTIONS
Dear Lida Graff and Berkley Weiner, It was a pleasure seeing you at home today with Home Based [facility] ([phone]) Your stated goal: To get back to walking again. This is what we talked about: 1. Recent heart attack 
-You have had no chest pain 
-It is important to keep the oxygen on at all times to avoid stress on the heart 
-You are progressing with physical therapy and occupational therapy and have had no angina (chest pain related to your heart) 2. Weakness after hospitalization/illness 
-Continue to work with physical and occupational therapy to increase your strength and safety with transfers 3. Congestive heart failure 
-This is currently stable 
-Let us know if you develop edema in your legs 4. Chronic kidney disease 
-This has been stable 
-Keep well hydrated 
-Avoid nonsteroidal antiinflammatories such as ibuprofen, advil, motrin, aleve, and naprosyn 5. Wound on left lower extremity 
-We have contacted home health to come and dress the wound and help it heal 
-You already have padding on your wheelchair but have physical therapy assess to see if more is needed 6. Health Maintenance None is due Health Maintenance Due Topic Date Due  Shingrix Vaccine Age 50> (1 of 2) 07/27/1975  Pneumococcal 65+ Low/Medium Risk (2 of 2 - PPSV23) 12/07/2017  Influenza Age 5 to Adult  08/01/2018  GLAUCOMA SCREENING Q2Y  08/09/2018 7. Advance Care Planning - A Durable Do Not Resusitate (DDNR) is on file - An Advance Directive is already on file. This is what you have shared with us about Advance Care Planning Advance Care Planning 10/12/2018 Patient's Healthcare Decision Maker is: Named in scanned ACP document Primary Decision Maker Name Terrance Kraft Primary Decision Maker Phone Number [phone] Primary Decision Maker Relationship to Patient Adult child Secondary Decision Maker Name - Secondary Decision Maker Phone Number -  
 Secondary Decision Maker Relationship to Patient -  
Confirm Advance Directive Yes, on file Does the patient have other document types Do Not Resuscitate Someone from the Home Based Primary Care Team will see you again in 4 weeks. If there are any concerns before that time, such as medication questions, worsening symptoms or a need to see a physician for an urgent or emergent situation; please call [phone]. A physician is also on call after our normal business hours of 8am to 5pm.  
 
In order to serve you better, please allow up to 48 hours for prescription refills to be processed. Certain medications may require more paperwork or a written prescription that you may need to  from the office. We appreciate you letting us know of any refill requests as soon as possible. Sincerely, The Home Based Primary Care Team 
MD Jacques Crisostomo NP Cherylyn Bi, NP Salome Cain, RN Mike Hicks RN Learning About Coronary Artery Disease (CAD) What is coronary artery disease? Coronary artery disease (CAD) occurs when plaque builds up in the arteries that bring oxygen-rich blood to your heart. Plaque is a fatty substance made of cholesterol, calcium, and other substances in the blood. This process is called hardening of the arteries, or atherosclerosis. What happens when you have coronary artery disease? · Plaque may narrow the coronary arteries. Narrowed arteries cause poor blood flow. This can lead to angina symptoms such as chest pain or discomfort. If blood flow is completely blocked, you could have a heart attack. · You can slow CAD and reduce the risk of future problems by making changes in your lifestyle. These include quitting smoking and eating heart-healthy foods. · Treatments for CAD, along with changes in your lifestyle, can help you live a longer and healthier life. How can you prevent coronary artery disease? · Do not smoke. It may be the best thing you can do to prevent heart disease. If you need help quitting, talk to your doctor about stop-smoking programs and medicines. These can increase your chances of quitting for good. · Be active. Get at least 30 minutes of exercise on most days of the week. Walking is a good choice. You also may want to do other activities, such as running, swimming, cycling, or playing tennis or team sports. · Eat heart-healthy foods. Eat more fruits and vegetables and less foods that contain saturated and trans fats. Limit alcohol, sodium, and sweets. · Stay at a healthy weight. Lose weight if you need to. · Manage other health problems such as diabetes, high blood pressure, and high cholesterol. · Manage stress. Stress can hurt your heart. To keep stress low, talk about your problems and feelings. Don't keep your feelings hidden. · If you have talked about it with your doctor, take a low-dose aspirin every day. Aspirin can help certain people lower their risk of a heart attack or stroke. But taking aspirin isn't right for everyone, because it can cause serious bleeding. Do not start taking daily aspirin unless your doctor knows about it. How is coronary artery disease treated? · Your doctor will suggest that you make lifestyle changes. For example, your doctor may ask you to eat healthy foods, quit smoking, lose extra weight, and be more active. · You will have to take medicines. · Your doctor may suggest a procedure to open narrowed or blocked arteries. This is called angioplasty. Or your doctor may suggest using healthy blood vessels to create detours around narrowed or blocked arteries. This is called bypass surgery. Follow-up care is a key part of your treatment and safety. Be sure to make and go to all appointments, and call your doctor if you are having problems. It's also a good idea to know your test results and keep a list of the medicines you take. Where can you learn more? Go to http://anna-sampson.info/. Enter (36) 1387 8986 in the search box to learn more about \"Learning About Coronary Artery Disease (CAD). \" Current as of: December 6, 2017 Content Version: 11.8 © 1188-8592 [employer]. Care instructions adapted under license by [employer] (which disclaims liability or warranty for this information). If you have questions about a medical condition or this instruction, always ask your healthcare professional. [place] [address] any warranty or liability for your use of this information.

## 2018-11-27 ENCOUNTER — DOCUMENTATION ONLY (OUTPATIENT)
Dept: FAMILY MEDICINE CLINIC | Age: 83
End: 2018-11-27

## 2018-11-27 DIAGNOSIS — Z51.5 END OF LIFE CARE: Primary | ICD-10-CM

## 2018-11-27 NOTE — PROGRESS NOTES
[facility] [id number] Team Members: Dr. Jimmie Guzman, Liz Norris, NP; Marija Cordero, CIRSTIN; Cony Diamond, RN; Toney Inman, RN, Delores Ewing RN, [facility] Lyles; [facility] PSR    Adrian Neri  7/27/1925 / [id number]  female    Date of Initial Visit: 5/4/18    Diagnosis:Enteritis, diverticulosis of sigmoid colon,  a-fib, rheumatoid arthritis, polymyalgia rheumatica, CAD, debility    Patient status summary: Wheelchair bound patient was /referred by Dr. Gloria Melgoza to our services due to taxing effort to seek primary care needs in the community. Advance Care Planning:   Primary Decision Maker ([address]): Jaz Triana  Relationship to patient:Son  Phone number: [phone]  [x] Named in a scanned document   [] Legal Next of Kin  [] Guardian    Secondary Decision Maker ([address]):   Relationship to patient:  Phone number:  [] Named in a scanned document   [] Legal Next of Kin  [] Guardian    ACP documents you current have include:  [x] Advance Directive or Living Will  [x] Durable Do Not Resuscitate  [] Physician Orders for Scope of Treatment (POST)  [] Medical Power of   [] Other    DME/Supplies:  Wheelchair, Hospital Bed, Bedside Commode and Oxygen       Allergies   Allergen Reactions    Phenergan [Promethazine] Other (comments)     \"loose my wits i go crazy\"    Nsaids (Non-Steroidal Anti-Inflammatory Drug) Nausea and Vomiting    Adhesive Tape-Silicones Other (comments)     BLISTERS    Aspirin Other (comments)     Per EMS patient is allergic, patient agrees but is confused.      Atenolol Nausea and Vomiting    Cymbalta [Duloxetine] Other (comments)     CONFUSION      Digoxin Nausea and Vomiting    Gluten Other (comments)     GLUTEN INTOLERANCE    Macrobid [Nitrofurantoin Monohyd/M-Cryst] Nausea and Vomiting    Novocain [Procaine] Other (comments)     Paralysis     Sulfa (Sulfonamide Antibiotics) Nausea and Vomiting    Codeine Other (comments)     Unable to swallow       Nutritional Requirements:   Oral with supported meal preparation    Functional/Activity Level:  Wheelchair with assistance for transfers    Code Status: DNR    Safety Measures:   Fall risk    Current Outpatient Medications   Medication Sig    ondansetron (ZOFRAN ODT) 8 mg disintegrating tablet Take 1 Tab by mouth Before breakfast, lunch, and dinner.  atorvastatin (LIPITOR) 40 mg tablet Take 1 Tab by mouth daily for 90 days.  aspirin 81 mg chewable tablet Take 1 Tab by mouth daily.  gabapentin (NEURONTIN) 300 mg capsule Take 600 mg by mouth two (2) times a day.  predniSONE (DELTASONE) 10 mg tablet Take 10 mg by mouth every evening.  metoprolol tartrate (LOPRESSOR) 50 mg tablet Take 1.5 Tabs by mouth two (2) times a day.  mirtazapine (REMERON) 15 mg tablet Take 1 Tab by mouth nightly. Indications: major depressive disorder    Bifidobacterium Infantis (ALIGN) 4 mg cap Take 4 mg by mouth daily.  fentaNYL (DURAGESIC) 25 mcg/hr PATCH 1 Patch by TransDERmal route every seventy-two (72) hours.  traMADol (ULTRAM) 50 mg tablet Take 50 mg by mouth two (2) times daily as needed for Pain.  polyethylene glycol (MIRALAX) 17 gram/dose powder Take 17 g by mouth daily as needed.  topiramate (TOPAMAX) 25 mg tablet Take 1 Tab by mouth daily (with breakfast).  senna-docusate (SENNA PLUS) 8.6-50 mg per tablet Take 1 Tab by mouth daily.  cyanocobalamin 1,000 mcg tablet Take 1,000 mcg by mouth daily.  nitroglycerin (NITROSTAT) 0.4 mg SL tablet 1 Tab by SubLINGual route every five (5) minutes as needed for Chest Pain.  cholecalciferol (VITAMIN D3) 1,000 unit tablet Take 1,000 Units by mouth daily.  esomeprazole (NEXIUM) 40 mg capsule Take 40 mg by mouth Daily (before breakfast). No current facility-administered medications for this visit.         The Plan of Care has been initiated for during discussion at interdisciplinary group meeting  and reviewed and updated by the Interdisciplinary Group (IDG) as frequently as the patient condition warrants. Plan of Care by Discipline:    1. Provider  Ongoing evaluation of treatment interventions for specific disease state, Assessment of medication adverse effects, Reduction of polypharmacy within benefit/burden framework appropriate to age, function and disease state, Determine need for laboratory assessment based on patient disease status , Create and implement disease /symptom specific plan to manage high risk conditions / symptoms, Anticipate and plan for medical intervention(s) in emergency / at time of crisis and Wound assessment and interventions as medical appropriate    2. Nursing  Education for safety; falls and Education for skin care in patients with limited mobility; with focus on preventing skin breakdown    3. Social Work  Establish therapeutic relationship through in home visits and telephonic touch points      Plan of Care Orders / Action Items:    1. Physical and occupational therapy to enhance safety with transfers and independence  2.  [facility] consulted for wound care - Left lower leg      Estimated Visit Frequency:  Monthly Provider Visit

## 2018-11-29 ENCOUNTER — DOCUMENTATION ONLY (OUTPATIENT)
Dept: FAMILY MEDICINE CLINIC | Age: 83
End: 2018-11-29

## 2018-11-29 NOTE — PROGRESS NOTES
Certification approval    Initial certification: 94/15/9    Certification period: 10/11/18 to 12/9/18    CCN: Maritza Tanner    Diagnosis code: G93.41    I have reviewed and agree with plan of care.

## 2018-11-29 NOTE — PROGRESS NOTES
Certification approval    Initial certification: 6/3/13    Certification period: 10/2/18 to 11/30/18    CCN: Maritza Tanner    Diagnosis code: I48.0    I have reviewed and agree with plan of care.

## 2018-12-02 ENCOUNTER — APPOINTMENT (OUTPATIENT)
Dept: CT IMAGING | Age: 83
DRG: 070 | End: 2018-12-02
Attending: HOSPITALIST
Payer: MEDICARE

## 2018-12-02 ENCOUNTER — HOSPITAL ENCOUNTER (OUTPATIENT)
Age: 83
Setting detail: OBSERVATION
Discharge: HOME | DRG: 070 | End: 2018-12-04
Attending: EMERGENCY MEDICINE | Admitting: HOSPITALIST
Payer: MEDICARE

## 2018-12-02 ENCOUNTER — APPOINTMENT (OUTPATIENT)
Dept: CT IMAGING | Age: 83
DRG: 070 | End: 2018-12-02
Attending: EMERGENCY MEDICINE
Payer: MEDICARE

## 2018-12-02 ENCOUNTER — APPOINTMENT (OUTPATIENT)
Dept: GENERAL RADIOLOGY | Age: 83
DRG: 070 | End: 2018-12-02
Attending: EMERGENCY MEDICINE
Payer: MEDICARE

## 2018-12-02 DIAGNOSIS — R41.82 ALTERED MENTAL STATUS, UNSPECIFIED ALTERED MENTAL STATUS TYPE: Primary | ICD-10-CM

## 2018-12-02 PROBLEM — G93.41 ACUTE METABOLIC ENCEPHALOPATHY: Status: ACTIVE | Noted: 2018-12-02

## 2018-12-02 LAB
ALBUMIN SERPL-MCNC: 3.2 G/DL (ref 3.5–5)
ALBUMIN/GLOB SERPL: 1 {RATIO} (ref 1.1–2.2)
ALP SERPL-CCNC: 100 U/L (ref 45–117)
ALT SERPL-CCNC: 24 U/L (ref 12–78)
ANION GAP SERPL CALC-SCNC: 8 MMOL/L (ref 5–15)
APPEARANCE UR: CLEAR
AST SERPL-CCNC: 35 U/L (ref 15–37)
BACTERIA URNS QL MICRO: ABNORMAL /HPF
BASOPHILS # BLD: 0.1 K/UL (ref 0–0.1)
BASOPHILS NFR BLD: 1 % (ref 0–1)
BILIRUB SERPL-MCNC: 0.9 MG/DL (ref 0.2–1)
BILIRUB UR QL: NEGATIVE
BUN SERPL-MCNC: 9 MG/DL (ref 6–20)
BUN/CREAT SERPL: 12 (ref 12–20)
CALCIUM SERPL-MCNC: 9.2 MG/DL (ref 8.5–10.1)
CHLORIDE SERPL-SCNC: 105 MMOL/L (ref 97–108)
CK MB CFR SERPL CALC: 2.4 % (ref 0–2.5)
CK MB SERPL-MCNC: 2.7 NG/ML (ref 5–25)
CK SERPL-CCNC: 112 U/L (ref 26–192)
CO2 SERPL-SCNC: 28 MMOL/L (ref 21–32)
COLOR UR: ABNORMAL
COMMENT, HOLDF: NORMAL
CREAT SERPL-MCNC: 0.73 MG/DL (ref 0.55–1.02)
DIFFERENTIAL METHOD BLD: ABNORMAL
EOSINOPHIL # BLD: 0.1 K/UL (ref 0–0.4)
EOSINOPHIL NFR BLD: 2 % (ref 0–7)
EPITH CASTS URNS QL MICRO: ABNORMAL /LPF
ERYTHROCYTE [DISTWIDTH] IN BLOOD BY AUTOMATED COUNT: 17.5 % (ref 11.5–14.5)
GLOBULIN SER CALC-MCNC: 3.3 G/DL (ref 2–4)
GLUCOSE SERPL-MCNC: 104 MG/DL (ref 65–100)
GLUCOSE UR STRIP.AUTO-MCNC: NEGATIVE MG/DL
HCT VFR BLD AUTO: 40.9 % (ref 35–47)
HGB BLD-MCNC: 12.3 G/DL (ref 11.5–16)
HGB UR QL STRIP: NEGATIVE
HYALINE CASTS URNS QL MICRO: ABNORMAL /LPF (ref 0–5)
IMM GRANULOCYTES # BLD: 0 K/UL (ref 0–0.04)
IMM GRANULOCYTES NFR BLD AUTO: 0 % (ref 0–0.5)
KETONES UR QL STRIP.AUTO: 15 MG/DL
LACTATE SERPL-SCNC: 1.7 MMOL/L (ref 0.4–2)
LEUKOCYTE ESTERASE UR QL STRIP.AUTO: NEGATIVE
LYMPHOCYTES # BLD: 0.6 K/UL (ref 0.8–3.5)
LYMPHOCYTES NFR BLD: 10 % (ref 12–49)
MCH RBC QN AUTO: 29.1 PG (ref 26–34)
MCHC RBC AUTO-ENTMCNC: 30.1 G/DL (ref 30–36.5)
MCV RBC AUTO: 96.7 FL (ref 80–99)
MONOCYTES # BLD: 0.5 K/UL (ref 0–1)
MONOCYTES NFR BLD: 8 % (ref 5–13)
NEUTS SEG # BLD: 4.8 K/UL (ref 1.8–8)
NEUTS SEG NFR BLD: 79 % (ref 32–75)
NITRITE UR QL STRIP.AUTO: POSITIVE
NRBC # BLD: 0 K/UL (ref 0–0.01)
NRBC BLD-RTO: 0 PER 100 WBC
PH UR STRIP: 8 [PH] (ref 5–8)
PLATELET # BLD AUTO: 175 K/UL (ref 150–400)
PMV BLD AUTO: 10.9 FL (ref 8.9–12.9)
POTASSIUM SERPL-SCNC: 4.6 MMOL/L (ref 3.5–5.1)
PROT SERPL-MCNC: 6.5 G/DL (ref 6.4–8.2)
PROT UR STRIP-MCNC: NEGATIVE MG/DL
RBC # BLD AUTO: 4.23 M/UL (ref 3.8–5.2)
RBC #/AREA URNS HPF: ABNORMAL /HPF (ref 0–5)
RBC MORPH BLD: ABNORMAL
SAMPLES BEING HELD,HOLD: NORMAL
SODIUM SERPL-SCNC: 141 MMOL/L (ref 136–145)
SP GR UR REFRACTOMETRY: 1.03 (ref 1–1.03)
TROPONIN I SERPL-MCNC: <0.05 NG/ML
UA: UC IF INDICATED,UAUC: ABNORMAL
UROBILINOGEN UR QL STRIP.AUTO: 0.2 EU/DL (ref 0.2–1)
WBC # BLD AUTO: 6.1 K/UL (ref 3.6–11)
WBC URNS QL MICRO: ABNORMAL /HPF (ref 0–4)

## 2018-12-02 PROCEDURE — 99218 HC RM OBSERVATION: CPT

## 2018-12-02 PROCEDURE — 74011250636 HC RX REV CODE- 250/636: Performed by: EMERGENCY MEDICINE

## 2018-12-02 PROCEDURE — 96375 TX/PRO/DX INJ NEW DRUG ADDON: CPT

## 2018-12-02 PROCEDURE — 74011000250 HC RX REV CODE- 250: Performed by: HOSPITALIST

## 2018-12-02 PROCEDURE — 87086 URINE CULTURE/COLONY COUNT: CPT

## 2018-12-02 PROCEDURE — 65270000029 HC RM PRIVATE

## 2018-12-02 PROCEDURE — 70450 CT HEAD/BRAIN W/O DYE: CPT

## 2018-12-02 PROCEDURE — 99283 EMERGENCY DEPT VISIT LOW MDM: CPT

## 2018-12-02 PROCEDURE — 96360 HYDRATION IV INFUSION INIT: CPT

## 2018-12-02 PROCEDURE — C9113 INJ PANTOPRAZOLE SODIUM, VIA: HCPCS | Performed by: HOSPITALIST

## 2018-12-02 PROCEDURE — 80053 COMPREHEN METABOLIC PANEL: CPT

## 2018-12-02 PROCEDURE — 87077 CULTURE AEROBIC IDENTIFY: CPT

## 2018-12-02 PROCEDURE — 96361 HYDRATE IV INFUSION ADD-ON: CPT

## 2018-12-02 PROCEDURE — 71250 CT THORAX DX C-: CPT

## 2018-12-02 PROCEDURE — 84484 ASSAY OF TROPONIN QUANT: CPT

## 2018-12-02 PROCEDURE — 77030011943

## 2018-12-02 PROCEDURE — 36415 COLL VENOUS BLD VENIPUNCTURE: CPT

## 2018-12-02 PROCEDURE — 96366 THER/PROPH/DIAG IV INF ADDON: CPT

## 2018-12-02 PROCEDURE — 74011000258 HC RX REV CODE- 258: Performed by: HOSPITALIST

## 2018-12-02 PROCEDURE — 74177 CT ABD & PELVIS W/CONTRAST: CPT

## 2018-12-02 PROCEDURE — 83605 ASSAY OF LACTIC ACID: CPT

## 2018-12-02 PROCEDURE — 74011250637 HC RX REV CODE- 250/637: Performed by: HOSPITALIST

## 2018-12-02 PROCEDURE — 87040 BLOOD CULTURE FOR BACTERIA: CPT

## 2018-12-02 PROCEDURE — 96374 THER/PROPH/DIAG INJ IV PUSH: CPT

## 2018-12-02 PROCEDURE — 96367 TX/PROPH/DG ADDL SEQ IV INF: CPT

## 2018-12-02 PROCEDURE — 81001 URINALYSIS AUTO W/SCOPE: CPT

## 2018-12-02 PROCEDURE — 74011250636 HC RX REV CODE- 250/636: Performed by: HOSPITALIST

## 2018-12-02 PROCEDURE — 87186 SC STD MICRODIL/AGAR DIL: CPT

## 2018-12-02 PROCEDURE — 82550 ASSAY OF CK (CPK): CPT

## 2018-12-02 PROCEDURE — 71045 X-RAY EXAM CHEST 1 VIEW: CPT

## 2018-12-02 PROCEDURE — 96365 THER/PROPH/DIAG IV INF INIT: CPT

## 2018-12-02 PROCEDURE — 74011000258 HC RX REV CODE- 258: Performed by: EMERGENCY MEDICINE

## 2018-12-02 PROCEDURE — 96372 THER/PROPH/DIAG INJ SC/IM: CPT

## 2018-12-02 PROCEDURE — 85025 COMPLETE CBC W/AUTO DIFF WBC: CPT

## 2018-12-02 PROCEDURE — 74011636320 HC RX REV CODE- 636/320: Performed by: EMERGENCY MEDICINE

## 2018-12-02 RX ORDER — ONDANSETRON 2 MG/ML
4 INJECTION INTRAMUSCULAR; INTRAVENOUS
Status: DISCONTINUED | OUTPATIENT
Start: 2018-12-02 | End: 2018-12-04 | Stop reason: HOSPADM

## 2018-12-02 RX ORDER — VANCOMYCIN HYDROCHLORIDE
1250 ONCE
Status: COMPLETED | OUTPATIENT
Start: 2018-12-02 | End: 2018-12-02

## 2018-12-02 RX ORDER — TOPIRAMATE 25 MG/1
25 TABLET ORAL
Status: DISCONTINUED | OUTPATIENT
Start: 2018-12-03 | End: 2018-12-04 | Stop reason: HOSPADM

## 2018-12-02 RX ORDER — SODIUM CHLORIDE 0.9 % (FLUSH) 0.9 %
5-10 SYRINGE (ML) INJECTION AS NEEDED
Status: DISCONTINUED | OUTPATIENT
Start: 2018-12-02 | End: 2018-12-04 | Stop reason: HOSPADM

## 2018-12-02 RX ORDER — MIRTAZAPINE 15 MG/1
15 TABLET, FILM COATED ORAL
Status: DISCONTINUED | OUTPATIENT
Start: 2018-12-02 | End: 2018-12-04 | Stop reason: HOSPADM

## 2018-12-02 RX ORDER — SODIUM CHLORIDE 0.9 % (FLUSH) 0.9 %
5-10 SYRINGE (ML) INJECTION EVERY 8 HOURS
Status: DISCONTINUED | OUTPATIENT
Start: 2018-12-02 | End: 2018-12-04 | Stop reason: HOSPADM

## 2018-12-02 RX ORDER — DIPHENHYDRAMINE HCL 25 MG
25 CAPSULE ORAL
Status: DISCONTINUED | OUTPATIENT
Start: 2018-12-02 | End: 2018-12-04 | Stop reason: HOSPADM

## 2018-12-02 RX ORDER — ACETAMINOPHEN 325 MG/1
650 TABLET ORAL
Status: DISCONTINUED | OUTPATIENT
Start: 2018-12-02 | End: 2018-12-04 | Stop reason: HOSPADM

## 2018-12-02 RX ORDER — TRAMADOL HYDROCHLORIDE 50 MG/1
50 TABLET ORAL
Status: DISCONTINUED | OUTPATIENT
Start: 2018-12-02 | End: 2018-12-04 | Stop reason: HOSPADM

## 2018-12-02 RX ORDER — PREDNISONE 10 MG/1
10 TABLET ORAL
Status: DISCONTINUED | OUTPATIENT
Start: 2018-12-03 | End: 2018-12-04 | Stop reason: HOSPADM

## 2018-12-02 RX ORDER — NALOXONE HYDROCHLORIDE 0.4 MG/ML
0.4 INJECTION, SOLUTION INTRAMUSCULAR; INTRAVENOUS; SUBCUTANEOUS AS NEEDED
Status: DISCONTINUED | OUTPATIENT
Start: 2018-12-02 | End: 2018-12-04 | Stop reason: HOSPADM

## 2018-12-02 RX ORDER — SODIUM CHLORIDE 0.9 % (FLUSH) 0.9 %
10 SYRINGE (ML) INJECTION
Status: COMPLETED | OUTPATIENT
Start: 2018-12-02 | End: 2018-12-02

## 2018-12-02 RX ORDER — ATORVASTATIN CALCIUM 40 MG/1
40 TABLET, FILM COATED ORAL DAILY
Status: DISCONTINUED | OUTPATIENT
Start: 2018-12-03 | End: 2018-12-04 | Stop reason: HOSPADM

## 2018-12-02 RX ORDER — LANOLIN ALCOHOL/MO/W.PET/CERES
1000 CREAM (GRAM) TOPICAL DAILY
Status: DISCONTINUED | OUTPATIENT
Start: 2018-12-03 | End: 2018-12-04 | Stop reason: HOSPADM

## 2018-12-02 RX ORDER — FENTANYL CITRATE 50 UG/ML
25 INJECTION, SOLUTION INTRAMUSCULAR; INTRAVENOUS
Status: DISCONTINUED | OUTPATIENT
Start: 2018-12-02 | End: 2018-12-04 | Stop reason: HOSPADM

## 2018-12-02 RX ORDER — FUROSEMIDE 10 MG/ML
40 INJECTION INTRAMUSCULAR; INTRAVENOUS ONCE
Status: COMPLETED | OUTPATIENT
Start: 2018-12-02 | End: 2018-12-02

## 2018-12-02 RX ORDER — ENOXAPARIN SODIUM 100 MG/ML
40 INJECTION SUBCUTANEOUS EVERY 24 HOURS
Status: DISCONTINUED | OUTPATIENT
Start: 2018-12-02 | End: 2018-12-04 | Stop reason: HOSPADM

## 2018-12-02 RX ORDER — MELATONIN
1000 DAILY
Status: DISCONTINUED | OUTPATIENT
Start: 2018-12-03 | End: 2018-12-04 | Stop reason: HOSPADM

## 2018-12-02 RX ORDER — GABAPENTIN 300 MG/1
600 CAPSULE ORAL 2 TIMES DAILY
Status: DISCONTINUED | OUTPATIENT
Start: 2018-12-02 | End: 2018-12-04 | Stop reason: HOSPADM

## 2018-12-02 RX ORDER — SODIUM CHLORIDE 9 MG/ML
75 INJECTION, SOLUTION INTRAVENOUS CONTINUOUS
Status: DISCONTINUED | OUTPATIENT
Start: 2018-12-02 | End: 2018-12-02

## 2018-12-02 RX ORDER — POLYETHYLENE GLYCOL 3350 17 G/17G
17 POWDER, FOR SOLUTION ORAL DAILY
Status: DISCONTINUED | OUTPATIENT
Start: 2018-12-03 | End: 2018-12-04

## 2018-12-02 RX ORDER — AMOXICILLIN 250 MG
1 CAPSULE ORAL DAILY
Status: DISCONTINUED | OUTPATIENT
Start: 2018-12-03 | End: 2018-12-04 | Stop reason: HOSPADM

## 2018-12-02 RX ORDER — GUAIFENESIN 100 MG/5ML
81 LIQUID (ML) ORAL DAILY
Status: DISCONTINUED | OUTPATIENT
Start: 2018-12-03 | End: 2018-12-04 | Stop reason: HOSPADM

## 2018-12-02 RX ADMIN — GABAPENTIN 600 MG: 300 CAPSULE ORAL at 22:21

## 2018-12-02 RX ADMIN — SODIUM CHLORIDE 40 MG: 9 INJECTION, SOLUTION INTRAMUSCULAR; INTRAVENOUS; SUBCUTANEOUS at 22:14

## 2018-12-02 RX ADMIN — VANCOMYCIN HYDROCHLORIDE 1250 MG: 10 INJECTION, POWDER, LYOPHILIZED, FOR SOLUTION INTRAVENOUS at 16:23

## 2018-12-02 RX ADMIN — Medication 10 ML: at 13:38

## 2018-12-02 RX ADMIN — PIPERACILLIN AND TAZOBACTAM 3.38 G: 3; .375 INJECTION, POWDER, FOR SOLUTION INTRAVENOUS at 21:00

## 2018-12-02 RX ADMIN — MIRTAZAPINE 15 MG: 15 TABLET, FILM COATED ORAL at 22:14

## 2018-12-02 RX ADMIN — FUROSEMIDE 40 MG: 10 INJECTION, SOLUTION INTRAMUSCULAR; INTRAVENOUS at 20:00

## 2018-12-02 RX ADMIN — Medication 10 ML: at 22:21

## 2018-12-02 RX ADMIN — IOPAMIDOL 100 ML: 755 INJECTION, SOLUTION INTRAVENOUS at 13:38

## 2018-12-02 RX ADMIN — SODIUM CHLORIDE 100 ML: 900 INJECTION, SOLUTION INTRAVENOUS at 13:39

## 2018-12-02 RX ADMIN — METOPROLOL TARTRATE 75 MG: 50 TABLET ORAL at 22:14

## 2018-12-02 RX ADMIN — SODIUM CHLORIDE 75 ML/HR: 900 INJECTION, SOLUTION INTRAVENOUS at 10:32

## 2018-12-02 RX ADMIN — ENOXAPARIN SODIUM 40 MG: 40 INJECTION SUBCUTANEOUS at 22:13

## 2018-12-02 NOTE — ROUTINE PROCESS
TRANSFER - OUT REPORT: 
 
Verbal report given to Staff RN(name) on Sampson Eisenberg  being transferred to (unit) for routine progression of care Report consisted of patients Situation, Background, Assessment and  
Recommendations(SBAR). Information from the following report(s) SBAR, Kardex, ED Summary and MAR was reviewed with the receiving nurse. Lines:  
Peripheral IV 12/02/18 Left Antecubital (Active) Site Assessment Clean, dry, & intact 12/2/2018 11:58 AM  
Phlebitis Assessment 0 12/2/2018 11:58 AM  
Infiltration Assessment 0 12/2/2018 11:58 AM  
Dressing Status Clean, dry, & intact 12/2/2018 11:58 AM  
Hub Color/Line Status Pink 12/2/2018 11:58 AM  
  
 
Opportunity for questions and clarification was provided. Patient transported with: 
 [employer]

## 2018-12-02 NOTE — ED TRIAGE NOTES
Triage note: Pt arrives via EMS from home with c/o AMS since Friday. Pt stopped eating and taking meds on Friday and has mentally declined since.

## 2018-12-02 NOTE — H&P
History & Physical 
 
Primary Care Provider: Malika Hudson NP Source of Information: ED note and patient History of Presenting Illness:  
Sandra Morin is a 80 y.o. female who presents with AMS, vomiting, abd pain 80 y.o. female with past medical history significant for CAD,  arrhythmia, polymyalgia, GERD, gluten intolerance, arthritis, and CHF who presents from home via EMS with chief complaint of altered mental status. Pt presents to the ED after 2 days of altered mental status. EMS stats that the sons stated that the patient stopped taking her medications on Friday (2 days ago) as well as has stopped eating. Pt has a decreased baseline. Per EMS, pt's baseline is very talkative and ambulatory (with walker). Pt was found by EMS with yellow/green vomit and incontinent. Per EMS, the son stated that the patient will do this occasionally, she will stop taking her medications and stop eating and will eventually become septic. Pt is on 4L of 02 at home. When I evaluated the patient in ED, no family member around, called son left message. Patient currently is able to answer questions, but very poor historian. She complains epigastric pain, and  Suprapubic area pain. No fever noticed in ED. Review of Systems: 
Not available due to her status Past Medical History:  
Diagnosis Date  Arrhythmia A-FIB  Arthritis  CAD (coronary artery disease) 1989 MI  
 Congestive heart failure (CHF) ([address]  GERD (gastroesophageal reflux disease)  Gluten intolerance  Heart failure ([address] CHF  Polymyalgia ([address] Past Surgical History:  
Procedure Laterality Date  CARDIAC SURG PROCEDURE UNLIST  1980'S CARDIAC CATH  
[address], 2006, 2013 LUMBAR SPINE  
 HX CHOLECYSTECTOMY  HX HIP REPLACEMENT Right 5/2015 [facility]  HX TONSILLECTOMY  CHILD  
 HX UROLOGICAL  BLADDER SUSPENSION - MESH  
 
 Prior to Admission medications Medication Sig Start Date End Date Taking? Authorizing Provider  
ondansetron (ZOFRAN ODT) 8 mg disintegrating tablet Take 1 Tab by mouth Before breakfast, lunch, and dinner. 11/7/18   Twin Melgoza NP  
atorvastatin (LIPITOR) 40 mg tablet Take 1 Tab by mouth daily for 90 days. 11/6/18 2/4/19  Twin Melgoza NP  
aspirin 81 mg chewable tablet Take 1 Tab by mouth daily. 10/15/18   Kiran Garcia MD  
gabapentin (NEURONTIN) 300 mg capsule Take 600 mg by mouth two (2) times a day. Provider, Historical  
predniSONE (DELTASONE) 10 mg tablet Take 10 mg by mouth every evening. Provider, Historical  
metoprolol tartrate (LOPRESSOR) 50 mg tablet Take 1.5 Tabs by mouth two (2) times a day. 10/10/18   Twin Melgoza NP  
mirtazapine (REMERON) 15 mg tablet Take 1 Tab by mouth nightly. Indications: major depressive disorder 10/9/18   Twin Melgoza NP Bifidobacterium Infantis (ALIGN) 4 mg cap Take 4 mg by mouth daily. Provider, Historical  
fentaNYL (DURAGESIC) 25 mcg/hr PATCH 1 Patch by TransDERmal route every seventy-two (72) hours. Provider, Historical  
traMADol (ULTRAM) 50 mg tablet Take 50 mg by mouth two (2) times daily as needed for Pain. Provider, Historical  
polyethylene glycol (MIRALAX) 17 gram/dose powder Take 17 g by mouth daily as needed. Provider, Historical  
topiramate (TOPAMAX) 25 mg tablet Take 1 Tab by mouth daily (with breakfast). 5/2/18   Poornimasam Garcia NP  
senna-docusate (SENNA PLUS) 8.6-50 mg per tablet Take 1 Tab by mouth daily. Provider, Historical  
cyanocobalamin 1,000 mcg tablet Take 1,000 mcg by mouth daily. Provider, Historical  
nitroglycerin (NITROSTAT) 0.4 mg SL tablet 1 Tab by SubLINGual route every five (5) minutes as needed for Chest Pain. 6/7/17   Ramesh Benson MD  
cholecalciferol (VITAMIN D3) 1,000 unit tablet Take 1,000 Units by mouth daily.     Provider, Historical  
 esomeprazole (NEXIUM) 40 mg capsule Take 40 mg by mouth Daily (before breakfast). Provider, Historical  
 
Allergies Allergen Reactions  Phenergan [Promethazine] Other (comments) \"loose my wits i go crazy\"  Nsaids (Non-Steroidal Anti-Inflammatory Drug) Nausea and Vomiting  Adhesive Tape-Silicones Other (comments) BLISTERS  Aspirin Other (comments) Per EMS patient is allergic, patient agrees but is confused.  Atenolol Nausea and Vomiting  Cymbalta [Duloxetine] Other (comments) CONFUSION 
  
 Digoxin Nausea and Vomiting  Gluten Other (comments) GLUTEN INTOLERANCE  
 Macrobid [Nitrofurantoin Monohyd/M-Cryst] Nausea and Vomiting  Novocain [Procaine] Other (comments) Paralysis  Sulfa (Sulfonamide Antibiotics) Nausea and Vomiting  Codeine Other (comments) Unable to swallow Family History Problem Relation Age of Onset Paez Dangelo Other Mother Intestinal obstruction  Cancer Father  Stroke Father  Heart Attack Brother  Cancer Brother  Cancer Brother  Anesth Problems Neg Hx SOCIAL HISTORY: 
Patient resides: 
Independently Assisted Living SNF With family care x Smoking history:  
None c Former Chronic Alcohol history:  
None c Social   
Chronic Ambulates:  
Independently   
w/cane   
w/walker c  
w/wc CODE STATUS: 
DNR c Full Other Objective:  
 
Physical Exam:  
 
Visit Vitals /73 (BP 1 Location: Left arm, BP Patient Position: At rest) Pulse 87 Temp 98.8 °F (37.1 °C) Resp 16 Ht 5' (1.524 m) SpO2 95% BMI 25.00 kg/m² O2 Device: Room air General:  Awake  cooperative,pverall weak. Head:  Normocephalic, without obvious abnormality, atraumatic. Eyes:  Conjunctivae/corneas clear. PERRL, EOMs intact. Nose: Nares normal. Septum midline. Mucosa normal. No drainage or sinus tenderness.   
Throat: Lips, mucosa, and tongue dry,   
 Neck: Supple, symmetrical, trachea midline, no adenopathy, thyroid: no enlargement/tenderness/nodules, no carotid bruit and no JVD. Back:   Symmetric, no curvature. ROM normal. No CVA tenderness. Lungs: Tachypnea, exp wheezing and rhonchi. Chest wall:  No tenderness or deformity. Heart:  Regular rate and rhythm, S1, S2 normal, no murmur, click, rub or gallop. Abdomen:   Soft,diffuse tenderness, more significant in epigastric area. Suprapubic area tenderness. Bowel sounds normal. No masses,  No organomegaly. Extremities: Extremities normal, atraumatic, ++ leann,a   
Pulses: 2+ and symmetric all extremities. Skin: Skin no open lesion. Mild erythema in left lower leg. Neurologic: CNII-XII intact. No focal weakness. Data Review:  
 
Recent Days: 
Recent Labs 12/02/18 
1122 WBC 6.1 HGB 12.3 HCT 40.9  Recent Labs 12/02/18 
1122   
K 4.6  CO2 28 * BUN 9  
CREA 0.73 CA 9.2 ALB 3.2* SGOT 35 ALT 24 No results for input(s): PH, PCO2, PO2, HCO3, FIO2 in the last 72 hours. 24 Hour Results: 
Recent Results (from the past 24 hour(s)) CBC WITH AUTOMATED DIFF Collection Time: 12/02/18 11:22 AM  
Result Value Ref Range WBC 6.1 3.6 - 11.0 K/uL  
 RBC 4.23 3.80 - 5.20 M/uL  
 HGB 12.3 11.5 - 16.0 g/dL HCT 40.9 35.0 - 47.0 % MCV 96.7 80.0 - 99.0 FL  
 MCH 29.1 26.0 - 34.0 PG  
 MCHC 30.1 30.0 - 36.5 g/dL  
 RDW 17.5 (H) 11.5 - 14.5 % PLATELET 602 432 - 750 K/uL MPV 10.9 8.9 - 12.9 FL  
 NRBC 0.0 0  WBC ABSOLUTE NRBC 0.00 0.00 - 0.01 K/uL NEUTROPHILS 79 (H) 32 - 75 % LYMPHOCYTES 10 (L) 12 - 49 % MONOCYTES 8 5 - 13 % EOSINOPHILS 2 0 - 7 % BASOPHILS 1 0 - 1 % IMMATURE GRANULOCYTES 0 0.0 - 0.5 % ABS. NEUTROPHILS 4.8 1.8 - 8.0 K/UL  
 ABS. LYMPHOCYTES 0.6 (L) 0.8 - 3.5 K/UL  
 ABS. MONOCYTES 0.5 0.0 - 1.0 K/UL  
 ABS. EOSINOPHILS 0.1 0.0 - 0.4 K/UL  
 ABS. BASOPHILS 0.1 0.0 - 0.1 K/UL ABS. IMM. GRANS. 0.0 0.00 - 0.04 K/UL  
 DF SMEAR SCANNED    
 RBC COMMENTS ANISOCYTOSIS 1+ 
    
 RBC COMMENTS OVALOCYTES PRESENT 
    
 RBC COMMENTS HYPOCHROMIA 1+ 
    
 RBC COMMENTS MACROCYTOSIS 
1+ METABOLIC PANEL, COMPREHENSIVE Collection Time: 12/02/18 11:22 AM  
Result Value Ref Range Sodium 141 136 - 145 mmol/L Potassium 4.6 3.5 - 5.1 mmol/L Chloride 105 97 - 108 mmol/L  
 CO2 28 21 - 32 mmol/L Anion gap 8 5 - 15 mmol/L Glucose 104 (H) 65 - 100 mg/dL BUN 9 6 - 20 MG/DL Creatinine 0.73 0.55 - 1.02 MG/DL  
 BUN/Creatinine ratio 12 12 - 20 GFR est AA >60 >60 ml/min/1.73m2 GFR est non-AA >60 >60 ml/min/1.73m2 Calcium 9.2 8.5 - 10.1 MG/DL Bilirubin, total 0.9 0.2 - 1.0 MG/DL  
 ALT (SGPT) 24 12 - 78 U/L  
 AST (SGOT) 35 15 - 37 U/L Alk. phosphatase 100 45 - 117 U/L Protein, total 6.5 6.4 - 8.2 g/dL Albumin 3.2 (L) 3.5 - 5.0 g/dL Globulin 3.3 2.0 - 4.0 g/dL A-G Ratio 1.0 (L) 1.1 - 2.2 CK W/ CKMB & INDEX Collection Time: 12/02/18 11:22 AM  
Result Value Ref Range  26 - 192 U/L  
 CK - MB 2.7 <3.6 NG/ML  
 CK-MB Index 2.4 0 - 2.5    
TROPONIN I Collection Time: 12/02/18 11:22 AM  
Result Value Ref Range Troponin-I, Qt. <0.05 <0.05 ng/mL SAMPLES BEING HELD Collection Time: 12/02/18 11:22 AM  
Result Value Ref Range SAMPLES BEING HELD 1RED 1BLU   
 COMMENT Add-on orders for these samples will be processed based on acceptable specimen integrity and analyte stability, which may vary by analyte. LACTIC ACID Collection Time: 12/02/18 11:22 AM  
Result Value Ref Range Lactic acid 1.7 0.4 - 2.0 MMOL/L Imaging:  CXR: IMPRESSION: Very mild linear scarring within the left midlung. Assessment:  
 
Active Problems: 
  Acute metabolic encephalopathy (42/2/3824) Plan: 1. AMS/Acute metabolic encephalopathy: possible due to early infection, etiology unclear, unlike CVA. UA is pending, CXR none specific changes, but noticed patient is tachypnea and having wheezing and coarse bs. ? Aspiration pneumonitis. Will order none contrast Chest CT. Also concerns the mild redness aound the left lower leg, ? Early cellulitis. Pt will be empirically start IV zosyn and vanco. And following blood cultures. ? Medications related. Not sure if she is still taking fentanyl patch. 2. abd pain and vomiting: ABD ct no acute changes. ? Acute gastritis vs none bleeding PUD. Change to IV PPI. 3. Diastolic congestive heart failure: leg was edematous. Will give one dose of IV lasix tonight. Prn diuresis based on leg swelling and following renal function. 4. Polymyalgia: continue small dose prednisone daily 5 CAD: no cp, continue asa Signed By: Cynthia Rojas MD   
 December 2, 2018

## 2018-12-02 NOTE — ED PROVIDER NOTES
80 y.o. female with past medical history significant for CAD, heart failure, arrhythmia, polymyalgia, GERD, gluten intolerance, arthritis, and CHF who presents from home via EMS with chief complaint of altered mental status. Pt presents to the ED after 2 days of altered mental status. EMS stats that the sons stated that the patient stopped taking her medications on Friday (2 days ago) as well as has stopped eating. Pt has a decreased baseline. Per EMS, pt's baseline is very talkative and ambulatory (with walker). Pt was found by EMS with yellow/green vomit and incontinent. Per EMS, the son stated that the patient will do this occasionally, she will stop taking her medications and stop eating and will eventually become septic. Pt is on 4L of 02 at home. There are no other acute medical concerns at this time. Social hx: Never smoker. PCP: Gary Yanes NP Note written by mike He, as dictated by Edwige Hughes MD 10:10 AM 
 
 
 
The history is provided by the patient. No  was used. Past Medical History:  
Diagnosis Date  Arrhythmia A-FIB  Arthritis  CAD (coronary artery disease) 1989 MI  
 Congestive heart failure (CHF) ([address]  GERD (gastroesophageal reflux disease)  Gluten intolerance  Heart failure ([address] CHF  Polymyalgia ([address] Past Surgical History:  
Procedure Laterality Date  CARDIAC SURG PROCEDURE UNLIST  1980'S CARDIAC CATH  
[address], 2006, 2013 LUMBAR SPINE  
 HX CHOLECYSTECTOMY  HX HIP REPLACEMENT Right 5/2015 [facility]  HX TONSILLECTOMY  CHILD  
 HX UROLOGICAL BLADDER SUSPENSION - MESH Family History:  
Problem Relation Age of Onset [facility] Other Mother Intestinal obstruction  Cancer Father  Stroke Father  Heart Attack Brother  Cancer Brother  Cancer Brother  Anesth Problems Neg Hx Social History Socioeconomic History  Marital status: SINGLE Spouse name: Not on file  Number of children: Not on file  Years of education: Not on file  Highest education level: Not on file Social Needs  Financial resource strain: Not on file  Food insecurity - worry: Not on file  Food insecurity - inability: Not on file  Transportation needs - medical: Not on file  Transportation needs - non-medical: Not on file Occupational History  Not on file Tobacco Use  Smoking status: Never Smoker  Smokeless tobacco: Never Used Substance and Sexual Activity  Alcohol use: No  
  Alcohol/week: 0.0 oz  Drug use: No  
 Sexual activity: No  
Other Topics Concern  Not on file Social History Narrative  Not on file ALLERGIES: Phenergan [promethazine]; Nsaids (non-steroidal anti-inflammatory drug); Adhesive tape-silicones; Aspirin; Atenolol; Cymbalta [duloxetine]; Digoxin; Gluten; Macrobid [nitrofurantoin monohyd/m-cryst]; Novocain [procaine]; Sulfa (sulfonamide antibiotics); and Codeine Review of Systems Reason unable to perform ROS: patient with altered mental status. Constitutional: Positive for activity change (not as active with walker) and appetite change (decreased foot intake). Negative for chills and fever. HENT: Negative for rhinorrhea, sore throat and trouble swallowing. Eyes: Negative for photophobia. Respiratory: Negative for cough and shortness of breath. Cardiovascular: Negative for chest pain and palpitations. Gastrointestinal: Positive for vomiting (yellow green vomit). Negative for abdominal pain and nausea. Genitourinary: Negative for dysuria, frequency and hematuria. Incontinent Musculoskeletal: Negative for arthralgias. Neurological: Negative for dizziness, syncope and weakness. Psychiatric/Behavioral: Positive for behavioral problems (altered mental status). The patient is not nervous/anxious. Vitals:  
 12/02/18 1015 BP: 164/73 Pulse: 87 Resp: 16 Temp: 98.8 °F (37.1 °C) SpO2: 95% Height: 5' (1.524 m) Physical Exam  
Constitutional: She appears well-developed and well-nourished. She appears lethargic. No distress. Patient does respond to verbal stiumulus and will follow commands but will not speak. VS as noted. HENT:  
Head: Normocephalic and atraumatic. Nose: Nose normal.  
Mouth/Throat: Oropharynx is clear and moist.  
Eyes: Conjunctivae and EOM are normal. Pupils are equal, round, and reactive to light. No scleral icterus. Neck: Normal range of motion. Neck supple. No JVD present. No tracheal deviation present. No thyromegaly present. No carotid bruits noted. Cardiovascular: Normal rate, regular rhythm, normal heart sounds and intact distal pulses. Exam reveals no gallop and no friction rub. No murmur heard. Pulmonary/Chest: Effort normal and breath sounds normal. No respiratory distress. She has no wheezes. She has no rales. She exhibits no tenderness. Abdominal: Soft. Bowel sounds are normal. She exhibits no distension and no mass. There is tenderness (Patient appears to have pain with palpation. ). There is no rebound and no guarding. Musculoskeletal: Normal range of motion. She exhibits edema. She exhibits no tenderness. There is some mild erythema and swelling of both lower extremities, but they are not warm. From knees down. Lymphadenopathy:  
  She has no cervical adenopathy. Neurological: She has normal reflexes. She appears lethargic. No cranial nerve deficit. Coordination normal.  
Skin: Skin is warm and dry. No rash noted. No erythema. Psychiatric: She has a normal mood and affect. Her behavior is normal. Judgment and thought content normal.  
Nursing note and vitals reviewed. MDM Number of Diagnoses or Management Options Amount and/or Complexity of Data Reviewed Clinical lab tests: ordered and reviewed Tests in the radiology section of CPT®: ordered and reviewed Decide to obtain previous medical records or to obtain history from someone other than the patient: yes Review and summarize past medical records: yes Discuss the patient with other providers: yes Independent visualization of images, tracings, or specimens: yes Risk of Complications, Morbidity, and/or Mortality Presenting problems: high Diagnostic procedures: high Management options: high Patient Progress Patient progress: stable Procedures It is noted that the patient had an acute MI in October of this year. She also had altered mental status due to her urinary tract infection in early October of this year. According to family, when she gets like this she usually has to be admitted for sepsis. It is noted at this time the patient will respond to verbal stimulus, she will open her eyes and look at you, she will  with both arms without difficulty and no lateralization of findings. She states that she is not hurting anywhere in the palpation of the abdomen results and the patient's grimacing and grunting as if she is in pain. She also seems to have some pain in her lower extremities. They're both swollen and slightly erythematous. They are not warm. Labs and cleaning CBC, lactate, cultures, urinalysis and chest x-ray will be in pain. We will also CT the patient's abdomen given the degree of discomfort she appears to have. She currently is on 4 L of nasal oxygen which he wears for COPD CHF, and this will be continued. She has not eaten or had fluids over the last several days. Will provide IV hydration while labs were being evaluated. Labs do not indicate any evidence of sepsis. Given her age, not eating or taking fluids, and not taking meds, will ask hospitalist to admit for further evaluation and treatment.

## 2018-12-03 ENCOUNTER — DOCUMENTATION ONLY (OUTPATIENT)
Dept: FAMILY MEDICINE CLINIC | Age: 83
End: 2018-12-03

## 2018-12-03 LAB
ALBUMIN SERPL-MCNC: 3 G/DL (ref 3.5–5)
ALBUMIN/GLOB SERPL: 1 {RATIO} (ref 1.1–2.2)
ALP SERPL-CCNC: 87 U/L (ref 45–117)
ALT SERPL-CCNC: 20 U/L (ref 12–78)
ANION GAP SERPL CALC-SCNC: 5 MMOL/L (ref 5–15)
AST SERPL-CCNC: 21 U/L (ref 15–37)
BASOPHILS # BLD: 0 K/UL (ref 0–0.1)
BASOPHILS NFR BLD: 1 % (ref 0–1)
BILIRUB SERPL-MCNC: 1.1 MG/DL (ref 0.2–1)
BUN SERPL-MCNC: 9 MG/DL (ref 6–20)
BUN/CREAT SERPL: 14 (ref 12–20)
CALCIUM SERPL-MCNC: 9 MG/DL (ref 8.5–10.1)
CHLORIDE SERPL-SCNC: 110 MMOL/L (ref 97–108)
CO2 SERPL-SCNC: 28 MMOL/L (ref 21–32)
CREAT SERPL-MCNC: 0.64 MG/DL (ref 0.55–1.02)
DIFFERENTIAL METHOD BLD: ABNORMAL
EOSINOPHIL # BLD: 0.2 K/UL (ref 0–0.4)
EOSINOPHIL NFR BLD: 4 % (ref 0–7)
ERYTHROCYTE [DISTWIDTH] IN BLOOD BY AUTOMATED COUNT: 17.5 % (ref 11.5–14.5)
GLOBULIN SER CALC-MCNC: 2.9 G/DL (ref 2–4)
GLUCOSE SERPL-MCNC: 100 MG/DL (ref 65–100)
HCT VFR BLD AUTO: 36.5 % (ref 35–47)
HGB BLD-MCNC: 11.4 G/DL (ref 11.5–16)
IMM GRANULOCYTES # BLD: 0 K/UL (ref 0–0.04)
IMM GRANULOCYTES NFR BLD AUTO: 0 % (ref 0–0.5)
LYMPHOCYTES # BLD: 0.7 K/UL (ref 0.8–3.5)
LYMPHOCYTES NFR BLD: 16 % (ref 12–49)
MAGNESIUM SERPL-MCNC: 1.7 MG/DL (ref 1.6–2.4)
MCH RBC QN AUTO: 29.7 PG (ref 26–34)
MCHC RBC AUTO-ENTMCNC: 31.2 G/DL (ref 30–36.5)
MCV RBC AUTO: 95.1 FL (ref 80–99)
MONOCYTES # BLD: 0.5 K/UL (ref 0–1)
MONOCYTES NFR BLD: 13 % (ref 5–13)
NEUTS SEG # BLD: 2.7 K/UL (ref 1.8–8)
NEUTS SEG NFR BLD: 66 % (ref 32–75)
NRBC # BLD: 0 K/UL (ref 0–0.01)
NRBC BLD-RTO: 0 PER 100 WBC
PHOSPHATE SERPL-MCNC: 3.5 MG/DL (ref 2.6–4.7)
PLATELET # BLD AUTO: 147 K/UL (ref 150–400)
PMV BLD AUTO: 9.6 FL (ref 8.9–12.9)
POTASSIUM SERPL-SCNC: 4.1 MMOL/L (ref 3.5–5.1)
PROT SERPL-MCNC: 5.9 G/DL (ref 6.4–8.2)
RBC # BLD AUTO: 3.84 M/UL (ref 3.8–5.2)
SODIUM SERPL-SCNC: 143 MMOL/L (ref 136–145)
WBC # BLD AUTO: 4 K/UL (ref 3.6–11)

## 2018-12-03 PROCEDURE — 74011250637 HC RX REV CODE- 250/637: Performed by: HOSPITALIST

## 2018-12-03 PROCEDURE — 84100 ASSAY OF PHOSPHORUS: CPT

## 2018-12-03 PROCEDURE — 74011000250 HC RX REV CODE- 250: Performed by: HOSPITALIST

## 2018-12-03 PROCEDURE — 74011000258 HC RX REV CODE- 258: Performed by: HOSPITALIST

## 2018-12-03 PROCEDURE — C9113 INJ PANTOPRAZOLE SODIUM, VIA: HCPCS | Performed by: HOSPITALIST

## 2018-12-03 PROCEDURE — 83735 ASSAY OF MAGNESIUM: CPT

## 2018-12-03 PROCEDURE — 99218 HC RM OBSERVATION: CPT

## 2018-12-03 PROCEDURE — 96376 TX/PRO/DX INJ SAME DRUG ADON: CPT

## 2018-12-03 PROCEDURE — 96372 THER/PROPH/DIAG INJ SC/IM: CPT

## 2018-12-03 PROCEDURE — 80053 COMPREHEN METABOLIC PANEL: CPT

## 2018-12-03 PROCEDURE — 74011250636 HC RX REV CODE- 250/636: Performed by: HOSPITALIST

## 2018-12-03 PROCEDURE — 74011636637 HC RX REV CODE- 636/637: Performed by: HOSPITALIST

## 2018-12-03 PROCEDURE — 96366 THER/PROPH/DIAG IV INF ADDON: CPT

## 2018-12-03 PROCEDURE — 36415 COLL VENOUS BLD VENIPUNCTURE: CPT

## 2018-12-03 PROCEDURE — 85025 COMPLETE CBC W/AUTO DIFF WBC: CPT

## 2018-12-03 PROCEDURE — 65270000029 HC RM PRIVATE

## 2018-12-03 RX ADMIN — ASPIRIN 81 MG CHEWABLE TABLET 81 MG: 81 TABLET CHEWABLE at 10:25

## 2018-12-03 RX ADMIN — TRAMADOL HYDROCHLORIDE 50 MG: 50 TABLET, FILM COATED ORAL at 20:54

## 2018-12-03 RX ADMIN — PREDNISONE 10 MG: 10 TABLET ORAL at 18:23

## 2018-12-03 RX ADMIN — METOPROLOL TARTRATE 75 MG: 50 TABLET ORAL at 20:54

## 2018-12-03 RX ADMIN — METOPROLOL TARTRATE 75 MG: 50 TABLET ORAL at 10:26

## 2018-12-03 RX ADMIN — ATORVASTATIN CALCIUM 40 MG: 40 TABLET, FILM COATED ORAL at 10:25

## 2018-12-03 RX ADMIN — PIPERACILLIN AND TAZOBACTAM 3.38 G: 3; .375 INJECTION, POWDER, FOR SOLUTION INTRAVENOUS at 13:19

## 2018-12-03 RX ADMIN — SENNOSIDES AND DOCUSATE SODIUM 1 TABLET: 8.6; 5 TABLET ORAL at 10:25

## 2018-12-03 RX ADMIN — POLYETHYLENE GLYCOL 3350 17 G: 17 POWDER, FOR SOLUTION ORAL at 10:25

## 2018-12-03 RX ADMIN — SODIUM CHLORIDE 40 MG: 9 INJECTION, SOLUTION INTRAMUSCULAR; INTRAVENOUS; SUBCUTANEOUS at 10:24

## 2018-12-03 RX ADMIN — Medication 10 ML: at 06:00

## 2018-12-03 RX ADMIN — Medication 10 ML: at 13:19

## 2018-12-03 RX ADMIN — GABAPENTIN 600 MG: 300 CAPSULE ORAL at 10:25

## 2018-12-03 RX ADMIN — ENOXAPARIN SODIUM 40 MG: 40 INJECTION SUBCUTANEOUS at 20:12

## 2018-12-03 RX ADMIN — MIRTAZAPINE 15 MG: 15 TABLET, FILM COATED ORAL at 20:54

## 2018-12-03 RX ADMIN — GABAPENTIN 600 MG: 300 CAPSULE ORAL at 18:23

## 2018-12-03 RX ADMIN — PIPERACILLIN AND TAZOBACTAM 3.38 G: 3; .375 INJECTION, POWDER, FOR SOLUTION INTRAVENOUS at 20:55

## 2018-12-03 RX ADMIN — VITAMIN D, TAB 1000IU (100/BT) 1000 UNITS: 25 TAB at 10:26

## 2018-12-03 RX ADMIN — PIPERACILLIN AND TAZOBACTAM 3.38 G: 3; .375 INJECTION, POWDER, FOR SOLUTION INTRAVENOUS at 05:00

## 2018-12-03 RX ADMIN — CYANOCOBALAMIN TAB 500 MCG 1000 MCG: 500 TAB at 10:26

## 2018-12-03 RX ADMIN — TOPIRAMATE 25 MG: 25 TABLET, FILM COATED ORAL at 10:28

## 2018-12-03 RX ADMIN — SODIUM CHLORIDE 40 MG: 9 INJECTION, SOLUTION INTRAMUSCULAR; INTRAVENOUS; SUBCUTANEOUS at 20:55

## 2018-12-03 RX ADMIN — Medication 10 ML: at 22:00

## 2018-12-03 NOTE — PROGRESS NOTES
Pharmacist Note - Vancomycin Dosing Consult provided for this 80 y.o. female for indication of sepsis? Aspiration pneumonitis/cellulitis? Bharti Donnelly Antibiotic regimen(s): Vanc + Zosyn Recent Labs 18 
1122 WBC 6.1 CREA 0.73  
BUN 9 Frequency of BMP: daily x3 Height: 152.4 cm Weight: 54.7 kg Est CrCl: 37 ml/min; Temp (24hrs), Av.3 °F (36.8 °C), Min:97.8 °F (36.6 °C), Max:98.8 °F (37.1 °C) Cultures: 
 blood - pending / urine -pending Goal trough = 15 - 20 mcg/mL Therapy will be initiated with a loading dose of 1250 mg IV x 1 to be followed by a maintenance dose of 750 mg IV every 24 hours. Pharmacy to follow patient daily and order levels / make dose adjustments as appropriate.

## 2018-12-03 NOTE — PROGRESS NOTES
Home Based [address]  [phone]       Name: Tamela Christianson  Date of birth: [birth year]    Date and time of visit: 12/3/18, 12:30 PM     Admitting diagnosis:  AMS, vomiting, abd pain     She has completed an AMD designating her son, Mira Hull [phone] as primary health care agent. She also completed an Advance Directive which directs for no life prolonging care in the context of imminent death. She has a DDNR on file    Made nurse visit to see patient in the hospital [facility]) after son called EMS for patient to go to ED for evaluation and additional consult as needed due to AMS, vomiting, and abdominal pain. Patient admitted on Sunday, December 2, 2018, and is currently on the sixth floor. Upon arrival patient is in bed resting with no family at bedside. She reported feeling \"foggy\", she was not able to recall who I was. Patient has IV access, but nothing running during time of my visit. I attempted to speak with both nurse Jazmin Rojas) and  Ian Anderson but neither was available during visit. Left my contact information with Rodrigue Gaytan to call this nurse as needed. Discharge:TBD    This nurse will continue to follow and schedule MARLENY upon discharge home.

## 2018-12-03 NOTE — PROGRESS NOTES
Problem: Pressure Injury - Risk of 
Goal: *Prevention of pressure injury Document Marcelo Scale and appropriate interventions in the flowsheet. Outcome: Progressing Towards Goal 
Pressure Injury Interventions: 
Sensory Interventions: Assess changes in LOC Moisture Interventions: Apply protective barrier, creams and emollients, Check for incontinence Q2 hours and as needed, Maintain skin hydration (lotion/cream) Activity Interventions: Assess need for specialty bed, Increase time out of bed Mobility Interventions: HOB 30 degrees or less Nutrition Interventions: Document food/fluid/supplement intake, Offer support with meals,snacks and hydration Friction and Shear Interventions: Feet elevated on foot rest, HOB 30 degrees or less

## 2018-12-03 NOTE — PROGRESS NOTES
Problem: Falls - Risk of 
Goal: *Absence of Falls Document Jefe Renteria Fall Risk and appropriate interventions in the flowsheet. Outcome: Progressing Towards Goal 
Fall Risk Interventions: 
Mobility Interventions: Bed/chair exit alarm, Patient to call before getting OOB Mentation Interventions: Bed/chair exit alarm, Evaluate medications/consider consulting pharmacy Elimination Interventions: Bed/chair exit alarm, Call light in reach, Patient to call for help with toileting needs

## 2018-12-03 NOTE — CDMP QUERY
Eliot Yang, Please clarify if this patient is (was) being treated/managed for:  
 
=> Possible urinary tract infection(POA) in the setting of +GNR UCX requiring IV Zosyn 
=> Other explanation of clinical findings 
=> Clinically Undetermined (no explanation for clinical findings) The medical record reflects the following clinical findings, treatment, and risk factors. Risk Factors:  Metabolic encephalopathy Clinical Indicators:  UCX +GNR Treatment: IV Zosyn Please clarify and document your clinical opinion in the progress notes and discharge summary including the definitive and/or presumptive diagnosis, (suspected or probable), related to the above clinical findings. Please include clinical findings supporting your diagnosis. Thank you, Luz Maria Silva [facility] 
[phone]

## 2018-12-03 NOTE — PROGRESS NOTES
Bedside shift change report given to Buffy Brewer RN (oncoming nurse) by Stephie Lozoya (offgoing nurse). Report included the following information SBAR.

## 2018-12-03 NOTE — PROGRESS NOTES
..Bedside shift change report given to Esau Burks RN (oncoming nurse) by JUAN PABLO Shanks (offgoing nurse). Report included the following information SBAR, Kardex, Intake/Output and MAR.

## 2018-12-03 NOTE — PROGRESS NOTES
Hospitalist Progress Note Chitra Arita MD 
Answering service: [phone] OR [phone] from in house phone Date of Service:  12/3/2018 NAME:  Sampson Eisenberg :  1925 MRN:  [id number] Admission Summary:  
Sampson Eisenberg is a 80 y.o. female who presents with AMS, vomiting, abd pain  
  
80 y.o. female with past medical history significant for CAD,  arrhythmia, polymyalgia, GERD, gluten intolerance, arthritis, and CHF who presents from home via EMS with chief complaint of altered mental status.  Pt presents to the ED after 2 days of altered mental status. EMS stats that the sons stated that the patient stopped taking her medications on Friday (2 days ago) as well as has stopped eating. Pt has a decreased baseline. Interval history / Subjective:  
Pt is AAOx 3 this morning I had a pathetic braekfast  
 
Assessment & Plan: 1. AMS/Acute metabolic encephalopathy: resolved - possible due to early infection, etiology unclear, cxr / ua wnl 
- ? Aspiration pneumonitis. Or cellulitis? 
- will d/c vanc cont zosyn for 1 more day and switch abx tomorrow to PO  
  
2. abd pain and vomiting: - ABD ct no acute changes. ? Acute gastritis vs none bleeding PUD. Change to IV PPI. 3. Diastolic congestive heart failure: leg was edematous. Will give one dose of IV lasix tonight. Prn diuresis based on leg swelling and following renal function. 4. Polymyalgia: continue small dose prednisone daily 5 CAD: no cp, continue asa pt on O2 at home 2L NC 
 
6. Acute on chronic diastolic CHF - stable Code status: DNR 
DVT prophylaxis: SCD Care Plan discussed with: Patient/Family and Nurse Disposition: TBD Hospital Problems  Date Reviewed: 10/16/2018 Codes Class Noted POA * (Principal) Acute metabolic encephalopathy [id number]: G93.41 
ICD-9-CM: 348.31  2018 Yes Review of Systems: A comprehensive review of systems was negative. Vital Signs:  
 Last 24hrs VS reviewed since prior progress note. Most recent are: 
Visit Vitals /67 (BP 1 Location: Right arm, BP Patient Position: At rest) Pulse 72 Temp 97.9 °F (36.6 °C) Resp 18 Ht 5' (1.524 m) Wt 54.7 kg (120 lb 9.6 oz) SpO2 99% BMI 23.55 kg/m² No intake or output data in the 24 hours ending 12/03/18 1103 Physical Examination:  
 
 
     
Constitutional:  No acute distress, cooperative, pleasant   
ENT:  Oral mucous moist, oropharynx benign. Neck supple, Resp:  CTA bilaterally. No wheezing/rhonchi/rales. No accessory muscle use CV:  Regular rhythm, normal rate, no murmurs, gallops, rubs GI:  Soft, non distended, non tender. normoactive bowel sounds, no hepatosplenomegaly Musculoskeletal:  No edema, warm, 2+ pulses throughout Neurologic:  Moves all extremities. AAOx3, CN II-XII reviewed Psych:  Good insight, Not anxious nor agitated. Data Review:  
 I personally reviewed  Image and labs Ct Head Wo Cont Result Date: 12/2/2018 IMPRESSION: No acute intracranial hemorrhage, mass or infarct. Ct Chest Wo Cont Result Date: 12/2/2018 IMPRESSION: Chronic lung changes Ct Abd Pelv W Cont Result Date: 12/2/2018 IMPRESSION: No bowel obstruction, ileus or perforation. No intra-abdominal abscess. Xr Chest [facility] Result Date: 12/2/2018 IMPRESSION: Very mild linear scarring within the left midlung. Labs:  
 
Recent Labs 12/03/18 
0327 12/02/18 
1122 WBC 4.0 6.1 HGB 11.4* 12.3 HCT 36.5 40.9 * 175 Recent Labs 12/03/18 
0327 12/02/18 
1122  141  
K 4.1 4.6 * 105 CO2 28 28 BUN 9 9  
CREA 0.64 0.73  104* CA 9.0 9.2 MG 1.7  --   
PHOS 3.5  --   
 
Recent Labs 12/03/18 
0327 12/02/18 
1122 SGOT 21 35 ALT 20 24 AP 87 100 TBILI 1.1* 0.9 TP 5.9* 6.5 ALB 3.0* 3.2*  
GLOB 2.9 3.3 No results for input(s): INR, PTP, APTT in the last 72 hours. No lab exists for component: INREXT No results for input(s): FE, TIBC, PSAT, FERR in the last 72 hours. No results found for: FOL, RBCF No results for input(s): PH, PCO2, PO2 in the last 72 hours. Recent Labs 12/02/18 
1122  CKNDX 2.4  
TROIQ <0.05 No results found for: CHOL, CHOLX, CHLST, CHOLV, HDL, LDL, LDLC, DLDLP, TGLX, TRIGL, TRIGP, CHHD, CHHDX Lab Results Component Value Date/Time Glucose (POC) 191 (H) 10/12/2018 12:52 PM  
 Glucose (POC) 89 10/03/2018 07:30 AM  
 
Lab Results Component Value Date/Time Color YELLOW/STRAW 12/02/2018 05:18 PM  
 Appearance CLEAR 12/02/2018 05:18 PM  
 Specific gravity 1.027 12/02/2018 05:18 PM  
 Specific gravity 1.025 10/03/2018 06:18 AM  
 pH (UA) 8.0 12/02/2018 05:18 PM  
 Protein NEGATIVE  12/02/2018 05:18 PM  
 Glucose NEGATIVE  12/02/2018 05:18 PM  
 Ketone 15 (A) 12/02/2018 05:18 PM  
 Bilirubin NEGATIVE  12/02/2018 05:18 PM  
 Urobilinogen 0.2 12/02/2018 05:18 PM  
 Nitrites POSITIVE (A) 12/02/2018 05:18 PM  
 Leukocyte Esterase NEGATIVE  12/02/2018 05:18 PM  
 Epithelial cells FEW 12/02/2018 05:18 PM  
 Bacteria 2+ (A) 12/02/2018 05:18 PM  
 WBC 0-4 12/02/2018 05:18 PM  
 RBC 0-5 12/02/2018 05:18 PM  
 
 
 
Medications Reviewed:  
 
Current Facility-Administered Medications Medication Dose Route Frequency  atorvastatin (LIPITOR) tablet 40 mg  40 mg Oral DAILY  cholecalciferol (VITAMIN D3) tablet 1,000 Units  1,000 Units Oral DAILY  cyanocobalamin (VITAMIN B12) tablet 1,000 mcg  1,000 mcg Oral DAILY  pantoprazole (PROTONIX) 40 mg in sodium chloride 0.9% 10 mL injection  40 mg IntraVENous Q12H  
 gabapentin (NEURONTIN) capsule 600 mg  600 mg Oral BID  metoprolol tartrate (LOPRESSOR) tablet 75 mg  75 mg Oral BID  mirtazapine (REMERON) tablet 15 mg  15 mg Oral QHS  polyethylene glycol (MIRALAX) packet 17 g  17 g Oral DAILY  predniSONE (DELTASONE) tablet 10 mg  10 mg Oral PCD  topiramate (TOPAMAX) tablet 25 mg  25 mg Oral DAILY WITH BREAKFAST  senna-docusate (PERICOLACE) 8.6-50 mg per tablet 1 Tab  1 Tab Oral DAILY  traMADol (ULTRAM) tablet 50 mg  50 mg Oral Q6H PRN  
 fentaNYL citrate (PF) injection 25 mcg  25 mcg IntraVENous Q4H PRN  
 sodium chloride (NS) flush 5-10 mL  5-10 mL IntraVENous Q8H  
 sodium chloride (NS) flush 5-10 mL  5-10 mL IntraVENous PRN  
 naloxone (NARCAN) injection 0.4 mg  0.4 mg IntraVENous PRN  
 enoxaparin (LOVENOX) injection 40 mg  40 mg SubCUTAneous Q24H  
 acetaminophen (TYLENOL) tablet 650 mg  650 mg Oral Q4H PRN  
 diphenhydrAMINE (BENADRYL) capsule 25 mg  25 mg Oral Q4H PRN  
 ondansetron (ZOFRAN) injection 4 mg  4 mg IntraVENous Q4H PRN  piperacillin-tazobactam (ZOSYN) 3.375 g in 0.9% sodium chloride (MBP/ADV) 100 mL  3.375 g IntraVENous Q8H  
 aspirin chewable tablet 81 mg  81 mg Oral DAILY  Vancomycin - pharmacy to dose   Other Rx Dosing/Monitoring  vancomycin (VANCOCIN) 750 mg in 0.9% sodium chloride 250 mL ([id number])  750 mg IntraVENous Q24H  
 
______________________________________________________________________ EXPECTED LENGTH OF STAY: - - - 
ACTUAL LENGTH OF STAY:          1 Dariana Díaz MD

## 2018-12-03 NOTE — CDMP QUERY
Eliot Bhatti, Please clarify if this patient is (was) being treated/managed for:  
 
=> Acute on chronic diastolic CHF in the setting of leg edema requiring IV Lasix 
=> Other explanation of clinical findings 
=> Clinically Undetermined (no explanation for clinical findings) The medical record reflects the following clinical findings, treatment, and risk factors. Risk Factors:  CHF Clinical Indicators:  leg edema Treatment: IV Lasix Please clarify and document your clinical opinion in the progress notes and discharge summary including the definitive and/or presumptive diagnosis, (suspected or probable), related to the above clinical findings. Please include clinical findings supporting your diagnosis. Thank you, Rosmery Diego [facility] 
[phone]

## 2018-12-03 NOTE — PROGRESS NOTES
Bedside and Verbal shift change report given to Jamila RN (oncoming nurse) by Adolm Cooks (offgoing nurse). Report included the following information SBAR.

## 2018-12-04 ENCOUNTER — TELEPHONE (OUTPATIENT)
Dept: FAMILY MEDICINE CLINIC | Age: 83
End: 2018-12-04

## 2018-12-04 VITALS
BODY MASS INDEX: 23.89 KG/M2 | RESPIRATION RATE: 17 BRPM | DIASTOLIC BLOOD PRESSURE: 57 MMHG | TEMPERATURE: 97.5 F | HEIGHT: 60 IN | HEART RATE: 70 BPM | SYSTOLIC BLOOD PRESSURE: 122 MMHG | OXYGEN SATURATION: 97 % | WEIGHT: 121.69 LBS

## 2018-12-04 LAB
ANION GAP SERPL CALC-SCNC: 7 MMOL/L (ref 5–15)
BACTERIA SPEC CULT: ABNORMAL
BUN SERPL-MCNC: 14 MG/DL (ref 6–20)
BUN/CREAT SERPL: 19 (ref 12–20)
CALCIUM SERPL-MCNC: 8.4 MG/DL (ref 8.5–10.1)
CC UR VC: ABNORMAL
CHLORIDE SERPL-SCNC: 110 MMOL/L (ref 97–108)
CO2 SERPL-SCNC: 27 MMOL/L (ref 21–32)
CREAT SERPL-MCNC: 0.75 MG/DL (ref 0.55–1.02)
GLUCOSE SERPL-MCNC: 146 MG/DL (ref 65–100)
POTASSIUM SERPL-SCNC: 4 MMOL/L (ref 3.5–5.1)
SERVICE CMNT-IMP: ABNORMAL
SODIUM SERPL-SCNC: 144 MMOL/L (ref 136–145)

## 2018-12-04 PROCEDURE — 74011000250 HC RX REV CODE- 250: Performed by: HOSPITALIST

## 2018-12-04 PROCEDURE — G8989 SELF CARE D/C STATUS: HCPCS

## 2018-12-04 PROCEDURE — G8979 MOBILITY GOAL STATUS: HCPCS | Performed by: PHYSICAL THERAPIST

## 2018-12-04 PROCEDURE — 74011250637 HC RX REV CODE- 250/637: Performed by: HOSPITALIST

## 2018-12-04 PROCEDURE — G8988 SELF CARE GOAL STATUS: HCPCS

## 2018-12-04 PROCEDURE — C9113 INJ PANTOPRAZOLE SODIUM, VIA: HCPCS | Performed by: HOSPITALIST

## 2018-12-04 PROCEDURE — 74011000258 HC RX REV CODE- 258: Performed by: HOSPITALIST

## 2018-12-04 PROCEDURE — 80048 BASIC METABOLIC PNL TOTAL CA: CPT

## 2018-12-04 PROCEDURE — 97530 THERAPEUTIC ACTIVITIES: CPT | Performed by: PHYSICAL THERAPIST

## 2018-12-04 PROCEDURE — 99218 HC RM OBSERVATION: CPT

## 2018-12-04 PROCEDURE — 97161 PT EVAL LOW COMPLEX 20 MIN: CPT | Performed by: PHYSICAL THERAPIST

## 2018-12-04 PROCEDURE — 36415 COLL VENOUS BLD VENIPUNCTURE: CPT

## 2018-12-04 PROCEDURE — 74011250636 HC RX REV CODE- 250/636: Performed by: HOSPITALIST

## 2018-12-04 PROCEDURE — 96366 THER/PROPH/DIAG IV INF ADDON: CPT

## 2018-12-04 PROCEDURE — G8978 MOBILITY CURRENT STATUS: HCPCS | Performed by: PHYSICAL THERAPIST

## 2018-12-04 PROCEDURE — 97535 SELF CARE MNGMENT TRAINING: CPT

## 2018-12-04 PROCEDURE — 96376 TX/PRO/DX INJ SAME DRUG ADON: CPT

## 2018-12-04 PROCEDURE — 97116 GAIT TRAINING THERAPY: CPT | Performed by: PHYSICAL THERAPIST

## 2018-12-04 PROCEDURE — G8987 SELF CARE CURRENT STATUS: HCPCS

## 2018-12-04 PROCEDURE — 97165 OT EVAL LOW COMPLEX 30 MIN: CPT

## 2018-12-04 RX ORDER — LEVOFLOXACIN 750 MG/1
750 TABLET ORAL DAILY
Qty: 3 TAB | Refills: 0 | Status: SHIPPED | OUTPATIENT
Start: 2018-12-04 | End: 2018-12-12

## 2018-12-04 RX ADMIN — GABAPENTIN 600 MG: 300 CAPSULE ORAL at 09:16

## 2018-12-04 RX ADMIN — METOPROLOL TARTRATE 75 MG: 50 TABLET ORAL at 09:17

## 2018-12-04 RX ADMIN — Medication 10 ML: at 06:00

## 2018-12-04 RX ADMIN — PIPERACILLIN AND TAZOBACTAM 3.38 G: 3; .375 INJECTION, POWDER, FOR SOLUTION INTRAVENOUS at 05:00

## 2018-12-04 RX ADMIN — Medication 10 ML: at 14:33

## 2018-12-04 RX ADMIN — CYANOCOBALAMIN TAB 500 MCG 1000 MCG: 500 TAB at 09:17

## 2018-12-04 RX ADMIN — TOPIRAMATE 25 MG: 25 TABLET, FILM COATED ORAL at 09:17

## 2018-12-04 RX ADMIN — SODIUM CHLORIDE 40 MG: 9 INJECTION, SOLUTION INTRAMUSCULAR; INTRAVENOUS; SUBCUTANEOUS at 09:16

## 2018-12-04 RX ADMIN — SENNOSIDES AND DOCUSATE SODIUM 1 TABLET: 8.6; 5 TABLET ORAL at 09:16

## 2018-12-04 RX ADMIN — PIPERACILLIN AND TAZOBACTAM 3.38 G: 3; .375 INJECTION, POWDER, FOR SOLUTION INTRAVENOUS at 14:32

## 2018-12-04 RX ADMIN — POLYETHYLENE GLYCOL 3350 17 G: 17 POWDER, FOR SOLUTION ORAL at 09:25

## 2018-12-04 RX ADMIN — ASPIRIN 81 MG CHEWABLE TABLET 81 MG: 81 TABLET CHEWABLE at 09:16

## 2018-12-04 RX ADMIN — VITAMIN D, TAB 1000IU (100/BT) 1000 UNITS: 25 TAB at 09:17

## 2018-12-04 NOTE — PROGRESS NOTES
Problem: Pressure Injury - Risk of 
Goal: *Prevention of pressure injury Document Marcelo Scale and appropriate interventions in the flowsheet. Outcome: Progressing Towards Goal 
Pressure Injury Interventions: 
Sensory Interventions: Assess changes in LOC Moisture Interventions: Limit adult briefs Activity Interventions: Pressure redistribution bed/mattress(bed type), PT/OT evaluation Mobility Interventions: HOB 30 degrees or less, Pressure redistribution bed/mattress (bed type), PT/OT evaluation Nutrition Interventions: Document food/fluid/supplement intake, Discuss nutritional consult with provider, Offer support with meals,snacks and hydration Friction and Shear Interventions: Feet elevated on foot rest, HOB 30 degrees or less Problem: Falls - Risk of 
Goal: *Absence of Falls Document Sergio Klein Fall Risk and appropriate interventions in the flowsheet. Outcome: Progressing Towards Goal 
Fall Risk Interventions: 
Mobility Interventions: Bed/chair exit alarm, Patient to call before getting OOB Mentation Interventions: Bed/chair exit alarm, Evaluate medications/consider consulting pharmacy Elimination Interventions: Bed/chair exit alarm, Call light in reach, Patient to call for help with toileting needs

## 2018-12-04 NOTE — TELEPHONE ENCOUNTER
Bryan Co called from [facility] and would like for patient to be seen for a follow up appointment before Monday if possible. Please let Rose Chaney know when to schedule.

## 2018-12-04 NOTE — TELEPHONE ENCOUNTER
This nurse will call patient's son, Purnima Powell to schedule follow up visit after discharge - 12/7/18

## 2018-12-04 NOTE — DISCHARGE INSTRUCTIONS
Discharge Instructions       PATIENT ID: Davion Christensen  MRN: [id number]   YOB: 1925    DATE OF ADMISSION: 12/2/2018 10:24 AM    DATE OF DISCHARGE: 12/4/2018    PRIMARY CARE PROVIDER: Leslie Dobson NP     ATTENDING PHYSICIAN: Rg Saenz DO  DISCHARGING PROVIDER: Portia Brady DO    To contact this individual call [phone] and ask the  to page. If unavailable ask to be transferred the Adult Hospitalist Department. DISCHARGE DIAGNOSES     UTI    CONSULTATIONS: None    PROCEDURES/SURGERIES: * No surgery found *    PENDING TEST RESULTS:   At the time of discharge the following test results are still pending: none    FOLLOW UP APPOINTMENTS:   Follow-up Information     Follow up With Specialties Details Why Contact Info    Teresa Melgoza NP Nurse Practitioner   [address] [place] [address]  [phone]             ADDITIONAL CARE RECOMMENDATIONS:   -Prescription given for levaquin- start taking 12/5  -Follow up with primary care physician within 7 days of discharge  -Return to the ER for fevers, altered mental status  -Resume home medications    DIET: Regular Diet    ACTIVITY: Activity as tolerated    WOUND CARE: none    EQUIPMENT needed: none      DISCHARGE MEDICATIONS:   See Medication Reconciliation Form    · It is important that you take the medication exactly as they are prescribed. · Keep your medication in the bottles provided by the pharmacist and keep a list of the medication names, dosages, and times to be taken in your wallet. · Do not take other medications without consulting your doctor. NOTIFY YOUR PHYSICIAN FOR ANY OF THE FOLLOWING:   Fever over 101 degrees for 24 hours. Chest pain, shortness of breath, fever, chills, nausea, vomiting, diarrhea, change in mentation, falling, weakness, bleeding. Severe pain or pain not relieved by medications.   Or, any other signs or symptoms that you may have questions about.      DISPOSITION:  x  Home With:   OT  PT  HH  RN       SNF/Inpatient Rehab/LTAC    Independent/assisted living    Hospice    Other:     CDMP Checked:   Yes x     PROBLEM LIST Updated:  Yes x       Signed:   Jace Rodríguez DO  12/4/2018  3:07 PM

## 2018-12-04 NOTE — PROGRESS NOTES
Care Management - Received call from charge nurse-Madyson requesting assistance with arranging home health and ambulance transport for patient. Spoke with patient's Ramos Pop who is her mPOA. Patient is currently open to [facility] for nursing, PT and OT. PT and OT were going to close this week because they felt like patient was at baseline. Discussed ambulance transport home. Discussed that this CM could not guarantee coverage for ambulane as patient is normally able to sit in her wheelchair. Son stated he recently received a bill from [facility] for a co-pay of $100 for one transport. He is agreeable to paying that again. Called [facility] and spoke with Alysha Irizarry. She said they will be here in 45 minutes to  the patient. Notified charge nurse. Sent referral via [employer] to [facility]. Placed name and number on AVS. DOUG Velásquez

## 2018-12-04 NOTE — PROGRESS NOTES
Problem: Mobility Impaired (Adult and Pediatric) Goal: *Acute Goals and Plan of Care (Insert Text) Physical Therapy Goals 12/4/2018 1. Patient will move from supine to sit and sit to supine , scoot up and down and roll side to side in bed with supervision/set-up within 7 day(s). 2.  Patient will transfer from bed to chair and chair to bed with supervision/set-up using the least restrictive device within 7 day(s). 3.  Patient will perform sit to stand with supervision/set-up within 7 day(s). 4.  Patient will ambulate with supervision/set-up for 10 feet with the least restrictive device within 7 day(s). 5.  Patient will ascend/descend 4 stairs with 2 handrail(s) with minimal assistance/contact guard assist within 7 day(s). physical Therapy EVALUATION Patient: Anusha Perez (29 y.o. female) Date: 12/4/2018 Primary Diagnosis: Acute metabolic encephalopathy [G40.48] Precautions:     
 
ASSESSMENT : 
Based on the objective data described below, the patient presents with impaired mobility and gait due to UTI with generalized weakness. She is alert and cooperative today. Came to sitting EOB with mod I using the bed rails. Good sitting balance. Stood with a RW and ambulated 2 feet to the [facility]. Sat for a bit and then she was able to stand to be cleaned up and then took a few steps to the chair. Kyphotic posture. Has arthritis throughout, especially in her knees and literally fell back into the chair when she sat down. Requires assist of 1 to be up out of bed. She states she is not going to rehab so recommend home with [facility] with 24 hour supervision. May need ambulance transport. She states they do not have a ramp and she doesn't leave the house so she hasn't done steps in a long time. Patient will benefit from skilled intervention to address the above impairments. Patients rehabilitation potential is considered to be Good Factors which may influence rehabilitation potential include: [x]         None noted 
[]         Mental ability/status []         Medical condition 
[]         Home/family situation and support systems 
[]         Safety awareness 
[]         Pain tolerance/management 
[]         Other: PLAN : 
Recommendations and Planned Interventions: 
[x]           Bed Mobility Training             []    Neuromuscular Re-Education 
[x]           Transfer Training                   []    Orthotic/Prosthetic Training 
[x]           Gait Training                         []    Modalities [x]           Therapeutic Exercises           []    Edema Management/Control 
[]           Therapeutic Activities            [x]    Patient and Family Training/Education 
[]           Other (comment): Frequency/Duration: Patient will be followed by physical therapy  5 times a week to address goals. Discharge Recommendations: Home Health Further Equipment Recommendations for Discharge: none SUBJECTIVE:  
Patient stated I am going home.  OBJECTIVE DATA SUMMARY:  
HISTORY:   
Past Medical History:  
Diagnosis Date  Arrhythmia A-FIB  Arthritis  CAD (coronary artery disease) 1989 MI  
 Congestive heart failure (CHF) ([address]  GERD (gastroesophageal reflux disease)  Gluten intolerance  Heart failure ([address] CHF  Polymyalgia ([facility] [place] [address] Past Surgical History:  
Procedure Laterality Date  CARDIAC SURG PROCEDURE UNLIST  1980'S CARDIAC CATH  
[address], 2006, 2013 LUMBAR SPINE  
 HX CHOLECYSTECTOMY  HX HIP REPLACEMENT Right 5/2015 [employer]  HX TONSILLECTOMY  CHILD  
 HX UROLOGICAL BLADDER SUSPENSION - MESH Prior Level of Function/Home Situation: Lives with her son and DIL. Used a RW for short distances in the home. States she had just progressed to where she could walk out of her room. Personal factors and/or comorbidities impacting plan of care: none Home Situation Home Environment: Private residence # Steps to Enter: 3 Rails to Enter: No 
One/Two Story Residence: Two story, live on 1st floor Living Alone: No 
Support Systems: Child(brittany) Patient Expects to be Discharged to[de-identified] Private residence Current DME Used/Available at Home: Hospital bed, Walker, rolling, Commode, bedside, Wheelchair, Tub transfer bench Tub or Shower Type: Tub/Shower combination EXAMINATION/PRESENTATION/DECISION MAKING: Critical Behavior: 
Neurologic State: Alert Orientation Level: Oriented X4 Cognition: Follows commands, Appropriate decision making Hearing: Auditory Auditory Impairment: NoneSkin:  Per nursing. Edema: per nursing. Range Of Motion: 
AROM: Within functional limits PROM: Within functional limits Strength:   
Strength: Generally decreased, functional 
  
  
  
  
  
  
Tone & Sensation:  
Tone: Normal 
  
  
  
  
Sensation: Intact Coordination: 
Coordination: Within functional limits Vision:  
  
Functional Mobility: 
Bed Mobility: 
Rolling: Modified independent Supine to Sit: Modified independent Scooting: Supervision Transfers: 
Sit to Stand: Contact guard assistance Stand to Sit: Contact guard assistance Balance:  
Sitting: Intact Standing: Impaired Standing - Static: Constant support; Fair 
Standing - Dynamic : FairAmbulation/Gait Training:Distance (ft): 2 Feet (ft) Assistive Device: Gait belt;Walker, rolling Ambulation - Level of Assistance: Contact guard assistance Gait Description (WDL): Exceptions to [facility] Gait Abnormalities: Decreased step clearance Base of Support: Narrowed Speed/Jessica: Pace decreased (<100 feet/min); Shuffled Step Length: Right shortened;Left shortened Only a few feet to the [facility] and chair. Stairs: Therapeutic Exercises:  
Able to do the LAQ which she does at home. Barthel Index: 
 
Bathin Bladder: 5 Bowels: 10 
Groomin Dressin Feeding: 10 Mobility: 0 Stairs: 0 Toilet Use: 5 Transfer (Bed to Chair and Back): 10 Total: 50 Barthel and G-code impairment scale: 
Percentage of impairment CH 
0% CI 
1-19% CJ 
20-39% CK 
40-59% CL 
60-79% CM 
80-99% CN 
100% Barthel Score 0-100 100 99-80 79-60 59-40 20-39 1-19 
 0 Barthel Score 0-20 20 17-19 13-16 9-12 5-8 1-4 0 The Barthel ADL Index: Guidelines 1. The index should be used as a record of what a patient does, not as a record of what a patient could do. 2. The main aim is to establish degree of independence from any help, physical or verbal, however minor and for whatever reason. 3. The need for supervision renders the patient not independent. 4. A patient's performance should be established using the best available evidence. Asking the patient, friends/relatives and nurses are the usual sources, but direct observation and common sense are also important. However direct testing is not needed. 5. Usually the patient's performance over the preceding 24-48 hours is important, but occasionally longer periods will be relevant. 6. Middle categories imply that the patient supplies over 50 per cent of the effort. 7. Use of aids to be independent is allowed. Freddy Clarke., Barthel, D.W. ([id number]). Functional evaluation: the Barthel Index. [address] [facility] (14)2. Michael Gomez ha ANNALISA McintyreF, Cyndy Arnold., Olegario Koch., [place], [address] [place] [address] (1999). Measuring the change indisability after inpatient rehabilitation; comparison of the responsiveness of the Barthel Index and Functional [place] Measure. Journal of Neurology, Neurosurgery, and Psychiatry, 66(4), 574-237. Swathi Tran, N.J.A, CARLOS Nascimento.GRECIA, & Hair Galicia M.A. (2004.) Assessment of post-stroke quality of life in cost-effectiveness studies: The usefulness of the Barthel Index and the EuroQoL-5D. [facility], 13, 418-05 ? Mobility - Walking and Moving Around:  
  - CURRENT STATUS: CK - 40%-59% impaired, limited or restricted  - GOAL STATUS: CJ - 20%-39% impaired, limited or restricted  - D/C STATUS:  ---------------To be determined--------------- Activity Tolerance:  
Good. Please refer to the flowsheet for vital signs taken during this treatment. After treatment:  
[x]         Patient left in no apparent distress sitting up in chair 
[]         Patient left in no apparent distress in bed 
[x]         Call bell left within reach [x]         Nursing notified 
[]         Caregiver present 
[]         Bed alarm activated COMMUNICATION/EDUCATION:  
The patients plan of care was discussed with: Registered Nurse and Certified Nursing Assistant/Patient Care Technician. [x]         Fall prevention education was provided and the patient/caregiver indicated understanding. [x]         Patient/family have participated as able in goal setting and plan of care. [x]         Patient/family agree to work toward stated goals and plan of care. []         Patient understands intent and goals of therapy, but is neutral about his/her participation. []         Patient is unable to participate in goal setting and plan of care. Thank you for this referral. 
Clara Jacobson, PT Time Calculation: 36 mins

## 2018-12-04 NOTE — NURSE NAVIGATOR
Chart reviewed by Heart Failure Nurse Navigator. Heart Failure database completed. EF:  55 ACEi/ARB/ARNi: Not indicated BB: Lopressor Aldosterone Antagonist: Not indicated CRT not indicated NYHA Functional Class Requested via provider message on [place]. Heart Failure Teach Back in Patient Education. Heart Failure Avoiding Triggers on Discharge Instructions. Cardiologist: None Post discharge follow up phone call to be made within 48-72 hours of discharge.

## 2018-12-04 NOTE — PROGRESS NOTES
Occupational Therapy EVALUATION/discharge Patient: Kale Tuttle (89 y.o. female) Date: 12/4/2018 Primary Diagnosis: Acute metabolic encephalopathy [L99.79] Precautions:   DNR, Fall ASSESSMENT:  
Based on the objective data described below, the patient presents with general debility post admit to ER 12-2 with AMS post loss of appetite/decreased PO and med taking. Patient limited by decreased dynamic balance and general fatigue, but cognition has cleared to [facility] at S level and is consistently able to follow commands. Performing self care and functional mobility at S-[place] A and she has 24/7 care in the home. She will benefit from [facility] OT as she is scheduled for discharge this afternoon and reports functional decline in self care skills compared to baseline. Incontinence of B & B today is not baseline per patient. Further skilled acute care occupational therapy is not indicated at this time due to imminent discharge. She will benefit from [facility]. Discharge Recommendations: Home Health Further Equipment Recommendations for Discharge: Has DME SUBJECTIVE:  
Patient stated I want a cane but my son makes me use a walker.  OBJECTIVE DATA SUMMARY:  
HISTORY:  
Past Medical History:  
Diagnosis Date  Arrhythmia A-FIB  Arthritis  CAD (coronary artery disease) 1989 MI  
 Congestive heart failure (CHF) ([address]  GERD (gastroesophageal reflux disease)  Gluten intolerance  Heart failure ([address] CHF  Polymyalgia ([address] Past Surgical History:  
Procedure Laterality Date  CARDIAC SURG PROCEDURE UNLIST  1980'S CARDIAC CATH  
[address], 2006, 2013 LUMBAR SPINE  
 HX CHOLECYSTECTOMY  HX HIP REPLACEMENT Right 5/2015 [employer]  HX TONSILLECTOMY  CHILD  
 HX UROLOGICAL BLADDER SUSPENSION - MESH Prior Level of Function/Environment/Context: mod I self care with sink side bathing; has assist with TTB shower 2x/week, Son does meds and $. She makes dolls as a hobby; Retired with masters degree in [employer] Home Situation Home Environment: Private residence # Steps to Enter: 3 Rails to Enter: No 
One/Two Story Residence: Two story, live on 1st floor Living Alone: No 
Support Systems: Child(brittany) Patient Expects to be Discharged to[de-identified] Private residence Current DME Used/Available at Home: Tub transfer bench, Hospital bed, Grab bars, Walker, rolling, Wheelchair Tub or Shower Type: Tub/Shower combination Hand dominance: Right EXAMINATION OF PERFORMANCE DEFICITS: 
Cognitive/Behavioral Status: 
Neurologic State: Alert Orientation Level: Oriented to person;Oriented to place;Oriented to situation Cognition: Follows commands(recommend MoCA for accurate baseline cognition; admited AMS) Perception: Appears intact Perseveration: No perseveration noted Safety/Judgement: Fall prevention Skin: intact Edema: trace B LEs Hearing: Auditory Auditory Impairment: None Vision/Perceptual:   
    
    
    
  
    
    
Corrective Lenses: Glasses Range of Motion: 
B UE 
AROM: Generally decreased, functional 
PROM: Generally decreased, functional 
  
  
  
  
  
  
Strength: 
B UE 
Strength: Generally decreased, functional; mildly decreased B LEs with decreased balance and endurance noted in standing Coordination: 
Coordination: Generally decreased, functional 
Fine Motor Skills-Upper: Left Intact; Right Intact Gross Motor Skills-Upper: Left Intact; Right Intact Tone & Sensation: 
 
Tone: Normal 
Sensation: Intact Balance: 
Sitting: Intact Standing: Impaired Standing - Static: Constant support; Fair 
Standing - Dynamic : Fair Functional Mobility and Transfers for ADLs:Bed Mobility: 
Rolling: Modified independent Supine to Sit: Modified independent Sit to Supine: Supervision Scooting: Supervision Transfers: 
Sit to Stand: Contact guard assistance Stand to Sit: Contact guard assistance Bed to Chair: Contact guard assistance Toilet Transfer : Contact guard assistance; Additional time(BSC) ADL Assessment: 
Feeding: Supervision(had stopped eating since ) Oral Facial Hygiene/Grooming: Setup Bathing: Minimum assistance Upper Body Dressing: Setup Lower Body Dressing: Contact guard assistance Toileting: Contact guard assistance ADL Intervention and task modifications: 
  
Initiated training of PLB on her normal 2L O2, pacing and energy conservation, fall prevention with focus on bathroom safety. Patient states, \"I will tell all of my friends because I did not know about these fall risks. Now I will be more safe. \" Cognitive Retraining Safety/Judgement: Fall prevention Functional Measure: 
Barthel Index: 
 
Bathin Bladder: 5 Bowels: 5 Groomin Dressin Feedin Mobility: 0 Stairs: 0 Toilet Use: 5 Transfer (Bed to Chair and Back): 10 Total: 40 Barthel and G-code impairment scale: 
Percentage of impairment CH 
0% CI 
1-19% CJ 
20-39% CK 
40-59% CL 
60-79% CM 
80-99% CN 
100% Barthel Score 0-100 100 99-80 79-60 59-40 20-39 1-19 
 0 Barthel Score 0-20 20 17-19 13-16 9-12 5-8 1-4 0 The Barthel ADL Index: Guidelines 1. The index should be used as a record of what a patient does, not as a record of what a patient could do. 2. The main aim is to establish degree of independence from any help, physical or verbal, however minor and for whatever reason. 3. The need for supervision renders the patient not independent. 4. A patient's performance should be established using the best available evidence. Asking the patient, friends/relatives and nurses are the usual sources, but direct observation and common sense are also important. However direct testing is not needed. 5. Usually the patient's performance over the preceding 24-48 hours is important, but occasionally longer periods will be relevant. 6. Middle categories imply that the patient supplies over 50 per cent of the effort. 7. Use of aids to be independent is allowed. Irene Saver., Barthel, D.W. ([id number]). Functional evaluation: the Barthel Index. [address] [facility] (14)2. GIANNA Cardozo, Blu Bailey., Shantell Godinez., [place], [facility] (1999). Measuring the change indisability after inpatient rehabilitation; comparison of the responsiveness of the Barthel Index and Functional [place] Measure. Journal of Neurology, Neurosurgery, and Psychiatry, 66(4), 090-800. HASEEB HansenA, MEKHI Nascimento, & Gaby Wilburn M.A. (2004.) Assessment of post-stroke quality of life in cost-effectiveness studies: The usefulness of the Barthel Index and the EuroQoL-5D. [facility], 13, 266-11 G codes: In compliance with CMSs Claims Based Outcome Reporting, the following G-code set was chosen for this patient based on their primary functional limitation being treated: The outcome measure chosen to determine the severity of the functional limitation was the Barthel Index  with a score of 40/100 which was correlated with the impairment scale. ? Self Care:  
  - CURRENT STATUS: CK - 40%-59% impaired, limited or restricted  - GOAL STATUS: CK - 40%-59% impaired, limited or restricted  - D/C STATUS:  CK - 40%-59% impaired, limited or restricted Occupational Therapy Evaluation Charge Determination History Examination Decision-Making LOW Complexity : Brief history review  MEDIUM Complexity : 3-5 performance deficits relating to physical, cognitive , or psychosocial skils that result in activity limitations and / or participation restrictions MEDIUM Complexity : Patient may present with comorbidities that affect occupational performnce. Miniml to moderate modification of tasks or assistance (eg, physical or verbal ) with assesment(s) is necessary to enable patient to complete evaluation Based on the above components, the patient evaluation is determined to be of the following complexity level: LOW Pain: 
  
  none Activity Tolerance:  
fair Please refer to the flowsheet for vital signs taken during this treatment. After treatment:  
[x]  Patient left in no apparent distress sitting up in chair 
[]  Patient left in no apparent distress in bed 
[x]  Call bell left within reach 
[]  Nursing notified 
[]  Caregiver present 
[]  Bed alarm activated COMMUNICATION/EDUCATION:  
Communication/Collaboration: 
[x]      Home safety education was provided and the patient/caregiver indicated understanding. [x]      Patient/family have participated as able and agree with findings and recommendations. []      Patient is unable to participate in plan of care at this time. Findings and recommendations were discussed with: Physical Therapist, Registered Nurse and Physician Anjali Brito OTR/L Time Calculation: 25 mins

## 2018-12-04 NOTE — PROGRESS NOTES
Problem: Pressure Injury - Risk of 
Goal: *Prevention of pressure injury Document Marcelo Scale and appropriate interventions in the flowsheet. Outcome: Progressing Towards Goal 
Pressure Injury Interventions: 
Sensory Interventions: Assess changes in LOC Moisture Interventions: Apply protective barrier, creams and emollients, Assess need for specialty bed, Check for incontinence Q2 hours and as needed Activity Interventions: Increase time out of bed Mobility Interventions: Float heels, HOB 30 degrees or less Nutrition Interventions: Document food/fluid/supplement intake Friction and Shear Interventions: HOB 30 degrees or less, Lift sheet

## 2018-12-04 NOTE — PROGRESS NOTES
Bedside shift change report given to lory (oncoming nurse) by Hernesto Briscoe (offgoing nurse). Report included the following information SBAR, Kardex, [facility] and Recent Results Spoke with dinesh in care management regarding discharge planning. Arrangements set up. 1800 informed son mom would be picked up by ambulance in 45 minutes

## 2018-12-05 ENCOUNTER — TELEPHONE (OUTPATIENT)
Dept: FAMILY MEDICINE CLINIC | Age: 83
End: 2018-12-05

## 2018-12-05 NOTE — TELEPHONE ENCOUNTER
Home Based [address]  [address] [facility], [address]  [facility] Transition of Care Note    Date/Time: 12/5/18   11:45 AM    Patient discharged from [facility] on 12/4/18. Pt was hospitalized 12/2/18-12/4/18. Pt was admitted to hospital from Home due to altered mental status    During admission patient was treated for acute metabolic encehalopathy with IV antibiotics (Vancomycin and Zosyn). She had the following test done:    Ct Head Wo Cont     Result Date: 12/2/2018  IMPRESSION: No acute intracranial hemorrhage, mass or infarct.       Ct Chest Wo Cont     Result Date: 12/2/2018  IMPRESSION: Chronic lung changes      Ct Abd Pelv W Cont     Result Date: 12/2/2018  IMPRESSION: No bowel obstruction, ileus or perforation. No intra-abdominal abscess.     Xr Chest Port     Result Date: 12/2/2018  IMPRESSION: Very mild linear scarring within the left midlung.        Patient discharged home with 1 new prescriptions (Levaquin 750 mg x 3 days) and Home Health ([facility]) and instruction to follow up with PCP within 1 week . Spoke with patient's son, Eliezer Hurtado today and he stated that patient is doing well. He reports she's doing much better than when last discharged from the hospital. He stated that patient is ambulating with walker and using commode with assistance. Patient denies chest pain, dizziness, shortness of breath, nausea, or vomiting. Son confirmed that prescription was filled and patient is tolerating medication well. Medication reconciliation completed. Patient's son verbalized understanding of medication management. Home Health is schedule to come in to resume care tomorrow. Reviewed plan of care. GOALS:     Knowledge and adherence of prescribed medication (dose). Eliezer Hurtado was able to name all medications, doses, frequencies and reason for taking.  He reports patient taking all medications as directed     Develop action plan for Self-Management Chronic Disease. Patient has follow up appointment scheduled with Carri Kahn NP on Friday, 12/7/18. Patient son is able to verbalize red flags and reasons to use PCP vs EMS services. Pt has a good support system. Son has my contact number for further questions or concerns.

## 2018-12-06 NOTE — DISCHARGE SUMMARY
Discharge Summary       PATIENT ID: Shanell Hurtado  MRN: [id number]   YOB: 1925    DATE OF ADMISSION: 12/2/2018 10:24 AM    DATE OF DISCHARGE: 12/4/2018  PRIMARY CARE PROVIDER: Nancy Roman NP     ATTENDING PHYSICIAN: Charly Colorado DO   DISCHARGING PROVIDER: [address], DO    To contact this individual call [phone] and ask the  to page. If unavailable ask to be transferred the Adult Hospitalist Department. CONSULTATIONS: None    PROCEDURES/SURGERIES: * No surgery found *    ADMITTING DIAGNOSES & HOSPITAL COURSE:     80year old female with past medical history significant for CAD, CHF, chronic respiratory failure, presenting to [facility] with altered mental status, refusal to take home medications, and epigastric pain. Found to have E. Coli UTI. Mental status improved with treatment. She was discharged home to finish levaquin. DISCHARGE DIAGNOSES / PLAN:      Ecoli UTI:   -continue levaquin. Initially on vanc/zosyn    Abdominal pain/nausea/vomiting:  -resolved. CT abd/pel no acute findings    HFpEF without exacerbation: home meds    CAD: home meds         PENDING TEST RESULTS:   At the time of discharge the following test results are still pending: none    FOLLOW UP APPOINTMENTS:    Follow-up Information     Follow up With Specialties Details Why Contact Info    Nancy Roman NP Nurse Practitioner  Office will contact patient with follow up PCP appointment information.   [address] [address]  [phone]      [address]  [phone]             ADDITIONAL CARE RECOMMENDATIONS:   -Prescription given for levaquin- start taking 12/5  -Follow up with primary care physician within 7 days of discharge  -Return to the ER for fevers, altered mental status  -Resume home medications    DIET: Regular Diet    ACTIVITY: Activity as tolerated    WOUND CARE: none    EQUIPMENT needed: none        DISCHARGE MEDICATIONS:  Discharge Medication List as of 12/4/2018  6:30 PM      START taking these medications    Details   levoFLOXacin (LEVAQUIN) 750 mg tablet Take 1 Tab by mouth daily. , Print, Disp-3 Tab, R-0         CONTINUE these medications which have NOT CHANGED    Details   ondansetron (ZOFRAN ODT) 8 mg disintegrating tablet Take 1 Tab by mouth Before breakfast, lunch, and dinner., Normal, Disp-270 Tab, R-1      atorvastatin (LIPITOR) 40 mg tablet Take 1 Tab by mouth daily for 90 days. , Normal, Disp-30 Tab, R-0      aspirin 81 mg chewable tablet Take 1 Tab by mouth daily. , Print, Disp-30 Tab, R-0      gabapentin (NEURONTIN) 300 mg capsule Take 600 mg by mouth two (2) times a day., Historical Med      predniSONE (DELTASONE) 10 mg tablet Take 10 mg by mouth every evening., Historical Med      metoprolol tartrate (LOPRESSOR) 50 mg tablet Take 1.5 Tabs by mouth two (2) times a day., Normal, Disp-135 Tab, R-1      mirtazapine (REMERON) 15 mg tablet Take 1 Tab by mouth nightly. Indications: major depressive disorder, Normal, Disp-30 Tab, R-1      Bifidobacterium Infantis (ALIGN) 4 mg cap Take 4 mg by mouth daily. , Historical Med      fentaNYL (DURAGESIC) 25 mcg/hr PATCH 1 Patch by TransDERmal route every seventy-two (72) hours. , Historical Med      traMADol (ULTRAM) 50 mg tablet Take 50 mg by mouth two (2) times daily as needed for Pain., Historical Med      polyethylene glycol (MIRALAX) 17 gram/dose powder Take 17 g by mouth daily as needed., Historical Med      topiramate (TOPAMAX) 25 mg tablet Take 1 Tab by mouth daily (with breakfast). , Normal, Disp-90 Tab, R-3      senna-docusate (SENNA PLUS) 8.6-50 mg per tablet Take 1 Tab by mouth daily. , Historical Med      cyanocobalamin 1,000 mcg tablet Take 1,000 mcg by mouth daily. , Historical Med      nitroglycerin (NITROSTAT) 0.4 mg SL tablet 1 Tab by SubLINGual route every five (5) minutes as needed for Chest Pain. , Normal, Disp-50 Tab, R-3      cholecalciferol (VITAMIN D3) 1,000 unit tablet Take 1,000 Units by mouth daily. , Historical Med      esomeprazole (NEXIUM) 40 mg capsule Take 40 mg by mouth Daily (before breakfast). , Historical Med               NOTIFY YOUR PHYSICIAN FOR ANY OF THE FOLLOWING:   Fever over 101 degrees for 24 hours. Chest pain, shortness of breath, fever, chills, nausea, vomiting, diarrhea, change in mentation, falling, weakness, bleeding. Severe pain or pain not relieved by medications. Or, any other signs or symptoms that you may have questions about. DISPOSITION:  x  Home With:   OT  PT  HH  RN       Long term SNF/Inpatient Rehab    Independent/assisted living    Hospice    Other:       PATIENT CONDITION AT DISCHARGE:     Functional status    Poor    x Deconditioned     Independent      Cognition     Lucid    x Forgetful     Dementia      Catheters/lines (plus indication)    [place]     PICC     PEG    x None      Code status     Full code    x DNR      PHYSICAL EXAMINATION AT DISCHARGE:                                                Constitutional:  No acute distress, cooperative, pleasant    ENT:  Oral mucous moist, oropharynx benign. Neck supple,    Resp:  CTA bilaterally. No wheezing/rhonchi/rales. No accessory muscle use   CV:  Regular rhythm, normal rate, no murmurs, gallops, rubs    GI:  Soft, non distended, non tender. normoactive bowel sounds, no hepatosplenomegaly     Musculoskeletal:  No edema, warm, 2+ pulses throughout    Neurologic:  Moves all extremities. Psych:  Good insight, Not anxious nor agitated.             CHRONIC MEDICAL DIAGNOSES:  Problem List as of 12/4/2018 Date Reviewed: 10/16/2018          Codes Class Noted - Resolved    * (Principal) Acute metabolic encephalopathy SUU-90-EK: G93.41  ICD-9-CM: 348.31  12/2/2018 - Present        Acute respiratory distress ICD-10-CM: R06.03  ICD-9-CM: 518.82  Unknown - Present        Chronic pain syndrome ICD-10-CM: G89.4  ICD-9-CM: 338.4  Unknown - Present        Dementia without behavioral disturbance ICD-10-CM: F03.90  ICD-9-CM: 294.20  Unknown - Present        Goals of care, counseling/discussion ICD-10-CM: Z71.89  ICD-9-CM: V65.49  Unknown - Present        Vascular dementia without behavioral disturbance ICD-10-CM: F01.50  ICD-9-CM: 290.40  Unknown - Present        Polyneuropathy associated with underlying disease ([facility] ICD-10-CM: G63  ICD-9-CM: 357.4  8/1/2018 - Present        Dependence on continuous supplemental oxygen ICD-10-CM: Z99.81  ICD-9-CM: V46.2  7/18/2018 - Present        Anemia associated with acute blood loss ICD-10-CM: D62  ICD-9-CM: 285.1  6/19/2018 - Present        Long-term use of immunosuppressant medication ICD-10-CM: Z79.899  ICD-9-CM: V58.69  11/20/2017 - Present        CKD (chronic kidney disease) stage 2, GFR 60-89 ml/min ICD-10-CM: N18.2  ICD-9-CM: 585.2  10/24/2017 - Present        Long term current use of systemic steroids ICD-10-CM: Z79.52  ICD-9-CM: V58.65  10/24/2017 - Present        Seronegative rheumatoid arthritis of multiple sites [facility]) ICD-10-CM: M06.09  ICD-9-CM: 714.0  10/24/2017 - Present        PMR (polymyalgia rheumatica) ([facility] ICD-10-CM: M35.3  ICD-9-CM: 774  10/4/2017 - Present        Lower extremity edema ICD-10-CM: R60.0  ICD-9-CM: 782.3  10/4/2017 - Present        Advance directive on file ICD-10-CM: Z78.9  ICD-9-CM: V49.89  5/10/2017 - Present        Paroxysmal atrial fibrillation ([facility] ICD-10-CM: I48.0  ICD-9-CM: 427.31  3/23/2016 - Present        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 ([facility] ICD-10-CM: I25.111  ICD-9-CM: 414.01, 413.9  10/20/2015 - Present        CHF, stage C ([facility] ICD-10-CM: I50.9  ICD-9-CM: 428.0  10/20/2015 - Present        Lumbar spinal stenosis ICD-10-CM: M48.061  ICD-9-CM: 724.02  10/20/2015 - Present        Osteoarthritis ICD-10-CM: M19.90  ICD-9-CM: 715.90  5/22/2015 - Present RESOLVED: STEMI (ST elevation myocardial infarction) ([facility] ICD-10-CM: I21.3  ICD-9-CM: 410.90  10/12/2018 - 10/14/2018        RESOLVED: Debility ICD-10-CM: R53.81  ICD-9-CM: 799.3  Unknown - 10/9/2018        RESOLVED: Decreased oral intake ICD-10-CM: R63.8  ICD-9-CM: 783.9  Unknown - 10/9/2018        RESOLVED: Allodynia ICD-10-CM: R20.8  ICD-9-CM: 782.0  Unknown - 10/9/2018        RESOLVED: Dissociative episodes ICD-10-CM: F44.9  ICD-9-CM: 300.15  Unknown - 10/9/2018        RESOLVED: Poor fluid intake ICD-10-CM: R63.8  ICD-9-CM: 783.9  Unknown - 10/9/2018        RESOLVED: Neck pain ICD-10-CM: M54.2  ICD-9-CM: 723.1  Unknown - 10/9/2018        RESOLVED: Depression ICD-10-CM: F32.9  ICD-9-CM: 758  Unknown - 10/9/2018        RESOLVED: Dehydration ICD-10-CM: E86.0  ICD-9-CM: 276.51  10/3/2018 - 10/9/2018        RESOLVED: Metabolic acidosis SO-12-PH: E87.2  ICD-9-CM: 276.2  10/3/2018 - 10/9/2018        RESOLVED: Severe pain ICD-10-CM: R52  ICD-9-CM: 780.96  10/3/2018 - 10/9/2018        RESOLVED: Acute metabolic encephalopathy CKQ-97-BB: G93.41  ICD-9-CM: 348.31  10/3/2018 - 10/9/2018        RESOLVED: Long term (current) use of systemic steroids ICD-10-CM: Z79.52  ICD-9-CM: V58.65  10/4/2017 - 11/20/2017              Greater than 30 minutes were spent with the patient on counseling and coordination of care    Signed:   Hector Chao DO  12/6/2018  5:43 PM

## 2018-12-07 ENCOUNTER — HOME VISIT (OUTPATIENT)
Dept: FAMILY MEDICINE CLINIC | Age: 83
End: 2018-12-07

## 2018-12-07 VITALS
DIASTOLIC BLOOD PRESSURE: 72 MMHG | TEMPERATURE: 98.4 F | OXYGEN SATURATION: 91 % | HEART RATE: 68 BPM | RESPIRATION RATE: 18 BRPM | SYSTOLIC BLOOD PRESSURE: 124 MMHG

## 2018-12-07 DIAGNOSIS — F01.50 VASCULAR DEMENTIA WITHOUT BEHAVIORAL DISTURBANCE (HCC): ICD-10-CM

## 2018-12-07 DIAGNOSIS — G93.40 ENCEPHALOPATHY: ICD-10-CM

## 2018-12-07 DIAGNOSIS — N30.00 ACUTE CYSTITIS WITHOUT HEMATURIA: ICD-10-CM

## 2018-12-07 DIAGNOSIS — I25.111 CORONARY ARTERY DISEASE INVOLVING NATIVE CORONARY ARTERY OF NATIVE HEART WITH ANGINA PECTORIS WITH DOCUMENTED SPASM (HCC): ICD-10-CM

## 2018-12-07 DIAGNOSIS — I10 ESSENTIAL HYPERTENSION: ICD-10-CM

## 2018-12-07 DIAGNOSIS — Z76.89 ENCOUNTER FOR SUPPORT AND COORDINATION OF TRANSITION OF CARE: Primary | ICD-10-CM

## 2018-12-07 DIAGNOSIS — N18.2 CKD (CHRONIC KIDNEY DISEASE) STAGE 2, GFR 60-89 ML/MIN: ICD-10-CM

## 2018-12-07 LAB
BACTERIA SPEC CULT: NORMAL
SERVICE CMNT-IMP: NORMAL

## 2018-12-07 RX ORDER — ATORVASTATIN CALCIUM 40 MG/1
40 TABLET, FILM COATED ORAL DAILY
Qty: 90 TAB | Refills: 3 | Status: SHIPPED | OUTPATIENT
Start: 2018-12-07 | End: 2019-03-07

## 2018-12-07 RX ORDER — MELATONIN
DAILY
COMMUNITY
End: 2019-02-27

## 2018-12-07 RX ORDER — PREDNISONE 10 MG/1
10 TABLET ORAL DAILY
Qty: 90 TAB | Refills: 3 | Status: SHIPPED | OUTPATIENT
Start: 2018-12-07

## 2018-12-07 RX ORDER — METOPROLOL TARTRATE 50 MG/1
75 TABLET ORAL 2 TIMES DAILY
Qty: 270 TAB | Refills: 3 | Status: SHIPPED | OUTPATIENT
Start: 2018-12-07 | End: 2019-02-27

## 2018-12-08 NOTE — PROGRESS NOTES
[facility] [address]  
[phone] Date of Current Visit: 12/7/2018 MARLENY Visit, Day 3 [facility]:  12/2/2018 to 12/4/2018 SUMMARY:  
80 yr female referred to \[facility]\"" by Dr. Janine Hurtado. She has a significant hx for seronegative RA, PMR of multiple joints, CAD, old MI, GERD and Afib. Due to this, Ms. Ayse Francis is unable to see a community PCP without significant taxing effort. Patient was sent to the hospital at [facility] and admitted through the ER for AMS, dehydration, poor po intake, and severe pain. She was seen by palliative care during her hospitalization and had a psyc consult. No medical cause was found for her symptoms and this is her fourth such episode in a year. She is out of skilled days and had to return home. Remeron was started for depression. Her fentanyl patch was increased to 25 mcg for pain. By discharge she was more alert mentally and he rpain was better controlled. She was also able to maintain hydration status. She r/o'ed for infection and a head CT was wnl. She was seen by surgery for a questionnable pneumoperitoneum on CT but this was r/o'ed. She was readmitted again on 10/12/18 and ruled in for a STEMI. She was discharged after declining cardiac catheterization on 12/14/2018. Patient stopped taking her medications 12/1 and became confused and went to the hospital the next day. She had a UTI and after antibiotic treatment was started, her mentation returned to baseline. She had no sepsis during hospitalization. GOALS OF CARE / TREATMENT PREFERENCES:  
GOALS OF CARE: 
Patient / health care proxy stated goals: To return to her cognitive and functional baseline. DIAGNOSES:  
 
  ICD-10-CM ICD-9-CM 1. Encounter for support and coordination of transition of care Z71.89 V65.49   
2. Essential hypertension I10 401.9 metoprolol tartrate (LOPRESSOR) 50 mg tablet 3. Acute cystitis without hematuria N30.00 595.0 4. Encephalopathy G93.40 348.30   
5. Vascular dementia without behavioral disturbance F01.50 290.40 6. CKD (chronic kidney disease) stage 2, GFR 60-89 ml/min N18.2 585.2 7. Coronary artery disease involving native coronary artery of native heart with angina pectoris with documented spasm (HCC) I25.111 414.01   
  413.9 PLAN:  
Patient Instructions Dear Yeison Rose and Patricia Baeza, It was a pleasure seeing you at home today with [facility] [address] [place] [address] ([phone]) Your stated goal: To get back to walking again. This is what we talked about: 1. Recent heart attack 
-You have had no chest pain 
-It is important to keep the oxygen on at all times to avoid stress on the heart 
-You are progressing with physical therapy and occupational therapy and have had no angina (chest pain related to your heart) 2. Weakness after hospitalization/illness for a urinary tract infection with confusion 
-Continue to work with physical and occupational therapy to increase your strength and safety with transfers 
-Patricia Baeza will call us if you get confused/stop taking your medications again so we can try to prevent a hospitalization 3. Congestive heart failure 
-This is currently stable 
-Let us know if you develop edema in your legs 4. Chronic kidney disease 
-This has been stable 
-Keep well hydrated 
-Avoid nonsteroidal antiinflammatories such as ibuprofen, advil, motrin, aleve, and naprosyn 5. Health Maintenance None is due There are no preventive care reminders to display for this patient. 6.  Advance Care Planning - A Durable Do Not Resusitate (DDNR) is on file - An Advance Directive is already on file. This is what you have shared with us about Advance Care Planning Advance Care Planning 10/12/2018 Patient's Healthcare Decision Maker is: Named in scanned ACP document Primary Decision Maker Name Norrine Sacks Primary Decision Maker Phone Number [phone] Primary Decision Maker Relationship to Patient Adult child Secondary Decision Maker Name - Secondary Decision Maker Phone Number - Secondary Decision Maker Relationship to Patient -  
Confirm Advance Directive Yes, on file Does the patient have other document types Do Not Resuscitate Someone from the Home Based Primary Care Team will see you again in 1 week. If there are any concerns before that time, such as medication questions, worsening symptoms or a need to see a physician for an urgent or emergent situation; please call [phone]. A physician is also on call after our normal business hours of 8am to 5pm.  
 
In order to serve you better, please allow up to 48 hours for prescription refills to be processed. Certain medications may require more paperwork or a written prescription that you may need to  from the office. We appreciate you letting us know of any refill requests as soon as possible. Sincerely, The Home Based Primary Care Team 
MD Lokesh Severino, CRISTIN Molina, CRISTIN Prajapati, JUAN PABLO Guerra, RN 
 
 
 
  
  
 
 
 PRESCRIPTIONS GIVEN:  
 
Medications Ordered Today Medications  atorvastatin (LIPITOR) 40 mg tablet Sig: Take 1 Tab by mouth daily for 90 days. Dispense:  90 Tab Refill:  3  
 predniSONE (DELTASONE) 10 mg tablet Sig: Take 10 mg by mouth daily. Dispense:  90 Tab Refill:  3  
 metoprolol tartrate (LOPRESSOR) 50 mg tablet Sig: Take 1.5 Tabs by mouth two (2) times a day. Dispense:  270 Tab Refill:  3 HISTORY:  
Please see RN note from this current visit; history taken together in presence of patient/family in the home. CHIEF COMPLAINT:  
Chief Complaint Patient presents with  Transitions Of Care HPI/SUBJECTIVE: The patient is: [x] Verbal / [] Nonverbal  
 
 Patient is back at baseline per her son.   She is sleeping well, walked with PT this am, is eating well. She is back to reading and working on doll clothes. She is working with PT/OT and nursing is dealing with the wound on her right leg dressing it. She reports her pain is well managed with the fentanyl patch and tramadol. REVIEW OF SYSTEMS:  
 
The following systems were [x] reviewed / [x] unable to be reviewed Systems: constitutional, ears/nose/mouth/throat, respiratory, gastrointestinal, genitourinary, musculoskeletal, integumentary, neurologic, psychiatric, endocrine. Positive findings noted:  Chronic back pain, wound on right leg. Urinary incontinence. FUNCTIONAL ASSESSMENT:  
 
Palliative Performance Scale (PPS): 50% PSYCHOSOCIAL/SPIRITUAL SCREENING:  
  
Any spiritual / Mandaeism concerns: [] Yes /  [x] No Christianity, no longer goes to [employer] because can't get out of home; [employer]ism does not come to her, may be willing to reconnect Caregiver Burnout: [] Yes /  [x] No /  [] No Caregiver Present PHYSICAL EXAM:  
 
Wt Readings from Last 3 Encounters:  
12/03/18 121 lb 11.1 oz (55.2 kg) 11/06/18 128 lb (58.1 kg) 10/14/18 128 lb 1.4 oz (58.1 kg) Blood pressure 124/72, pulse 68, temperature 98.4 °F (36.9 °C), temperature source Oral, resp. rate 18, SpO2 91 %. Last bowel movement:  one day ago Constitutional:  Frail, elderly female seated in W/C, NAD Eyes: pupils equal, anicteric, conjunctiva are pink. ENMT: no nasal discharge, moist mucous membranes and lips. Nasal mucosa pink without discharge Cardiovascular: regularly, irregular rhythm, distal pulses intact, edema +1 LE's Respiratory: breathing not labored, symmetric, CTA A&P Gastrointestinal: soft, +bowel sounds. No TTP HSM, mass R, G or R Musculoskeletal: no deformity, no tenderness to palpation Skin: warm, dry. LLE, lateral anterior facility--open wound 3 X 5 cm's with skin tear on edge. Base macerated. No discharge. Neurologic: oriented X 2, no focal deficits, LE's with 3/5 strength, UE's 4/5 Psychiatric: No hallucinations, no agitation or anxiety HISTORY:  
 
Past Medical and Surgical History reviewed in [facility] on date of initial visit Patient Active Problem List  
Diagnosis Code  Osteoarthritis M19.90  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 ([facility]) I25.111  
 CHF, stage C ([facility]) I50.9  Lumbar spinal stenosis M48.061  
 Paroxysmal atrial fibrillation ([facility]) I48.0  Advance directive on file E43.1  PMR (polymyalgia rheumatica) ([facility]) M35.3  Lower extremity edema R60.0  CKD (chronic kidney disease) stage 2, GFR 60-89 ml/min N18.2  Long term current use of systemic steroids Z79.52  Seronegative rheumatoid arthritis of multiple sites ([facility] M06.09  
 Long-term use of immunosuppressant medication Z79.899  Anemia associated with acute blood loss D62  Dependence on continuous supplemental oxygen Z99.81  
 Polyneuropathy associated with underlying disease ([facility] G63  Vascular dementia without behavioral disturbance F01.50  Acute respiratory distress R06.03  
 Chronic pain syndrome G89.4  Dementia without behavioral disturbance F03.90  
 Goals of care, counseling/discussion Z71.89  
 Acute metabolic encephalopathy F78.71 Family History Problem Relation Age of Onset Ornelas Other Mother Intestinal obstruction  Cancer Father  Stroke Father  Heart Attack Brother  Cancer Brother  Cancer Brother  Anesth Problems Neg Hx History reviewed, no pertinent family history. Social History Tobacco Use  Smoking status: Never Smoker  Smokeless tobacco: Never Used Substance Use Topics  Alcohol use: No  
  Alcohol/week: 0.0 oz Allergies Allergen Reactions  Nsaids (Non-Steroidal Anti-Inflammatory Drug) Nausea and Vomiting  Adhesive Tape-Silicones Other (comments)   BLISTERS  
  Cymbalta [Duloxetine] Other (comments) CONFUSION 
  
 Digoxin Nausea and Vomiting  Gluten Other (comments) GLUTEN INTOLERANCE  
 Macrobid [Nitrofurantoin Monohyd/M-Cryst] Nausea and Vomiting  Novocain [Procaine] Other (comments) Paralysis  Sulfa (Sulfonamide Antibiotics) Nausea and Vomiting  Atenolol Nausea and Vomiting  Codeine Other (comments) Unable to swallow  Phenergan [Promethazine] Other (comments) \"loose my wits i go crazy\" Current Outpatient Medications Medication Sig  cholecalciferol (VITAMIN D3) 1,000 unit tablet Take  by mouth daily.  atorvastatin (LIPITOR) 40 mg tablet Take 1 Tab by mouth daily for 90 days.  predniSONE (DELTASONE) 10 mg tablet Take 10 mg by mouth daily.  metoprolol tartrate (LOPRESSOR) 50 mg tablet Take 1.5 Tabs by mouth two (2) times a day.  levoFLOXacin (LEVAQUIN) 750 mg tablet Take 1 Tab by mouth daily.  ondansetron (ZOFRAN ODT) 8 mg disintegrating tablet Take 1 Tab by mouth Before breakfast, lunch, and dinner.  aspirin 81 mg chewable tablet Take 1 Tab by mouth daily.  gabapentin (NEURONTIN) 300 mg capsule Take 600 mg by mouth two (2) times a day.  mirtazapine (REMERON) 15 mg tablet Take 1 Tab by mouth nightly. Indications: major depressive disorder  Bifidobacterium Infantis (ALIGN) 4 mg cap Take 4 mg by mouth daily.  fentaNYL (DURAGESIC) 25 mcg/hr PATCH 1 Patch by TransDERmal route every seventy-two (72) hours.  traMADol (ULTRAM) 50 mg tablet Take 50 mg by mouth two (2) times daily as needed for Pain.  topiramate (TOPAMAX) 25 mg tablet Take 1 Tab by mouth daily (with breakfast).  senna-docusate (SENNA PLUS) 8.6-50 mg per tablet Take 1 Tab by mouth daily.  esomeprazole (NEXIUM) 40 mg capsule Take 40 mg by mouth Daily (before breakfast).  polyethylene glycol (MIRALAX) 17 gram/dose powder Take 17 g by mouth daily as needed.  nitroglycerin (NITROSTAT) 0.4 mg SL tablet 1 Tab by SubLINGual route every five (5) minutes as needed for Chest Pain. No current facility-administered medications for this visit. LAB DATA REVIEWED:  
 
Lab Results Component Value Date/Time Hemoglobin A1c 5.6 09/20/2018 11:59 AM  
 
No results found for: Haskel Aw, MCA2, MCA3, MCAU, MCAU2, MCALPOCT Lab Results Component Value Date/Time TSH 1.560 11/20/2017 02:40 PM  
 TSH 0.86 05/25/2015 04:32 AM  
 
Lab Results Component Value Date/Time VITAMIN D, 25-HYDROXY 43.1 10/04/2017 02:52 PM  
   
 
Lab Results Component Value Date/Time WBC 4.0 12/03/2018 03:27 AM  
 HGB 11.4 (L) 12/03/2018 03:27 AM  
 PLATELET 404 (L) 17/99/8442 03:27 AM  
 
Lab Results Component Value Date/Time Sodium 144 12/04/2018 02:28 AM  
 Potassium 4.0 12/04/2018 02:28 AM  
 Chloride 110 (H) 12/04/2018 02:28 AM  
 CO2 27 12/04/2018 02:28 AM  
 BUN 14 12/04/2018 02:28 AM  
 Creatinine 0.75 12/04/2018 02:28 AM  
 Calcium 8.4 (L) 12/04/2018 02:28 AM  
 Magnesium 1.7 12/03/2018 03:27 AM  
 Phosphorus 3.5 12/03/2018 03:27 AM  
  
Lab Results Component Value Date/Time AST (SGOT) 21 12/03/2018 03:27 AM  
 Alk. phosphatase 87 12/03/2018 03:27 AM  
 Protein, total 5.9 (L) 12/03/2018 03:27 AM  
 Albumin 3.0 (L) 12/03/2018 03:27 AM  
 Globulin 2.9 12/03/2018 03:27 AM  
 
No results found for: IRON, FE, TIBC, IBCT, PSAT, FERR Total time:  45  minutes Counseling / coordination time:  10 minutes on CAD, PT, OT, wound care, how to handle future changes in condition 
> 50% counseling / coordination?: No

## 2018-12-08 NOTE — PATIENT INSTRUCTIONS
Dear Mar English and Reece Beltran, It was a pleasure seeing you at home today with [facility] ([phone]) Your stated goal: To get back to walking again. This is what we talked about: 1. Recent heart attack 
-You have had no chest pain 
-It is important to keep the oxygen on at all times to avoid stress on the heart 
-You are progressing with physical therapy and occupational therapy and have had no angina (chest pain related to your heart) 2. Weakness after hospitalization/illness for a urinary tract infection with confusion 
-Continue to work with physical and occupational therapy to increase your strength and safety with transfers 
-Reece Beltran will call us if you get confused/stop taking your medications again so we can try to prevent a hospitalization 3. Congestive heart failure 
-This is currently stable 
-Let us know if you develop edema in your legs 4. Chronic kidney disease 
-This has been stable 
-Keep well hydrated 
-Avoid nonsteroidal antiinflammatories such as ibuprofen, advil, motrin, aleve, and naprosyn 5. Health Maintenance None is due There are no preventive care reminders to display for this patient. 6.  Advance Care Planning - A Durable Do Not Resusitate (DDNR) is on file - An Advance Directive is already on file. This is what you have shared with us about Advance Care Planning Advance Care Planning 10/12/2018 Patient's Healthcare Decision Maker is: Named in scanned ACP document Primary Decision Maker Name Alyssa Macias Primary Decision Maker Phone Number [phone] Primary Decision Maker Relationship to Patient Adult child Secondary Decision Maker Name - Secondary Decision Maker Phone Number - Secondary Decision Maker Relationship to Patient -  
Confirm Advance Directive Yes, on file Does the patient have other document types Do Not Resuscitate Someone from the [facility] Team will see you again in 1 week. If there are any concerns before that time, such as medication questions, worsening symptoms or a need to see a physician for an urgent or emergent situation; please call [phone]. A physician is also on call after our normal business hours of 8am to 5pm.  
 
In order to serve you better, please allow up to 48 hours for prescription refills to be processed. Certain medications may require more paperwork or a written prescription that you may need to  from the office. We appreciate you letting us know of any refill requests as soon as possible. Sincerely, [facility] 
MD Melissa Child NP Kerry Mari, JUAN PABLO Ferrara RN Urinary Tract Infection in Women: Care Instructions Your Care Instructions A urinary tract infection, or UTI, is a general term for an infection anywhere between the kidneys and the urethra (where urine comes out). Most UTIs are bladder infections. They often cause pain or burning when you urinate. UTIs are caused by bacteria and can be cured with antibiotics. Be sure to complete your treatment so that the infection goes away. Follow-up care is a key part of your treatment and safety. Be sure to make and go to all appointments, and call your doctor if you are having problems. It's also a good idea to know your test results and keep a list of the medicines you take. How can you care for yourself at home? · Take your antibiotics as directed. Do not stop taking them just because you feel better. You need to take the full course of antibiotics. · Drink extra water and other fluids for the next day or two. This may help wash out the bacteria that are causing the infection. (If you have kidney, heart, or liver disease and have to limit fluids, talk with your doctor before you increase your fluid intake.) · Avoid drinks that are carbonated or have caffeine. They can irritate the bladder. · Urinate often. Try to empty your bladder each time. · To relieve pain, take a hot bath or lay a heating pad set on low over your lower belly or genital area. Never go to sleep with a heating pad in place. To prevent UTIs · Drink plenty of water each day. This helps you urinate often, which clears bacteria from your system. (If you have kidney, heart, or liver disease and have to limit fluids, talk with your doctor before you increase your fluid intake.) · Urinate when you need to. · Urinate right after you have sex. · Change sanitary pads often. · Avoid douches, bubble baths, feminine hygiene sprays, and other feminine hygiene products that have deodorants. · After going to the bathroom, wipe from front to back. When should you call for help? Call your doctor now or seek immediate medical care if: 
  · Symptoms such as fever, chills, nausea, or vomiting get worse or appear for the first time.  
  · You have new pain in your back just below your rib cage. This is called flank pain.  
  · There is new blood or pus in your urine.  
  · You have any problems with your antibiotic medicine.  
 Watch closely for changes in your health, and be sure to contact your doctor if: 
  · You are not getting better after taking an antibiotic for 2 days.  
  · Your symptoms go away but then come back. Where can you learn more? Go to http://anna-sampson.info/. Enter Q353 in the search box to learn more about \"Urinary Tract Infection in Women: Care Instructions. \" Current as of: March 21, 2018 Content Version: 11.8 © 0222-8067 [employer]. Care instructions adapted under license by [employer] (which disclaims liability or warranty for this information). If you have questions about a medical condition or this instruction, always ask your healthcare professional. [address] any warranty or liability for your use of this information.

## 2018-12-12 ENCOUNTER — TELEPHONE (OUTPATIENT)
Dept: FAMILY MEDICINE CLINIC | Age: 83
End: 2018-12-12

## 2018-12-12 DIAGNOSIS — B35.9 DERMATOPHYTOSIS: ICD-10-CM

## 2018-12-12 DIAGNOSIS — R60.9 EDEMA, UNSPECIFIED TYPE: Primary | ICD-10-CM

## 2018-12-12 RX ORDER — FLUCONAZOLE 150 MG/1
150 TABLET ORAL DAILY
Qty: 3 TAB | Refills: 0 | Status: SHIPPED | OUTPATIENT
Start: 2018-12-12 | End: 2018-12-15

## 2018-12-12 RX ORDER — FUROSEMIDE 20 MG/1
TABLET ORAL
Qty: 10 TAB | Refills: 0 | Status: SHIPPED | OUTPATIENT
Start: 2018-12-12 | End: 2019-02-27

## 2018-12-14 ENCOUNTER — HOME VISIT (OUTPATIENT)
Dept: FAMILY MEDICINE CLINIC | Age: 83
End: 2018-12-14

## 2018-12-14 VITALS
OXYGEN SATURATION: 97 % | TEMPERATURE: 98.3 F | RESPIRATION RATE: 18 BRPM | DIASTOLIC BLOOD PRESSURE: 72 MMHG | SYSTOLIC BLOOD PRESSURE: 138 MMHG | HEART RATE: 72 BPM

## 2018-12-14 DIAGNOSIS — R60.9 EDEMA, UNSPECIFIED TYPE: Primary | ICD-10-CM

## 2018-12-14 DIAGNOSIS — S80.829A: ICD-10-CM

## 2018-12-17 NOTE — PATIENT INSTRUCTIONS
Dear Buffy Allen and Jose Jimenez,    It was a pleasure seeing you at home today with [facility] ([phone])    Your stated goal: To get back to walking again. This is what we talked about:     1. Edema  -Elevate your legs frequently throughout the day  -Increase lasix (furosemide) to 40 mg daily for 2 days  -I will follow it up the beginning of the week and draw electrolytes    2. Blistering/wounds of lower extremities  -Home health will dress your wounds and follow them  -Keeping the swelling down will help wih healing and avoid future blistering    3. Health Maintenance  None is due  There are no preventive care reminders to display for this patient. 4.  Advance Care Planning   - A Durable Do Not Resusitate (DDNR) is on file  - An Advance Directive is already on file. This is what you have shared with us about [facility]. Planning 10/12/2018   Patient's Healthcare Decision Maker is: Named in scanned ACP document   Primary Decision Maker Name Ernst Ny   Primary Decision Maker Phone Number [phone]   Primary Decision Maker Relationship to Patient Adult child   Secondary Decision Maker Name -   Secondary Decision [address] [place] [address] Ave Phone Number -   Secondary Decision Maker Relationship to Patient -   Confirm Advance Directive Yes, on file   Does the patient have other document types Do Not Resuscitate       Someone from the [facility] Team will see you again in 4 days. If there are any concerns before that time, such as medication questions, worsening symptoms or a need to see a physician for an urgent or emergent situation; please call [phone]. A physician is also on call after our normal business hours of 8am to 5pm.     In order to serve you better, please allow up to 48 hours for prescription refills to be processed. Certain medications may require more paperwork or a written prescription that you may need to  from the office.  We appreciate you letting us know of any refill requests as soon as possible. Sincerely,    [facility]  MD Bert Kumar NP Reatha Rei, NP Billee Grate, RN Chere Haley, RN                       Leg and Ankle Edema: Care Instructions  Your Care Instructions  Swelling in the legs, ankles, and feet is called edema. It is common after you sit or stand for a while. Long plane flights or car rides often cause swelling in the legs and feet. You may also have swelling if you have to stand for long periods of time at your job. Problems with the veins in the legs (varicose veins) and changes in hormones can also cause swelling. Sometimes the swelling in the ankles and feet is caused by a more serious problem, such as heart failure, infection, blood clots, or liver or kidney disease. Follow-up care is a key part of your treatment and safety. Be sure to make and go to all appointments, and call your doctor if you are having problems. It's also a good idea to know your test results and keep a list of the medicines you take. How can you care for yourself at home? · If your doctor gave you medicine, take it as prescribed. Call your doctor if you think you are having a problem with your medicine. · Whenever you are resting, raise your legs up. Try to keep the swollen area higher than the level of your heart. · Take breaks from standing or sitting in one position. ? Walk around to increase the blood flow in your lower legs. ? Move your feet and ankles often while you stand, or tighten and relax your leg muscles. · Wear support stockings. Put them on in the morning, before swelling gets worse. · Eat a balanced diet. Lose weight if you need to. · Limit the amount of salt (sodium) in your diet. Salt holds fluid in the body and may increase swelling. When should you call for help? Call 911 anytime you think you may need emergency care.  For example, call if:    · You have symptoms of a blood clot in your lung (called a pulmonary embolism). These may include:  ? Sudden chest pain. ? Trouble breathing. ? Coughing up blood.    Call your doctor now or seek immediate medical care if:    · You have signs of a blood clot, such as:  ? Pain in your calf, back of the knee, thigh, or groin. ? Redness and swelling in your leg or groin.     · You have symptoms of infection, such as:  ? Increased pain, swelling, warmth, or redness. ? Red streaks or pus. ? A fever.    Watch closely for changes in your health, and be sure to contact your doctor if:    · Your swelling is getting worse.     · You have new or worsening pain in your legs.     · You do not get better as expected. Where can you learn more? Go to http://anna-sampson.info/. Enter A854 in the search box to learn more about \"Leg and Ankle Edema: Care Instructions. \"  Current as of: November 20, 2017  Content Version: 11.8  © 1767-0930 [employer]. Care instructions adapted under license by [employer] (which disclaims liability or warranty for this information). If you have questions about a medical condition or this instruction, always ask your healthcare professional. [place] [address] any warranty or liability for your use of this information.

## 2018-12-17 NOTE — PROGRESS NOTES
Home Based [address] [facility] [employer]   [phone]      Date of Current Visit: 12/14/2018     SUMMARY:   80 yr female referred to [facility] by Dr. Harvey Anthony. She has a significant hx for seronegative RA, PMR of multiple joints, CAD, old MI, GERD and Afib. Due to this, Ms. Ever Lance is unable to see a community PCP without significant taxing effort. Patient was sent to the hospital at [facility] and admitted through the ER for AMS, dehydration, poor po intake, and severe pain. She was seen by palliative care during her hospitalization and had a psyc consult. No medical cause was found for her symptoms and this is her fourth such episode in a year. She is out of skilled days and had to return home. Remeron was started for depression. Her fentanyl patch was increased to 25 mcg for pain. By discharge she was more alert mentally and he rpain was better controlled. She was also able to maintain hydration status. She r/o'ed for infection and a head CT was wnl. She was seen by surgery for a questionnable pneumoperitoneum on CT but this was r/o'ed. She was readmitted again on 10/12/18 and ruled in for a STEMI. She was discharged after declining cardiac catheterization on 12/14/2018. Patient stopped taking her medications 12/1 and became confused and went to the hospital the next day. She had a UTI and after antibiotic treatment was started, her mentation returned to baseline. She had no sepsis during hospitalization. GOALS OF CARE / TREATMENT PREFERENCES:   GOALS OF CARE:  Patient / health care proxy stated goals: To return to her cognitive and functional baseline. DIAGNOSES:       ICD-10-CM ICD-9-CM    1. Edema, unspecified type R60.9 782.3    2.  Blister of leg without infection, unspecified laterality, initial encounter S80.829A 916.2         PLAN:   Patient Instructions     Dear Brenda Oneil and Lisbeth Gonzalez,    It was a pleasure seeing you at home today with [facility] ([phone])    Your stated goal: To get back to walking again. This is what we talked about:     1. Edema  -Elevate your legs frequently throughout the day  -Increase lasix (furosemide) to 40 mg daily for 2 days  -I will follow it up the beginning of the week and draw electrolytes    2. Blistering/wounds of lower extremities  -Home health will dress your wounds and follow them  -Keeping the swelling down will help wih healing and avoid future blistering    3. Health Maintenance  None is due  There are no preventive care reminders to display for this patient. 4.  Advance Care Planning   - A Durable Do Not Resusitate (DDNR) is on file  - An Advance Directive is already on file. This is what you have shared with us about Darby Umana. Planning 10/12/2018   Patient's Healthcare Decision Maker is: Named in scanned ACP document   Primary Decision Maker Name Terrance Kraft   Primary Decision Maker Phone Number [phone]   Primary Decision Maker Relationship to Patient Adult child   Secondary Decision Maker Name -   Secondary Decision [address] [place] [address] Phone Number -   Secondary Decision Maker Relationship to Patient -   Confirm Advance Directive Yes, on file   Does the patient have other document types Do Not Resuscitate       Someone from the Home Based Primary Care Team will see you again in 4 days. If there are any concerns before that time, such as medication questions, worsening symptoms or a need to see a physician for an urgent or emergent situation; please call [phone]. A physician is also on call after our normal business hours of 8am to 5pm.     In order to serve you better, please allow up to 48 hours for prescription refills to be processed. Certain medications may require more paperwork or a written prescription that you may need to  from the office. We appreciate you letting us know of any refill requests as soon as possible.     Sincerely,    The Home Based Primary Care Team  MD Ankit Aggarwal, CRISTIN Shelton RN Vonzell Stands, JUAN PABLO                       Leg and Ankle Edema: Care Instructions  Your Care Instructions  Swelling in the legs, ankles, and feet is called edema. It is common after you sit or stand for a while. Long plane flights or car rides often cause swelling in the legs and feet. You may also have swelling if you have to stand for long periods of time at your job. Problems with the veins in the legs (varicose veins) and changes in hormones can also cause swelling. Sometimes the swelling in the ankles and feet is caused by a more serious problem, such as heart failure, infection, blood clots, or liver or kidney disease. Follow-up care is a key part of your treatment and safety. Be sure to make and go to all appointments, and call your doctor if you are having problems. It's also a good idea to know your test results and keep a list of the medicines you take. How can you care for yourself at home? · If your doctor gave you medicine, take it as prescribed. Call your doctor if you think you are having a problem with your medicine. · Whenever you are resting, raise your legs up. Try to keep the swollen area higher than the level of your heart. · Take breaks from standing or sitting in one position. ? Walk around to increase the blood flow in your lower legs. ? Move your feet and ankles often while you stand, or tighten and relax your leg muscles. · Wear support stockings. Put them on in the morning, before swelling gets worse. · Eat a balanced diet. Lose weight if you need to. · Limit the amount of salt (sodium) in your diet. Salt holds fluid in the body and may increase swelling. When should you call for help? Call 911 anytime you think you may need emergency care. For example, call if:    · You have symptoms of a blood clot in your lung (called a pulmonary embolism). These may include:  ? Sudden chest pain. ?  Trouble breathing. ? Coughing up blood.    Call your doctor now or seek immediate medical care if:    · You have signs of a blood clot, such as:  ? Pain in your calf, back of the knee, thigh, or groin. ? Redness and swelling in your leg or groin.     · You have symptoms of infection, such as:  ? Increased pain, swelling, warmth, or redness. ? Red streaks or pus. ? A fever.    Watch closely for changes in your health, and be sure to contact your doctor if:    · Your swelling is getting worse.     · You have new or worsening pain in your legs.     · You do not get better as expected. Where can you learn more? Go to http://anna-sampson.info/. Enter R825 in the search box to learn more about \"Leg and Ankle Edema: Care Instructions. \"  Current as of: November 20, 2017  Content Version: 11.8  © 4563-9058 [employer]. Care instructions adapted under license by [employer] (which disclaims liability or warranty for this information). If you have questions about a medical condition or this instruction, always ask your healthcare professional. [place] [address] any warranty or liability for your use of this information. PRESCRIPTIONS GIVEN:     No orders of the defined types were placed in this encounter. HISTORY:   Please see RN note from this current visit; history taken together in presence of patient/family in the home. CHIEF COMPLAINT:   Chief Complaint   Patient presents with    Leg Swelling    Skin Problem     HPI/SUBJECTIVE:    The patient is: [x] Verbal / [] Nonverbal     Was called yesterday y [place] nurse concerned about large increase in edema in LE's with blistering on RLE and open wounds. Lasix 20 mg was called into the pharmacy and given for two days with no change in urination or decrease in edema. She denies SOB, ches tpain. She is compliant with other medication. She continues at he baseline mentally and functionally. REVIEW OF SYSTEMS:     The following systems were [x] reviewed / [x] unable to be reviewed  Systems: constitutional, ears/nose/mouth/throat, respiratory, gastrointestinal, genitourinary, musculoskeletal, integumentary, neurologic, psychiatric, endocrine. Positive findings noted:  Chronic back pain, wound on LLE, blistering on RLE,  LE edema urinary incontinence. FUNCTIONAL ASSESSMENT:     Palliative Performance Scale (PPS): 60%     PSYCHOSOCIAL/SPIRITUAL SCREENING:      Any spiritual / Zoroastrianism concerns: [] Yes /  [x] No Zoroastrian, no longer goes to [employer] because can't get out of home; [employer] does not come to her, may be willing to reconnect    Caregiver Burnout: [] Yes /  [x] No /  [] No Caregiver Present       PHYSICAL EXAM:     Wt Readings from Last 3 Encounters:   12/03/18 121 lb 11.1 oz (55.2 kg)   11/06/18 128 lb (58.1 kg)   10/14/18 128 lb 1.4 oz (58.1 kg)     Blood pressure 138/72, pulse 72, temperature 98.3 °F (36.8 °C), temperature source Oral, resp. rate 18, SpO2 97 %. Last bowel movement:  one day ago    Constitutional:  Frail, elderly female seated in W/C, NAD  Eyes: pupils equal, anicteric, conjunctiva are pink. ENMT: no nasal discharge, moist mucous membranes and lips. Nasal mucosa pink without discharge  Cardiovascular: regularly, irregular rhythm, distal pulses intact,  Respiratory: breathing not labored, symmetric, CTA A&P  Gastrointestinal: soft, +bowel sounds. No TTP HSM, mass R, G or R  Musculoskeletal: no deformity, no tenderness to palpation   Skin: warm, dry. LLE, lateral anterior facility--open wound 3 X 5 cm's with skin tear on edge. Base macerated. No discharge. BLE with 3+ edema, blistering with open areas. Base erythematous. No discharge or surrounding erythema. Largest area 2 cm's.     Neurologic: oriented X 3, no focal deficits, LE's with 3/5 strength, UE's 4/5  Psychiatric: No hallucinations, no agitation or anxiety   HISTORY:     Past Medical and Surgical History reviewed in [facility] on date of initial visit    Patient Active Problem List   Diagnosis Code    Osteoarthritis M19.90    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 ([facility] I25.111    CHF, stage C ([facility] I50.9    Lumbar spinal stenosis M48.061    Paroxysmal atrial fibrillation (HCC) I48.0    Advance directive on file Z78.9    PMR (polymyalgia rheumatica) (HCC) M35.3    Lower extremity edema R60.0    CKD (chronic kidney disease) stage 2, GFR 60-89 ml/min N18.2    Long term current use of systemic steroids Z79.52    Seronegative rheumatoid arthritis of multiple sites ([facility] M06.09    Long-term use of immunosuppressant medication Z79.899    Anemia associated with acute blood loss D62    Dependence on continuous supplemental oxygen Z99.81    Polyneuropathy associated with underlying disease ([facility] G63    Vascular dementia without behavioral disturbance F01.50    Acute respiratory distress R06.03    Chronic pain syndrome G89.4    Dementia without behavioral disturbance F03.90    Goals of care, counseling/discussion Z71.89    Acute metabolic encephalopathy G61.67     Family History   Problem Relation Age of Onset    Other Mother         Intestinal obstruction    Cancer Father     Stroke Father     Heart Attack Brother     Cancer Brother     Cancer Brother     Anesth Problems Neg Hx       History reviewed, no pertinent family history.   Social History     Tobacco Use    Smoking status: Never Smoker    Smokeless tobacco: Never Used   Substance Use Topics    Alcohol use: No     Alcohol/week: 0.0 oz     Allergies   Allergen Reactions    Nsaids (Non-Steroidal Anti-Inflammatory Drug) Nausea and Vomiting    Adhesive Tape-Silicones Other (comments)     BLISTERS    Cymbalta [Duloxetine] Other (comments)     CONFUSION      Digoxin Nausea and Vomiting    Gluten Other (comments)     GLUTEN INTOLERANCE    Macrobid [Nitrofurantoin Monohyd/M-Cryst] Nausea and Vomiting    Novocain [Procaine] Other (comments)     Paralysis     Sulfa (Sulfonamide Antibiotics) Nausea and Vomiting    Atenolol Nausea and Vomiting    Codeine Other (comments)     Unable to swallow    Phenergan [Promethazine] Other (comments)     \"loose my wits i go crazy\"      Current Outpatient Medications   Medication Sig    furosemide (LASIX) 20 mg tablet Take one po daily x 2 days and will call family with further directions    cholecalciferol (VITAMIN D3) 1,000 unit tablet Take  by mouth daily.  atorvastatin (LIPITOR) 40 mg tablet Take 1 Tab by mouth daily for 90 days.  predniSONE (DELTASONE) 10 mg tablet Take 10 mg by mouth daily.  metoprolol tartrate (LOPRESSOR) 50 mg tablet Take 1.5 Tabs by mouth two (2) times a day.  ondansetron (ZOFRAN ODT) 8 mg disintegrating tablet Take 1 Tab by mouth Before breakfast, lunch, and dinner.  aspirin 81 mg chewable tablet Take 1 Tab by mouth daily.  gabapentin (NEURONTIN) 300 mg capsule Take 600 mg by mouth two (2) times a day.  mirtazapine (REMERON) 15 mg tablet Take 1 Tab by mouth nightly. Indications: major depressive disorder    Bifidobacterium Infantis (ALIGN) 4 mg cap Take 4 mg by mouth daily.  fentaNYL (DURAGESIC) 25 mcg/hr PATCH 1 Patch by TransDERmal route every seventy-two (72) hours.  traMADol (ULTRAM) 50 mg tablet Take 50 mg by mouth two (2) times daily as needed for Pain.  polyethylene glycol (MIRALAX) 17 gram/dose powder Take 17 g by mouth daily as needed.  topiramate (TOPAMAX) 25 mg tablet Take 1 Tab by mouth daily (with breakfast).  senna-docusate (SENNA PLUS) 8.6-50 mg per tablet Take 1 Tab by mouth daily.  esomeprazole (NEXIUM) 40 mg capsule Take 40 mg by mouth Daily (before breakfast).  nitroglycerin (NITROSTAT) 0.4 mg SL tablet 1 Tab by SubLINGual route every five (5) minutes as needed for Chest Pain. No current facility-administered medications for this visit. LAB DATA REVIEWED:     Lab Results   Component Value Date/Time    Hemoglobin A1c 5.6 09/20/2018 11:59 AM     No results found for: MCACR, MCA1, MCA2, MCA3, MCAU, MCAU2, MCALPOCT  Lab Results   Component Value Date/Time    TSH 1.560 11/20/2017 02:40 PM    TSH 0.86 05/25/2015 04:32 AM     Lab Results   Component Value Date/Time    VITAMIN D, 25-HYDROXY 43.1 10/04/2017 02:52 PM         Lab Results   Component Value Date/Time    WBC 4.0 12/03/2018 03:27 AM    HGB 11.4 (L) 12/03/2018 03:27 AM    PLATELET 349 (L) 32/31/8777 03:27 AM     Lab Results   Component Value Date/Time    Sodium 144 12/04/2018 02:28 AM    Potassium 4.0 12/04/2018 02:28 AM    Chloride 110 (H) 12/04/2018 02:28 AM    CO2 27 12/04/2018 02:28 AM    BUN 14 12/04/2018 02:28 AM    Creatinine 0.75 12/04/2018 02:28 AM    Calcium 8.4 (L) 12/04/2018 02:28 AM    Magnesium 1.7 12/03/2018 03:27 AM    Phosphorus 3.5 12/03/2018 03:27 AM      Lab Results   Component Value Date/Time    AST (SGOT) 21 12/03/2018 03:27 AM    Alk.  phosphatase 87 12/03/2018 03:27 AM    Protein, total 5.9 (L) 12/03/2018 03:27 AM    Albumin 3.0 (L) 12/03/2018 03:27 AM    Globulin 2.9 12/03/2018 03:27 AM     No results found for: IRON, FE, TIBC, IBCT, PSAT, FERR             Total time:  35  minutes  Counseling / coordination time:  10 minutes on edema, diuretics, wounds  > 50% counseling / coordination?: No

## 2018-12-19 ENCOUNTER — HOME VISIT (OUTPATIENT)
Dept: FAMILY MEDICINE CLINIC | Age: 83
End: 2018-12-19

## 2018-12-19 DIAGNOSIS — R60.9 EDEMA, UNSPECIFIED TYPE: Primary | ICD-10-CM

## 2018-12-19 DIAGNOSIS — S81.809D MULTIPLE OPEN WOUNDS OF LOWER LEG, UNSPECIFIED LATERALITY, SUBSEQUENT ENCOUNTER: ICD-10-CM

## 2018-12-19 DIAGNOSIS — I50.9 CHF, STAGE C (HCC): ICD-10-CM

## 2018-12-19 DIAGNOSIS — N18.2 CKD (CHRONIC KIDNEY DISEASE) STAGE 2, GFR 60-89 ML/MIN: ICD-10-CM

## 2018-12-19 DIAGNOSIS — E78.5 HYPERLIPIDEMIA, UNSPECIFIED HYPERLIPIDEMIA TYPE: ICD-10-CM

## 2018-12-20 LAB
ALBUMIN SERPL-MCNC: 4 G/DL (ref 3.2–4.6)
ALBUMIN/GLOB SERPL: 2.1 {RATIO} (ref 1.2–2.2)
ALP SERPL-CCNC: 86 IU/L (ref 39–117)
ALT SERPL-CCNC: 38 IU/L (ref 0–32)
AST SERPL-CCNC: 33 IU/L (ref 0–40)
BASOPHILS # BLD AUTO: 0 X10E3/UL (ref 0–0.2)
BASOPHILS NFR BLD AUTO: 0 %
BILIRUB SERPL-MCNC: 0.2 MG/DL (ref 0–1.2)
BUN SERPL-MCNC: 18 MG/DL (ref 10–36)
BUN/CREAT SERPL: 19 (ref 12–28)
CALCIUM SERPL-MCNC: 9.3 MG/DL (ref 8.7–10.3)
CHLORIDE SERPL-SCNC: 106 MMOL/L (ref 96–106)
CHOLEST SERPL-MCNC: 171 MG/DL (ref 100–199)
CO2 SERPL-SCNC: 25 MMOL/L (ref 20–29)
CREAT SERPL-MCNC: 0.93 MG/DL (ref 0.57–1)
EOSINOPHIL # BLD AUTO: 0.1 X10E3/UL (ref 0–0.4)
EOSINOPHIL NFR BLD AUTO: 2 %
ERYTHROCYTE [DISTWIDTH] IN BLOOD BY AUTOMATED COUNT: 17.4 % (ref 12.3–15.4)
GLOBULIN SER CALC-MCNC: 1.9 G/DL (ref 1.5–4.5)
GLUCOSE SERPL-MCNC: 57 MG/DL (ref 65–99)
HCT VFR BLD AUTO: 33.5 % (ref 34–46.6)
HDLC SERPL-MCNC: 78 MG/DL
HGB BLD-MCNC: 10.4 G/DL (ref 11.1–15.9)
IMM GRANULOCYTES # BLD: 0 X10E3/UL (ref 0–0.1)
IMM GRANULOCYTES NFR BLD: 0 %
LDLC SERPL CALC-MCNC: 71 MG/DL (ref 0–99)
LYMPHOCYTES # BLD AUTO: 2.8 X10E3/UL (ref 0.7–3.1)
LYMPHOCYTES NFR BLD AUTO: 33 %
MCH RBC QN AUTO: 28.8 PG (ref 26.6–33)
MCHC RBC AUTO-ENTMCNC: 31 G/DL (ref 31.5–35.7)
MCV RBC AUTO: 93 FL (ref 79–97)
MONOCYTES # BLD AUTO: 0.8 X10E3/UL (ref 0.1–0.9)
MONOCYTES NFR BLD AUTO: 9 %
NEUTROPHILS # BLD AUTO: 4.8 X10E3/UL (ref 1.4–7)
NEUTROPHILS NFR BLD AUTO: 56 %
PLATELET # BLD AUTO: 226 X10E3/UL (ref 150–379)
POTASSIUM SERPL-SCNC: 4.7 MMOL/L (ref 3.5–5.2)
PROT SERPL-MCNC: 5.9 G/DL (ref 6–8.5)
RBC # BLD AUTO: 3.61 X10E6/UL (ref 3.77–5.28)
SODIUM SERPL-SCNC: 145 MMOL/L (ref 134–144)
TRIGL SERPL-MCNC: 111 MG/DL (ref 0–149)
VLDLC SERPL CALC-MCNC: 22 MG/DL (ref 5–40)
WBC # BLD AUTO: 8.6 X10E3/UL (ref 3.4–10.8)

## 2018-12-20 NOTE — PROGRESS NOTES
Spoke with patient's son, Angela Berrios and informed him of lab results and instruction per NP.   Angela Berrios verbalized understanding

## 2018-12-23 VITALS
HEART RATE: 74 BPM | OXYGEN SATURATION: 95 % | TEMPERATURE: 98.1 F | SYSTOLIC BLOOD PRESSURE: 126 MMHG | RESPIRATION RATE: 17 BRPM | DIASTOLIC BLOOD PRESSURE: 64 MMHG

## 2018-12-24 NOTE — PROGRESS NOTES
Home Based [address] [facility]   [phone]      Date of Current Visit: 12/19/2018     SUMMARY:   80 yr female referred to [facility] by Dr. Urban Jackson. She has a significant hx for seronegative RA, PMR of multiple joints, CAD, old MI, GERD and Afib. Due to this, Ms. Mariana Lucas is unable to see a community PCP without significant taxing effort. Patient was sent to the hospital at [facility] and admitted through the ER for AMS, dehydration, poor po intake, and severe pain. She was seen by palliative care during her hospitalization and had a psyc consult. No medical cause was found for her symptoms and this is her fourth such episode in a year. She is out of skilled days and had to return home. Remeron was started for depression. Her fentanyl patch was increased to 25 mcg for pain. By discharge she was more alert mentally and he rpain was better controlled. She was also able to maintain hydration status. She r/o'ed for infection and a head CT was wnl. She was seen by surgery for a questionnable pneumoperitoneum on CT but this was r/o'ed. She was readmitted again on 10/12/18 and ruled in for a STEMI. She was discharged after declining cardiac catheterization on 12/14/2018. Patient stopped taking her medications 12/1 and became confused and went to the hospital the next day. She had a UTI and after antibiotic treatment was started, her mentation returned to baseline. She had no sepsis during hospitalization. GOALS OF CARE / TREATMENT PREFERENCES:   GOALS OF CARE:  Patient / health care proxy stated goals: To return to her cognitive and functional baseline. DIAGNOSES:       ICD-10-CM ICD-9-CM    1. Edema, unspecified type V38.6 440.1 METABOLIC PANEL, COMPREHENSIVE      CBC WITH AUTOMATED DIFF   2. Hyperlipidemia, unspecified hyperlipidemia type E78.5 272.4 LIPID PANEL   3. CKD (chronic kidney disease) stage 2, GFR 60-89 ml/min N18.2 585.2    4.  CHF, stage C (HCC) I50.9 428.0    5. Multiple open wounds of lower leg, unspecified laterality, subsequent encounter S81.809D V58.89      894.0         PLAN:   Patient Instructions     Dear Sridhar Cordero and Tirso Khanna,    It was a pleasure seeing you at home today with [facility] ([phone])    Your stated goal: To get back to walking again. This is what we talked about:     1. Edema  -This is much improved  -If it returns, call and I will tell you how to dose the lasix  -Labs were drawn today to check her electrolytes and kidney function    2. Lower extremity wounds  These are healing better and home health will continue to treat    3. Congestive heart failure  -This is currently stable    4. Chronic kidney disease  -This has been stable  -Keep well hydrated  -Avoid nonsteroidal antiinflammatories such as ibuprofen, advil, motrin, aleve, and naprosyn    5. Health Maintenance  None is due  There are no preventive care reminders to display for this patient. 6.  Advance Care Planning   - A Durable Do Not Resusitate (DDNR) is on file  - An Advance Directive is already on file. This is what you have shared with us about Darby Umana. Planning 10/12/2018   Patient's Healthcare Decision Maker is: Named in scanned ACP document   Primary Decision Maker Name Jona Johnson   Primary Decision Maker Phone Number [phone]   Primary Decision Maker Relationship to Patient Adult child   Secondary Decision Maker Name -   Secondary Decision [address] Phone Number -   Secondary Decision Maker Relationship to Patient -   Confirm Advance Directive Yes, on file   Does the patient have other document types Do Not Resuscitate       Someone from the Home Based Primary Care Team will see you again in 1 week. If there are any concerns before that time, such as medication questions, worsening symptoms or a need to see a physician for an urgent or emergent situation; please call [phone].  A physician is also on call after our normal business hours of 8am to 5pm.     In order to serve you better, please allow up to 48 hours for prescription refills to be processed. Certain medications may require more paperwork or a written prescription that you may need to  from the office. We appreciate you letting us know of any refill requests as soon as possible. Sincerely,    The Home Based Primary Care Team  MD Vicente Oconnell NP Dyan Forester, NP Windle Ramal, RN  Dearmonique Alcala RN                        Leg and Ankle Edema: Care Instructions  Your Care Instructions  Swelling in the legs, ankles, and feet is called edema. It is common after you sit or stand for a while. Long plane flights or car rides often cause swelling in the legs and feet. You may also have swelling if you have to stand for long periods of time at your job. Problems with the veins in the legs (varicose veins) and changes in hormones can also cause swelling. Sometimes the swelling in the ankles and feet is caused by a more serious problem, such as heart failure, infection, blood clots, or liver or kidney disease. Follow-up care is a key part of your treatment and safety. Be sure to make and go to all appointments, and call your doctor if you are having problems. It's also a good idea to know your test results and keep a list of the medicines you take. How can you care for yourself at home? · If your doctor gave you medicine, take it as prescribed. Call your doctor if you think you are having a problem with your medicine. · Whenever you are resting, raise your legs up. Try to keep the swollen area higher than the level of your heart. · Take breaks from standing or sitting in one position. ? Walk around to increase the blood flow in your lower legs. ? Move your feet and ankles often while you stand, or tighten and relax your leg muscles. · Wear support stockings. Put them on in the morning, before swelling gets worse. · Eat a balanced diet.  Lose weight if you need to. · Limit the amount of salt (sodium) in your diet. Salt holds fluid in the body and may increase swelling. When should you call for help? Call 911 anytime you think you may need emergency care. For example, call if:    · You have symptoms of a blood clot in your lung (called a pulmonary embolism). These may include:  ? Sudden chest pain. ? Trouble breathing. ? Coughing up blood.    Call your doctor now or seek immediate medical care if:    · You have signs of a blood clot, such as:  ? Pain in your calf, back of the knee, thigh, or groin. ? Redness and swelling in your leg or groin.     · You have symptoms of infection, such as:  ? Increased pain, swelling, warmth, or redness. ? Red streaks or pus. ? A fever.    Watch closely for changes in your health, and be sure to contact your doctor if:    · Your swelling is getting worse.     · You have new or worsening pain in your legs.     · You do not get better as expected. Where can you learn more? Go to http://anna-sampson.info/. Enter K206 in the search box to learn more about \"Leg and Ankle Edema: Care Instructions. \"  Current as of: November 20, 2017  Content Version: 11.8  © 0173-4107 [employer]. Care instructions adapted under license by [employer] (which disclaims liability or warranty for this information). If you have questions about a medical condition or this instruction, always ask your healthcare professional. [place] [address] any warranty or liability for your use of this information. PRESCRIPTIONS GIVEN:     No orders of the defined types were placed in this encounter. HISTORY:   Please see RN note from this current visit; history taken together in presence of patient/family in the home.       CHIEF COMPLAINT:   Chief Complaint   Patient presents with    Leg Swelling    Labs     HPI/SUBJECTIVE:    The patient is: [x] Verbal / [] Nonverbal Edema much improved. [facility] nurse reports wounds of legs are improving with decrease in edema. No SOB, CP,  At cognitive and functional baseline. She is eating well and transferring herself to the [facility]. She is sleeping well. Pain is at 2 to 4 with pain meds. REVIEW OF SYSTEMS:     The following systems were [x] reviewed / [x] unable to be reviewed  Systems: constitutional, ears/nose/mouth/throat, respiratory, gastrointestinal, genitourinary, musculoskeletal, integumentary, neurologic, psychiatric, endocrine. Positive findings noted:  Chronic back pain, wound on LLE, blistering on RLE,  LE edema urinary incontinence. FUNCTIONAL ASSESSMENT:     Palliative Performance Scale (PPS): 60%     PSYCHOSOCIAL/SPIRITUAL SCREENING:      Any spiritual / Anabaptism concerns: [] Yes /  [x] No Sabianist, no longer goes to [employer] because can't get out of home; [employer] does not come to her, may be willing to reconnect    Caregiver Burnout: [] Yes /  [x] No /  [] No Caregiver Present       PHYSICAL EXAM:     Wt Readings from Last 3 Encounters:   12/03/18 121 lb 11.1 oz (55.2 kg)   11/06/18 128 lb (58.1 kg)   10/14/18 128 lb 1.4 oz (58.1 kg)     Blood pressure 126/64, pulse 74, temperature 98.1 °F (36.7 °C), temperature source Oral, resp. rate 17, SpO2 95 %. Last bowel movement:  one day ago    Constitutional:  Frail, elderly female seated in W/C, NAD  Eyes: pupils equal, anicteric, conjunctiva are pink. ENMT: no nasal discharge, moist mucous membranes and lips. Nasal mucosa pink without discharge  Cardiovascular: regularly, irregular rhythm, distal pulses intact   Respiratory: breathing not labored, symmetric, CTA A&P  Gastrointestinal: soft, +bowel sounds. No TTP HSM, mass R, G or R  Musculoskeletal: no deformity, no tenderness to palpation   Skin: warm, dry. LLE, lateral anterior facility--open wound 3 X 5 cm's with skin tear on edge. Base clean. No discharge.   BLE with 1+ edema, blistering with open areas which are healing. Base erythematous. No discharge or surrounding erythema. Largest area 2 cm's. Neurologic: oriented X 3, no focal deficits, LE's with 3/5 strength, UE's 4/5  Psychiatric: No hallucinations, no agitation or anxiety   HISTORY:     Past Medical and Surgical History reviewed in [facility] on date of initial visit    Patient Active Problem List   Diagnosis Code    Osteoarthritis M19.90    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 ([facility] [address] I25.111    CHF, stage C ([facility] I50.9    Lumbar spinal stenosis M48.061    Paroxysmal atrial fibrillation ([facility] [address] [address] I48.0    Advance directive on file Z78.9    PMR (polymyalgia rheumatica) ([facility]) M35.3    Lower extremity edema R60.0    CKD (chronic kidney disease) stage 2, GFR 60-89 ml/min N18.2    Long term current use of systemic steroids Z79.52    Seronegative rheumatoid arthritis of multiple sites ([facility] [address] M06.09    Long-term use of immunosuppressant medication Z79.899    Anemia associated with acute blood loss D62    Dependence on continuous supplemental oxygen Z99.81    Polyneuropathy associated with underlying disease ([facility] G63    Vascular dementia without behavioral disturbance F01.50    Acute respiratory distress R06.03    Chronic pain syndrome G89.4    Dementia without behavioral disturbance F03.90    Goals of care, counseling/discussion Z71.89    Acute metabolic encephalopathy D98.33     Family History   Problem Relation Age of Onset    Other Mother         Intestinal obstruction    Cancer Father     Stroke Father     Heart Attack Brother     Cancer Brother     Cancer Brother     Anesth Problems Neg Hx       History reviewed, no pertinent family history.   Social History     Tobacco Use    Smoking status: Never Smoker    Smokeless tobacco: Never Used   Substance Use Topics    Alcohol use: No     Alcohol/week: 0.0 oz     Allergies   Allergen Reactions    Nsaids (Non-Steroidal Anti-Inflammatory Drug) Nausea and Vomiting    Adhesive Tape-Silicones Other (comments)     BLISTERS    Cymbalta [Duloxetine] Other (comments)     CONFUSION      Digoxin Nausea and Vomiting    Gluten Other (comments)     GLUTEN INTOLERANCE    Macrobid [Nitrofurantoin Monohyd/M-Cryst] Nausea and Vomiting    Novocain [Procaine] Other (comments)     Paralysis     Sulfa (Sulfonamide Antibiotics) Nausea and Vomiting    Atenolol Nausea and Vomiting    Codeine Other (comments)     Unable to swallow    Phenergan [Promethazine] Other (comments)     \"loose my wits i go crazy\"      Current Outpatient Medications   Medication Sig    furosemide (LASIX) 20 mg tablet Take one po daily x 2 days and will call family with further directions    cholecalciferol (VITAMIN D3) 1,000 unit tablet Take  by mouth daily.  atorvastatin (LIPITOR) 40 mg tablet Take 1 Tab by mouth daily for 90 days.  predniSONE (DELTASONE) 10 mg tablet Take 10 mg by mouth daily.  metoprolol tartrate (LOPRESSOR) 50 mg tablet Take 1.5 Tabs by mouth two (2) times a day.  ondansetron (ZOFRAN ODT) 8 mg disintegrating tablet Take 1 Tab by mouth Before breakfast, lunch, and dinner.  aspirin 81 mg chewable tablet Take 1 Tab by mouth daily.  gabapentin (NEURONTIN) 300 mg capsule Take 600 mg by mouth two (2) times a day.  mirtazapine (REMERON) 15 mg tablet Take 1 Tab by mouth nightly. Indications: major depressive disorder    Bifidobacterium Infantis (ALIGN) 4 mg cap Take 4 mg by mouth daily.  fentaNYL (DURAGESIC) 25 mcg/hr PATCH 1 Patch by TransDERmal route every seventy-two (72) hours.  polyethylene glycol (MIRALAX) 17 gram/dose powder Take 17 g by mouth daily as needed.  topiramate (TOPAMAX) 25 mg tablet Take 1 Tab by mouth daily (with breakfast).  senna-docusate (SENNA PLUS) 8.6-50 mg per tablet Take 1 Tab by mouth daily.     nitroglycerin (NITROSTAT) 0.4 mg SL tablet 1 Tab by SubLINGual route every five (5) minutes as needed for Chest Pain.  esomeprazole (NEXIUM) 40 mg capsule Take 40 mg by mouth Daily (before breakfast).  traMADol (ULTRAM) 50 mg tablet Take 50 mg by mouth two (2) times daily as needed for Pain. No current facility-administered medications for this visit. LAB DATA REVIEWED:     Lab Results   Component Value Date/Time    Hemoglobin A1c 5.6 09/20/2018 11:59 AM     No results found for: MCACR, MCA1, MCA2, MCA3, MCAU, MCAU2, MCALPOCT  Lab Results   Component Value Date/Time    TSH 1.560 11/20/2017 02:40 PM    TSH 0.86 05/25/2015 04:32 AM     Lab Results   Component Value Date/Time    VITAMIN D, 25-HYDROXY 43.1 10/04/2017 02:52 PM         Lab Results   Component Value Date/Time    WBC 8.6 12/19/2018 02:59 AM    HGB 10.4 (L) 12/19/2018 02:59 AM    PLATELET 194 37/65/3537 02:59 AM     Lab Results   Component Value Date/Time    Sodium 145 (H) 12/19/2018 02:59 AM    Potassium 4.7 12/19/2018 02:59 AM    Chloride 106 12/19/2018 02:59 AM    CO2 25 12/19/2018 02:59 AM    BUN 18 12/19/2018 02:59 AM    Creatinine 0.93 12/19/2018 02:59 AM    Calcium 9.3 12/19/2018 02:59 AM    Magnesium 1.7 12/03/2018 03:27 AM    Phosphorus 3.5 12/03/2018 03:27 AM      Lab Results   Component Value Date/Time    AST (SGOT) 33 12/19/2018 02:59 AM    Alk.  phosphatase 86 12/19/2018 02:59 AM    Protein, total 5.9 (L) 12/19/2018 02:59 AM    Albumin 4.0 12/19/2018 02:59 AM    Globulin 2.9 12/03/2018 03:27 AM     No results found for: IRON, FE, TIBC, IBCT, PSAT, FERR             Total time:  35  minutes  Counseling / coordination time:  10 minutes on edema, diuretics, wounds  > 50% counseling / coordination?: No

## 2018-12-24 NOTE — PATIENT INSTRUCTIONS
Dear Komal Neely and Jhoana Tobar,    It was a pleasure seeing you at home today with [facility] ([phone])    Your stated goal: To get back to walking again. This is what we talked about:     1. Edema  -This is much improved  -If it returns, call and I will tell you how to dose the lasix  -Labs were drawn today to check her electrolytes and kidney function    2. Lower extremity wounds  These are healing better and home health will continue to treat    3. Congestive heart failure  -This is currently stable    4. Chronic kidney disease  -This has been stable  -Keep well hydrated  -Avoid nonsteroidal antiinflammatories such as ibuprofen, advil, motrin, aleve, and naprosyn    5. Health Maintenance  None is due  There are no preventive care reminders to display for this patient. 6.  Advance Care Planning   - A Durable Do Not Resusitate (DDNR) is on file  - An Advance Directive is already on file. This is what you have shared with us about Darby Umana. Planning 10/12/2018   Patient's Healthcare Decision Maker is: Named in scanned ACP document   Primary Decision Maker Name Beth De León   Primary Decision Maker Phone Number [phone]   Primary Decision Maker Relationship to Patient Adult child   Secondary Decision Maker Name -   Secondary Decision [facility] Phone Number -   Secondary Decision Maker Relationship to Patient -   Confirm Advance Directive Yes, on file   Does the patient have other document types Do Not Resuscitate       Someone from the Home Based Primary Care Team will see you again in 1 week. If there are any concerns before that time, such as medication questions, worsening symptoms or a need to see a physician for an urgent or emergent situation; please call [phone]. A physician is also on call after our normal business hours of 8am to 5pm.     In order to serve you better, please allow up to 48 hours for prescription refills to be processed. Certain medications may require more paperwork or a written prescription that you may need to  from the office. We appreciate you letting us know of any refill requests as soon as possible. Sincerely,    The Home Based Primary Care Team  MD Oneida Cadena, NP Bradly Kussmaul, NP Rinaldo Schmitz, RN Janan Harps, JUAN PABLO                        Leg and Ankle Edema: Care Instructions  Your Care Instructions  Swelling in the legs, ankles, and feet is called edema. It is common after you sit or stand for a while. Long plane flights or car rides often cause swelling in the legs and feet. You may also have swelling if you have to stand for long periods of time at your job. Problems with the veins in the legs (varicose veins) and changes in hormones can also cause swelling. Sometimes the swelling in the ankles and feet is caused by a more serious problem, such as heart failure, infection, blood clots, or liver or kidney disease. Follow-up care is a key part of your treatment and safety. Be sure to make and go to all appointments, and call your doctor if you are having problems. It's also a good idea to know your test results and keep a list of the medicines you take. How can you care for yourself at home? · If your doctor gave you medicine, take it as prescribed. Call your doctor if you think you are having a problem with your medicine. · Whenever you are resting, raise your legs up. Try to keep the swollen area higher than the level of your heart. · Take breaks from standing or sitting in one position. ? Walk around to increase the blood flow in your lower legs. ? Move your feet and ankles often while you stand, or tighten and relax your leg muscles. · Wear support stockings. Put them on in the morning, before swelling gets worse. · Eat a balanced diet. Lose weight if you need to. · Limit the amount of salt (sodium) in your diet. Salt holds fluid in the body and may increase swelling.   When should you call for help? Call 911 anytime you think you may need emergency care. For example, call if:    · You have symptoms of a blood clot in your lung (called a pulmonary embolism). These may include:  ? Sudden chest pain. ? Trouble breathing. ? Coughing up blood.    Call your doctor now or seek immediate medical care if:    · You have signs of a blood clot, such as:  ? Pain in your calf, back of the knee, thigh, or groin. ? Redness and swelling in your leg or groin.     · You have symptoms of infection, such as:  ? Increased pain, swelling, warmth, or redness. ? Red streaks or pus. ? A fever.    Watch closely for changes in your health, and be sure to contact your doctor if:    · Your swelling is getting worse.     · You have new or worsening pain in your legs.     · You do not get better as expected. Where can you learn more? Go to http://anna-sampson.info/. Enter W173 in the search box to learn more about \"Leg and Ankle Edema: Care Instructions. \"  Current as of: November 20, 2017  Content Version: 11.8  © 9783-3002 [employer]. Care instructions adapted under license by [employer] (which disclaims liability or warranty for this information). If you have questions about a medical condition or this instruction, always ask your healthcare professional. [address] any warranty or liability for your use of this information.

## 2018-12-27 ENCOUNTER — TELEPHONE (OUTPATIENT)
Dept: FAMILY MEDICINE CLINIC | Age: 83
End: 2018-12-27

## 2019-01-02 ENCOUNTER — HOME VISIT (OUTPATIENT)
Dept: FAMILY MEDICINE CLINIC | Age: 84
End: 2019-01-02

## 2019-01-02 VITALS
TEMPERATURE: 97.8 F | OXYGEN SATURATION: 94 % | DIASTOLIC BLOOD PRESSURE: 72 MMHG | HEART RATE: 78 BPM | SYSTOLIC BLOOD PRESSURE: 128 MMHG | RESPIRATION RATE: 16 BRPM

## 2019-01-02 DIAGNOSIS — I48.0 PAROXYSMAL ATRIAL FIBRILLATION (HCC): ICD-10-CM

## 2019-01-02 DIAGNOSIS — I25.118 CORONARY ARTERY DISEASE OF NATIVE ARTERY OF NATIVE HEART WITH STABLE ANGINA PECTORIS (HCC): ICD-10-CM

## 2019-01-02 DIAGNOSIS — I50.9 CHF, STAGE C (HCC): Primary | ICD-10-CM

## 2019-01-02 DIAGNOSIS — F33.2 SEVERE EPISODE OF RECURRENT MAJOR DEPRESSIVE DISORDER, WITHOUT PSYCHOTIC FEATURES (HCC): ICD-10-CM

## 2019-01-02 RX ORDER — MIRTAZAPINE 15 MG/1
15 TABLET, FILM COATED ORAL
Qty: 90 TAB | Refills: 1 | Status: ON HOLD | OUTPATIENT
Start: 2019-01-02 | End: 2019-08-26 | Stop reason: SDUPTHER

## 2019-01-03 NOTE — PROGRESS NOTES
Home Based [address] [place] [employer]  
[phone] Date of Current Visit: 1/2/2019 SUMMARY:  
80 yr female referred to [facility] by Dr. Janay Barnett. She has a significant hx for seronegative RA, PMR of multiple joints, CAD, old MI, GERD and Afib. Due to this, Ms. Antoni Ashley is unable to see a community PCP without significant taxing effort. Patient was sent to the hospital at [facility] and admitted through the ER for AMS, dehydration, poor po intake, and severe pain. She was seen by palliative care during her hospitalization and had a psyc consult. No medical cause was found for her symptoms and this is her fourth such episode in a year. She is out of skilled days and had to return home. Remeron was started for depression. Her fentanyl patch was increased to 25 mcg for pain. By discharge she was more alert mentally and he rpain was better controlled. She was also able to maintain hydration status. She r/o'ed for infection and a head CT was wnl. She was seen by surgery for a questionnable pneumoperitoneum on CT but this was r/o'ed. She was readmitted again on 10/12/18 and ruled in for a STEMI. She was discharged after declining cardiac catheterization on 12/14/2018. Patient stopped taking her medications 12/1 and became confused and went to the hospital the next day. She had a UTI and after antibiotic treatment was started, her mentation returned to baseline. She had no sepsis during hospitalization. REASON FOR VISIT;  F/u recent frequent hospitalizations, recurrent uti's, cad, ckd, chf, depression GOALS OF CARE / TREATMENT PREFERENCES:  
GOALS OF CARE: 
Patient / health care proxy stated goals: To return to her cognitive and functional baseline. DIAGNOSES:  
 
  ICD-10-CM ICD-9-CM 1. CHF, stage C (HCC) I50.9 428.0 2. Severe episode of recurrent major depressive disorder, without psychotic features [facility] F33.2 296.33 mirtazapine (REMERON) 15 mg tablet 3. Coronary artery disease of native artery of native heart with stable angina pectoris ([facility] I25.118 414.01   
  413.9 4. Paroxysmal atrial fibrillation (HCC) I48.0 427.31 PLAN:  
Patient Instructions Dear Heaven Villar and Rubén Marshall, It was a pleasure seeing you at home today with Home Based [facility] ([phone]) Your stated goal: To get back to walking again. This is what we talked about: 1. Edema 
-This is much improved 
-If it returns, call and I will tell you how to dose the lasix 
-Labs were drawn today to check her electrolytes and kidney function 2. Lower extremity wounds These are healing better and home health will continue to treat 3. Congestive heart failure 
-This is currently stable 4. Chronic kidney disease 
-This has been stable 
-Keep well hydrated 
-Avoid nonsteroidal antiinflammatories such as ibuprofen, advil, motrin, aleve, and naprosyn 5. Health Maintenance None is due There are no preventive care reminders to display for this patient. 6.  Advance Care Planning - A Durable Do Not Resusitate (DDNR) is on file - An Advance Directive is already on file. This is what you have shared with us about Advance Care Planning Advance Care Planning 10/12/2018 Patient's Healthcare Decision Maker is: Named in scanned ACP document Primary Decision Maker Name Emily De Leon Primary Decision Maker Phone Number [phone] Primary Decision Maker Relationship to Patient Adult child Secondary Decision Maker Name - Secondary Decision Maker Phone Number - Secondary Decision Maker Relationship to Patient -  
Confirm Advance Directive Yes, on file Does the patient have other document types Do Not Resuscitate Someone from the Home Based Primary Care Team will see you again in 1 week.  
 
If there are any concerns before that time, such as medication questions, worsening symptoms or a need to see a physician for an urgent or emergent situation; please call [phone]. A physician is also on call after our normal business hours of 8am to 5pm.  
 
In order to serve you better, please allow up to 48 hours for prescription refills to be processed. Certain medications may require more paperwork or a written prescription that you may need to  from the office. We appreciate you letting us know of any refill requests as soon as possible. Sincerely, The Home Based Primary Care Team 
MD Sudhir Jiméenz, CRISTIN Pelaez, JUAN PABLO Avalos RN Leg and Ankle Edema: Care Instructions Your Care Instructions Swelling in the legs, ankles, and feet is called edema. It is common after you sit or stand for a while. Long plane flights or car rides often cause swelling in the legs and feet. You may also have swelling if you have to stand for long periods of time at your job. Problems with the veins in the legs (varicose veins) and changes in hormones can also cause swelling. Sometimes the swelling in the ankles and feet is caused by a more serious problem, such as heart failure, infection, blood clots, or liver or kidney disease. Follow-up care is a key part of your treatment and safety. Be sure to make and go to all appointments, and call your doctor if you are having problems. It's also a good idea to know your test results and keep a list of the medicines you take. How can you care for yourself at home? · If your doctor gave you medicine, take it as prescribed. Call your doctor if you think you are having a problem with your medicine. · Whenever you are resting, raise your legs up. Try to keep the swollen area higher than the level of your heart. · Take breaks from standing or sitting in one position. ? Walk around to increase the blood flow in your lower legs. ? Move your feet and ankles often while you stand, or tighten and relax your leg muscles. · Wear support stockings. Put them on in the morning, before swelling gets worse. · Eat a balanced diet. Lose weight if you need to. · Limit the amount of salt (sodium) in your diet. Salt holds fluid in the body and may increase swelling. When should you call for help? Call 911 anytime you think you may need emergency care. For example, call if: 
  · You have symptoms of a blood clot in your lung (called a pulmonary embolism). These may include: 
? Sudden chest pain. ? Trouble breathing. ? Coughing up blood.  
 Call your doctor now or seek immediate medical care if: 
  · You have signs of a blood clot, such as: 
? Pain in your calf, back of the knee, thigh, or groin. ? Redness and swelling in your leg or groin.  
  · You have symptoms of infection, such as: 
? Increased pain, swelling, warmth, or redness. ? Red streaks or pus. ? A fever.  
 Watch closely for changes in your health, and be sure to contact your doctor if: 
  · Your swelling is getting worse.  
  · You have new or worsening pain in your legs.  
  · You do not get better as expected. Where can you learn more? Go to http://anna-sampson.info/. Enter N549 in the search box to learn more about \"Leg and Ankle Edema: Care Instructions. \" Current as of: November 20, 2017 Content Version: 11.8 © 6967-0312 [employer]. Care instructions adapted under license by [employer] (which disclaims liability or warranty for this information). If you have questions about a medical condition or this instruction, always ask your healthcare professional. [place] [address] any warranty or liability for your use of this information. PRESCRIPTIONS GIVEN:  
 
Medications Ordered Today Medications  mirtazapine (REMERON) 15 mg tablet Sig: Take 1 Tab by mouth nightly. Dispense:  90 Tab Refill:  1  HISTORY:  
 Please see RN note from this current visit; history taken together in presence of patient/family in the home. CHIEF COMPLAINT:  
Chief Complaint Patient presents with  Recurrent UTI  
  with hospitalizations  Coronary Artery Disease HPI/SUBJECTIVE: The patient is: [x] Verbal / [] Nonverbal  
Edema remains improved. No SOB. Has CP about 2 times a month and one NTG resolves it. Occurs when sitting in wheel chair. She does not desire a cardiac cath or further eval.  She would consider medical management if chest pain becomes more frequent. HH is treating her le wounds and are reporting progress. No confusion and she is reading, doing crafts and enjoyed family visits over the holidays. REVIEW OF SYSTEMS:  
 
The following systems were [x] reviewed / [x] unable to be reviewed Systems: constitutional, ears/nose/mouth/throat, respiratory, gastrointestinal, genitourinary, musculoskeletal, integumentary, neurologic, psychiatric, endocrine. Positive findings noted:  Chronic back pain, wounds on LE's, and chest pain several times a month FUNCTIONAL ASSESSMENT:  
 
Palliative Performance Scale (PPS): 60% PSYCHOSOCIAL/SPIRITUAL SCREENING:  
  
Any spiritual / Lutheran concerns: [] Yes /  [x] No Jainism, no longer goes to [employer] because can't get out of home; [employer] does not come to her, may be willing to reconnect Caregiver Burnout: [] Yes /  [x] No /  [] No Caregiver Present PHYSICAL EXAM:  
 
Wt Readings from Last 3 Encounters:  
12/03/18 121 lb 11.1 oz (55.2 kg) 11/06/18 128 lb (58.1 kg) 10/14/18 128 lb 1.4 oz (58.1 kg) Blood pressure 128/72, pulse 78, temperature 97.8 °F (36.6 °C), temperature source Oral, resp. rate 16, SpO2 94 %. Last bowel movement:  one day ago Constitutional:  Frail, elderly female seated in W/C, NAD Eyes: pupils equal, anicteric, conjunctiva are pink. ENMT: no nasal discharge, moist mucous membranes and lips.   Nasal mucosa pink without discharge Cardiovascular: regularly, irregular rhythm, distal pulses intact Respiratory: breathing not labored, symmetric, CTA A&P Gastrointestinal: soft, +bowel sounds. No TTP HSM, mass R, G or R Musculoskeletal: no deformity, no tenderness to palpation Skin: warm, dry. LLE, lateral anterior facility--open wound 2 X 4 cm's with skin tear on edge. Base clean. No discharge. BLE with 1+ edema, blistering with open areas which are healing. Base erythematous. No discharge or surrounding erythema. Largest area 1.5 cm's. Neurologic: oriented X 3, no focal deficits, LE's with 3/5 strength, UE's 4/5 Psychiatric: No hallucinations, no agitation or anxiety HISTORY:  
 
Past Medical and Surgical History reviewed in [facility] on date of initial visit Patient Active Problem List  
Diagnosis Code  Osteoarthritis M19.90  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 ([facility]) I25.111  
 CHF, stage C ([facility]) I50.9  Lumbar spinal stenosis M48.061  
 Paroxysmal atrial fibrillation ([facility]) I48.0  Advance directive on file Q58.4  PMR (polymyalgia rheumatica) ([facility]) M35.3  Lower extremity edema R60.0  CKD (chronic kidney disease) stage 2, GFR 60-89 ml/min N18.2  Long term current use of systemic steroids Z79.52  Seronegative rheumatoid arthritis of multiple sites ([facility] M06.09  
 Long-term use of immunosuppressant medication Z79.899  Anemia associated with acute blood loss D62  Dependence on continuous supplemental oxygen Z99.81  
 Polyneuropathy associated with underlying disease ([facility] G63  Vascular dementia without behavioral disturbance F01.50  Acute respiratory distress R06.03  
 Chronic pain syndrome G89.4  Dementia without behavioral disturbance F03.90  
 Goals of care, counseling/discussion Z71.89  
 Acute metabolic encephalopathy B65.22 Family History Problem Relation Age of Onset Krunal.Seek Other Mother Intestinal obstruction  Cancer Father  Stroke Father  Heart Attack Brother  Cancer Brother  Cancer Brother  Anesth Problems Neg Hx History reviewed, no pertinent family history. Social History Tobacco Use  Smoking status: Never Smoker  Smokeless tobacco: Never Used Substance Use Topics  Alcohol use: No  
  Alcohol/week: 0.0 oz Allergies Allergen Reactions  Nsaids (Non-Steroidal Anti-Inflammatory Drug) Nausea and Vomiting  Adhesive Tape-Silicones Other (comments) BLISTERS  Cymbalta [Duloxetine] Other (comments) CONFUSION 
  
 Digoxin Nausea and Vomiting  Gluten Other (comments) GLUTEN INTOLERANCE  
 Macrobid [Nitrofurantoin Monohyd/M-Cryst] Nausea and Vomiting  Novocain [Procaine] Other (comments) Paralysis  Sulfa (Sulfonamide Antibiotics) Nausea and Vomiting  Atenolol Nausea and Vomiting  Codeine Other (comments) Unable to swallow  Phenergan [Promethazine] Other (comments) \"loose my wits i go crazy\" Current Outpatient Medications Medication Sig  
 mirtazapine (REMERON) 15 mg tablet Take 1 Tab by mouth nightly.  furosemide (LASIX) 20 mg tablet Take one po daily x 2 days and will call family with further directions  cholecalciferol (VITAMIN D3) 1,000 unit tablet Take  by mouth daily.  atorvastatin (LIPITOR) 40 mg tablet Take 1 Tab by mouth daily for 90 days.  predniSONE (DELTASONE) 10 mg tablet Take 10 mg by mouth daily.  metoprolol tartrate (LOPRESSOR) 50 mg tablet Take 1.5 Tabs by mouth two (2) times a day.  ondansetron (ZOFRAN ODT) 8 mg disintegrating tablet Take 1 Tab by mouth Before breakfast, lunch, and dinner.  aspirin 81 mg chewable tablet Take 1 Tab by mouth daily.  gabapentin (NEURONTIN) 300 mg capsule Take 600 mg by mouth two (2) times a day.  Bifidobacterium Infantis (ALIGN) 4 mg cap Take 4 mg by mouth daily.  fentaNYL (DURAGESIC) 25 mcg/hr PATCH 1 Patch by TransDERmal route every seventy-two (72) hours.  traMADol (ULTRAM) 50 mg tablet Take 50 mg by mouth two (2) times daily as needed for Pain.  polyethylene glycol (MIRALAX) 17 gram/dose powder Take 17 g by mouth daily as needed.  topiramate (TOPAMAX) 25 mg tablet Take 1 Tab by mouth daily (with breakfast).  senna-docusate (SENNA PLUS) 8.6-50 mg per tablet Take 1 Tab by mouth daily.  esomeprazole (NEXIUM) 40 mg capsule Take 40 mg by mouth Daily (before breakfast).  nitroglycerin (NITROSTAT) 0.4 mg SL tablet 1 Tab by SubLINGual route every five (5) minutes as needed for Chest Pain. No current facility-administered medications for this visit. LAB DATA REVIEWED:  
 
Lab Results Component Value Date/Time Hemoglobin A1c 5.6 09/20/2018 11:59 AM  
 
No results found for: Raymond Hung, MCA2, MCA3, MCAU, MCAU2, MCALPOCT Lab Results Component Value Date/Time TSH 1.560 11/20/2017 02:40 PM  
 TSH 0.86 05/25/2015 04:32 AM  
 
Lab Results Component Value Date/Time VITAMIN D, 25-HYDROXY 43.1 10/04/2017 02:52 PM  
   
 
Lab Results Component Value Date/Time WBC 8.6 12/19/2018 02:59 AM  
 HGB 10.4 (L) 12/19/2018 02:59 AM  
 PLATELET 807 41/00/7153 02:59 AM  
 
Lab Results Component Value Date/Time Sodium 145 (H) 12/19/2018 02:59 AM  
 Potassium 4.7 12/19/2018 02:59 AM  
 Chloride 106 12/19/2018 02:59 AM  
 CO2 25 12/19/2018 02:59 AM  
 BUN 18 12/19/2018 02:59 AM  
 Creatinine 0.93 12/19/2018 02:59 AM  
 Calcium 9.3 12/19/2018 02:59 AM  
 Magnesium 1.7 12/03/2018 03:27 AM  
 Phosphorus 3.5 12/03/2018 03:27 AM  
  
Lab Results Component Value Date/Time AST (SGOT) 33 12/19/2018 02:59 AM  
 Alk.  phosphatase 86 12/19/2018 02:59 AM  
 Protein, total 5.9 (L) 12/19/2018 02:59 AM  
 Albumin 4.0 12/19/2018 02:59 AM  
 Globulin 2.9 12/03/2018 03:27 AM  
 
No results found for: IRON, FE, TIBC, IBCT, PSAT, FERR  
 
   
 
 
 Total time:  35  minutes Counseling / coordination time:  10 minutes on edema, diuretics, wounds, CAD, possible new medications if chest pain accelerates 
> 50% counseling / coordination?: No

## 2019-01-03 NOTE — PATIENT INSTRUCTIONS
Dear Shreya Hayward and Katy Cain, It was a pleasure seeing you at home today with Home Based [facility] ([phone]) Your stated goal: To get back to walking again. This is what we talked about: 1. Edema 
-This is much improved 
-If it returns, call and I will tell you how to dose the lasix 
-Labs were drawn today to check her electrolytes and kidney function 2. Lower extremity wounds These are healing better and home health will continue to treat 
-There was a 50% improvement today 3. Congestive heart failure 
-This is currently stable 4. Chronic kidney disease 
-This has been stable 
-Keep well hydrated 
-Avoid nonsteroidal antiinflammatories such as ibuprofen, advil, motrin, aleve, and naprosyn 5. Heart Disease 
-You have having chest pain which is relieved by one nitroglycerin  several times a month 
-If the frequency increases, call us 6. Health Maintenance None is due There are no preventive care reminders to display for this patient. 7.  Advance Care Planning - A Durable Do Not Resusitate (DDNR) is on file - An Advance Directive is already on file. This is what you have shared with us about Advance Care Planning Advance Care Planning 10/12/2018 Patient's Healthcare Decision Maker is: Named in scanned ACP document Primary Decision Maker Name Aeljo Sanchez Primary Decision Maker Phone Number [phone] Primary Decision Maker Relationship to Patient Adult child Secondary Decision Maker Name - Secondary Decision Maker Phone Number - Secondary Decision Maker Relationship to Patient -  
Confirm Advance Directive Yes, on file Does the patient have other document types Do Not Resuscitate Someone from the Home Based Primary Care Team will see you again in 4 weeks.  
 
If there are any concerns before that time, such as medication questions, worsening symptoms or a need to see a physician for an urgent or emergent situation; please call [phone]. A physician is also on call after our normal business hours of 8am to 5pm.  
 
In order to serve you better, please allow up to 48 hours for prescription refills to be processed. Certain medications may require more paperwork or a written prescription that you may need to  from the office. We appreciate you letting us know of any refill requests as soon as possible. Sincerely, The Home Based Primary Care Team 
MD Liat Gordon NP Kathaleen Blander, NP Enos Morrow, RN Robb Hipps, RN Leg and Ankle Edema: Care Instructions Your Care Instructions Swelling in the legs, ankles, and feet is called edema. It is common after you sit or stand for a while. Long plane flights or car rides often cause swelling in the legs and feet. You may also have swelling if you have to stand for long periods of time at your job. Problems with the veins in the legs (varicose veins) and changes in hormones can also cause swelling. Sometimes the swelling in the ankles and feet is caused by a more serious problem, such as heart failure, infection, blood clots, or liver or kidney disease. Follow-up care is a key part of your treatment and safety. Be sure to make and go to all appointments, and call your doctor if you are having problems. It's also a good idea to know your test results and keep a list of the medicines you take. How can you care for yourself at home? · If your doctor gave you medicine, take it as prescribed. Call your doctor if you think you are having a problem with your medicine. · Whenever you are resting, raise your legs up. Try to keep the swollen area higher than the level of your heart. · Take breaks from standing or sitting in one position. ? Walk around to increase the blood flow in your lower legs. ? Move your feet and ankles often while you stand, or tighten and relax your leg muscles. · Wear support stockings. Put them on in the morning, before swelling gets worse. · Eat a balanced diet. Lose weight if you need to. · Limit the amount of salt (sodium) in your diet. Salt holds fluid in the body and may increase swelling. When should you call for help? Call 911 anytime you think you may need emergency care. For example, call if: 
  · You have symptoms of a blood clot in your lung (called a pulmonary embolism). These may include: 
? Sudden chest pain. ? Trouble breathing. ? Coughing up blood.  
 Call your doctor now or seek immediate medical care if: 
  · You have signs of a blood clot, such as: 
? Pain in your calf, back of the knee, thigh, or groin. ? Redness and swelling in your leg or groin.  
  · You have symptoms of infection, such as: 
? Increased pain, swelling, warmth, or redness. ? Red streaks or pus. ? A fever.  
 Watch closely for changes in your health, and be sure to contact your doctor if: 
  · Your swelling is getting worse.  
  · You have new or worsening pain in your legs.  
  · You do not get better as expected. Where can you learn more? Go to http://anna-sampson.info/. Enter N748 in the search box to learn more about \"Leg and Ankle Edema: Care Instructions. \" Current as of: November 20, 2017 Content Version: 11.8 © 8129-8018 [employer]. Care instructions adapted under license by [employer] (which disclaims liability or warranty for this information). If you have questions about a medical condition or this instruction, always ask your healthcare professional. [place] [address] any warranty or liability for your use of this information.

## 2019-01-18 ENCOUNTER — TELEPHONE (OUTPATIENT)
Dept: PALLATIVE CARE | Age: 84
End: 2019-01-18

## 2019-01-22 ENCOUNTER — TELEPHONE (OUTPATIENT)
Dept: PALLATIVE CARE | Age: 84
End: 2019-01-22

## 2019-01-22 NOTE — TELEPHONE ENCOUNTER
Sharifa reports that patient has a small skin tear on RLE. Wound care orders given, clean with NS, cover with thin layer of Vaseline, cover with 2x2, and secure with Priya.     Patient will be seen by home health again on 1/24

## 2019-01-23 ENCOUNTER — TELEPHONE (OUTPATIENT)
Dept: FAMILY MEDICINE CLINIC | Age: 84
End: 2019-01-23

## 2019-01-24 ENCOUNTER — DOCUMENTATION ONLY (OUTPATIENT)
Dept: FAMILY MEDICINE CLINIC | Age: 84
End: 2019-01-24

## 2019-01-24 NOTE — PROGRESS NOTES
Certification approval    Initial certification: 29/21/16    Certification period: 12/10/18 to 2/7/19    CCN: Maritza Tanner    Diagnosis code: G93.41    I have reviewed and agree with plan of care.

## 2019-01-29 ENCOUNTER — HOME VISIT (OUTPATIENT)
Dept: FAMILY MEDICINE CLINIC | Age: 84
End: 2019-01-29

## 2019-01-29 DIAGNOSIS — I48.0 PAROXYSMAL ATRIAL FIBRILLATION (HCC): ICD-10-CM

## 2019-01-29 DIAGNOSIS — I50.9 CHF, STAGE C (HCC): Primary | ICD-10-CM

## 2019-01-29 DIAGNOSIS — I25.118 CORONARY ARTERY DISEASE OF NATIVE ARTERY OF NATIVE HEART WITH STABLE ANGINA PECTORIS (HCC): ICD-10-CM

## 2019-01-29 DIAGNOSIS — K59.03 DRUG-INDUCED CONSTIPATION: ICD-10-CM

## 2019-01-29 DIAGNOSIS — G89.4 CHRONIC PAIN SYNDROME: ICD-10-CM

## 2019-01-29 DIAGNOSIS — Z66 DNR NO CODE (DO NOT RESUSCITATE): ICD-10-CM

## 2019-01-30 ENCOUNTER — DOCUMENTATION ONLY (OUTPATIENT)
Dept: FAMILY MEDICINE CLINIC | Age: 84
End: 2019-01-30

## 2019-01-30 VITALS
RESPIRATION RATE: 18 BRPM | SYSTOLIC BLOOD PRESSURE: 118 MMHG | OXYGEN SATURATION: 95 % | HEART RATE: 72 BPM | DIASTOLIC BLOOD PRESSURE: 68 MMHG | TEMPERATURE: 98.4 F

## 2019-01-30 NOTE — PATIENT INSTRUCTIONS
Dear Marissa Ford , It was a pleasure seeing you at home today with [facility] ([phone]) Your stated goal:  To return to her cognitive and functional baseline. This is what we talked about: 1. Congestive heart failure 
-This has been clinically stable 
-Continue lasix 2. Heart disease 
-You have had no recent chest pain 
-Continue your daily aspirin 3. Atrial fibrillation 
-This has been rate controlled 
-Continue metoprolol 4. Chronic pain 
-You report good pain without oversedation 
-Continue fentanyl and tramadol 5. Constipation 
-This is causing some problems when you try to pass stools 
-Continue miralax daily 
-Increase your senna to one tablet twice a day 6. Health Maintenance There are no preventive care reminders to display for this patient. 7.  Advance Care Planning This is what you have shared with us about Advance Care Planning Primary Decision Maker (Health Care Agent): Ruth Ann Roche Relationship to patient: Son Phone [phone] (Home) [x] Named in a scanned document  
[] Legal Next of Kin 
[] Guardian Secondary Decision Maker (First Alternate Health Care Agent): Javier Lanes Relationship to patient:daughter-in-law Phone number:  [phone] [facility]) [] Named in a scanned document  
[] Legal Next of Kin 
[] Guardian ACP documents you current have include: 
[x] Advance Directive or Living Will 
[x] Durable Do Not Resuscitate 
[] Physician Orders for Scope of Treatment (POST) [] Medical Power of  
[] Other Someone from the Home Based Primary Care Team will see you again in one month If there are any concerns before that time, such as medication questions, worsening symptoms or a need to see a physician for an urgent or emergent situation; please call [phone].  A physician is also on call after our normal business hours of 8am to 5pm.  
 
 In order to serve you better, please allow up to 48 hours for prescription refills to be processed. Certain medications may require more paperwork or a written prescription that you may need to  from the office. We appreciate you letting us know of any refill requests as soon as possible. Sincerely, The [facility] Team 
 
Rolinda Deacon, MD Floyce Hover, NP Jamesetta Kallman, NP Hendrick Char, RN Dock Cowden, RN Constipation: Care Instructions Your Care Instructions Constipation means that you have a hard time passing stools (bowel movements). People pass stools from 3 times a day to once every 3 days. What is normal for you may be different. Constipation may occur with pain in the rectum and cramping. The pain may get worse when you try to pass stools. Sometimes there are small amounts of bright red blood on toilet paper or the surface of stools. This is because of enlarged veins near the rectum (hemorrhoids). A few changes in your diet and lifestyle may help you avoid ongoing constipation. Your doctor may also prescribe medicine to help loosen your stool. Some medicines can cause constipation. These include pain medicines and antidepressants. Tell your doctor about all the medicines you take. Your doctor may want to make a medicine change to ease your symptoms. Follow-up care is a key part of your treatment and safety. Be sure to make and go to all appointments, and call your doctor if you are having problems. It's also a good idea to know your test results and keep a list of the medicines you take. How can you care for yourself at home? · Drink plenty of fluids, enough so that your urine is light yellow or clear like water. If you have kidney, heart, or liver disease and have to limit fluids, talk with your doctor before you increase the amount of fluids you drink. · Include high-fiber foods in your diet each day. These include fruits, vegetables, beans, and whole grains. · Get at least 30 minutes of exercise on most days of the week. Walking is a good choice. You also may want to do other activities, such as running, swimming, cycling, or playing tennis or team sports. · Take a fiber supplement, such as Citrucel or Metamucil, every day. Read and follow all instructions on the label. · Schedule time each day for a bowel movement. A daily routine may help. Take your time having your bowel movement. · Support your feet with a small step stool when you sit on the toilet. This helps flex your hips and places your pelvis in a squatting position. · Your doctor may recommend an over-the-counter laxative to relieve your constipation. Examples are Milk of Magnesia and MiraLax. Read and follow all instructions on the label. Do not use laxatives on a long-term basis. When should you call for help? Call your doctor now or seek immediate medical care if: 
  · You have new or worse belly pain.  
  · You have new or worse nausea or vomiting.  
  · You have blood in your stools.  
 Watch closely for changes in your health, and be sure to contact your doctor if: 
  · Your constipation is getting worse.  
  · You do not get better as expected. Where can you learn more? Go to http://anna-sampson.info/. Enter 21 [phone] in the search box to learn more about \"Constipation: Care Instructions. \" Current as of: September 23, 2018 Content Version: 11.9 © 4628-1122 [employer]. Care instructions adapted under license by [employer] (which disclaims liability or warranty for this information). If you have questions about a medical condition or this instruction, always ask your healthcare professional. [place] [address] any warranty or liability for your use of this information.

## 2019-01-30 NOTE — PROGRESS NOTES
[facility] [id number] Team Members: Dr. Annie Regan, [place], CRISTIN; Mary Farmer, NP; Gregory Milton, RN; Dock Cowden, RN, Reinaldo Atkins RN, [facility] Hein; [facility]    Man Cummins  7/27/1925 / [id number]  female    Date of Initial Visit: 5/4/18    Diagnosis:Enteritis, diverticulosis of sigmoid colon,  a-fib, rheumatoid arthritis, polymyalgia rheumatica, CAD, debility    Patient status summary:   Patient and POC discussed in IDT meeting for 90 day update. Wheelchair bound patient was /referred by Dr. Lenny August to our services due to taxing effort to seek primary care needs in the community. The patient has made remarkable progress since her hospitalizations last year. She can take herself to the bathroom now in her WC. She transfers herself in and out of bed. Her cognition has improved to baseline.       Advance Care Planning:   Primary Decision Maker ([address]): Carey Cobb  Relationship to patient:Son  Phone number: [phone]  [x] Named in a scanned document   [] Legal Next of Kin  [] Guardian    Secondary Decision Maker ([address]):   Relationship to patient:  Phone number:  [] Named in a scanned document   [] Legal Next of Kin  [] Guardian    ACP documents you current have include:  [x] Advance Directive or Living Will  [x] Durable Do Not Resuscitate  [] Physician Orders for Scope of Treatment (POST)  [] Medical Power of   [] Other    DME/Supplies:  Wheelchair, Hospital Bed, Bedside Commode and Oxygen       Allergies   Allergen Reactions    Nsaids (Non-Steroidal Anti-Inflammatory Drug) Nausea and Vomiting    Adhesive Tape-Silicones Other (comments)     BLISTERS    Cymbalta [Duloxetine] Other (comments)     CONFUSION      Digoxin Nausea and Vomiting    Gluten Other (comments)     GLUTEN INTOLERANCE    Macrobid [Nitrofurantoin Monohyd/M-Cryst] Nausea and Vomiting    Novocain [Procaine] Other (comments)     Paralysis     Sulfa (Sulfonamide Antibiotics) Nausea and Vomiting    Atenolol Nausea and Vomiting    Codeine Other (comments)     Unable to swallow    Phenergan [Promethazine] Other (comments)     \"loose my wits i go crazy\"       Nutritional Requirements:   Oral with supported meal preparation    Functional/Activity Level:  Wheelchair with assistance for transfers    Code Status: DNR    Safety Measures:   Fall risk    Current Outpatient Medications   Medication Sig    mirtazapine (REMERON) 15 mg tablet Take 1 Tab by mouth nightly.  furosemide (LASIX) 20 mg tablet Take one po daily x 2 days and will call family with further directions    cholecalciferol (VITAMIN D3) 1,000 unit tablet Take  by mouth daily.  atorvastatin (LIPITOR) 40 mg tablet Take 1 Tab by mouth daily for 90 days.  predniSONE (DELTASONE) 10 mg tablet Take 10 mg by mouth daily.  metoprolol tartrate (LOPRESSOR) 50 mg tablet Take 1.5 Tabs by mouth two (2) times a day.  ondansetron (ZOFRAN ODT) 8 mg disintegrating tablet Take 1 Tab by mouth Before breakfast, lunch, and dinner.  aspirin 81 mg chewable tablet Take 1 Tab by mouth daily.  gabapentin (NEURONTIN) 300 mg capsule Take 600 mg by mouth two (2) times a day.  Bifidobacterium Infantis (ALIGN) 4 mg cap Take 4 mg by mouth daily.  fentaNYL (DURAGESIC) 25 mcg/hr PATCH 1 Patch by TransDERmal route every seventy-two (72) hours.  traMADol (ULTRAM) 50 mg tablet Take 50 mg by mouth two (2) times daily as needed for Pain.  polyethylene glycol (MIRALAX) 17 gram/dose powder Take 17 g by mouth daily as needed.  topiramate (TOPAMAX) 25 mg tablet Take 1 Tab by mouth daily (with breakfast).  senna-docusate (SENNA PLUS) 8.6-50 mg per tablet Take 1 Tab by mouth daily.  nitroglycerin (NITROSTAT) 0.4 mg SL tablet 1 Tab by SubLINGual route every five (5) minutes as needed for Chest Pain.     esomeprazole (NEXIUM) 40 mg capsule Take 40 mg by mouth Daily (before breakfast). No current facility-administered medications for this visit. The Plan of Care has been initiated for during discussion at interdisciplinary group meeting  and reviewed and updated by the Interdisciplinary Group (IDG) as frequently as the patient condition warrants. Plan of Care by Discipline:    1. Provider  Ongoing evaluation of treatment interventions for specific disease state, Assessment of medication adverse effects, Reduction of polypharmacy within benefit/burden framework appropriate to age, function and disease state, Determine need for laboratory assessment based on patient disease status , Create and implement disease /symptom specific plan to manage high risk conditions / symptoms, Anticipate and plan for medical intervention(s) in emergency / at time of crisis and Wound assessment and interventions as medical appropriate    2. Nursing  Education for safety; falls and Education for skin care in patients with limited mobility; with focus on preventing skin breakdown    3. Social Work  Establish therapeutic relationship through in home visits and telephonic touch points      Plan of Care Orders / Action Items:    1. Monitor CHF at every visit  2.   Continue reduction of polypharmacy      Estimated Visit Frequency:  Monthly Provider Visit

## 2019-01-31 NOTE — PROGRESS NOTES
Home Based [address] [facility]  
[phone] Date of Current Visit: 01/29/19 SUMMARY:  
For full summary of patient's condition, please see Home Based Primary Care initial visit note on 5/3/18. 
 
80 yr female referred to [facility] by Dr. Bronson Hill. She has a significant hx for seronegative RA, PMR of multiple joints, CAD, old MI, GERD and Afib. Due to this, Ms. Inocente Stovall is unable to see a community PCP without significant taxing effort. Patient was sent to the hospital at [facility] and admitted through the ER for AMS, dehydration, poor po intake, and severe pain. She was seen by palliative care during her hospitalization and had a psyc consult. No medical cause was found for her symptoms and this is her fourth such episode in a year. She is out of skilled days and had to return home. Remeron was started for depression. Her fentanyl patch was increased to 25 mcg for pain. By discharge she was more alert mentally and he rpain was better controlled. She was also able to maintain hydration status. She r/o'ed for infection and a head CT was wnl. She was seen by surgery for a questionnable pneumoperitoneum on CT but this was r/o'ed. She was readmitted again on 10/12/18 and ruled in for a STEMI. She was discharged after declining cardiac catheterization on 12/14/2018. Patient stopped taking her medications 12/1 and became confused and went to the hospital the next day. She had a UTI and after antibiotic treatment was started, her mentation returned to baseline. She had no sepsis during hospitalization. This patient's chronic conditions including chronic respiratory failure with oxygen dependency, CAD, debility, PMR and RAhave resulted in the inability to leave the home to receive primary medical care without a significant taxing effort. Today we are visiting the patient for the following reason F/U CAD, CHF, A Fib, chronic pain and constipation. This patient's goals of care are: to return to her cognitive and functional baseline. This patient's resuscitation status is: 
[] Attempt Resuscitation       [x] Do Not Attempt Resuscitation Primary Decision Maker (Health Care Agent): Nu Allen Relationship to patient: Son Phone [phone] (Home) [x] Named in a scanned document  
[] Legal Next of Kin 
[] Guardian Secondary Decision Maker (First Alternate Health Care Agent): Lu Domingo Relationship to patient:daughter-in-law Phone number:  [phone] [facility]) [] Named in a scanned document  
[] Legal Next of Kin 
[] Guardian ACP documents you current have include: 
[x] Advance Directive or Living Will 
[x] Durable Do Not Resuscitate 
[] Physician Orders for Scope of Treatment (POST) [] Medical Power of  
[] Other DIAGNOSES & PLAN:  
 
  ICD-10-CM ICD-9-CM 1. CHF, stage C (HCC) I50.9 428.0 2. Coronary artery disease of native artery of native heart with stable angina pectoris ([facility] [address] I25.118 414.01   
  413.9 3. Paroxysmal atrial fibrillation (HCC) I48.0 427.31   
4. Chronic pain syndrome G89.4 338.4 5. Drug-induced constipation K59.03 564.09   
  E980.5 6. DNR no code (do not resuscitate) Z66 V49.86 DO NOT RESUSCITATE Patient Instructions Dear Andrew Beach , It was a pleasure seeing you at home today with Home Based [address] [place], [place] [address]tone [facility] ([phone]) Your stated goal:  To return to her cognitive and functional baseline. This is what we talked about: 1. Congestive heart failure 
-This has been clinically stable 
-Continue lasix 2. Heart disease 
-You have had no recent chest pain 
-Continue your daily aspirin 3. Atrial fibrillation 
-This has been rate controlled 
-Continue metoprolol 4. Chronic pain 
-You report good pain without oversedation 
-Continue fentanyl and tramadol 5. Constipation 
-This is causing some problems when you try to pass stools -Continue miralax daily 
-Increase your senna to one tablet twice a day 6. Health Maintenance There are no preventive care reminders to display for this patient. 7.  Advance Care Planning This is what you have shared with us about Advance Care Planning Primary Decision Maker (Health Care Agent): Huseyin Dawkins Relationship to patient: Son Phone [phone] (Home) [x] Named in a scanned document  
[] Legal Next of Kin 
[] Guardian Secondary Decision Maker (First Alternate Health Care Agent): Manuel Julescock Relationship to patient:daughter-in-law Phone number:  [phone] [facility]) [] Named in a scanned document  
[] Legal Next of Kin 
[] Guardian ACP documents you current have include: 
[x] Advance Directive or Living Will 
[x] Durable Do Not Resuscitate 
[] Physician Orders for Scope of Treatment (POST) [] Medical Power of  
[] Other Someone from the Home Based Primary Care Team will see you again in one month If there are any concerns before that time, such as medication questions, worsening symptoms or a need to see a physician for an urgent or emergent situation; please call [phone]. A physician is also on call after our normal business hours of 8am to 5pm.  
 
In order to serve you better, please allow up to 48 hours for prescription refills to be processed. Certain medications may require more paperwork or a written prescription that you may need to  from the office. We appreciate you letting us know of any refill requests as soon as possible. Sincerely, The Home Based Primary Care Team 
 
MD Latisha Henley NP Elihue Curly, NP Jerry Blamer, RN Pecola Alpers, RN Constipation: Care Instructions Your Care Instructions Constipation means that you have a hard time passing stools (bowel movements). People pass stools from 3 times a day to once every 3 days. What is normal for you may be different.  Constipation may occur with pain in the rectum and cramping. The pain may get worse when you try to pass stools. Sometimes there are small amounts of bright red blood on toilet paper or the surface of stools. This is because of enlarged veins near the rectum (hemorrhoids). A few changes in your diet and lifestyle may help you avoid ongoing constipation. Your doctor may also prescribe medicine to help loosen your stool. Some medicines can cause constipation. These include pain medicines and antidepressants. Tell your doctor about all the medicines you take. Your doctor may want to make a medicine change to ease your symptoms. Follow-up care is a key part of your treatment and safety. Be sure to make and go to all appointments, and call your doctor if you are having problems. It's also a good idea to know your test results and keep a list of the medicines you take. How can you care for yourself at home? · Drink plenty of fluids, enough so that your urine is light yellow or clear like water. If you have kidney, heart, or liver disease and have to limit fluids, talk with your doctor before you increase the amount of fluids you drink. · Include high-fiber foods in your diet each day. These include fruits, vegetables, beans, and whole grains. · Get at least 30 minutes of exercise on most days of the week. Walking is a good choice. You also may want to do other activities, such as running, swimming, cycling, or playing tennis or team sports. · Take a fiber supplement, such as Citrucel or Metamucil, every day. Read and follow all instructions on the label. · Schedule time each day for a bowel movement. A daily routine may help. Take your time having your bowel movement. · Support your feet with a small step stool when you sit on the toilet. This helps flex your hips and places your pelvis in a squatting position.  
· Your doctor may recommend an over-the-counter laxative to relieve your constipation. Examples are Milk of Magnesia and MiraLax. Read and follow all instructions on the label. Do not use laxatives on a long-term basis. When should you call for help? Call your doctor now or seek immediate medical care if: 
  · You have new or worse belly pain.  
  · You have new or worse nausea or vomiting.  
  · You have blood in your stools.  
 Watch closely for changes in your health, and be sure to contact your doctor if: 
  · Your constipation is getting worse.  
  · You do not get better as expected. Where can you learn more? Go to http://anna360Guanxisampson.info/. Enter 21  in the search box to learn more about \"Constipation: Care Instructions. \" Current as of: September 23, 2018 Content Version: 11.9 © 0401-1187 [employer]. Care instructions adapted under license by [employer] (which disclaims liability or warranty for this information). If you have questions about a medical condition or this instruction, always ask your healthcare professional. [place] [address] any warranty or liability for your use of this information. HISTORY:  
HISTORY OBTAINED FROM: Patient CHIEF COMPLAINT:  
Chief Complaint Patient presents with  Follow Up Chronic Condition CAD, CHF, A Fib, chronic pain, constipation HPI/SUBJECTIVE: Mrs. Noe Kaur reports she is doing well. She now takes herself to the bathroom and got rid of her bedside commode. She is watching TV and reading and naps. She is enjoying time with family. She denies CP and SOB. She continues to wear her oxygen. She has her normal appetite. She has trouble passing stools--it takes a long time. She continues on fentanyl and tramadol with good pain control and is not sedated. Recent Fall: [x] No / [] Yes (when):   
Last bowel movement:  yesterday REVIEW OF SYSTEMS:  
 
The following systems were [x] reviewed / [] unable to be reviewed Systems: constitutional, ears/nose/mouth/throat, respiratory, gastrointestinal, genitourinary, musculoskeletal, integumentary, neurologic, psychiatric, endocrine. Positive findings noted below: chronic MOSS, baseline weakness VITAL SIGNS, PHYSICAL EXAM & FUNCTIONAL ASSESSMENT Vital Signs: Wt Readings from Last 3 Encounters:  
12/03/18 121 lb 11.1 oz (55.2 kg) 11/06/18 128 lb (58.1 kg) 10/14/18 128 lb 1.4 oz (58.1 kg) Temp Readings from Last 3 Encounters:  
01/29/19 98.4 °F (36.9 °C) (Oral) 01/02/19 97.8 °F (36.6 °C) (Oral) 12/19/18 98.1 °F (36.7 °C) (Oral) BP Readings from Last 3 Encounters:  
01/29/19 118/68  
01/02/19 128/72  
12/19/18 126/64 Pulse Readings from Last 3 Encounters:  
01/29/19 72  
01/02/19 78  
12/19/18 74 Physical Exam: 
Constitutional:  Frail, elderly female seated in W/C, NAD Eyes: pupils equal, anicteric, conjunctiva are pink. ENMT: no nasal discharge, moist mucous membranes and lips. Nasal mucosa pink without discharge Cardiovascular: regularly, irregular rhythm, distal pulses intact Respiratory: breathing not labored, symmetric, CTA A&P Gastrointestinal: soft, +bowel sounds. No TTP HSM, mass R, G or R Musculoskeletal: no deformity, no tenderness to palpation Skin: warm, dry. Neurologic: oriented X 3, no focal deficits, LE's with 3/5 strength, UE's 4/5 Psychiatric: No hallucinations, no agitation or anxiety Functional Assessment (description): 
 
Palliative Performance Scale: 50 Timed Up and Go (TUG): Not able to perform Fall Risk Assessment, last 12 mths 11/15/2017 Able to walk? Yes Fall in past 12 months? No  
 
 
 LAB DATA REVIEWED:  
 
Lab Results Component Value Date/Time Hemoglobin A1c 5.6 09/20/2018 11:59 AM  
 
No results found for: Leela Rain, MCA2, MCA3, MCAU, MCAU2, MCALPOCT Lab Results Component Value Date/Time TSH 1.560 11/20/2017 02:40 PM  
 TSH 0.86 05/25/2015 04:32 AM  
 
Lab Results Component Value Date/Time VITAMIN D, 25-HYDROXY 43.1 10/04/2017 02:52 PM  
   
 
Lab Results Component Value Date/Time WBC 8.6 12/19/2018 02:59 AM  
 HGB 10.4 (L) 12/19/2018 02:59 AM  
 PLATELET 031 57/87/8772 02:59 AM  
 
Lab Results Component Value Date/Time Sodium 145 (H) 12/19/2018 02:59 AM  
 Potassium 4.7 12/19/2018 02:59 AM  
 Chloride 106 12/19/2018 02:59 AM  
 CO2 25 12/19/2018 02:59 AM  
 BUN 18 12/19/2018 02:59 AM  
 Creatinine 0.93 12/19/2018 02:59 AM  
 Calcium 9.3 12/19/2018 02:59 AM  
 Magnesium 1.7 12/03/2018 03:27 AM  
 Phosphorus 3.5 12/03/2018 03:27 AM  
  
Lab Results Component Value Date/Time AST (SGOT) 33 12/19/2018 02:59 AM  
 Alk. phosphatase 86 12/19/2018 02:59 AM  
 Protein, total 5.9 (L) 12/19/2018 02:59 AM  
 Albumin 4.0 12/19/2018 02:59 AM  
 Globulin 2.9 12/03/2018 03:27 AM  
 
No results found for: IRON, FE, TIBC, IBCT, PSAT, FERR  
 
 MEDICATIONS & PRESCRIPTIONS Allergies Allergen Reactions  Nsaids (Non-Steroidal Anti-Inflammatory Drug) Nausea and Vomiting  Adhesive Tape-Silicones Other (comments) BLISTERS  Cymbalta [Duloxetine] Other (comments) CONFUSION 
  
 Digoxin Nausea and Vomiting  Gluten Other (comments) GLUTEN INTOLERANCE  
 Macrobid [Nitrofurantoin Monohyd/M-Cryst] Nausea and Vomiting  Novocain [Procaine] Other (comments) Paralysis  Sulfa (Sulfonamide Antibiotics) Nausea and Vomiting  Atenolol Nausea and Vomiting  Codeine Other (comments) Unable to swallow  Phenergan [Promethazine] Other (comments) \"loose my wits i go crazy\" Current Outpatient Medications Medication Sig  
 mirtazapine (REMERON) 15 mg tablet Take 1 Tab by mouth nightly.  furosemide (LASIX) 20 mg tablet Take one po daily x 2 days and will call family with further directions  cholecalciferol (VITAMIN D3) 1,000 unit tablet Take  by mouth daily.   
 atorvastatin (LIPITOR) 40 mg tablet Take 1 Tab by mouth daily for 90 days.  
 predniSONE (DELTASONE) 10 mg tablet Take 10 mg by mouth daily.  aspirin 81 mg chewable tablet Take 1 Tab by mouth daily.  gabapentin (NEURONTIN) 300 mg capsule Take 600 mg by mouth two (2) times a day.  Bifidobacterium Infantis (ALIGN) 4 mg cap Take 4 mg by mouth daily.  fentaNYL (DURAGESIC) 25 mcg/hr PATCH 1 Patch by TransDERmal route every seventy-two (72) hours.  traMADol (ULTRAM) 50 mg tablet Take 50 mg by mouth two (2) times daily as needed for Pain.  polyethylene glycol (MIRALAX) 17 gram/dose powder Take 17 g by mouth daily as needed.  topiramate (TOPAMAX) 25 mg tablet Take 1 Tab by mouth daily (with breakfast).  senna-docusate (SENNA PLUS) 8.6-50 mg per tablet Take 1 Tab by mouth daily.  esomeprazole (NEXIUM) 40 mg capsule Take 40 mg by mouth Daily (before breakfast).  metoprolol tartrate (LOPRESSOR) 50 mg tablet Take 1.5 Tabs by mouth two (2) times a day.  ondansetron (ZOFRAN ODT) 8 mg disintegrating tablet Take 1 Tab by mouth Before breakfast, lunch, and dinner.  nitroglycerin (NITROSTAT) 0.4 mg SL tablet 1 Tab by SubLINGual route every five (5) minutes as needed for Chest Pain. No current facility-administered medications for this visit. No orders of the defined types were placed in this encounter. Total time: 45min Counseling / coordination time: 15 min on constipation, CAD, CHF 
> 50% counseling / coordination?: No

## 2019-02-15 ENCOUNTER — DOCUMENTATION ONLY (OUTPATIENT)
Dept: FAMILY MEDICINE CLINIC | Age: 84
End: 2019-02-15

## 2019-02-19 DIAGNOSIS — M06.09 SERONEGATIVE RHEUMATOID ARTHRITIS OF MULTIPLE SITES (HCC): ICD-10-CM

## 2019-02-19 DIAGNOSIS — F01.50 VASCULAR DEMENTIA WITHOUT BEHAVIORAL DISTURBANCE (HCC): ICD-10-CM

## 2019-02-19 DIAGNOSIS — R53.81 DEBILITY: Primary | ICD-10-CM

## 2019-02-19 DIAGNOSIS — I50.9 CHF, STAGE C (HCC): Primary | ICD-10-CM

## 2019-02-21 ENCOUNTER — TELEPHONE (OUTPATIENT)
Dept: FAMILY MEDICINE CLINIC | Age: 84
End: 2019-02-21

## 2019-02-27 ENCOUNTER — HOME VISIT (OUTPATIENT)
Dept: FAMILY MEDICINE CLINIC | Age: 84
End: 2019-02-27

## 2019-02-27 VITALS
TEMPERATURE: 97.4 F | RESPIRATION RATE: 17 BRPM | OXYGEN SATURATION: 94 % | DIASTOLIC BLOOD PRESSURE: 72 MMHG | HEART RATE: 67 BPM | SYSTOLIC BLOOD PRESSURE: 116 MMHG

## 2019-02-27 DIAGNOSIS — M48.061 SPINAL STENOSIS OF LUMBAR REGION, UNSPECIFIED WHETHER NEUROGENIC CLAUDICATION PRESENT: ICD-10-CM

## 2019-02-27 DIAGNOSIS — I25.118 CORONARY ARTERY DISEASE OF NATIVE ARTERY OF NATIVE HEART WITH STABLE ANGINA PECTORIS (HCC): ICD-10-CM

## 2019-02-27 DIAGNOSIS — N18.2 CKD (CHRONIC KIDNEY DISEASE) STAGE 2, GFR 60-89 ML/MIN: ICD-10-CM

## 2019-02-27 DIAGNOSIS — I50.9 CHF, STAGE C (HCC): Primary | ICD-10-CM

## 2019-02-27 DIAGNOSIS — I48.0 PAROXYSMAL ATRIAL FIBRILLATION (HCC): ICD-10-CM

## 2019-02-27 DIAGNOSIS — G89.4 CHRONIC PAIN SYNDROME: ICD-10-CM

## 2019-02-27 RX ORDER — METOPROLOL SUCCINATE 25 MG/1
TABLET, EXTENDED RELEASE ORAL DAILY
COMMUNITY
End: 2019-05-27

## 2019-02-27 RX ORDER — METOPROLOL TARTRATE 50 MG/1
75 TABLET ORAL 2 TIMES DAILY
COMMUNITY
End: 2019-07-17 | Stop reason: SDUPTHER

## 2019-02-27 NOTE — PROGRESS NOTES
Home Based [address] [place] [employer]  
[phone] Date of Current Visit: 02/27/19 SUMMARY:  
For full summary of patient's condition, please see Home Based Primary Care initial visit note on 5/3/18. 
 
80 yr female referred to [facility] by Dr. Emma Lee. She has a significant hx for seronegative RA, PMR of multiple joints, CAD, old MI, GERD and Afib. Due to this, Ms. Kenisha Peace is unable to see a community PCP without significant taxing effort. Patient was sent to the hospital at [facility] and admitted through the ER for AMS, dehydration, poor po intake, and severe pain. She was seen by palliative care during her hospitalization and had a psyc consult. No medical cause was found for her symptoms and this is her fourth such episode in a year. She is out of skilled days and had to return home. Remeron was started for depression. Her fentanyl patch was increased to 25 mcg for pain. By discharge she was more alert mentally and he rpain was better controlled. She was also able to maintain hydration status. She r/o'ed for infection and a head CT was wnl. She was seen by surgery for a questionnable pneumoperitoneum on CT but this was r/o'ed. She was readmitted again on 10/12/18 and ruled in for a STEMI. She was discharged after declining cardiac catheterization on 12/14/2018. Patient stopped taking her medications 12/1 and became confused and went to the hospital the next day. She had a UTI and after antibiotic treatment was started, her mentation returned to baseline. She had no sepsis during hospitalization. This patient's chronic conditions including chronic respiratory failure with oxygen dependency, CAD, debility, PMR and RAhave resulted in the inability to leave the home to receive primary medical care without a significant taxing effort. Today we are visiting the patient for the following reason F/U CAD, CHF, A Fib, chronic pain and ckd. This patient's goals of care are: to return to her cognitive and functional baseline. This patient's resuscitation status is: 
[] Attempt Resuscitation       [x] Do Not Attempt Resuscitation Primary Decision Maker (Health Care Agent): Josue Romero Relationship to patient: Son Phone [phone] (Home) [x] Named in a scanned document  
[] Legal Next of Kin 
[] Guardian Secondary Decision Maker (First Alternate Health Care Agent): Froy Cameron Relationship to patient:daughter-in-law Phone number:  [phone] [facility]) [] Named in a scanned document  
[] Legal Next of Kin 
[] Guardian ACP documents you current have include: 
[x] Advance Directive or Living Will 
[x] Durable Do Not Resuscitate 
[] Physician Orders for Scope of Treatment (POST) [] Medical Power of  
[] Other DIAGNOSES & PLAN:  
 
  ICD-10-CM ICD-9-CM 1. CHF, stage C (HCC) I50.9 428.0 2. Coronary artery disease of native artery of native heart with stable angina pectoris ([facility] [address] I25.118 414.01   
  413.9 3. Paroxysmal atrial fibrillation (HCC) I48.0 427.31   
4. Chronic pain syndrome G89.4 338.4 5. CKD (chronic kidney disease) stage 2, GFR 60-89 ml/min N18.2 585.2 6. Spinal stenosis of lumbar region, unspecified whether neurogenic claudication present M48.061 724.02 Patient Instructions Dear Don Villalobos , It was a pleasure seeing you at home today with Home Based [address] [place] [address] ([phone]) Your stated goal:  To return to her cognitive and functional baseline. This is what we talked about: 1. Congestive heart failure 
-This has been clinically stable 
-Call us if you develop swelling/edema or shortness of breath 2. Heart disease 
-You have had no recent chest pain 
-Continue your daily aspirin 3. Atrial fibrillation 
-This has been rate controlled 
-Continue metoprolol 4. Chronic pain 
-You report good pain control without oversedation -Continue fentanyl and tramadol 
-You have not had to use the Tramadol very much 6. Health Maintenance There are no preventive care reminders to display for this patient. 7.  Advance Care Planning This is what you have shared with us about Advance Care Planning Primary Decision Maker (Health Care Agent): Hans Briscoe Relationship to patient: Son Phone [phone] (Home) [x] Named in a scanned document  
[] Legal Next of Kin 
[] Guardian Secondary Decision Maker (First Alternate Health Care Agent): Puneet Mcneal Relationship to patient:daughter-in-law Phone number:  [phone] [facility]) [] Named in a scanned document  
[] Legal Next of Kin 
[] Guardian ACP documents you current have include: 
[x] Advance Directive or Living Will 
[x] Durable Do Not Resuscitate 
[] Physician Orders for Scope of Treatment (POST) [] Medical Power of  
[] Other Someone from the Home Based Primary Care Team will see you again in one month If there are any concerns before that time, such as medication questions, worsening symptoms or a need to see a physician for an urgent or emergent situation; please call [phone]. A physician is also on call after our normal business hours of 8am to 5pm.  
 
In order to serve you better, please allow up to 48 hours for prescription refills to be processed. Certain medications may require more paperwork or a written prescription that you may need to  from the office. We appreciate you letting us know of any refill requests as soon as possible. Sincerely, The Home Based Primary Care Team 
 
MD Etta Rock, CRISTIN Black, CRISTIN Jarquin, RN Cassandra Zamarripa, RN 
 
  
  
 
 
 HISTORY:  
HISTORY OBTAINED FROM: Patient CHIEF COMPLAINT:  
Chief Complaint Patient presents with  Follow Up Chronic Condition   CAD, HTN,   
 
 
HPI/SUBJECTIVE:  Going to the dentist soon as she has not been in several years.  She reports good pain control. She has had no chest pain in the past month and no edema or SOB. She continues to independently transfer herself to the toilet and bed. She is eating well, no constipation, sleeping well. Her son fills her pill boxes and gives her her medications. Recent Fall: [x] No / [] Yes (when):   
Last bowel movement:  yesterday REVIEW OF SYSTEMS:  
 
The following systems were [x] reviewed / [] unable to be reviewed Systems: constitutional, ears/nose/mouth/throat, respiratory, gastrointestinal, genitourinary, musculoskeletal, integumentary, neurologic, psychiatric, endocrine. Positive findings noted below: baseline weakness, MOSS 
 
 VITAL SIGNS, PHYSICAL EXAM & FUNCTIONAL ASSESSMENT Vital Signs: Wt Readings from Last 3 Encounters:  
12/03/18 121 lb 11.1 oz (55.2 kg) 11/06/18 128 lb (58.1 kg) 10/14/18 128 lb 1.4 oz (58.1 kg) Temp Readings from Last 3 Encounters:  
02/27/19 97.4 °F (36.3 °C) (Oral) 01/29/19 98.4 °F (36.9 °C) (Oral) 01/02/19 97.8 °F (36.6 °C) (Oral) BP Readings from Last 3 Encounters:  
02/27/19 116/72  
01/29/19 118/68  
01/02/19 128/72 Pulse Readings from Last 3 Encounters:  
02/27/19 67  
01/29/19 72  
01/02/19 78 Physical Exam: 
Constitutional:  Frail, elderly female seated in W/C, NAD Eyes: pupils equal, anicteric, conjunctiva are pink. ENMT: no nasal discharge, moist mucous membranes and lips. Nasal mucosa pink without discharge Cardiovascular: regularly, irregular rhythm, distal pulses intact Respiratory: breathing not labored, symmetric, CTA A&P Gastrointestinal: soft, +bowel sounds. No TTP HSM, mass R, G or R Musculoskeletal: no deformity, no tenderness to palpation Skin: warm, dry. Neurologic: oriented X 3, no focal deficits, LE's with 3/5 strength, UE's 4/5 Psychiatric: No hallucinations, no agitation or anxiety Functional Assessment (description): 
 
Palliative Performance Scale: 50 
 Timed Up and Go (TUG): Not able to perform Fall Risk Assessment, last 12 mths 11/15/2017 Able to walk? Yes Fall in past 12 months? No  
 
 
 LAB DATA REVIEWED:  
 
Lab Results Component Value Date/Time Hemoglobin A1c 5.6 09/20/2018 11:59 AM  
 
No results found for: Dulcie Linker, MCA2, MCA3, MCAU, MCAU2, MCALPOCT Lab Results Component Value Date/Time TSH 1.560 11/20/2017 02:40 PM  
 TSH 0.86 05/25/2015 04:32 AM  
 
Lab Results Component Value Date/Time VITAMIN D, 25-HYDROXY 43.1 10/04/2017 02:52 PM  
   
 
Lab Results Component Value Date/Time WBC 8.6 12/19/2018 02:59 AM  
 HGB 10.4 (L) 12/19/2018 02:59 AM  
 PLATELET 088 14/24/8434 02:59 AM  
 
Lab Results Component Value Date/Time Sodium 145 (H) 12/19/2018 02:59 AM  
 Potassium 4.7 12/19/2018 02:59 AM  
 Chloride 106 12/19/2018 02:59 AM  
 CO2 25 12/19/2018 02:59 AM  
 BUN 18 12/19/2018 02:59 AM  
 Creatinine 0.93 12/19/2018 02:59 AM  
 Calcium 9.3 12/19/2018 02:59 AM  
 Magnesium 1.7 12/03/2018 03:27 AM  
 Phosphorus 3.5 12/03/2018 03:27 AM  
  
Lab Results Component Value Date/Time AST (SGOT) 33 12/19/2018 02:59 AM  
 Alk. phosphatase 86 12/19/2018 02:59 AM  
 Protein, total 5.9 (L) 12/19/2018 02:59 AM  
 Albumin 4.0 12/19/2018 02:59 AM  
 Globulin 2.9 12/03/2018 03:27 AM  
 
No results found for: IRON, FE, TIBC, IBCT, PSAT, FERR  
 
 MEDICATIONS & PRESCRIPTIONS Allergies Allergen Reactions  Nsaids (Non-Steroidal Anti-Inflammatory Drug) Nausea and Vomiting  Adhesive Tape-Silicones Other (comments) BLISTERS  Cymbalta [Duloxetine] Other (comments) CONFUSION 
  
 Digoxin Nausea and Vomiting  Gluten Other (comments) GLUTEN INTOLERANCE  
 Macrobid [Nitrofurantoin Monohyd/M-Cryst] Nausea and Vomiting  Novocain [Procaine] Other (comments) Paralysis  Sulfa (Sulfonamide Antibiotics) Nausea and Vomiting  Atenolol Nausea and Vomiting  Codeine Other (comments) Unable to swallow  Phenergan [Promethazine] Other (comments) \"loose my wits i go crazy\" Current Outpatient Medications Medication Sig  
 metoprolol tartrate (LOPRESSOR) 50 mg tablet Take 75 mg by mouth two (2) times a day.  metoprolol succinate (TOPROL-XL) 25 mg XL tablet Take  by mouth daily. One in the am and 2 tablets in the evening  mirtazapine (REMERON) 15 mg tablet Take 1 Tab by mouth nightly.  atorvastatin (LIPITOR) 40 mg tablet Take 1 Tab by mouth daily for 90 days.  predniSONE (DELTASONE) 10 mg tablet Take 10 mg by mouth daily.  ondansetron (ZOFRAN ODT) 8 mg disintegrating tablet Take 1 Tab by mouth Before breakfast, lunch, and dinner.  aspirin 81 mg chewable tablet Take 1 Tab by mouth daily.  gabapentin (NEURONTIN) 300 mg capsule Take 600 mg by mouth three (3) times daily.  fentaNYL (DURAGESIC) 25 mcg/hr PATCH 1 Patch by TransDERmal route every seventy-two (72) hours.  traMADol (ULTRAM) 50 mg tablet Take 50 mg by mouth two (2) times daily as needed for Pain.  topiramate (TOPAMAX) 25 mg tablet Take 1 Tab by mouth daily (with breakfast).  senna-docusate (SENNA PLUS) 8.6-50 mg per tablet Take 1 Tab by mouth two (2) times a day.  nitroglycerin (NITROSTAT) 0.4 mg SL tablet 1 Tab by SubLINGual route every five (5) minutes as needed for Chest Pain.  esomeprazole (NEXIUM) 40 mg capsule Take 40 mg by mouth Daily (before breakfast).  polyethylene glycol (MIRALAX) 17 gram/dose powder Take 17 g by mouth daily as needed. No current facility-administered medications for this visit. No orders of the defined types were placed in this encounter. Total time: 45min Counseling / coordination time: 15 min on chronic pain, chf 
> 50% counseling / coordination?: No

## 2019-03-04 NOTE — PATIENT INSTRUCTIONS
Dear Man Cummins , It was a pleasure seeing you at home today with [facility] ([phone]) Your stated goal:  To return to her cognitive and functional baseline. This is what we talked about: 1. Congestive heart failure 
-This has been clinically stable 
-Call us if you develop swelling/edema or shortness of breath 2. Heart disease 
-You have had no recent chest pain 
-Continue your daily aspirin 3. Atrial fibrillation 
-This has been rate controlled 
-Continue metoprolol 4. Chronic pain 
-You report good pain control without oversedation 
-Continue fentanyl and tramadol 
-You have not had to use the Tramadol very much 6. Health Maintenance There are no preventive care reminders to display for this patient. 7.  Advance Care Planning This is what you have shared with us about Advance Care Planning Primary Decision Maker (Health Care Agent): Carey Cobb Relationship to patient: Son Phone [phone] (Home) [x] Named in a scanned document  
[] Legal Next of Kin 
[] Guardian Secondary Decision Maker (First Alternate Health Care Agent): Amadeo Sol Relationship to patient:daughter-in-law Phone number:  [phone] [facility]) [] Named in a scanned document  
[] Legal Next of Kin 
[] Guardian ACP documents you current have include: 
[x] Advance Directive or Living Will 
[x] Durable Do Not Resuscitate 
[] Physician Orders for Scope of Treatment (POST) [] Medical Power of  
[] Other Someone from the Home Based Primary Care Team will see you again in one month If there are any concerns before that time, such as medication questions, worsening symptoms or a need to see a physician for an urgent or emergent situation; please call [phone].  A physician is also on call after our normal business hours of 8am to 5pm.  
 
In order to serve you better, please allow up to 48 hours for prescription refills to be processed. Certain medications may require more paperwork or a written prescription that you may need to  from the office. We appreciate you letting us know of any refill requests as soon as possible. Sincerely, The Home Based Primary Care Team 
 
MD Chandrakant Ross NP Lindie Orem, NP Dahlia Branch, JUAN PABLO Lazo, RN Learning About Heart Failure Zones What are heart failure zones? Heart failure zones give you an easy way to see changes in your heart failure symptoms. They also tell you when you need to get help. Check every day to see which zone you are in. Green zone. You are doing well. This is where you want to be. · Your weight is stable. This means it is not going up or down. · You breathe easily. · You are sleeping well. You are able to lie flat without shortness of breath. · You can do your usual activities. Yellow zone. Be careful. Your symptoms are changing. Call your doctor. · You have new or increased shortness of breath. · You are dizzy or lightheaded, or you feel like you may faint. · You have sudden weight gain, such as more than 2 to 3 pounds in a day or 5 pounds in a week. (Your doctor may suggest a different range of weight gain.) · You have increased swelling in your legs, ankles, or feet. · You are so tired or weak that you cannot do your usual activities. · You are not sleeping well. Shortness of breath wakes you up at night. You need extra pillows. Your doctor's name: ____________________________________________________________ Your doctor's contact information: _________________________________________________ Red zone. This is an emergency. Call 911. You have symptoms of sudden heart failure, such as: 
· You have severe trouble breathing. · You cough up pink, foamy mucus. · You have a new irregular or fast heartbeat. You have symptoms of a heart attack. These may include: · Chest pain or pressure, or a strange feeling in the chest. 
· Sweating. · Shortness of breath. · Nausea or vomiting. · Pain, pressure, or a strange feeling in the back, neck, jaw, or upper belly or in one or both shoulders or arms. · Lightheadedness or sudden weakness. · A fast or irregular heartbeat. If you have symptoms of a heart attack: After you call 911, the  may tell you to chew 1 adult-strength or 2 to 4 low-dose aspirin. Wait for an ambulance. Do not try to drive yourself. Follow-up care is a key part of your treatment and safety. Be sure to make and go to all appointments, and call your doctor if you are having problems. It's also a good idea to know your test results and keep a list of the medicines you take. Where can you learn more? Go to http://anna-sampson.info/. Enter T174 in the search box to learn more about \"Learning About Heart Failure Zones. \" Current as of: July 22, 2018 Content Version: 11.9 © 0902-3542 [employer]. Care instructions adapted under license by [employer] (which disclaims liability or warranty for this information). If you have questions about a medical condition or this instruction, always ask your healthcare professional. [place] [address] any warranty or liability for your use of this information.

## 2019-03-25 ENCOUNTER — HOME VISIT (OUTPATIENT)
Dept: FAMILY MEDICINE CLINIC | Age: 84
End: 2019-03-25

## 2019-03-25 DIAGNOSIS — G89.4 CHRONIC PAIN SYNDROME: ICD-10-CM

## 2019-03-25 DIAGNOSIS — I50.9 CHF, STAGE C (HCC): Primary | ICD-10-CM

## 2019-03-25 DIAGNOSIS — I10 ESSENTIAL HYPERTENSION: ICD-10-CM

## 2019-03-25 DIAGNOSIS — I25.118 CORONARY ARTERY DISEASE OF NATIVE ARTERY OF NATIVE HEART WITH STABLE ANGINA PECTORIS (HCC): ICD-10-CM

## 2019-03-25 DIAGNOSIS — N18.2 CKD (CHRONIC KIDNEY DISEASE) STAGE 2, GFR 60-89 ML/MIN: ICD-10-CM

## 2019-03-25 DIAGNOSIS — I48.0 PAROXYSMAL ATRIAL FIBRILLATION (HCC): ICD-10-CM

## 2019-03-25 DIAGNOSIS — M48.061 SPINAL STENOSIS OF LUMBAR REGION, UNSPECIFIED WHETHER NEUROGENIC CLAUDICATION PRESENT: ICD-10-CM

## 2019-03-25 NOTE — PROGRESS NOTES
Home Based Primary Care [facility]) & Supportive Services   
[phone] NOTES: Home Based Primary Care is a PROVIDER (MD/NP) based interdisciplinary practice for patient's who cannot (or taxing effort) access primary / speciality medical care in an office setting. [facility] is NOT [place] but works with [address] when there is a skilled need. Our MD/NPs are integral in Care Transitions; PLEASE ELAR [phone] if this patient arrives in the Emergency Department or Hospital.  
 
Date of Current Visit: 03/25/19 Patient/Family present for Current Visit: Patient and son Goals of Care as stated by the patient/family is: per pt \"to run with the rabbits and howl at the moon\"/ per son \"to be able to do as much as possible for herself and have a good quality of life\" Total time: 45 min Counseling / coordination time: 20 minute on CHF, labs, chronic pain 
> 50% counseling / coordination?: No 
 
ASSESSMENT & PLAN Diagnoses and all orders for this visit: 
 
1. CHF, stage C ([address] well compensated A. Have negotiated with patient and will draw labs every 6 months so due Again in May 2019. B. Continue beta blocker. Ace was stopped in hospital and BP has not  allowed a restart. Spoke to patient about decreasing metoprolol to add Ace, but she needs the rate control for a fib. C.  Reviewed zones of control for CHF 2. Coronary artery disease of native artery of native heart with stable angina pectoris ([address] A. No recent chest pain B. Patient has NTG if needed C. Continue ASA and beta blocker 3. Paroxysmal atrial fibrillation (HCC) A. Rate controlled on metoprolol B. Not a candidate for [facility] in the past secondary to falls and now Patient does not want to restart medication 4. Chronic pain syndrome, well controlled with avg pain 4-5/10 with no side effects A. Refilled fentanyl patches for two months and continue prn tramadol B.  Continue gabapentin for neuropathic pain along with topama for  Headaches 5. CKD (chronic kidney disease) stage 2, GFR 60-89 ml/min A. Has been stable B. Avoid nephrotoxic drugs 6. Spinal stenosis of lumbar region, unspecified whether neurogenic claudication present A. Main issue is pain--see section above B. Ambulation also an issue but patient is using rollator to go to [place] But cannot go down steps to go out of home 7. Essential hypertension A. Well controlled on metoprolol B. Continue to monitor BP 
 C. Pt follows a low sodium diet Follow-up and Dispositions · Return in about 1 month (around 4/25/2019). I have left a SUMMARY / INSTRUCTIONS from today's visit with the patient/family in the home HEALTH MAINTENANCE There are no preventive care reminders to display for this patient. CC & SUBJECTIVE including ROS & FUNCTION Chief Complaint Patient presents with  Follow Up Chronic Condition CHF, CAD, Laureen Arellano is a 80 y.o. with chronic medical conditions as listed in the patient's updated Problem List (see below) Their Subjective Information provided as today's visit includes: Patient has been well overall and moving around the house in her wheelchair and using her rollator to go to the sheikh [place]. She is sleeping well. No constipation on opioids--on senna bid. She is able to tell me all of the political happenings in the country as she is a self-professed \"political junkie\". She is also eating well. She would like to be younger and more active again. Positive ROS findings: Some diarrhea today; chronic weakness, LE weakness, fatigue and MOSS The following systems were [x] reviewed / [] unable to be reviewed Systems: constitutional, ears/nose/mouth/throat, respiratory, gastrointestinal, genitourinary, musculoskeletal, integumentary, neurologic, psychiatric, endocrine. PPS:40 Karnofsky: Timed Up and Go (TUG): Unable to perform Fall Risk Assessment, last 12 mths 11/15/2017 Able to walk? Yes Fall in past 12 months? No  
 
 
ADVANCE CARE PLANNING The following information was updated I/ reviewed as accurate in Orders and the Advance Care Planning Navigator: 
@SLT@ Health Care Agent: Zoltan Shah - [phone] First Alternate Health Care Agent: Anne Mckay - [phone] Advance Care Planning 10/12/2018 Patient's Healthcare Decision Maker is: Named in scanned ACP document Primary Decision Maker Name Tata Stewart Primary Decision Maker Phone Number [phone] Primary Decision Maker Relationship to Patient Adult child Secondary Decision Maker Name - Secondary Decision Maker Phone Number - Secondary Decision Maker Relationship to Patient -  
Confirm Advance Directive Yes, on file Does the patient have other document types Do Not Resuscitate VITAL SIGNS & PHYSICAL EXAM  
 
Median Arm Circumference (inches): Wt Readings from Last 3 Encounters:  
12/03/18 121 lb 11.1 oz (55.2 kg) 11/06/18 128 lb (58.1 kg) 10/14/18 128 lb 1.4 oz (58.1 kg) Temp Readings from Last 3 Encounters:  
02/27/19 97.4 °F (36.3 °C) (Oral) 01/29/19 98.4 °F (36.9 °C) (Oral) 01/02/19 97.8 °F (36.6 °C) (Oral) BP Readings from Last 3 Encounters:  
02/27/19 116/72  
01/29/19 118/68  
01/02/19 128/72 Pulse Readings from Last 3 Encounters:  
02/27/19 67  
01/29/19 72  
01/02/19 78  
 
 
(Constitutional) Patient Physical Status: Well groomed female lying in bed in hospital bed in [facility] Pacemaker: 
ICD: 
Feeding Tube: 
Urine Catheter: 
Other: 
 
Eyes: pupils equal, anicteric, conjunctiva pink ENMT: no nasal discharge, moist mucous membranes Cardiovascular: irregularly irregular, no M/R/G, distal pulses intact Respiratory: breathing not labored, symmetric, CTA bilat. Gastrointestinal: + bowel sounds, soft, non-tender, non-distended Musculoskeletal: no deformity, no tenderness to palpation Skin: warm, dry Neurologic: A/Ox3, following commands, moving all extremities equally Psychiatric: normal affect, no hallucinations Other: PROBLEM LIST Patient Active Problem List  
Diagnosis Code  Osteoarthritis M19.90  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 ([facility]) I25.111  
 CHF, stage C ([facility]) I50.9  Lumbar spinal stenosis M48.061  
 Paroxysmal atrial fibrillation ([facility]) I48.0  Advance directive on file T34.0  PMR (polymyalgia rheumatica) ([facility]) M35.3  Lower extremity edema R60.0  CKD (chronic kidney disease) stage 2, GFR 60-89 ml/min N18.2  Long term current use of systemic steroids Z79.52  Seronegative rheumatoid arthritis of multiple sites ([facility] M06.09  
 Long-term use of immunosuppressant medication Z79.899  Anemia associated with acute blood loss D62  Dependence on continuous supplemental oxygen Z99.81  
 Polyneuropathy associated with underlying disease ([facility] G63  Vascular dementia without behavioral disturbance F01.50  Acute respiratory distress R06.03  
 Chronic pain syndrome G89.4  Dementia without behavioral disturbance F03.90  
 Goals of care, counseling/discussion Z71.89  
 Acute metabolic encephalopathy Y84.02  
  
 
 MEDICATIONS & PRESCRIPTIONS Allergies Allergen Reactions  Nsaids (Non-Steroidal Anti-Inflammatory Drug) Nausea and Vomiting  Adhesive Tape-Silicones Other (comments) BLISTERS  Cymbalta [Duloxetine] Other (comments) CONFUSION 
  
 Digoxin Nausea and Vomiting  Gluten Other (comments) GLUTEN INTOLERANCE  
 Macrobid [Nitrofurantoin Monohyd/M-Cryst] Nausea and Vomiting  Novocain [Procaine] Other (comments) Paralysis  Sulfa (Sulfonamide Antibiotics) Nausea and Vomiting  Atenolol Nausea and Vomiting  Codeine Other (comments) Unable to swallow  Phenergan [Promethazine] Other (comments) \"loose my wits i go crazy\" Current Outpatient Medications Medication Sig  
 metoprolol tartrate (LOPRESSOR) 50 mg tablet Take 75 mg by mouth two (2) times a day.  metoprolol succinate (TOPROL-XL) 25 mg XL tablet Take  by mouth daily. One in the am and 2 tablets in the evening  mirtazapine (REMERON) 15 mg tablet Take 1 Tab by mouth nightly.  predniSONE (DELTASONE) 10 mg tablet Take 10 mg by mouth daily.  ondansetron (ZOFRAN ODT) 8 mg disintegrating tablet Take 1 Tab by mouth Before breakfast, lunch, and dinner.  aspirin 81 mg chewable tablet Take 1 Tab by mouth daily.  gabapentin (NEURONTIN) 300 mg capsule Take 600 mg by mouth three (3) times daily.  fentaNYL (DURAGESIC) 25 mcg/hr PATCH 1 Patch by TransDERmal route every seventy-two (72) hours.  traMADol (ULTRAM) 50 mg tablet Take 50 mg by mouth two (2) times daily as needed for Pain.  polyethylene glycol (MIRALAX) 17 gram/dose powder Take 17 g by mouth daily as needed.  topiramate (TOPAMAX) 25 mg tablet Take 1 Tab by mouth daily (with breakfast).  senna-docusate (SENNA PLUS) 8.6-50 mg per tablet Take 1 Tab by mouth two (2) times a day.  nitroglycerin (NITROSTAT) 0.4 mg SL tablet 1 Tab by SubLINGual route every five (5) minutes as needed for Chest Pain.  esomeprazole (NEXIUM) 40 mg capsule Take 40 mg by mouth Daily (before breakfast). No current facility-administered medications for this visit. No orders of the defined types were placed in this encounter. TEST DATA REVIEWED:  
 
Lab Results Component Value Date/Time Hemoglobin A1c 5.6 09/20/2018 11:59 AM  
 
No results found for: [facility], MCA2, MCA3, MCAU, MCAU2, MCALPOCT Lab Results Component Value Date/Time TSH 1.560 11/20/2017 02:40 PM  
 TSH 0.86 05/25/2015 04:32 AM  
 
Lab Results Component Value Date/Time VITAMIN D, 25-HYDROXY 43.1 10/04/2017 02:52 PM  
   
 
Lab Results Component Value Date/Time WBC 8.6 12/19/2018 02:59 AM  
 HGB 10.4 (L) 12/19/2018 02:59 AM  
 PLATELET 782 27/55/6172 02:59 AM  
 
Lab Results Component Value Date/Time Sodium 145 (H) 12/19/2018 02:59 AM  
 Potassium 4.7 12/19/2018 02:59 AM  
 Chloride 106 12/19/2018 02:59 AM  
 CO2 25 12/19/2018 02:59 AM  
 BUN 18 12/19/2018 02:59 AM  
 Creatinine 0.93 12/19/2018 02:59 AM  
 Calcium 9.3 12/19/2018 02:59 AM  
 Magnesium 1.7 12/03/2018 03:27 AM  
 Phosphorus 3.5 12/03/2018 03:27 AM  
  
Lab Results Component Value Date/Time AST (SGOT) 33 12/19/2018 02:59 AM  
 Alk.  phosphatase 86 12/19/2018 02:59 AM  
 Protein, total 5.9 (L) 12/19/2018 02:59 AM  
 Albumin 4.0 12/19/2018 02:59 AM  
 Globulin 2.9 12/03/2018 03:27 AM  
 
No results found for: IRON, FE, TIBC, IBCT, PSAT, FERR

## 2019-04-23 ENCOUNTER — HOME VISIT (OUTPATIENT)
Dept: FAMILY MEDICINE CLINIC | Age: 84
End: 2019-04-23

## 2019-04-23 VITALS
HEART RATE: 72 BPM | OXYGEN SATURATION: 97 % | SYSTOLIC BLOOD PRESSURE: 128 MMHG | RESPIRATION RATE: 18 BRPM | TEMPERATURE: 98.2 F | DIASTOLIC BLOOD PRESSURE: 72 MMHG

## 2019-04-23 DIAGNOSIS — L98.9 SKIN LESION OF FACE: ICD-10-CM

## 2019-04-23 DIAGNOSIS — M48.061 SPINAL STENOSIS OF LUMBAR REGION, UNSPECIFIED WHETHER NEUROGENIC CLAUDICATION PRESENT: ICD-10-CM

## 2019-04-23 DIAGNOSIS — I50.9 CHF, STAGE C (HCC): ICD-10-CM

## 2019-04-23 DIAGNOSIS — N18.2 CKD (CHRONIC KIDNEY DISEASE) STAGE 2, GFR 60-89 ML/MIN: ICD-10-CM

## 2019-04-23 DIAGNOSIS — I48.0 PAROXYSMAL ATRIAL FIBRILLATION (HCC): ICD-10-CM

## 2019-04-23 DIAGNOSIS — N64.4 BREAST PAIN, RIGHT: Primary | ICD-10-CM

## 2019-04-23 DIAGNOSIS — G89.4 CHRONIC PAIN SYNDROME: ICD-10-CM

## 2019-04-23 DIAGNOSIS — I25.118 CORONARY ARTERY DISEASE OF NATIVE ARTERY OF NATIVE HEART WITH STABLE ANGINA PECTORIS (HCC): ICD-10-CM

## 2019-04-23 DIAGNOSIS — M06.09 SERONEGATIVE RHEUMATOID ARTHRITIS OF MULTIPLE SITES (HCC): ICD-10-CM

## 2019-04-26 RX ORDER — GABAPENTIN 300 MG/1
600 CAPSULE ORAL 3 TIMES DAILY
Qty: 180 CAP | Refills: 3 | Status: ON HOLD | OUTPATIENT
Start: 2019-04-26 | End: 2019-08-26 | Stop reason: SDUPTHER

## 2019-04-28 NOTE — PROGRESS NOTES
Home Based Primary Care [facility]) & Supportive Services   
[phone] NOTES: Home Based Primary Care is a PROVIDER (MD/NP) based interdisciplinary practice (RN, LCSW, Pharmacy, Dietician) for patient's who cannot access (or have a taxing effort) primary / speciality medical care in an office setting. [facility] is NOT [place] but works with [address]. when there is a skilled need. Our MD/NPs are integral in Care Transitions; PLEASE CALL [phone] if this patient arrives in the Emergency Department or Hospital.  
 
Date of Current Visit: 04/23/19 Patient/Family present for Current Visit: Patient Goals of Care as stated by the patient/family is: to be as mobile as possible Total time: 45 min Counseling / coordination time: 15 min with son discussing skin lesion, patient's request for a mammogram 
> 50% counseling / coordination?: No 
 
ASSESSMENT & PLAN Diagnoses and all orders for this visit: 1. Breast pain, right 
 -This is likely muscular 
 -Patient is requesting a mammogram 
 -It is very difficult to get her out of the home 
 -I will discuss the mammogram with her son who is her care provider 2. Skin lesion of face 
 -Lesion on her face is suspicious and continues to bleed intermittently 
 -We will discuss a dermatology referral with her son as it is difficult to get her out of the home 3. Coronary artery disease of native artery of native heart with stable angina pectoris ([address] -Patient has not had chest pain in several months and does have as needed nitroglycerin if she needs it 
 -Continue metoprolol and aspirin 4. CHF, stage C ([address] -Well compensated 5. Paroxysmal atrial fibrillation (HCC) 
 -rate controlled on metoprolol 
 -not on [facility] secondary to falls 6. Chronic pain syndrome 
 -Well controlled on Fentanyl, tramadol prn 7. CKD (chronic kidney disease) stage 2, GFR 60-89 ml/min 
 -Stable 
 -avoid nephrotoxic drugs 8. Seronegative rheumatoid arthritis of multiple sites ([facility] -Continue prednisone 9. Spinal stenosis of lumbar region, unspecified whether neurogenic claudication present 
 -In w/c part of the time 
 -pain well controlled Follow-up and Dispositions · Return in about 1 month (around 5/23/2019). I have left a SUMMARY / INSTRUCTIONS from today's visit with the patient/family in the home HEALTH MAINTENANCE There are no preventive care reminders to display for this patient. CC & SUBJECTIVE including ROS & FUNCTION Chief Complaint Patient presents with  Follow Up Chronic Condition CAD, HTN, CHF, chronic pain Carola Kussmaul is a 80 y.o. with chronic medical conditions as listed in the patient's updated Problem List (see below) Their Subjective Information provided as today's visit includes: Patient is eating and drinking well. She ambulates with her Rollator to the bathroom and back and otherwise is in her bed or wheelchair. She has bowel movements almost daily which are soft and no constipation she reports her pain is 3 out of 10 most of the time on the fentanyl and occasionally takes as needed tramadol. She is taking her medication as ordered. She complains of a lesion on her face that has been bleeding but is been present for over a year and seems to be growing. She also complains of right breast pain. She is requesting a mammogram. 
 
Positive ROS findings: Patient complains of long-term weakness, breast pain on the right, and a bleeding skin lesion on her left face The following systems were [x] reviewed / [] unable to be reviewed Systems: constitutional, ears/nose/mouth/throat, respiratory, gastrointestinal, genitourinary, musculoskeletal, integumentary, neurologic, psychiatric, endocrine. PPS: 40 
Karnofsky:  
Timed Up and Go (TUG): Fall Risk Assessment, last 12 mths 11/15/2017 Able to walk? Yes Fall in past 12 months?  No  
 
 
 ADVANCE CARE PLANNING The following information was updated I/ reviewed as accurate in Orders and the Advance Care Planning Navigator: 
@[email or web address]@ Health Care Agent: Torres Carias - [phone] First Alternate Health Care Agent: Sebastian Irizarry - [phone] Advance Care Planning 10/12/2018 Patient's Healthcare Decision Maker is: Named in scanned ACP document Primary Decision Maker Name Ryan Malone Primary Decision Maker Phone Number [phone] Primary Decision Maker Relationship to Patient Adult child Secondary Decision Maker Name - Secondary Decision Maker Phone Number - Secondary Decision Maker Relationship to Patient -  
Confirm Advance Directive Yes, on file Does the patient have other document types Do Not Resuscitate VITAL SIGNS & PHYSICAL EXAM  
 
Median Arm Circumference (inches): Wt Readings from Last 3 Encounters:  
12/03/18 121 lb 11.1 oz (55.2 kg) 11/06/18 128 lb (58.1 kg) 10/14/18 128 lb 1.4 oz (58.1 kg) Temp Readings from Last 3 Encounters:  
04/23/19 98.2 °F (36.8 °C) (Oral) 02/27/19 97.4 °F (36.3 °C) (Oral) 01/29/19 98.4 °F (36.9 °C) (Oral) BP Readings from Last 3 Encounters:  
04/23/19 128/72  
02/27/19 116/72  
01/29/19 118/68 Pulse Readings from Last 3 Encounters:  
04/23/19 72  
02/27/19 67  
01/29/19 72  
 
 
(Constitutional) Patient Physical Status: Patient is a well-dressed elderly  female seated in her wheelchair in no acute distress Pacemaker: 
ICD: 
Feeding Tube: 
Urine Catheter: 
Other: 
 
Eyes: pupils equal, anicteric, conjunctiva pink ENMT: no nasal discharge, moist mucous membranes, pharynx clear Neck: No JVD thyromegaly or LAD Cardiovascular: regular rhythm, no M/R/G, distal pulses intact, +2 lower extremity edema Respiratory: breathing not labored, symmetric, CTA bilat. Gastrointestinal: + bowel sounds, soft, non-tender, non-distended Musculoskeletal: no deformity, no tenderness to palpation Skin: warm, dry, crusted lesion approximately 1.5 x 1.5 cm on left jaw with bleeding Neurologic: A/Ox3, following commands, moving all extremities equally Psychiatric: normal affect, no hallucinations Other: Breast exam no masses bilaterally no nipple discharge. No axillary adenopathy. Patient with tenderness to palpation in the right breast in the inner upper and lower quadrants PROBLEM LIST Patient Active Problem List  
Diagnosis Code  Osteoarthritis M19.90  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 ([facility]) I25.111  
 CHF, stage C ([facility]) I50.9  Lumbar spinal stenosis M48.061  
 Paroxysmal atrial fibrillation ([facility]) I48.0  Advance directive on file J79.8  PMR (polymyalgia rheumatica) ([facility]) M35.3  Lower extremity edema R60.0  CKD (chronic kidney disease) stage 2, GFR 60-89 ml/min N18.2  Long term current use of systemic steroids Z79.52  Seronegative rheumatoid arthritis of multiple sites ([address] M06.09  
 Long-term use of immunosuppressant medication Z79.899  Anemia associated with acute blood loss D62  Dependence on continuous supplemental oxygen Z99.81  
 Polyneuropathy associated with underlying disease ([address] G63  Vascular dementia without behavioral disturbance F01.50  Acute respiratory distress R06.03  
 Chronic pain syndrome G89.4  Dementia without behavioral disturbance F03.90  
 Goals of care, counseling/discussion Z71.89  
 Acute metabolic encephalopathy F76.36  
  
 
 MEDICATIONS & PRESCRIPTIONS Allergies Allergen Reactions  Nsaids (Non-Steroidal Anti-Inflammatory Drug) Nausea and Vomiting  Adhesive Tape-Silicones Other (comments) BLISTERS  Cymbalta [Duloxetine] Other (comments) CONFUSION 
  
 Digoxin Nausea and Vomiting  Gluten Other (comments)   GLUTEN INTOLERANCE  
  Macrobid [Nitrofurantoin Monohyd/M-Cryst] Nausea and Vomiting  Novocain [Procaine] Other (comments) Paralysis  Sulfa (Sulfonamide Antibiotics) Nausea and Vomiting  Atenolol Nausea and Vomiting  Codeine Other (comments) Unable to swallow  Phenergan [Promethazine] Other (comments) \"loose my wits i go crazy\" Current Outpatient Medications Medication Sig  
 gabapentin (NEURONTIN) 300 mg capsule Take 2 Caps by mouth three (3) times daily.  metoprolol tartrate (LOPRESSOR) 50 mg tablet Take 75 mg by mouth two (2) times a day.  metoprolol succinate (TOPROL-XL) 25 mg XL tablet Take  by mouth daily. One in the am and 2 tablets in the evening  mirtazapine (REMERON) 15 mg tablet Take 1 Tab by mouth nightly.  predniSONE (DELTASONE) 10 mg tablet Take 10 mg by mouth daily.  ondansetron (ZOFRAN ODT) 8 mg disintegrating tablet Take 1 Tab by mouth Before breakfast, lunch, and dinner.  aspirin 81 mg chewable tablet Take 1 Tab by mouth daily.  fentaNYL (DURAGESIC) 25 mcg/hr PATCH 1 Patch by TransDERmal route every seventy-two (72) hours.  topiramate (TOPAMAX) 25 mg tablet Take 1 Tab by mouth daily (with breakfast).  senna-docusate (SENNA PLUS) 8.6-50 mg per tablet Take 1 Tab by mouth two (2) times a day.  esomeprazole (NEXIUM) 40 mg capsule Take 40 mg by mouth Daily (before breakfast).  traMADol (ULTRAM) 50 mg tablet Take 50 mg by mouth two (2) times daily as needed for Pain.  polyethylene glycol (MIRALAX) 17 gram/dose powder Take 17 g by mouth daily as needed.  nitroglycerin (NITROSTAT) 0.4 mg SL tablet 1 Tab by SubLINGual route every five (5) minutes as needed for Chest Pain. No current facility-administered medications for this visit. No orders of the defined types were placed in this encounter. TEST DATA REVIEWED:  
 
Lab Results Component Value Date/Time  Hemoglobin A1c 5.6 09/20/2018 11:59 AM  
 
 No results found for: [facility], MCA2, [address], MCAU, [address], MCALPOCT Lab Results Component Value Date/Time TSH 1.560 11/20/2017 02:40 PM  
 TSH 0.86 05/25/2015 04:32 AM  
 
Lab Results Component Value Date/Time VITAMIN D, 25-HYDROXY 43.1 10/04/2017 02:52 PM  
   
 
Lab Results Component Value Date/Time WBC 8.6 12/19/2018 02:59 AM  
 HGB 10.4 (L) 12/19/2018 02:59 AM  
 PLATELET 592 95/15/6139 02:59 AM  
 
Lab Results Component Value Date/Time Sodium 145 (H) 12/19/2018 02:59 AM  
 Potassium 4.7 12/19/2018 02:59 AM  
 Chloride 106 12/19/2018 02:59 AM  
 CO2 25 12/19/2018 02:59 AM  
 BUN 18 12/19/2018 02:59 AM  
 Creatinine 0.93 12/19/2018 02:59 AM  
 Calcium 9.3 12/19/2018 02:59 AM  
 Magnesium 1.7 12/03/2018 03:27 AM  
 Phosphorus 3.5 12/03/2018 03:27 AM  
  
Lab Results Component Value Date/Time AST (SGOT) 33 12/19/2018 02:59 AM  
 Alk.  phosphatase 86 12/19/2018 02:59 AM  
 Protein, total 5.9 (L) 12/19/2018 02:59 AM  
 Albumin 4.0 12/19/2018 02:59 AM  
 Globulin 2.9 12/03/2018 03:27 AM  
 
No results found for: IRON, FE, TIBC, IBCT, PSAT, FERR

## 2019-04-28 NOTE — PATIENT INSTRUCTIONS
Learning About Heart Failure Zones What are heart failure zones? Heart failure zones give you an easy way to see changes in your heart failure symptoms. They also tell you when you need to get help. Check every day to see which zone you are in. Green zone. You are doing well. This is where you want to be. · Your weight is stable. This means it is not going up or down. · You breathe easily. · You are sleeping well. You are able to lie flat without shortness of breath. · You can do your usual activities. Yellow zone. Be careful. Your symptoms are changing. Call your doctor. · You have new or increased shortness of breath. · You are dizzy or lightheaded, or you feel like you may faint. · You have sudden weight gain, such as more than 2 to 3 pounds in a day or 5 pounds in a week. (Your doctor may suggest a different range of weight gain.) · You have increased swelling in your legs, ankles, or feet. · You are so tired or weak that you cannot do your usual activities. · You are not sleeping well. Shortness of breath wakes you up at night. You need extra pillows. Your doctor's name: ____________________________________________________________ Your doctor's contact information: _________________________________________________ Red zone. This is an emergency. Call 911. You have symptoms of sudden heart failure, such as: 
· You have severe trouble breathing. · You cough up pink, foamy mucus. · You have a new irregular or fast heartbeat. You have symptoms of a heart attack. These may include: · Chest pain or pressure, or a strange feeling in the chest. 
· Sweating. · Shortness of breath. · Nausea or vomiting. · Pain, pressure, or a strange feeling in the back, neck, jaw, or upper belly or in one or both shoulders or arms. · Lightheadedness or sudden weakness. · A fast or irregular heartbeat.  
If you have symptoms of a heart attack: After you call 911, the  may tell you to chew 1 adult-strength or 2 to 4 low-dose aspirin. Wait for an ambulance. Do not try to drive yourself. Follow-up care is a key part of your treatment and safety. Be sure to make and go to all appointments, and call your doctor if you are having problems. It's also a good idea to know your test results and keep a list of the medicines you take. Where can you learn more? Go to http://anna-sampson.info/. Enter T174 in the search box to learn more about \"Learning About Heart Failure Zones. \" Current as of: July 22, 2018 Content Version: 11.9 © 9081-2767 [employer]. Care instructions adapted under license by [employer] (which disclaims liability or warranty for this information). If you have questions about a medical condition or this instruction, always ask your healthcare professional. [place] [address] any warranty or liability for your use of this information.

## 2019-05-21 ENCOUNTER — HOME VISIT (OUTPATIENT)
Dept: FAMILY MEDICINE CLINIC | Age: 84
End: 2019-05-21

## 2019-05-21 DIAGNOSIS — I25.111 CORONARY ARTERY DISEASE INVOLVING NATIVE CORONARY ARTERY OF NATIVE HEART WITH ANGINA PECTORIS WITH DOCUMENTED SPASM (HCC): ICD-10-CM

## 2019-05-21 DIAGNOSIS — M06.09 SERONEGATIVE RHEUMATOID ARTHRITIS OF MULTIPLE SITES (HCC): ICD-10-CM

## 2019-05-21 DIAGNOSIS — E78.49 OTHER HYPERLIPIDEMIA: ICD-10-CM

## 2019-05-21 DIAGNOSIS — G63 POLYNEUROPATHY ASSOCIATED WITH UNDERLYING DISEASE (HCC): ICD-10-CM

## 2019-05-21 DIAGNOSIS — I48.0 PAROXYSMAL ATRIAL FIBRILLATION (HCC): ICD-10-CM

## 2019-05-21 DIAGNOSIS — N18.2 CKD (CHRONIC KIDNEY DISEASE) STAGE 2, GFR 60-89 ML/MIN: ICD-10-CM

## 2019-05-21 DIAGNOSIS — M48.061 SPINAL STENOSIS OF LUMBAR REGION, UNSPECIFIED WHETHER NEUROGENIC CLAUDICATION PRESENT: ICD-10-CM

## 2019-05-21 DIAGNOSIS — I50.9 CHF, STAGE C (HCC): Primary | ICD-10-CM

## 2019-05-21 DIAGNOSIS — M35.3 PMR (POLYMYALGIA RHEUMATICA) (HCC): ICD-10-CM

## 2019-05-21 DIAGNOSIS — I25.10 CORONARY ARTERY DISEASE INVOLVING NATIVE CORONARY ARTERY OF NATIVE HEART WITHOUT ANGINA PECTORIS: ICD-10-CM

## 2019-05-22 DIAGNOSIS — R11.2 NON-INTRACTABLE VOMITING WITH NAUSEA, UNSPECIFIED VOMITING TYPE: ICD-10-CM

## 2019-05-22 LAB
ALBUMIN SERPL-MCNC: 3.8 G/DL (ref 3.2–4.6)
ALBUMIN/GLOB SERPL: 2.1 {RATIO} (ref 1.2–2.2)
ALP SERPL-CCNC: 73 IU/L (ref 39–117)
ALT SERPL-CCNC: 18 IU/L (ref 0–32)
AST SERPL-CCNC: 16 IU/L (ref 0–40)
BASOPHILS # BLD AUTO: 0 X10E3/UL (ref 0–0.2)
BASOPHILS NFR BLD AUTO: 0 %
BILIRUB SERPL-MCNC: 0.2 MG/DL (ref 0–1.2)
BUN SERPL-MCNC: 17 MG/DL (ref 10–36)
BUN/CREAT SERPL: 20 (ref 12–28)
CALCIUM SERPL-MCNC: 9.5 MG/DL (ref 8.7–10.3)
CHLORIDE SERPL-SCNC: 105 MMOL/L (ref 96–106)
CO2 SERPL-SCNC: 22 MMOL/L (ref 20–29)
CREAT SERPL-MCNC: 0.86 MG/DL (ref 0.57–1)
EOSINOPHIL # BLD AUTO: 0.1 X10E3/UL (ref 0–0.4)
EOSINOPHIL NFR BLD AUTO: 1 %
ERYTHROCYTE [DISTWIDTH] IN BLOOD BY AUTOMATED COUNT: 15.8 % (ref 12.3–15.4)
GLOBULIN SER CALC-MCNC: 1.8 G/DL (ref 1.5–4.5)
GLUCOSE SERPL-MCNC: 138 MG/DL (ref 65–99)
HCT VFR BLD AUTO: 36.3 % (ref 34–46.6)
HGB BLD-MCNC: 11.5 G/DL (ref 11.1–15.9)
IMM GRANULOCYTES # BLD AUTO: 0 X10E3/UL (ref 0–0.1)
IMM GRANULOCYTES NFR BLD AUTO: 0 %
LYMPHOCYTES # BLD AUTO: 1 X10E3/UL (ref 0.7–3.1)
LYMPHOCYTES NFR BLD AUTO: 20 %
MCH RBC QN AUTO: 31 PG (ref 26.6–33)
MCHC RBC AUTO-ENTMCNC: 31.7 G/DL (ref 31.5–35.7)
MCV RBC AUTO: 98 FL (ref 79–97)
MONOCYTES # BLD AUTO: 0.5 X10E3/UL (ref 0.1–0.9)
MONOCYTES NFR BLD AUTO: 10 %
NEUTROPHILS # BLD AUTO: 3.5 X10E3/UL (ref 1.4–7)
NEUTROPHILS NFR BLD AUTO: 69 %
PLATELET # BLD AUTO: 176 X10E3/UL (ref 150–450)
POTASSIUM SERPL-SCNC: 4.3 MMOL/L (ref 3.5–5.2)
PROT SERPL-MCNC: 5.6 G/DL (ref 6–8.5)
RBC # BLD AUTO: 3.71 X10E6/UL (ref 3.77–5.28)
SODIUM SERPL-SCNC: 145 MMOL/L (ref 134–144)
TSH SERPL DL<=0.005 MIU/L-ACNC: 1.36 UIU/ML (ref 0.45–4.5)
WBC # BLD AUTO: 5.1 X10E3/UL (ref 3.4–10.8)

## 2019-05-22 RX ORDER — ONDANSETRON 8 MG/1
8 TABLET, ORALLY DISINTEGRATING ORAL
Qty: 270 TAB | Refills: 3 | Status: SHIPPED | OUTPATIENT
Start: 2019-05-22 | End: 2019-06-05 | Stop reason: SDUPTHER

## 2019-05-27 VITALS
RESPIRATION RATE: 17 BRPM | SYSTOLIC BLOOD PRESSURE: 122 MMHG | DIASTOLIC BLOOD PRESSURE: 68 MMHG | TEMPERATURE: 98 F | OXYGEN SATURATION: 97 % | HEART RATE: 74 BPM

## 2019-05-27 NOTE — ASSESSMENT & PLAN NOTE
Key CAD CHF Meds             metoprolol tartrate (LOPRESSOR) 50 mg tablet (Taking) Take 75 mg by mouth two (2) times a day. aspirin 81 mg chewable tablet (Taking) Take 1 Tab by mouth daily. nitroglycerin (NITROSTAT) 0.4 mg SL tablet 1 Tab by SubLINGual route every five (5) minutes as needed for Chest Pain.         Lab Results   Component Value Date/Time    Sodium 145 05/21/2019 01:17 PM    Sodium (POC) 136 10/12/2018 12:52 PM    Potassium 4.3 05/21/2019 01:17 PM    Potassium (POC) 5.0 10/12/2018 12:52 PM    Cholesterol, total 171 12/19/2018 02:59 AM    HDL Cholesterol 78 12/19/2018 02:59 AM    LDL, calculated 71 12/19/2018 02:59 AM    Triglyceride 111 12/19/2018 02:59 AM    INR 1.1 10/13/2018 08:44 AM    Prothrombin time 11.3 10/13/2018 08:44 AM

## 2019-05-27 NOTE — ASSESSMENT & PLAN NOTE
Lab Results   Component Value Date/Time    WBC 5.1 05/21/2019 01:17 PM    HGB 11.5 05/21/2019 01:17 PM    HCT 36.3 05/21/2019 01:17 PM    Hematocrit (POC) 37 10/12/2018 12:52 PM    PLATELET 626 63/73/6639 01:17 PM    Creatinine 0.86 05/21/2019 01:17 PM    Creatinine (POC) 1.2 10/12/2018 12:52 PM    BUN 17 05/21/2019 01:17 PM    BUN (POC) 24 10/12/2018 12:52 PM    Potassium 4.3 05/21/2019 01:17 PM    Potassium (POC) 5.0 10/12/2018 12:52 PM    INR 1.1 10/13/2018 08:44 AM    Prothrombin time 11.3 10/13/2018 08:44 AM

## 2019-05-27 NOTE — PROGRESS NOTES
Home Based Primary Care [facility]) & Supportive Services    [id number]      NOTE: Home Based Primary Care is a PROVIDER (MD/NP) based interdisciplinary practice (RN, LCSW, Pharmacy, Dietician) for patient's who cannot access (or have a taxing effort) primary / speciality medical care in an office setting. [facility] is NOT [employer] but works with [address]. when there is a skilled need. Our MD/NPs are integral in Care Transitions; PLEASE CALL [phone] if this patient arrives in the Emergency Department or Hospital.          Date of Current Visit: 05/21/19    Patient/Family present for Current Visit: Patient    Goals of Care as stated by the patient/family is: to live at home and be as healthy and independent as possible      Is this encounter billed on time:No    Total time: 45 min  Counseling / coordination time: 10 minutes reviewing visit with son by phone  > 50% counseling / coordination?:No     ASSESSMENT & PLAN       Diagnoses and all orders for this visit:    1. CHF, stage C ([address]  -     METABOLIC PANEL, COMPREHENSIVE  -     CBC WITH AUTOMATED DIFF   -well compensated   -Continue metoprolol,   2. CKD (chronic kidney disease) stage 2, GFR 60-89 ml/min  -     METABOLIC PANEL, COMPREHENSIVE  -     CBC WITH AUTOMATED DIFF   Avoid nephrotoxic drugs  3. Coronary artery disease involving native coronary artery of native heart without angina pectoris  -     METABOLIC PANEL, COMPREHENSIVE  -     CBC WITH AUTOMATED DIFF   -No recent chest pain   -has NTG prn if needed  4. Other hyperlipidemia  -     TSH 3RD GENERATION   -Lipid panel good and patient did not want statin  5. Paroxysmal atrial fibrillation (HCC)   -in regular rhythm at visits for months   -Was not a candidate for [facility] in past  6.  Spinal stenosis of lumbar region, unspecified whether neurogenic claudication present   -Adequate pain control on Fentanyl and tramadol       Follow-up and Dispositions    · Return in about 1 month (around 6/21/2019). I have left a SUMMARY / INSTRUCTIONS from today's visit with the patient/family in the home      HEALTH MAINTENANCE   There are no preventive care reminders to display for this patient. CC & SUBJECTIVE including ROS & FUNCTION     Chief Complaint   Patient presents with    Follow Up Chronic Condition     CHF, CKD, CAD, chronic pain        Luca Herron is a 80 y.o. with chronic medical conditions as listed in the patient's updated Problem List (see below)    Their Subjective Information provided as today's visit includes: Ms. Disha Guillory reports her pain is usually a 4 out of 10 most days in her legs and back. She continues on a fentanyl patch and tramadol as needed. She is using the wheelchair in the house but also can ambulate with her walker to the bathroom. She continues on chronic oxygen. She is taking her medications as prescribed and her son is managing these for her. She continues to live in his home. She has not had any recent wounds. She is eating well and drinking well. She reports she sleeps well at night. She has not had chest pain in several months. Positive ROS findings: Chronic dyspnea on exertion, chronic back and leg pain, inability to walk very far    The following systems were [x] reviewed / [] unable to be reviewed  Systems: constitutional, ears/nose/mouth/throat, respiratory, gastrointestinal, genitourinary, musculoskeletal, integumentary, neurologic, psychiatric, endocrine. PPS: 40  Karnofsky:   Timed Up and Go (TUG):   Fall Risk Assessment, last 12 mths 11/15/2017   Able to walk? Yes   Fall in past 12 months?  No       ADVANCE CARE PLANNING                                 The following information was updated I/ reviewed as accurate in Orders and the Advance Care Planning Navigator:  @EIK@     [facility] Agent: [id number] [facility] [phone]    [address]: Zelda Landrynataliia [phone]  Advance Care Planning 10/12/2018   Patient's Healthcare Decision Maker is: Named in scanned ACP document   Primary Decision Maker Name Yuni Alvarez   Primary Decision Maker Phone Number [phone]   Primary Decision Maker Relationship to Patient Adult child   Secondary Decision Maker Name -   Secondary Decision Maker Phone Number -   Secondary Decision Maker Relationship to Patient -   Confirm Advance Directive Yes, on file   Does the patient have other document types Do Not Resuscitate        VITAL SIGNS & PHYSICAL EXAM     Median Arm Circumference (inches): Wt Readings from Last 3 Encounters:   12/03/18 121 lb 11.1 oz (55.2 kg)   11/06/18 128 lb (58.1 kg)   10/14/18 128 lb 1.4 oz (58.1 kg)     Temp Readings from Last 3 Encounters:   05/27/19 98 °F (36.7 °C) (Oral)   04/23/19 98.2 °F (36.8 °C) (Oral)   02/27/19 97.4 °F (36.3 °C) (Oral)     BP Readings from Last 3 Encounters:   05/27/19 122/68   04/23/19 128/72   02/27/19 116/72     Pulse Readings from Last 3 Encounters:   05/27/19 74   04/23/19 72   02/27/19 67       (Constitutional) Patient Physical Status: Patient is an elderly frail  female seated in her wheelchair in no acute distress wearing oxygen by nasal cannula    Pacemaker:  ICD:  Feeding Tube:  Urine Catheter:  Other: O2 at 2 L    Eyes: pupils equal, anicteric, conjunctiva pink  ENMT: no nasal discharge, moist mucous membranes, pharynx clear  Neck: No JVD thyromegaly or LAD  Cardiovascular: regular rhythm, no M/R/G, distal pulses intact, 2+ lower extremity edema  Respiratory: breathing not labored, symmetric, CTA bilat.   Gastrointestinal: + bowel sounds, soft, non-tender, non-distended  Musculoskeletal: no deformity, no tenderness to palpation, weakness of all extremities  Skin: warm, dry, large crusted lesion on her left chin that is tannish in appearance and consistent with an AK  Neurologic: A/Ox3, following commands, moving all extremities equally  Psychiatric: normal affect, no hallucinations  Other:     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 ([facility] I25.111    CHF, stage C ([facility]) I50.9    Lumbar spinal stenosis M48.061    Paroxysmal atrial fibrillation ([facility]) I48.0    Advance directive on file Z78.9    PMR (polymyalgia rheumatica) ([facility]) M35.3    Lower extremity edema R60.0    CKD (chronic kidney disease) stage 2, GFR 60-89 ml/min N18.2    Long term current use of systemic steroids Z79.52    Seronegative rheumatoid arthritis of multiple sites ([facility] M06.09    Long-term use of immunosuppressant medication Z79.899    Anemia associated with acute blood loss D62    Dependence on continuous supplemental oxygen Z99.81    Polyneuropathy associated with underlying disease ([facility] G63    Vascular dementia without behavioral disturbance F01.50    Acute respiratory distress R06.03    Chronic pain syndrome G89.4    Dementia without behavioral disturbance F03.90    Goals of care, counseling/discussion Z71.89    Acute metabolic encephalopathy J53.82      Family History   Problem Relation Age of Onset    Other Mother         Intestinal obstruction    Cancer Father     Stroke Father     Heart Attack Brother     Cancer Brother     Cancer Brother     Anesth Problems Neg Hx      Social History     Socioeconomic History    Marital status: SINGLE     Spouse name: Not on file    Number of children: Not on file    Years of education: Not on file    Highest education level: Not on file   Tobacco Use    Smoking status: Never Smoker    Smokeless tobacco: Never Used   Substance and Sexual Activity    Alcohol use: No     Alcohol/week: 0.0 oz    Drug use: No    Sexual activity: Never        MEDICATIONS & PRESCRIPTIONS     Allergies   Allergen Reactions    Nsaids (Non-Steroidal Anti-Inflammatory Drug) Nausea and Vomiting    Adhesive Tape-Silicones Other (comments)     BLISTERS    Cymbalta [Duloxetine] Other (comments)     CONFUSION      Digoxin Nausea and Vomiting    Gluten Other (comments)     GLUTEN INTOLERANCE    Macrobid [Nitrofurantoin Monohyd/M-Cryst] Nausea and Vomiting    Novocain [Procaine] Other (comments)     Paralysis     Sulfa (Sulfonamide Antibiotics) Nausea and Vomiting    Atenolol Nausea and Vomiting    Codeine Other (comments)     Unable to swallow    Phenergan [Promethazine] Other (comments)     \"loose my wits i go crazy\"      Current Outpatient Medications   Medication Sig    ondansetron (ZOFRAN ODT) 8 mg disintegrating tablet Take 1 Tab by mouth Before breakfast, lunch, and dinner.  gabapentin (NEURONTIN) 300 mg capsule Take 2 Caps by mouth three (3) times daily.  metoprolol tartrate (LOPRESSOR) 50 mg tablet Take 75 mg by mouth two (2) times a day.  mirtazapine (REMERON) 15 mg tablet Take 1 Tab by mouth nightly.  predniSONE (DELTASONE) 10 mg tablet Take 10 mg by mouth daily.  aspirin 81 mg chewable tablet Take 1 Tab by mouth daily.  fentaNYL (DURAGESIC) 25 mcg/hr PATCH 1 Patch by TransDERmal route every seventy-two (72) hours.  polyethylene glycol (MIRALAX) 17 gram/dose powder Take 17 g by mouth daily as needed.  topiramate (TOPAMAX) 25 mg tablet Take 1 Tab by mouth daily (with breakfast).  senna-docusate (SENNA PLUS) 8.6-50 mg per tablet Take 1 Tab by mouth two (2) times a day.  esomeprazole (NEXIUM) 40 mg capsule Take 40 mg by mouth Daily (before breakfast).  traMADol (ULTRAM) 50 mg tablet Take 50 mg by mouth two (2) times daily as needed for Pain.  nitroglycerin (NITROSTAT) 0.4 mg SL tablet 1 Tab by SubLINGual route every five (5) minutes as needed for Chest Pain. No current facility-administered medications for this visit. No orders of the defined types were placed in this encounter.         TEST DATA REVIEWED:     Lab Results   Component Value Date/Time    Hemoglobin A1c 5.6 09/20/2018 11:59 AM     No results found for: MCACR, MCA1, MCA2, MCA3, MCAU, MCAU2, MCALPOCT  Lab Results   Component Value Date/Time    TSH 1.360 05/21/2019 01:17 PM     Lab Results   Component Value Date/Time    VITAMIN D, 25-HYDROXY 43.1 10/04/2017 02:52 PM         Lab Results   Component Value Date/Time    WBC 5.1 05/21/2019 01:17 PM    HGB 11.5 05/21/2019 01:17 PM    PLATELET 761 13/59/9971 01:17 PM     Lab Results   Component Value Date/Time    Sodium 145 (H) 05/21/2019 01:17 PM    Potassium 4.3 05/21/2019 01:17 PM    Chloride 105 05/21/2019 01:17 PM    CO2 22 05/21/2019 01:17 PM    BUN 17 05/21/2019 01:17 PM    Creatinine 0.86 05/21/2019 01:17 PM    Calcium 9.5 05/21/2019 01:17 PM    Magnesium 1.7 12/03/2018 03:27 AM    Phosphorus 3.5 12/03/2018 03:27 AM      Lab Results   Component Value Date/Time    AST (SGOT) 16 05/21/2019 01:17 PM    Alk.  phosphatase 73 05/21/2019 01:17 PM    Protein, total 5.6 (L) 05/21/2019 01:17 PM    Albumin 3.8 05/21/2019 01:17 PM    Globulin 2.9 12/03/2018 03:27 AM     No results found for: IRON, FE, TIBC, IBCT, PSAT, FERR

## 2019-05-27 NOTE — ASSESSMENT & PLAN NOTE
Lab Results   Component Value Date/Time    WBC 5.1 05/21/2019 01:17 PM    HGB 11.5 05/21/2019 01:17 PM    HCT 36.3 05/21/2019 01:17 PM    Hematocrit (POC) 37 10/12/2018 12:52 PM    PLATELET 071 16/65/5169 01:17 PM    Creatinine 0.86 05/21/2019 01:17 PM    Creatinine (POC) 1.2 10/12/2018 12:52 PM    BUN 17 05/21/2019 01:17 PM    BUN (POC) 24 10/12/2018 12:52 PM    Potassium 4.3 05/21/2019 01:17 PM    Potassium (POC) 5.0 10/12/2018 12:52 PM    INR 1.1 10/13/2018 08:44 AM    Prothrombin time 11.3 10/13/2018 08:44 AM

## 2019-05-27 NOTE — PATIENT INSTRUCTIONS
Learning About Coronary Artery Disease (CAD) What is coronary artery disease? Coronary artery disease (CAD) occurs when plaque builds up in the arteries that bring oxygen-rich blood to your heart. Plaque is a fatty substance made of cholesterol, calcium, and other substances in the blood. This process is called hardening of the arteries, or atherosclerosis. What happens when you have coronary artery disease? · Plaque may narrow the coronary arteries. Narrowed arteries cause poor blood flow. This can lead to angina symptoms such as chest pain or discomfort. If blood flow is completely blocked, you could have a heart attack. · You can slow CAD and reduce the risk of future problems by making changes in your lifestyle. These include quitting smoking and eating heart-healthy foods. · Treatments for CAD, along with changes in your lifestyle, can help you live a longer and healthier life. How can you prevent coronary artery disease? · Do not smoke. It may be the best thing you can do to prevent heart disease. If you need help quitting, talk to your doctor about stop-smoking programs and medicines. These can increase your chances of quitting for good. · Be active. Get at least 30 minutes of exercise on most days of the week. Walking is a good choice. You also may want to do other activities, such as running, swimming, cycling, or playing tennis or team sports. · Eat heart-healthy foods. Eat more fruits and vegetables and less foods that contain saturated and trans fats. Limit alcohol, sodium, and sweets. · Stay at a healthy weight. Lose weight if you need to. · Manage other health problems such as diabetes, high blood pressure, and high cholesterol. · Manage stress. Stress can hurt your heart. To keep stress low, talk about your problems and feelings. Don't keep your feelings hidden.  
· If you have talked about it with your doctor, take a low-dose aspirin every day. Aspirin can help certain people lower their risk of a heart attack or stroke. But taking aspirin isn't right for everyone, because it can cause serious bleeding. Do not start taking daily aspirin unless your doctor knows about it. How is coronary artery disease treated? · Your doctor will suggest that you make lifestyle changes. For example, your doctor may ask you to eat healthy foods, quit smoking, lose extra weight, and be more active. · You will have to take medicines. · Your doctor may suggest a procedure to open narrowed or blocked arteries. This is called angioplasty. Or your doctor may suggest using healthy blood vessels to create detours around narrowed or blocked arteries. This is called bypass surgery. Follow-up care is a key part of your treatment and safety. Be sure to make and go to all appointments, and call your doctor if you are having problems. It's also a good idea to know your test results and keep a list of the medicines you take. Where can you learn more? Go to http://anna-sampson.info/. Enter (28) [phone] in the search box to learn more about \"Learning About Coronary Artery Disease (CAD). \" Current as of: July 22, 2018 Content Version: 11.9 © 6021-9585 [employer]. Care instructions adapted under license by [employer] (which disclaims liability or warranty for this information). If you have questions about a medical condition or this instruction, always ask your healthcare professional. [place] [address] any warranty or liability for your use of this information.

## 2019-05-27 NOTE — ASSESSMENT & PLAN NOTE
Lab Results   Component Value Date/Time    WBC 5.1 05/21/2019 01:17 PM    HGB 11.5 05/21/2019 01:17 PM    HCT 36.3 05/21/2019 01:17 PM    Hematocrit (POC) 37 10/12/2018 12:52 PM    PLATELET 000 58/16/9979 01:17 PM    Creatinine 0.86 05/21/2019 01:17 PM    Creatinine (POC) 1.2 10/12/2018 12:52 PM    BUN 17 05/21/2019 01:17 PM    BUN (POC) 24 10/12/2018 12:52 PM    Potassium 4.3 05/21/2019 01:17 PM    Potassium (POC) 5.0 10/12/2018 12:52 PM    INR 1.1 10/13/2018 08:44 AM    Prothrombin time 11.3 10/13/2018 08:44 AM

## 2019-06-05 ENCOUNTER — TELEPHONE (OUTPATIENT)
Dept: PALLATIVE CARE | Age: 84
End: 2019-06-05

## 2019-06-05 DIAGNOSIS — R11.2 NON-INTRACTABLE VOMITING WITH NAUSEA, UNSPECIFIED VOMITING TYPE: ICD-10-CM

## 2019-06-05 RX ORDER — ONDANSETRON HYDROCHLORIDE 8 MG/1
8 TABLET, FILM COATED ORAL
Qty: 270 TAB | Refills: 3 | OUTPATIENT
Start: 2019-06-05 | End: 2019-06-26 | Stop reason: SDUPTHER

## 2019-06-05 RX ORDER — ONDANSETRON 8 MG/1
8 TABLET, ORALLY DISINTEGRATING ORAL
Qty: 270 TAB | Refills: 3 | Status: SHIPPED | OUTPATIENT
Start: 2019-06-05

## 2019-06-05 NOTE — TELEPHONE ENCOUNTER
[facility] - advised per Aminta Case NP ok to change from zofran ODT to regular.

## 2019-06-05 NOTE — TELEPHONE ENCOUNTER
Raquel Aguayo w/ [facility] is calling to speak to nurse regarding script for Zofran. Needs clarification. Call transferred to nurse.

## 2019-06-17 ENCOUNTER — DOCUMENTATION ONLY (OUTPATIENT)
Dept: FAMILY MEDICINE CLINIC | Age: 84
End: 2019-06-17

## 2019-06-17 NOTE — PROGRESS NOTES
[facility] [id number] [facility] Team Members: Dr. Jewell Obrien, Lauren Noonan, NP; Venice Liz NP; Tanya Carias, RN; Magdaleno Kerr, JUAN PABLO, Beverly Mason RN, CUCA Watts  7/27/1925 / [id number]  female    Date of Initial Visit: 5/4/18  Date of last visit: 5/21/19    Diagnosis:Enteritis, diverticulosis of sigmoid colon,  a-fib, rheumatoid arthritis, polymyalgia rheumatica, CAD, debility    Patient status summary:   Patient and POC discussed in IDT meeting for 90 day update. The patient has made remarkable progress, she continues to use her wheel chair in the home, but ambulates with walker to the bathroom. Her cognition has improved to baseline. Her son continues to assist with medication administration.      Advance Care Planning:   Primary Decision Maker ([address]): Vernon Saldivar  Relationship to patient:Son  Phone number: [phone]  [x] Named in a scanned document   [] Legal Next of Kin  [] Guardian    Secondary Decision Maker ([address]):   Relationship to patient:  Phone number:  [] Named in a scanned document   [] Legal Next of Kin  [] Guardian    ACP documents you current have include:  [x] Advance Directive or Living Will  [x] Durable Do Not Resuscitate  [] Physician Orders for Scope of Treatment (POST)  [] Medical Power of   [] Other    DME/Supplies:  Wheelchair, Hospital Bed, Bedside Commode and Oxygen       Allergies   Allergen Reactions    Nsaids (Non-Steroidal Anti-Inflammatory Drug) Nausea and Vomiting    Adhesive Tape-Silicones Other (comments)     BLISTERS    Cymbalta [Duloxetine] Other (comments)     CONFUSION      Digoxin Nausea and Vomiting    Gluten Other (comments)     GLUTEN INTOLERANCE    Macrobid [Nitrofurantoin Monohyd/M-Cryst] Nausea and Vomiting    Novocain [Procaine] Other (comments)     Paralysis     Sulfa (Sulfonamide Antibiotics) Nausea and Vomiting  Atenolol Nausea and Vomiting    Codeine Other (comments)     Unable to swallow    Phenergan [Promethazine] Other (comments)     \"loose my wits i go crazy\"       Nutritional Requirements:   Oral with supported meal preparation    Functional/Activity Level:  Wheelchair with assistance for transfers    Code Status: DNR    Safety Measures:   Fall risk    Current Outpatient Medications   Medication Sig    ondansetron (ZOFRAN ODT) 8 mg disintegrating tablet Take 1 Tab by mouth Before breakfast, lunch, and dinner.  ondansetron hcl (ZOFRAN) 8 mg tablet Take 1 Tab by mouth every eight (8) hours as needed for Nausea.  gabapentin (NEURONTIN) 300 mg capsule Take 2 Caps by mouth three (3) times daily.  metoprolol tartrate (LOPRESSOR) 50 mg tablet Take 75 mg by mouth two (2) times a day.  mirtazapine (REMERON) 15 mg tablet Take 1 Tab by mouth nightly.  predniSONE (DELTASONE) 10 mg tablet Take 10 mg by mouth daily.  aspirin 81 mg chewable tablet Take 1 Tab by mouth daily.  fentaNYL (DURAGESIC) 25 mcg/hr PATCH 1 Patch by TransDERmal route every seventy-two (72) hours.  traMADol (ULTRAM) 50 mg tablet Take 50 mg by mouth two (2) times daily as needed for Pain.  polyethylene glycol (MIRALAX) 17 gram/dose powder Take 17 g by mouth daily as needed.  topiramate (TOPAMAX) 25 mg tablet Take 1 Tab by mouth daily (with breakfast).  senna-docusate (SENNA PLUS) 8.6-50 mg per tablet Take 1 Tab by mouth two (2) times a day.  nitroglycerin (NITROSTAT) 0.4 mg SL tablet 1 Tab by SubLINGual route every five (5) minutes as needed for Chest Pain.  esomeprazole (NEXIUM) 40 mg capsule Take 40 mg by mouth Daily (before breakfast). No current facility-administered medications for this visit. The Plan of Care has been initiated for during discussion at interdisciplinary group meeting  and reviewed and updated by the Interdisciplinary Group (IDG) as frequently as the patient condition warrants. Plan of Care by Discipline:    1. Provider  Ongoing evaluation of treatment interventions for specific disease state and Determine need for laboratory assessment based on patient disease status     2. Nursing  Education for safety; falls and Skin assessment in patient's with limited mobility    3. Social Work  Review Emergency Preparedness Plan    4. Other    Plan of Care Orders / Action Items:    1. Monitor CHF at every visit  2.   Continue reduction of polypharmacy    Follow up visits: Monthly with NP

## 2019-06-26 ENCOUNTER — HOME VISIT (OUTPATIENT)
Dept: FAMILY MEDICINE CLINIC | Age: 84
End: 2019-06-26

## 2019-06-26 VITALS
HEART RATE: 78 BPM | OXYGEN SATURATION: 98 % | DIASTOLIC BLOOD PRESSURE: 70 MMHG | SYSTOLIC BLOOD PRESSURE: 140 MMHG | RESPIRATION RATE: 18 BRPM | TEMPERATURE: 97.3 F

## 2019-06-26 DIAGNOSIS — G89.4 CHRONIC PAIN SYNDROME: ICD-10-CM

## 2019-06-26 DIAGNOSIS — I10 ESSENTIAL HYPERTENSION: ICD-10-CM

## 2019-06-26 DIAGNOSIS — I48.0 PAROXYSMAL ATRIAL FIBRILLATION (HCC): ICD-10-CM

## 2019-06-26 DIAGNOSIS — N18.2 CKD (CHRONIC KIDNEY DISEASE) STAGE 2, GFR 60-89 ML/MIN: ICD-10-CM

## 2019-06-26 DIAGNOSIS — I50.9 CHF, STAGE C (HCC): Primary | ICD-10-CM

## 2019-06-26 DIAGNOSIS — I25.10 CORONARY ARTERY DISEASE INVOLVING NATIVE CORONARY ARTERY OF NATIVE HEART WITHOUT ANGINA PECTORIS: ICD-10-CM

## 2019-06-26 RX ORDER — ATORVASTATIN CALCIUM 40 MG/1
40 TABLET, FILM COATED ORAL DAILY
COMMUNITY

## 2019-06-26 NOTE — PROGRESS NOTES
Home Based Primary Care [facility]) & Supportive Services    [phone]      NOTE: Home Based Primary Care is a PROVIDER (MD/NP) based interdisciplinary practice (RN, LCSW, Pharmacy, Dietician) for patient's who cannot access (or have a taxing effort) primary / speciality medical care in an office setting. [facility] is NOT [employer] but works with [address] when there is a skilled need. Our MD/NPs are integral in Care Transitions; PLEASE CALL [phone] if this patient arrives in the Emergency Department or Hospital.          Date of Current Visit: 06/26/19    Patient/Family present for Current Visit: Patient and son    Goals of Care as stated by the patient/family is: To be as healthy and happy as possible    Preferences for hospitalization if that were to be necessary:  [] Patient DOES NOT WANT hospitalization; focus all treatments at home  [x] Patient WOULD WANT hospitalization for potentially reversible causes  Patient prefers hospitalization at:   [facility]    Is this encounter billed on time:No    Total time: 45 min  Counseling / coordination time: 15 minutes reviewing opioid regulations and changes to our visit  > 50% counseling / coordination?: no    ASSESSMENT & PLAN       Diagnoses and all orders for this visit:    1. CHF, stage C ([address]   -This has been well compensated without any exacerbations   Continue metoprolol  2. CKD (chronic kidney disease) stage 2, GFR 60-89 ml/min   -stable   -avoid nephrotoxic drugs  3. Coronary artery disease involving native coronary artery of native heart without angina pectoris   -Patient had to use NTG X2 in the past week   -Family will call if frequency is increasing   -Continue aspirin  4. Paroxysmal atrial fibrillation (HCC)   -In a regular rhythm at today's visit   -Continue metoprolol  5.  Chronic pain syndrome   -Continue Topamax for chronic headaches which have been stable   -Refilled fentanyl patches for a month   -Patient still has some tramadol but rarely uses  6. Essential hypertension   -Blood pressure is more elevated than patient's baseline today   -We will reassess next visit before deciding on any medication change   -She denies increased edema, sodium in her diet, or missing medications       Follow-up and Dispositions    · Return in about 1 month (around 7/26/2019). I have left a SUMMARY / Geralin Lied from today's visit with the patient/family in the home    Maritza Swann 97 Maintenance Due   Topic Date Due    MEDICARE YEARLY EXAM  07/19/2019        CC & SUBJECTIVE including ROS & FUNCTION     Chief Complaint   Patient presents with    Follow Up Chronic Condition     CHF, CAD, HTN, chronic pain        Zaid Lockett is a 80 y.o. with chronic medical conditions as listed in the patient's updated Problem List (see below)    Their Subjective Information provided as today's visit includes: Patient's son reports this is the longest period that he patient has stayed out of the hospital in years. She is eating well and states she is gained some weight in her abdomen that she is unhappy about. She sleeps well. She had angina times to the previous week and has not had a reoccurrence. She took one nitroglycerin that eliminated the pain. Continues to use her Rollator to go to the bathroom in the sheikh wheelchair on the bottom floor of the house. Having soft bowel movements at least daily. Reports she is taking all of her medication. Pain is usually controlled around the level 4 5 with the fentanyl patch. She rarely has a tramadol any longer. Review of Systems   Constitutional: Negative for chills, diaphoresis, fever, malaise/fatigue and weight loss. HENT: Negative for congestion and sinus pain. Eyes: Negative for blurred vision and double vision. Respiratory: Negative for cough, hemoptysis, sputum production, shortness of breath and wheezing. Cardiovascular: Positive for chest pain.  Negative for palpitations, orthopnea, claudication, leg swelling and PND. Gastrointestinal: Positive for nausea. Negative for abdominal pain, blood in stool, constipation, diarrhea, heartburn and vomiting. Genitourinary: Negative for dysuria, frequency and urgency. Musculoskeletal: Positive for back pain. Negative for falls, joint pain, myalgias and neck pain. Skin: Negative for itching and rash. Neurological: Negative for dizziness, tingling, tremors, sensory change, speech change, focal weakness, weakness and headaches. Endo/Heme/Allergies: Does not bruise/bleed easily. Psychiatric/Behavioral: Positive for memory loss. Negative for depression, hallucinations, substance abuse and suicidal ideas. The patient is not nervous/anxious and does not have insomnia. All other systems reviewed and are negative. The following systems were [x] reviewed / [] unable to be reviewed  Systems: constitutional, ears/nose/mouth/throat, respiratory, gastrointestinal, genitourinary, musculoskeletal, integumentary, neurologic, psychiatric, endocrine. PPS:50  Karnofsky:   Timed Up and Go (TUG):   Fall Risk Assessment, last 12 mths 11/15/2017   Able to walk? Yes   Fall in past 12 months?  No       Current Durable Medical Equipment in Home:  [x] Hospital Bed  [] Bedside Commode  [x] Wheelchair  [] Ceola Flores  [x] Walker  [] Respiratory Support:oxygen    ADVANCE CARE PLANNING                                 The following information was updated I/ reviewed as accurate in Orders and the Advance Care Planning Navigator:  [unfilled]     Health [facility] Agent: Jaymie Gómez [phone]    [address]: Claire Dotson [phone]  Advance Care Planning 10/12/2018   Patient's Healthcare Decision Maker is: Named in scanned ACP document   Primary Decision Maker Name Ana Laura    Primary Decision Maker Phone Number [phone]   Primary Decision Maker Relationship to Patient Adult child   Secondary Decision Maker Name - Secondary Decision Maker Phone Number -   Secondary Decision Maker Relationship to Patient -   Confirm Advance Directive Yes, on file   Does the patient have other document types Do Not Resuscitate        VITAL SIGNS & PHYSICAL EXAM     Median Arm Circumference (inches): Wt Readings from Last 3 Encounters:   12/03/18 121 lb 11.1 oz (55.2 kg)   11/06/18 128 lb (58.1 kg)   10/14/18 128 lb 1.4 oz (58.1 kg)     Temp Readings from Last 3 Encounters:   06/26/19 97.3 °F (36.3 °C) (Oral)   05/27/19 98 °F (36.7 °C) (Oral)   04/23/19 98.2 °F (36.8 °C) (Oral)     BP Readings from Last 3 Encounters:   06/26/19 140/70   05/27/19 122/68   04/23/19 128/72     Pulse Readings from Last 3 Encounters:   06/26/19 78   05/27/19 74   04/23/19 72       (Constitutional) Patient Physical Status: patient is a frail elderly female seated in her wheelchair in NAD    Pacemaker:  ICD:  Feeding Tube:  Urine Catheter:  Other:  Physical Exam   Constitutional: She is oriented to person, place, and time. She appears distressed. frai   HENT:   Head: Normocephalic and atraumatic. Mouth/Throat: Oropharynx is clear and moist.   Eyes: Conjunctivae are normal. Right eye exhibits discharge. Left eye exhibits no discharge. No scleral icterus. Neck: Normal range of motion. Neck supple. No JVD present. No tracheal deviation present. No thyromegaly present. Cardiovascular: Normal rate, regular rhythm, normal heart sounds and intact distal pulses. Exam reveals no gallop and no friction rub. No murmur heard. Pulmonary/Chest: Effort normal and breath sounds normal. No respiratory distress. She has no wheezes. She has no rales. Abdominal: Soft. Bowel sounds are normal. She exhibits no distension and no mass. There is no tenderness. There is no rebound and no guarding. Musculoskeletal: Normal range of motion. She exhibits edema. She exhibits no deformity. 1+ pitting edema LE's   Lymphadenopathy:     She has no cervical adenopathy. Neurological: She is alert and oriented to person, place, and time. Skin: Skin is warm and dry. No rash noted. She is not diaphoretic. No erythema. No pallor. Psychiatric: Mood, memory, affect and judgment normal.   Vitals reviewed.        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 ([facility] I25.111    CHF, stage C ([facility] I50.9    Lumbar spinal stenosis M48.061    Paroxysmal atrial fibrillation ([facility]) I48.0    Advance directive on file Z78.9    PMR (polymyalgia rheumatica) ([facility]) M35.3    Lower extremity edema R60.0    CKD (chronic kidney disease) stage 2, GFR 60-89 ml/min N18.2    Long term current use of systemic steroids Z79.52    Seronegative rheumatoid arthritis of multiple sites ([facility] M06.09    Long-term use of immunosuppressant medication Z79.899    Anemia associated with acute blood loss D62    Dependence on continuous supplemental oxygen Z99.81    Polyneuropathy associated with underlying disease ([facility] G63    Vascular dementia without behavioral disturbance F01.50    Acute respiratory distress R06.03    Chronic pain syndrome G89.4    Dementia without behavioral disturbance F03.90    Goals of care, counseling/discussion Z71.89    Acute metabolic encephalopathy F00.53      Family History   Problem Relation Age of Onset    Other Mother         Intestinal obstruction    Cancer Father     Stroke Father     Heart Attack Brother     Cancer Brother     Cancer Brother     Anesth Problems Neg Hx      Social History     Socioeconomic History    Marital status: SINGLE     Spouse name: Not on file    Number of children: Not on file    Years of education: Not on file    Highest education level: Not on file   Tobacco Use    Smoking status: Never Smoker    Smokeless tobacco: Never Used   Substance and Sexual Activity    Alcohol use: No     Alcohol/week: 0.0 oz    Drug use: No    Sexual activity: Never        MEDICATIONS & PRESCRIPTIONS     Allergies   Allergen Reactions    Nsaids (Non-Steroidal Anti-Inflammatory Drug) Nausea and Vomiting    Adhesive Tape-Silicones Other (comments)     BLISTERS    Cymbalta [Duloxetine] Other (comments)     CONFUSION      Digoxin Nausea and Vomiting    Gluten Other (comments)     GLUTEN INTOLERANCE    Macrobid [Nitrofurantoin Monohyd/M-Cryst] Nausea and Vomiting    Novocain [Procaine] Other (comments)     Paralysis     Sulfa (Sulfonamide Antibiotics) Nausea and Vomiting    Atenolol Nausea and Vomiting    Codeine Other (comments)     Unable to swallow    Phenergan [Promethazine] Other (comments)     \"loose my wits i go crazy\"      Current Outpatient Medications   Medication Sig    atorvastatin (LIPITOR) 40 mg tablet Take  by mouth daily.  ondansetron (ZOFRAN ODT) 8 mg disintegrating tablet Take 1 Tab by mouth Before breakfast, lunch, and dinner.  gabapentin (NEURONTIN) 300 mg capsule Take 2 Caps by mouth three (3) times daily. (Patient taking differently: Take 600 mg by mouth two (2) times a day.)    metoprolol tartrate (LOPRESSOR) 50 mg tablet Take 75 mg by mouth two (2) times a day.  mirtazapine (REMERON) 15 mg tablet Take 1 Tab by mouth nightly.  predniSONE (DELTASONE) 10 mg tablet Take 10 mg by mouth daily.  aspirin 81 mg chewable tablet Take 1 Tab by mouth daily.  fentaNYL (DURAGESIC) 25 mcg/hr PATCH 1 Patch by TransDERmal route every seventy-two (72) hours.  topiramate (TOPAMAX) 25 mg tablet Take 1 Tab by mouth daily (with breakfast).  senna-docusate (SENNA PLUS) 8.6-50 mg per tablet Take 1 Tab by mouth two (2) times a day.  esomeprazole (NEXIUM) 40 mg capsule Take 40 mg by mouth Daily (before breakfast).  traMADol (ULTRAM) 50 mg tablet Take 50 mg by mouth two (2) times daily as needed for Pain.     polyethylene glycol (MIRALAX) 17 gram/dose powder Take 17 g by mouth daily as needed.  nitroglycerin (NITROSTAT) 0.4 mg SL tablet 1 Tab by SubLINGual route every five (5) minutes as needed for Chest Pain. No current facility-administered medications for this visit. No orders of the defined types were placed in this encounter. TEST DATA REVIEWED:     Lab Results   Component Value Date/Time    Hemoglobin A1c 5.6 09/20/2018 11:59 AM     No results found for: Gloria Lather, MCA2, MCA3, MCAU, MCAU2, MCALPOCT  Lab Results   Component Value Date/Time    TSH 1.360 05/21/2019 01:17 PM     Lab Results   Component Value Date/Time    VITAMIN D, 25-HYDROXY 43.1 10/04/2017 02:52 PM         Lab Results   Component Value Date/Time    WBC 5.1 05/21/2019 01:17 PM    HGB 11.5 05/21/2019 01:17 PM    PLATELET 094 97/28/5509 01:17 PM     Lab Results   Component Value Date/Time    Sodium 145 (H) 05/21/2019 01:17 PM    Potassium 4.3 05/21/2019 01:17 PM    Chloride 105 05/21/2019 01:17 PM    CO2 22 05/21/2019 01:17 PM    BUN 17 05/21/2019 01:17 PM    Creatinine 0.86 05/21/2019 01:17 PM    Calcium 9.5 05/21/2019 01:17 PM    Magnesium 1.7 12/03/2018 03:27 AM    Phosphorus 3.5 12/03/2018 03:27 AM      Lab Results   Component Value Date/Time    AST (SGOT) 16 05/21/2019 01:17 PM    Alk.  phosphatase 73 05/21/2019 01:17 PM    Protein, total 5.6 (L) 05/21/2019 01:17 PM    Albumin 3.8 05/21/2019 01:17 PM    Globulin 2.9 12/03/2018 03:27 AM     No results found for: IRON, FE, TIBC, IBCT, PSAT, FERR

## 2019-06-26 NOTE — PATIENT INSTRUCTIONS
Angina: Care Instructions Your Care Instructions You have a problem called angina. Angina happens when there is not enough blood flow to your heart muscle. Angina is a sign of coronary artery disease (CAD). CAD occurs when blood vessels that supply the heart become narrowed. Having CAD increases your risk of a heart attack. Chest pain or pressure is the most common symptom of angina. But some people have other symptoms, like: 
· Pain, pressure, or a strange feeling in the back, neck, jaw, or upper belly, or in one or both shoulders or arms. · Shortness of breath. · Nausea or vomiting. · Lightheadedness or sudden weakness. · Fast or irregular heartbeat. Women are somewhat more likely than men to have angina symptoms like shortness of breath, nausea, and back or jaw pain. Angina can be dangerous. That's why it is important to pay attention to your symptoms. Know what is typical for you, learn how to control your symptoms, and understand when you need to get treatment. A change in your usual pattern of symptoms is an emergency. It may mean that you are having a heart attack. The doctor has checked you carefully, but problems can develop later. If you notice any problems or new symptoms, get medical treatment right away. Follow-up care is a key part of your treatment and safety. Be sure to make and go to all appointments, and call your doctor if you are having problems. It's also a good idea to know your test results and keep a list of the medicines you take. How can you care for yourself at home? Medicines 
  · If your doctor has given you nitroglycerin for angina symptoms, keep it with you at all times. If you have symptoms, sit down and rest, and take the first dose of nitroglycerin as directed. If your symptoms get worse or are not getting better within 5 minutes, call 911 right away. Stay on the phone. The emergency  will give you further instructions.   · If your doctor advises it, take 1 low-dose aspirin a day to prevent heart attack.  
  · Be safe with medicines. Take your medicines exactly as prescribed. Call your doctor if you think you are having a problem with your medicine. You will get more details on the specific medicines your doctor prescribes.  
 Lifestyle changes 
  · Do not smoke. If you need help quitting, talk to your doctor about stop-smoking programs and medicines. These can increase your chances of quitting for good.  
  · Eat a heart-healthy diet that is low in saturated fat and salt, and is high in fiber. Talk to your doctor or a dietitian about healthy eating.  
  · Stay at a healthy weight. Or lose weight if you need to. Activity 
  · Talk to your doctor about a level of activity that is safe for you.  
  · If an activity causes angina symptoms, stop and rest.  
When should you call for help? Call 911 anytime you think you may need emergency care. For example, call if: 
  · You passed out (lost consciousness).  
  · You have symptoms of a heart attack. These may include: 
? Chest pain or pressure, or a strange feeling in the chest. 
? Sweating. ? Shortness of breath. ? Nausea or vomiting. ? Pain, pressure, or a strange feeling in the back, neck, jaw, or upper belly or in one or both shoulders or arms. ? Lightheadedness or sudden weakness. ? A fast or irregular heartbeat. After you call 911, the  may tell you to chew 1 adult-strength or 2 to 4 low-dose aspirin. Wait for an ambulance. Do not try to drive yourself.  
  · You have angina symptoms that do not go away with rest or are not getting better within 5 minutes after you take a dose of nitroglycerin.  
 Call your doctor now or seek immediate medical care if: 
  · You are having angina symptoms more often than usual, or they are different or worse than usual.  
  · You feel dizzy or lightheaded, or you feel like you may faint.  Watch closely for changes in your health, and be sure to contact your doctor if you have any problems. Where can you learn more? Go to http://anna-sampson.info/. Enter H129 in the search box to learn more about \"Angina: Care Instructions. \" Current as of: July 22, 2018 Content Version: 11.9 © 8534-7492 [employer]. Care instructions adapted under license by [employer] (which disclaims liability or warranty for this information). If you have questions about a medical condition or this instruction, always ask your healthcare professional. [address] any warranty or liability for your use of this information.

## 2019-07-08 RX ORDER — TOPIRAMATE 25 MG/1
25 TABLET ORAL
Qty: 90 TAB | Refills: 3 | Status: ON HOLD | OUTPATIENT
Start: 2019-07-08 | End: 2019-08-26 | Stop reason: SDUPTHER

## 2019-07-15 ENCOUNTER — HOME VISIT (OUTPATIENT)
Dept: FAMILY MEDICINE CLINIC | Age: 84
End: 2019-07-15

## 2019-07-15 VITALS
HEART RATE: 78 BPM | DIASTOLIC BLOOD PRESSURE: 82 MMHG | TEMPERATURE: 98.1 F | RESPIRATION RATE: 16 BRPM | OXYGEN SATURATION: 97 % | SYSTOLIC BLOOD PRESSURE: 150 MMHG

## 2019-07-15 DIAGNOSIS — R11.0 NAUSEA: ICD-10-CM

## 2019-07-15 DIAGNOSIS — G89.4 CHRONIC PAIN SYNDROME: Primary | ICD-10-CM

## 2019-07-16 NOTE — PROGRESS NOTES
Home Based Primary Care [facility]) & Supportive Services    [phone]      NOTE: Home Based Primary Care is a PROVIDER (MD/NP) based interdisciplinary practice (RN, LCSW, Pharmacy, Dietician) for patient's who cannot access (or have a taxing effort) primary / speciality medical care in an office setting. [facility] is NOT [employer] but works with [employer] when there is a skilled need. Our MD/NPs are integral in Care Transitions; PLEASE CALL [phone] if this patient arrives in the Emergency Department or Hospital.          Date of Current Visit: 07/15/19    Patient/Family present for Current Visit: Patient and son    Goals of Care as stated by the patient/family is: To be as healthy as possible and comfortable    Preferences for hospitalization if that were to be necessary:  [] Patient DOES NOT WANT hospitalization; focus all treatments at home  [x] Patient WOULD WANT hospitalization for potentially reversible causes  Patient prefers hospitalization at:       Is this encounter billed on time:No    Total time: 30 min  Counseling / coordination time: 15 min on opioids, checking   > 50% counseling / coordination?: Yes    ASSESSMENT & PLAN       Diagnoses and all orders for this visit:    1. Chronic pain syndrome   -refilled fentanyl patches for two months   -Patient rarely uses Tramadol   -Pain usually at a 3 to 4 level out of 10  2. Nausea   -Had been controlled with zofran   -Asked her to take the zofran at least 30 minutes before meals, not with meals       Follow-up and Dispositions    · Return in about 2 months (around 9/15/2019). I have left a SUMMARY / INSTRUCTIONS from today's visit with the patient/family in the home    Maritza Swann 97 Maintenance Due   Topic Date Due    MEDICARE YEARLY EXAM  07/19/2019        CC & SUBJECTIVE including ROS & FUNCTION     Chief Complaint   Patient presents with    Pain (Chronic)    Nausea        Angela Raheem is a 80 y.o. with chronic medical conditions as listed in the patient's updated Problem List (see below)    Their Subjective Information provided as today's visit includes: I met with the patient and her son. She reports her pain is almost always a 3 or 4 out of 10 on the fentanyl and she is able to use her Rollator to go to a wheelchair. Her appetite is good. She does state that she gets bored but she watches a lot of politics on TV and tries to read some. Her bowel movements are usually soft though occasionally she will have constipation or diarrhea. She denies chest pain or using her sublingual nitroglycerin at my last visit, shortness of breath or increased edema. She does endorse some increased nausea but Zofran with meals and 4. She has had no emesis. Review of Systems   Constitutional: Negative for chills, diaphoresis, fever, malaise/fatigue and weight loss. HENT: Negative for congestion and sinus pain. Eyes: Negative for blurred vision, double vision and photophobia. Respiratory: Negative for cough, hemoptysis, sputum production, shortness of breath and wheezing. Cardiovascular: Positive for leg swelling. Negative for chest pain, palpitations, orthopnea and claudication. Gastrointestinal: Positive for diarrhea. Negative for abdominal pain, blood in stool, constipation, heartburn, nausea and vomiting. Genitourinary: Negative for dysuria, frequency, hematuria and urgency. Musculoskeletal: Positive for back pain and joint pain. Negative for falls, myalgias and neck pain. Skin: Negative for itching and rash. Neurological: Negative for dizziness, tingling, tremors, sensory change, speech change, focal weakness and headaches. Endo/Heme/Allergies: Does not bruise/bleed easily. Psychiatric/Behavioral: Negative for depression, hallucinations, memory loss, substance abuse and suicidal ideas. The patient is not nervous/anxious and does not have insomnia.     All other systems reviewed and are negative. The following systems were [x] reviewed / [] unable to be reviewed  Systems: constitutional, ears/nose/mouth/throat, respiratory, gastrointestinal, genitourinary, musculoskeletal, integumentary, neurologic, psychiatric, endocrine. PPS:40  Karnofsky:   Timed Up and Go (TUG):   Fall Risk Assessment, last 12 mths 11/15/2017   Able to walk? Yes   Fall in past 12 months? No       Current Durable Medical Equipment in Home:  [x] Hospital Bed  [] Bedside Commode  [x] Wheelchair  [] Homero Earnest  [x] Walker  [x] Respiratory Support:oxygen    ADVANCE CARE PLANNING                                 The following information was updated I/ reviewed as accurate in Orders and the Advance Care Planning Navigator:    Resuscitation Preference: DNR      Health Care Agent: Sabine Nicola Dalia [phone]    [address]: Tarah Waldemar [phone]  Advance Care Planning 10/12/2018   Patient's Healthcare Decision Maker is: Named in scanned ACP document   Primary Decision Maker Name Delmi Parrish   Primary Decision Maker Phone Number [phone]   Primary Decision Maker Relationship to Patient Adult child   Secondary Decision Maker Name -   Secondary Decision [address] Phone Number -   Secondary Decision Maker Relationship to Patient -   Confirm Advance Directive Yes, on file   Does the patient have other document types Do Not Resuscitate        VITAL SIGNS & PHYSICAL EXAM     Median Arm Circumference (inches):     Wt Readings from Last 3 Encounters:   12/03/18 121 lb 11.1 oz (55.2 kg)   11/06/18 128 lb (58.1 kg)   10/14/18 128 lb 1.4 oz (58.1 kg)     Temp Readings from Last 3 Encounters:   07/15/19 98.1 °F (36.7 °C) (Oral)   06/26/19 97.3 °F (36.3 °C) (Oral)   05/27/19 98 °F (36.7 °C) (Oral)     BP Readings from Last 3 Encounters:   07/15/19 150/82   06/26/19 140/70   05/27/19 122/68     Pulse Readings from Last 3 Encounters:   07/15/19 78   06/26/19 78   05/27/19 74       (Constitutional) Patient Physical Status: Patient is an elderly frail female seated in her W/C in NAD    Pacemaker:  ICD:  Feeding Tube:  Urine Catheter:  Other:    Physical Exam   Constitutional: She is oriented to person, place, and time. No distress. frail   HENT:   Head: Normocephalic and atraumatic. Mouth/Throat: Oropharynx is clear and moist. No oropharyngeal exudate. Eyes: Conjunctivae are normal. Right eye exhibits no discharge. Left eye exhibits no discharge. No scleral icterus. Neck: No JVD present. No tracheal deviation present. No thyromegaly present. Cardiovascular: Normal rate, regular rhythm, normal heart sounds and intact distal pulses. Exam reveals no gallop and no friction rub. No murmur heard. Pulmonary/Chest: Effort normal and breath sounds normal. No respiratory distress. She has no wheezes. She has no rales. She exhibits no tenderness. Oxygen at 2L by NC   Abdominal: Soft. Bowel sounds are normal. She exhibits no distension and no mass. There is no tenderness. There is no rebound and no guarding. Musculoskeletal: She exhibits edema. She exhibits no deformity. 2+ nonpitting edema LE's   Lymphadenopathy:     She has no cervical adenopathy. Neurological: She is alert and oriented to person, place, and time. She has normal reflexes. No cranial nerve deficit. Skin: Skin is warm and dry. No rash noted. She is not diaphoretic. No erythema. No pallor. Psychiatric: Mood, memory, affect and judgment normal.   Vitals reviewed.        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 ([address] I25.111    CHF, stage C ([address] I50.9    Lumbar spinal stenosis M48.061    Paroxysmal atrial fibrillation (HCC) I48.0    Advance directive on file Z78.9    PMR (polymyalgia rheumatica) ([address] M35.3    Lower extremity edema R60.0    CKD (chronic kidney disease) stage 2, GFR 60-89 ml/min N18.2    Long term current use of systemic steroids Z79.52    Seronegative rheumatoid arthritis of multiple sites (HCC) M06.09    Long-term use of immunosuppressant medication Z79.899    Anemia associated with acute blood loss D62    Dependence on continuous supplemental oxygen Z99.81    Polyneuropathy associated with underlying disease ([address] G63    Vascular dementia without behavioral disturbance F01.50    Acute respiratory distress R06.03    Chronic pain syndrome G89.4    Dementia without behavioral disturbance F03.90    Goals of care, counseling/discussion Z71.89    Acute metabolic encephalopathy T52.10      Family History   Problem Relation Age of Onset    Other Mother         Intestinal obstruction    Cancer Father     Stroke Father     Heart Attack Brother     Cancer Brother     Cancer Brother     Anesth Problems Neg Hx      Social History     Socioeconomic History    Marital status: SINGLE     Spouse name: Not on file    Number of children: Not on file    Years of education: Not on file    Highest education level: Not on file   Tobacco Use    Smoking status: Never Smoker    Smokeless tobacco: Never Used   Substance and Sexual Activity    Alcohol use: No     Alcohol/week: 0.0 oz    Drug use: No    Sexual activity: Never        MEDICATIONS & PRESCRIPTIONS     Allergies   Allergen Reactions    Nsaids (Non-Steroidal Anti-Inflammatory Drug) Nausea and Vomiting    Adhesive Tape-Silicones Other (comments)     BLISTERS    Cymbalta [Duloxetine] Other (comments)     CONFUSION      Digoxin Nausea and Vomiting    Gluten Other (comments)     GLUTEN INTOLERANCE    Macrobid [Nitrofurantoin Monohyd/M-Cryst] Nausea and Vomiting    Novocain [Procaine] Other (comments)     Paralysis     Sulfa (Sulfonamide Antibiotics) Nausea and Vomiting    Atenolol Nausea and Vomiting    Codeine Other (comments)     Unable to swallow    Phenergan [Promethazine] Other (comments)     \"loose my wits i go crazy\"      Current Outpatient Medications   Medication Sig    topiramate (TOPAMAX) 25 mg tablet Take 1 Tab by mouth daily (with breakfast).  atorvastatin (LIPITOR) 40 mg tablet Take  by mouth daily.  gabapentin (NEURONTIN) 300 mg capsule Take 2 Caps by mouth three (3) times daily. (Patient taking differently: Take 600 mg by mouth two (2) times a day.)    metoprolol tartrate (LOPRESSOR) 50 mg tablet Take 75 mg by mouth two (2) times a day.  mirtazapine (REMERON) 15 mg tablet Take 1 Tab by mouth nightly.  predniSONE (DELTASONE) 10 mg tablet Take 10 mg by mouth daily.  aspirin 81 mg chewable tablet Take 1 Tab by mouth daily.  fentaNYL (DURAGESIC) 25 mcg/hr PATCH 1 Patch by TransDERmal route every seventy-two (72) hours.  traMADol (ULTRAM) 50 mg tablet Take 50 mg by mouth two (2) times daily as needed for Pain.  polyethylene glycol (MIRALAX) 17 gram/dose powder Take 17 g by mouth daily as needed.  senna-docusate (SENNA PLUS) 8.6-50 mg per tablet Take 1 Tab by mouth two (2) times a day.  esomeprazole (NEXIUM) 40 mg capsule Take 40 mg by mouth Daily (before breakfast).  ondansetron (ZOFRAN ODT) 8 mg disintegrating tablet Take 1 Tab by mouth Before breakfast, lunch, and dinner.  nitroglycerin (NITROSTAT) 0.4 mg SL tablet 1 Tab by SubLINGual route every five (5) minutes as needed for Chest Pain. No current facility-administered medications for this visit. No orders of the defined types were placed in this encounter.         TEST DATA REVIEWED:     Lab Results   Component Value Date/Time    Hemoglobin A1c 5.6 09/20/2018 11:59 AM     No results found for: MCACR, MCA1, MCA2, MCA3, MCAU, MCAU2, MCALPOCT  Lab Results   Component Value Date/Time    TSH 1.360 05/21/2019 01:17 PM     Lab Results   Component Value Date/Time    VITAMIN D, 25-HYDROXY 43.1 10/04/2017 02:52 PM         Lab Results   Component Value Date/Time    WBC 5.1 05/21/2019 01:17 PM HGB 11.5 05/21/2019 01:17 PM    PLATELET 014 89/29/0308 01:17 PM     Lab Results   Component Value Date/Time    Sodium 145 (H) 05/21/2019 01:17 PM    Potassium 4.3 05/21/2019 01:17 PM    Chloride 105 05/21/2019 01:17 PM    CO2 22 05/21/2019 01:17 PM    BUN 17 05/21/2019 01:17 PM    Creatinine 0.86 05/21/2019 01:17 PM    Calcium 9.5 05/21/2019 01:17 PM    Magnesium 1.7 12/03/2018 03:27 AM    Phosphorus 3.5 12/03/2018 03:27 AM      Lab Results   Component Value Date/Time    AST (SGOT) 16 05/21/2019 01:17 PM    Alk.  phosphatase 73 05/21/2019 01:17 PM    Protein, total 5.6 (L) 05/21/2019 01:17 PM    Albumin 3.8 05/21/2019 01:17 PM    Globulin 2.9 12/03/2018 03:27 AM     No results found for: IRON, FE, TIBC, IBCT, PSAT, FERR

## 2019-07-16 NOTE — PATIENT INSTRUCTIONS

## 2019-07-17 RX ORDER — METOPROLOL TARTRATE 50 MG/1
75 TABLET ORAL 2 TIMES DAILY
Qty: 60 TAB | Refills: 6 | Status: SHIPPED | OUTPATIENT
Start: 2019-07-17

## 2019-07-17 NOTE — TELEPHONE ENCOUNTER
Requested Prescriptions     Pending Prescriptions Disp Refills    metoprolol tartrate (LOPRESSOR) 50 mg tablet       Sig: Take 1.5 Tabs by mouth two (2) times a day.

## 2019-08-13 ENCOUNTER — TELEPHONE (OUTPATIENT)
Dept: FAMILY MEDICINE CLINIC | Age: 84
End: 2019-08-13

## 2019-08-13 NOTE — TELEPHONE ENCOUNTER
Spoke with patient's son, Aby Montalvo and advised that NP recommend sending [facility] out to evaluate patient, draw STAT labs, and give IV fluids as needed for suspected Dehydration and/or UTI.   Aby Montalvo verbalized understanding

## 2019-08-13 NOTE — TELEPHONE ENCOUNTER
Returned call to Arturo and advised Pramod Meneses NP [facility] and based on staffing they cannot guarantee visit for this evening. Advised that nurse can come out and draw STAT labs, but based on the time we may not get results back until 10 pm and depending on result we may not be able to treat her in the home. Arturo verbalized understanding and stated that he will continue to monitor patient, push fluids, offer foods that she may eat and will take to the ER if she worsens - [facility] Neil Villar to call on call today if patient worsens. This nurse called Nabil Diez and requested the visit as schedule. Per intake, they will see patient earlier if space becomes available.

## 2019-08-13 NOTE — TELEPHONE ENCOUNTER
[facility]/Patient son, Dewayne Torres returned call and stated that they do not have anyone to send out this evening. [facility] recommended patient going to the ER or Urgent Care. Neil Villar that I will discuss with [employer] Yoselin, NP and josh back with recommendations. Spoke with Ally Wyatt NP she will call [facility] to try and arrange for someone to see patient today.   Ally Wyatt will call this nurse back with update on treatment plan

## 2019-08-13 NOTE — TELEPHONE ENCOUNTER
Patient son called and reported nausea and vomiting as of yesterday- not eating or drinking - he has given zofran TID yesterday, and 2 zofran tabs this morning. Patient is urinating but no bowel movement as of yesterday.   Advised I will call NP and call back with recommendations

## 2019-08-14 ENCOUNTER — APPOINTMENT (OUTPATIENT)
Dept: CT IMAGING | Age: 84
DRG: 689 | End: 2019-08-14
Attending: INTERNAL MEDICINE
Payer: MEDICARE

## 2019-08-14 ENCOUNTER — HOSPITAL ENCOUNTER (INPATIENT)
Age: 84
LOS: 12 days | Discharge: NURSING, REHAB OR LONG-TERM CARE | DRG: 689 | End: 2019-08-26
Attending: STUDENT IN AN ORGANIZED HEALTH CARE EDUCATION/TRAINING PROGRAM | Admitting: INTERNAL MEDICINE
Payer: MEDICARE

## 2019-08-14 ENCOUNTER — APPOINTMENT (OUTPATIENT)
Dept: GENERAL RADIOLOGY | Age: 84
DRG: 689 | End: 2019-08-14
Attending: STUDENT IN AN ORGANIZED HEALTH CARE EDUCATION/TRAINING PROGRAM
Payer: MEDICARE

## 2019-08-14 DIAGNOSIS — N39.0 SEPSIS DUE TO GRAM-NEGATIVE UTI (HCC): ICD-10-CM

## 2019-08-14 DIAGNOSIS — A41.9 SEPSIS, DUE TO UNSPECIFIED ORGANISM: ICD-10-CM

## 2019-08-14 DIAGNOSIS — A41.50 SEPSIS DUE TO GRAM-NEGATIVE UTI (HCC): ICD-10-CM

## 2019-08-14 DIAGNOSIS — F33.2 SEVERE EPISODE OF RECURRENT MAJOR DEPRESSIVE DISORDER, WITHOUT PSYCHOTIC FEATURES (HCC): ICD-10-CM

## 2019-08-14 DIAGNOSIS — R53.81 DEBILITY: ICD-10-CM

## 2019-08-14 DIAGNOSIS — M35.3 PMR (POLYMYALGIA RHEUMATICA) (HCC): ICD-10-CM

## 2019-08-14 DIAGNOSIS — N39.0 URINARY TRACT INFECTION WITHOUT HEMATURIA, SITE UNSPECIFIED: ICD-10-CM

## 2019-08-14 DIAGNOSIS — M17.0 PRIMARY OSTEOARTHRITIS OF BOTH KNEES: Primary | ICD-10-CM

## 2019-08-14 DIAGNOSIS — F11.90 CHRONIC, CONTINUOUS USE OF OPIOIDS: ICD-10-CM

## 2019-08-14 DIAGNOSIS — G93.40 ENCEPHALOPATHY ACUTE: ICD-10-CM

## 2019-08-14 LAB
ALBUMIN SERPL-MCNC: 3.3 G/DL (ref 3.5–5)
ALBUMIN/GLOB SERPL: 1 {RATIO} (ref 1.1–2.2)
ALP SERPL-CCNC: 85 U/L (ref 45–117)
ALT SERPL-CCNC: 31 U/L (ref 12–78)
ANION GAP SERPL CALC-SCNC: 12 MMOL/L (ref 5–15)
APPEARANCE UR: ABNORMAL
AST SERPL-CCNC: 38 U/L (ref 15–37)
ATRIAL RATE: 113 BPM
BACTERIA URNS QL MICRO: ABNORMAL /HPF
BASOPHILS # BLD: 0 K/UL (ref 0–0.1)
BASOPHILS NFR BLD: 0 % (ref 0–1)
BILIRUB SERPL-MCNC: 1.6 MG/DL (ref 0.2–1)
BILIRUB UR QL: NEGATIVE
BUN SERPL-MCNC: 9 MG/DL (ref 6–20)
BUN/CREAT SERPL: 11 (ref 12–20)
CALCIUM SERPL-MCNC: 9.7 MG/DL (ref 8.5–10.1)
CALCULATED P AXIS, ECG09: 18 DEGREES
CALCULATED R AXIS, ECG10: 42 DEGREES
CALCULATED T AXIS, ECG11: 15 DEGREES
CHLORIDE SERPL-SCNC: 102 MMOL/L (ref 97–108)
CO2 SERPL-SCNC: 25 MMOL/L (ref 21–32)
COLOR UR: ABNORMAL
COMMENT, HOLDF: NORMAL
CREAT SERPL-MCNC: 0.83 MG/DL (ref 0.55–1.02)
DIAGNOSIS, 93000: NORMAL
DIFFERENTIAL METHOD BLD: ABNORMAL
EOSINOPHIL # BLD: 0 K/UL (ref 0–0.4)
EOSINOPHIL NFR BLD: 1 % (ref 0–7)
EPITH CASTS URNS QL MICRO: ABNORMAL /LPF
ERYTHROCYTE [DISTWIDTH] IN BLOOD BY AUTOMATED COUNT: 14.6 % (ref 11.5–14.5)
GLOBULIN SER CALC-MCNC: 3.3 G/DL (ref 2–4)
GLUCOSE SERPL-MCNC: 112 MG/DL (ref 65–100)
GLUCOSE UR STRIP.AUTO-MCNC: NEGATIVE MG/DL
HCT VFR BLD AUTO: 42.9 % (ref 35–47)
HGB BLD-MCNC: 13.3 G/DL (ref 11.5–16)
HGB UR QL STRIP: ABNORMAL
IMM GRANULOCYTES # BLD AUTO: 0 K/UL (ref 0–0.04)
IMM GRANULOCYTES NFR BLD AUTO: 0 % (ref 0–0.5)
KETONES UR QL STRIP.AUTO: 80 MG/DL
LACTATE BLD-SCNC: 1.74 MMOL/L (ref 0.4–2)
LACTATE BLD-SCNC: 2.09 MMOL/L (ref 0.4–2)
LEUKOCYTE ESTERASE UR QL STRIP.AUTO: ABNORMAL
LYMPHOCYTES # BLD: 1.4 K/UL (ref 0.8–3.5)
LYMPHOCYTES NFR BLD: 16 % (ref 12–49)
MCH RBC QN AUTO: 32.6 PG (ref 26–34)
MCHC RBC AUTO-ENTMCNC: 31 G/DL (ref 30–36.5)
MCV RBC AUTO: 105.1 FL (ref 80–99)
MONOCYTES # BLD: 0.8 K/UL (ref 0–1)
MONOCYTES NFR BLD: 10 % (ref 5–13)
NEUTS SEG # BLD: 6.4 K/UL (ref 1.8–8)
NEUTS SEG NFR BLD: 74 % (ref 32–75)
NITRITE UR QL STRIP.AUTO: POSITIVE
NRBC # BLD: 0 K/UL (ref 0–0.01)
NRBC BLD-RTO: 0 PER 100 WBC
P-R INTERVAL, ECG05: 130 MS
PH UR STRIP: 6 [PH] (ref 5–8)
PLATELET # BLD AUTO: 140 K/UL (ref 150–400)
PMV BLD AUTO: 9.9 FL (ref 8.9–12.9)
POTASSIUM SERPL-SCNC: 4.7 MMOL/L (ref 3.5–5.1)
PROT SERPL-MCNC: 6.6 G/DL (ref 6.4–8.2)
PROT UR STRIP-MCNC: NEGATIVE MG/DL
Q-T INTERVAL, ECG07: 328 MS
QRS DURATION, ECG06: 78 MS
QTC CALCULATION (BEZET), ECG08: 449 MS
RBC # BLD AUTO: 4.08 M/UL (ref 3.8–5.2)
RBC #/AREA URNS HPF: ABNORMAL /HPF (ref 0–5)
SAMPLES BEING HELD,HOLD: NORMAL
SODIUM SERPL-SCNC: 139 MMOL/L (ref 136–145)
SP GR UR REFRACTOMETRY: 1.01 (ref 1–1.03)
TROPONIN I SERPL-MCNC: 0.11 NG/ML
TROPONIN I SERPL-MCNC: 0.14 NG/ML
UR CULT HOLD, URHOLD: NORMAL
UROBILINOGEN UR QL STRIP.AUTO: 1 EU/DL (ref 0.2–1)
VENTRICULAR RATE, ECG03: 113 BPM
WBC # BLD AUTO: 8.7 K/UL (ref 3.6–11)
WBC URNS QL MICRO: ABNORMAL /HPF (ref 0–4)

## 2019-08-14 PROCEDURE — 87040 BLOOD CULTURE FOR BACTERIA: CPT

## 2019-08-14 PROCEDURE — 83605 ASSAY OF LACTIC ACID: CPT

## 2019-08-14 PROCEDURE — 74011636320 HC RX REV CODE- 636/320: Performed by: RADIOLOGY

## 2019-08-14 PROCEDURE — 74177 CT ABD & PELVIS W/CONTRAST: CPT

## 2019-08-14 PROCEDURE — 74011000258 HC RX REV CODE- 258: Performed by: RADIOLOGY

## 2019-08-14 PROCEDURE — 87077 CULTURE AEROBIC IDENTIFY: CPT

## 2019-08-14 PROCEDURE — 94761 N-INVAS EAR/PLS OXIMETRY MLT: CPT

## 2019-08-14 PROCEDURE — 80053 COMPREHEN METABOLIC PANEL: CPT

## 2019-08-14 PROCEDURE — P9612 CATHETERIZE FOR URINE SPEC: HCPCS

## 2019-08-14 PROCEDURE — 99285 EMERGENCY DEPT VISIT HI MDM: CPT

## 2019-08-14 PROCEDURE — 93005 ELECTROCARDIOGRAM TRACING: CPT

## 2019-08-14 PROCEDURE — 85025 COMPLETE CBC W/AUTO DIFF WBC: CPT

## 2019-08-14 PROCEDURE — 87086 URINE CULTURE/COLONY COUNT: CPT

## 2019-08-14 PROCEDURE — 74011250636 HC RX REV CODE- 250/636: Performed by: INTERNAL MEDICINE

## 2019-08-14 PROCEDURE — 71045 X-RAY EXAM CHEST 1 VIEW: CPT

## 2019-08-14 PROCEDURE — 70450 CT HEAD/BRAIN W/O DYE: CPT

## 2019-08-14 PROCEDURE — 74011250637 HC RX REV CODE- 250/637: Performed by: INTERNAL MEDICINE

## 2019-08-14 PROCEDURE — 81001 URINALYSIS AUTO W/SCOPE: CPT

## 2019-08-14 PROCEDURE — 74011250636 HC RX REV CODE- 250/636: Performed by: STUDENT IN AN ORGANIZED HEALTH CARE EDUCATION/TRAINING PROGRAM

## 2019-08-14 PROCEDURE — 36415 COLL VENOUS BLD VENIPUNCTURE: CPT

## 2019-08-14 PROCEDURE — 87186 SC STD MICRODIL/AGAR DIL: CPT

## 2019-08-14 PROCEDURE — 65660000000 HC RM CCU STEPDOWN

## 2019-08-14 PROCEDURE — 71275 CT ANGIOGRAPHY CHEST: CPT

## 2019-08-14 PROCEDURE — 74011000258 HC RX REV CODE- 258: Performed by: STUDENT IN AN ORGANIZED HEALTH CARE EDUCATION/TRAINING PROGRAM

## 2019-08-14 PROCEDURE — 84484 ASSAY OF TROPONIN QUANT: CPT

## 2019-08-14 RX ORDER — NITROGLYCERIN 400 UG/1
1 SPRAY ORAL
Status: DISCONTINUED | OUTPATIENT
Start: 2019-08-14 | End: 2019-08-26 | Stop reason: HOSPADM

## 2019-08-14 RX ORDER — PREDNISONE 10 MG/1
10 TABLET ORAL DAILY
Status: DISCONTINUED | OUTPATIENT
Start: 2019-08-15 | End: 2019-08-26 | Stop reason: HOSPADM

## 2019-08-14 RX ORDER — SODIUM CHLORIDE 0.9 % (FLUSH) 0.9 %
5-40 SYRINGE (ML) INJECTION AS NEEDED
Status: DISCONTINUED | OUTPATIENT
Start: 2019-08-14 | End: 2019-08-26 | Stop reason: HOSPADM

## 2019-08-14 RX ORDER — ENOXAPARIN SODIUM 100 MG/ML
40 INJECTION SUBCUTANEOUS EVERY 24 HOURS
Status: DISCONTINUED | OUTPATIENT
Start: 2019-08-14 | End: 2019-08-26 | Stop reason: HOSPADM

## 2019-08-14 RX ORDER — SODIUM CHLORIDE 0.9 % (FLUSH) 0.9 %
5-40 SYRINGE (ML) INJECTION EVERY 8 HOURS
Status: DISCONTINUED | OUTPATIENT
Start: 2019-08-14 | End: 2019-08-23

## 2019-08-14 RX ORDER — SODIUM CHLORIDE 0.9 % (FLUSH) 0.9 %
10 SYRINGE (ML) INJECTION
Status: COMPLETED | OUTPATIENT
Start: 2019-08-14 | End: 2019-08-14

## 2019-08-14 RX ORDER — GUAIFENESIN 100 MG/5ML
81 LIQUID (ML) ORAL DAILY
Status: DISCONTINUED | OUTPATIENT
Start: 2019-08-15 | End: 2019-08-14

## 2019-08-14 RX ORDER — PANTOPRAZOLE SODIUM 40 MG/1
40 TABLET, DELAYED RELEASE ORAL
Status: DISCONTINUED | OUTPATIENT
Start: 2019-08-15 | End: 2019-08-26 | Stop reason: HOSPADM

## 2019-08-14 RX ORDER — FENTANYL 25 UG/1
1 PATCH TRANSDERMAL
Status: DISCONTINUED | OUTPATIENT
Start: 2019-08-14 | End: 2019-08-16

## 2019-08-14 RX ORDER — GUAIFENESIN 100 MG/5ML
81 LIQUID (ML) ORAL DAILY
Status: DISCONTINUED | OUTPATIENT
Start: 2019-08-14 | End: 2019-08-26 | Stop reason: HOSPADM

## 2019-08-14 RX ORDER — ONDANSETRON 2 MG/ML
4 INJECTION INTRAMUSCULAR; INTRAVENOUS
Status: DISCONTINUED | OUTPATIENT
Start: 2019-08-14 | End: 2019-08-21 | Stop reason: SDUPTHER

## 2019-08-14 RX ORDER — SODIUM CHLORIDE 0.9 % (FLUSH) 0.9 %
5-10 SYRINGE (ML) INJECTION AS NEEDED
Status: DISCONTINUED | OUTPATIENT
Start: 2019-08-14 | End: 2019-08-26 | Stop reason: HOSPADM

## 2019-08-14 RX ORDER — AMOXICILLIN 250 MG
1 CAPSULE ORAL 2 TIMES DAILY
Status: DISCONTINUED | OUTPATIENT
Start: 2019-08-14 | End: 2019-08-26 | Stop reason: HOSPADM

## 2019-08-14 RX ORDER — TOPIRAMATE 25 MG/1
25 TABLET ORAL
Status: DISCONTINUED | OUTPATIENT
Start: 2019-08-15 | End: 2019-08-26 | Stop reason: HOSPADM

## 2019-08-14 RX ORDER — ATORVASTATIN CALCIUM 20 MG/1
40 TABLET, FILM COATED ORAL DAILY
Status: DISCONTINUED | OUTPATIENT
Start: 2019-08-15 | End: 2019-08-26 | Stop reason: HOSPADM

## 2019-08-14 RX ORDER — SODIUM CHLORIDE 9 MG/ML
75 INJECTION, SOLUTION INTRAVENOUS CONTINUOUS
Status: DISCONTINUED | OUTPATIENT
Start: 2019-08-14 | End: 2019-08-19

## 2019-08-14 RX ADMIN — SODIUM CHLORIDE 500 ML: 900 INJECTION, SOLUTION INTRAVENOUS at 16:47

## 2019-08-14 RX ADMIN — IOPAMIDOL 100 ML: 755 INJECTION, SOLUTION INTRAVENOUS at 20:29

## 2019-08-14 RX ADMIN — SODIUM CHLORIDE 100 ML: 900 INJECTION, SOLUTION INTRAVENOUS at 17:23

## 2019-08-14 RX ADMIN — Medication 10 ML: at 17:23

## 2019-08-14 RX ADMIN — CEFTRIAXONE 1 G: 1 INJECTION, POWDER, FOR SOLUTION INTRAMUSCULAR; INTRAVENOUS at 16:47

## 2019-08-14 RX ADMIN — SODIUM CHLORIDE 125 ML/HR: 900 INJECTION, SOLUTION INTRAVENOUS at 21:31

## 2019-08-14 RX ADMIN — METOPROLOL TARTRATE 75 MG: 50 TABLET ORAL at 21:29

## 2019-08-14 RX ADMIN — SENNOSIDES, DOCUSATE SODIUM 1 TABLET: 50; 8.6 TABLET, FILM COATED ORAL at 21:29

## 2019-08-14 RX ADMIN — Medication 10 ML: at 21:31

## 2019-08-14 RX ADMIN — ENOXAPARIN SODIUM 40 MG: 40 INJECTION SUBCUTANEOUS at 21:30

## 2019-08-14 RX ADMIN — ACETAMINOPHEN 650 MG: 650 SOLUTION ORAL at 23:07

## 2019-08-14 RX ADMIN — IOPAMIDOL 100 ML: 755 INJECTION, SOLUTION INTRAVENOUS at 17:23

## 2019-08-14 RX ADMIN — ASPIRIN 81 MG CHEWABLE TABLET 81 MG: 81 TABLET CHEWABLE at 21:30

## 2019-08-14 NOTE — ED TRIAGE NOTES
Pt arrived via EMS. Pt is on 3L NC at baseline. Hx of CHF and dementia. EMS was called for n/v and upon arrival patient was covered in urine in a diaper that had not been changed since Monday. Pt lives with  and CPS was consulted due to living conditions. Per EMS pt had a fever of 100F tympanic.

## 2019-08-14 NOTE — H&P
Hospitalist History and Physical  Brain Jacobson MD  Answering service: [phone] from in house phone        Date of Service:  2019  NAME:  Lorin Rodríguez  :  1925  MRN:  [id number]  Primary Care Provider: Rodolfo Youngblood NP    Chief Complaint:   Chief Complaint   Patient presents with    Nausea    Vomiting    Altered mental status       History of Present Illness:     Lorin Rodríguez is a 80 y.o. female  With PMH of PAF, CAD, GERD, HFpEF who presents due to worsening confusion. Patient is currently very confused, unable to answer any questions or follow any requests, I called her Son Garfield Hawkins who gave me the most of the history, data also obtained from chart review and ED staff. As per collective reports, patient at baseline is AAOX3, ambulates using a walker, lives at home and has home based primary care services. Patient has history of chronic pain meds use due to chronic pain and has had issues with constipation. Since  she has not had a BM, Monday she started to have nausea and dry heaving with occasional vomiting. Son states that when she becomes nauseous, she stops taking her meds which she usually takes properly, and her symptoms grow worse. She has been progressively confused and bed bound, her diaper has not been changed in a week almost. Patient was seen by [facility] where she was noted to have fever of 101.7 F and noted to have poor oral intake. yesterday dispatch health was requested but could not reach the patient. Today EMS was called because of worsening confusion. EMS noted poor sanatory condition and progressive worsening of patient's situation. Patient was brought to ED for eval. In the ED, she is noted to have abnormal UA, is confused and has tachycardia. BW reveals elevated troponin. She was started on IVF and Rocephin for UTI. Pt cont to be confused. Cannot offer in depth ROS. Chart reviewed at length.   Review of Systems:  Pertinent positives noted in HPI. Unable to review complete ROS due to patient condition I-e confusion. Past Medical and surgical history:   Past Medical History:   Diagnosis Date    Arrhythmia     A-FIB    Arthritis     CAD (coronary artery disease) 1989    MI    Congestive heart failure (CHF) (HCC)     GERD (gastroesophageal reflux disease)     Gluten intolerance     Heart failure (HCC)     CHF    Polymyalgia (HCC)       Past Surgical History:   Procedure Laterality Date    CARDIAC SURG PROCEDURE UNLIST  1980'S    CARDIAC CATH    HX BACK SURGERY  1998, 2006, 2013    LUMBAR SPINE    HX CHOLECYSTECTOMY      [place] HIP REPLACEMENT Right 5/2015    Ryan Schwalbe    HX TONSILLECTOMY  CHILD    HX UROLOGICAL      BLADDER SUSPENSION - MESH       Home medications:  Prior to Admission medications    Medication Sig Start Date End Date Taking? Authorizing Provider   metoprolol tartrate (LOPRESSOR) 50 mg tablet Take 1.5 Tabs by mouth two (2) times a day. 7/17/19   Alysia Melgoza NP   topiramate (TOPAMAX) 25 mg tablet Take 1 Tab by mouth daily (with breakfast). 7/8/19   Alysia Melgoza NP   atorvastatin (LIPITOR) 40 mg tablet Take  by mouth daily. Provider, Historical   ondansetron (ZOFRAN ODT) 8 mg disintegrating tablet Take 1 Tab by mouth Before breakfast, lunch, and dinner. 6/5/19   Alysia Melgoza NP   gabapentin (NEURONTIN) 300 mg capsule Take 2 Caps by mouth three (3) times daily. Patient taking differently: Take 600 mg by mouth two (2) times a day. 4/26/19   Alysia Melgoza NP   mirtazapine (REMERON) 15 mg tablet Take 1 Tab by mouth nightly. 1/2/19   Alysia Melgoza NP   predniSONE (DELTASONE) 10 mg tablet Take 10 mg by mouth daily. 12/7/18   Alysia Melgoza NP   aspirin 81 mg chewable tablet Take 1 Tab by mouth daily. 10/15/18   Waldo Redman MD   fentaNYL (DURAGESIC) 25 mcg/hr PATCH 1 Patch by TransDERmal route every seventy-two (72) hours.     Provider, Historical   traMADol (ULTRAM) 50 mg tablet Take 50 mg by mouth two (2) times daily as needed for Pain. Provider, Historical   polyethylene glycol (MIRALAX) 17 gram/dose powder Take 17 g by mouth daily as needed. Provider, Historical   senna-docusate (SENNA PLUS) 8.6-50 mg per tablet Take 1 Tab by mouth two (2) times a day. Provider, Historical   nitroglycerin (NITROSTAT) 0.4 mg SL tablet 1 Tab by SubLINGual route every five (5) minutes as needed for Chest Pain. 6/7/17   Susi Abarca MD   esomeprazole (NEXIUM) 40 mg capsule Take 40 mg by mouth Daily (before breakfast). Provider, Historical       Allergies: Allergies   Allergen Reactions    Nsaids (Non-Steroidal Anti-Inflammatory Drug) Nausea and Vomiting    Adhesive Tape-Silicones Other (comments)     BLISTERS    Cymbalta [Duloxetine] Other (comments)     CONFUSION      Digoxin Nausea and Vomiting    Gluten Other (comments)     GLUTEN INTOLERANCE    Macrobid [Nitrofurantoin Monohyd/M-Cryst] Nausea and Vomiting    Novocain [Procaine] Other (comments)     Paralysis     Sulfa (Sulfonamide Antibiotics) Nausea and Vomiting    Atenolol Nausea and Vomiting    Codeine Other (comments)     Unable to swallow    Phenergan [Promethazine] Other (comments)     \"loose my wits i go crazy\"       Family history:   Family History   Problem Relation Age of Onset    Other Mother         Intestinal obstruction    Cancer Father     Stroke Father     Heart Attack Brother     Cancer Brother     Cancer Brother     Anesth Problems Neg Hx         SOCIAL HISTORY:  Patient resides at Home. Patient ambulates with walker. Smoking history: Family reports that she has never smoked. She has never used smokeless tobacco.  Drug History:Family  reports that she does not use drugs. Alcohol history:Family  reports that she does not drink alcohol.     Objective:     Physical Exam:   Visit Vitals  /74   Pulse (!) 115   Temp 100.2 °F (37.9 °C)   Resp 30   Ht 5' 4\" (1.626 m)   Wt 54.9 kg (121 lb)   SpO2 98%   BMI 20.77 kg/m² General appearance: fatigued, distracted, mild distress, appears stated age, toxic  Head: Normocephalic, without obvious abnormality, atraumatic  Eyes: positive light reflex, no jaundiced sclera, EOMI  Throat: dry oral mucosa  Lungs: CTA BL, no Rales, wheezing, or labored breathing  Heart: sinus tachycardia,  NM, NG  Abdomen: soft, NT, NG, NR, BS positive  Extremities: chronic venous stasis with dermatosis, no obvious open areas. Pulses: palpable 1+  Skin: dry skin and decreased turgor  Neurologic: awake, alert, moves all extremities but does not follow commands, complete exam not possible due to patient's confusion. ECG:  Sinus tachycardia. Laboratory and other diagnostic Data Review: All diagnostic labs and studies have been reviewed. Xr Chest Port    Result Date: 8/14/2019  INDICATION:  meets SIRS criteria EXAM: Chest single view. COMPARISON: 12/2/2018. FINDINGS: A single frontal view of the chest at 1532 hours shows poor inspiratory effort with bibasilar atelectasis, left greater than right, similar to the prior study. .  The heart, mediastinum and pulmonary vasculature are stable . The bony thorax is unremarkable for age, except for degenerative change and lower thoracic dextroscoliosis. . . IMPRESSION: Poor inspiratory effort with bibasilar atelectasis, similar to the prior study 12/2/2018. .  .        Patient Vitals for the past 12 hrs:   Temp Pulse Resp BP SpO2   08/14/19 1630  (!) 115 30 157/74 98 %   08/14/19 1600  (!) 112 29 155/70 97 %   08/14/19 1530  (!) 116 23 160/69 97 %   08/14/19 1518 100.2 °F (37.9 °C) (!) 120 18 175/74 96 %       Recent Labs     08/14/19  1529   WBC 8.7   HGB 13.3   HCT 42.9   *     Recent Labs     08/14/19  1529      K 4.7      CO2 25   BUN 9   CREA 0.83   *   CA 9.7     Recent Labs     08/14/19  1529   SGOT 38*   ALT 31   AP 85   TBILI 1.6*   TP 6.6   ALB 3.3*   GLOB 3.3     No results for input(s): INR, PTP, APTT in the last 72 hours.    No lab exists for component: INREXT   No results for input(s): FE, TIBC, PSAT, FERR in the last 72 hours. No results found for: FOL, RBCF   No results for input(s): PH, PCO2, PO2 in the last 72 hours. No results for input(s): CPK, CKNDX, TROIQ in the last 72 hours. No lab exists for component: CPKMB  Lab Results   Component Value Date/Time    Cholesterol, total 171 12/19/2018 02:59 AM    HDL Cholesterol 78 12/19/2018 02:59 AM    LDL, calculated 71 12/19/2018 02:59 AM    Triglyceride 111 12/19/2018 02:59 AM     Lab Results   Component Value Date/Time    Glucose (POC) 191 (H) 10/12/2018 12:52 PM    Glucose (POC) 89 10/03/2018 07:30 AM     Lab Results   Component Value Date/Time    Color YELLOW/STRAW 08/14/2019 03:29 PM    Appearance CLOUDY (A) 08/14/2019 03:29 PM    Specific gravity 1.012 08/14/2019 03:29 PM    Specific gravity 1.025 10/03/2018 06:18 AM    pH (UA) 6.0 08/14/2019 03:29 PM    Protein NEGATIVE  08/14/2019 03:29 PM    Glucose NEGATIVE  08/14/2019 03:29 PM    Ketone 80 (A) 08/14/2019 03:29 PM    Bilirubin NEGATIVE  08/14/2019 03:29 PM    Urobilinogen 1.0 08/14/2019 03:29 PM    Nitrites POSITIVE (A) 08/14/2019 03:29 PM    Leukocyte Esterase MODERATE (A) 08/14/2019 03:29 PM    Epithelial cells FEW 08/14/2019 03:29 PM    Bacteria 2+ (A) 08/14/2019 03:29 PM    WBC  08/14/2019 03:29 PM    RBC 0-5 08/14/2019 03:29 PM       Assessment:   Given the patient's current clinical presentation, I have a high level of concern for decompensation if discharged from the emergency department.  Complex decision making was performed, which includes reviewing the patient's available past medical records, laboratory results, and x-ray films.        My assessment of this patient's clinical condition and my plan of care is as follows.       Active Problems:    Sepsis ([address] (8/14/2019)        Plan:     - sepsis, etiology unclear, probable UTI  UA noted, check Urine cx  Cont rocephin  IVF  Sepsis re-assessment completed    - acute Lactic Acidosis: due to sepsis  IVF, repeat labs in am    - Acute encephalopathy, POA  Likely toxic metabolic  Patient is AAOX 3 at baseline as per son  Will get CT head    - Nausea and vomiting, with history of severe OIC  Check CT abd/pelvis  Zofran PRN  IVF  regular diet unless contra-indication    - tachycardia, hypoxemia, POA  Etiology unclear, likely due to sepsis  Given immobility for last few days, check CTPE  IVF, monitored bed    - Chronic pain syndrome and chronic opiate use  Resume Fentanyl patch  As per son, it is due to be changed tonight    - Elevated troponin  EKG slightly abnormal with T wave inversion and tachycardia  Trend CE  Consult cards  Cont ASA, statins, lopressor  NTG PRN    - chronic diastolic CHF, NYHA II, without exacerbation: cont to monitor  - PAF: currently NSR, cont Lopressors, not on [address] due to fall risk  - general debility and unable to care for self: PT/OT/CM consult  - essential HTN: controlled, cont home meds as able  - HLP: cont home meds  - GERD: PPI  - chronic hypoxemic resp failure: O2 support      Diet: Regular Diet and Renal Diet  Activity: Ambulate with assist, fall precautions  DVT prophylaxis: Lovenox  Isolation precautions: none  Consultations: none  Anticipated disposition: TBD  Code status: Full Code    Admit to inpatient status   Patient was explained about the risk of admission including and not a complete list including risk of falls,fractures,blood clots,allergic reactions,infections. Patient/family also understands and agrees to the treatment plan including medications and side effect profiles and also understand the risk with radiation while undergoing imaging studies. The patient and the family/friends (after permission given by the patient to discuss) understand this and agree with the admission plan.        Signed By: Kyle Romeo MD     08/14/19  5:04 PM

## 2019-08-14 NOTE — ED PROVIDER NOTES
The patient is a 58-year-old female with a past medical history significant for dementia, A. fib, CAD and CHF who presents by EMS from home and reportedly unsanitary conditions, Adult Protective Services involved, for altered mental status and nausea and vomiting. The patient was found in a diaper that was reportedly less changed on Monday. The rest of the history is very limited given her advanced age and dementia. No family members or friends at bedside to corroborate the history. No trauma reported by EMS.     Past Medical History:   Diagnosis Date    Arrhythmia     A-FIB    Arthritis     CAD (coronary artery disease) 1989    MI    Congestive heart failure (CHF) (HCC)     GERD (gastroesophageal reflux disease)     Gluten intolerance     Heart failure (HCC)     CHF    Polymyalgia (HCC)        Past Surgical History:   Procedure Laterality Date    CARDIAC SURG PROCEDURE UNLIST  1980'S    CARDIAC CATH    HX BACK SURGERY  1998, 2006, 2013    LUMBAR SPINE    HX CHOLECYSTECTOMY      [id number] HIP REPLACEMENT Right 5/2015    [facility] [facility]    HX TONSILLECTOMY  CHILD    HX UROLOGICAL      BLADDER SUSPENSION - MESH         Family History:   Problem Relation Age of Onset    Other Mother         Intestinal obstruction    Cancer Father     Stroke Father     Heart Attack Brother     Cancer Brother     Cancer Brother     Anesth Problems Neg Hx        Social History     Socioeconomic History    Marital status: SINGLE     Spouse name: Not on file    Number of children: Not on file    Years of education: Not on file    Highest education level: Not on file   Occupational History    Not on file   Social Needs    Financial resource strain: Not on file    Food insecurity:     Worry: Not on file     Inability: Not on file    Transportation needs:     Medical: Not on file     Non-medical: Not on file   Tobacco Use    Smoking status: Never Smoker    Smokeless tobacco: Never Used   Substance and Sexual Activity    Alcohol use: No     Alcohol/week: 0.0 standard drinks    Drug use: No    Sexual activity: Never   Lifestyle    Physical activity:     Days per week: Not on file     Minutes per session: Not on file    Stress: Not on file   Relationships    Social connections:     Talks on phone: Not on file     Gets together: Not on file     Attends Jain service: Not on file     Active member of club or organization: Not on file     Attends meetings of clubs or organizations: Not on file     Relationship status: Not on file    Intimate partner violence:     Fear of current or ex partner: Not on file     Emotionally abused: Not on file     Physically abused: Not on file     Forced sexual activity: Not on file   Other Topics Concern    Not on file   Social History Narrative    Not on file         ALLERGIES: Nsaids (non-steroidal anti-inflammatory drug); Adhesive tape-silicones; Cymbalta [duloxetine]; Digoxin; Gluten; Macrobid [nitrofurantoin monohyd/m-cryst]; Novocain [procaine]; Sulfa (sulfonamide antibiotics); Atenolol; Codeine; and Phenergan [promethazine]    Review of Systems   Unable to perform ROS: Dementia       Vitals:    08/14/19 1518   BP: 175/74   Pulse: (!) 120   Resp: 18   SpO2: 96%   Weight: 54.9 kg (121 lb)   Height: 5' 4\" (1.626 m)            Physical Exam   Constitutional: She appears well-developed. No distress. Elderly, dishevled   HENT:   Head: Normocephalic and atraumatic. Eyes: Conjunctivae and EOM are normal.   Neck: Normal range of motion. Neck supple. Cardiovascular: Normal rate, regular rhythm and normal heart sounds. Pulmonary/Chest: Effort normal and breath sounds normal. No respiratory distress. Abdominal: Soft. There is no tenderness. There is no guarding. Musculoskeletal: Normal range of motion. She exhibits no edema. Neurological: She is alert. She exhibits normal muscle tone. Speech clear, oriented to person only. Skin: Skin is warm and dry.         MDM  Number of Diagnoses or Management Options  Critical Care  Total time providing critical care: 30-74 minutes (Total critical care time spend exclusive of procedures:  32 minutes.  )         Procedures      EKG interpretation:   Rhythm: sinus tachycardia; and irregular with occ PACs. Rate (approx.): 113; Axis: normal; Intervals: normal ; ST/T wave: non-specific changes - no STEMI with a; EKG documented and interpreted by Sean Pollard MD, ED MD.    Assessment and plan: This is a 78-year-old female with multiple comorbidities who presents with altered mental status and sirs criteria, concerning for sepsis. Likely source is UTI. Will obtain checks x-ray to screen for pneumonia. No skin lesions or areas of cellulitis on my exam.  She will require IV antibiotics and admission for further management and evaluation. Hospitalist Pooja for Admission  4:33 PM    ED Room Number: ER23/23  Patient Name and age:  Ni Perez 80 y.o.  female  Working Diagnosis:   1. Sepsis, due to unspecified organism ([address]    2. Urinary tract infection without hematuria, site unspecified      Readmission: no  Isolation Requirements:  no  Recommended Level of Care:  step down  Code Status:  Full Code  Department:[facility] Adult ED - 21   Other:  Code Sepsis called. Rocephin started.

## 2019-08-14 NOTE — ROUTINE PROCESS
TRANSFER - IN REPORT: 
 
Verbal report received from [address] [place] [address] rn(name) on Leopold Goring  being received from ed(unit) for routine progression of care Report consisted of patients Situation, Background, Assessment and  
Recommendations(SBAR). Information from the following report(s) ED Summary was reviewed with the receiving nurse. Opportunity for questions and clarification was provided. Assessment completed upon patients arrival to unit and care assumed.

## 2019-08-15 LAB — TROPONIN I SERPL-MCNC: 0.14 NG/ML

## 2019-08-15 PROCEDURE — 36415 COLL VENOUS BLD VENIPUNCTURE: CPT

## 2019-08-15 PROCEDURE — 74011000258 HC RX REV CODE- 258: Performed by: STUDENT IN AN ORGANIZED HEALTH CARE EDUCATION/TRAINING PROGRAM

## 2019-08-15 PROCEDURE — 65660000000 HC RM CCU STEPDOWN

## 2019-08-15 PROCEDURE — 74011250637 HC RX REV CODE- 250/637: Performed by: INTERNAL MEDICINE

## 2019-08-15 PROCEDURE — 74011250636 HC RX REV CODE- 250/636: Performed by: INTERNAL MEDICINE

## 2019-08-15 PROCEDURE — 76450000000

## 2019-08-15 PROCEDURE — 84484 ASSAY OF TROPONIN QUANT: CPT

## 2019-08-15 PROCEDURE — 74011250636 HC RX REV CODE- 250/636: Performed by: STUDENT IN AN ORGANIZED HEALTH CARE EDUCATION/TRAINING PROGRAM

## 2019-08-15 RX ORDER — ACETAMINOPHEN 325 MG/1
650 TABLET ORAL
Status: DISCONTINUED | OUTPATIENT
Start: 2019-08-15 | End: 2019-08-26 | Stop reason: HOSPADM

## 2019-08-15 RX ADMIN — ENOXAPARIN SODIUM 40 MG: 40 INJECTION SUBCUTANEOUS at 17:20

## 2019-08-15 RX ADMIN — Medication 10 ML: at 21:41

## 2019-08-15 RX ADMIN — TOPIRAMATE 25 MG: 25 TABLET, FILM COATED ORAL at 09:01

## 2019-08-15 RX ADMIN — Medication 10 ML: at 09:02

## 2019-08-15 RX ADMIN — SODIUM CHLORIDE 125 ML/HR: 900 INJECTION, SOLUTION INTRAVENOUS at 07:00

## 2019-08-15 RX ADMIN — PANTOPRAZOLE SODIUM 40 MG: 40 TABLET, DELAYED RELEASE ORAL at 09:01

## 2019-08-15 RX ADMIN — SODIUM CHLORIDE 75 ML/HR: 900 INJECTION, SOLUTION INTRAVENOUS at 15:45

## 2019-08-15 RX ADMIN — CEFTRIAXONE 1 G: 1 INJECTION, POWDER, FOR SOLUTION INTRAMUSCULAR; INTRAVENOUS at 15:47

## 2019-08-15 RX ADMIN — ACETAMINOPHEN 650 MG: 325 TABLET, FILM COATED ORAL at 21:41

## 2019-08-15 NOTE — PROGRESS NOTES
MARLENY Plan:    1. Disposition TBD; Home with HH likely, son would like to resume [facility] if deemed appropriate by MD; [facility] order needed    2. Palliative following     3. [address] team following; Ilda Hernández [phone] met CM this afternoon     4. Transport; [facility] likely; CM to assist as needed    5. Continue Medical Care    Reason for Admission:   Nausea, vomiting, altered mental status                RRAT Score:    37- high risk              Resources/supports as identified by patient/family:   Pt has [employer] Medicare/[employer], and a strong family support, son is the primary caretaker                 Top Challenges facing patient (as identified by patient/family and CM): Finances/Medication cost?     See above                Transportation? TBD based on disposition                Support system or lack thereof? Pt has a strong family support and has home based primary care                     Living arrangements? Lives in a 2-story house with son             Self-care/ADLs/Cognition? Confused/altered, needs maximum assistance            Current Advanced Directive/Advance Care Plan: On file                           Plan for utilizing home health:    Likely; pt had [facility] before and discharged recently, would like to resume if recommended again                  Transition of Care Plan:                  Pt is a 80-year old female who presents to ER due to nausea, vomiting, and altered mental status. CM met pt's son at bedside, introduced, explained the role, and offered assistance. Pt's son Lydia Cid [phone] stated that they live together in a 2 story house. Pt's room is located on the first floor. Pt uses a walker and wheelchair at baseline. Pt requires maximum assistance and cannot do ADLs except going to bathroom. Pt receives private caregiving for bathing twice a week.  Gallo Berumen wants to resume Home Health if deemed necessary and would like to have Deepali. Pt may benefit from palliative services and continued care from [place] [phone]. Care Management Interventions  PCP Verified by CM:  Yes  Mode of Transport at Discharge: [address] [facility] [address] [facility] [address] (CM Consult): [address]: No  Reason Outside [place]: Patient already serviced by other home care/hospice agency  Physical Therapy Consult: No  Occupational Therapy Consult: No  Speech Therapy Consult: No  Current Support Network: Own Home(son )  Confirm Follow Up Transport: Other (see comment)(likely [facility] )  Plan discussed with Pt/Family/Caregiver: Yes  Discharge Location  Discharge Placement: [address] [place] [address]    [phone]

## 2019-08-15 NOTE — CONSULTS
CARDIOLOGY CONSULT                  Subjective:    Date of  Admission: 8/14/2019  3:11 PM     Admission type:Emergency    Jaswinder Reveles is a 80 y.o. female admitted for Sepsis ([facility] [address] [A41.9]. She had begun to be sick with nausea, vomiting. She has become more altered and less able to take care of herself. She was febrile when she was visited by the [facility] RN and she was sent to ED. She was noted in the ED to have likely sepsis from a urological source. Troponin mildly elevated.     Patient Active Problem List    Diagnosis Date Noted    Sepsis ([address] 08/14/2019    Acute metabolic encephalopathy 46/30/5617    Acute respiratory distress     Chronic pain syndrome     Dementia without behavioral disturbance     Goals of care, counseling/discussion     Vascular dementia without behavioral disturbance     Polyneuropathy associated with underlying disease ([address] 08/01/2018    Dependence on continuous supplemental oxygen 07/18/2018    Anemia associated with acute blood loss 06/19/2018    Long-term use of immunosuppressant medication 11/20/2017    CKD (chronic kidney disease) stage 2, GFR 60-89 ml/min 10/24/2017    Long term current use of systemic steroids 10/24/2017    Seronegative rheumatoid arthritis of multiple sites ([address] 10/24/2017    PMR (polymyalgia rheumatica) ([address] 10/04/2017    Lower extremity edema 10/04/2017    Advance directive on file 05/10/2017    Paroxysmal atrial fibrillation ([address] 03/23/2016    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 ([address] 10/20/2015    CHF, stage C ([address] 10/20/2015    Lumbar spinal stenosis 10/20/2015    Osteoarthritis 05/22/2015      Berenice Newsome NP  Past Medical History:   Diagnosis Date    Arrhythmia     A-FIB    Arthritis     CAD (coronary artery disease) 1989    MI    [facility]est[facility]F) ([facility]     GERD (gastroesophageal reflux disease)     Gluten intolerance     Heart failure ([address] CHF    Polymyalgia [facility])       Past Surgical History:   Procedure Laterality Date    CARDIAC SURG PROCEDURE UNLIST  1980'S    CARDIAC CATH    HX BACK SURGERY  1998, 2006, 2013    LUMBAR SPINE    HX CHOLECYSTECTOMY      [place] H[place] REPL[place]EMENT Right 5/2015    [place]    HX TONSILLECTOMY  CHILD    HX UROLOGICAL      BLADDER SUSPENSION - MESH     Allergies   Allergen Reactions    Adhesive Tape-Silicones Other (comments)     BLISTERS    Cymbalta [Duloxetine] Other (comments)     CONFUSION      Digoxin Nausea and Vomiting    Gluten Other (comments)     GLUTEN INTOLERANCE    Macrobid [Nitrofurantoin Monohyd/M-Cryst] Nausea and Vomiting    Novocain [Procaine] Other (comments)     Paralysis     Sulfa (Sulfonamide Antibiotics) Nausea and Vomiting    Atenolol Nausea and Vomiting    Codeine Other (comments)     Unable to swallow    Nsaids (Non-Steroidal Anti-Inflammatory Drug) Nausea and Vomiting    Phenergan [Promethazine] Other (comments)     \"loose my wits i go crazy\"      Family History   Problem Relation Age of Onset    Other Mother         Intestinal obstruction    Cancer Father     Stroke Father     Heart Attack Brother     Cancer Brother     Cancer Brother     Anesth Problems Neg Hx       Current Facility-Administered Medications   Medication Dose Route Frequency    sodium chloride (NS) flush 5-10 mL  5-10 mL IntraVENous PRN    cefTRIAXone (ROCEPHIN) 1 g in 0.9% sodium chloride (MBP/ADV) 50 mL  1 g IntraVENous Q24H    0.9% sodium chloride infusion  75 mL/hr IntraVENous CONTINUOUS    sodium chloride (NS) flush 5-40 mL  5-40 mL IntraVENous Q8H    sodium chloride (NS) flush 5-40 mL  5-40 mL IntraVENous PRN    enoxaparin (LOVENOX) injection 40 mg  40 mg SubCUTAneous Q24H    ondansetron (ZOFRAN) injection 4 mg  4 mg IntraVENous Q4H PRN    acetaminophen (TYLENOL) solution 650 mg  650 mg Oral Q4H PRN    atorvastatin (LIPITOR) tablet 40 mg  40 mg Oral DAILY    pantoprazole (PROTONIX) tablet 40 mg 40 mg Oral ACB    fentaNYL (DURAGESIC) 25 mcg/hr patch 1 Patch  1 Patch TransDERmal Q72H    metoprolol tartrate (LOPRESSOR) tablet 75 mg  75 mg Oral BID    predniSONE (DELTASONE) tablet 10 mg  10 mg Oral DAILY    topiramate (TOPAMAX) tablet 25 mg  25 mg Oral DAILY WITH BREAKFAST    senna-docusate (PERICOLACE) 8.6-50 mg per tablet 1 Tab  1 Tab Oral BID    aspirin chewable tablet 81 mg  81 mg Oral DAILY    nitroglycerin (NITROLINGUAL) sublingual 0.4 mg/spray  1 Spray SubLINGual Q5MIN PRN         Review of Symptoms:  [facility] 2/2 clinical condition         Physical Exam    Visit Vitals  /79 (BP 1 Location: Right arm, BP Patient Position: At rest)   Pulse 82   Temp 98.8 °F (37.1 °C)   Resp 16   Ht 5' 4\" (1.626 m)   Wt 59.4 kg (131 lb)   SpO2 94%   BMI 22.49 kg/m²     NAD  Skin warm and dry  Nl conjunctiva  Oropharynx without exudate. Neck supple  Lungs clear  Normal S1/ S2 with occasional ectopy  1/6 systolic murmur  Abdomen soft and non tender  Pulses 2+ radials  Neuro:  Grossly intact  Appropriate      Cardiographics    Telemetry: normal sinus rhythm  ECG: normal sinus rhythm, nonspecific ST and T waves changes, PAC's noted      Labs:   Recent Results (from the past 24 hour(s))   EKG, 12 LEAD, INITIAL    Collection Time: 08/14/19  3:21 PM   Result Value Ref Range    Ventricular Rate 113 BPM    Atrial Rate 113 BPM    P-R Interval 130 ms    QRS Duration 78 ms    Q-T Interval 328 ms    QTC Calculation (Bezet) 449 ms    Calculated P Axis 18 degrees    Calculated R Axis 42 degrees    Calculated T Axis 15 degrees    Diagnosis       Sinus tachycardia with premature atrial complexes  Nonspecific ST abnormality RSR' or QR pattern in V1 suggests right   ventricular conduction delay  When compared with ECG of 06-NOV-2018 13:39,  Vent.  rate has increased BY  48 BPM  ST now depressed in Anterolateral leads  Confirmed by Graciela Quinn M.D., Brennen Angelo ([id number]) on 8/14/2019 4:29:59 PM     URINALYSIS W/MICROSCOPIC    Collection Time: 08/14/19  3:29 PM   Result Value Ref Range    Color YELLOW/STRAW      Appearance CLOUDY (A) CLEAR      Specific gravity 1.012 1.003 - 1.030      pH (UA) 6.0 5.0 - 8.0      Protein NEGATIVE  NEG mg/dL    Glucose NEGATIVE  NEG mg/dL    Ketone 80 (A) NEG mg/dL    Bilirubin NEGATIVE  NEG      Blood SMALL (A) NEG      Urobilinogen 1.0 0.2 - 1.0 EU/dL    Nitrites POSITIVE (A) NEG      Leukocyte Esterase MODERATE (A) NEG      WBC  0 - 4 /hpf    RBC 0-5 0 - 5 /hpf    Epithelial cells FEW FEW /lpf    Bacteria 2+ (A) NEG /hpf   URINE CULTURE HOLD SAMPLE    Collection Time: 08/14/19  3:29 PM   Result Value Ref Range    Urine culture hold        URINE ON HOLD IN MICROBIOLOGY DEPT FOR 3 DAYS. IF UNPRESERVED URINE IS SUBMITTED, IT CANNOT BE USED FOR ADDITIONAL TESTING AFTER 24 HRS, RECOLLECTION WILL BE REQUIRED. SAMPLES BEING HELD    Collection Time: 08/14/19  3:29 PM   Result Value Ref Range    SAMPLES BEING HELD 1 RED 1 BLUE     COMMENT        Add-on orders for these samples will be processed based on acceptable specimen integrity and analyte stability, which may vary by analyte.    CULTURE, BLOOD    Collection Time: 08/14/19  3:29 PM   Result Value Ref Range    Special Requests: NO SPECIAL REQUESTS      Culture result: NO GROWTH AFTER 13 HOURS     CULTURE, BLOOD    Collection Time: 08/14/19  3:29 PM   Result Value Ref Range    Special Requests: NO SPECIAL REQUESTS      Culture result: NO GROWTH AFTER 13 HOURS     METABOLIC PANEL, COMPREHENSIVE    Collection Time: 08/14/19  3:29 PM   Result Value Ref Range    Sodium 139 136 - 145 mmol/L    Potassium 4.7 3.5 - 5.1 mmol/L    Chloride 102 97 - 108 mmol/L    CO2 25 21 - 32 mmol/L    Anion gap 12 5 - 15 mmol/L    Glucose 112 (H) 65 - 100 mg/dL    BUN 9 6 - 20 MG/DL    Creatinine 0.83 0.55 - 1.02 MG/DL    BUN/Creatinine ratio 11 (L) 12 - 20      GFR est AA >60 >60 ml/min/1.73m2    GFR est non-AA >60 >60 ml/min/1.73m2    Calcium 9.7 8.5 - 10.1 MG/DL    Bilirubin, total 1.6 (H) 0.2 - 1.0 MG/DL    ALT (SGPT) 31 12 - 78 U/L    AST (SGOT) 38 (H) 15 - 37 U/L    Alk. phosphatase 85 45 - 117 U/L    Protein, total 6.6 6.4 - 8.2 g/dL    Albumin 3.3 (L) 3.5 - 5.0 g/dL    Globulin 3.3 2.0 - 4.0 g/dL    A-G Ratio 1.0 (L) 1.1 - 2.2     CBC WITH AUTOMATED DIFF    Collection Time: 08/14/19  3:29 PM   Result Value Ref Range    WBC 8.7 3.6 - 11.0 K/uL    RBC 4.08 3.80 - 5.20 M/uL    HGB 13.3 11.5 - 16.0 g/dL    HCT 42.9 35.0 - 47.0 %    .1 (H) 80.0 - 99.0 FL    MCH 32.6 26.0 - 34.0 PG    MCHC 31.0 30.0 - 36.5 g/dL    RDW 14.6 (H) 11.5 - 14.5 %    PLATELET 600 (L) 912 - 400 K/uL    MPV 9.9 8.9 - 12.9 FL    NRBC 0.0 0  WBC    ABSOLUTE NRBC 0.00 0.00 - 0.01 K/uL    NEUTROPHILS 74 32 - 75 %    LYMPHOCYTES 16 12 - 49 %    MONOCYTES 10 5 - 13 %    EOSINOPHILS 1 0 - 7 %    BASOPHILS 0 0 - 1 %    IMMATURE GRANULOCYTES 0 0.0 - 0.5 %    ABS. NEUTROPHILS 6.4 1.8 - 8.0 K/UL    ABS. LYMPHOCYTES 1.4 0.8 - 3.5 K/UL    ABS. MONOCYTES 0.8 0.0 - 1.0 K/UL    ABS. EOSINOPHILS 0.0 0.0 - 0.4 K/UL    ABS. BASOPHILS 0.0 0.0 - 0.1 K/UL    ABS. IMM.  GRANS. 0.0 0.00 - 0.04 K/UL    DF AUTOMATED     CULTURE, URINE    Collection Time: 08/14/19  3:29 PM   Result Value Ref Range    Special Requests: NO SPECIAL REQUESTS      [place] Count >100,000  COLONIES/mL        Culture result: GRAM NEGATIVE RODS (A)     TROPONIN I    Collection Time: 08/14/19  3:29 PM   Result Value Ref Range    Troponin-I, Qt. 0.11 (H) <0.05 ng/mL   POC LACTIC ACID    Collection Time: 08/14/19  3:30 PM   Result Value Ref Range    Lactic Acid (POC) 2.09 (HH) 0.40 - 2.00 mmol/L   TROPONIN I    Collection Time: 08/14/19  8:00 PM   Result Value Ref Range    Troponin-I, Qt. 0.14 (H) <0.05 ng/mL   POC LACTIC ACID    Collection Time: 08/14/19  8:04 PM   Result Value Ref Range    Lactic Acid (POC) 1.74 0.40 - 2.00 mmol/L   TROPONIN I    Collection Time: 08/15/19  3:22 AM   Result Value Ref Range    Troponin-I, Qt. 0.14 (H) <0.05 ng/mL Assessment and Plan: This is a 80 yof with MMP including CAD, AF, dementia here with likely sepsis from urological source. Elevated troponin is likely demand ischemia related to presenting issue. She is most appropriate for medical management. No need to check more troponins  Echo from last year is sufficient  Would continue on her current medical regimen  No further IP cardiac plans    Signing off, please call with questions.  Thank you for this consult     Assessment:

## 2019-08-15 NOTE — PROGRESS NOTES
Admission Medication Reconciliation:    Information obtained from:  Patient's son, Elian Cavanaugh, over the phone  RxQuery data available¹:  YES    Comments/Recommendations: Updated PTA meds/reviewed patient's allergies. 1)  Read through Ms. Manning's list of home medications over the phone with her son, Elian Cavanaugh, who seems to be a reliable historian. He is the one who helps her with her meds at home. She has not had any food, water or medications since Monday evening per his report. Her Fentanyl patch is due to be changed this evening. 2)  Medication changes (since last review): Added  - none    Adjusted  - Tramadol from BID prn to q6h ptn    Removed  - none     ¹RxQuery pharmacy benefit data reflects medications filled and processed through the patient's insurance, however   this data does NOT capture whether the medication was picked up or is currently being taken by the patient. Allergies:  Nsaids (non-steroidal anti-inflammatory drug); Adhesive tape-silicones; Cymbalta [duloxetine]; Digoxin; Gluten; Macrobid [nitrofurantoin monohyd/m-cryst]; Novocain [procaine]; Sulfa (sulfonamide antibiotics); Atenolol; Codeine; and Phenergan [promethazine]    Significant PMH/Disease States:   Past Medical History:   Diagnosis Date    Arrhythmia     A-FIB    Arthritis     CAD (coronary artery disease) 1989    MI    Congestive heart failure (CHF) (HCC)     GERD (gastroesophageal reflux disease)     Gluten intolerance     Heart failure (HCC)     CHF    Polymyalgia (HCC)      Chief Complaint for this Admission:    Chief Complaint   Patient presents with    Nausea    Vomiting    Altered mental status     Prior to Admission Medications:   Prior to Admission Medications   Prescriptions Last Dose Informant Patient Reported? Taking?   aspirin 81 mg chewable tablet   No Yes   Sig: Take 1 Tab by mouth daily. atorvastatin (LIPITOR) 40 mg tablet   Yes Yes   Sig: Take 40 mg by mouth daily.    esomeprazole (NEXIUM) 40 mg capsule Yes Yes   Sig: Take 40 mg by mouth Daily (before breakfast). fentaNYL (DURAGESIC) 25 mcg/hr PATCH   Yes Yes   Si Patch by TransDERmal route every seventy-two (72) hours. gabapentin (NEURONTIN) 300 mg capsule 2019 at pm  No Yes   Sig: Take 2 Caps by mouth three (3) times daily. Patient taking differently: Take 600 mg by mouth two (2) times a day. metoprolol tartrate (LOPRESSOR) 50 mg tablet 2019 at pm  No Yes   Sig: Take 1.5 Tabs by mouth two (2) times a day. mirtazapine (REMERON) 15 mg tablet 2019 at pm  No Yes   Sig: Take 1 Tab by mouth nightly. nitroglycerin (NITROSTAT) 0.4 mg SL tablet   No Yes   Si Tab by SubLINGual route every five (5) minutes as needed for Chest Pain. ondansetron (ZOFRAN ODT) 8 mg disintegrating tablet 2019 at pm  No Yes   Sig: Take 1 Tab by mouth Before breakfast, lunch, and dinner. polyethylene glycol (MIRALAX) 17 gram/dose powder   Yes Yes   Sig: Take 17 g by mouth daily as needed. predniSONE (DELTASONE) 10 mg tablet 2019 at am  No Yes   Sig: Take 10 mg by mouth daily. senna-docusate (SENNA PLUS) 8.6-50 mg per tablet 2019 at pm  Yes Yes   Sig: Take 1 Tab by mouth two (2) times a day. topiramate (TOPAMAX) 25 mg tablet 2019 at am  No Yes   Sig: Take 1 Tab by mouth daily (with breakfast). traMADol (ULTRAM) 50 mg tablet   Yes Yes   Sig: Take 50 mg by mouth every six (6) hours as needed for Pain. Facility-Administered Medications: None     Please contact the main inpatient pharmacy with any questions or concerns at [phone] and we will direct you to the clinical pharmacist covering this patient's care while in-house.    Erica Leon, ELIZABETD

## 2019-08-15 NOTE — PROGRESS NOTES
Chart reviewed, discussed with nursing, pt not following commands, responds painfully when touched or  moved - per nursing/family decreased participation secondary to UTI - will continue to follow and complete PT eval when pt able to participate.    Francesco Hoang, PT

## 2019-08-15 NOTE — PROGRESS NOTES
Bedside shift change report given to jimmy (oncoming nurse) by alpesh sheldon (offgoing nurse). Report included the following information SBAR and Kardex.

## 2019-08-15 NOTE — PROGRESS NOTES
Spiritual Care Assessment/Progress Note  [facility]      NAME: Vanessa Chavarria      MRN: [id number]  AGE: 80 y.o. SEX: female  Gnosticism Affiliation: Moravian   Language: English     8/15/2019     Total Time (in minutes): 5     Spiritual Assessment begun in [facility] through conversation with:         [x]Patient        [] Family    [] Friend(s)        Reason for Consult: Initial visit     Spiritual beliefs: (Please include comment if needed)     [] Identifies with a cliff tradition:         [] Supported by a cliff community:            [] Claims no spiritual orientation:           [] Seeking spiritual identity:                [] Adheres to an individual form of spirituality:           [x] Not able to assess:  See comments below                         Identified resources for coping:      [] Prayer                               [] Music                  [] Guided Imagery     [] Family/friends                 [] Pet visits     [] Devotional reading                         [x] Unknown     [] Other                                             Interventions offered during this visit: (See comments for more details)    Patient Interventions: Initial visit           Plan of Care:     [] Support spiritual and/or cultural needs    [] Support AMD and/or advance care planning process      [] Support grieving process   [] Coordinate Rites and/or Rituals    [] Coordination with community clergy   [] No spiritual needs identified at this time   [] Detailed Plan of Care below (See Comments)  [] Make referral to Music Therapy  [] Make referral to Pet Therapy     [] Make referral to Addiction services  [] Make referral to [facility]  [] Make referral to Spiritual Care Partner  [] No future visits requested        [x] Follow up visits as needed     Comments: Pt appeared to be resting and staff was preparing to offer care. Unable to assess at this time. Pt is known to this  from previous hospitalization. She appreciates prayer. Chaplains are available for support as needed; please page at [phone].     Yvonne Pat, Palliative

## 2019-08-15 NOTE — PROGRESS NOTES
Occupational Therapy    Chart reviewed, referral received. Attempted to see pt. Pt did not respond to commands or attempts to participate in therapy. Pt kept eyes closed throughout attempts to converse with pt and engage pt in session. Pt grimaced and cried out in pain when RUE and BLE were gently touched/palpated. At time of visit, pt refusing to take tylenol from nursing staff. OT Evaluation aborted. Will continue to follow as appropriate. Ethelene Gauss

## 2019-08-15 NOTE — PROGRESS NOTES
[place] [facility], pt tachycardic, on remote tele, has cardiology consult for am     08/14/19 2108   Vitals   Temp 98.6 °F (37 °C)   Temp Source Axillary   Pulse (Heart Rate) (!) 112   Resp Rate 18   O2 Sat (%) 99 %   Level of Consciousness Alert   /85   MAP (Calculated) 100   BP 1 Location Right arm   MEWS Score 3   Oxygen Therapy   O2 Device Nasal cannula   O2 Flow Rate (L/min) 2 l/min

## 2019-08-15 NOTE — NURSE NAVIGATOR
[facility] [address], [address] [place] [address] Street  [phone]       Name: Domenic Kline  YOB: 1925    8/15/19,   12:45 pm     NOTE: [facility] is a PROVIDER (MD/NP) based interdisciplinary practice (RN, LCSW, Pharmacy, Dietician) for patient's who cannot access (or have a taxing effort) primary / speciality medical care in an office setting. \[facility]\"" is NOT [employer] but works with [address] [place] [address]. when there is a skilled need. Our MD/NPs are integral in Care Transitions; PLEASE CALL [phone] if this patient arrives in the Emergency Department or Hospital.        Admitting diagnosis:  Altered mentation, Nausea, vomiting and possible UTI    She has completed an AMD designating her Debra Hobbs  [phone] which directs for no life prolonging care in the context of imminent death. She has a DDNR on file    Made nurse visit to see patient in the hospital [facility]) after PCP recommendation for her to go to ED for evaluation and additional work up    Patient admitted on Wednesday, August 14, 2019, and is currently on the second floor. Upon arrival patient is in bed resting with eyes closed - she did not respond when nurse called name, or touched her. Patient is has IV access in left arm receiving IV fluids. This nurse spoke with  during visit.   Left my contact information for his to call with any questions, suport or need assistance from [facility] team for coordination of care and/or discharge planning

## 2019-08-15 NOTE — PROGRESS NOTES
Infiltrate of 20 reanna PIV in right AC; 100ml of [id number] infiltrated. Informed RN Julián Venegas in [facility].

## 2019-08-15 NOTE — PROGRESS NOTES
Problem: Sepsis: Day 2  Goal: Activity/Safety  Outcome: Progressing Towards Goal  Goal: Consults, if ordered  Outcome: Progressing Towards Goal  Goal: Nutrition/Diet  Outcome: Progressing Towards Goal  Goal: Discharge Planning  Outcome: Progressing Towards Goal  Goal: Medications  Outcome: Progressing Towards Goal

## 2019-08-15 NOTE — CONSULTS
Palliative Medicine Consult  Adonis: [phone] ([phone])    Patient Name: Juan Teran  YOB: 1925    Date of Initial Consult: 8/15/19  Reason for Consult: Care decisions  Requesting Provider: Dr. Kinsey Carney  Primary Care Physician: Rishi Thorne NP     SUMMARY:   Juan Teran is a 80 y. o. with a past history of Enteritis, diverticulosis of sigmoid colon,  a-fib, rheumatoid arthritis, polymyalgia rheumatica, CAD, debility, who was admitted on 8/14/2019 from the ED with a diagnosis of sepsis. She was found at home by home health nurse with fever, nausea/vomiting, urinary incontinence and acutely confused. At baseline she has been thriving under the care of her son with routine follow up by [facility] team.  She uses a walker back and forth into the bathroom and her wheelchair otherwise. She is on a fentanyl Td for chronic pain syndrome. Notably she has had multiple past admissions for acute onset confusion, the last in December she was found to have a UTI, but previous admissions were without definitive etiology. Her admission urine cx is currently growing >100,000 gram neg rods. Current medical issues leading to Palliative Medicine involvement include: clarification of care goals, continuity of care. PALLIATIVE DIAGNOSES:   1. Encephalopathy  2. Urinary tract infection- gram neg rods  3. Chronic pain syndrome dt diffuse arthralgias- on chronic opioid therapy  4. Debility       PLAN:   1. Pt known to me from prior admissions in 2018. 2. She appears to be encephalopathic this admission from a clear source-gram neg vega UTI. On past admissions her reasons for encephalopathy have been less clear. 3. I touched base with the [facility] team and reviewed latest progress notes from [employer] and IDT rounds. She has been exhibiting improvement in her condition and has been doing rather well despite her debility.   A&Ox3 at baseline, ambulates to the bathroom with her walker, otherwise utilizes a wheelchair. There are no major concerns from the team.     4. I did place a DNR order consistent with her DDNR on file as well as documentation from [address] [place] [address] team.  Informed Dr. Werner Padilla. 5. Otherwise her goals have been clear in the past for hospital readmission for potentially reversible conditions. 6. I am available if needed for follow up or to meet with patient's son if she doesn't exhibit improvement. 7. Agree with continuation of her fentanyl Td as prescribed at home. 8. Will follow peripherally for now. 9. Initial consult note routed to primary continuity provider and/or primary health care team members  10.  Communicated plan of care with: Palliative IDT, [place] [facility] Team     GOALS OF CARE / TREATMENT PREFERENCES:     GOALS OF CARE:  Patient/Health Care Proxy Stated Goals: Prolong life    TREATMENT PREFERENCES:   Code Status: DNR    Advance Care Planning:  [x] [facility] Interdisciplinary Team has updated the ACP Navigator with Health Care Decision Maker and Patient Ricardo Eisenberg[address]ev[address]: Aida Caraballo - [phone]    First Alternate Health Care Agent: Verito Mireles - Other Relative - [phone]  Advance Care Planning 10/12/2018   Patient's Healthcare Decision Maker is: Named in scanned ACP document   Primary Decision Maker Name Alma Weeks   Primary Decision Maker Phone Number [phone]   Primary Decision Maker Relationship to Patient Adult child   Secondary Decision Maker Name -   Secondary Decision [facility] Phone Number -   Secondary Decision Maker Relationship to Patient -   Confirm Advance Directive Yes, on file   Does the patient have other document types Do Not Resuscitate       Medical Interventions: Limited additional interventions     Other Instructions:   She has a DDNR on file  She has a healthcare power of  named without living will portion of AMD        Other:    As far as possible, the palliative care team has discussed with patient / health care proxy about goals of care / treatment preferences for patient. HISTORY:     History obtained from:  Extensive chart review, [facility] RN    CHIEF COMPLAINT: confusion    HPI/SUBJECTIVE:    The patient is:   [] Verbal and participatory  [x] Non-participatory due to:  Medical condition      Clinical Pain Assessment (nonverbal scale for severity on nonverbal patients): Activity (Movement): Lying quietly, normal position    Duration: for how long has pt been experiencing pain (e.g., 2 days, 1 month, years)  Frequency: how often pain is an issue (e.g., several times per day, once every few days, constant)     FUNCTIONAL ASSESSMENT:     Palliative Performance Scale (PPS):  PPS: 30       PSYCHOSOCIAL/SPIRITUAL SCREENING:     Palliative IDT has assessed this patient for cultural preferences / practices and a referral made as appropriate to needs (Cultural Services, Patient Advocacy, Ethics, etc.)    Any spiritual / Restoration concerns:  [] Yes /  [x] No    Caregiver Burnout:  [] Yes /  [] No /  [x] No Caregiver Present      Anticipatory grief assessment:   [x] Normal  / [] Maladaptive       ESAS Anxiety:      ESAS Depression:          REVIEW OF SYSTEMS:     Positive and pertinent negative findings in ROS are noted above in HPI. The following systems were [] reviewed / [x] unable to be reviewed as noted in HPI  Other findings are noted below. Systems: constitutional, ears/nose/mouth/throat, respiratory, gastrointestinal, genitourinary, musculoskeletal, integumentary, neurologic, psychiatric, endocrine. Positive findings noted below. Modified ESAS Completed by: provider     Drowsiness: 10                                      PHYSICAL EXAM:     From RN flowsheet:  Wt Readings from Last 3 Encounters:   08/15/19 59.4 kg (131 lb)   12/03/18 55.2 kg (121 lb 11.1 oz)   11/06/18 58.1 kg (128 lb)     Blood pressure 132/79, pulse 82, temperature 98.8 °F (37.1 °C), resp.  rate 16, height 5' 4\" (1.626 m), weight 59.4 kg (131 lb), SpO2 94 %. Pain Scale 1: Adult Nonverbal Pain Scale                    Last bowel movement, if known:     Constitutional: robust, elderly  female lying in bed; arouses to touch briefly, somnolent  Eyes: not assessed, closed  ENMT: no nasal discharge, moist mucous membranes  Cardiovascular: regular rhythm, distal pulses intact  Respiratory: breathing not labored, symmetric  Gastrointestinal: soft non-tender, +bowel sounds  Musculoskeletal: no deformity, pt moans / vocalizes loudly when blanket removed from feet w/o any palpation  Neurologic: somnolent, does not follow commands but does flutter eyelids and move limbs spontaneously to stimulation  Psychiatric: unable to assess       HISTORY:     Active Problems:    Sepsis ([facility] [address] (8/14/2019)      Past Medical History:   Diagnosis Date    Arrhythmia     A-FIB    Arthritis     CAD (coronary artery disease) 1989    MI    Congestive heart failure (CHF) (HCC)     GERD (gastroesophageal reflux disease)     Gluten intolerance     Heart failure (HCC)     CHF    Polymyalgia ([facility] [address]       Past Surgical History:   Procedure Laterality Date    CARDIAC SURG PROCEDURE UNLIST  1980'S    CARDIAC CATH    HX BACK SURGERY  1998, 2006, 2013    LUMBAR SPINE    HX CHOLECYSTECTOMY      [facility] HIP REPLAC[facility] Right 5/2015    Alia Escalanteummer HX TONSILLECTOMY  CHILD    HX UROLOGICAL      BLADDER SUSPENSION - MESH      Family History   Problem Relation Age of Onset    Other Mother         Intestinal obstruction    Cancer Father     Stroke Father     Heart Attack Brother     Cancer Brother     Cancer Brother     Anesth Problems Neg Hx       History reviewed, no pertinent family history.   Social History     Tobacco Use    Smoking status: Never Smoker    Smokeless tobacco: Never Used   Substance Use Topics    Alcohol use: No     Alcohol/week: 0.0 standard drinks     Allergies   Allergen Reactions    Adhesive Tape-Silicones Other (comments)     BLISTERS  Cymbalta [Duloxetine] Other (comments)     CONFUSION      Digoxin Nausea and Vomiting    Gluten Other (comments)     GLUTEN INTOLERANCE    Macrobid [Nitrofurantoin Monohyd/M-Cryst] Nausea and Vomiting    Novocain [Procaine] Other (comments)     Paralysis     Sulfa (Sulfonamide Antibiotics) Nausea and Vomiting    Atenolol Nausea and Vomiting    Codeine Other (comments)     Unable to swallow    Nsaids (Non-Steroidal Anti-Inflammatory Drug) Nausea and Vomiting    Phenergan [Promethazine] Other (comments)     \"loose my wits i go crazy\"      Current Facility-Administered Medications   Medication Dose Route Frequency    sodium chloride (NS) flush 5-10 mL  5-10 mL IntraVENous PRN    cefTRIAXone (ROCEPHIN) 1 g in 0.9% sodium chloride (MBP/ADV) 50 mL  1 g IntraVENous Q24H    0.9% sodium chloride infusion  75 mL/hr IntraVENous CONTINUOUS    sodium chloride (NS) flush 5-40 mL  5-40 mL IntraVENous Q8H    sodium chloride (NS) flush 5-40 mL  5-40 mL IntraVENous PRN    enoxaparin (LOVENOX) injection 40 mg  40 mg SubCUTAneous Q24H    ondansetron (ZOFRAN) injection 4 mg  4 mg IntraVENous Q4H PRN    acetaminophen (TYLENOL) solution 650 mg  650 mg Oral Q4H PRN    atorvastatin (LIPITOR) tablet 40 mg  40 mg Oral DAILY    pantoprazole (PROTONIX) tablet 40 mg  40 mg Oral ACB    fentaNYL (DURAGESIC) 25 mcg/hr patch 1 Patch  1 Patch TransDERmal Q72H    metoprolol tartrate (LOPRESSOR) tablet 75 mg  75 mg Oral BID    predniSONE (DELTASONE) tablet 10 mg  10 mg Oral DAILY    topiramate (TOPAMAX) tablet 25 mg  25 mg Oral DAILY WITH BREAKFAST    senna-docusate (PERICOLACE) 8.6-50 mg per tablet 1 Tab  1 Tab Oral BID    aspirin chewable tablet 81 mg  81 mg Oral DAILY    nitroglycerin (NITROLINGUAL) sublingual 0.4 mg/spray  1 Spray SubLINGual Q5MIN PRN          LAB AND IMAGING FINDINGS:     Lab Results   Component Value Date/Time    WBC 8.7 08/14/2019 03:29 PM    HGB 13.3 08/14/2019 03:29 PM    PLATELET 301 (L) 08/14/2019 03:29 PM     Lab Results   Component Value Date/Time    Sodium 139 08/14/2019 03:29 PM    Potassium 4.7 08/14/2019 03:29 PM    Chloride 102 08/14/2019 03:29 PM    CO2 25 08/14/2019 03:29 PM    BUN 9 08/14/2019 03:29 PM    Creatinine 0.83 08/14/2019 03:29 PM    Calcium 9.7 08/14/2019 03:29 PM    Magnesium 1.7 12/03/2018 03:27 AM    Phosphorus 3.5 12/03/2018 03:27 AM      Lab Results   Component Value Date/Time    AST (SGOT) 38 (H) 08/14/2019 03:29 PM    Alk. phosphatase 85 08/14/2019 03:29 PM    Protein, total 6.6 08/14/2019 03:29 PM    Albumin 3.3 (L) 08/14/2019 03:29 PM    Globulin 3.3 08/14/2019 03:29 PM     Lab Results   Component Value Date/Time    INR 1.1 10/13/2018 08:44 AM    Prothrombin time 11.3 (H) 10/13/2018 08:44 AM    aPTT 26.7 10/03/2018 08:20 AM      No results found for: IRON, FE, TIBC, IBCT, PSAT, FERR   No results found for: PH, PCO2, PO2  No components found for: [place]   Lab Results   Component Value Date/Time     12/02/2018 11:22 AM    CK - MB 2.7 12/02/2018 11:22 AM                Total time:   Counseling / coordination time, spent as noted above:   > 50% counseling / coordination?:     Prolonged service was provided for  []30 min   []75 min in face to face time in the presence of the patient, spent as noted above. Time Start:   Time End:   Note: this can only be billed with 55492 (initial) or 11008 (follow up). If multiple start / stop times, list each separately.

## 2019-08-15 NOTE — PROGRESS NOTES
Bedside shift change report given to Raiza Joseph (oncoming nurse) by Logan Truong (offgoing nurse). Report included the following information SBAR, Kardex and MAR.

## 2019-08-15 NOTE — WOUND CARE
Wound Consult:  New Patient Visit. Chart reviewed. Consulted for right lower leg. Spoke with patients nurses at change of shift and in with Toula Pleasure on coming nurse for patient and we assessed and provided care. Patient is resting on a Gerry bed with Isoflex mattress. Patient cries out with care; Bentley Small talking to her and comforting her. Assessment/Treatment: 
Patient largely incontinent at time of visit for urine and small formed stool. Right buttock - pale pink, blanching intact skin; ~ 2 x 1 cm area that appears as if it may blister; some movement of epidermis noted but remains closed at this time; POA MASD/possibly Stage 2 more friction / moisture related. Applied Z Guard ointment to both buttocks. Opposing left buttock is pale pink, blanching, intact. No redness to sacrum. Overall skin is in good condition especially if patient has chronic urinary incontinence. No rash or redness in groin noted. Right lower leg - largely ecchymotic to reddish discoloration that appears chronic - some pale white atrophied areas of skin; very fragile in appearance but no wounds noted; placed Mepitel One over lateral / posterior lower leg where skin appears most fragile to help maintain integrity. Left leg has discoloration red to rust colored but to less extent. No discoloration to heels. Ecchymosis and fragile skin on arms. Had IV infiltrate on right arm; edematous but no warmth or broken skin noted. Wound Recommendations: 
Z Guard protective barrier to buttocks to keep lightly coated. Mepitel One to posterior right lower leg to protect fragile skin - change weekly and as needed. Skin Care Recommendations: 1. Minimize friction/shear: minimize layers of linen/pads under patient. 2. Off load pressure/reposition: continue to turn and reposition approximately every 2 hours; float heels or use Prevalon boots; waffle cushion for sitting.  
3. Manage Moisture - keep skin folds dry; incontinence skin care and appropriate sized briefs. 4. Continue to monitor nutrition, pain, and skin risk scale, and skin assessment. Plan: 
Spoke with Dr. Luz Patrick regarding findings and obtained orders for treatment. We will continue to reassess routinely and as needed. Katlin Mata RN,[facility] Wound Healing Office [phone] Pager [phone]

## 2019-08-15 NOTE — PROGRESS NOTES
CM attempted to see the pt, pt sleeping, will attempt later and call the family member.      Tunde Jacobsen     [phone]

## 2019-08-15 NOTE — PROGRESS NOTES
Speech pathology note  Reviewed chart and discussed case with RN. Note SLP consulted due to patient refusing PO. Attempted to complete swallowing evaluation, however unable to evaluate swallowing as she refused PO by tightly closing her lips despite verbal/tactile cues and encouragement. Patient known to SLP service from past admission, during which patient with no oral/pharyngeal dysphagia, however participation was an issue. Unfortunately, nothing for SLP to add as patient is refusing PO intake. May benefit from non-oral route for medications. If concerns for oropharyngeal dysphagia or aspiration noted when patient agreeable to PO intake, please re-consult. Thank you.     Emelyn Chow, CCC-SLP

## 2019-08-15 NOTE — PROGRESS NOTES
Hospitalist Progress Note  Andrew Salamanca MD  Answering service: [phone] from in house phone        Date of Service:  8/15/2019  NAME:  Cely Ruiz  :  1925  MRN:  [id number]  PCP: Kodi Finney NP    Chief Complaint:   Chief Complaint   Patient presents with   Nena Joyce Nausea    Vomiting    Altered mental status         Admission Summary:     Cely Ruiz is a 80 y.o. female who presented with nausea, acute encephaloapthy    Interval history / Subjective:   Patient seen for Follow up of CC: sepsis  Patient seen and examined by the bedside  Labs, images and notes reviewed  Patient is comfortable, HR improved, mentation improving, understands my questions but refuses to answer my questions. No NVD while inpatient. Discussed with nursing staff, no acute issues overnight, orders reviewed. Assessment & Plan:     - sepsis, etiology unclear, probable UTI  UA abnormal, Urine cx pending  Cont rocephin  IVF     - acute Lactic Acidosis: due to sepsis  Resolved, cont IVF     - Acute encephalopathy, POA  Likely toxic metabolic  Patient is AAOX 3 at baseline as per son  CT head without acute pathology     - Nausea and vomiting, with history of severe OIC  CT abd/pelvis: no acute pathology  Zofran PRN  IVF  regular diet     - tachycardia, hypoxemia, POA  Etiology unclear, likely due to sepsis  CT PE neg  Tachycardia resolved     - Chronic pain syndrome and chronic opiate use  Resume Fentanyl patch, watch for excessive sedation.  If sedated, remove patch ASAP and administer narcan  As per son, it is due to be changed tonight     - Elevated troponin  EKG slightly abnormal with T wave inversion and tachycardia  Trend CE x3: mildly elevated  Consult cards, pending  Cont ASA, statins, lopressor  NTG PRN     - chronic diastolic CHF, NYHA II, without exacerbation: cont to monitor  - PAF: currently NSR, cont Lopressors, not on [address] due to fall risk  - general debility and unable to care for self: PT/OT/CM consult  - essential HTN: controlled, cont home meds  - HLP: cont home meds  - GERD: PPI  - chronic hypoxemic resp failure: O2 support    Code status: Full Code    DVT prophylaxis: Lovenox    Care Plan discussed with: Patient/Family and Nurse    Disposition: TBD    Hospital Problems  Date Reviewed: 10/16/2018          Codes Class Noted POA    Sepsis ([address] ICD-10-CM: A41.9  ICD-9-CM: 038.9, 995.91  2019 Unknown                Review of Systems:   Review of systems not obtained due to patient factors. confusion        Physical Examination:     General appearance: fatigued, uncooperative, no distress, appears stated age  Head: Normocephalic, without obvious abnormality, atraumatic  Lungs: clear to auscultation bilaterally, no rales, rhonchi or labored breathing  Heart: regular rate and rhythm, NM, NG, NR  Abdomen: soft, non-tender. Bowel sounds normal. No masses,  no organomegaly  Extremities: edema 1+  Neurologic: does not follow commands, moves all extremities voluntarily. Vital Signs:    Last 24hrs VS reviewed since prior progress note. Most recent are:    Visit Vitals  /89 (BP 1 Location: Right arm)   Pulse 66   Temp 97.3 °F (36.3 °C)   Resp 16   Ht 5' 4\" (1.626 m)   Wt 59.4 kg (131 lb)   SpO2 95%   BMI 22.49 kg/m²         Intake/Output Summary (Last 24 hours) at 8/15/2019 0915  Last data filed at 2019 1543  Gross per 24 hour   Intake    Output 200 ml   Net -200 ml        Tmax:  Temp (24hrs), Av.7 °F (37.1 °C), Min:97.3 °F (36.3 °C), Max:100.2 °F (37.9 °C)      Data Review:   Data reviewed by myself:  Ct Head Wo Cont    Result Date: 2019  EXAM: CT HEAD WO CONT INDICATION: confusion COMPARISON: 2018. CONTRAST: None. TECHNIQUE: Unenhanced CT of the head was performed using 5 mm images. Brain and bone windows were generated.   CT dose reduction was achieved through use of a standardized protocol tailored for this examination and automatic exposure control for dose modulation. FINDINGS: The ventricles and sulci are normal in size, shape and configuration and midline. Minimal low density within the periventricular white matter. . There is no intracranial hemorrhage, extra-axial collection, mass, mass effect or midline shift. The basilar cisterns are open. No acute infarct is identified. The bone windows demonstrate no abnormalities. There is near-complete opacification of the left maxillary sinus. IMPRESSION: 1. No acute intracranial abnormality 2. Left maxillary sinus sinus because of inflammatory change     Cta Chest W Or W Wo Cont    Result Date: 8/14/2019  INDICATION: Nausea and vomiting COMPARISON: December 2, 2018 and chest x-ray same day TECHNIQUE:  2.5 mm axial images were obtained from the bases to the lung apices after the intravenous administration of 100 cc of Isovue-370. Three-dimensional postprocessing was performed by the technologist with MIP reconstructions. Portal venous imaging was obtained through the abdomen and pelvis with sagittal and coronal reformats. Subsequent portal venous imaging of the abdomen and pelvis was performed with sagittal and coronal reformats. Oral contrast Was not administered CT dose reduction was achieved through use of a standardized protocol tailored for this examination and automatic exposure control for dose modulation. FINDINGS: THYROID: 3.1 cm nodule isthmus of the thyroid not significantly changed MEDIASTINUM: No mass or lymphadenopathy. CHARLOTTE: No mass or lymphadenopathy. THORACIC AORTA: No dissection or aneurysm. There are vascular calcifications MAIN PULMONARY ARTERY: Given motion artifact no large central pulmonary embolus TRACHEA/BRONCHI: Patent. ESOPHAGUS: No wall thickening or dilatation. HEART: Normal in size. PLEURA: Right hemidiaphragm is elevated. No pleural effusion. LUNGS: There is atelectasis bilaterally. There is nodular airspace disease in the left upper lobe LIVER: No focal lesion.  GALLBLADDER: Surgically absent SPLEEN: Not enlarged PANCREAS: No mass or ductal dilatation. ADRENALS: Unremarkable. KIDNEYS: No mass, calculus, or hydronephrosis. STOMACH: Unremarkable. SMALL BOWEL: No dilatation or wall thickening. COLON: Diverticulosis of the sigmoid colon no evidence of diverticulitis APPENDIX: Not identified PERITONEUM: No ascites or pneumoperitoneum. RETROPERITONEUM: Aorta is atherosclerotic but normal in caliber. No pathologic adenopathy REPRODUCTIVE ORGANS: Within normal limits URINARY BLADDER: No wall thickening BONES: Patient is scoliotic and osteopenic with multilevel degenerative change ADDITIONAL COMMENTS: There is contrast within the right arm due to extravasation     IMPRESSION: 1. No acute cardiopulmonary disease. No acute pulmonary embolus 2. Diverticulosis of the sigmoid colon without evidence of diverticulitis 3. Extravasation during initial injection with contrast in the soft tissues of the right arm. After extravasation the ER nurse was notified patient return to the emergency department for an additional IV and return for subsequent injection. Ct Abd Pelv W Cont    Result Date: 8/14/2019  INDICATION: Nausea and vomiting COMPARISON: December 2, 2018 and chest x-ray same day TECHNIQUE:  2.5 mm axial images were obtained from the bases to the lung apices after the intravenous administration of 100 cc of Isovue-370. Three-dimensional postprocessing was performed by the technologist with MIP reconstructions. Portal venous imaging was obtained through the abdomen and pelvis with sagittal and coronal reformats. Subsequent portal venous imaging of the abdomen and pelvis was performed with sagittal and coronal reformats. Oral contrast Was not administered CT dose reduction was achieved through use of a standardized protocol tailored for this examination and automatic exposure control for dose modulation.  FINDINGS: THYROID: 3.1 cm nodule isthmus of the thyroid not significantly changed MEDIASTINUM: No mass or lymphadenopathy. CHARLOTTE: No mass or lymphadenopathy. THORACIC AORTA: No dissection or aneurysm. There are vascular calcifications MAIN PULMONARY ARTERY: Given motion artifact no large central pulmonary embolus TRACHEA/BRONCHI: Patent. ESOPHAGUS: No wall thickening or dilatation. HEART: Normal in size. PLEURA: Right hemidiaphragm is elevated. No pleural effusion. LUNGS: There is atelectasis bilaterally. There is nodular airspace disease in the left upper lobe LIVER: No focal lesion. GALLBLADDER: Surgically absent SPLEEN: Not enlarged PANCREAS: No mass or ductal dilatation. ADRENALS: Unremarkable. KIDNEYS: No mass, calculus, or hydronephrosis. STOMACH: Unremarkable. SMALL BOWEL: No dilatation or wall thickening. COLON: Diverticulosis of the sigmoid colon no evidence of diverticulitis APPENDIX: Not identified PERITONEUM: No ascites or pneumoperitoneum. RETROPERITONEUM: Aorta is atherosclerotic but normal in caliber. No pathologic adenopathy REPRODUCTIVE ORGANS: Within normal limits URINARY BLADDER: No wall thickening BONES: Patient is scoliotic and osteopenic with multilevel degenerative change ADDITIONAL COMMENTS: There is contrast within the right arm due to extravasation     IMPRESSION: 1. No acute cardiopulmonary disease. No acute pulmonary embolus 2. Diverticulosis of the sigmoid colon without evidence of diverticulitis 3. Extravasation during initial injection with contrast in the soft tissues of the right arm. After extravasation the ER nurse was notified patient return to the emergency department for an additional IV and return for subsequent injection. Xr Chest Port    Result Date: 8/14/2019  INDICATION:  meets SIRS criteria EXAM: Chest single view. COMPARISON: 12/2/2018. FINDINGS: A single frontal view of the chest at 1532 hours shows poor inspiratory effort with bibasilar atelectasis, left greater than right, similar to the prior study. .  The heart, mediastinum and pulmonary vasculature are stable . The bony thorax is unremarkable for age, except for degenerative change and lower thoracic dextroscoliosis. . . IMPRESSION: Poor inspiratory effort with bibasilar atelectasis, similar to the prior study 12/2/2018. .  . No results found for: SDES  Lab Results   Component Value Date/Time    Culture result: NO GROWTH AFTER 13 HOURS 08/14/2019 03:29 PM    Culture result: NO GROWTH AFTER 13 HOURS 08/14/2019 03:29 PM    Culture result: ESCHERICHIA COLI (A) 12/02/2018 05:18 PM     All Micro Results     Procedure Component Value Units Date/Time    CULTURE, BLOOD [[id number]] Collected:  08/14/19 1529    Order Status:  Completed Specimen:  Blood Updated:  08/15/19 0519     Special Requests: NO SPECIAL REQUESTS        Culture result: NO GROWTH AFTER 13 HOURS       CULTURE, BLOOD [[id number]] Collected:  08/14/19 1529    Order Status:  Completed Specimen:  Blood Updated:  08/15/19 0519     Special Requests: NO SPECIAL REQUESTS        Culture result: NO GROWTH AFTER 13 HOURS       CULTURE, URINE [[id number]] Collected:  08/14/19 1529    Order Status:  Completed Specimen:  Cath Urine Updated:  08/14/19 1723    URINE CULTURE HOLD SAMPLE [[id number]] Collected:  08/14/19 1529    Order Status:  Completed Specimen:  Urine from Serum Updated:  08/14/19 1541     Urine culture hold       URINE ON HOLD IN MICROBIOLOGY DEPT FOR 3 DAYS. IF UNPRESERVED URINE IS SUBMITTED, IT CANNOT BE USED FOR ADDITIONAL TESTING AFTER 24 HRS, RECOLLECTION WILL BE REQUIRED. Labs: reviewed by myself. Recent Labs     08/14/19 1529   WBC 8.7   HGB 13.3   HCT 42.9   *     Recent Labs     08/14/19 1529      K 4.7      CO2 25   BUN 9   CREA 0.83   *   CA 9.7     Recent Labs     08/14/19 1529   SGOT 38*   ALT 31   AP 85   TBILI 1.6*   TP 6.6   ALB 3.3*   GLOB 3.3     No results for input(s): INR, PTP, APTT in the last 72 hours.     No lab exists for component: INREXT   No results for input(s): FE, TIBC, PSAT, FERR in the last 72 hours. No results found for: FOL, RBCF   No results for input(s): PH, PCO2, PO2 in the last 72 hours.   Recent Labs     08/15/19  0322 08/14/19 2000 08/14/19  1529   TROIQ 0.14* 0.14* 0.11*     Lab Results   Component Value Date/Time    Cholesterol, total 171 12/19/2018 02:59 AM    HDL Cholesterol 78 12/19/2018 02:59 AM    LDL, calculated 71 12/19/2018 02:59 AM    Triglyceride 111 12/19/2018 02:59 AM     Lab Results   Component Value Date/Time    Glucose (POC) 191 (H) 10/12/2018 12:52 PM    Glucose (POC) 89 10/03/2018 07:30 AM     Lab Results   Component Value Date/Time    Color YELLOW/STRAW 08/14/2019 03:29 PM    Appearance CLOUDY (A) 08/14/2019 03:29 PM    Specific gravity 1.012 08/14/2019 03:29 PM    Specific gravity 1.025 10/03/2018 06:18 AM    pH (UA) 6.0 08/14/2019 03:29 PM    Protein NEGATIVE  08/14/2019 03:29 PM    Glucose NEGATIVE  08/14/2019 03:29 PM    Ketone 80 (A) 08/14/2019 03:29 PM    Bilirubin NEGATIVE  08/14/2019 03:29 PM    Urobilinogen 1.0 08/14/2019 03:29 PM    Nitrites POSITIVE (A) 08/14/2019 03:29 PM    Leukocyte Esterase MODERATE (A) 08/14/2019 03:29 PM    Epithelial cells FEW 08/14/2019 03:29 PM    Bacteria 2+ (A) 08/14/2019 03:29 PM    WBC  08/14/2019 03:29 PM    RBC 0-5 08/14/2019 03:29 PM         Medications Reviewed:     Current Facility-Administered Medications   Medication Dose Route Frequency    sodium chloride (NS) flush 5-10 mL  5-10 mL IntraVENous PRN    cefTRIAXone (ROCEPHIN) 1 g in 0.9% sodium chloride (MBP/ADV) 50 mL  1 g IntraVENous Q24H    0.9% sodium chloride infusion  125 mL/hr IntraVENous CONTINUOUS    sodium chloride (NS) flush 5-40 mL  5-40 mL IntraVENous Q8H    sodium chloride (NS) flush 5-40 mL  5-40 mL IntraVENous PRN    enoxaparin (LOVENOX) injection 40 mg  40 mg SubCUTAneous Q24H    ondansetron (ZOFRAN) injection 4 mg  4 mg IntraVENous Q4H PRN    acetaminophen (TYLENOL) solution 650 mg  650 mg Oral Q4H PRN    atorvastatin (LIPITOR) tablet 40 mg  40 mg Oral DAILY    pantoprazole (PROTONIX) tablet 40 mg  40 mg Oral ACB    fentaNYL (DURAGESIC) 25 mcg/hr patch 1 Patch  1 Patch TransDERmal Q72H    metoprolol tartrate (LOPRESSOR) tablet 75 mg  75 mg Oral BID    predniSONE (DELTASONE) tablet 10 mg  10 mg Oral DAILY    topiramate (TOPAMAX) tablet 25 mg  25 mg Oral DAILY WITH BREAKFAST    senna-docusate (PERICOLACE) 8.6-50 mg per tablet 1 Tab  1 Tab Oral BID    aspirin chewable tablet 81 mg  81 mg Oral DAILY    nitroglycerin (NITROLINGUAL) sublingual 0.4 mg/spray  1 Spray SubLINGual Q5MIN PRN     ______________________________________________________________________  EXPECTED LENGTH OF STAY: - - -  ACTUAL LENGTH OF STAY:          1                 Jackie Rand MD

## 2019-08-16 LAB
BACTERIA SPEC CULT: ABNORMAL
CC UR VC: ABNORMAL
SERVICE CMNT-IMP: ABNORMAL

## 2019-08-16 PROCEDURE — 77010033678 HC OXYGEN DAILY

## 2019-08-16 PROCEDURE — 74011250636 HC RX REV CODE- 250/636: Performed by: INTERNAL MEDICINE

## 2019-08-16 PROCEDURE — 74011000258 HC RX REV CODE- 258: Performed by: STUDENT IN AN ORGANIZED HEALTH CARE EDUCATION/TRAINING PROGRAM

## 2019-08-16 PROCEDURE — 74011250636 HC RX REV CODE- 250/636: Performed by: STUDENT IN AN ORGANIZED HEALTH CARE EDUCATION/TRAINING PROGRAM

## 2019-08-16 PROCEDURE — 65660000000 HC RM CCU STEPDOWN

## 2019-08-16 PROCEDURE — 74011250637 HC RX REV CODE- 250/637: Performed by: INTERNAL MEDICINE

## 2019-08-16 PROCEDURE — 74011636637 HC RX REV CODE- 636/637: Performed by: INTERNAL MEDICINE

## 2019-08-16 RX ORDER — TRAMADOL HYDROCHLORIDE 50 MG/1
50 TABLET ORAL
Status: DISCONTINUED | OUTPATIENT
Start: 2019-08-16 | End: 2019-08-26 | Stop reason: HOSPADM

## 2019-08-16 RX ADMIN — PREDNISONE 10 MG: 10 TABLET ORAL at 08:28

## 2019-08-16 RX ADMIN — METOPROLOL TARTRATE 75 MG: 50 TABLET ORAL at 18:16

## 2019-08-16 RX ADMIN — ATORVASTATIN CALCIUM 40 MG: 20 TABLET, FILM COATED ORAL at 08:28

## 2019-08-16 RX ADMIN — Medication 10 ML: at 22:15

## 2019-08-16 RX ADMIN — ENOXAPARIN SODIUM 40 MG: 40 INJECTION SUBCUTANEOUS at 18:17

## 2019-08-16 RX ADMIN — Medication 10 ML: at 06:39

## 2019-08-16 RX ADMIN — METOPROLOL TARTRATE 75 MG: 50 TABLET ORAL at 08:28

## 2019-08-16 RX ADMIN — SENNOSIDES, DOCUSATE SODIUM 1 TABLET: 50; 8.6 TABLET, FILM COATED ORAL at 18:16

## 2019-08-16 RX ADMIN — ASPIRIN 81 MG CHEWABLE TABLET 81 MG: 81 TABLET CHEWABLE at 08:28

## 2019-08-16 RX ADMIN — TOPIRAMATE 25 MG: 25 TABLET, FILM COATED ORAL at 06:39

## 2019-08-16 RX ADMIN — SENNOSIDES, DOCUSATE SODIUM 1 TABLET: 50; 8.6 TABLET, FILM COATED ORAL at 08:28

## 2019-08-16 RX ADMIN — CEFTRIAXONE 1 G: 1 INJECTION, POWDER, FOR SOLUTION INTRAMUSCULAR; INTRAVENOUS at 16:22

## 2019-08-16 RX ADMIN — SODIUM CHLORIDE 75 ML/HR: 900 INJECTION, SOLUTION INTRAVENOUS at 22:14

## 2019-08-16 NOTE — PROGRESS NOTES
Hospitalist Progress Note  Serafina Kehr, MD  Answering service: [phone] from in house phone        Date of Service:  2019  NAME:  Juan Diego Garcia  :  1925  MRN:  [id number]  PCP: Karen Fraire NP    Chief Complaint:   Chief Complaint   Patient presents with   Christina Hale Nausea    Vomiting    Altered mental status         Admission Summary:     Juan Diego Garcia is a 80 y.o. female who presented with nausea, acute encephaloapthy    Interval history / Subjective:   Patient seen for Follow up of CC: sepsis  Patient seen and examined by the bedside  Labs, images and notes reviewed    Patient is still lethartic, sleeping most of her time, open eyes or simple questions, but back to sleep very quickly, no significant improvement compared to yesterday per nurse. Discussed with nursing staff, no acute issues overnight, orders reviewed.      Assessment & Plan:     - sepsis, etiology unclear, probable UTI  UA abnormal, Urine cx E COLI   Cont rocephin  IVF     - acute Lactic Acidosis: due to sepsis  Resolved, cont IVF     - Acute encephalopathy, POA  Likely toxic metabolic  Patient is AAOX 3 at baseline as per son  CT head without acute pathology  Still not baseline yet      - Nausea and vomiting, with history of severe OIC  CT abd/pelvis: no acute pathology  Zofran PRN  IVF  Advance diet if tolerate      - tachycardia, hypoxemia, POA  Etiology unclear, likely due to sepsis  CT PE neg  Tachycardia resolved     - Chronic pain syndrome and chronic opiate use  On  Fentanyl patch,   Will temp dc fentanyl patch due to her mental status changes   Prn tramadol if in pain,   May restart smaller dose when mental status back to normal      - Elevated troponin  EKG slightly abnormal with T wave inversion and tachycardia  Trend CE x3: mildly elevated  Consult cards, pending  Cont ASA, statins, lopressor  NTG PRN     - chronic diastolic CHF, NYHA II, without exacerbation: cont to monitor  - PAF: currently NSR, cont Lopressors, not on [address] due to fall risk  - general debility and unable to care for self: PT/OT/CM consult  - essential HTN: controlled, cont home meds  - HLP: cont home meds  - GERD: PPI  - chronic hypoxemic resp failure: O2 support    Code status: DNR    DVT prophylaxis: Lovenox    Care Plan discussed with: Patient/Family and Nurse    Disposition: TBD    Hospital Problems  Date Reviewed: 10/16/2018          Codes Class Noted POA    Sepsis ([facility] [address] ICD-10-CM: A41.9  ICD-9-CM: 038.9, 995.91  2019 Unknown                Review of Systems:   Review of systems not obtained due to patient factors. confusion        Physical Examination:     General appearance: fatigued, uncooperative, no distress, appears stated age  Head: Normocephalic, without obvious abnormality, atraumatic  Lungs: clear to auscultation bilaterally, no rales, rhonchi or labored breathing  Heart: regular rate and rhythm, NM, NG, NR  Abdomen: soft, non-tender. Bowel sounds normal. No masses,  no organomegaly  Extremities: edema 1+  Neurologic: does not follow commands, moves all extremities voluntarily. Vital Signs:    Last 24hrs VS reviewed since prior progress note. Most recent are:    Visit Vitals  /72 (BP 1 Location: Left arm, BP Patient Position: At rest)   Pulse (!) 105   Temp 98.7 °F (37.1 °C)   Resp 16   Ht 5' 4\" (1.626 m)   Wt 61.9 kg (136 lb 6.4 oz)   SpO2 93%   BMI 23.41 kg/m²       No intake or output data in the 24 hours ending 19 1314     Tmax:  Temp (24hrs), Av.1 °F (37.3 °C), Min:98.7 °F (37.1 °C), Max:99.6 °F (37.6 °C)      Data Review:   Data reviewed by myself:  Ct Head Wo Cont    Result Date: 2019  EXAM: CT HEAD WO CONT INDICATION: confusion COMPARISON: 2018. CONTRAST: None. TECHNIQUE: Unenhanced CT of the head was performed using 5 mm images. Brain and bone windows were generated.   CT dose reduction was achieved through use of a standardized protocol tailored for this examination and automatic exposure control for dose modulation. FINDINGS: The ventricles and sulci are normal in size, shape and configuration and midline. Minimal low density within the periventricular white matter. . There is no intracranial hemorrhage, extra-axial collection, mass, mass effect or midline shift. The basilar cisterns are open. No acute infarct is identified. The bone windows demonstrate no abnormalities. There is near-complete opacification of the left maxillary sinus. IMPRESSION: 1. No acute intracranial abnormality 2. Left maxillary sinus sinus because of inflammatory change     Cta Chest W Or W Wo Cont    Result Date: 8/14/2019  INDICATION: Nausea and vomiting COMPARISON: December 2, 2018 and chest x-ray same day TECHNIQUE:  2.5 mm axial images were obtained from the bases to the lung apices after the intravenous administration of 100 cc of Isovue-370. Three-dimensional postprocessing was performed by the technologist with MIP reconstructions. Portal venous imaging was obtained through the abdomen and pelvis with sagittal and coronal reformats. Subsequent portal venous imaging of the abdomen and pelvis was performed with sagittal and coronal reformats. Oral contrast Was not administered CT dose reduction was achieved through use of a standardized protocol tailored for this examination and automatic exposure control for dose modulation. FINDINGS: THYROID: 3.1 cm nodule isthmus of the thyroid not significantly changed MEDIASTINUM: No mass or lymphadenopathy. CHARLOTTE: No mass or lymphadenopathy. THORACIC AORTA: No dissection or aneurysm. There are vascular calcifications MAIN PULMONARY ARTERY: Given motion artifact no large central pulmonary embolus TRACHEA/BRONCHI: Patent. ESOPHAGUS: No wall thickening or dilatation. HEART: Normal in size. PLEURA: Right hemidiaphragm is elevated. No pleural effusion. LUNGS: There is atelectasis bilaterally.  There is nodular airspace disease in the left upper lobe LIVER: No focal lesion. GALLBLADDER: Surgically absent SPLEEN: Not enlarged PANCREAS: No mass or ductal dilatation. ADRENALS: Unremarkable. KIDNEYS: No mass, calculus, or hydronephrosis. STOMACH: Unremarkable. SMALL BOWEL: No dilatation or wall thickening. COLON: Diverticulosis of the sigmoid colon no evidence of diverticulitis APPENDIX: Not identified PERITONEUM: No ascites or pneumoperitoneum. RETROPERITONEUM: Aorta is atherosclerotic but normal in caliber. No pathologic adenopathy REPRODUCTIVE ORGANS: Within normal limits URINARY BLADDER: No wall thickening BONES: Patient is scoliotic and osteopenic with multilevel degenerative change ADDITIONAL COMMENTS: There is contrast within the right arm due to extravasation     IMPRESSION: 1. No acute cardiopulmonary disease. No acute pulmonary embolus 2. Diverticulosis of the sigmoid colon without evidence of diverticulitis 3. Extravasation during initial injection with contrast in the soft tissues of the right arm. After extravasation the ER nurse was notified patient return to the emergency department for an additional IV and return for subsequent injection. Ct Abd Pelv W Cont    Result Date: 8/14/2019  INDICATION: Nausea and vomiting COMPARISON: December 2, 2018 and chest x-ray same day TECHNIQUE:  2.5 mm axial images were obtained from the bases to the lung apices after the intravenous administration of 100 cc of Isovue-370. Three-dimensional postprocessing was performed by the technologist with MIP reconstructions. Portal venous imaging was obtained through the abdomen and pelvis with sagittal and coronal reformats. Subsequent portal venous imaging of the abdomen and pelvis was performed with sagittal and coronal reformats. Oral contrast Was not administered CT dose reduction was achieved through use of a standardized protocol tailored for this examination and automatic exposure control for dose modulation.  FINDINGS: THYROID: 3.1 cm nodule isthmus of the thyroid not significantly changed MEDIASTINUM: No mass or lymphadenopathy. CHARLOTTE: No mass or lymphadenopathy. THORACIC AORTA: No dissection or aneurysm. There are vascular calcifications MAIN PULMONARY ARTERY: Given motion artifact no large central pulmonary embolus TRACHEA/BRONCHI: Patent. ESOPHAGUS: No wall thickening or dilatation. HEART: Normal in size. PLEURA: Right hemidiaphragm is elevated. No pleural effusion. LUNGS: There is atelectasis bilaterally. There is nodular airspace disease in the left upper lobe LIVER: No focal lesion. GALLBLADDER: Surgically absent SPLEEN: Not enlarged PANCREAS: No mass or ductal dilatation. ADRENALS: Unremarkable. KIDNEYS: No mass, calculus, or hydronephrosis. STOMACH: Unremarkable. SMALL BOWEL: No dilatation or wall thickening. COLON: Diverticulosis of the sigmoid colon no evidence of diverticulitis APPENDIX: Not identified PERITONEUM: No ascites or pneumoperitoneum. RETROPERITONEUM: Aorta is atherosclerotic but normal in caliber. No pathologic adenopathy REPRODUCTIVE ORGANS: Within normal limits URINARY BLADDER: No wall thickening BONES: Patient is scoliotic and osteopenic with multilevel degenerative change ADDITIONAL COMMENTS: There is contrast within the right arm due to extravasation     IMPRESSION: 1. No acute cardiopulmonary disease. No acute pulmonary embolus 2. Diverticulosis of the sigmoid colon without evidence of diverticulitis 3. Extravasation during initial injection with contrast in the soft tissues of the right arm. After extravasation the ER nurse was notified patient return to the emergency department for an additional IV and return for subsequent injection. Xr Chest Port    Result Date: 8/14/2019  INDICATION:  meets SIRS criteria EXAM: Chest single view. COMPARISON: 12/2/2018. FINDINGS: A single frontal view of the chest at 1532 hours shows poor inspiratory effort with bibasilar atelectasis, left greater than right, similar to the prior study. .  The heart, mediastinum and pulmonary vasculature are stable . The bony thorax is unremarkable for age, except for degenerative change and lower thoracic dextroscoliosis. . . IMPRESSION: Poor inspiratory effort with bibasilar atelectasis, similar to the prior study 12/2/2018. .  . No results found for: SDES  Lab Results   Component Value Date/Time    Culture result: NO GROWTH 2 DAYS 08/14/2019 03:29 PM    Culture result: NO GROWTH 2 DAYS 08/14/2019 03:29 PM    Culture result: ESCHERICHIA COLI (A) 08/14/2019 03:29 PM     All Micro Results     Procedure Component Value Units Date/Time    CULTURE, URINE [[id number]]  (Abnormal)  (Susceptibility) Collected:  08/14/19 1529    Order Status:  Completed Specimen:  Cath Urine Updated:  08/16/19 1058     Special Requests: NO SPECIAL REQUESTS        [place] Count --        >100,000  COLONIES/mL       Culture result: ESCHERICHIA COLI       CULTURE, BLOOD [[id number]] Collected:  08/14/19 1529    Order Status:  Completed Specimen:  Blood Updated:  08/16/19 0458     Special Requests: NO SPECIAL REQUESTS        Culture result: NO GROWTH 2 DAYS       CULTURE, BLOOD [[id number]] Collected:  08/14/19 1529    Order Status:  Completed Specimen:  Blood Updated:  08/16/19 0458     Special Requests: NO SPECIAL REQUESTS        Culture result: NO GROWTH 2 DAYS       URINE CULTURE HOLD SAMPLE [[id number]] Collected:  08/14/19 1529    Order Status:  Completed Specimen:  Urine from Serum Updated:  08/14/19 1541     Urine culture hold       URINE ON HOLD IN MICROBIOLOGY DEPT FOR 3 DAYS. IF UNPRESERVED URINE IS SUBMITTED, IT CANNOT BE USED FOR ADDITIONAL TESTING AFTER 24 HRS, RECOLLECTION WILL BE REQUIRED. Labs: reviewed by myself.      Recent Labs     08/14/19 1529   WBC 8.7   HGB 13.3   HCT 42.9   *     Recent Labs     08/14/19 1529      K 4.7      CO2 25   BUN 9   CREA 0.83   *   CA 9.7     Recent Labs     08/14/19 1529   SGOT 38*   ALT 31   AP 85   TBILI 1.6*   TP 6.6 ALB 3.3*   GLOB 3.3     No results for input(s): INR, PTP, APTT in the last 72 hours. No lab exists for component: INREXT, INREXT   No results for input(s): FE, TIBC, PSAT, FERR in the last 72 hours. No results found for: FOL, RBCF   No results for input(s): PH, PCO2, PO2 in the last 72 hours.   Recent Labs     08/15/19  0322 08/14/19 2000 08/14/19  1529   TROIQ 0.14* 0.14* 0.11*     Lab Results   Component Value Date/Time    Cholesterol, total 171 12/19/2018 02:59 AM    HDL Cholesterol 78 12/19/2018 02:59 AM    LDL, calculated 71 12/19/2018 02:59 AM    Triglyceride 111 12/19/2018 02:59 AM     Lab Results   Component Value Date/Time    Glucose (POC) 191 (H) 10/12/2018 12:52 PM    Glucose (POC) 89 10/03/2018 07:30 AM     Lab Results   Component Value Date/Time    Color YELLOW/STRAW 08/14/2019 03:29 PM    Appearance CLOUDY (A) 08/14/2019 03:29 PM    Specific gravity 1.012 08/14/2019 03:29 PM    Specific gravity 1.025 10/03/2018 06:18 AM    pH (UA) 6.0 08/14/2019 03:29 PM    Protein NEGATIVE  08/14/2019 03:29 PM    Glucose NEGATIVE  08/14/2019 03:29 PM    Ketone 80 (A) 08/14/2019 03:29 PM    Bilirubin NEGATIVE  08/14/2019 03:29 PM    Urobilinogen 1.0 08/14/2019 03:29 PM    Nitrites POSITIVE (A) 08/14/2019 03:29 PM    Leukocyte Esterase MODERATE (A) 08/14/2019 03:29 PM    Epithelial cells FEW 08/14/2019 03:29 PM    Bacteria 2+ (A) 08/14/2019 03:29 PM    WBC  08/14/2019 03:29 PM    RBC 0-5 08/14/2019 03:29 PM         Medications Reviewed:     Current Facility-Administered Medications   Medication Dose Route Frequency    acetaminophen (TYLENOL) tablet 650 mg  650 mg Oral Q4H PRN    sodium chloride (NS) flush 5-10 mL  5-10 mL IntraVENous PRN    cefTRIAXone (ROCEPHIN) 1 g in 0.9% sodium chloride (MBP/ADV) 50 mL  1 g IntraVENous Q24H    0.9% sodium chloride infusion  75 mL/hr IntraVENous CONTINUOUS    sodium chloride (NS) flush 5-40 mL  5-40 mL IntraVENous Q8H    sodium chloride (NS) flush 5-40 mL  5-40 mL IntraVENous PRN    enoxaparin (LOVENOX) injection 40 mg  40 mg SubCUTAneous Q24H    ondansetron (ZOFRAN) injection 4 mg  4 mg IntraVENous Q4H PRN    atorvastatin (LIPITOR) tablet 40 mg  40 mg Oral DAILY    pantoprazole (PROTONIX) tablet 40 mg  40 mg Oral ACB    fentaNYL (DURAGESIC) 25 mcg/hr patch 1 Patch  1 Patch TransDERmal Q72H    metoprolol tartrate (LOPRESSOR) tablet 75 mg  75 mg Oral BID    predniSONE (DELTASONE) tablet 10 mg  10 mg Oral DAILY    topiramate (TOPAMAX) tablet 25 mg  25 mg Oral DAILY WITH BREAKFAST    senna-docusate (PERICOLACE) 8.6-50 mg per tablet 1 Tab  1 Tab Oral BID    aspirin chewable tablet 81 mg  81 mg Oral DAILY    nitroglycerin (NITROLINGUAL) sublingual 0.4 mg/spray  1 Spray SubLINGual Q5MIN PRN     ______________________________________________________________________  EXPECTED LENGTH OF STAY: 4d 19h  ACTUAL LENGTH OF STAY:          2                 Sharon Eisenberg MD

## 2019-08-16 NOTE — PROGRESS NOTES
Bedside shift change report given to Celestina Oliveira (oncoming nurse) by Carli Sher RN (offgoing nurse). Report included the following information SBAR and Kardex.

## 2019-08-16 NOTE — PROGRESS NOTES
Discussed with nursing - patient not following commands - will re-assess on 8/19 for appropriateness of PT.   Hillery Duane, PT

## 2019-08-16 NOTE — PROGRESS NOTES
MARLENY Plan:     1. Disposition TBD; Home with HH likely, son would like to resume [facility] if deemed appropriate by MD; [facility] order needed     2. Palliative following      3. [facility] team following; Jeremy Hernandez [phone] met CM this afternoon      4. Transport; [facility] likely; CM to assist as needed     5.  Continue Medical Care     [address] [facility]     [phone]

## 2019-08-16 NOTE — PROGRESS NOTES
Problem: Pressure Injury - Risk of  Goal: *Prevention of pressure injury  Description  Document Marcelo Scale and appropriate interventions in the flowsheet. Outcome: Progressing Towards Goal  Note:   Pressure Injury Interventions:  Sensory Interventions: Assess changes in LOC, Float heels, Keep linens dry and wrinkle-free    Moisture Interventions: Absorbent underpads, Apply protective barrier, creams and emollients, Check for incontinence Q2 hours and as needed    Activity Interventions: Assess need for specialty bed, Increase time out of bed    Mobility Interventions: Assess need for specialty bed, Float heels, HOB 30 degrees or less    Nutrition Interventions: Document food/fluid/supplement intake    Friction and Shear Interventions: Apply protective barrier, creams and emollients                Problem: Patient Education: Go to Patient Education Activity  Goal: Patient/Family Education  Outcome: Progressing Towards Goal     Problem: Falls - Risk of  Goal: *Absence of Falls  Description  Document Dara Lorenzo Fall Risk and appropriate interventions in the flowsheet.   Outcome: Progressing Towards Goal  Note:   Fall Risk Interventions:       Mentation Interventions: Adequate sleep, hydration, pain control, Bed/chair exit alarm    Medication Interventions: Bed/chair exit alarm, Teach patient to arise slowly    Elimination Interventions: Bed/chair exit alarm, Call light in reach              Problem: Patient Education: Go to Patient Education Activity  Goal: Patient/Family Education  Outcome: Progressing Towards Goal

## 2019-08-16 NOTE — PROGRESS NOTES
Occupational therapy 1100 -   08.16.2019    Chart reviewed in prep for OT evaluation. RN reporting patient does not follow commands. Noted patient requires max A for all ADLs at baseline. Will f/u later in PM as able and appropriate. Thank you. Leta Gupta MS, OTR/L

## 2019-08-17 PROCEDURE — 97161 PT EVAL LOW COMPLEX 20 MIN: CPT

## 2019-08-17 PROCEDURE — 97530 THERAPEUTIC ACTIVITIES: CPT

## 2019-08-17 PROCEDURE — 74011250636 HC RX REV CODE- 250/636: Performed by: INTERNAL MEDICINE

## 2019-08-17 PROCEDURE — 74011636637 HC RX REV CODE- 636/637: Performed by: INTERNAL MEDICINE

## 2019-08-17 PROCEDURE — 74011000258 HC RX REV CODE- 258: Performed by: STUDENT IN AN ORGANIZED HEALTH CARE EDUCATION/TRAINING PROGRAM

## 2019-08-17 PROCEDURE — 77010033678 HC OXYGEN DAILY

## 2019-08-17 PROCEDURE — 74011250637 HC RX REV CODE- 250/637: Performed by: INTERNAL MEDICINE

## 2019-08-17 PROCEDURE — 74011250637 HC RX REV CODE- 250/637: Performed by: HOSPITALIST

## 2019-08-17 PROCEDURE — 97165 OT EVAL LOW COMPLEX 30 MIN: CPT

## 2019-08-17 PROCEDURE — 74011250636 HC RX REV CODE- 250/636: Performed by: STUDENT IN AN ORGANIZED HEALTH CARE EDUCATION/TRAINING PROGRAM

## 2019-08-17 PROCEDURE — 65660000000 HC RM CCU STEPDOWN

## 2019-08-17 PROCEDURE — 94760 N-INVAS EAR/PLS OXIMETRY 1: CPT

## 2019-08-17 RX ORDER — ACETAMINOPHEN 650 MG/1
650 SUPPOSITORY RECTAL
Status: DISCONTINUED | OUTPATIENT
Start: 2019-08-17 | End: 2019-08-26 | Stop reason: HOSPADM

## 2019-08-17 RX ORDER — KETOROLAC TROMETHAMINE 30 MG/ML
15 INJECTION, SOLUTION INTRAMUSCULAR; INTRAVENOUS
Status: DISPENSED | OUTPATIENT
Start: 2019-08-17 | End: 2019-08-22

## 2019-08-17 RX ORDER — GABAPENTIN 300 MG/1
600 CAPSULE ORAL
Status: DISCONTINUED | OUTPATIENT
Start: 2019-08-17 | End: 2019-08-26 | Stop reason: HOSPADM

## 2019-08-17 RX ADMIN — Medication 10 ML: at 09:05

## 2019-08-17 RX ADMIN — METOPROLOL TARTRATE 75 MG: 50 TABLET ORAL at 09:17

## 2019-08-17 RX ADMIN — METOPROLOL TARTRATE 75 MG: 50 TABLET ORAL at 17:46

## 2019-08-17 RX ADMIN — Medication 10 ML: at 22:02

## 2019-08-17 RX ADMIN — TRAMADOL HYDROCHLORIDE 50 MG: 50 TABLET, FILM COATED ORAL at 09:17

## 2019-08-17 RX ADMIN — SENNOSIDES, DOCUSATE SODIUM 1 TABLET: 50; 8.6 TABLET, FILM COATED ORAL at 09:16

## 2019-08-17 RX ADMIN — GABAPENTIN 600 MG: 300 CAPSULE ORAL at 18:31

## 2019-08-17 RX ADMIN — ACETAMINOPHEN 650 MG: 650 SUPPOSITORY RECTAL at 12:53

## 2019-08-17 RX ADMIN — KETOROLAC TROMETHAMINE 15 MG: 30 INJECTION, SOLUTION INTRAMUSCULAR at 15:22

## 2019-08-17 RX ADMIN — ASPIRIN 81 MG CHEWABLE TABLET 81 MG: 81 TABLET CHEWABLE at 09:17

## 2019-08-17 RX ADMIN — SENNOSIDES, DOCUSATE SODIUM 1 TABLET: 50; 8.6 TABLET, FILM COATED ORAL at 17:46

## 2019-08-17 RX ADMIN — CEFTRIAXONE 1 G: 1 INJECTION, POWDER, FOR SOLUTION INTRAMUSCULAR; INTRAVENOUS at 15:22

## 2019-08-17 RX ADMIN — ACETAMINOPHEN 650 MG: 325 TABLET, FILM COATED ORAL at 01:20

## 2019-08-17 RX ADMIN — PANTOPRAZOLE SODIUM 40 MG: 40 TABLET, DELAYED RELEASE ORAL at 09:17

## 2019-08-17 RX ADMIN — TOPIRAMATE 25 MG: 25 TABLET, FILM COATED ORAL at 09:17

## 2019-08-17 RX ADMIN — ATORVASTATIN CALCIUM 40 MG: 20 TABLET, FILM COATED ORAL at 09:16

## 2019-08-17 RX ADMIN — Medication 10 ML: at 15:22

## 2019-08-17 RX ADMIN — PREDNISONE 10 MG: 10 TABLET ORAL at 09:17

## 2019-08-17 RX ADMIN — SODIUM CHLORIDE 75 ML/HR: 900 INJECTION, SOLUTION INTRAVENOUS at 12:53

## 2019-08-17 RX ADMIN — ENOXAPARIN SODIUM 40 MG: 40 INJECTION SUBCUTANEOUS at 17:47

## 2019-08-17 RX ADMIN — TRAMADOL HYDROCHLORIDE 50 MG: 50 TABLET, FILM COATED ORAL at 17:46

## 2019-08-17 NOTE — PROGRESS NOTES
Bedside shift change report given to Vinayak Pressley (oncoming nurse) by Mariela Aceves (offgoing nurse). Report included the following information SBAR, Kardex, MAR and Recent Results.

## 2019-08-17 NOTE — PROGRESS NOTES
Bedside shift change report given to Sonal Bosch RN (oncoming nurse) by Angela Fofana RN (offgoing nurse). Report included the following information SBAR and Kardex.

## 2019-08-17 NOTE — PROGRESS NOTES
Problem: Pressure Injury - Risk of  Goal: *Prevention of pressure injury  Description  Document Marcelo Scale and appropriate interventions in the flowsheet. Outcome: Progressing Towards Goal  Note:   Pressure Injury Interventions:  Sensory Interventions: Assess changes in LOC    Moisture Interventions: Absorbent underpads    Activity Interventions: Assess need for specialty bed, Increase time out of bed    Mobility Interventions: Float heels    Nutrition Interventions: Document food/fluid/supplement intake    Friction and Shear Interventions: Apply protective barrier, creams and emollients                Problem: Patient Education: Go to Patient Education Activity  Goal: Patient/Family Education  Outcome: Progressing Towards Goal     Problem: Falls - Risk of  Goal: *Absence of Falls  Description  Document Yoandy Foy Fall Risk and appropriate interventions in the flowsheet.   Outcome: Progressing Towards Goal  Note:   Fall Risk Interventions:       Mentation Interventions: Adequate sleep, hydration, pain control, Bed/chair exit alarm, Room close to nurse's station    Medication Interventions: Bed/chair exit alarm, Patient to call before getting OOB, Teach patient to arise slowly    Elimination Interventions: Bed/chair exit alarm, Call light in reach              Problem: Patient Education: Go to Patient Education Activity  Goal: Patient/Family Education  Outcome: Progressing Towards Goal

## 2019-08-17 NOTE — PROGRESS NOTES
Problem: Mobility Impaired (Adult and Pediatric)  Goal: *Acute Goals and Plan of Care (Insert Text)  Description  FUNCTIONAL STATUS PRIOR TO ADMISSION: Per chart review, patient was ambulatory short household distances as of December 2018-unclear if still ambulatory due to patient confusion. HOME SUPPORT PRIOR TO ADMISSION: The patient lived with son-had caregiver for bathing unclear if additional assistance required. Physical Therapy Goals  Initiated 8/17/2019  1. Patient will move from supine to sit and sit to supine , scoot up and down and roll side to side in bed with moderate assistance  within 7 day(s). 2.  Patient will transfer from bed to chair and chair to bed with moderate assistance  using the least restrictive device within 7 day(s). 3.  Patient will perform sit to stand with moderate assistance  within 7 day(s). 4.  Patient will ambulate with moderate assistance  for 25 feet with the least restrictive device within 7 day(s). 5.  Patient will tolerate sitting EOB with UE support x 5 minutes and min A within 7 days. Outcome: Progressing Towards Goal     PHYSICAL THERAPY EVALUATION  Patient: Juan Diego Garcia (23 y.o. female)  Date: 8/17/2019  Primary Diagnosis: Sepsis ([facility] [A41.9]        Precautions: fall, DNR         ASSESSMENT  Based on the objective data described below, the patient presents with confusion and intermittent command following, appears to have generalized weakness, increased pain with yelling out to all tactile stimulation, decreased activity tolerance, and overall decreased independence from baseline per chart review now admitted for sepsis. Patient unable to tolerate EOB mobility secondary to increased pain-although RN had attempted to medicate earlier patient spit out pills. Able to reposition in bed with total A for improved posture/alignment and pressure relief. Will follow for 3 x week trial to assess participation.     Current Level of Function Impacting Discharge (mobility/balance): total A for mobility, very limited by pain    Functional Outcome Measure: The patient scored 5/100 on the Barthel outcome measure which is indicative of dependence on caregivers for all needs. Other factors to consider for discharge: can family/caregivers provide current needed assistance      Patient will benefit from skilled therapy intervention to address the above noted impairments. PLAN :  Recommendations and Planned Interventions: bed mobility training, transfer training, gait training, therapeutic exercises, neuromuscular re-education, patient and family training/education and therapeutic activities      Frequency/Duration: Patient will be followed by physical therapy:  3 times a week to address goals.     Recommendation for discharge: (in order for the patient to meet his/her long term goals)  To be determined: patient has previous refused SNF and returned home with HHPT; if caregivers can assist at current level and patient refuses SNF then home with HHPT     This discharge recommendation:  Has not yet been discussed the attending provider and/or case management    Equipment recommendations for successful discharge (if) home: patient owns DME required for discharge         SUBJECTIVE:   Patient stated nuh .    OBJECTIVE DATA SUMMARY:   HISTORY:    Past Medical History:   Diagnosis Date    Arrhythmia     A-FIB    Arthritis     CAD (coronary artery disease) 1989    MI    Congestive heart failure (CHF) ([address]     GERD (gastroesophageal reflux disease)     Gluten intolerance     Heart failure ([address]     CHF    Polymyalgia ([address]      Past Surgical History:   Procedure Laterality Date    CARDIAC SURG PROCEDURE UNLIST  1980'S    CARDIAC CATH    HX [address], 2006, 2013    LUMBAR SPINE    HX CHOLECYSTECTOMY      HX HIP REPLACEMENT Right 5/2015    [facility]    HX TONSILLECTOMY  CHILD    HX UROLOGICAL      BLADDER SUSPENSION - MESH       Personal factors and/or comorbidities impacting plan of care: pain with light touch    Home Situation  Home Environment: Private residence  One/Two Story Residence: Two story, live on 1st floor  Living Alone: No  Support Systems: Child(brittany)  Patient Expects to be Discharged to[de-identified] Private residence    EXAMINATION/PRESENTATION/DECISION MAKING:   Critical Behavior:  Neurologic State: Confused, Drowsy  Orientation Level: Disoriented to place, Disoriented to situation, Disoriented to time, Oriented to person  Cognition: Decreased attention/concentration     Hearing:     Skin:    Edema:   Range Of Motion:  AROM: Grossly decreased, non-functional           PROM: Grossly decreased, non-functional(unable to tolerate PROM 2* p!)           Strength:    Strength: Grossly decreased, non-functional                    Tone & Sensation:                  Sensation: Intact               Coordination:     Vision:      Functional Mobility:  Bed Mobility:     Supine to Sit: Total assistance(inferred)        Transfers:                             Balance:   Sitting: Impaired; With support  Sitting - Static: Poor (constant support)(increased L lean in supported sitting)  Sitting - Dynamic: Poor (constant support)  Ambulation/Gait Training:                                                         Stairs: Therapeutic Exercises:       Functional Measure:  Barthel Index:    Bathin  Bladder: 0  Bowels: 5  Groomin  Dressin  Feedin  Mobility: 0  Stairs: 0  Toilet Use: 0  Transfer (Bed to Chair and Back): 0  Total: 5/100       Percentage of impairment   0%   1-19%   20-39%   40-59%   60-79%   80-99%   100%   Barthel Score 0-100 100 99-80 79-60 59-40 20-39 1-19   0     The Barthel ADL Index: Guidelines  1. The index should be used as a record of what a patient does, not as a record of what a patient could do. 2. The main aim is to establish degree of independence from any help, physical or verbal, however minor and for whatever reason.   3. The need for supervision renders the patient not independent. 4. A patient's performance should be established using the best available evidence. Asking the patient, friends/relatives and nurses are the usual sources, but direct observation and common sense are also important. However direct testing is not needed. 5. Usually the patient's performance over the preceding 24-48 hours is important, but occasionally longer periods will be relevant. 6. Middle categories imply that the patient supplies over 50 per cent of the effort. 7. Use of aids to be independent is allowed. [place]., Barthel, D.W. ([id number]). Functional evaluation: the Barthel Index. [address] [facility] (14)2. Phi Christianson ha ANNALISA McintyreF, Oscar Aburto., [place]., [place], [address] (1999). Measuring the change indisability after inpatient rehabilitation; comparison of the responsiveness of the Barthel Index and Functional [place] Measure. Journal of Neurology, Neurosurgery, and Psychiatry, 66(4), 763-878. Ty Bacon, N.J.A, MEKHI Nascimento, & Noemi Nicole, M.A. (2004.) Assessment of post-stroke quality of life in cost-effectiveness studies: The usefulness of the Barthel Index and the EuroQoL-5D. Quality of Life Research, 13, 030-40         Activity Tolerance:   Poor  Please refer to the flowsheet for vital signs taken during this treatment. After treatment patient left in no apparent distress:   Supine in bed and Call bell within reach    COMMUNICATION/EDUCATION:   The patients plan of care was discussed with: Occupational Therapist and Registered Nurse. Patient is unable to participate in goal setting and plan of care.     Thank you for this referral.  Jody Kelly, PT   Time Calculation: 11 mins

## 2019-08-17 NOTE — PROGRESS NOTES
Hospitalist Progress Note                               Dustin Gonzalez MD                                     Answering service: [phone]                               OR [phone] from in house phone                                         Date of Service:  2019  NAME:  Hilario Miner  :  1925  MRN:  [id number]      Admission Summary:     81 Y/O female with a PMH significant for Afib, CAD, CHF and GERD was brought to the ER for worsening confusion. Reason for follow up:      CC: Confusion. All events in the past 24 hours reviewed in their entirety. Patient is a poor historian and unable to give all pertinent  history. Assessment & Plan:       1) ID: Resolving sepsis due to E.coli UTI present on admission. Afebrile, resolved lactic Acidemia. Continue rocephin. 2) CNS: Resolving acute encephalopathy probable multifactorial metabolic encephalopathy. CT head negative for any acute findings. 3) GI: Resolving nausea and vomiting.     4) CVS: CAD. Probable Demand ischemia due to the sepsis on admission. Paroxysmal Afib; not on chronic anticopagulation due to H/o falls predisposing to bleeding diatheses. Chronic diastolic CHF with a preserved EF and without any acute exarcerbation. Controlled primary hypertension. Dyslipidemia. Cardiology eval notesd and appreciated. 5) Resp: Chronic Hypoxemic respiratory failure. 6) GI: H/O GERD. 7) Prophylaxis: DVT. 8) Directives: DNR/DNI. 9) Plan: Anticipate D/C in 2-3 days. D/W patient and Nursing. Hospital Problems  Date Reviewed: 10/16/2018          Codes Class Noted POA    Sepsis ([facility] ICD-10-CM: A41.9  ICD-9-CM: 038.9, 995.91  2019 Unknown                  Physical Examination:      Last 24hrs VS reviewed since prior progress note.  Most recent are:  Visit Vitals  /69 (BP 1 Location: Left arm, BP Patient Position: At rest)   Pulse 87   Temp 98.5 °F (36.9 °C) Resp 18   Ht 5' 4\" (1.626 m)   Wt 61.7 kg (136 lb 1.6 oz)   SpO2 96%   BMI 23.36 kg/m²           Constitutional:  No acute distress. HEENT: Head is a traumatic,  Un icteric sclera. Pink conjunctiva,no erythema or discharge. Oral mucous moist, oropharynx benign. Neck supple,    Resp:  Decreased air entry bibasilarly. CV:  Regular rhythm, normal rate, no murmurs, gallops, rubs    GI:  Soft, non distended, non tender. normoactive bowel sounds, no hepatosplenomegaly    :  No CVA or suprapubic tenderness   Skin  :  No erythema,rash,bullae,dipigmentation     Musculoskeletal:  No edema, warm, 2+ pulses throughout    Neurologic: Responds to stimuli. Labs:   No results for input(s): WBC, HGB, HCT, PLT, HGBEXT, HCTEXT, PLTEXT in the last 72 hours. No results for input(s): NA, K, CL, CO2, BUN, CREA, GLU, CA, MG, PHOS, URICA in the last 72 hours. No results for input(s): SGOT, GPT, ALT, AP, TBIL, TBILI, TP, ALB, GLOB, GGT, AML, LPSE in the last 72 hours. No lab exists for component: AMYP, HLPSE  No results for input(s): INR, PTP, APTT in the last 72 hours. No lab exists for component: INREXT   No results for input(s): FE, TIBC, PSAT, FERR in the last 72 hours. No results found for: FOL, RBCF   No results for input(s): PH, PCO2, PO2 in the last 72 hours.   Recent Labs     08/15/19  0322 08/14/19 2000   TROIQ 0.14* 0.14*     Lab Results   Component Value Date/Time    Cholesterol, total 171 12/19/2018 02:59 AM    HDL Cholesterol 78 12/19/2018 02:59 AM    LDL, calculated 71 12/19/2018 02:59 AM    Triglyceride 111 12/19/2018 02:59 AM     Lab Results   Component Value Date/Time    Glucose (POC) 191 (H) 10/12/2018 12:52 PM    Glucose (POC) 89 10/03/2018 07:30 AM     Lab Results   Component Value Date/Time    Color YELLOW/STRAW 08/14/2019 03:29 PM    Appearance CLOUDY (A) 08/14/2019 03:29 PM    Specific gravity 1.012 08/14/2019 03:29 PM    Specific gravity 1.025 10/03/2018 06:18 AM    pH (UA) 6.0 08/14/2019 03:29 PM    Protein NEGATIVE  08/14/2019 03:29 PM    Glucose NEGATIVE  08/14/2019 03:29 PM    Ketone 80 (A) 08/14/2019 03:29 PM    Bilirubin NEGATIVE  08/14/2019 03:29 PM    Urobilinogen 1.0 08/14/2019 03:29 PM    Nitrites POSITIVE (A) 08/14/2019 03:29 PM    Leukocyte Esterase MODERATE (A) 08/14/2019 03:29 PM    Epithelial cells FEW 08/14/2019 03:29 PM    Bacteria 2+ (A) 08/14/2019 03:29 PM    WBC  08/14/2019 03:29 PM    RBC 0-5 08/14/2019 03:29 PM         Medications Reviewed:     Current Facility-Administered Medications   Medication Dose Route Frequency    acetaminophen (TYLENOL) suppository 650 mg  650 mg Rectal Q4H PRN    ketorolac (TORADOL) injection 15 mg  15 mg IntraVENous Q6H PRN    traMADol (ULTRAM) tablet 50 mg  50 mg Oral Q6H PRN    acetaminophen (TYLENOL) tablet 650 mg  650 mg Oral Q4H PRN    sodium chloride (NS) flush 5-10 mL  5-10 mL IntraVENous PRN    cefTRIAXone (ROCEPHIN) 1 g in 0.9% sodium chloride (MBP/ADV) 50 mL  1 g IntraVENous Q24H    0.9% sodium chloride infusion  75 mL/hr IntraVENous CONTINUOUS    sodium chloride (NS) flush 5-40 mL  5-40 mL IntraVENous Q8H    sodium chloride (NS) flush 5-40 mL  5-40 mL IntraVENous PRN    enoxaparin (LOVENOX) injection 40 mg  40 mg SubCUTAneous Q24H    ondansetron (ZOFRAN) injection 4 mg  4 mg IntraVENous Q4H PRN    atorvastatin (LIPITOR) tablet 40 mg  40 mg Oral DAILY    pantoprazole (PROTONIX) tablet 40 mg  40 mg Oral ACB    metoprolol tartrate (LOPRESSOR) tablet 75 mg  75 mg Oral BID    predniSONE (DELTASONE) tablet 10 mg  10 mg Oral DAILY    topiramate (TOPAMAX) tablet 25 mg  25 mg Oral DAILY WITH BREAKFAST    senna-docusate (PERICOLACE) 8.6-50 mg per tablet 1 Tab  1 Tab Oral BID    aspirin chewable tablet 81 mg  81 mg Oral DAILY    nitroglycerin (NITROLINGUAL) sublingual 0.4 mg/spray  1 Spray SubLINGual Q5MIN PRN     ______________________________________________________________________  EXPECTED LENGTH OF STAY: 4d 19h  ACTUAL LENGTH OF STAY:          3                 Dustin Gonzalez MD

## 2019-08-17 NOTE — PROGRESS NOTES
Problem: Self Care Deficits Care Plan (Adult)  Goal: *Acute Goals and Plan of Care (Insert Text)  Description    FUNCTIONAL STATUS PRIOR TO ADMISSION: Pt resided with son PTA. Support for weekly bathing noted per chart. Pt reportedly tolerance w/c and walker use PTA. HOME SUPPORT: The patient lived with son. Occupational Therapy Goals  Initiated 8/17/2019  1. Patient will perform grooming with moderate assistance  within 7 day(s). 2.  Patient will perform self-feeding with moderate assistance  within 7 day(s). 3.  Patient will perform bed mobility maximum A within 7 days for improved ADL performance within 7 days. 4.  Patient follows simple, one step commands 3/4 trials for improved ADL performance within 7 days. 5.  Patient completes UB HEP moderate A x 10 minutes within 7 days. Outcome: Not Met  OCCUPATIONAL THERAPY EVALUATION  Patient: Eusebia Castillo (22 y.o. female)  Date: 8/17/2019  Primary Diagnosis: Sepsis ([facility] [A41.9]        Precautions:   DNI, Fall    ASSESSMENT  Pt was cleared for session by RN Effie Rios. Pt received with L lateral lean stating \"help me\" repeatedly. Pt was not able able to provide any meaningful information about how she would like to be helped. Pt was oriented x 1 only. Pt yelled out apparently in pain at gentle repositioning in bed. Pt primarily responded to questions with grunting. Only phrase pt stated was, \"I hurt\". OT relayed pt presentation to Irma Tellez who verbalized she would contact physician regarding pt presentation. Current Level of Function Impacting Discharge (ADLs/self-care): Total A for all ADL. Functional Outcome Measure: The patient scored 5/10 on the Barthel Index outcome measure which is indicative of 99% disability level (total A on caregivers for ADL). Other factors to consider for discharge: Pt resides with son. Patient will benefit from skilled therapy intervention to address the above noted impairments.        PLAN :  Recommendations and Planned Interventions: self care training, functional mobility training, therapeutic exercise, balance training, therapeutic activities, endurance activities, patient education, home safety training and family training/education    Frequency/Duration: Patient will be followed by occupational therapy 3 times a week to address goals. Recommendation for discharge: (in order for the patient to meet his/her long term goals)  To be determined: Unclear if pt son able to provide pt needed level of care/ medical plan       This discharge recommendation:  Has not yet been discussed the attending provider and/or case management    Equipment recommendations for successful discharge (if) home: TBD pending on pt discharge plan        SUBJECTIVE:   Patient stated I hurt.     OBJECTIVE DATA SUMMARY:   HISTORY:   Past Medical History:   Diagnosis Date    Arrhythmia     A-FIB    Arthritis     CAD (coronary artery disease) 1989    MI    Congestive heart failure (CHF) (HCC)     GERD (gastroesophageal reflux disease)     Gluten intolerance     Heart failure (HCC)     CHF    Polymyalgia [facility])      Past Surgical History:   Procedure Laterality Date    CARDIAC SURG PROCEDURE UNLIST  1980'S    CARDIAC CATH    HX BACK SURGERY  1998, 2006, 2013    LUMBAR SPINE    HX CHOLECYSTECTOMY      HX HIP REPLACEMENT Right 5/2015    [facility]    HX TONSILLECTOMY  CHILD    HX UROLOGICAL      BLADDER SUSPENSION - MESH       Expanded or extensive additional review of patient history:     Home Situation  Home Environment: Private residence  One/Two Story Residence: Two story, live on 1st floor  Living Alone: No  Support Systems: Child(brittany)(Pt resides with son)  Patient Expects to be Discharged to[de-identified] Private residence  Current DME Used/Available at Home: [address], Wheelchair    Hand dominance: Right    EXAMINATION OF PERFORMANCE DEFICITS:  Cognitive/Behavioral Status:  Neurologic State: Alert  Orientation Level: Oriented to person;Disoriented to place; Disoriented to situation;Disoriented to time  Cognition: Decreased command following;Decreased attention/concentration  Perception: (Not able to fully assess)     Safety/Judgement: Not assessed    Skin: Bilateral bruising throughout BUE    Edema: R proximal UE    Hearing:       Vision/Perceptual:    Tracking: Unable to test secondary due to decreased visual attention              Visual Fields: Unable to test secondary due to decreased visual attention                 Range of Motion:    AROM: Grossly decreased, non-functional  PROM: Generally decreased, functional                      Strength:    Strength: Grossly decreased, non-functional                Coordination:  Coordination: Grossly decreased, non-functional  Fine Motor Skills-Upper: Left Impaired;Right Impaired    Gross Motor Skills-Upper: Left Impaired;Right Impaired    Tone & Sensation:    Tone: Normal  Sensation: Intact                      Balance:  Sitting: Impaired; With support  Sitting - Static: Poor (constant support)(Left lateral lean supported sitting in bed)  Sitting - Dynamic: Poor (constant support)    Functional Mobility and Transfers for ADLs:  Bed Mobility:  Supine to Sit: Total assistance(Very limited mobility tolerance due to P!)    Transfers:       ADL Assessment:  Feeding: Total assistance    Oral Facial Hygiene/Grooming: Total assistance    Bathing: Total assistance    Upper Body Dressing: Total assistance    Lower Body Dressing: Total assistance    Toileting:  Total assistance                ADL Intervention and task modifications:                                     Cognitive Retraining  Safety/Judgement: Not assessed    Functional Measure:  Barthel Index:    Bathin  Bladder: 0  Bowels: 5  Groomin  Dressin  Feedin  Mobility: 0  Stairs: 0  Toilet Use: 0  Transfer (Bed to Chair and Back): 0  Total: 5/100        Percentage of impairment   0%   1-19%   20-39%   40-59%   60-79%   80-99%   100% Barthel Score 0-100 100 99-80 79-60 59-40 20-39 1-19   0     The Barthel ADL Index: Guidelines  1. The index should be used as a record of what a patient does, not as a record of what a patient could do. 2. The main aim is to establish degree of independence from any help, physical or verbal, however minor and for whatever reason. 3. The need for supervision renders the patient not independent. 4. A patient's performance should be established using the best available evidence. Asking the patient, friends/relatives and nurses are the usual sources, but direct observation and common sense are also important. However direct testing is not needed. 5. Usually the patient's performance over the preceding 24-48 hours is important, but occasionally longer periods will be relevant. 6. Middle categories imply that the patient supplies over 50 per cent of the effort. 7. Use of aids to be independent is allowed. John Figueroa., Barthel, D.W. ([id number]). Functional evaluation: the Barthel Index. [address] [facility] (14)2. Karol Allan ha GIANNA Mcintyre, [place]., Sheri Vences., [place], [facility] (1999). Measuring the change indisability after inpatient rehabilitation; comparison of the responsiveness of the Barthel Index and Functional [place] Measure. Journal of Neurology, Neurosurgery, and Psychiatry, 66(4), 911-206. MATTHEW Mariscal, MEKHI Nascimento, & Nohemy Lynn, M.A. (2004.) Assessment of post-stroke quality of life in cost-effectiveness studies: The usefulness of the Barthel Index and the EuroQoL-5D.  Quality of Life Research, 15, 900-58        Occupational Therapy Evaluation Charge Determination   History Examination Decision-Making   LOW Complexity : Brief history review  HIGH Complexity : 5 or more performance deficits relating to physical, cognitive , or psychosocial skils that result in activity limitations and / or participation restrictions HIGH Complexity : Patient presents with comorbidities that affect occupational performance. Signifigant modification of tasks or assistance (eg, physical or verbal) with assessment (s) is necessary to enable patient to complete evaluation       Based on the above components, the patient evaluation is determined to be of the following complexity level: LOW   Pain Rating:  Pt was not able to provide pain rating. OT relayed pt presentation to pt RN Effie Rios who was contacting MD.    Activity Tolerance:   Poor  Please refer to the flowsheet for vital signs taken during this treatment. After treatment patient left in no apparent distress:    Supine in bed    COMMUNICATION/EDUCATION:   The patients plan of care was discussed with: Physical Therapist and Registered Nurse. Patient/family agree to work toward stated goals and plan of care. This patients plan of care is appropriate for delegation to [facility].     Thank you for this referral.  Gerrianne Gowers  Time Calculation: 13 mins

## 2019-08-17 NOTE — PROGRESS NOTES
Problem: Pressure Injury - Risk of  Goal: *Prevention of pressure injury  Description  Document Marcelo Scale and appropriate interventions in the flowsheet. Outcome: Progressing Towards Goal  Note:   Pressure Injury Interventions:  Sensory Interventions: Assess changes in LOC, Check visual cues for pain    Moisture Interventions: Absorbent underpads, Apply protective barrier, creams and emollients    Activity Interventions: Pressure redistribution bed/mattress(bed type)    Mobility Interventions: Float heels, HOB 30 degrees or less    Nutrition Interventions: Document food/fluid/supplement intake    Friction and Shear Interventions: HOB 30 degrees or less     Problem: Falls - Risk of  Goal: *Absence of Falls  Description  Document Kade Fall Risk and appropriate interventions in the flowsheet.   Outcome: Progressing Towards Goal  Note:   Fall Risk Interventions:  Mobility Interventions: Communicate number of staff needed for ambulation/transfer    Mentation Interventions: Adequate sleep, hydration, pain control, Evaluate medications/consider consulting pharmacy    Medication Interventions: Bed/chair exit alarm    Elimination Interventions: Bed/chair exit alarm, Toileting schedule/hourly rounds       Problem: Patient Education: Go to Patient Education Activity  Goal: Patient/Family Education  Outcome: Progressing Towards Goal     Problem: Pain  Goal: *Control of Pain  Outcome: Progressing Towards Goal

## 2019-08-18 LAB
ANION GAP SERPL CALC-SCNC: 8 MMOL/L (ref 5–15)
BASOPHILS # BLD: 0 K/UL (ref 0–0.1)
BASOPHILS NFR BLD: 0 % (ref 0–1)
BUN SERPL-MCNC: 8 MG/DL (ref 6–20)
BUN/CREAT SERPL: 21 (ref 12–20)
CALCIUM SERPL-MCNC: 8.5 MG/DL (ref 8.5–10.1)
CHLORIDE SERPL-SCNC: 115 MMOL/L (ref 97–108)
CO2 SERPL-SCNC: 23 MMOL/L (ref 21–32)
CREAT SERPL-MCNC: 0.39 MG/DL (ref 0.55–1.02)
DIFFERENTIAL METHOD BLD: ABNORMAL
EOSINOPHIL # BLD: 0.1 K/UL (ref 0–0.4)
EOSINOPHIL NFR BLD: 4 % (ref 0–7)
ERYTHROCYTE [DISTWIDTH] IN BLOOD BY AUTOMATED COUNT: 14.5 % (ref 11.5–14.5)
GLUCOSE SERPL-MCNC: 100 MG/DL (ref 65–100)
HCT VFR BLD AUTO: 33.8 % (ref 35–47)
HGB BLD-MCNC: 10.5 G/DL (ref 11.5–16)
IMM GRANULOCYTES # BLD AUTO: 0 K/UL (ref 0–0.04)
IMM GRANULOCYTES NFR BLD AUTO: 0 % (ref 0–0.5)
LYMPHOCYTES # BLD: 0.4 K/UL (ref 0.8–3.5)
LYMPHOCYTES NFR BLD: 10 % (ref 12–49)
MCH RBC QN AUTO: 32.6 PG (ref 26–34)
MCHC RBC AUTO-ENTMCNC: 31.1 G/DL (ref 30–36.5)
MCV RBC AUTO: 105 FL (ref 80–99)
MONOCYTES # BLD: 0.4 K/UL (ref 0–1)
MONOCYTES NFR BLD: 12 % (ref 5–13)
NEUTS SEG # BLD: 2.8 K/UL (ref 1.8–8)
NEUTS SEG NFR BLD: 74 % (ref 32–75)
NRBC # BLD: 0 K/UL (ref 0–0.01)
NRBC BLD-RTO: 0 PER 100 WBC
PLATELET # BLD AUTO: 118 K/UL (ref 150–400)
PMV BLD AUTO: 10.4 FL (ref 8.9–12.9)
POTASSIUM SERPL-SCNC: 3.3 MMOL/L (ref 3.5–5.1)
RBC # BLD AUTO: 3.22 M/UL (ref 3.8–5.2)
RBC MORPH BLD: ABNORMAL
SODIUM SERPL-SCNC: 146 MMOL/L (ref 136–145)
WBC # BLD AUTO: 3.7 K/UL (ref 3.6–11)

## 2019-08-18 PROCEDURE — 74011000258 HC RX REV CODE- 258: Performed by: STUDENT IN AN ORGANIZED HEALTH CARE EDUCATION/TRAINING PROGRAM

## 2019-08-18 PROCEDURE — 77010033678 HC OXYGEN DAILY

## 2019-08-18 PROCEDURE — 74011250636 HC RX REV CODE- 250/636: Performed by: STUDENT IN AN ORGANIZED HEALTH CARE EDUCATION/TRAINING PROGRAM

## 2019-08-18 PROCEDURE — 74011250636 HC RX REV CODE- 250/636: Performed by: INTERNAL MEDICINE

## 2019-08-18 PROCEDURE — 85025 COMPLETE CBC W/AUTO DIFF WBC: CPT

## 2019-08-18 PROCEDURE — 74011636637 HC RX REV CODE- 636/637: Performed by: INTERNAL MEDICINE

## 2019-08-18 PROCEDURE — 80048 BASIC METABOLIC PNL TOTAL CA: CPT

## 2019-08-18 PROCEDURE — 74011250637 HC RX REV CODE- 250/637: Performed by: INTERNAL MEDICINE

## 2019-08-18 PROCEDURE — 65660000000 HC RM CCU STEPDOWN

## 2019-08-18 PROCEDURE — 36415 COLL VENOUS BLD VENIPUNCTURE: CPT

## 2019-08-18 PROCEDURE — 74011250637 HC RX REV CODE- 250/637: Performed by: HOSPITALIST

## 2019-08-18 RX ORDER — POTASSIUM CHLORIDE 14.9 MG/ML
10 INJECTION INTRAVENOUS ONCE
Status: COMPLETED | OUTPATIENT
Start: 2019-08-18 | End: 2019-08-18

## 2019-08-18 RX ORDER — ONDANSETRON 2 MG/ML
4 INJECTION INTRAMUSCULAR; INTRAVENOUS
Status: DISCONTINUED | OUTPATIENT
Start: 2019-08-18 | End: 2019-08-26 | Stop reason: HOSPADM

## 2019-08-18 RX ADMIN — TRAMADOL HYDROCHLORIDE 50 MG: 50 TABLET, FILM COATED ORAL at 09:59

## 2019-08-18 RX ADMIN — PANTOPRAZOLE SODIUM 40 MG: 40 TABLET, DELAYED RELEASE ORAL at 10:01

## 2019-08-18 RX ADMIN — ASPIRIN 81 MG CHEWABLE TABLET 81 MG: 81 TABLET CHEWABLE at 10:00

## 2019-08-18 RX ADMIN — KETOROLAC TROMETHAMINE 15 MG: 30 INJECTION, SOLUTION INTRAMUSCULAR at 04:02

## 2019-08-18 RX ADMIN — METOPROLOL TARTRATE 75 MG: 50 TABLET ORAL at 10:00

## 2019-08-18 RX ADMIN — SODIUM CHLORIDE 75 ML/HR: 900 INJECTION, SOLUTION INTRAVENOUS at 16:38

## 2019-08-18 RX ADMIN — GABAPENTIN 600 MG: 300 CAPSULE ORAL at 09:59

## 2019-08-18 RX ADMIN — TOPIRAMATE 25 MG: 25 TABLET, FILM COATED ORAL at 09:59

## 2019-08-18 RX ADMIN — CEFTRIAXONE 1 G: 1 INJECTION, POWDER, FOR SOLUTION INTRAMUSCULAR; INTRAVENOUS at 16:34

## 2019-08-18 RX ADMIN — ONDANSETRON 4 MG: 2 INJECTION INTRAMUSCULAR; INTRAVENOUS at 09:09

## 2019-08-18 RX ADMIN — Medication 10 ML: at 23:09

## 2019-08-18 RX ADMIN — KETOROLAC TROMETHAMINE 15 MG: 30 INJECTION, SOLUTION INTRAMUSCULAR at 16:33

## 2019-08-18 RX ADMIN — SENNOSIDES, DOCUSATE SODIUM 1 TABLET: 50; 8.6 TABLET, FILM COATED ORAL at 17:48

## 2019-08-18 RX ADMIN — Medication 10 ML: at 10:00

## 2019-08-18 RX ADMIN — METOPROLOL TARTRATE 75 MG: 50 TABLET ORAL at 17:48

## 2019-08-18 RX ADMIN — ENOXAPARIN SODIUM 40 MG: 40 INJECTION SUBCUTANEOUS at 17:48

## 2019-08-18 RX ADMIN — SENNOSIDES, DOCUSATE SODIUM 1 TABLET: 50; 8.6 TABLET, FILM COATED ORAL at 10:00

## 2019-08-18 RX ADMIN — POTASSIUM CHLORIDE 10 MEQ: 200 INJECTION, SOLUTION INTRAVENOUS at 11:00

## 2019-08-18 RX ADMIN — PREDNISONE 10 MG: 10 TABLET ORAL at 09:59

## 2019-08-18 RX ADMIN — ATORVASTATIN CALCIUM 40 MG: 20 TABLET, FILM COATED ORAL at 09:59

## 2019-08-18 RX ADMIN — Medication 10 ML: at 16:35

## 2019-08-18 RX ADMIN — GABAPENTIN 600 MG: 300 CAPSULE ORAL at 17:48

## 2019-08-18 NOTE — PROGRESS NOTES
Hospitalist Progress Note                               Monalisa Wilson MD                                     Answering service: [phone]                               OR [phone] from in house phone                                         Date of Service:  2019  NAME:  Sampson Eisenberg  :  1925  MRN:  [id number]      Admission Summary:     81 Y/O female with a PMH significant for Afib, CAD, CHF and GERD was brought to the ER for worsening confusion. Reason for follow up:      CC: Confusion. All events in the past 24 hours reviewed in their entirety. Patient is a poor historian and unable to give all pertinent history. Assessment & Plan:       1) ID: Resolving sepsis due to E.coli UTI present on admission. Afebrile, resolved lactic Acidemia, blood cultures negative to date. Continue Rocephin. 2) CNS: Resolving acute encephalopathy probable multifactorial metabolic encephalopathy. CT head negative for any acute findings. 3) GI: Resolving nausea and vomiting. As needed Zofran. 4) Electrolytes: Hypokalemia. Repletion with F/U.     5) CVS: CAD. Probable Demand ischemia due to the sepsis on admission. Paroxysmal Afib; not on chronic anticopagulation due to H/O falls predisposing to bleeding diatheses. Chronic diastolic CHF with a preserved EF and without any acute exarcerbation. Controlled primary hypertension. Dyslipidemia. Cardiology eval notesd and appreciated. 6) Resp: Chronic Hypoxemic respiratory failure. 7) GI: H/O GERD. 8) Prophylaxis: DVT. 9) Rehab: PT/OT eval. Ambulated with a walker prior to admission. 10) Directives: DNR/DNI with a guarded prognosis. 11) Plan: Anticipate [facility] for ongoing therapy. Discussed in detail with patient's son Tito Guaman at [phone]. D/W Nursing.       Hospital Problems  Date Reviewed: 10/16/2018          Codes Class Noted POA    Sepsis ([facility] [address] ICD-10-CM: A41.9  ICD-9-CM: 038.9, 995.91  8/14/2019 Unknown                  Physical Examination:      Last 24hrs VS reviewed since prior progress note. Most recent are:  Visit Vitals  /80 (BP 1 Location: Left arm, BP Patient Position: At rest)   Pulse 84   Temp 97.6 °F (36.4 °C)   Resp 16   Ht 5' 4\" (1.626 m)   Wt 61.6 kg (135 lb 12.9 oz)   SpO2 97%   BMI 23.31 kg/m²           Constitutional:  No acute distress. HEENT: Head is a traumatic,  Un icteric sclera. Pink conjunctiva,no erythema or discharge. Oral mucous moist, oropharynx benign. Neck supple,    Resp:  Decreased air entry bibasilarly. Limited exam due to poor effort. CV:  Regular rhythm, normal rate, no murmurs, gallops, rubs    GI:  Soft, non distended, non tender. normoactive bowel sounds, no hepatosplenomegaly    :  No CVA or suprapubic tenderness   Skin  :  No erythema,rash,bullae,dipigmentation     Musculoskeletal:  No edema, warm, 2+ pulses throughout    Neurologic: Responds to stimuli. Labs:     Recent Labs     08/18/19 0427   WBC 3.7   HGB 10.5*   HCT 33.8*   *     Recent Labs     08/18/19 0427   *   K 3.3*   *   CO2 23   BUN 8   CREA 0.39*      CA 8.5     No results for input(s): SGOT, GPT, ALT, AP, TBIL, TBILI, TP, ALB, GLOB, GGT, AML, LPSE in the last 72 hours. No lab exists for component: AMYP, HLPSE  No results for input(s): INR, PTP, APTT in the last 72 hours. No lab exists for component: INREXT, INREXT   No results for input(s): FE, TIBC, PSAT, FERR in the last 72 hours. No results found for: FOL, RBCF   No results for input(s): PH, PCO2, PO2 in the last 72 hours. No results for input(s): CPK, CKNDX, TROIQ in the last 72 hours.     No lab exists for component: CPKMB  Lab Results   Component Value Date/Time    Cholesterol, total 171 12/19/2018 02:59 AM    HDL Cholesterol 78 12/19/2018 02:59 AM    LDL, calculated 71 12/19/2018 02:59 AM    Triglyceride 111 12/19/2018 02:59 AM     Lab Results   Component Value Date/Time    Glucose (POC) 191 (H) 10/12/2018 12:52 PM    Glucose (POC) 89 10/03/2018 07:30 AM     Lab Results   Component Value Date/Time    Color YELLOW/STRAW 08/14/2019 03:29 PM    Appearance CLOUDY (A) 08/14/2019 03:29 PM    Specific gravity 1.012 08/14/2019 03:29 PM    Specific gravity 1.025 10/03/2018 06:18 AM    pH (UA) 6.0 08/14/2019 03:29 PM    Protein NEGATIVE  08/14/2019 03:29 PM    Glucose NEGATIVE  08/14/2019 03:29 PM    Ketone 80 (A) 08/14/2019 03:29 PM    Bilirubin NEGATIVE  08/14/2019 03:29 PM    Urobilinogen 1.0 08/14/2019 03:29 PM    Nitrites POSITIVE (A) 08/14/2019 03:29 PM    Leukocyte Esterase MODERATE (A) 08/14/2019 03:29 PM    Epithelial cells FEW 08/14/2019 03:29 PM    Bacteria 2+ (A) 08/14/2019 03:29 PM    WBC  08/14/2019 03:29 PM    RBC 0-5 08/14/2019 03:29 PM         Medications Reviewed:     Current Facility-Administered Medications   Medication Dose Route Frequency    ondansetron (ZOFRAN) injection 4 mg  4 mg IntraVENous Q4H PRN    potassium chloride 10 mEq in 50 ml IVPB  10 mEq IntraVENous ONCE    acetaminophen (TYLENOL) suppository 650 mg  650 mg Rectal Q4H PRN    ketorolac (TORADOL) injection 15 mg  15 mg IntraVENous Q6H PRN    gabapentin (NEURONTIN) capsule 600 mg  600 mg Oral BIDPC    traMADol (ULTRAM) tablet 50 mg  50 mg Oral Q6H PRN    acetaminophen (TYLENOL) tablet 650 mg  650 mg Oral Q4H PRN    sodium chloride (NS) flush 5-10 mL  5-10 mL IntraVENous PRN    cefTRIAXone (ROCEPHIN) 1 g in 0.9% sodium chloride (MBP/ADV) 50 mL  1 g IntraVENous Q24H    0.9% sodium chloride infusion  75 mL/hr IntraVENous CONTINUOUS    sodium chloride (NS) flush 5-40 mL  5-40 mL IntraVENous Q8H    sodium chloride (NS) flush 5-40 mL  5-40 mL IntraVENous PRN    enoxaparin (LOVENOX) injection 40 mg  40 mg SubCUTAneous Q24H    ondansetron (ZOFRAN) injection 4 mg  4 mg IntraVENous Q4H PRN    atorvastatin (LIPITOR) tablet 40 mg  40 mg Oral DAILY    pantoprazole (PROTONIX) tablet 40 mg  40 mg Oral ACB    metoprolol tartrate (LOPRESSOR) tablet 75 mg  75 mg Oral BID    predniSONE (DELTASONE) tablet 10 mg  10 mg Oral DAILY    topiramate (TOPAMAX) tablet 25 mg  25 mg Oral DAILY WITH BREAKFAST    senna-docusate (PERICOLACE) 8.6-50 mg per tablet 1 Tab  1 Tab Oral BID    aspirin chewable tablet 81 mg  81 mg Oral DAILY    nitroglycerin (NITROLINGUAL) sublingual 0.4 mg/spray  1 Spray SubLINGual Q5MIN PRN     ______________________________________________________________________  EXPECTED LENGTH OF STAY: 4d 19h  ACTUAL LENGTH OF STAY:          4                 Mack Somers MD

## 2019-08-18 NOTE — PROGRESS NOTES
Bedside shift change report given to Johnny Lopes (oncoming nurse) by Dusty Baron (offgoing nurse). Report included the following information SBAR, Kardex, MAR and Recent Results.

## 2019-08-18 NOTE — PROGRESS NOTES
Problem: Pressure Injury - Risk of  Goal: *Prevention of pressure injury  Description  Document Marcelo Scale and appropriate interventions in the flowsheet. Outcome: Progressing Towards Goal  Note:   Pressure Injury Interventions:  Sensory Interventions: Assess changes in LOC    Moisture Interventions: Absorbent underpads    Activity Interventions: Pressure redistribution bed/mattress(bed type)    Mobility Interventions: Float heels, HOB 30 degrees or less    Nutrition Interventions: Document food/fluid/supplement intake    Friction and Shear Interventions: HOB 30 degrees or less                Problem: Patient Education: Go to Patient Education Activity  Goal: Patient/Family Education  Outcome: Progressing Towards Goal     Problem: Falls - Risk of  Goal: *Absence of Falls  Description  Document Kade Fall Risk and appropriate interventions in the flowsheet.   Outcome: Progressing Towards Goal  Note:   Fall Risk Interventions:  Mobility Interventions: Communicate number of staff needed for ambulation/transfer    Mentation Interventions: Adequate sleep, hydration, pain control, Bed/chair exit alarm    Medication Interventions: Bed/chair exit alarm    Elimination Interventions: Bed/chair exit alarm, Call light in reach              Problem: Patient Education: Go to Patient Education Activity  Goal: Patient/Family Education  Outcome: Progressing Towards Goal     Problem: Pain  Goal: *Control of Pain  Outcome: Progressing Towards Goal

## 2019-08-18 NOTE — PROGRESS NOTES
Bedside shift change report given to [facility], RN (oncoming nurse) by Sowmya Cabezas RN (offgoing nurse). Report included the following information SBAR and Kardex.

## 2019-08-18 NOTE — PROGRESS NOTES
Cm reviewed chart and received consult to assist with short term SNF placement for rehab. Pt's son prefers [facility]. CM sent referral through [facility] to [facility]. Pt has [employer] and will require insurance authorization, Auth process can not be started until Monday (PT/OT notes from Saturday will be within 48 hour window to submit for authorization on Monday). CM will continue to follow.   YULIANA Parekh, ACM

## 2019-08-18 NOTE — PROGRESS NOTES
Problem: Pressure Injury - Risk of  Goal: *Prevention of pressure injury  Description  Document Marcelo Scale and appropriate interventions in the flowsheet. Outcome: Progressing Towards Goal  Note:   Pressure Injury Interventions:  Sensory Interventions: Assess changes in LOC    Moisture Interventions: Absorbent underpads, Check for incontinence Q2 hours and as needed    Activity Interventions: Pressure redistribution bed/mattress(bed type)    Mobility Interventions: HOB 30 degrees or less    Nutrition Interventions: Document food/fluid/supplement intake    Friction and Shear Interventions: HOB 30 degrees or less, Minimize layers       Problem: Falls - Risk of  Goal: *Absence of Falls  Description  Document Kade Fall Risk and appropriate interventions in the flowsheet. Outcome: Progressing Towards Goal  Note:   Fall Risk Interventions:  Mobility Interventions: Communicate number of staff needed for ambulation/transfer    Mentation Interventions: Adequate sleep, hydration, pain control    Medication Interventions: Bed/chair exit alarm    Elimination Interventions:  Toileting schedule/hourly rounds       Problem: Patient Education: Go to Patient Education Activity  Goal: Patient/Family Education  Outcome: Progressing Towards Goal     Problem: Pain  Goal: *Control of Pain  Outcome: Progressing Towards Goal     Problem: Nausea/Vomiting (Adult)  Goal: *Absence of nausea/vomiting  Outcome: Progressing Towards Goal

## 2019-08-19 LAB
ANION GAP SERPL CALC-SCNC: 4 MMOL/L (ref 5–15)
BUN SERPL-MCNC: 12 MG/DL (ref 6–20)
BUN/CREAT SERPL: 27 (ref 12–20)
CALCIUM SERPL-MCNC: 8.4 MG/DL (ref 8.5–10.1)
CHLORIDE SERPL-SCNC: 117 MMOL/L (ref 97–108)
CO2 SERPL-SCNC: 25 MMOL/L (ref 21–32)
CREAT SERPL-MCNC: 0.44 MG/DL (ref 0.55–1.02)
GLUCOSE SERPL-MCNC: 103 MG/DL (ref 65–100)
POTASSIUM SERPL-SCNC: 3.6 MMOL/L (ref 3.5–5.1)
SODIUM SERPL-SCNC: 146 MMOL/L (ref 136–145)

## 2019-08-19 PROCEDURE — 74011250637 HC RX REV CODE- 250/637: Performed by: HOSPITALIST

## 2019-08-19 PROCEDURE — 74011250636 HC RX REV CODE- 250/636: Performed by: INTERNAL MEDICINE

## 2019-08-19 PROCEDURE — 80048 BASIC METABOLIC PNL TOTAL CA: CPT

## 2019-08-19 PROCEDURE — 36415 COLL VENOUS BLD VENIPUNCTURE: CPT

## 2019-08-19 PROCEDURE — 74011000258 HC RX REV CODE- 258: Performed by: STUDENT IN AN ORGANIZED HEALTH CARE EDUCATION/TRAINING PROGRAM

## 2019-08-19 PROCEDURE — 97530 THERAPEUTIC ACTIVITIES: CPT

## 2019-08-19 PROCEDURE — 74011250636 HC RX REV CODE- 250/636: Performed by: STUDENT IN AN ORGANIZED HEALTH CARE EDUCATION/TRAINING PROGRAM

## 2019-08-19 PROCEDURE — 74011000258 HC RX REV CODE- 258: Performed by: INTERNAL MEDICINE

## 2019-08-19 PROCEDURE — 74011250637 HC RX REV CODE- 250/637: Performed by: INTERNAL MEDICINE

## 2019-08-19 PROCEDURE — 94760 N-INVAS EAR/PLS OXIMETRY 1: CPT

## 2019-08-19 PROCEDURE — 65270000032 HC RM SEMIPRIVATE

## 2019-08-19 PROCEDURE — 74011636637 HC RX REV CODE- 636/637: Performed by: INTERNAL MEDICINE

## 2019-08-19 PROCEDURE — 77010033678 HC OXYGEN DAILY

## 2019-08-19 RX ORDER — DEXTROSE MONOHYDRATE AND SODIUM CHLORIDE 5; .45 G/100ML; G/100ML
50 INJECTION, SOLUTION INTRAVENOUS CONTINUOUS
Status: DISCONTINUED | OUTPATIENT
Start: 2019-08-19 | End: 2019-08-23

## 2019-08-19 RX ADMIN — METOPROLOL TARTRATE 75 MG: 50 TABLET ORAL at 17:32

## 2019-08-19 RX ADMIN — DEXTROSE MONOHYDRATE AND SODIUM CHLORIDE 50 ML/HR: 5; .45 INJECTION, SOLUTION INTRAVENOUS at 11:45

## 2019-08-19 RX ADMIN — PREDNISONE 10 MG: 10 TABLET ORAL at 09:37

## 2019-08-19 RX ADMIN — TOPIRAMATE 25 MG: 25 TABLET, FILM COATED ORAL at 07:05

## 2019-08-19 RX ADMIN — METOPROLOL TARTRATE 75 MG: 50 TABLET ORAL at 09:36

## 2019-08-19 RX ADMIN — Medication 10 ML: at 21:48

## 2019-08-19 RX ADMIN — TRAMADOL HYDROCHLORIDE 50 MG: 50 TABLET, FILM COATED ORAL at 17:33

## 2019-08-19 RX ADMIN — Medication 10 ML: at 06:56

## 2019-08-19 RX ADMIN — ATORVASTATIN CALCIUM 40 MG: 20 TABLET, FILM COATED ORAL at 09:37

## 2019-08-19 RX ADMIN — SENNOSIDES, DOCUSATE SODIUM 1 TABLET: 50; 8.6 TABLET, FILM COATED ORAL at 17:33

## 2019-08-19 RX ADMIN — CEFTRIAXONE 1 G: 1 INJECTION, POWDER, FOR SOLUTION INTRAMUSCULAR; INTRAVENOUS at 15:44

## 2019-08-19 RX ADMIN — PANTOPRAZOLE SODIUM 40 MG: 40 TABLET, DELAYED RELEASE ORAL at 06:56

## 2019-08-19 RX ADMIN — KETOROLAC TROMETHAMINE 15 MG: 30 INJECTION, SOLUTION INTRAMUSCULAR at 07:12

## 2019-08-19 RX ADMIN — ASPIRIN 81 MG CHEWABLE TABLET 81 MG: 81 TABLET CHEWABLE at 09:37

## 2019-08-19 RX ADMIN — SENNOSIDES, DOCUSATE SODIUM 1 TABLET: 50; 8.6 TABLET, FILM COATED ORAL at 09:36

## 2019-08-19 RX ADMIN — GABAPENTIN 600 MG: 300 CAPSULE ORAL at 17:33

## 2019-08-19 RX ADMIN — ENOXAPARIN SODIUM 40 MG: 40 INJECTION SUBCUTANEOUS at 17:33

## 2019-08-19 RX ADMIN — TRAMADOL HYDROCHLORIDE 50 MG: 50 TABLET, FILM COATED ORAL at 11:42

## 2019-08-19 RX ADMIN — GABAPENTIN 600 MG: 300 CAPSULE ORAL at 09:37

## 2019-08-19 NOTE — PROGRESS NOTES
Bedside shift change report given to Vamsi Paula (oncoming nurse) by Seb Felix RN (offgoing nurse). Report included the following information SBAR, Kardex and MAR.

## 2019-08-19 NOTE — PROGRESS NOTES
Hospitalist Progress Note                               Shanika Silva MD                                     Answering service: [phone]                               OR [phone] from in house phone                                         Date of Service:  2019  NAME:  Reymundo Melton  :  1925  MRN:  [id number]      Admission Summary:     79 Y/O female with a PMH significant for Afib, CAD, CHF and GERD was brought to the ER for worsening confusion. Reason for follow up:      CC: Confusion. All events in the past 24 hours reviewed in their entirety. Patient is a poor historian and unable to give all pertinent history. However, appeared more awake and alert on am rounds. Assessment & Plan:       1) ID: Resolving sepsis due to E.coli UTI present on admission. Afebrile, resolved lactic Acidemia, blood cultures negative to date. Continue Rocephin. 2) CNS: Resolving acute encephalopathy probable multifactorial metabolic encephalopathy. CT head negative for any acute findings. 3) GI: Resolving nausea and vomiting. As needed Zofran. 4) Electrolytes: Resolved Hypokalemia.     5) CVS: CAD. Probable Demand ischemia due to the sepsis on admission. Paroxysmal Afib; not on chronic anticopagulation due to H/O falls predisposing to bleeding diatheses. Chronic diastolic CHF with a preserved EF and without any acute exarcerbation. Controlled primary hypertension. Dyslipidemia. Cardiology eval noted and appreciated. 6) Resp: Chronic Hypoxemic respiratory failure. 7) GI: H/O GERD. 8) Prophylaxis: DVT. 9) Rehab: Debility. PT/OT as tolerated. Ambulated with a walker prior to admission. 10) Directives: DNR/DNI with a guarded prognosis. 11) Plan: Anticipate D/[facility] for ongoing therapy in 1-2 days. Discussed in detail with patient's son Kendal Vee at [phone].  D/W Nursing. Hospital Problems  Date Reviewed: 10/16/2018          Codes Class Noted POA    Sepsis ([facility] [address] ICD-10-CM: A41.9  ICD-9-CM: 038.9, 995.91  8/14/2019 Unknown                  Physical Examination:      Last 24hrs VS reviewed since prior progress note. Most recent are:  Visit Vitals  /71 (BP 1 Location: Right arm, BP Patient Position: At rest)   Pulse 90   Temp 98.4 °F (36.9 °C)   Resp 15   Ht 5' 4\" (1.626 m)   Wt 63 kg (139 lb)   SpO2 96%   BMI 23.86 kg/m²           Constitutional:  No acute distress. HEENT: Head is a traumatic,  Un icteric sclera. Pink conjunctiva,no erythema or discharge. Oral mucous moist, oropharynx benign. Neck supple,    Resp:  Decreased air entry bibasilarly. Limited exam due to poor effort. CV:  Regular rhythm, normal rate, no murmurs, gallops, rubs    GI:  Soft, non distended, non tender. normoactive bowel sounds, no hepatosplenomegaly    :  No CVA or suprapubic tenderness   Skin  :  No erythema,rash,bullae,dipigmentation     Musculoskeletal:  No edema, warm, 2+ pulses throughout    Neurologic: Awake and alert. Labs:     Recent Labs     08/18/19  0427   WBC 3.7   HGB 10.5*   HCT 33.8*   *     Recent Labs     08/19/19  0126 08/18/19  0427   * 146*   K 3.6 3.3*   * 115*   CO2 25 23   BUN 12 8   CREA 0.44* 0.39*   * 100   CA 8.4* 8.5     No results for input(s): SGOT, GPT, ALT, AP, TBIL, TBILI, TP, ALB, GLOB, GGT, AML, LPSE in the last 72 hours. No lab exists for component: AMYP, HLPSE  No results for input(s): INR, PTP, APTT in the last 72 hours. No lab exists for component: INREXT, INREXT   No results for input(s): FE, TIBC, PSAT, FERR in the last 72 hours. No results found for: FOL, RBCF   No results for input(s): PH, PCO2, PO2 in the last 72 hours. No results for input(s): CPK, CKNDX, TROIQ in the last 72 hours.     No lab exists for component: CPKMB  Lab Results   Component Value Date/Time    Cholesterol, total 171 12/19/2018 02:59 AM    HDL Cholesterol 78 12/19/2018 02:59 AM    LDL, calculated 71 12/19/2018 02:59 AM    Triglyceride 111 12/19/2018 02:59 AM     Lab Results   Component Value Date/Time    Glucose (POC) 191 (H) 10/12/2018 12:52 PM    Glucose (POC) 89 10/03/2018 07:30 AM     Lab Results   Component Value Date/Time    Color YELLOW/STRAW 08/14/2019 03:29 PM    Appearance CLOUDY (A) 08/14/2019 03:29 PM    Specific gravity 1.012 08/14/2019 03:29 PM    Specific gravity 1.025 10/03/2018 06:18 AM    pH (UA) 6.0 08/14/2019 03:29 PM    Protein NEGATIVE  08/14/2019 03:29 PM    Glucose NEGATIVE  08/14/2019 03:29 PM    Ketone 80 (A) 08/14/2019 03:29 PM    Bilirubin NEGATIVE  08/14/2019 03:29 PM    Urobilinogen 1.0 08/14/2019 03:29 PM    Nitrites POSITIVE (A) 08/14/2019 03:29 PM    Leukocyte Esterase MODERATE (A) 08/14/2019 03:29 PM    Epithelial cells FEW 08/14/2019 03:29 PM    Bacteria 2+ (A) 08/14/2019 03:29 PM    WBC  08/14/2019 03:29 PM    RBC 0-5 08/14/2019 03:29 PM         Medications Reviewed:     Current Facility-Administered Medications   Medication Dose Route Frequency    dextrose 5 % - 0.45% NaCl infusion  50 mL/hr IntraVENous CONTINUOUS    ondansetron (ZOFRAN) injection 4 mg  4 mg IntraVENous Q4H PRN    acetaminophen (TYLENOL) suppository 650 mg  650 mg Rectal Q4H PRN    ketorolac (TORADOL) injection 15 mg  15 mg IntraVENous Q6H PRN    gabapentin (NEURONTIN) capsule 600 mg  600 mg Oral BIDPC    traMADol (ULTRAM) tablet 50 mg  50 mg Oral Q6H PRN    acetaminophen (TYLENOL) tablet 650 mg  650 mg Oral Q4H PRN    sodium chloride (NS) flush 5-10 mL  5-10 mL IntraVENous PRN    cefTRIAXone (ROCEPHIN) 1 g in 0.9% sodium chloride (MBP/ADV) 50 mL  1 g IntraVENous Q24H    sodium chloride (NS) flush 5-40 mL  5-40 mL IntraVENous Q8H    sodium chloride (NS) flush 5-40 mL  5-40 mL IntraVENous PRN    enoxaparin (LOVENOX) injection 40 mg  40 mg SubCUTAneous Q24H    ondansetron (ZOFRAN) injection 4 mg  4 mg IntraVENous Q4H PRN    atorvastatin (LIPITOR) tablet 40 mg  40 mg Oral DAILY    pantoprazole (PROTONIX) tablet 40 mg  40 mg Oral ACB    metoprolol tartrate (LOPRESSOR) tablet 75 mg  75 mg Oral BID    predniSONE (DELTASONE) tablet 10 mg  10 mg Oral DAILY    topiramate (TOPAMAX) tablet 25 mg  25 mg Oral DAILY WITH BREAKFAST    senna-docusate (PERICOLACE) 8.6-50 mg per tablet 1 Tab  1 Tab Oral BID    aspirin chewable tablet 81 mg  81 mg Oral DAILY    nitroglycerin (NITROLINGUAL) sublingual 0.4 mg/spray  1 Spray SubLINGual Q5MIN PRN     ______________________________________________________________________  EXPECTED LENGTH OF STAY: 4d 19h  ACTUAL LENGTH OF STAY:          5                 Dwain Geronimo MD

## 2019-08-19 NOTE — PROGRESS NOTES
Problem: Self Care Deficits Care Plan (Adult)  Goal: *Acute Goals and Plan of Care (Insert Text)  Description    FUNCTIONAL STATUS PRIOR TO ADMISSION: Pt resided with son PTA. Support for weekly bathing noted per chart. Pt reportedly tolerance w/c and walker use PTA. HOME SUPPORT: The patient lived with son. Occupational Therapy Goals  Initiated 8/17/2019  1. Patient will perform grooming with moderate assistance  within 7 day(s). 2.  Patient will perform self-feeding with moderate assistance  within 7 day(s). 3.  Patient will perform bed mobility maximum A within 7 days for improved ADL performance within 7 days. 4.  Patient follows simple, one step commands 3/4 trials for improved ADL performance within 7 days. 5.  Patient completes UB HEP moderate A x 10 minutes within 7 days. Outcome: Progressing Towards Goal   OCCUPATIONAL THERAPY TREATMENT  Patient: Jeanine Reyna (16 y.o. female)  Date: 8/19/2019  Diagnosis: Sepsis ([facility] [address] [A41.9] <principal problem not specified>       Precautions: DNI, Fall  Chart, occupational therapy assessment, plan of care, and goals were reviewed. ASSESSMENT  Based on the objective data described below, patient presents with significant pain, weakness, fatigue and decreased endurance impacting ADL performance today. Patient stating significant pain with touch to her LEs and movement, assisting minimally with moving legs off of bed. Total A for supine to sit EOB, initially total A EOB progressing to static sitting. Attempted stand x 2 for functional transfer to chair for lunch however patient requiring max-total Ax2 for attempted stand. Returned supine with total Ax2 and completed rolling with max A. Current Level of Function Impacting Discharge (ADLs): max to total A ADL     Other factors to consider for discharge: was mostly independent PTA, per PT family does state that her typical progression after UTI is significant impairment before returning to baseline. PLAN :  Patient continues to benefit from skilled intervention to address the above impairments. Continue treatment per established plan of care. to address goals. Recommend with staff: Recommend with nursing patient to complete as able in order to maintain strength, endurance and independence: ADLs with supervision/setup, bed in chair position  3x/day and rolling in bed for toileting with 2 assist. Thank you for your assistance. Recommend next OT session: EOB grooming, sitting balance    Recommendation for discharge: (in order for the patient to meet his/her long term goals)  Therapy up to 5 days/week in SNF setting    This discharge recommendation:  Has not yet been discussed the attending provider and/or case management    Equipment recommendations for successful discharge (if) home: to be determined by rehab facility       SUBJECTIVE:   Patient stated that hurts!!.    OBJECTIVE DATA SUMMARY:   Cognitive/Behavioral Status:  Neurologic State: Confused  Orientation Level: Oriented to person;Oriented to place; Disoriented to time;Disoriented to situation  Cognition: Decreased command following             Functional Mobility and Transfers for ADLs:  Bed Mobility:  Rolling: Maximum assistance;Assist x2  Supine to Sit: Total assistance;Assist x2  Sit to Supine: Total assistance;Assist x2    Transfers:             Balance:  Sitting: Impaired  Sitting - Static: Fair (occasional)(briefly, then fatiguing after 1.5 min)  Sitting - Dynamic: Poor (constant support)  Standing: Impaired  Standing - Static: Poor    ADL Intervention:         Therapeutic Exercises:       Pain:      Activity Tolerance:   Fair  Please refer to the flowsheet for vital signs taken during this treatment.     After treatment patient left in no apparent distress:   Supine in bed, Call bell within reach and Side rails x 3    COMMUNICATION/COLLABORATION:   The patients plan of care was discussed with: Physical Therapist and Registered Nurse    Robbie Ramos  Time Calculation: 15 mins

## 2019-08-19 NOTE — PROGRESS NOTES
Problem: Mobility Impaired (Adult and Pediatric)  Goal: *Acute Goals and Plan of Care (Insert Text)  Description  FUNCTIONAL STATUS PRIOR TO ADMISSION: Per chart review, patient was ambulatory short household distances as of December 2018-unclear if still ambulatory due to patient confusion. HOME SUPPORT PRIOR TO ADMISSION: The patient lived with son-had caregiver for bathing unclear if additional assistance required. Physical Therapy Goals  Initiated 8/17/2019  1. Patient will move from supine to sit and sit to supine , scoot up and down and roll side to side in bed with moderate assistance  within 7 day(s). 2.  Patient will transfer from bed to chair and chair to bed with moderate assistance  using the least restrictive device within 7 day(s). 3.  Patient will perform sit to stand with moderate assistance  within 7 day(s). 4.  Patient will ambulate with moderate assistance  for 25 feet with the least restrictive device within 7 day(s). 5.  Patient will tolerate sitting EOB with UE support x 5 minutes and min A within 7 days. Outcome: Progressing Towards Goal  PHYSICAL THERAPY TREATMENT  Patient: Zaid Lockett (29 y.o. female)  Date: 8/19/2019  Diagnosis: Sepsis ([facility] [A41.9] <principal problem not specified>       Precautions: DNI, Fall  Chart, physical therapy assessment, plan of care and goals were reviewed. ASSESSMENT  Based on the objective data described below, pt overall - much more alert - and conversant - confusion noted. Pt continues to respond painfully when touched or moved. Limited participation for sitting or attempts to stand and transfer to bedside chair. Current Level of Function Impacting Discharge (mobility/balance): Max assist for bed mobility; pt uncooperative with attempt to transfer to bedside chair    Other factors to consider for discharge: Dementia         PLAN :  Patient continues to benefit from skilled intervention to address the above impairments. Continue treatment per established plan of care. to address goals. Recommendation for discharge: (in order for the patient to meet his/her long term goals)  Therapy up to 5 days/week in SNF setting    This discharge recommendation:  Has been made in collaboration with the attending provider and/or case management    Equipment recommendations for successful discharge (if) home: to be determined by rehab facility       SUBJECTIVE:   Patient stated i'm 80years old.     OBJECTIVE DATA SUMMARY:   Critical Behavior:  Neurologic State: Confused  Orientation Level: Oriented to person, Oriented to place, Disoriented to time, Disoriented to situation  Cognition: Decreased command following  Safety/Judgement: Not assessed  Functional Mobility Training:  Bed Mobility:  Rolling: Maximum assistance;Assist x2  Supine to Sit: Total assistance;Assist x2  Sit to Supine: Total assistance;Assist x2           Transfers:   Attempt standing x 2 - pt extending at knees - hips/knees flexed and properly positioned for sit to stand and blocked prior to attempting to stand - unable to achieve full standing secondary to patient pushing backwards. Balance:  Sitting: Impaired  Sitting - Static: Fair (occasional)  Sitting - Dynamic: Poor (constant support)  Standing: Impaired(Unable to attain standing with max assist x 2 )  Standing - Static: Poor        Therapeutic Exercises:   Attempted AROM - heel slides in supine - prior to rolling/attempts to come to sitting - pt with minimal movement to request and responded painfully when therapist attempted to actively assist.    Pain Rating:  Pt unable to rate pain - responds painfully when moved or touched - multiple skin abrasions/bruises noted. Activity Tolerance:   Poor  Please refer to the flowsheet for vital signs taken during this treatment.     After treatment patient left in no apparent distress:   Supine in bed, Call bell within reach and Bed / chair alarm activated    COMMUNICATION/COLLABORATION:   The patients plan of care was discussed with: Registered Nurse    Gamal Falcon, PT   Time Calculation: 15 mins

## 2019-08-20 LAB
ANION GAP SERPL CALC-SCNC: 5 MMOL/L (ref 5–15)
BACTERIA SPEC CULT: NORMAL
BACTERIA SPEC CULT: NORMAL
BUN SERPL-MCNC: 9 MG/DL (ref 6–20)
BUN/CREAT SERPL: 19 (ref 12–20)
CALCIUM SERPL-MCNC: 8.3 MG/DL (ref 8.5–10.1)
CHLORIDE SERPL-SCNC: 112 MMOL/L (ref 97–108)
CO2 SERPL-SCNC: 28 MMOL/L (ref 21–32)
CREAT SERPL-MCNC: 0.48 MG/DL (ref 0.55–1.02)
GLUCOSE SERPL-MCNC: 137 MG/DL (ref 65–100)
POTASSIUM SERPL-SCNC: 3.3 MMOL/L (ref 3.5–5.1)
SERVICE CMNT-IMP: NORMAL
SERVICE CMNT-IMP: NORMAL
SODIUM SERPL-SCNC: 145 MMOL/L (ref 136–145)

## 2019-08-20 PROCEDURE — 80048 BASIC METABOLIC PNL TOTAL CA: CPT

## 2019-08-20 PROCEDURE — 74011250636 HC RX REV CODE- 250/636: Performed by: INTERNAL MEDICINE

## 2019-08-20 PROCEDURE — 74011250637 HC RX REV CODE- 250/637: Performed by: INTERNAL MEDICINE

## 2019-08-20 PROCEDURE — 77010033678 HC OXYGEN DAILY

## 2019-08-20 PROCEDURE — 65270000032 HC RM SEMIPRIVATE

## 2019-08-20 PROCEDURE — 97530 THERAPEUTIC ACTIVITIES: CPT | Performed by: PHYSICAL THERAPIST

## 2019-08-20 PROCEDURE — 97535 SELF CARE MNGMENT TRAINING: CPT

## 2019-08-20 PROCEDURE — 97530 THERAPEUTIC ACTIVITIES: CPT

## 2019-08-20 PROCEDURE — 74011250636 HC RX REV CODE- 250/636: Performed by: STUDENT IN AN ORGANIZED HEALTH CARE EDUCATION/TRAINING PROGRAM

## 2019-08-20 PROCEDURE — 77030038269 HC DRN EXT URIN PURWCK BARD -A

## 2019-08-20 PROCEDURE — 74011636637 HC RX REV CODE- 636/637: Performed by: INTERNAL MEDICINE

## 2019-08-20 PROCEDURE — 74011000258 HC RX REV CODE- 258: Performed by: INTERNAL MEDICINE

## 2019-08-20 PROCEDURE — 74011250637 HC RX REV CODE- 250/637: Performed by: HOSPITALIST

## 2019-08-20 PROCEDURE — 36415 COLL VENOUS BLD VENIPUNCTURE: CPT

## 2019-08-20 PROCEDURE — 74011000258 HC RX REV CODE- 258: Performed by: STUDENT IN AN ORGANIZED HEALTH CARE EDUCATION/TRAINING PROGRAM

## 2019-08-20 RX ORDER — POTASSIUM CHLORIDE 1.5 G/1.77G
20 POWDER, FOR SOLUTION ORAL DAILY
Status: DISCONTINUED | OUTPATIENT
Start: 2019-08-20 | End: 2019-08-26 | Stop reason: HOSPADM

## 2019-08-20 RX ORDER — FENTANYL 25 UG/1
1 PATCH TRANSDERMAL
Status: DISCONTINUED | OUTPATIENT
Start: 2019-08-20 | End: 2019-08-26 | Stop reason: HOSPADM

## 2019-08-20 RX ADMIN — CEFTRIAXONE 1 G: 1 INJECTION, POWDER, FOR SOLUTION INTRAMUSCULAR; INTRAVENOUS at 15:25

## 2019-08-20 RX ADMIN — PREDNISONE 10 MG: 10 TABLET ORAL at 09:01

## 2019-08-20 RX ADMIN — ATORVASTATIN CALCIUM 40 MG: 20 TABLET, FILM COATED ORAL at 09:02

## 2019-08-20 RX ADMIN — SENNOSIDES, DOCUSATE SODIUM 1 TABLET: 50; 8.6 TABLET, FILM COATED ORAL at 17:23

## 2019-08-20 RX ADMIN — SENNOSIDES, DOCUSATE SODIUM 1 TABLET: 50; 8.6 TABLET, FILM COATED ORAL at 09:01

## 2019-08-20 RX ADMIN — METOPROLOL TARTRATE 75 MG: 50 TABLET ORAL at 17:23

## 2019-08-20 RX ADMIN — METOPROLOL TARTRATE 75 MG: 50 TABLET ORAL at 09:01

## 2019-08-20 RX ADMIN — Medication 10 ML: at 23:48

## 2019-08-20 RX ADMIN — ASPIRIN 81 MG CHEWABLE TABLET 81 MG: 81 TABLET CHEWABLE at 09:02

## 2019-08-20 RX ADMIN — KETOROLAC TROMETHAMINE 15 MG: 30 INJECTION, SOLUTION INTRAMUSCULAR at 07:27

## 2019-08-20 RX ADMIN — PANTOPRAZOLE SODIUM 40 MG: 40 TABLET, DELAYED RELEASE ORAL at 07:20

## 2019-08-20 RX ADMIN — TOPIRAMATE 25 MG: 25 TABLET, FILM COATED ORAL at 07:20

## 2019-08-20 RX ADMIN — DEXTROSE MONOHYDRATE AND SODIUM CHLORIDE 50 ML/HR: 5; .45 INJECTION, SOLUTION INTRAVENOUS at 09:06

## 2019-08-20 RX ADMIN — GABAPENTIN 600 MG: 300 CAPSULE ORAL at 17:23

## 2019-08-20 RX ADMIN — TRAMADOL HYDROCHLORIDE 50 MG: 50 TABLET, FILM COATED ORAL at 04:02

## 2019-08-20 RX ADMIN — ENOXAPARIN SODIUM 40 MG: 40 INJECTION SUBCUTANEOUS at 17:23

## 2019-08-20 RX ADMIN — POTASSIUM CHLORIDE 20 MEQ: 1.5 POWDER, FOR SOLUTION ORAL at 11:47

## 2019-08-20 RX ADMIN — TRAMADOL HYDROCHLORIDE 50 MG: 50 TABLET, FILM COATED ORAL at 17:31

## 2019-08-20 RX ADMIN — GABAPENTIN 600 MG: 300 CAPSULE ORAL at 09:01

## 2019-08-20 RX ADMIN — Medication 10 ML: at 07:20

## 2019-08-20 NOTE — PROGRESS NOTES
Problem: Patient Education: Go to Patient Education Activity  Goal: Patient/Family Education  Outcome: Progressing Towards Goal     Problem: Sepsis: Day of Diagnosis  Goal: Off Pathway (Use only if patient is Off Pathway)  Outcome: Progressing Towards Goal  Goal: *Fluid resuscitation  Outcome: Progressing Towards Goal  Goal: *Paired blood cultures prior to first dose of antibiotic  Outcome: Progressing Towards Goal  Goal: *First dose of  appropriate antibiotic within 3 hours of arrival to ED, within 1 hour of arrival to ICU  Outcome: Progressing Towards Goal  Goal: *Lactic acid with first set of blood cultures  Outcome: Progressing Towards Goal  Goal: *Pneumococcal immunization (if eligible)  Outcome: Progressing Towards Goal  Goal: *Influenza immunization (if eligible)  Outcome: Progressing Towards Goal  Goal: Activity/Safety  Outcome: Progressing Towards Goal  Goal: Consults, if ordered  Outcome: Progressing Towards Goal  Goal: Diagnostic Test/Procedures  Outcome: Progressing Towards Goal  Goal: Nutrition/Diet  Outcome: Progressing Towards Goal  Goal: Discharge Planning  Outcome: Progressing Towards Goal  Goal: Medications  Outcome: Progressing Towards Goal  Goal: Respiratory  Outcome: Progressing Towards Goal  Goal: Treatments/Interventions/Procedures  Outcome: Progressing Towards Goal  Goal: Psychosocial  Outcome: Progressing Towards Goal     Problem: Sepsis: Day 2  Goal: Off Pathway (Use only if patient is Off Pathway)  Outcome: Progressing Towards Goal  Goal: *Central Venous Pressure maintained at 8-12 mm Hg  Outcome: Progressing Towards Goal  Goal: *Hemodynamically stable  Outcome: Progressing Towards Goal  Goal: *Tolerating diet  Outcome: Progressing Towards Goal  Goal: Activity/Safety  Outcome: Progressing Towards Goal  Goal: Consults, if ordered  Outcome: Progressing Towards Goal  Goal: Diagnostic Test/Procedures  Outcome: Progressing Towards Goal  Goal: Nutrition/Diet  Outcome: Progressing Towards Goal  Goal: Discharge Planning  Outcome: Progressing Towards Goal  Goal: Medications  Outcome: Progressing Towards Goal  Goal: Respiratory  Outcome: Progressing Towards Goal  Goal: Treatments/Interventions/Procedures  Outcome: Progressing Towards Goal  Goal: Psychosocial  Outcome: Progressing Towards Goal     Problem: Pressure Injury - Risk of  Goal: *Prevention of pressure injury  Description  Document Marcelo Scale and appropriate interventions in the flowsheet. Outcome: Progressing Towards Goal  Note:   Pressure Injury Interventions:  Sensory Interventions: Assess changes in LOC    Moisture Interventions: Limit adult briefs    Activity Interventions: Pressure redistribution bed/mattress(bed type)    Mobility Interventions: HOB 30 degrees or less    Nutrition Interventions: Document food/fluid/supplement intake    Friction and Shear Interventions: HOB 30 degrees or less                Problem: Patient Education: Go to Patient Education Activity  Goal: Patient/Family Education  Outcome: Progressing Towards Goal     Problem: Falls - Risk of  Goal: *Absence of Falls  Description  Document Kade Fall Risk and appropriate interventions in the flowsheet.   Outcome: Progressing Towards Goal  Note:   Fall Risk Interventions:  Mobility Interventions: Bed/chair exit alarm    Mentation Interventions: Bed/chair exit alarm    Medication Interventions: Bed/chair exit alarm    Elimination Interventions: Bed/chair exit alarm              Problem: Patient Education: Go to Patient Education Activity  Goal: Patient/Family Education  Outcome: Progressing Towards Goal     Problem: Patient Education: Go to Patient Education Activity  Goal: Patient/Family Education  Outcome: Progressing Towards Goal     Problem: Patient Education: Go to Patient Education Activity  Goal: Patient/Family Education  Outcome: Progressing Towards Goal     Problem: Pain  Goal: *Control of Pain  Outcome: Progressing Towards Goal     Problem: Nausea/Vomiting (Adult)  Goal: *Absence of nausea/vomiting  Outcome: Progressing Towards Goal

## 2019-08-20 NOTE — PROGRESS NOTES
Problem: Mobility Impaired (Adult and Pediatric)  Goal: *Acute Goals and Plan of Care (Insert Text)  Description  FUNCTIONAL STATUS PRIOR TO ADMISSION: Per chart review, patient was ambulatory short household distances as of December 2018-unclear if still ambulatory due to patient confusion. HOME SUPPORT PRIOR TO ADMISSION: The patient lived with son-had caregiver for bathing unclear if additional assistance required. Physical Therapy Goals  Initiated 8/17/2019  1. Patient will move from supine to sit and sit to supine , scoot up and down and roll side to side in bed with moderate assistance  within 7 day(s). 2.  Patient will transfer from bed to chair and chair to bed with moderate assistance  using the least restrictive device within 7 day(s). 3.  Patient will perform sit to stand with moderate assistance  within 7 day(s). 4.  Patient will ambulate with moderate assistance  for 25 feet with the least restrictive device within 7 day(s). 5.  Patient will tolerate sitting EOB with UE support x 5 minutes and min A within 7 days. Outcome: Progressing Towards Goal   PHYSICAL THERAPY TREATMENT  Patient: Tisha Jiménez (75 y.o. female)  Date: 8/20/2019  Diagnosis: Sepsis ([facility] [A41.9] <principal problem not specified>       Precautions: DNI, Fall  Chart, physical therapy assessment, plan of care and goals were reviewed. ASSESSMENT  Patient continues to be limited by confusion as well as pain. More agreeable and pleasant this session and does initiate some movement actively. Unable to stand or ambulate at this time. Current Level of Function Impacting Discharge (mobility/balance): Continues to need modA x 2 for supine to sit and totalA for sit to supine. MaxA x 2 for sit to stand and unable to come to full stand. Unable to ambulate.     Other factors to consider for discharge: continues to be confused and cannot ambulate at this time         PLAN :  Patient continues to benefit from skilled intervention to address the above impairments. Continue treatment per established plan of care. to address goals. Recommendation for discharge: (in order for the patient to meet his/her long term goals)  Therapy up to 5 days/week in SNF setting      Equipment recommendations for successful discharge (if) home: to be determined by rehab facility       SUBJECTIVE:   Patient stated It's miserable.     OBJECTIVE DATA SUMMARY:   Critical Behavior:  Neurologic State: Alert, Confused  Orientation Level: Oriented to person, Oriented to place, Disoriented to situation, Disoriented to time  Cognition: Follows commands  Safety/Judgement: Not assessed  Functional Mobility Training:  Bed Mobility:     Supine to Sit: Moderate assistance;Assist x2  Sit to Supine: Total assistance;Assist x2           Transfers:  Sit to Stand: Maximum assistance;Assist x2(unable to obtain)                                Balance:  Sitting: Impaired  Sitting - Static: Fair (occasional)  Sitting - Dynamic: Fair (occasional)  Standing: Impaired  Standing - Static: Poor  Ambulation/Gait Training:       Pain Rating:  Reports pain with activity but does not rate    Activity Tolerance:   Fair  Please refer to the flowsheet for vital signs taken during this treatment.     After treatment patient left in no apparent distress:   Supine in bed, Call bell within reach, Bed / chair alarm activated and Side rails x 3    COMMUNICATION/COLLABORATION:   The patients plan of care was discussed with: Physical Therapist, Occupational Therapist and Registered Nurse    Parul Gardiner PT, DPT   Time Calculation: 18 mins

## 2019-08-20 NOTE — PROGRESS NOTES
Problem: Self Care Deficits Care Plan (Adult)  Goal: *Acute Goals and Plan of Care (Insert Text)  Description    FUNCTIONAL STATUS PRIOR TO ADMISSION: Pt resided with son PTA. Support for weekly bathing noted per chart. Pt reportedly tolerance w/c and walker use PTA. HOME SUPPORT: The patient lived with son. Occupational Therapy Goals  Initiated 8/17/2019  1. Patient will perform grooming with moderate assistance  within 7 day(s). 2.  Patient will perform self-feeding with moderate assistance  within 7 day(s). 3.  Patient will perform bed mobility maximum A within 7 days for improved ADL performance within 7 days. 4.  Patient follows simple, one step commands 3/4 trials for improved ADL performance within 7 days. 5.  Patient completes UB HEP moderate A x 10 minutes within 7 days. Outcome: Progressing Towards Goal   OCCUPATIONAL THERAPY TREATMENT  Patient: Shashi Lockett (61 y.o. female)  Date: 8/20/2019  Diagnosis: Sepsis ([facility] [A41.9] <principal problem not specified>       Precautions: DNI, Fall  Chart, occupational therapy assessment, plan of care, and goals were reviewed. ASSESSMENT  Based on the objective data described below, patient presents with confusion, decreased strength, endurance, and pain impacting ADL. Patient more alert and calm today but with continued confusion. She required mod A x 2 for bed mobility to come to sitting EOB, fair sitting balance EOB with L lateral lean, total A for scooting and max A x 2 for attempted stand with RW before returning supine. Current Level of Function Impacting Discharge (ADLs): mod Ax2 bed mobility to EOB, total A don socks, impaired sitting balance and standing balance needed for functional transfers/ADL     Other factors to consider for discharge:  mostly independent prior to admission living with family support          PLAN :  Patient continues to benefit from skilled intervention to address the above impairments.   Continue treatment per established plan of care. to address goals. Recommend with staff: Recommend with nursing patient to complete as able in order to maintain strength, endurance and independence: ADLs with supervision/setup, bed in chair 3x/day and rolling toileting with 2 assist. Thank you for your assistance. Recommend next OT session: grooming bed in chair position     Recommendation for discharge: (in order for the patient to meet his/her long term goals)  Therapy up to 5 days/week in SNF setting    This discharge recommendation:  Has not yet been discussed the attending provider and/or case management    Equipment recommendations for successful discharge (if) home: to be determined by rehab facility       SUBJECTIVE:   Patient stated You all are lambs.     OBJECTIVE DATA SUMMARY:   Cognitive/Behavioral Status:  Neurologic State: Alert;Confused  Orientation Level: Oriented to person;Oriented to place; Disoriented to situation;Disoriented to time  Cognition: Follows commands             Functional Mobility and Transfers for ADLs:  Bed Mobility:  Supine to Sit: Moderate assistance;Assist x2  Sit to Supine: Total assistance;Assist x2    Transfers:  Sit to Stand: Maximum assistance;Assist x2(unable to obtain)          Balance:  Sitting: Impaired  Sitting - Static: Fair (occasional)  Sitting - Dynamic: Fair (occasional)  Standing: Impaired  Standing - Static: Poor    ADL Intervention:   Total A don socks        Therapeutic Exercises:       Pain:      Activity Tolerance:   Fair  Please refer to the flowsheet for vital signs taken during this treatment.     After treatment patient left in no apparent distress:   Supine in bed, Call bell within reach, Bed / chair alarm activated and Side rails x 3    COMMUNICATION/COLLABORATION:   The patients plan of care was discussed with: Physical Therapist and Registered Nurse Bushra Angelo  Time Calculation: 18 mins

## 2019-08-20 NOTE — PROGRESS NOTES
Hospitalist Progress Note                               Shanika Silva MD                                     Answering service: [phone]                               [phone] from in house phone                                         Date of Service:  2019  NAME:  Reymundo Melton  :  1925  MRN:  [id number]      Admission Summary:     79 Y/O female with a PMH significant for Afib, CAD, CHF and GERD was brought to the ER for worsening confusion. Reason for follow up:      CC: Confusion. All events in the past 24 hours reviewed in their entirety. Patient is a poor historian and unable to give all pertinent history. However, appeared more awake and alert on am rounds. Assessment & Plan:       1) ID: Resolved sepsis due to E.coli UTI present on admission. Afebrile, resolved lactic Acidemia, blood cultures negative to date. Continue Rocephin. 2) CNS: Resolved acute encephalopathy probable multifactorial metabolic encephalopathy. CT head negative for any acute findings. 3) GI: Resolving nausea and vomiting. As needed Zofran. 4) Electrolytes: Hypokalemia. Repletion with F/U.     5) CVS: CAD. Probable Demand ischemia due to the sepsis on admission. Paroxysmal Afib; not on chronic anticopagulation due to H/O falls predisposing to bleeding diatheses. Chronic diastolic CHF with a preserved EF and without any acute exarcerbation. Controlled primary hypertension. Dyslipidemia. Cardiology eval noted and appreciated. 6) Resp: Chronic Hypoxemic respiratory failure. 7) GI: H/O GERD. 8) Analgesia: Uncontrolled leg pains requiring gabapentin and as needed Tramadol for optimal control. Will restart fentanyl 18 microgram patch. 9) Prophylaxis: DVT. 10) Rehab: Debility. PT/OT as tolerated. Ambulated with a walker prior to admission. 11) Directives: DNR/DNI with a guarded prognosis.      12) Plan: Anticipate D/C to [facility] facility (Preferably [place]) for ongoing therapy in 1-2 days if leg pain improved. Discussed in detail with patient's son Hema Fong at [phone]. Hospital Problems  Date Reviewed: 10/16/2018          Codes Class Noted POA    Sepsis ([address] ICD-10-CM: A41.9  ICD-9-CM: 038.9, 995.91  8/14/2019 Unknown                Physical Examination:      Last 24hrs VS reviewed since prior progress note. Most recent are:  Visit Vitals  /77 (BP 1 Location: Right arm, BP Patient Position: At rest)   Pulse 74   Temp 98 °F (36.7 °C)   Resp 16   Ht 5' 4\" (1.626 m)   Wt 63.1 kg (139 lb 1.6 oz)   SpO2 96%   BMI 23.88 kg/m²           Constitutional:  No acute distress. HEENT: Head is a traumatic,  Un icteric sclera. Pink conjunctiva,no erythema or discharge. Oral mucous moist, oropharynx benign. Neck supple,    Resp:  Decreased air entry bibasilarly. Limited exam due to poor effort. CV:  Regular rhythm, normal rate, no murmurs, gallops, rubs    GI:  Soft, non distended, non tender. normoactive bowel sounds, no hepatosplenomegaly    :  No CVA or suprapubic tenderness   Skin  :  No erythema,rash,bullae,dipigmentation     Musculoskeletal:  Bipedal edema. Neurologic: Awake and alert. Labs:     Recent Labs     08/18/19  0427   WBC 3.7   HGB 10.5*   HCT 33.8*   *     Recent Labs     08/20/19  0609 08/19/19  0126 08/18/19  0427    146* 146*   K 3.3* 3.6 3.3*   * 117* 115*   CO2 28 25 23   BUN 9 12 8   CREA 0.48* 0.44* 0.39*   * 103* 100   CA 8.3* 8.4* 8.5     No results for input(s): SGOT, GPT, ALT, AP, TBIL, TBILI, TP, ALB, GLOB, GGT, AML, LPSE in the last 72 hours. No lab exists for component: AMYP, HLPSE  No results for input(s): INR, PTP, APTT in the last 72 hours. No lab exists for component: INREXT, INREXT   No results for input(s): FE, TIBC, PSAT, FERR in the last 72 hours.    No results found for: FOL, RBCF   No results for input(s): PH, PCO2, PO2 in the last 72 hours. No results for input(s): CPK, CKNDX, TROIQ in the last 72 hours.     No lab exists for component: CPKMB  Lab Results   Component Value Date/Time    Cholesterol, total 171 12/19/2018 02:59 AM    HDL Cholesterol 78 12/19/2018 02:59 AM    LDL, calculated 71 12/19/2018 02:59 AM    Triglyceride 111 12/19/2018 02:59 AM     Lab Results   Component Value Date/Time    Glucose (POC) 191 (H) 10/12/2018 12:52 PM    Glucose (POC) 89 10/03/2018 07:30 AM     Lab Results   Component Value Date/Time    Color YELLOW/STRAW 08/14/2019 03:29 PM    Appearance CLOUDY (A) 08/14/2019 03:29 PM    Specific gravity 1.012 08/14/2019 03:29 PM    Specific gravity 1.025 10/03/2018 06:18 AM    pH (UA) 6.0 08/14/2019 03:29 PM    Protein NEGATIVE  08/14/2019 03:29 PM    Glucose NEGATIVE  08/14/2019 03:29 PM    Ketone 80 (A) 08/14/2019 03:29 PM    Bilirubin NEGATIVE  08/14/2019 03:29 PM    Urobilinogen 1.0 08/14/2019 03:29 PM    Nitrites POSITIVE (A) 08/14/2019 03:29 PM    Leukocyte Esterase MODERATE (A) 08/14/2019 03:29 PM    Epithelial cells FEW 08/14/2019 03:29 PM    Bacteria 2+ (A) 08/14/2019 03:29 PM    WBC  08/14/2019 03:29 PM    RBC 0-5 08/14/2019 03:29 PM         Medications Reviewed:     Current Facility-Administered Medications   Medication Dose Route Frequency    potassium chloride (KLOR-CON) packet for solution 20 mEq  20 mEq Oral DAILY    dextrose 5 % - 0.45% NaCl infusion  50 mL/hr IntraVENous CONTINUOUS    ondansetron (ZOFRAN) injection 4 mg  4 mg IntraVENous Q4H PRN    acetaminophen (TYLENOL) suppository 650 mg  650 mg Rectal Q4H PRN    ketorolac (TORADOL) injection 15 mg  15 mg IntraVENous Q6H PRN    gabapentin (NEURONTIN) capsule 600 mg  600 mg Oral BIDPC    traMADol (ULTRAM) tablet 50 mg  50 mg Oral Q6H PRN    acetaminophen (TYLENOL) tablet 650 mg  650 mg Oral Q4H PRN    sodium chloride (NS) flush 5-10 mL  5-10 mL IntraVENous PRN    cefTRIAXone (ROCEPHIN) 1 g in 0.9% sodium chloride (MBP/ADV) 50 mL 1 g IntraVENous Q24H    sodium chloride (NS) flush 5-40 mL  5-40 mL IntraVENous Q8H    sodium chloride (NS) flush 5-40 mL  5-40 mL IntraVENous PRN    enoxaparin (LOVENOX) injection 40 mg  40 mg SubCUTAneous Q24H    ondansetron (ZOFRAN) injection 4 mg  4 mg IntraVENous Q4H PRN    atorvastatin (LIPITOR) tablet 40 mg  40 mg Oral DAILY    pantoprazole (PROTONIX) tablet 40 mg  40 mg Oral ACB    metoprolol tartrate (LOPRESSOR) tablet 75 mg  75 mg Oral BID    predniSONE (DELTASONE) tablet 10 mg  10 mg Oral DAILY    topiramate (TOPAMAX) tablet 25 mg  25 mg Oral DAILY WITH BREAKFAST    senna-docusate (PERICOLACE) 8.6-50 mg per tablet 1 Tab  1 Tab Oral BID    aspirin chewable tablet 81 mg  81 mg Oral DAILY    nitroglycerin (NITROLINGUAL) sublingual 0.4 mg/spray  1 Spray SubLINGual Q5MIN PRN     ______________________________________________________________________  EXPECTED LENGTH OF STAY: 4d 19h  ACTUAL LENGTH OF STAY:          6                 Monalisa Wilson MD

## 2019-08-20 NOTE — PROGRESS NOTES
Spiritual Care Assessment/Progress Note  [facility]      NAME: Shashi Lockett      MRN: [id number]  AGE: 80 y.o. SEX: female  Latter day Affiliation: Mosque   Language: English     8/20/2019     Total Time (in minutes): 5     Spiritual Assessment begun in [facility] through conversation with:         []Patient        [] Family    [] Friend(s)        Reason for Consult: Palliative Care, Initial/Spiritual Assessment     Spiritual beliefs: (Please include comment if needed)     [] Identifies with a cliff tradition:         [] Supported by a cliff community:            [] Claims no spiritual orientation:           [] Seeking spiritual identity:                [] Adheres to an individual form of spirituality:           [x] Not able to assess:                           Identified resources for coping:      [] Prayer                               [] Music                  [] Guided Imagery     [] Family/friends                 [] Pet visits     [] Devotional reading                         [] Unknown     [] Other                                             Interventions offered during this visit: (See comments for more details)    Patient Interventions: Initial visit           Plan of Care:     [] Support spiritual and/or cultural needs    [] Support AMD and/or advance care planning process      [] Support grieving process   [] Coordinate Rites and/or Rituals    [] Coordination with community clergy   [] No spiritual needs identified at this time   [] Detailed Plan of Care below (See Comments)  [] Make referral to Music Therapy  [] Make referral to Pet Therapy     [] Make referral to Addiction services  [] Make referral to [facility]  [] Make referral to Spiritual Care Partner  [] No future visits requested        [x] Follow up visits as needed     Comments: Attempted visit with Ms. Jackson Alamo. Chart reviewed prior to visit and pt's case discussed with Palliative Dr. Julián Khoury.     Pt was resting with no family present. Per  chart notes from previous hospitalizations, pt has been appreciative of prayer. Chaplains are available for support as needed.     Yvonne Greene, Palliative

## 2019-08-21 LAB
ANION GAP SERPL CALC-SCNC: 4 MMOL/L (ref 5–15)
BASOPHILS # BLD: 0 K/UL (ref 0–0.1)
BASOPHILS NFR BLD: 1 % (ref 0–1)
BUN SERPL-MCNC: 9 MG/DL (ref 6–20)
BUN/CREAT SERPL: 19 (ref 12–20)
CALCIUM SERPL-MCNC: 8.4 MG/DL (ref 8.5–10.1)
CHLORIDE SERPL-SCNC: 111 MMOL/L (ref 97–108)
CO2 SERPL-SCNC: 31 MMOL/L (ref 21–32)
CREAT SERPL-MCNC: 0.47 MG/DL (ref 0.55–1.02)
DIFFERENTIAL METHOD BLD: ABNORMAL
EOSINOPHIL # BLD: 0.2 K/UL (ref 0–0.4)
EOSINOPHIL NFR BLD: 5 % (ref 0–7)
ERYTHROCYTE [DISTWIDTH] IN BLOOD BY AUTOMATED COUNT: 14.6 % (ref 11.5–14.5)
GLUCOSE SERPL-MCNC: 118 MG/DL (ref 65–100)
HCT VFR BLD AUTO: 33.6 % (ref 35–47)
HGB BLD-MCNC: 10.2 G/DL (ref 11.5–16)
IMM GRANULOCYTES # BLD AUTO: 0 K/UL
IMM GRANULOCYTES NFR BLD AUTO: 0 %
LYMPHOCYTES # BLD: 0.6 K/UL (ref 0.8–3.5)
LYMPHOCYTES NFR BLD: 19 % (ref 12–49)
MCH RBC QN AUTO: 32 PG (ref 26–34)
MCHC RBC AUTO-ENTMCNC: 30.4 G/DL (ref 30–36.5)
MCV RBC AUTO: 105.3 FL (ref 80–99)
MONOCYTES # BLD: 0.3 K/UL (ref 0–1)
MONOCYTES NFR BLD: 11 % (ref 5–13)
NEUTS SEG # BLD: 2 K/UL (ref 1.8–8)
NEUTS SEG NFR BLD: 64 % (ref 32–75)
NRBC # BLD: 0 K/UL (ref 0–0.01)
NRBC BLD-RTO: 0 PER 100 WBC
PLATELET # BLD AUTO: 157 K/UL (ref 150–400)
PMV BLD AUTO: 9.8 FL (ref 8.9–12.9)
POTASSIUM SERPL-SCNC: 3.7 MMOL/L (ref 3.5–5.1)
RBC # BLD AUTO: 3.19 M/UL (ref 3.8–5.2)
RBC MORPH BLD: ABNORMAL
RBC MORPH BLD: ABNORMAL
SODIUM SERPL-SCNC: 146 MMOL/L (ref 136–145)
WBC # BLD AUTO: 3.1 K/UL (ref 3.6–11)

## 2019-08-21 PROCEDURE — 94760 N-INVAS EAR/PLS OXIMETRY 1: CPT

## 2019-08-21 PROCEDURE — 36415 COLL VENOUS BLD VENIPUNCTURE: CPT

## 2019-08-21 PROCEDURE — 97535 SELF CARE MNGMENT TRAINING: CPT

## 2019-08-21 PROCEDURE — 80048 BASIC METABOLIC PNL TOTAL CA: CPT

## 2019-08-21 PROCEDURE — 74011000258 HC RX REV CODE- 258: Performed by: STUDENT IN AN ORGANIZED HEALTH CARE EDUCATION/TRAINING PROGRAM

## 2019-08-21 PROCEDURE — 74011000258 HC RX REV CODE- 258: Performed by: INTERNAL MEDICINE

## 2019-08-21 PROCEDURE — 74011250636 HC RX REV CODE- 250/636: Performed by: STUDENT IN AN ORGANIZED HEALTH CARE EDUCATION/TRAINING PROGRAM

## 2019-08-21 PROCEDURE — 97530 THERAPEUTIC ACTIVITIES: CPT

## 2019-08-21 PROCEDURE — 74011250637 HC RX REV CODE- 250/637: Performed by: INTERNAL MEDICINE

## 2019-08-21 PROCEDURE — 85025 COMPLETE CBC W/AUTO DIFF WBC: CPT

## 2019-08-21 PROCEDURE — 65270000032 HC RM SEMIPRIVATE

## 2019-08-21 PROCEDURE — 74011250636 HC RX REV CODE- 250/636: Performed by: INTERNAL MEDICINE

## 2019-08-21 PROCEDURE — 77010033678 HC OXYGEN DAILY

## 2019-08-21 PROCEDURE — 74011636637 HC RX REV CODE- 636/637: Performed by: INTERNAL MEDICINE

## 2019-08-21 RX ORDER — FENTANYL 25 UG/1
1 PATCH TRANSDERMAL
Status: DISCONTINUED | OUTPATIENT
Start: 2019-08-21 | End: 2019-08-21

## 2019-08-21 RX ADMIN — DEXTROSE MONOHYDRATE AND SODIUM CHLORIDE 50 ML/HR: 5; .45 INJECTION, SOLUTION INTRAVENOUS at 06:29

## 2019-08-21 RX ADMIN — GABAPENTIN 600 MG: 300 CAPSULE ORAL at 08:40

## 2019-08-21 RX ADMIN — POTASSIUM CHLORIDE 20 MEQ: 1.5 POWDER, FOR SOLUTION ORAL at 08:41

## 2019-08-21 RX ADMIN — ATORVASTATIN CALCIUM 40 MG: 20 TABLET, FILM COATED ORAL at 08:40

## 2019-08-21 RX ADMIN — ENOXAPARIN SODIUM 40 MG: 40 INJECTION SUBCUTANEOUS at 17:45

## 2019-08-21 RX ADMIN — SENNOSIDES, DOCUSATE SODIUM 1 TABLET: 50; 8.6 TABLET, FILM COATED ORAL at 17:45

## 2019-08-21 RX ADMIN — PREDNISONE 10 MG: 10 TABLET ORAL at 08:40

## 2019-08-21 RX ADMIN — PANTOPRAZOLE SODIUM 40 MG: 40 TABLET, DELAYED RELEASE ORAL at 06:29

## 2019-08-21 RX ADMIN — METOPROLOL TARTRATE 75 MG: 50 TABLET ORAL at 08:40

## 2019-08-21 RX ADMIN — SENNOSIDES, DOCUSATE SODIUM 1 TABLET: 50; 8.6 TABLET, FILM COATED ORAL at 08:41

## 2019-08-21 RX ADMIN — METOPROLOL TARTRATE 75 MG: 50 TABLET ORAL at 17:46

## 2019-08-21 RX ADMIN — ASPIRIN 81 MG CHEWABLE TABLET 81 MG: 81 TABLET CHEWABLE at 08:40

## 2019-08-21 RX ADMIN — GABAPENTIN 600 MG: 300 CAPSULE ORAL at 17:45

## 2019-08-21 RX ADMIN — PANTOPRAZOLE SODIUM 40 MG: 40 TABLET, DELAYED RELEASE ORAL at 07:30

## 2019-08-21 RX ADMIN — CEFTRIAXONE 1 G: 1 INJECTION, POWDER, FOR SOLUTION INTRAMUSCULAR; INTRAVENOUS at 15:21

## 2019-08-21 RX ADMIN — Medication 10 ML: at 06:30

## 2019-08-21 RX ADMIN — Medication 10 ML: at 22:00

## 2019-08-21 RX ADMIN — TOPIRAMATE 25 MG: 25 TABLET, FILM COATED ORAL at 06:29

## 2019-08-21 NOTE — PROGRESS NOTES
Problem: Mobility Impaired (Adult and Pediatric)  Goal: *Acute Goals and Plan of Care (Insert Text)  Description  FUNCTIONAL STATUS PRIOR TO ADMISSION: Per chart review, patient was ambulatory short household distances as of December 2018-unclear if still ambulatory due to patient confusion. HOME SUPPORT PRIOR TO ADMISSION: The patient lived with son-had caregiver for bathing unclear if additional assistance required. Physical Therapy Goals  Initiated 8/17/2019  1. Patient will move from supine to sit and sit to supine , scoot up and down and roll side to side in bed with moderate assistance  within 7 day(s). 2.  Patient will transfer from bed to chair and chair to bed with moderate assistance  using the least restrictive device within 7 day(s). 3.  Patient will perform sit to stand with moderate assistance  within 7 day(s). 4.  Patient will ambulate with moderate assistance  for 25 feet with the least restrictive device within 7 day(s). 5.  Patient will tolerate sitting EOB with UE support x 5 minutes and min A within 7 days. Outcome: Progressing Towards Goal  PHYSICAL THERAPY TREATMENT  Patient: Hilario Miner (89 y.o. female)  Date: 8/21/2019  Diagnosis: [facility] ([facility] [A41.9] <principal problem not specified>       Precautions: DNI, Fall  Chart, physical therapy assessment, plan of care and goals were reviewed. ASSESSMENT  Based on the objective data described below, pt presenting with decline in functional mobility and increased confusion - functioning below baseline - requiring max assist x 2 to transfer patient OOB. Current Level of Function Impacting Discharge (mobility/balance): Max assist x 2 for transfer to bedside chair              PLAN :  Patient continues to benefit from skilled intervention to address the above impairments. Continue treatment per established plan of care. to address goals.     Recommendation for discharge: (in order for the patient to meet his/her long term goals)  Therapy up to 5 days/week in SNF setting    This discharge recommendation:  Has been made in collaboration with the attending provider and/or case management    Equipment recommendations for successful discharge (if) home: to be determined by rehab facility       SUBJECTIVE:     OBJECTIVE DATA SUMMARY:   Critical Behavior:  Neurologic State: Alert, Confused  Orientation Level: Oriented to person, Oriented to place  Cognition: Follows commands  Safety/Judgement: Not assessed  Functional Mobility Training:  Bed Mobility:     Supine to Sit: Moderate assistance;Assist x2              Transfers:  Sit to Stand: Maximum assistance;Assist x2        Stand Pivot Transfers: Maximum assistance(assist x 2)  Bed to Chair: Maximum assistance(assist x 2)                    Balance:  Sitting: Impaired  Sitting - Static: Fair (occasional)  Sitting - Dynamic: Fair (occasional)  Standing: Impaired  Standing - Static: Poor;Constant support         Sitting edge of bed - sat x 5 mins - patient able to maintain independently with close supervision      Attempted sit to stand x 3 - feet positioned/knees blocked - required max assist x 2 - unable to come to standing secondary to patient extending /pushing backwards           Pain Rating:  Pt unable to rate pain - pt responds painfully when touched. Activity Tolerance:   Fair - requires low stimulus environment  Please refer to the flowsheet for vital signs taken during this treatment.     After treatment patient left in no apparent distress:   Sitting in chair, Call bell within reach and Bed / chair alarm activated    COMMUNICATION/COLLABORATION:   The patients plan of care was discussed with: Registered Nurse    Efrain Suarez PT   Time Calculation: 15 mins

## 2019-08-21 NOTE — PROGRESS NOTES
Problem: Nausea/Vomiting (Adult)  Goal: *Absence of nausea/vomiting  Outcome: Progressing Towards Goal

## 2019-08-21 NOTE — PROGRESS NOTES
Problem: Pressure Injury - Risk of  Goal: *Prevention of pressure injury  Description  Document Marcelo Scale and appropriate interventions in the flowsheet. Outcome: Progressing Towards Goal  Note:   Pressure Injury Interventions:  Sensory Interventions: Assess changes in LOC, Keep linens dry and wrinkle-free, Turn and reposition approx. every two hours (pillows and wedges if needed)    Moisture Interventions: Internal/External urinary devices    Activity Interventions: Pressure redistribution bed/mattress(bed type), Increase time out of bed    Mobility Interventions: HOB 30 degrees or less, Pressure redistribution bed/mattress (bed type)    Nutrition Interventions: Document food/fluid/supplement intake    Friction and Shear Interventions: HOB 30 degrees or less                Problem: Falls - Risk of  Goal: *Absence of Falls  Description  Document Kade Fall Risk and appropriate interventions in the flowsheet.   Outcome: Progressing Towards Goal  Note:   Fall Risk Interventions:  Mobility Interventions: Bed/chair exit alarm, Communicate number of staff needed for ambulation/transfer    Mentation Interventions: Bed/chair exit alarm, Door open when patient unattended, Room close to nurse's station    Medication Interventions: Bed/chair exit alarm    Elimination Interventions: Call light in reach, Toileting schedule/hourly rounds              Problem: Pain  Goal: *Control of Pain  Outcome: Progressing Towards Goal     Problem: Nausea/Vomiting (Adult)  Goal: *Absence of nausea/vomiting  Outcome: Progressing Towards Goal

## 2019-08-21 NOTE — PROGRESS NOTES
Problem: Self Care Deficits Care Plan (Adult)  Goal: *Acute Goals and Plan of Care (Insert Text)  Description    FUNCTIONAL STATUS PRIOR TO ADMISSION: Pt resided with son PTA. Support for weekly bathing noted per chart. Pt reportedly tolerance w/c and walker use PTA. HOME SUPPORT: The patient lived with son. Occupational Therapy Goals  Initiated 8/17/2019  1. Patient will perform grooming with moderate assistance  within 7 day(s). 2.  Patient will perform self-feeding with moderate assistance  within 7 day(s). 3.  Patient will perform bed mobility maximum A within 7 days for improved ADL performance within 7 days. 4.  Patient follows simple, one step commands 3/4 trials for improved ADL performance within 7 days. 5.  Patient completes UB HEP moderate A x 10 minutes within 7 days. Outcome: Progressing Towards Goal   OCCUPATIONAL THERAPY TREATMENT  Patient: Jay Jay Dejesus (85 y.o. female)  Date: 8/21/2019  Diagnosis: Sepsis ([facility] [A41.9] <principal problem not specified>       Precautions: DNI, Fall  Chart, occupational therapy assessment, plan of care, and goals were reviewed. ASSESSMENT  Based on the objective data described below, pt continues to present with increase confusion and increase assistance with self-care. Pt received seated in chair after finishing lunch. Max to total assist to reposition in chair 2/2 pt leaning towards L side. Placed pillow under L UE to provide elevation 2/2 edema and fragile skin. Max assist to comb hair with R hand (after therapist combed out tangles). Limited by decrease B UE AROM and decrease strength. Per PT, pt was max assist x 2 with bed to chair transfers. Will con't to follow and address listed goals.      Current Level of Function Impacting Discharge (ADLs): max to total assist care with mobility and self-care    Other factors to consider for discharge: confusion         PLAN :  Patient continues to benefit from skilled intervention to address the above impairments. Continue treatment per established plan of care. to address goals. Recommend with staff: Lisa Turcios with meals, position L UE with pillow when seated in chair    Recommend next OT session: UB exercises, grooming    Recommendation for discharge: (in order for the patient to meet his/her long term goals)  Therapy up to 5 days/week in SNF setting    This discharge recommendation:  A follow-up discussion with the attending provider and/or case management is planned    Equipment recommendations for successful discharge (if) home: to be determined by rehab facility       SUBJECTIVE:   Patient stated I'm full.     OBJECTIVE DATA SUMMARY:   Cognitive/Behavioral Status:  Neurologic State: Alert;Confused  Orientation Level: Oriented to person;Oriented to place  Cognition: Follows commands             Functional Mobility and Transfers for ADLs:  Bed Mobility:       Transfers:             Balance:       ADL Intervention:   Positioning to achieve mid-line and elevate L UE to provide optimal positioning    Grooming  Brushing/Combing Hair: Maximum assistance                                  Therapeutic Exercises:       Pain:  No c/o pain! Activity Tolerance:   Fair  Please refer to the flowsheet for vital signs taken during this treatment.     After treatment patient left in no apparent distress:   Sitting in chair and Call bell within reach    COMMUNICATION/COLLABORATION:   The patients plan of care was discussed with: Physical Therapist    YANELI Arciniega/L  Time Calculation: 13 mins

## 2019-08-21 NOTE — PROGRESS NOTES
MARLENY Plan:     1. Disposition SNF for Rehab; [facility] pt; awaiting medical clearance     2. Palliative following      3. [facility] team following; CM to notify [facility] discharge     4. Transport; [facility] likely; CM to assist as needed     5.  Shannan Morales  Clinical     [phone]

## 2019-08-21 NOTE — PROGRESS NOTES
Hospitalist Progress Note                               Destiny Davis MD                                     Answering service: [phone]                               OR [phone] from in house phone                                         Date of Service:  2019  NAME:  Sisi Martinez  :  1925  MRN:  [id number]      Admission Summary:     79 Y/O female with a PMH significant for Afib, CAD, CHF and GERD was brought to the ER for worsening confusion. Reason for follow up:      CC: Confusion. All events in the past 24 hours reviewed in their entirety. Patient is a poor historian and unable to give all pertinent history. However, appeared more awake and alert on am rounds. C/O persistent B/L leg pains. Assessment & Plan:       1) ID: Resolved sepsis due to E.coli UTI present on admission. Afebrile, resolved lactic Acidemia, blood cultures negative to date. Continue Rocephin. 2) CNS: Resolved acute encephalopathy probable multifactorial metabolic encephalopathy. CT head negative for any acute findings. 3) GI: Resolving nausea and vomiting. As needed Zofran. 4) Electrolytes: Resolved Hypokalemia.     5) CVS: CAD. Probable Demand ischemia due to the sepsis on admission. Paroxysmal Afib; not on chronic anticopagulation due to H/O falls predisposing to bleeding diatheses. Chronic diastolic CHF with a preserved EF and without any acute exarcerbation. Controlled primary hypertension. Dyslipidemia. Cardiology eval noted and appreciated. 6) Resp: Chronic Hypoxemic respiratory failure. 7) GI: H/O GERD. 8) Analgesia: Uncontrolled leg pains requiring gabapentin and as needed Tramadol for optimal control. Will restart fentanyl 18 microgram patch. 9) Prophylaxis: DVT. 10) Rehab: Debility. PT/OT as tolerated. Ambulated with a walker prior to admission. 11) Directives: DNR/DNI with a guarded prognosis.      12) Plan: Anticipate D/C to [facility] (Preferably [place]) for ongoing therapy in 1-2 days if leg pain improved and able to work with therapists. Discussed in detail with patient's son Elian Cavanaugh at [phone]. D/W Nursing. Hospital Problems  Date Reviewed: 10/16/2018          Codes Class Noted POA    Sepsis ([facility] ICD-10-CM: A41.9  ICD-9-CM: 038.9, 995.91  8/14/2019 Unknown                Physical Examination:      Last 24hrs VS reviewed since prior progress note. Most recent are:  Visit Vitals  /74 (BP 1 Location: Left arm, BP Patient Position: At rest)   Pulse 77   Temp 98.2 °F (36.8 °C)   Resp 16   Ht 5' 4\" (1.626 m)   Wt 64.2 kg (141 lb 9.6 oz)   SpO2 97%   BMI 24.31 kg/m²           Constitutional:  No acute distress. HEENT: Head is a traumatic,  Un icteric sclera. Pink conjunctiva,no erythema or discharge. Oral mucous moist, oropharynx benign. Neck supple,    Resp:  Decreased air entry bibasilarly. Limited exam due to poor effort. CV:  Regular rhythm, normal rate, no murmurs, gallops, rubs    GI:  Soft, non distended, non tender. normoactive bowel sounds, no hepatosplenomegaly    :  No CVA or suprapubic tenderness   Skin  :  No erythema,rash,bullae,dipigmentation     Musculoskeletal:  Bipedal edema. Neurologic: Awake and alert. Labs:     Recent Labs     08/21/19  0113   WBC 3.1*   HGB 10.2*   HCT 33.6*        Recent Labs     08/21/19  0113 08/20/19  0609 08/19/19  0126   * 145 146*   K 3.7 3.3* 3.6   * 112* 117*   CO2 31 28 25   BUN 9 9 12   CREA 0.47* 0.48* 0.44*   * 137* 103*   CA 8.4* 8.3* 8.4*     No results for input(s): SGOT, GPT, ALT, AP, TBIL, TBILI, TP, ALB, GLOB, GGT, AML, LPSE in the last 72 hours. No lab exists for component: AMYP, HLPSE  No results for input(s): INR, PTP, APTT in the last 72 hours. No lab exists for component: INREXT, INREXT   No results for input(s): FE, TIBC, PSAT, FERR in the last 72 hours.    No results found for: FOL, RBCF   No results for input(s): PH, PCO2, PO2 in the last 72 hours. No results for input(s): CPK, CKNDX, TROIQ in the last 72 hours.     No lab exists for component: CPKMB  Lab Results   Component Value Date/Time    Cholesterol, total 171 12/19/2018 02:59 AM    HDL Cholesterol 78 12/19/2018 02:59 AM    LDL, calculated 71 12/19/2018 02:59 AM    Triglyceride 111 12/19/2018 02:59 AM     Lab Results   Component Value Date/Time    Glucose (POC) 191 (H) 10/12/2018 12:52 PM    Glucose (POC) 89 10/03/2018 07:30 AM     Lab Results   Component Value Date/Time    Color YELLOW/STRAW 08/14/2019 03:29 PM    Appearance CLOUDY (A) 08/14/2019 03:29 PM    Specific gravity 1.012 08/14/2019 03:29 PM    Specific gravity 1.025 10/03/2018 06:18 AM    pH (UA) 6.0 08/14/2019 03:29 PM    Protein NEGATIVE  08/14/2019 03:29 PM    Glucose NEGATIVE  08/14/2019 03:29 PM    Ketone 80 (A) 08/14/2019 03:29 PM    Bilirubin NEGATIVE  08/14/2019 03:29 PM    Urobilinogen 1.0 08/14/2019 03:29 PM    Nitrites POSITIVE (A) 08/14/2019 03:29 PM    Leukocyte Esterase MODERATE (A) 08/14/2019 03:29 PM    Epithelial cells FEW 08/14/2019 03:29 PM    Bacteria 2+ (A) 08/14/2019 03:29 PM    WBC  08/14/2019 03:29 PM    RBC 0-5 08/14/2019 03:29 PM         Medications Reviewed:     Current Facility-Administered Medications   Medication Dose Route Frequency    potassium chloride (KLOR-CON) packet for solution 20 mEq  20 mEq Oral DAILY    fentaNYL (DURAGESIC) 25 mcg/hr patch 1 Patch  1 Patch TransDERmal Q72H    dextrose 5 % - 0.45% NaCl infusion  50 mL/hr IntraVENous CONTINUOUS    ondansetron (ZOFRAN) injection 4 mg  4 mg IntraVENous Q4H PRN    acetaminophen (TYLENOL) suppository 650 mg  650 mg Rectal Q4H PRN    ketorolac (TORADOL) injection 15 mg  15 mg IntraVENous Q6H PRN    gabapentin (NEURONTIN) capsule 600 mg  600 mg Oral BIDPC    traMADol (ULTRAM) tablet 50 mg  50 mg Oral Q6H PRN    acetaminophen (TYLENOL) tablet 650 mg  650 mg Oral Q4H PRN    sodium chloride (NS) flush 5-10 mL  5-10 mL IntraVENous PRN    cefTRIAXone (ROCEPHIN) 1 g in 0.9% sodium chloride (MBP/ADV) 50 mL  1 g IntraVENous Q24H    sodium chloride (NS) flush 5-40 mL  5-40 mL IntraVENous Q8H    sodium chloride (NS) flush 5-40 mL  5-40 mL IntraVENous PRN    enoxaparin (LOVENOX) injection 40 mg  40 mg SubCUTAneous Q24H    atorvastatin (LIPITOR) tablet 40 mg  40 mg Oral DAILY    pantoprazole (PROTONIX) tablet 40 mg  40 mg Oral ACB    metoprolol tartrate (LOPRESSOR) tablet 75 mg  75 mg Oral BID    predniSONE (DELTASONE) tablet 10 mg  10 mg Oral DAILY    topiramate (TOPAMAX) tablet 25 mg  25 mg Oral DAILY WITH BREAKFAST    senna-docusate (PERICOLACE) 8.6-50 mg per tablet 1 Tab  1 Tab Oral BID    aspirin chewable tablet 81 mg  81 mg Oral DAILY    nitroglycerin (NITROLINGUAL) sublingual 0.4 mg/spray  1 Spray SubLINGual Q5MIN PRN     ______________________________________________________________________  EXPECTED LENGTH OF STAY: 4d 19h  ACTUAL LENGTH OF STAY:          7                 Karen Schulte MD

## 2019-08-22 LAB
ANION GAP SERPL CALC-SCNC: 3 MMOL/L (ref 5–15)
BUN SERPL-MCNC: 7 MG/DL (ref 6–20)
BUN/CREAT SERPL: 15 (ref 12–20)
CALCIUM SERPL-MCNC: 8.1 MG/DL (ref 8.5–10.1)
CHLORIDE SERPL-SCNC: 111 MMOL/L (ref 97–108)
CO2 SERPL-SCNC: 32 MMOL/L (ref 21–32)
CREAT SERPL-MCNC: 0.46 MG/DL (ref 0.55–1.02)
GLUCOSE SERPL-MCNC: 128 MG/DL (ref 65–100)
POTASSIUM SERPL-SCNC: 3.7 MMOL/L (ref 3.5–5.1)
SODIUM SERPL-SCNC: 146 MMOL/L (ref 136–145)

## 2019-08-22 PROCEDURE — 77010033678 HC OXYGEN DAILY

## 2019-08-22 PROCEDURE — 94760 N-INVAS EAR/PLS OXIMETRY 1: CPT

## 2019-08-22 PROCEDURE — 74011250637 HC RX REV CODE- 250/637: Performed by: INTERNAL MEDICINE

## 2019-08-22 PROCEDURE — 74011250636 HC RX REV CODE- 250/636: Performed by: INTERNAL MEDICINE

## 2019-08-22 PROCEDURE — 74011636637 HC RX REV CODE- 636/637: Performed by: INTERNAL MEDICINE

## 2019-08-22 PROCEDURE — 80048 BASIC METABOLIC PNL TOTAL CA: CPT

## 2019-08-22 PROCEDURE — 74011000258 HC RX REV CODE- 258: Performed by: STUDENT IN AN ORGANIZED HEALTH CARE EDUCATION/TRAINING PROGRAM

## 2019-08-22 PROCEDURE — 74011250636 HC RX REV CODE- 250/636: Performed by: STUDENT IN AN ORGANIZED HEALTH CARE EDUCATION/TRAINING PROGRAM

## 2019-08-22 PROCEDURE — 36415 COLL VENOUS BLD VENIPUNCTURE: CPT

## 2019-08-22 PROCEDURE — 97535 SELF CARE MNGMENT TRAINING: CPT

## 2019-08-22 PROCEDURE — 65270000032 HC RM SEMIPRIVATE

## 2019-08-22 RX ADMIN — POTASSIUM CHLORIDE 20 MEQ: 1.5 POWDER, FOR SOLUTION ORAL at 08:36

## 2019-08-22 RX ADMIN — Medication 10 ML: at 06:19

## 2019-08-22 RX ADMIN — CEFTRIAXONE 1 G: 1 INJECTION, POWDER, FOR SOLUTION INTRAMUSCULAR; INTRAVENOUS at 17:30

## 2019-08-22 RX ADMIN — GABAPENTIN 600 MG: 300 CAPSULE ORAL at 08:36

## 2019-08-22 RX ADMIN — METOPROLOL TARTRATE 75 MG: 50 TABLET ORAL at 08:36

## 2019-08-22 RX ADMIN — METOPROLOL TARTRATE 75 MG: 50 TABLET ORAL at 17:30

## 2019-08-22 RX ADMIN — SENNOSIDES, DOCUSATE SODIUM 1 TABLET: 50; 8.6 TABLET, FILM COATED ORAL at 08:36

## 2019-08-22 RX ADMIN — SENNOSIDES, DOCUSATE SODIUM 1 TABLET: 50; 8.6 TABLET, FILM COATED ORAL at 17:30

## 2019-08-22 RX ADMIN — TOPIRAMATE 25 MG: 25 TABLET, FILM COATED ORAL at 08:36

## 2019-08-22 RX ADMIN — ATORVASTATIN CALCIUM 40 MG: 20 TABLET, FILM COATED ORAL at 08:36

## 2019-08-22 RX ADMIN — GABAPENTIN 600 MG: 300 CAPSULE ORAL at 17:30

## 2019-08-22 RX ADMIN — ENOXAPARIN SODIUM 40 MG: 40 INJECTION SUBCUTANEOUS at 17:31

## 2019-08-22 RX ADMIN — PREDNISONE 10 MG: 10 TABLET ORAL at 08:36

## 2019-08-22 RX ADMIN — ASPIRIN 81 MG CHEWABLE TABLET 81 MG: 81 TABLET CHEWABLE at 08:36

## 2019-08-22 NOTE — WOUND CARE
Wound Care Note:     Follow-up visit for right buttock and right leg    Chart shows:  Admitted for sepsis  Past Medical History:   Diagnosis Date    Arrhythmia     A-FIB    Arthritis     CAD (coronary artery disease) 1989    MI    Congestive heart failure (CHF) (HCC)     GERD (gastroesophageal reflux disease)     Gluten intolerance     Heart failure (HCC)     CHF    Polymyalgia (HCC)      WBC = 3.1 on 8/21/19  Admitted from home    Assessment:   Patient is A&O x 4, communicative, incontinent with moderate assistance needed in repositioning. Bed: [place]  Patient wearing briefs for incontinence and has a Pure Wick in place. Diet: Mechanical soft    Right heel, bilateral buttocks, and sacral skin intact and with blanchable  Erythema. Left heel skin intact without erythema. 1. POA right buttock skin intact, no blistering of skin noted, pink to lavender blanchable erythema. Z guard paste applied. 2.  POA left buttock with pink to lavender blanchable erythema, skin intact. Z guard paste applied. 3.  POA right lower leg with echymosis, skin is very fragile and the proximal end of wound now has serous blister that is intact, no drainage, wound edges are closed. Mepitel One reapplied. Patient repositioned on right side. Heels offloaded on pillows. Recommendations:    Continue with current wound care. Bilateral buttocks- apply Z guard paste (orange tube)     Right lateral/posterior leg- Mepitel One to protect fragile skin and serous blister. Skin Care & Pressure Prevention:  Minimize layers of linen/pads under patient to optimize support surface. Turn/reposition approximately every 2 hours and offload heels. Manage incontinence / promote continence     Discussed above plan with patient & JUAN PABLO Jack    Transition of Care: Plan to follow as needed while admitted to hospital.  Anticipate discharge to SNF today.     Jaja Ku \"Ese\" RADHA Peña, RN, [facility].  office [phone]  pager [phone] or call  to page

## 2019-08-22 NOTE — PROGRESS NOTES
NUTRITION  Pt seen for:     [x]           LOS        RECOMMENDATIONS:   1. Continue supplements at facility until appetite back to baseline. SUBJECTIVE/OBJECTIVE:   Information obtained from:   patient        Pt admitted for sepsis. Working on placement at rehab per case mgmt notes. Pt visited today. Very pleasant. Notes appetite down since not feeling well. Does not drink Ensure at home but has tired them in the past and enjoys. Agreeable to BID while appetite poor - provides: 700kcal, 40g protein. Likes PB crackers for snack. Will continue to follow for PO intake if discharge delayed. Recommend continuing supplements at facility until appetite back to baseline.      Diet:  Mechanical soft  Supplements: none  Intake: []           Good     []           Fair      [x]           Poor   Patient Vitals for the past 100 hrs:   % Diet Eaten   08/21/19 1338 25 %   08/20/19 0951 50 %   08/18/19 1258 0 %     Weight Changes:   [x]            Stable PTA @ 128#    Nutrition Problems Identified:  []      None  []           Specified food preferences   []           Dislikes supplements              []           Allergies    PLAN:   [x]           Obtained/adjusted food preferences/tolerances and/or snacks options   [x]   Continue current diet  []           Educated patient  [x]           Add Supplements    Rescreen:  []            At Nutrition Risk           [x]            Not at [address], rescreen per screening protocol    Carey Somers, RD [address] [place] , Pager #[phone] or [phone]

## 2019-08-22 NOTE — PROGRESS NOTES
Hospitalist Progress Note                               Bolivar Bolanos MD                                     Answering service: [phone]                               OR [phone] from in house phone                                         Date of Service:  2019  NAME:  Cely Ruiz  :  1925  MRN:  [id number]      Admission Summary:     79 Y/O female with a PMH significant for Afib, CAD, CHF and GERD was brought to the ER for worsening confusion. Reason for follow up:      CC: Confusion. All events in the past 24 hours reviewed in their entirety. Patient is a poor historian and unable to give all pertinent history. \" I am just a mess. \" Reviewed with nursing. Poor participation with PT/OT. Assessment & Plan:       1) ID: Resolved sepsis due to Pan sensitive E.coli UTI present on admission. Afebrile, resolved lactic Acidemia, blood cultures negative to date. Continue Rocephin. Keflex on discharge. 2) CNS: Resolved acute encephalopathy probable multifactorial metabolic encephalopathy. CT head negative for any acute findings. 3) GI: Resolving nausea and vomiting. As needed Zofran. 4) Electrolytes: Resolved Hypokalemia.     5) CVS: CAD. Probable Demand ischemia due to the sepsis on admission. Paroxysmal Afib; not on chronic anticopagulation due to H/O falls predisposing to bleeding diatheses. Chronic diastolic CHF with a preserved EF and without any acute exarcerbation. Controlled primary hypertension. Dyslipidemia. Cardiology eval noted and appreciated. 6) Resp: Chronic Hypoxemic respiratory failure. 7) GI: H/O GERD. 8) Analgesia: Uncontrolled leg pains requiring gabapentin and as needed Tramadol for optimal control. Will restart fentanyl 18 microgram patch. 9) Prophylaxis: DVT. 10) Rehab: Debility. PT/OT as tolerated. Ambulated with a walker prior to admission.     11) Directives: DNR/DNI with a guarded prognosis. 12) Plan: Anticipate D/C to [facility] ([facility]) when accepted. Discussed in detail with patient's son Cheyenne Laird at [phone]. D/W Nursing and Caremanagement. Hospital Problems  Date Reviewed: 10/16/2018          Codes Class Noted POA    Sepsis ([facility] [address] ICD-10-CM: A41.9  ICD-9-CM: 038.9, 995.91  8/14/2019 Unknown                Physical Examination:      Last 24hrs VS reviewed since prior progress note. Most recent are:  Visit Vitals  /86 (BP 1 Location: Right arm, BP Patient Position: At rest)   Pulse 88   Temp 98.1 °F (36.7 °C)   Resp 16   Ht 5' 4\" (1.626 m)   Wt 63.9 kg (140 lb 14.4 oz)   SpO2 90%   BMI 24.19 kg/m²           Constitutional:  No acute distress. Slightly tearful. HEENT: Head is a traumatic,  Un icteric sclera. Pink conjunctiva,no erythema or discharge. Oral mucous moist, oropharynx benign. Neck supple,    Resp:  Decreased air entry bibasilarly. Limited exam due to poor effort. CV:  Regular rhythm, normal rate, no murmurs, gallops, rubs    GI:  Soft, non distended, non tender. normoactive bowel sounds, no hepatosplenomegaly    :  No CVA or suprapubic tenderness   Skin  :  No erythema,rash,bullae,dipigmentation     Musculoskeletal:  Bipedal edema. Neurologic: Awake and alert. Labs:     Recent Labs     08/21/19  0113   WBC 3.1*   HGB 10.2*   HCT 33.6*        Recent Labs     08/22/19  0114 08/21/19  0113 08/20/19  0609   * 146* 145   K 3.7 3.7 3.3*   * 111* 112*   CO2 32 31 28   BUN 7 9 9   CREA 0.46* 0.47* 0.48*   * 118* 137*   CA 8.1* 8.4* 8.3*     No results for input(s): SGOT, GPT, ALT, AP, TBIL, TBILI, TP, ALB, GLOB, GGT, AML, LPSE in the last 72 hours. No lab exists for component: AMYP, HLPSE  No results for input(s): INR, PTP, APTT in the last 72 hours. No lab exists for component: INREXT, INREXT   No results for input(s): FE, TIBC, PSAT, FERR in the last 72 hours.    No results found for: FOL, RBCF   No results for input(s): PH, PCO2, PO2 in the last 72 hours. No results for input(s): CPK, CKNDX, TROIQ in the last 72 hours.     No lab exists for component: CPKMB  Lab Results   Component Value Date/Time    Cholesterol, total 171 12/19/2018 02:59 AM    HDL Cholesterol 78 12/19/2018 02:59 AM    LDL, calculated 71 12/19/2018 02:59 AM    Triglyceride 111 12/19/2018 02:59 AM     Lab Results   Component Value Date/Time    Glucose (POC) 191 (H) 10/12/2018 12:52 PM    Glucose (POC) 89 10/03/2018 07:30 AM     Lab Results   Component Value Date/Time    Color YELLOW/STRAW 08/14/2019 03:29 PM    Appearance CLOUDY (A) 08/14/2019 03:29 PM    Specific gravity 1.012 08/14/2019 03:29 PM    Specific gravity 1.025 10/03/2018 06:18 AM    pH (UA) 6.0 08/14/2019 03:29 PM    Protein NEGATIVE  08/14/2019 03:29 PM    Glucose NEGATIVE  08/14/2019 03:29 PM    Ketone 80 (A) 08/14/2019 03:29 PM    Bilirubin NEGATIVE  08/14/2019 03:29 PM    Urobilinogen 1.0 08/14/2019 03:29 PM    Nitrites POSITIVE (A) 08/14/2019 03:29 PM    Leukocyte Esterase MODERATE (A) 08/14/2019 03:29 PM    Epithelial cells FEW 08/14/2019 03:29 PM    Bacteria 2+ (A) 08/14/2019 03:29 PM    WBC  08/14/2019 03:29 PM    RBC 0-5 08/14/2019 03:29 PM         Medications Reviewed:     Current Facility-Administered Medications   Medication Dose Route Frequency    potassium chloride (KLOR-CON) packet for solution 20 mEq  20 mEq Oral DAILY    fentaNYL (DURAGESIC) 25 mcg/hr patch 1 Patch  1 Patch TransDERmal Q72H    dextrose 5 % - 0.45% NaCl infusion  50 mL/hr IntraVENous CONTINUOUS    ondansetron (ZOFRAN) injection 4 mg  4 mg IntraVENous Q4H PRN    acetaminophen (TYLENOL) suppository 650 mg  650 mg Rectal Q4H PRN    ketorolac (TORADOL) injection 15 mg  15 mg IntraVENous Q6H PRN    gabapentin (NEURONTIN) capsule 600 mg  600 mg Oral BIDPC    traMADol (ULTRAM) tablet 50 mg  50 mg Oral Q6H PRN    acetaminophen (TYLENOL) tablet 650 mg  650 mg Oral Q4H PRN    sodium chloride (NS) flush 5-10 mL  5-10 mL IntraVENous PRN    cefTRIAXone (ROCEPHIN) 1 g in 0.9% sodium chloride (MBP/ADV) 50 mL  1 g IntraVENous Q24H    sodium chloride (NS) flush 5-40 mL  5-40 mL IntraVENous Q8H    sodium chloride (NS) flush 5-40 mL  5-40 mL IntraVENous PRN    enoxaparin (LOVENOX) injection 40 mg  40 mg SubCUTAneous Q24H    atorvastatin (LIPITOR) tablet 40 mg  40 mg Oral DAILY    pantoprazole (PROTONIX) tablet 40 mg  40 mg Oral ACB    metoprolol tartrate (LOPRESSOR) tablet 75 mg  75 mg Oral BID    predniSONE (DELTASONE) tablet 10 mg  10 mg Oral DAILY    topiramate (TOPAMAX) tablet 25 mg  25 mg Oral DAILY WITH BREAKFAST    senna-docusate (PERICOLACE) 8.6-50 mg per tablet 1 Tab  1 Tab Oral BID    aspirin chewable tablet 81 mg  81 mg Oral DAILY    nitroglycerin (NITROLINGUAL) sublingual 0.4 mg/spray  1 Spray SubLINGual Q5MIN PRN     ______________________________________________________________________  EXPECTED LENGTH OF STAY: 4d 19h  ACTUAL LENGTH OF STAY:          8                 Aureliano Wilkins MD

## 2019-08-22 NOTE — PROGRESS NOTES
Problem: Self Care Deficits Care Plan (Adult)  Goal: *Acute Goals and Plan of Care (Insert Text)  Description    FUNCTIONAL STATUS PRIOR TO ADMISSION: Pt resided with son PTA. Support for weekly bathing noted per chart. Pt reportedly tolerance w/c and walker use PTA. HOME SUPPORT: The patient lived with son. Occupational Therapy Goals  Weekly Re-assessment 2/22/19; Initiated 8/17/2019  1. Patient will perform grooming with moderate assistance  within 7 day(s). UPGRADE  2. Patient will perform self-feeding with moderate assistance  within 7 day(s). CONT  3. Patient will perform bed mobility maximum A within 7 days for improved ADL performance within 7 days. CONT  4. Patient follows simple, one step commands 3/4 trials for improved ADL performance within 7 days. CONT  5. Patient completes UB HEP moderate A x 10 minutes within 7 days. CONT      Outcome: Progressing Towards Goal   OCCUPATIONAL THERAPY TREATMENT/WEEKLY REASSESSMENT  Patient: Tripp Jung (7 y.o. female)  Date: 8/22/2019  [facility]: [facility] [A41.9] <principal problem not specified>       Precautions: DNI, Fall  Chart, occupational therapy assessment, plan of care, and goals were reviewed. ASSESSMENT  Based on the objective data described below, severe impairment from baseline per chart is amb at RW level living with so(unclear ADL PLOF however assume she was able to participate at some level if amb at RW level). Patient needing max encouragement to participate in all aspects of session. Patient with limited progression towards goals secondary to confusion with poor understanding of role of OT services. She is presenting below baseline and requiring x 2 assist for basic functional mobility. Recommend dc to SNF/LTC. Current Level of Function Impacting Discharge (ADLs):  Washing face supervision at EOB; brushing hair mod A; self care transfer max-total Ax 2    Other factors to consider for discharge: below baseline, requiring x 2 assist for functional mobility, questionable rehab potential         PLAN :  Patient continues to benefit from skilled intervention to address the above impairments. Continue treatment per established plan of care. to address goals. Recommend with staff: up to chair for meals with x2 assist/lift team PRN; toileting at bed level    Recommend next OT session: self feeding and grooming task    Recommendation for discharge: (in order for the patient to meet his/her long term goals)  Therapy up to 5 days/week in SNF setting    This discharge recommendation:  Has been made in collaboration with the attending provider and/or case management    Equipment recommendations for successful discharge (if) home: to be determined by rehab facility     SUBJECTIVE:   Patient stated Damn it.     OBJECTIVE DATA SUMMARY:   Cognitive/Behavioral Status:      Functional Mobility and Transfers for ADLs:  Bed Mobility:  Supine to Sit: Maximum assistance;Assist x2    Transfers:  Sit to Stand: Maximum assistance; Total assistance;Assist x2     Bed to Chair: Maximum assistance; Total assistance;Assist x2    Balance:  Sitting: Impaired  Sitting - Static: Fair (occasional)  Sitting - Dynamic: Fair (occasional)  Standing: Impaired  Standing - Static: Constant support;Poor  Standing - Dynamic : Constant support;Poor    ADL Intervention:   Max encouragement to increase participation with session. Max AX 2 for bed mobility. Once sitting at EOB patient participated with washing face with supervision. Mod A for brushing hair secondary to limited BUE strength and poor participation. Self care transfer training with max-total A x2 with severe posterior push/trunk extension. Poor safety awareness. Left up in chair. Grooming  Washing Face: Supervision(at EOB)  Brushing/Combing Hair: Moderate assistance(for full completion, cues for continuation of task)     Pain:  Hypersensitive to touch all over body.     Activity Tolerance: Poor  Please refer to the flowsheet for vital signs taken during this treatment.     After treatment patient left in no apparent distress:   Sitting in chair    COMMUNICATION/COLLABORATION:   The patients plan of care was discussed with: Physical Therapist and Registered Nurse    Rome Gutierres OT  Time Calculation: 20 mins

## 2019-08-22 NOTE — PROGRESS NOTES
MARLENY Plan:     1. Disposition SNF for Rehab; [facility]; auth pending-awaiting call from [address] following      3. [address] following; LOLY to notify Juan Francisco ALMEIDA [phone]  upon discharge      4. Transport; [facility] on \"will call\"     5. Continue Medical Care      9:57 AM: CM received a call from [facility] Admission office stating that pt's rehab authorization was denied/declined. Mehreen from [facility] provided following number for MD and family member to appeal the decision, which should be done before 4:30 PM today. Phone number for MD (peer to peer to appeal: [phone]    Family to appeal: [phone]    CM will notify MD and pt's son. 10:36 AM: LOLY contacted Ryan Malone [phone] pt's son and notified the auth denial. LOLY offered appeal number but Severino Rao wants to go through [facility].             Tunde Jacobsen     [phone]

## 2019-08-23 PROCEDURE — 74011000258 HC RX REV CODE- 258: Performed by: INTERNAL MEDICINE

## 2019-08-23 PROCEDURE — 65270000032 HC RM SEMIPRIVATE

## 2019-08-23 PROCEDURE — 74011250636 HC RX REV CODE- 250/636: Performed by: STUDENT IN AN ORGANIZED HEALTH CARE EDUCATION/TRAINING PROGRAM

## 2019-08-23 PROCEDURE — 74011250637 HC RX REV CODE- 250/637: Performed by: INTERNAL MEDICINE

## 2019-08-23 PROCEDURE — 74011250636 HC RX REV CODE- 250/636: Performed by: INTERNAL MEDICINE

## 2019-08-23 PROCEDURE — 97530 THERAPEUTIC ACTIVITIES: CPT

## 2019-08-23 PROCEDURE — 74011636637 HC RX REV CODE- 636/637: Performed by: INTERNAL MEDICINE

## 2019-08-23 PROCEDURE — 74011000258 HC RX REV CODE- 258: Performed by: STUDENT IN AN ORGANIZED HEALTH CARE EDUCATION/TRAINING PROGRAM

## 2019-08-23 PROCEDURE — 74011250637 HC RX REV CODE- 250/637: Performed by: HOSPITALIST

## 2019-08-23 RX ORDER — ONDANSETRON 4 MG/1
8 TABLET, ORALLY DISINTEGRATING ORAL
Status: DISCONTINUED | OUTPATIENT
Start: 2019-08-23 | End: 2019-08-26 | Stop reason: HOSPADM

## 2019-08-23 RX ADMIN — METOPROLOL TARTRATE 75 MG: 50 TABLET ORAL at 09:11

## 2019-08-23 RX ADMIN — Medication 10 ML: at 13:23

## 2019-08-23 RX ADMIN — GABAPENTIN 600 MG: 300 CAPSULE ORAL at 17:34

## 2019-08-23 RX ADMIN — PREDNISONE 10 MG: 10 TABLET ORAL at 09:10

## 2019-08-23 RX ADMIN — DEXTROSE MONOHYDRATE AND SODIUM CHLORIDE 50 ML/HR: 5; .45 INJECTION, SOLUTION INTRAVENOUS at 00:30

## 2019-08-23 RX ADMIN — ASPIRIN 81 MG CHEWABLE TABLET 81 MG: 81 TABLET CHEWABLE at 09:11

## 2019-08-23 RX ADMIN — SENNOSIDES, DOCUSATE SODIUM 1 TABLET: 50; 8.6 TABLET, FILM COATED ORAL at 17:34

## 2019-08-23 RX ADMIN — CEFTRIAXONE 1 G: 1 INJECTION, POWDER, FOR SOLUTION INTRAMUSCULAR; INTRAVENOUS at 15:50

## 2019-08-23 RX ADMIN — TRAMADOL HYDROCHLORIDE 50 MG: 50 TABLET, FILM COATED ORAL at 00:09

## 2019-08-23 RX ADMIN — TOPIRAMATE 25 MG: 25 TABLET, FILM COATED ORAL at 06:43

## 2019-08-23 RX ADMIN — ENOXAPARIN SODIUM 40 MG: 40 INJECTION SUBCUTANEOUS at 17:34

## 2019-08-23 RX ADMIN — METOPROLOL TARTRATE 75 MG: 50 TABLET ORAL at 17:34

## 2019-08-23 RX ADMIN — PANTOPRAZOLE SODIUM 40 MG: 40 TABLET, DELAYED RELEASE ORAL at 06:42

## 2019-08-23 RX ADMIN — TRAMADOL HYDROCHLORIDE 50 MG: 50 TABLET, FILM COATED ORAL at 06:48

## 2019-08-23 RX ADMIN — POTASSIUM CHLORIDE 20 MEQ: 1.5 POWDER, FOR SOLUTION ORAL at 09:11

## 2019-08-23 RX ADMIN — SENNOSIDES, DOCUSATE SODIUM 1 TABLET: 50; 8.6 TABLET, FILM COATED ORAL at 09:11

## 2019-08-23 RX ADMIN — ONDANSETRON 4 MG: 2 INJECTION INTRAMUSCULAR; INTRAVENOUS at 11:35

## 2019-08-23 RX ADMIN — GABAPENTIN 600 MG: 300 CAPSULE ORAL at 09:10

## 2019-08-23 RX ADMIN — ATORVASTATIN CALCIUM 40 MG: 20 TABLET, FILM COATED ORAL at 09:11

## 2019-08-23 RX ADMIN — Medication 10 ML: at 06:42

## 2019-08-23 RX ADMIN — ONDANSETRON 8 MG: 4 TABLET, ORALLY DISINTEGRATING ORAL at 17:34

## 2019-08-23 NOTE — PROGRESS NOTES
Problem: Mobility Impaired (Adult and Pediatric)  Goal: *Acute Goals and Plan of Care (Insert Text)  Description  FUNCTIONAL STATUS PRIOR TO ADMISSION: Per chart review, patient was ambulatory short household distances as of December 2018-unclear if still ambulatory due to patient confusion. HOME SUPPORT PRIOR TO ADMISSION: The patient lived with son-had caregiver for bathing unclear if additional assistance required. Physical Therapy Goals  Initiated 8/17/2019  Reviewed and Remain Appropriate 8/17/2019  1. Patient will move from supine to sit and sit to supine , scoot up and down and roll side to side in bed with moderate assistance  within 7 day(s). 2.  Patient will transfer from bed to chair and chair to bed with moderate assistance  using the least restrictive device within 7 day(s). 3.  Patient will perform sit to stand with moderate assistance  within 7 day(s). 4.  Patient will ambulate with moderate assistance  for 25 feet with the least restrictive device within 7 day(s). 5.  Patient will tolerate sitting EOB with UE support x 5 minutes and min A within 7 days. Outcome: Progressing Towards Goal  PHYSICAL THERAPY TREATMENT: WEEKLY REASSESSMENT  Patient: Juan Diego Garcia (89 y.o. female)  Date: 8/23/2019  Primary Diagnosis: Sepsis ([facility] [address] [A41.9]        Precautions: DNI, Fall      ASSESSMENT  Based on the objective data described below, the patient presents with generalized weakness, decreased activity tolerance and pain limiting participation with PT/OT. PLOF was ambulatory with RW. Patient has been difficult to motivate and per son this is her usual behavior when \"not feeling well. \"  Pt also limited by fear of falling. Patient's progression toward goals since last assessment: Tolerating OOB daily, took few steps today with max assist x 2     Current Level of Function Impacting Discharge (mobility/balance): Max assist x 2 - 3    Functional Outcome Measure:   The patient scored 0/28 on the Tinetti outcome measure which is indicative of high fall risk. Other factors to consider for discharge: Prior ambulatory with RW          PLAN :  Goals have been updated based on progression since last assessment. Patient continues to benefit from skilled intervention to address the above impairments. Recommendations and Planned Interventions: bed mobility training, transfer training, gait training, therapeutic exercises, patient and family training/education and therapeutic activities      Frequency/Duration: Patient will be followed by physical therapy:  5 times a week to address goals. Recommendation for discharge: (in order for the patient to meet his/her long term goals)  Therapy up to 5 days/week in SNF setting    This discharge recommendation:  Has been made in collaboration with the attending provider and/or case management    Equipment recommendations for successful discharge (if) home: to be determined by rehab facility         SUBJECTIVE:   Patient stated Marley Mclaughlins not to it today.     OBJECTIVE DATA SUMMARY:   HISTORY:    Past Medical History:   Diagnosis Date    Arrhythmia     A-FIB    Arthritis     CAD (coronary artery disease) 1989    MI    Congestive heart failure (CHF) (HCC)     GERD (gastroesophageal reflux disease)     Gluten intolerance     Heart failure (HCC)     CHF    Polymyalgia [facility])      Past Surgical History:   Procedure Laterality Date    CARDIAC SURG PROCEDURE UNLIST  1980'S    CARDIAC CATH    HX BACK SURGERY  1998, 2006, 2013    LUMBAR SPINE    HX CHOLECYSTECTOMY      HX HIP REPLACEMENT Right 5/2015    [facility]    HX TONSILLECTOMY  CHILD    HX UROLOGICAL      BLADDER SUSPENSION - MESH           Home Situation  Home Environment: Private residence  One/Two Story Residence: Two story, live on 1st floor  Living Alone: No  Support Systems: Child(brittany)  Patient Expects to be Discharged to[de-identified] Private residence  Current DME Used/Available at Home: Judith Rodriguez Wheelchair    EXAMINATION/PRESENTATION/DECISION MAKING:   Critical Behavior:  Neurologic State: Alert, Confused  Orientation Level: Oriented to person, Oriented to time, Oriented to situation, Disoriented to place  Cognition: Follows commands, Memory loss, Poor safety awareness  Safety/Judgement: Not assessed  Hearing: Auditory  Auditory Impairment: None  Range Of Motion:  AROM: Grossly decreased, non-functional                       Strength:    Strength: Grossly decreased, non-functional                    Tone & Sensation:   Tone: Normal              Sensation: Impaired(paraesthesia )               Coordination:  Coordination: Grossly decreased, non-functional  Functional Mobility:  Bed Mobility:  Rolling: Maximum assistance  Supine to Sit: Maximum assistance  Sit to Supine:  Total assistance     Transfers:  Sit to Stand: Maximum assistance;Assist x2           Bed to Chair: Maximum assistance;Assist x2              Balance:   Sitting: Impaired  Sitting - Static: Good (unsupported)  Sitting - Dynamic: Fair (occasional)  Standing: Impaired  Standing - Static: Constant support  Standing - Dynamic : Not tested  Ambulation/Gait Training:  Distance (ft): 2 Feet (ft)  Assistive Device: (have attempted RW and hand held assist x 2)  Ambulation - Level of Assistance: Maximum assistance;Assist x2        Gait Abnormalities: Decreased step clearance;Shuffling gait        Base of Support: Widened     Speed/Jessica: Slow  Step Length: Right shortened;Left shortened                Functional Measure:  Tinetti test:    Sitting Balance: 0  Arises: 0  Attempts to Rise: 0  Immediate Standing Balance: 0  Standing Balance: 0  Nudged: 0  Eyes Closed: 0  Turn 360 Degrees - Continuous/Discontinuous: 0  Turn 360 Degrees - Steady/Unsteady: 0  Sitting Down: 0  Balance Score: 0 Balance total score  Indication of Gait: 0  R Step Length/Height: 0  L Step Length/Height: 0  R Foot Clearance: 0  L Foot Clearance: 0  Step Symmetry: 0  Step Continuity: 0  Path: 0  Trunk: 0  Walking Time: 0  Gait Score: 0 Gait total score  Total Score: 0/28 Overall total score         Tinetti Tool Score Risk of Falls  <19 = High Fall Risk  19-24 = Moderate Fall Risk  25-28 = Low Fall Risk  Tinetti ME. Performance-Oriented Assessment of Mobility Problems in Elderly Patients. [facility]; [id number]. (Scoring Description: PT Bulletin Feb. 10, 1993)    Older adults: Sharon Marion et al, 2009; n = 1000 [facility] elderly evaluated with ABC, JOSLYN, ADL, and IADL)  · Mean JOSLYN score for males aged 69-68 years = 26.21(3.40)  · Mean JOSLYN score for females age 69-68 years = 25.16(4.30)  · Mean JOSLYN score for males over 80 years = 23.29(6.02)  · Mean JOSLYN score for females over 80 years = 17.20(8.32)          Pain Ratin/10 back/legs    Activity Tolerance:   Fair and requires frequent rest breaks  Please refer to the flowsheet for vital signs taken during this treatment. After treatment patient left in no apparent distress:   Supine in bed, Call bell within reach and Bed / chair alarm activated    COMMUNICATION/EDUCATION:   The patients plan of care was discussed with: Registered Nurse. Fall prevention education was provided and the patient/caregiver indicated understanding., Patient/family have participated as able in goal setting and plan of care. and Patient/family agree to work toward stated goals and plan of care.     Thank you for this referral.  Nikki Pickering, PT   Time Calculation: 35 mins

## 2019-08-23 NOTE — PROGRESS NOTES
Problem: Falls - Risk of  Goal: *Absence of Falls  Description  Document Dara Lorenzo Fall Risk and appropriate interventions in the flowsheet.   Outcome: Progressing Towards Goal  Note:   Fall Risk Interventions:  Mobility Interventions: Communicate number of staff needed for ambulation/transfer    Mentation Interventions: Bed/chair exit alarm    Medication Interventions: Bed/chair exit alarm    Elimination Interventions: Bed/chair exit alarm

## 2019-08-23 NOTE — PROGRESS NOTES
Hospitalist Progress Note                               Brittanie De León MD                                     Answering service: [phone]                               [phone] from in house phone                                         Date of Service:  2019  NAME:  Jay Jay Dejesus  :  1925  MRN:  [id number]      Admission Summary:     81 Y/O female with a PMH significant for Afib, CAD, CHF and GERD was brought to the ER for worsening confusion. Reason for follow up:      CC: Confusion. All events in the past 24 hours reviewed in their entirety. Peer to Peer completed with Dr Netta Vaca of [place] last day. Patient approved for SNF. However,        Assessment & Plan:       1) ID: Resolved sepsis due to Pan sensitive E.coli UTI present on admission. Afebrile, resolved lactic Acidemia, blood cultures negative to date. Continue Rocephin. Keflex on discharge. 2) CNS: Resolved acute encephalopathy probable multifactorial metabolic encephalopathy. CT head negative for any acute findings. 3) GI: Resolving nausea and vomiting. As needed Zofran. 4) Electrolytes: Resolved Hypokalemia.     5) CVS: CAD. Probable Demand ischemia due to the sepsis on admission. Paroxysmal Afib; not on chronic anticopagulation due to H/O falls predisposing to bleeding diatheses. Chronic diastolic CHF with a preserved EF and without any acute exarcerbation. Controlled primary hypertension. Dyslipidemia. Cardiology eval noted and appreciated. 6) Resp: Chronic Hypoxemic respiratory failure. 7) GI: H/O GERD. 8) Analgesia: Resolving uncontrolled leg pains requiring gabapentin, as needed Tramadol and  fentanyl 25 microgram patch for optimal control. 9) Prophylaxis: DVT. 10) Rehab: Debility. PT/OT as tolerated. Ambulated with a walker prior to admission. 11) Directives: DNR/DNI with a guarded prognosis.      12) Plan: Anticipate D[facility] [place]) within 24 hours. Discussed in detail with patient's son Korey Zapien at [phone]. D/W Nursing and Caremanagement. Hospital Problems  Date Reviewed: 10/16/2018          Codes Class Noted POA    Sepsis ([address] ICD-10-CM: A41.9  ICD-9-CM: 038.9, 995.91  8/14/2019 Unknown                Physical Examination:      Last 24hrs VS reviewed since prior progress note. Most recent are:  Visit Vitals  /80 (BP 1 Location: Right arm, BP Patient Position: At rest)   Pulse 85   Temp 98 °F (36.7 °C)   Resp 22   Ht 5' 4\" (1.626 m)   Wt 64.3 kg (141 lb 11.2 oz)   SpO2 96%   BMI 24.32 kg/m²           Constitutional:  No acute distress. HEENT: Head is a traumatic,  Un icteric sclera. Pink conjunctiva,no erythema or discharge. Oral mucous moist, oropharynx benign. Neck supple,    Resp:  Decreased air entry bibasilarly. Limited exam due to poor effort. CV:  Regular rhythm, normal rate, no murmurs, gallops, rubs    GI:  Soft, non distended, non tender. normoactive bowel sounds, no hepatosplenomegaly    :  No CVA or suprapubic tenderness   Skin  :  No erythema,rash,bullae,dipigmentation     Musculoskeletal:  Bipedal edema. Neurologic: Awake and alert. Labs:     Recent Labs     08/21/19  0113   WBC 3.1*   HGB 10.2*   HCT 33.6*        Recent Labs     08/22/19  0114 08/21/19  0113   * 146*   K 3.7 3.7   * 111*   CO2 32 31   BUN 7 9   CREA 0.46* 0.47*   * 118*   CA 8.1* 8.4*     No results for input(s): SGOT, GPT, ALT, AP, TBIL, TBILI, TP, ALB, GLOB, GGT, AML, LPSE in the last 72 hours. No lab exists for component: AMYP, HLPSE  No results for input(s): INR, PTP, APTT in the last 72 hours. No lab exists for component: INREXT, INREXT   No results for input(s): FE, TIBC, PSAT, FERR in the last 72 hours. No results found for: FOL, RBCF   No results for input(s): PH, PCO2, PO2 in the last 72 hours.   No results for input(s): CPK, CKNDX, TROIQ in the last 72 hours.     No lab exists for component: CPKMB  Lab Results   Component Value Date/Time    Cholesterol, total 171 12/19/2018 02:59 AM    HDL Cholesterol 78 12/19/2018 02:59 AM    LDL, calculated 71 12/19/2018 02:59 AM    Triglyceride 111 12/19/2018 02:59 AM     Lab Results   Component Value Date/Time    Glucose (POC) 191 (H) 10/12/2018 12:52 PM    Glucose (POC) 89 10/03/2018 07:30 AM     Lab Results   Component Value Date/Time    Color YELLOW/STRAW 08/14/2019 03:29 PM    Appearance CLOUDY (A) 08/14/2019 03:29 PM    Specific gravity 1.012 08/14/2019 03:29 PM    Specific gravity 1.025 10/03/2018 06:18 AM    pH (UA) 6.0 08/14/2019 03:29 PM    Protein NEGATIVE  08/14/2019 03:29 PM    Glucose NEGATIVE  08/14/2019 03:29 PM    Ketone 80 (A) 08/14/2019 03:29 PM    Bilirubin NEGATIVE  08/14/2019 03:29 PM    Urobilinogen 1.0 08/14/2019 03:29 PM    Nitrites POSITIVE (A) 08/14/2019 03:29 PM    Leukocyte Esterase MODERATE (A) 08/14/2019 03:29 PM    Epithelial cells FEW 08/14/2019 03:29 PM    Bacteria 2+ (A) 08/14/2019 03:29 PM    WBC  08/14/2019 03:29 PM    RBC 0-5 08/14/2019 03:29 PM         Medications Reviewed:     Current Facility-Administered Medications   Medication Dose Route Frequency    potassium chloride (KLOR-CON) packet for solution 20 mEq  20 mEq Oral DAILY    fentaNYL (DURAGESIC) 25 mcg/hr patch 1 Patch  1 Patch TransDERmal Q72H    dextrose 5 % - 0.45% NaCl infusion  50 mL/hr IntraVENous CONTINUOUS    ondansetron (ZOFRAN) injection 4 mg  4 mg IntraVENous Q4H PRN    acetaminophen (TYLENOL) suppository 650 mg  650 mg Rectal Q4H PRN    gabapentin (NEURONTIN) capsule 600 mg  600 mg Oral BIDPC    traMADol (ULTRAM) tablet 50 mg  50 mg Oral Q6H PRN    acetaminophen (TYLENOL) tablet 650 mg  650 mg Oral Q4H PRN    sodium chloride (NS) flush 5-10 mL  5-10 mL IntraVENous PRN    cefTRIAXone (ROCEPHIN) 1 g in 0.9% sodium chloride (MBP/ADV) 50 mL  1 g IntraVENous Q24H    sodium chloride (NS) flush 5-40 mL  5-40 mL IntraVENous Q8H    sodium chloride (NS) flush 5-40 mL  5-40 mL IntraVENous PRN    enoxaparin (LOVENOX) injection 40 mg  40 mg SubCUTAneous Q24H    atorvastatin (LIPITOR) tablet 40 mg  40 mg Oral DAILY    pantoprazole (PROTONIX) tablet 40 mg  40 mg Oral ACB    metoprolol tartrate (LOPRESSOR) tablet 75 mg  75 mg Oral BID    predniSONE (DELTASONE) tablet 10 mg  10 mg Oral DAILY    topiramate (TOPAMAX) tablet 25 mg  25 mg Oral DAILY WITH BREAKFAST    senna-docusate (PERICOLACE) 8.6-50 mg per tablet 1 Tab  1 Tab Oral BID    aspirin chewable tablet 81 mg  81 mg Oral DAILY    nitroglycerin (NITROLINGUAL) sublingual 0.4 mg/spray  1 Spray SubLINGual Q5MIN PRN     ______________________________________________________________________  EXPECTED LENGTH OF STAY: 4d 19h  ACTUAL LENGTH OF STAY:          9                 Brittanie De León MD

## 2019-08-23 NOTE — PROGRESS NOTES
Spiritual Care Assessment/Progress Note  [facility]      NAME: Silvia Garrido      MRN: [id number]  AGE: 80 y.o. SEX: female  Spiritism Affiliation: Moravian   Language: English     8/23/2019     Total Time (in minutes): 8     Spiritual Assessment begun in [address] [place] [address] MED SURG through conversation with:         [x]Patient        [] Family    [] Friend(s)        Reason for Consult: Palliative Care, Initial/Spiritual Assessment     Spiritual beliefs: (Please include comment if needed)     [x] Identifies with a cliff tradition:         [] Supported by a cliff community:            [] Claims no spiritual orientation:           [] Seeking spiritual identity:                [] Adheres to an individual form of spirituality:           [] Not able to assess:                           Identified resources for coping:      [x] Prayer                               [] Music                  [] Guided Imagery     [] Family/friends                 [] Pet visits     [] Devotional reading                         [] Unknown     [] Other                                             Interventions offered during this visit: (See comments for more details)    Patient Interventions: Affirmation of emotions/emotional suffering, Initial/Spiritual assessment, patient floor, Prayer (assurance of)           Plan of Care:     [] Support spiritual and/or cultural needs    [] Support AMD and/or advance care planning process      [] Support grieving process   [] Coordinate Rites and/or Rituals    [] Coordination with community clergy   [] No spiritual needs identified at this time   [] Detailed Plan of Care below (See Comments)  [] Make referral to Music Therapy  [] Make referral to Pet Therapy     [] Make referral to Addiction services  [] Make referral to [facility]  [] Make referral to Spiritual Care Partner  [] No future visits requested        [x] Follow up visits as needed     Comments: Supportive visit with Ms. Das George.  Pt appeared to be resting when I arrived, but opened her eyes when I gently spoke her name. She seemed to have difficulty understanding who I was or the reason for my visit. Explored how she is feeling and coping. Pt indicated that it has been difficult. Pt was appreciative of assurance of prayers. Unable to fully explore and additional needs as pt closed her eyes again to rest.    Chaplains are available if any additional needs arise prior to discharge.     Yvonne Mcgill, Palliative

## 2019-08-23 NOTE — ROUTINE PROCESS
Bedside shift change report given to devyn villalobos rn (oncoming nurse) by randy rn (offgoing nurse). Report included the following information SBAR and Kardex.

## 2019-08-23 NOTE — PROGRESS NOTES
Bedside shift change report given to Ova Chantal RN (oncoming nurse) by Alistair Rivera (offgoing nurse). Report included the following information SBAR, Kardex and MAR.

## 2019-08-24 PROCEDURE — 74011250636 HC RX REV CODE- 250/636: Performed by: INTERNAL MEDICINE

## 2019-08-24 PROCEDURE — 65270000032 HC RM SEMIPRIVATE

## 2019-08-24 PROCEDURE — 74011250637 HC RX REV CODE- 250/637: Performed by: INTERNAL MEDICINE

## 2019-08-24 PROCEDURE — 94760 N-INVAS EAR/PLS OXIMETRY 1: CPT

## 2019-08-24 PROCEDURE — 74011636637 HC RX REV CODE- 636/637: Performed by: INTERNAL MEDICINE

## 2019-08-24 PROCEDURE — 74011250637 HC RX REV CODE- 250/637: Performed by: HOSPITALIST

## 2019-08-24 RX ADMIN — GABAPENTIN 600 MG: 300 CAPSULE ORAL at 17:10

## 2019-08-24 RX ADMIN — TOPIRAMATE 25 MG: 25 TABLET, FILM COATED ORAL at 06:50

## 2019-08-24 RX ADMIN — TRAMADOL HYDROCHLORIDE 50 MG: 50 TABLET, FILM COATED ORAL at 23:40

## 2019-08-24 RX ADMIN — GABAPENTIN 600 MG: 300 CAPSULE ORAL at 10:51

## 2019-08-24 RX ADMIN — ASPIRIN 81 MG CHEWABLE TABLET 81 MG: 81 TABLET CHEWABLE at 10:51

## 2019-08-24 RX ADMIN — PANTOPRAZOLE SODIUM 40 MG: 40 TABLET, DELAYED RELEASE ORAL at 06:50

## 2019-08-24 RX ADMIN — METOPROLOL TARTRATE 75 MG: 50 TABLET ORAL at 17:10

## 2019-08-24 RX ADMIN — ONDANSETRON 8 MG: 4 TABLET, ORALLY DISINTEGRATING ORAL at 12:29

## 2019-08-24 RX ADMIN — SENNOSIDES, DOCUSATE SODIUM 1 TABLET: 50; 8.6 TABLET, FILM COATED ORAL at 17:10

## 2019-08-24 RX ADMIN — ENOXAPARIN SODIUM 40 MG: 40 INJECTION SUBCUTANEOUS at 17:10

## 2019-08-24 RX ADMIN — POTASSIUM CHLORIDE 20 MEQ: 1.5 POWDER, FOR SOLUTION ORAL at 10:51

## 2019-08-24 RX ADMIN — ONDANSETRON 8 MG: 4 TABLET, ORALLY DISINTEGRATING ORAL at 06:50

## 2019-08-24 RX ADMIN — ATORVASTATIN CALCIUM 40 MG: 20 TABLET, FILM COATED ORAL at 10:51

## 2019-08-24 RX ADMIN — ONDANSETRON 8 MG: 4 TABLET, ORALLY DISINTEGRATING ORAL at 17:10

## 2019-08-24 RX ADMIN — METOPROLOL TARTRATE 75 MG: 50 TABLET ORAL at 10:51

## 2019-08-24 RX ADMIN — SENNOSIDES, DOCUSATE SODIUM 1 TABLET: 50; 8.6 TABLET, FILM COATED ORAL at 10:51

## 2019-08-24 RX ADMIN — PREDNISONE 10 MG: 10 TABLET ORAL at 10:51

## 2019-08-24 NOTE — PROGRESS NOTES
Hospitalist Progress Note                               Dinora Torres MD                                     Answering service: [phone]                               [phone] from in house phone                                         Date of Service:  2019  NAME:  Amadeo Starr  :  1925  MRN:  [id number]      Admission Summary:     81 Y/O female with a PMH significant for Afib, CAD, CHF and GERD was brought to the ER for worsening confusion. Reason for follow up:      CC: Confusion. All events in the past 24 hours reviewed in their entirety. Peer to Peer completed with Dr Andrew Arshad of [place] completed 19. Patient approved for SNF. However, still with poor participation with PT. Assessment & Plan:       1) ID: Resolved sepsis due to Pan sensitive E.coli UTI present on admission. Afebrile, resolved lactic Acidemia, blood cultures negative to date. Completed Ceftriaxone. Will recheck UA. 2) CNS: Resolved acute encephalopathy probable multifactorial metabolic encephalopathy. CT head negative for any acute findings. 3) GI: Resolving nausea and vomiting. As needed Zofran. 4) Electrolytes: Resolved Hypokalemia.     5) CVS: CAD. Probable Demand ischemia due to the sepsis on admission. Paroxysmal Afib; not on chronic anticopagulation due to H/O falls predisposing to bleeding diatheses. Chronic diastolic CHF with a preserved EF and without any acute exarcerbation. Controlled primary hypertension. Dyslipidemia. Cardiology eval noted and appreciated. 6) Resp: Chronic Hypoxemic respiratory failure. 7) GI: H/O GERD. 8) Analgesia: Resolving uncontrolled leg pains requiring gabapentin, as needed Tramadol and  fentanyl 25 microgram patch for optimal control. 9) Prophylaxis: DVT. 10) Rehab: Debility. PT/OT as tolerated. Ambulated with a walker prior to admission.     11) Directives: DNR/DNI with a guarded prognosis. 12) Plan: Anticipate D/C to [facility] ([facility] [place]) on 8/26/19. Discussed in detail with patient's son Davion Brink at [phone]. D/W Caremanagement. Hospital Problems  Date Reviewed: 10/16/2018          Codes Class Noted POA    Sepsis ([facility] [address] ICD-10-CM: A41.9  ICD-9-CM: 038.9, 995.91  8/14/2019 Unknown                Physical Examination:      Last 24hrs VS reviewed since prior progress note. Most recent are:  Visit Vitals  /79 (BP 1 Location: Right arm, BP Patient Position: At rest)   Pulse 83   Temp 98.5 °F (36.9 °C)   Resp 18   Ht 5' 4\" (1.626 m)   Wt 62.5 kg (137 lb 12.6 oz)   SpO2 97%   BMI 23.65 kg/m²           Constitutional:  No acute distress. HEENT: Head is a traumatic,  Un icteric sclera. Pink conjunctiva,no erythema or discharge. Oral mucous moist, oropharynx benign. Neck supple,    Resp:  Decreased air entry bibasilarly. Limited exam due to poor effort. CV:  Regular rhythm, normal rate, no murmurs, gallops, rubs    GI:  Soft, non distended, non tender. normoactive bowel sounds, no hepatosplenomegaly    :  No CVA or suprapubic tenderness   Skin  :  No erythema,rash,bullae,dipigmentation     Musculoskeletal:  Bipedal edema. Neurologic: Awake and alert. Labs:     No results for input(s): WBC, HGB, HCT, PLT, HGBEXT, HCTEXT, PLTEXT, HGBEXT, HCTEXT, PLTEXT in the last 72 hours. Recent Labs     08/22/19  0114   *   K 3.7   *   CO2 32   BUN 7   CREA 0.46*   *   CA 8.1*     No results for input(s): SGOT, GPT, ALT, AP, TBIL, TBILI, TP, ALB, GLOB, GGT, AML, LPSE in the last 72 hours. No lab exists for component: AMYP, HLPSE  No results for input(s): INR, PTP, APTT in the last 72 hours. No lab exists for component: INREXT, INREXT   No results for input(s): FE, TIBC, PSAT, FERR in the last 72 hours. No results found for: FOL, RBCF   No results for input(s): PH, PCO2, PO2 in the last 72 hours.   No results for input(s): CPK, CKNDX, TROIQ in the last 72 hours.     No lab exists for component: CPKMB  Lab Results   Component Value Date/Time    Cholesterol, total 171 12/19/2018 02:59 AM    HDL Cholesterol 78 12/19/2018 02:59 AM    LDL, calculated 71 12/19/2018 02:59 AM    Triglyceride 111 12/19/2018 02:59 AM     Lab Results   Component Value Date/Time    Glucose (POC) 191 (H) 10/12/2018 12:52 PM    Glucose (POC) 89 10/03/2018 07:30 AM     Lab Results   Component Value Date/Time    Color YELLOW/STRAW 08/14/2019 03:29 PM    Appearance CLOUDY (A) 08/14/2019 03:29 PM    Specific gravity 1.012 08/14/2019 03:29 PM    Specific gravity 1.025 10/03/2018 06:18 AM    pH (UA) 6.0 08/14/2019 03:29 PM    Protein NEGATIVE  08/14/2019 03:29 PM    Glucose NEGATIVE  08/14/2019 03:29 PM    Ketone 80 (A) 08/14/2019 03:29 PM    Bilirubin NEGATIVE  08/14/2019 03:29 PM    Urobilinogen 1.0 08/14/2019 03:29 PM    Nitrites POSITIVE (A) 08/14/2019 03:29 PM    Leukocyte Esterase MODERATE (A) 08/14/2019 03:29 PM    Epithelial cells FEW 08/14/2019 03:29 PM    Bacteria 2+ (A) 08/14/2019 03:29 PM    WBC  08/14/2019 03:29 PM    RBC 0-5 08/14/2019 03:29 PM         Medications Reviewed:     Current Facility-Administered Medications   Medication Dose Route Frequency    ondansetron (ZOFRAN ODT) tablet 8 mg  8 mg Oral TIDAC    potassium chloride (KLOR-CON) packet for solution 20 mEq  20 mEq Oral DAILY    fentaNYL (DURAGESIC) 25 mcg/hr patch 1 Patch  1 Patch TransDERmal Q72H    ondansetron (ZOFRAN) injection 4 mg  4 mg IntraVENous Q4H PRN    acetaminophen (TYLENOL) suppository 650 mg  650 mg Rectal Q4H PRN    gabapentin (NEURONTIN) capsule 600 mg  600 mg Oral BIDPC    traMADol (ULTRAM) tablet 50 mg  50 mg Oral Q6H PRN    acetaminophen (TYLENOL) tablet 650 mg  650 mg Oral Q4H PRN    sodium chloride (NS) flush 5-10 mL  5-10 mL IntraVENous PRN    sodium chloride (NS) flush 5-40 mL  5-40 mL IntraVENous PRN    enoxaparin (LOVENOX) injection 40 mg  40 mg SubCUTAneous Q24H    atorvastatin (LIPITOR) tablet 40 mg  40 mg Oral DAILY    pantoprazole (PROTONIX) tablet 40 mg  40 mg Oral ACB    metoprolol tartrate (LOPRESSOR) tablet 75 mg  75 mg Oral BID    predniSONE (DELTASONE) tablet 10 mg  10 mg Oral DAILY    topiramate (TOPAMAX) tablet 25 mg  25 mg Oral DAILY WITH BREAKFAST    senna-docusate (PERICOLACE) 8.6-50 mg per tablet 1 Tab  1 Tab Oral BID    aspirin chewable tablet 81 mg  81 mg Oral DAILY    nitroglycerin (NITROLINGUAL) sublingual 0.4 mg/spray  1 Spray SubLINGual Q5MIN PRN     ______________________________________________________________________  EXPECTED LENGTH OF STAY: 4d 19h  ACTUAL LENGTH OF STAY:          10                 Shannan Mandujano MD

## 2019-08-24 NOTE — ROUTINE PROCESS
Bedside shift change report given to devyn villalobos rn (oncoming nurse) by shannen rn (offgoing nurse). Report included the following information SBAR and Kardex.

## 2019-08-24 NOTE — PROGRESS NOTES
Bedside shift change report given to Sim Lyons (oncoming nurse) by Brittany Dawson (offgoing nurse). Report included the following information SBAR, Kardex, Intake/Output, MAR and Recent Results.

## 2019-08-25 LAB
APPEARANCE UR: CLEAR
BACTERIA URNS QL MICRO: NEGATIVE /HPF
BASOPHILS # BLD: 0.1 K/UL (ref 0–0.1)
BASOPHILS NFR BLD: 1 % (ref 0–1)
BILIRUB UR QL: NEGATIVE
COLOR UR: ABNORMAL
DIFFERENTIAL METHOD BLD: ABNORMAL
EOSINOPHIL # BLD: 0.1 K/UL (ref 0–0.4)
EOSINOPHIL NFR BLD: 2 % (ref 0–7)
EPITH CASTS URNS QL MICRO: ABNORMAL /LPF
ERYTHROCYTE [DISTWIDTH] IN BLOOD BY AUTOMATED COUNT: 14.6 % (ref 11.5–14.5)
GLUCOSE UR STRIP.AUTO-MCNC: NEGATIVE MG/DL
HCT VFR BLD AUTO: 36.9 % (ref 35–47)
HGB BLD-MCNC: 11.1 G/DL (ref 11.5–16)
HGB UR QL STRIP: ABNORMAL
HYALINE CASTS URNS QL MICRO: ABNORMAL /LPF (ref 0–5)
IMM GRANULOCYTES # BLD AUTO: 0 K/UL (ref 0–0.04)
IMM GRANULOCYTES NFR BLD AUTO: 0 % (ref 0–0.5)
KETONES UR QL STRIP.AUTO: NEGATIVE MG/DL
LEUKOCYTE ESTERASE UR QL STRIP.AUTO: NEGATIVE
LYMPHOCYTES # BLD: 0.6 K/UL (ref 0.8–3.5)
LYMPHOCYTES NFR BLD: 10 % (ref 12–49)
MCH RBC QN AUTO: 32.2 PG (ref 26–34)
MCHC RBC AUTO-ENTMCNC: 30.1 G/DL (ref 30–36.5)
MCV RBC AUTO: 107 FL (ref 80–99)
MONOCYTES # BLD: 0.4 K/UL (ref 0–1)
MONOCYTES NFR BLD: 8 % (ref 5–13)
NEUTS SEG # BLD: 4.4 K/UL (ref 1.8–8)
NEUTS SEG NFR BLD: 79 % (ref 32–75)
NITRITE UR QL STRIP.AUTO: NEGATIVE
NRBC # BLD: 0 K/UL (ref 0–0.01)
NRBC BLD-RTO: 0 PER 100 WBC
PH UR STRIP: 7.5 [PH] (ref 5–8)
PLATELET # BLD AUTO: 222 K/UL (ref 150–400)
PMV BLD AUTO: 9.9 FL (ref 8.9–12.9)
PROT UR STRIP-MCNC: NEGATIVE MG/DL
RBC # BLD AUTO: 3.45 M/UL (ref 3.8–5.2)
RBC #/AREA URNS HPF: ABNORMAL /HPF (ref 0–5)
RBC MORPH BLD: ABNORMAL
SP GR UR REFRACTOMETRY: 1 (ref 1–1.03)
UR CULT HOLD, URHOLD: NORMAL
UROBILINOGEN UR QL STRIP.AUTO: 0.2 EU/DL (ref 0.2–1)
WBC # BLD AUTO: 5.6 K/UL (ref 3.6–11)
WBC URNS QL MICRO: ABNORMAL /HPF (ref 0–4)

## 2019-08-25 PROCEDURE — 65270000032 HC RM SEMIPRIVATE

## 2019-08-25 PROCEDURE — 74011250637 HC RX REV CODE- 250/637: Performed by: HOSPITALIST

## 2019-08-25 PROCEDURE — 85025 COMPLETE CBC W/AUTO DIFF WBC: CPT

## 2019-08-25 PROCEDURE — 36415 COLL VENOUS BLD VENIPUNCTURE: CPT

## 2019-08-25 PROCEDURE — 77010033678 HC OXYGEN DAILY

## 2019-08-25 PROCEDURE — 74011250636 HC RX REV CODE- 250/636: Performed by: INTERNAL MEDICINE

## 2019-08-25 PROCEDURE — 74011250637 HC RX REV CODE- 250/637: Performed by: INTERNAL MEDICINE

## 2019-08-25 PROCEDURE — 81001 URINALYSIS AUTO W/SCOPE: CPT

## 2019-08-25 PROCEDURE — 74011636637 HC RX REV CODE- 636/637: Performed by: INTERNAL MEDICINE

## 2019-08-25 RX ORDER — NYSTATIN 100000 [USP'U]/G
POWDER TOPICAL 4 TIMES DAILY
Status: DISCONTINUED | OUTPATIENT
Start: 2019-08-25 | End: 2019-08-26 | Stop reason: HOSPADM

## 2019-08-25 RX ADMIN — METOPROLOL TARTRATE 75 MG: 50 TABLET ORAL at 09:18

## 2019-08-25 RX ADMIN — ATORVASTATIN CALCIUM 40 MG: 20 TABLET, FILM COATED ORAL at 09:19

## 2019-08-25 RX ADMIN — ACETAMINOPHEN 650 MG: 650 SUPPOSITORY RECTAL at 14:03

## 2019-08-25 RX ADMIN — ASPIRIN 81 MG CHEWABLE TABLET 81 MG: 81 TABLET CHEWABLE at 09:19

## 2019-08-25 RX ADMIN — PREDNISONE 10 MG: 10 TABLET ORAL at 09:19

## 2019-08-25 RX ADMIN — SENNOSIDES, DOCUSATE SODIUM 1 TABLET: 50; 8.6 TABLET, FILM COATED ORAL at 09:18

## 2019-08-25 RX ADMIN — NYSTATIN: 100000 POWDER TOPICAL at 22:35

## 2019-08-25 RX ADMIN — NYSTATIN: 100000 POWDER TOPICAL at 19:00

## 2019-08-25 RX ADMIN — PANTOPRAZOLE SODIUM 40 MG: 40 TABLET, DELAYED RELEASE ORAL at 07:49

## 2019-08-25 RX ADMIN — ONDANSETRON 8 MG: 4 TABLET, ORALLY DISINTEGRATING ORAL at 07:49

## 2019-08-25 RX ADMIN — ENOXAPARIN SODIUM 40 MG: 40 INJECTION SUBCUTANEOUS at 17:57

## 2019-08-25 RX ADMIN — POTASSIUM CHLORIDE 20 MEQ: 1.5 POWDER, FOR SOLUTION ORAL at 09:19

## 2019-08-25 RX ADMIN — TRAMADOL HYDROCHLORIDE 50 MG: 50 TABLET, FILM COATED ORAL at 09:26

## 2019-08-25 RX ADMIN — ONDANSETRON 8 MG: 4 TABLET, ORALLY DISINTEGRATING ORAL at 11:44

## 2019-08-25 RX ADMIN — GABAPENTIN 600 MG: 300 CAPSULE ORAL at 09:18

## 2019-08-25 RX ADMIN — TOPIRAMATE 25 MG: 25 TABLET, FILM COATED ORAL at 07:49

## 2019-08-25 RX ADMIN — NYSTATIN: 100000 POWDER TOPICAL at 14:03

## 2019-08-25 NOTE — PROGRESS NOTES
Bedside shift change report given to Carloz Pearson RN (oncoming nurse) by Aquilino Damian (offgoing nurse). Report included the following information SBAR, Kardex and MAR.

## 2019-08-25 NOTE — PROGRESS NOTES
Bedside shift change report given to Casey Canales (oncoming nurse) by Mabel Natarajan (offgoing nurse). Report included the following information SBAR, Kardex, MAR and Recent Results.

## 2019-08-25 NOTE — PROGRESS NOTES
Problem: Pressure Injury - Risk of  Goal: *Prevention of pressure injury  Description  Document Marcelo Scale and appropriate interventions in the flowsheet. Offload heels  Turn approximately every 2 hours   Outcome: Progressing Towards Goal  Note:   Pressure Injury Interventions:  Sensory Interventions: Assess changes in LOC    Moisture Interventions: Absorbent underpads    Activity Interventions: Pressure redistribution bed/mattress(bed type)    Mobility Interventions: HOB 30 degrees or less    Nutrition Interventions: Document food/fluid/supplement intake    Friction and Shear Interventions: HOB 30 degrees or less       Problem: Falls - Risk of  Goal: *Absence of Falls  Description  Document Kade Fall Risk and appropriate interventions in the flowsheet. Outcome: Progressing Towards Goal  Note:   Fall Risk Interventions:  Mobility Interventions: Communicate number of staff needed for ambulation/transfer    Mentation Interventions: Bed/chair exit alarm    Medication Interventions: Bed/chair exit alarm    Elimination Interventions:  Toileting schedule/hourly rounds       Problem: Pain  Goal: *Control of Pain  Outcome: Progressing Towards Goal     Problem: Nausea/Vomiting (Adult)  Goal: *Absence of nausea/vomiting  Outcome: Progressing Towards Goal

## 2019-08-25 NOTE — PROGRESS NOTES
Problem: Falls - Risk of  Goal: *Absence of Falls  Description  Document [place] Betsey Fall Risk and appropriate interventions in the flowsheet.   Outcome: Progressing Towards Goal  Note:   Fall Risk Interventions:  Mobility Interventions: Communicate number of staff needed for ambulation/transfer    Mentation Interventions: Bed/chair exit alarm    Medication Interventions: Bed/chair exit alarm    Elimination Interventions: Call light in reach

## 2019-08-25 NOTE — PROGRESS NOTES
Hospitalist Progress Note                               Jo Ann Tejeda MD                                     Answering service: [phone]                               OR 2525 from in house phone                                         Date of Service:  2019  NAME:  Lorin Rodríguez  :  1925  MRN:  [id number]      Admission Summary:     79 Y/O female with a PMH significant for Afib, CAD, CHF and GERD was brought to the ER for worsening confusion. Reason for follow up:      CC: Confusion. All events in the past 24 hours reviewed in their entirety. Peer to Peer completed with Dr Christ Diamond of [place] completed 19. Patient approved for SNF. However, still with poor participation with PT/OT. C/O some neck pain. Assessment & Plan:       1) ID: Resolved sepsis due to Pan sensitive E.coli UTI present on admission. Afebrile, resolved lactic Acidemia, blood cultures negative to date. Completed Ceftriaxone. Monitor off anti-infective therapy. Will recheck UA. 2) CNS: Resolved acute encephalopathy probable multifactorial metabolic encephalopathy. CT head negative for any acute findings. 3) GI: Resolving nausea and vomiting. As needed Zofran. 4) Electrolytes: Resolved Hypokalemia.     5) CVS: CAD. Probable Demand ischemia due to the sepsis on admission. Paroxysmal Afib; not on chronic anticopagulation due to H/O falls predisposing to bleeding diatheses. Chronic diastolic CHF with a preserved EF and without any acute exarcerbation. Controlled primary hypertension. Dyslipidemia. Cardiology eval noted and appreciated. 6) Resp: Chronic Hypoxemic respiratory failure. 7) GI: H/O GERD. 8) Analgesia: Resolving uncontrolled leg pains requiring gabapentin, as needed Tramadol and  fentanyl 25 microgram patch for optimal control. 9) Prophylaxis: DVT. 10) Rehab: Debility. PT/OT as tolerated.  Ambulated with a walker prior to admission. 11) Directives: DNR/DNI with a guarded prognosis. 12) Plan: Anticipate D/C to [facility] Post-[facility]ute care facility (Preferably [place]) 1-2 days. .  Discussed in detail with patient's son Angela Romero at [phone]. D/W Caremanagement and Nursing. Hospital Problems  Date Reviewed: 10/16/2018          Codes Class Noted POA    Sepsis ([facility] [address] ICD-10-CM: A41.9  ICD-9-CM: 038.9, 995.91  8/14/2019 Unknown                Physical Examination:      Last 24hrs VS reviewed since prior progress note. Most recent are:  Visit Vitals  /72 (BP 1 Location: Right arm, BP Patient Position: At rest)   Pulse 71   Temp 97.9 °F (36.6 °C)   Resp 16   Ht 5' 4\" (1.626 m)   Wt 62 kg (136 lb 11 oz)   SpO2 96%   BMI 23.46 kg/m²           Constitutional:  No acute distress. HEENT: Head is a traumatic,  Un icteric sclera. Pink conjunctiva,no erythema or discharge. Oral mucous moist, oropharynx benign. Neck supple,    Resp:  Decreased air entry bibasilarly. Limited exam due to poor effort. CV:  Regular rhythm, normal rate, no murmurs, gallops, rubs    GI:  Soft, non distended, non tender. normoactive bowel sounds, no hepatosplenomegaly    :  No CVA or suprapubic tenderness   Skin  :  No erythema,rash,bullae,dipigmentation     Musculoskeletal:  Bipedal edema. Neurologic: Awake and alert. Labs:     No results for input(s): WBC, HGB, HCT, PLT, HGBEXT, HCTEXT, PLTEXT, HGBEXT, HCTEXT, PLTEXT in the last 72 hours. No results for input(s): NA, K, CL, CO2, BUN, CREA, GLU, CA, MG, PHOS, URICA in the last 72 hours. No results for input(s): SGOT, GPT, ALT, AP, TBIL, TBILI, TP, ALB, GLOB, GGT, AML, LPSE in the last 72 hours. No lab exists for component: AMYP, HLPSE  No results for input(s): INR, PTP, APTT in the last 72 hours. No lab exists for component: INREXT, INREXT   No results for input(s): FE, TIBC, PSAT, FERR in the last 72 hours.    No results found for: FOL, RBCF   No results for input(s): PH, PCO2, PO2 in the last 72 hours. No results for input(s): CPK, CKNDX, TROIQ in the last 72 hours.     No lab exists for component: CPKMB  Lab Results   Component Value Date/Time    Cholesterol, total 171 12/19/2018 02:59 AM    HDL Cholesterol 78 12/19/2018 02:59 AM    LDL, calculated 71 12/19/2018 02:59 AM    Triglyceride 111 12/19/2018 02:59 AM     Lab Results   Component Value Date/Time    Glucose (POC) 191 (H) 10/12/2018 12:52 PM    Glucose (POC) 89 10/03/2018 07:30 AM     Lab Results   Component Value Date/Time    Color YELLOW/STRAW 08/14/2019 03:29 PM    Appearance CLOUDY (A) 08/14/2019 03:29 PM    Specific gravity 1.012 08/14/2019 03:29 PM    Specific gravity 1.025 10/03/2018 06:18 AM    pH (UA) 6.0 08/14/2019 03:29 PM    Protein NEGATIVE  08/14/2019 03:29 PM    Glucose NEGATIVE  08/14/2019 03:29 PM    Ketone 80 (A) 08/14/2019 03:29 PM    Bilirubin NEGATIVE  08/14/2019 03:29 PM    Urobilinogen 1.0 08/14/2019 03:29 PM    Nitrites POSITIVE (A) 08/14/2019 03:29 PM    Leukocyte Esterase MODERATE (A) 08/14/2019 03:29 PM    Epithelial cells FEW 08/14/2019 03:29 PM    Bacteria 2+ (A) 08/14/2019 03:29 PM    WBC  08/14/2019 03:29 PM    RBC 0-5 08/14/2019 03:29 PM         Medications Reviewed:     Current Facility-Administered Medications   Medication Dose Route Frequency    ondansetron (ZOFRAN ODT) tablet 8 mg  8 mg Oral TIDAC    potassium chloride (KLOR-CON) packet for solution 20 mEq  20 mEq Oral DAILY    fentaNYL (DURAGESIC) 25 mcg/hr patch 1 Patch  1 Patch TransDERmal Q72H    ondansetron (ZOFRAN) injection 4 mg  4 mg IntraVENous Q4H PRN    acetaminophen (TYLENOL) suppository 650 mg  650 mg Rectal Q4H PRN    gabapentin (NEURONTIN) capsule 600 mg  600 mg Oral BIDPC    traMADol (ULTRAM) tablet 50 mg  50 mg Oral Q6H PRN    acetaminophen (TYLENOL) tablet 650 mg  650 mg Oral Q4H PRN    sodium chloride (NS) flush 5-10 mL  5-10 mL IntraVENous PRN    sodium chloride (NS) flush 5-40 mL  5-40 mL IntraVENous PRN    enoxaparin (LOVENOX) injection 40 mg  40 mg SubCUTAneous Q24H    atorvastatin (LIPITOR) tablet 40 mg  40 mg Oral DAILY    pantoprazole (PROTONIX) tablet 40 mg  40 mg Oral ACB    metoprolol tartrate (LOPRESSOR) tablet 75 mg  75 mg Oral BID    predniSONE (DELTASONE) tablet 10 mg  10 mg Oral DAILY    topiramate (TOPAMAX) tablet 25 mg  25 mg Oral DAILY WITH BREAKFAST    senna-docusate (PERICOLACE) 8.6-50 mg per tablet 1 Tab  1 Tab Oral BID    aspirin chewable tablet 81 mg  81 mg Oral DAILY    nitroglycerin (NITROLINGUAL) sublingual 0.4 mg/spray  1 Spray SubLINGual Q5MIN PRN     ______________________________________________________________________  EXPECTED LENGTH OF STAY: 4d 19h  ACTUAL LENGTH OF STAY:          11                 Katarina Perez MD

## 2019-08-26 VITALS
HEIGHT: 64 IN | SYSTOLIC BLOOD PRESSURE: 133 MMHG | BODY MASS INDEX: 23.22 KG/M2 | WEIGHT: 136 LBS | HEART RATE: 77 BPM | DIASTOLIC BLOOD PRESSURE: 81 MMHG | TEMPERATURE: 97.9 F | RESPIRATION RATE: 16 BRPM | OXYGEN SATURATION: 93 %

## 2019-08-26 PROCEDURE — 74011636637 HC RX REV CODE- 636/637: Performed by: INTERNAL MEDICINE

## 2019-08-26 PROCEDURE — 74011250637 HC RX REV CODE- 250/637: Performed by: INTERNAL MEDICINE

## 2019-08-26 RX ORDER — GABAPENTIN 300 MG/1
600 CAPSULE ORAL 3 TIMES DAILY
Qty: 30 CAP | Refills: 0 | Status: SHIPPED | OUTPATIENT
Start: 2019-08-26 | End: 2019-10-10

## 2019-08-26 RX ORDER — ACETAMINOPHEN 325 MG/1
650 TABLET ORAL
Qty: 20 TAB | Refills: 0 | Status: SHIPPED | OUTPATIENT
Start: 2019-08-26

## 2019-08-26 RX ORDER — MIRTAZAPINE 15 MG/1
15 TABLET, FILM COATED ORAL
Qty: 30 TAB | Refills: 0 | Status: SHIPPED | OUTPATIENT
Start: 2019-08-26

## 2019-08-26 RX ORDER — TRAMADOL HYDROCHLORIDE 50 MG/1
50 TABLET ORAL
Qty: 28 TAB | Refills: 0 | Status: SHIPPED | OUTPATIENT
Start: 2019-08-26 | End: 2019-09-02

## 2019-08-26 RX ORDER — TOPIRAMATE 25 MG/1
25 TABLET ORAL
Qty: 30 TAB | Refills: 1 | Status: SHIPPED | OUTPATIENT
Start: 2019-08-26

## 2019-08-26 RX ORDER — FENTANYL 25 UG/1
1 PATCH TRANSDERMAL
Qty: 4 PATCH | Refills: 0 | Status: SHIPPED | OUTPATIENT
Start: 2019-08-26 | End: 2019-09-07

## 2019-08-26 RX ADMIN — ASPIRIN 81 MG CHEWABLE TABLET 81 MG: 81 TABLET CHEWABLE at 08:51

## 2019-08-26 RX ADMIN — ATORVASTATIN CALCIUM 40 MG: 20 TABLET, FILM COATED ORAL at 08:51

## 2019-08-26 RX ADMIN — NYSTATIN: 100000 POWDER TOPICAL at 08:51

## 2019-08-26 RX ADMIN — GABAPENTIN 600 MG: 300 CAPSULE ORAL at 08:51

## 2019-08-26 RX ADMIN — METOPROLOL TARTRATE 75 MG: 50 TABLET ORAL at 08:51

## 2019-08-26 RX ADMIN — PANTOPRAZOLE SODIUM 40 MG: 40 TABLET, DELAYED RELEASE ORAL at 06:56

## 2019-08-26 RX ADMIN — ONDANSETRON 8 MG: 4 TABLET, ORALLY DISINTEGRATING ORAL at 06:55

## 2019-08-26 RX ADMIN — PREDNISONE 10 MG: 10 TABLET ORAL at 08:51

## 2019-08-26 RX ADMIN — SENNOSIDES, DOCUSATE SODIUM 1 TABLET: 50; 8.6 TABLET, FILM COATED ORAL at 08:51

## 2019-08-26 RX ADMIN — POTASSIUM CHLORIDE 20 MEQ: 1.5 POWDER, FOR SOLUTION ORAL at 08:51

## 2019-08-26 RX ADMIN — TOPIRAMATE 25 MG: 25 TABLET, FILM COATED ORAL at 06:56

## 2019-08-26 NOTE — PROGRESS NOTES
MARLENY Plan:     1. Disposition [facility] for [facility] accepted the pt;     Call to report: [phone] and ask for wing 2   Room # 204 B     3. [facility] Primary Care team following; LOLY  notified Juan Francisco ALMEIDA [phone]      4. Transport; [facility] 11:30     5.  [address] [facility]  Clinical     [phone]

## 2019-08-26 NOTE — PROGRESS NOTES
Problem: Falls - Risk of  Goal: *Absence of Falls  Description  Document Mani Mcbride Fall Risk and appropriate interventions in the flowsheet.   Outcome: Progressing Towards Goal  Note:   Fall Risk Interventions:  Mobility Interventions: Communicate number of staff needed for ambulation/transfer, OT consult for ADLs, Patient to call before getting OOB, PT Consult for mobility concerns, PT Consult for assist device competence    Mentation Interventions: Adequate sleep, hydration, pain control, Bed/chair exit alarm, Door open when patient unattended, More frequent rounding, Reorient patient, Room close to nurse's station    Medication Interventions: Bed/chair exit alarm, Patient to call before getting OOB, Teach patient to arise slowly    Elimination Interventions: Bed/chair exit alarm, Call light in reach, Patient to call for help with toileting needs, Toileting schedule/hourly rounds

## 2019-08-26 NOTE — DISCHARGE SUMMARY
Enrrique Castaneda 2906 SUMMARY    Name:  Charles Martínez  MR#:  [id number]  :  1925  ACCOUNT #:  [de-identified]  ADMIT DATE:  2019  DISCHARGE DATE:  2019    MAIN DIAGNOSES ON ADMISSION:  1. Acute multifactorial metabolic encephalopathy. 2.  Probable sepsis due to urinary tract infection. 3.  Hypokalemia. HOSPITAL COURSE:  A 80-year-old female with a past medical history significant for chronic Atrial fibrillation and not on chronic anticoagulation, coronary artery disease, congestive heart failure, gastroesophageal reflux disease, polymyalgia rheumatica, dyslipidemia, primary hypertension, neuropathy and chronic pain syndrome was brought to the emergency room from home for acutely confused state. Initial hospital course significant for the patient being admitted to the monitored floor after further evaluation with a CBC that showed a WBC of 8.7, hemoglobin 13.3, hematocrit 42.9, platelet count of 512. A metabolic panel with a sodium of 139, potassium 4.7, chloride of 102, a bicarb of 25, a BUN of 9, creatinine 0.83, glucose of 112, calcium 9.7 and ALT of 31, AST of 85, total bilirubin 1.6, albumin 3.3. A urinalysis with yellow cloudy urine, positive nitrites, moderate leukocyte esterase, wbc's . A chest x-ray that showed some bibasilar atelectasis. A CT of the head without contrast which did not show any acute intracranial abnormality. A CT of the chest with contrast that showed no acute pulmonary embolism. A CT of the abdomen and pelvis with contrast that showed no acute cardiopulmonary disease, no acute pulmonary embolism, some diverticulosis of the sigmoid colon without evidence of diverticulitis. Initial management included neuro checks, IV fluids, blood cultures, urine cultures and empiric IV antibiotics. The patient was noted to have lactic acidemia and this most likely was due to the sepsis from the urinary tract infection present on admission.   The patient also with some mildly elevated troponin, this most likely was due to demand ischemia due to the sepsis present on admission. The patient had no acute ischemic changes by EKG. The patient also noted with chronic diastolic congestive heart failure, New York Heart Association class II without any definite exacerbation. The patient had slow but steady clinical improvement with resolution of the acute probable multifactorial metabolic encephalopathy, completed a 7-day course of ceftriaxone with a repeat urinalysis without any evidence of infection. Electrolytes including potassium were monitored and repleted. Hospital course was complicated by some bilateral leg pain probably due to neuropathy initially limiting her progress with physical and occupational therapy. The patient's gabapentin, tramadol and fentanyl patch were all resumed with significant clinical improvement. The patient was able to tolerate an advancing diet, was evaluated by physical and occupational therapy with recommendations for a short-term post acute care facility for ongoing therapy with the aid of a front-wheeled walker. Daily discussions and updates were done with the patient's son, Angela Romero, either at bedside or by phone at which time he was in agreement with the patient being discharged to [facility]. Interdisciplinary team meeting was held and again all parties were in agreement on the patient's discharge plans. With significant clinical and chemical improvement, complete resolution of the metabolic encephalopathy, pain control of the legs been optimized, it was deemed that the patient was stable for discharge on this day, 08/26/2019. PHYSICAL EXAMINATION:  Findings as follows:  GENERAL:  The patient awake, alert, denying new complaints. VITAL SIGNS:  Her blood pressure of 133/81, heart rate 77, respiratory rate 16, afebrile, saturating 93% on 1 L nasal cannula.   CARDIOVASCULAR EXAM:  S1, S2 present. RESPIRATORY EXAM:  Lungs with decreased air entry at both bases without any crepitations or rhonchi. GASTROINTESTINAL EXAM:  Bowel sounds present. The abdomen is soft, nontender. No rebound, no guarding. GENITOURINARY EXAM:  No suprapubic or flank tenderness. MUSCULOSKELETAL EXAM:  About 2+ bipedal edema. SKIN EXAM:  Nil of note. CNS EXAM:  The patient is awake, alert. LABORATORY DATA:  Lab findings on discharge:  A CBC with a WBC of 5.6, hemoglobin 11.1, hematocrit 36.9, platelet count of 249. Metabolic panel with a sodium of 146, potassium 3.7, a chloride of 111, a bicarb of 32, a BUN of 7, a creatinine 0.46 with a glucose of 128, calcium 8.1. A repeat urinalysis with yellow clear urine, negative leukocytes, negative nitrites, wbc's 0-4, negative bacteria. FINAL DIAGNOSES ON DISCHARGE:  1. Resolved sepsis due to pansensitive Escherichia coli urinary tract infection, present on admission. Completed a 7-day course of ceftriaxone with a negative repeat urinalysis. 2.  Resolved acute multifactorial metabolic encephalopathy. CT of the head without contrast was negative for acute findings. 3.  Resolved emesis. 4.  Resolved hypokalemia. 5.  Coronary artery disease. 6.  Probable demand ischemia due to the sepsis present on admission. 7.  Paroxysmal atrial fibrillation. Not a candidate for chronic anticoagulation due to history of falls predisposing to bleeding diathesis. 8.  Chronic diastolic congestive heart failure without any acute exacerbation. 9.  Controlled primary hypertension. 10.  Dyslipidemia. 11.  Chronic hypoxemic respiratory failure. 12.  History of gastroesophageal reflux disease. 13.  Resolved uncontrolled leg pains, probably due to neuropathy. 14.  Do not resuscitate, do not intubate status. DISCHARGE MEDICATIONS:  As follows:  1. Tylenol 650 mg p.o. every 4 hours as needed for pain, fever or mild headache. 2.  Aspirin 81 mg p.o. daily.   3.  Atorvastatin 40 mg p.o. daily. 4.  Fentanyl 25 mcg one patch transdermally every 72 hours. 5.  Gabapentin 300 mg 2 capsules p.o. 3 times a day. 6.  Metoprolol tartrate 50 mg 1-1/2 tablets p.o. twice a day. 7.  MiraLax 17 g p.o. daily as needed for constipation. 8.  Mirtazapine 15 mg 1 tablet p.o. nightly for major depression. 9.  Nexium 40 mg p.o. daily. 10.  Nitroglycerin 0.4 mg sublingual every 5 minutes as needed for chest pain. 11.  Ondansetron 8 mg 1 capsule p.o. before breakfast, lunch, and dinner. 12.  Prednisone 10 mg p.o. daily for history of polymyalgia rheumatica. 13.  Senna Plus 8.6-50 mg 1 tablet p.o. twice a day. 14.  Topiramate 25 mg 1 tablet p.o. daily. 15.  Tramadol 50 mg 1 tablet p.o. every 6 hours as needed for pain. DISCHARGE INSTRUCTIONS:  The patient is to follow a regular diet as tolerated, to do daily physical and occupational therapy as tolerated, to have falls precaution in place at all times. The patient is to follow up with PCP in one week's time from discharge. Please note the patient is a do not resuscitate, do not intubate status. CONDITION ON DISCHARGE:  The patient is stable for discharge this day, 08/26/2019. The entire discharge plans including all medications, their side effects, the importance of adhering to all outpatient followup plans discussed in detail with the patient. All questions and concerns were addressed. Total time for the discharge summary 50 minutes.       MD FRANCINE [email or web address]  D:  08/26/2019 10:50  T:  08/26/2019 10:54  JOB #:  [id number]

## 2019-08-26 NOTE — PROGRESS NOTES
Bedside and Verbal shift change report given to [address] [place] [address], [address], RN (oncoming nurse) by Yadira Mensah RN (offgoing nurse). Report included the following information SBAR, Kardex, Intake/Output, MAR and Recent Results.

## 2019-08-26 NOTE — DISCHARGE INSTRUCTIONS
Patient to have a regular diet as tolerated. Patient to daily PT/OT as tolerated. Patient to have fall precautions in place. Patient to follow up with PCP in 1 week.

## 2019-08-26 NOTE — PROGRESS NOTES
Pt confused. Refused to take sked 18:00 meds despite nurse coming in every 60 to [address] for the rest of the evening to try to convince her to take them.

## 2019-08-26 NOTE — PROGRESS NOTES
TRANSFER - OUT REPORT:    Verbal report given to Lakshmi steiner RN (name) on Taj Law  being transferred to [facility] (unit) for routine progression of care       Report consisted of patients Situation, Background, Assessment and   Recommendations(SBAR). Information from the following report(s) SBAR, Kardex, Intake/Output, MAR and Recent Results was reviewed with the receiving nurse. Lines:       Opportunity for questions and clarification was provided.       Patient transported with:   O2 @ 1 liters

## 2019-08-28 ENCOUNTER — TELEPHONE (OUTPATIENT)
Dept: CASE MANAGEMENT | Age: 84
End: 2019-08-28

## 2019-09-04 ENCOUNTER — TELEPHONE (OUTPATIENT)
Dept: FAMILY MEDICINE CLINIC | Age: 84
End: 2019-09-04

## 2019-09-04 NOTE — TELEPHONE ENCOUNTER
Outgoing call placed to [facility] for , Timothy Issa to return call with update and anticipated discharge date

## 2019-09-09 ENCOUNTER — TELEPHONE (OUTPATIENT)
Dept: FAMILY MEDICINE CLINIC | Age: 84
End: 2019-09-09

## 2019-09-09 NOTE — TELEPHONE ENCOUNTER
Outgoing call placed to Juan Stevenson [place] at [facility] and she reported that patient is improving slowly - she continues with PT/OT and speech therapy - she is moderate to max assist on transfers, parallel treatment bar, as well as sitting on the side of the bed. Not progressing with ambulation at this time. Anticipated discharge date to home with son schedule for 9/23/19.   Fax number provided for discharge summary upon discharge from facility

## 2019-10-08 ENCOUNTER — TELEPHONE (OUTPATIENT)
Dept: FAMILY MEDICINE CLINIC | Age: 84
End: 2019-10-08

## 2019-10-08 DIAGNOSIS — M54.40 CHRONIC LOW BACK PAIN WITH SCIATICA, SCIATICA LATERALITY UNSPECIFIED, UNSPECIFIED BACK PAIN LATERALITY: Primary | ICD-10-CM

## 2019-10-08 DIAGNOSIS — G89.29 CHRONIC LOW BACK PAIN WITH SCIATICA, SCIATICA LATERALITY UNSPECIFIED, UNSPECIFIED BACK PAIN LATERALITY: Primary | ICD-10-CM

## 2019-10-08 RX ORDER — TRAMADOL HYDROCHLORIDE 50 MG/1
50 TABLET ORAL
Qty: 60 TAB | Refills: 1 | Status: ON HOLD | OUTPATIENT
Start: 2019-10-08 | End: 2019-10-15 | Stop reason: SDUPTHER

## 2019-10-08 NOTE — TELEPHONE ENCOUNTER
Home Based [address] Transition of Care Note    ADMIT DATE:  08/14/2019  DISCHARGE DATE:  08/26/2019     MAIN DIAGNOSES ON ADMISSION:  1. Acute multifactorial metabolic encephalopathy. 2.  Probable sepsis due to urinary tract infection. 3.  Hypokalemia. On August 14, 2019, patient presented to the ED for acute onset of confusion  Receiving a chest x-ray that showed some bibasilar atelectasis. A CT of the head without contrast which did not show any acute intracranial abnormality. A CT of the chest with contrast that showed no acute pulmonary embolism. A CT of the abdomen and pelvis with contrast that showed no acute cardiopulmonary disease, no acute pulmonary embolism, some diverticulosis of the sigmoid colon without evidence of diverticulitis. Initial management included neuro checks, IV fluids, blood cultures, urine cultures and empiric IV antibiotics. She was discharged to [address] [place] [address]    Patient admitted to [facility] on 8/26/19 and Discharged home on 10/7/19    Spoke with patient son, Aranza Verde today and he stated that patient is doing well. He deies chest pain, dizziness, shortness of breath, nausea, or vomiting. Per Aranza Verde patient was not discharged from [facility] with any new medications. He reported that patient's mobility is very limited, unable to stand or walk independently. He reported that [facility] from [facility] came today to resume care. PT and OT is schedule to see patient tomorrow    Medication reconciliation completed. Patient's son verbalized understanding of medication management. Reviewed plan of care. Patient has provided input to plan and agrees with goals. GOALS:     Knowledge and adherence of prescribed medication (ie. action, side effects, missed dose, etc.). Patient was able to name all medications, doses, frequencies and reason for taking.  Pt reports taking all medications as directed     Develop action plan for Self-Management Chronic Disease. Patient has follow up appointment scheduled with Cammie Fraire NP on Friday, 10/11/19. Pt is able to verbalize red flags and reasons to use PCP vs EMS services. Pt has a good support system. Patient has my contact number for further questions or concerns.

## 2019-10-10 ENCOUNTER — APPOINTMENT (OUTPATIENT)
Dept: VASCULAR SURGERY | Age: 84
End: 2019-10-10
Attending: EMERGENCY MEDICINE
Payer: MEDICARE

## 2019-10-10 ENCOUNTER — APPOINTMENT (OUTPATIENT)
Dept: GENERAL RADIOLOGY | Age: 84
End: 2019-10-10
Attending: EMERGENCY MEDICINE
Payer: MEDICARE

## 2019-10-10 ENCOUNTER — HOSPITAL ENCOUNTER (OUTPATIENT)
Age: 84
Setting detail: OBSERVATION
Discharge: NURSING, REHAB OR LONG-TERM CARE | End: 2019-10-15
Attending: EMERGENCY MEDICINE | Admitting: HOSPITALIST
Payer: MEDICARE

## 2019-10-10 DIAGNOSIS — M25.562 ARTHRALGIA OF BOTH LOWER LEGS: ICD-10-CM

## 2019-10-10 DIAGNOSIS — R53.1 WEAKNESS: Primary | ICD-10-CM

## 2019-10-10 DIAGNOSIS — M79.604 BILATERAL LEG PAIN: ICD-10-CM

## 2019-10-10 DIAGNOSIS — M17.0 PRIMARY OSTEOARTHRITIS OF BOTH KNEES: ICD-10-CM

## 2019-10-10 DIAGNOSIS — M79.605 BILATERAL LEG PAIN: ICD-10-CM

## 2019-10-10 DIAGNOSIS — G89.29 CHRONIC LOW BACK PAIN WITH SCIATICA, SCIATICA LATERALITY UNSPECIFIED, UNSPECIFIED BACK PAIN LATERALITY: ICD-10-CM

## 2019-10-10 DIAGNOSIS — M54.40 CHRONIC LOW BACK PAIN WITH SCIATICA, SCIATICA LATERALITY UNSPECIFIED, UNSPECIFIED BACK PAIN LATERALITY: ICD-10-CM

## 2019-10-10 DIAGNOSIS — M25.561 ARTHRALGIA OF BOTH LOWER LEGS: ICD-10-CM

## 2019-10-10 LAB
ANION GAP SERPL CALC-SCNC: 4 MMOL/L (ref 5–15)
BASOPHILS # BLD: 0 K/UL (ref 0–0.1)
BASOPHILS NFR BLD: 0 % (ref 0–1)
BUN SERPL-MCNC: 14 MG/DL (ref 6–20)
BUN/CREAT SERPL: 23 (ref 12–20)
CALCIUM SERPL-MCNC: 9 MG/DL (ref 8.5–10.1)
CHLORIDE SERPL-SCNC: 106 MMOL/L (ref 97–108)
CO2 SERPL-SCNC: 34 MMOL/L (ref 21–32)
COMMENT, HOLDF: NORMAL
CREAT SERPL-MCNC: 0.61 MG/DL (ref 0.55–1.02)
DIFFERENTIAL METHOD BLD: ABNORMAL
EOSINOPHIL # BLD: 0.1 K/UL (ref 0–0.4)
EOSINOPHIL NFR BLD: 1 % (ref 0–7)
ERYTHROCYTE [DISTWIDTH] IN BLOOD BY AUTOMATED COUNT: 13.9 % (ref 11.5–14.5)
GLUCOSE SERPL-MCNC: 86 MG/DL (ref 65–100)
HCT VFR BLD AUTO: 38.5 % (ref 35–47)
HGB BLD-MCNC: 11.2 G/DL (ref 11.5–16)
IMM GRANULOCYTES # BLD AUTO: 0 K/UL (ref 0–0.04)
IMM GRANULOCYTES NFR BLD AUTO: 0 % (ref 0–0.5)
LYMPHOCYTES # BLD: 1.1 K/UL (ref 0.8–3.5)
LYMPHOCYTES NFR BLD: 16 % (ref 12–49)
MCH RBC QN AUTO: 32.2 PG (ref 26–34)
MCHC RBC AUTO-ENTMCNC: 29.1 G/DL (ref 30–36.5)
MCV RBC AUTO: 110.6 FL (ref 80–99)
MONOCYTES # BLD: 0.7 K/UL (ref 0–1)
MONOCYTES NFR BLD: 11 % (ref 5–13)
NEUTS SEG # BLD: 4.8 K/UL (ref 1.8–8)
NEUTS SEG NFR BLD: 71 % (ref 32–75)
NRBC # BLD: 0 K/UL (ref 0–0.01)
NRBC BLD-RTO: 0 PER 100 WBC
PLATELET # BLD AUTO: 118 K/UL (ref 150–400)
PMV BLD AUTO: 11 FL (ref 8.9–12.9)
POTASSIUM SERPL-SCNC: 4.5 MMOL/L (ref 3.5–5.1)
RBC # BLD AUTO: 3.48 M/UL (ref 3.8–5.2)
SAMPLES BEING HELD,HOLD: NORMAL
SODIUM SERPL-SCNC: 144 MMOL/L (ref 136–145)
WBC # BLD AUTO: 6.7 K/UL (ref 3.6–11)

## 2019-10-10 PROCEDURE — 85025 COMPLETE CBC W/AUTO DIFF WBC: CPT

## 2019-10-10 PROCEDURE — 97530 THERAPEUTIC ACTIVITIES: CPT

## 2019-10-10 PROCEDURE — 96365 THER/PROPH/DIAG IV INF INIT: CPT

## 2019-10-10 PROCEDURE — 36415 COLL VENOUS BLD VENIPUNCTURE: CPT

## 2019-10-10 PROCEDURE — 74011250637 HC RX REV CODE- 250/637: Performed by: EMERGENCY MEDICINE

## 2019-10-10 PROCEDURE — 74011250637 HC RX REV CODE- 250/637: Performed by: HOSPITALIST

## 2019-10-10 PROCEDURE — 99285 EMERGENCY DEPT VISIT HI MDM: CPT

## 2019-10-10 PROCEDURE — 93970 EXTREMITY STUDY: CPT

## 2019-10-10 PROCEDURE — 97161 PT EVAL LOW COMPLEX 20 MIN: CPT

## 2019-10-10 PROCEDURE — 80048 BASIC METABOLIC PNL TOTAL CA: CPT

## 2019-10-10 PROCEDURE — 94762 N-INVAS EAR/PLS OXIMTRY CONT: CPT

## 2019-10-10 PROCEDURE — 99218 HC RM OBSERVATION: CPT

## 2019-10-10 PROCEDURE — 74011250636 HC RX REV CODE- 250/636: Performed by: HOSPITALIST

## 2019-10-10 PROCEDURE — 74011000258 HC RX REV CODE- 258: Performed by: HOSPITALIST

## 2019-10-10 PROCEDURE — 73590 X-RAY EXAM OF LOWER LEG: CPT

## 2019-10-10 RX ORDER — ONDANSETRON 2 MG/ML
4 INJECTION INTRAMUSCULAR; INTRAVENOUS
Status: DISCONTINUED | OUTPATIENT
Start: 2019-10-10 | End: 2019-10-15 | Stop reason: HOSPADM

## 2019-10-10 RX ORDER — NALOXONE HYDROCHLORIDE 0.4 MG/ML
0.4 INJECTION, SOLUTION INTRAMUSCULAR; INTRAVENOUS; SUBCUTANEOUS AS NEEDED
Status: DISCONTINUED | OUTPATIENT
Start: 2019-10-10 | End: 2019-10-15 | Stop reason: HOSPADM

## 2019-10-10 RX ORDER — MIRTAZAPINE 15 MG/1
15 TABLET, FILM COATED ORAL
Status: DISCONTINUED | OUTPATIENT
Start: 2019-10-10 | End: 2019-10-15 | Stop reason: HOSPADM

## 2019-10-10 RX ORDER — SODIUM CHLORIDE 0.9 % (FLUSH) 0.9 %
5-40 SYRINGE (ML) INJECTION EVERY 8 HOURS
Status: DISCONTINUED | OUTPATIENT
Start: 2019-10-10 | End: 2019-10-15 | Stop reason: HOSPADM

## 2019-10-10 RX ORDER — SODIUM CHLORIDE 0.9 % (FLUSH) 0.9 %
5-40 SYRINGE (ML) INJECTION AS NEEDED
Status: DISCONTINUED | OUTPATIENT
Start: 2019-10-10 | End: 2019-10-15 | Stop reason: HOSPADM

## 2019-10-10 RX ORDER — TRAMADOL HYDROCHLORIDE 50 MG/1
50 TABLET ORAL
Status: COMPLETED | OUTPATIENT
Start: 2019-10-10 | End: 2019-10-10

## 2019-10-10 RX ORDER — GABAPENTIN 300 MG/1
300 CAPSULE ORAL 4 TIMES DAILY
Status: DISCONTINUED | OUTPATIENT
Start: 2019-10-10 | End: 2019-10-14 | Stop reason: DRUGHIGH

## 2019-10-10 RX ORDER — TRAMADOL HYDROCHLORIDE 50 MG/1
50 TABLET ORAL EVERY 8 HOURS
Status: DISCONTINUED | OUTPATIENT
Start: 2019-10-10 | End: 2019-10-15 | Stop reason: HOSPADM

## 2019-10-10 RX ORDER — PANTOPRAZOLE SODIUM 40 MG/1
40 TABLET, DELAYED RELEASE ORAL
Status: DISCONTINUED | OUTPATIENT
Start: 2019-10-11 | End: 2019-10-15 | Stop reason: HOSPADM

## 2019-10-10 RX ORDER — GUAIFENESIN 100 MG/5ML
81 LIQUID (ML) ORAL DAILY
Status: DISCONTINUED | OUTPATIENT
Start: 2019-10-11 | End: 2019-10-15 | Stop reason: HOSPADM

## 2019-10-10 RX ORDER — ATORVASTATIN CALCIUM 40 MG/1
40 TABLET, FILM COATED ORAL DAILY
Status: DISCONTINUED | OUTPATIENT
Start: 2019-10-11 | End: 2019-10-15 | Stop reason: HOSPADM

## 2019-10-10 RX ORDER — FENTANYL 25 UG/1
1 PATCH TRANSDERMAL
Status: ON HOLD | COMMUNITY
End: 2019-10-15 | Stop reason: SDUPTHER

## 2019-10-10 RX ORDER — GABAPENTIN 300 MG/1
300 CAPSULE ORAL 4 TIMES DAILY
Status: ON HOLD | COMMUNITY
End: 2019-10-15 | Stop reason: SDUPTHER

## 2019-10-10 RX ORDER — ESOMEPRAZOLE MAGNESIUM 40 MG/1
40 CAPSULE, DELAYED RELEASE ORAL
Status: DISCONTINUED | OUTPATIENT
Start: 2019-10-11 | End: 2019-10-10 | Stop reason: CLARIF

## 2019-10-10 RX ORDER — PREDNISONE 10 MG/1
10 TABLET ORAL DAILY
Status: DISCONTINUED | OUTPATIENT
Start: 2019-10-11 | End: 2019-10-15 | Stop reason: HOSPADM

## 2019-10-10 RX ORDER — ACETAMINOPHEN 325 MG/1
650 TABLET ORAL EVERY 8 HOURS
Status: DISCONTINUED | OUTPATIENT
Start: 2019-10-10 | End: 2019-10-15 | Stop reason: HOSPADM

## 2019-10-10 RX ORDER — AMOXICILLIN 250 MG
1 CAPSULE ORAL 2 TIMES DAILY
Status: DISCONTINUED | OUTPATIENT
Start: 2019-10-11 | End: 2019-10-15 | Stop reason: HOSPADM

## 2019-10-10 RX ORDER — NITROGLYCERIN 0.4 MG/1
0.4 TABLET SUBLINGUAL
Status: DISCONTINUED | OUTPATIENT
Start: 2019-10-10 | End: 2019-10-15 | Stop reason: HOSPADM

## 2019-10-10 RX ORDER — FUROSEMIDE 10 MG/ML
20 INJECTION INTRAMUSCULAR; INTRAVENOUS
Status: DISCONTINUED | OUTPATIENT
Start: 2019-10-10 | End: 2019-10-15 | Stop reason: HOSPADM

## 2019-10-10 RX ORDER — TOPIRAMATE 25 MG/1
25 TABLET ORAL
Status: DISCONTINUED | OUTPATIENT
Start: 2019-10-11 | End: 2019-10-15 | Stop reason: HOSPADM

## 2019-10-10 RX ADMIN — TRAMADOL HYDROCHLORIDE 50 MG: 50 TABLET ORAL at 23:35

## 2019-10-10 RX ADMIN — TRAMADOL HYDROCHLORIDE 50 MG: 50 TABLET ORAL at 16:01

## 2019-10-10 RX ADMIN — ACETAMINOPHEN 650 MG: 325 TABLET, FILM COATED ORAL at 23:35

## 2019-10-10 RX ADMIN — GABAPENTIN 300 MG: 300 CAPSULE ORAL at 23:35

## 2019-10-10 RX ADMIN — MIRTAZAPINE 15 MG: 15 TABLET, FILM COATED ORAL at 23:34

## 2019-10-10 RX ADMIN — Medication 10 ML: at 23:36

## 2019-10-10 RX ADMIN — CEFAZOLIN 1 G: 1 INJECTION, POWDER, FOR SOLUTION INTRAMUSCULAR; INTRAVENOUS at 23:32

## 2019-10-10 NOTE — ED TRIAGE NOTES
Pt arrived via EMS from home. Pt was d/c'd from [facility] rehab yesterday and is staying with her son. Pt reports her son was trying to get her to walk but patient cannot walk and fell to the floor. Pt reports L leg pain.

## 2019-10-10 NOTE — PROGRESS NOTES
Physical Therapy Screening:  A referral to physical therapy order is indicated when the patient is medically stable. A screening was performed on this patient's chart based on their entrance into the emergency department and potential need for physical therapy identified. The patients chart was reviewed and the patient interviewed/screened and found to be appropriate for a skilled therapy evaluation. Please consult for physical therapy if you would like an evaluation to be completed. Thank you. 16:27 - Met with patient. Patient reports being discharged from [facility] rehab to her son's home where she has been living for about 5 years. She has been home for a day. Her son was assisting her off the toilet when she fell citing right leg weakness. Will reach out to patient's son for additional information. 16:35 - Attempted to reach patient's son, no answer, message left. 17:15 - Spoke with patient's son. Patient discharged home from [facility] Monday Oct 7. States he was informed patient was able to walk 15-20 steps with her rolling walker and min A with therapist though she has not been able to walk since returning home and was max assist of 2 to carry her up the steps and into the home, has been in the bed since returning home except to get to the [facility]. He was assisting her from the bed to the [facility] when her legs gave way causing her to fall. He endorses patient having chronic right leg pain and weakness for years, has been to [facility] for rehab 7 times in the past 5 years. Patient is open to [facility] for PT, OT, Nsg. She has her own personal care aide for bathing. DME: walker, wheelchair, bedside commode. There are steps to enter the home.

## 2019-10-10 NOTE — ED PROVIDER NOTES
80 y.o. female with past medical history significant for CAD, CHF, a-fib, polymyalgia, GERD, gluten intolerance, and arthritis who presents from home via EMS with chief complaint of leg pain. Pt was recently discharged from [facility] rehab yesterday and is currently living with her son. Pt's son states he tried to walk her yesterday and she had an \"assited fall\" to the ground. Pt c/o left leg pain and decreased ROM secondary to pain. Pt affirms having pain in the LE's bilaterally for \"quite some time. \" Pt localizes the pain to occur from the knee down to the ankle. Pt states that her LE swelling had not increased lately. Pt's medication sheet shows that she is currently taking: Nexium, gabapentin, fentanyl, metoprolol, 81mg aspirin, atorvastatin, topiramate, tramadol, nitrostat, prednisone, mirtazapine, ondansetron, and nystatin. There are no other acute medical concerns at this time. Social hx: Never smoker. PCP: Leann Henry NP    Note written by mike Tony, as dictated by Bentley Bernard MD 3:40 PM      The history is provided by the patient. No  was used.         Past Medical History:   Diagnosis Date    Arrhythmia     A-FIB    Arthritis     CAD (coronary artery disease) 1989    MI    Congestive heart failure (CHF) (HCC)     GERD (gastroesophageal reflux disease)     Gluten intolerance     Heart failure (HCC)     CHF    Polymyalgia (HCC)        Past Surgical History:   Procedure Laterality Date    CARDIAC SURG PROCEDURE UNLIST  1980'S    CARDIAC CATH    HX BACK SURGERY  1998, 2006, 2013    LUMBAR SPINE    HX CHOLECYSTECTOMY      [place] H[place] REPLAC[place] Right 5/2015    Fernando Oden HX TONSILLECTOMY  CHILD    HX UROLOGICAL      BLADDER SUSPENSION - MESH         Family History:   Problem Relation Age of Onset    Other Mother         Intestinal obstruction    Cancer Father     Stroke Father     Heart Attack Brother     Cancer Brother     Cancer Brother    Devin [place] Problems Neg Hx        Social History     Socioeconomic History    Marital status: SINGLE     Spouse name: Not on file    Number of children: Not on file    Years of education: Not on file    Highest education level: Not on file   Occupational History    Not on file   Social Needs    Financial resource strain: Not on file    Food insecurity:     Worry: Not on file     Inability: Not on file    Transportation needs:     Medical: Not on file     Non-medical: Not on file   Tobacco Use    Smoking status: Never Smoker    Smokeless tobacco: Never Used   Substance and Sexual Activity    Alcohol use: No     Alcohol/week: 0.0 standard drinks    Drug use: No    Sexual activity: Never   Lifestyle    Physical activity:     Days per week: Not on file     Minutes per session: Not on file    Stress: Not on file   Relationships    Social connections:     Talks on phone: Not on file     Gets together: Not on file     Attends Church service: Not on file     Active member of club or organization: Not on file     Attends meetings of clubs or organizations: Not on file     Relationship status: Not on file    Intimate partner violence:     Fear of current or ex partner: Not on file     Emotionally abused: Not on file     Physically abused: Not on file     Forced sexual activity: Not on file   Other Topics Concern    Not on file   Social History Narrative    Not on file         ALLERGIES: Adhesive tape-silicones; Novocain [procaine]; Atenolol; Codeine; Cymbalta [duloxetine]; Digoxin; Gluten; Macrobid [nitrofurantoin monohyd/m-cryst]; Nsaids (non-steroidal anti-inflammatory drug); Phenergan [promethazine]; and Sulfa (sulfonamide antibiotics)    Review of Systems   Constitutional: Negative for chills, diaphoresis and fever. HENT: Negative for congestion and trouble swallowing. Eyes: Negative for photophobia and visual disturbance. Respiratory: Negative for cough, chest tightness and shortness of breath. Cardiovascular: Positive for leg swelling (no changes). Negative for chest pain and palpitations. Gastrointestinal: Negative for abdominal pain, diarrhea, nausea and vomiting. Genitourinary: Negative for difficulty urinating, dysuria, flank pain and frequency. Musculoskeletal: Positive for myalgias (leg pain with the R > L.). Negative for back pain. Skin: Negative for rash and wound. Neurological: Negative for dizziness, weakness, light-headedness and headaches. Hematological: Negative for adenopathy. Does not bruise/bleed easily. Psychiatric/Behavioral: Negative for agitation and confusion. Vitals:    10/10/19 1519   BP: 144/67   Pulse: 61   Resp: 18   Temp: 97.9 °F (36.6 °C)   SpO2: 95%   Weight: 61.7 kg (136 lb)   Height: 5' (1.524 m)            Physical Exam   Constitutional: She appears well-developed and well-nourished. No distress. HENT:   Head: Normocephalic. Mouth/Throat: Oropharynx is clear and moist.   Eyes: Pupils are equal, round, and reactive to light. Conjunctivae and EOM are normal.   Neck: Normal range of motion. Neck supple. No JVD present. Cardiovascular: Normal rate, regular rhythm, normal heart sounds and intact distal pulses. Pulmonary/Chest: Effort normal and breath sounds normal.   Abdominal: Soft. Bowel sounds are normal. She exhibits no distension. There is no tenderness. Musculoskeletal: She exhibits no deformity. Bilateral LE swelling with superficial wounds no more than stage 1 without any active bleeding. Tenderness to lower extremities with the L > R. Unable to assess gait. Lymphadenopathy:     She has no cervical adenopathy. Neurological: She is alert. No cranial nerve deficit or sensory deficit. Slightly confused. Skin: Skin is warm and dry. Capillary refill takes less than 2 seconds. No rash noted. She is not diaphoretic. No erythema. Psychiatric: She has a normal mood and affect. Nursing note and vitals reviewed.      Note written by mike Bear, as dictated by Roman Bradshaw MD 3:40 PM    MDM       Procedures      PROGRESS NOTE:  5:46 PM  Case management spoke with Noe Woodruff, pt's son, who stated that at [facility] and at the time of discharge pt was able to walk 10-20 steps. Since being home, pt has not been able to walk. Pt has some help at the house, but unsure of how often. CONSULT NOTE:  5:48 PM Roman Bradshaw MD spoke with Bridget Melchor for Case Management. Will test pt's ambulation with a walker to see how she is able to manage. Will determine from there whether or not she is able to go home or not. PROGRESS NOTE:  7:21 PM  Spoke with the patient son. Left his phone number [phone]. Stated if she were to be discharged, he would need medical transport to move her into the house. 8:00 PM  Case management has seen pt and she is unable to ambulate. They are hoping she has days left at [place] but are unable to check tonight. Pt is a 2 person assist and does not have this at home thus will advocate for admission. Hospitalist Pooja for Admission Dr. Soto Hughes  9:06 PM    ED Room Number: ER06/06  Patient Name and age:  Bridger Steve 80 y.o.  female  Working Diagnosis:   1. Weakness    2. Arthralgia of both lower legs      Readmission: no  Isolation Requirements:  no  Recommended Level of Care:  med/surg  Code Status:  Full Code  Department:[facility] Adult ED - 21   Other:  Case management has seen pt and she is unable to ambulate. They are hoping she has days left at [place] but are unable to check tonight. Pt is a 2 person assist and does not have this at home thus will advocate for admission.        Mo Evans MD

## 2019-10-10 NOTE — PROGRESS NOTES
Date of previous inpatient admission/ ED visit? 8/14/19-8/26/19  Inpatient Admission  (Sepsis)  What brought the patient back to ED? Patient presents to the ED w/ chief compliant for fall evaluation    Did patient decline recommended services during last admission/ ED visit (if yes, what)? No. Patient admitted to [facility] 8/26/19-10/9/19    Has patient seen a provider since their last inpatient admission/ED visit (if yes, when)? Yes. Seen by Rehab Physician during Admission    CM Interventions:  From previous inpatient admission/ED visit: Assessment  From current inpatient admission/ED visit: Assessment    SSED/CM consult received and appreciated. EMR reviewed. Case discussed w/ Katharine Nyhan. Patient and son live together. [facility]/PT/OT and private aide assists w/ care. DME includes RW, BSC, and w/c. Patient was not in room at time of CM visit. This writer will f/u post ED visit regarding additional resources for patient/caregiver. Care Management Interventions  PCP Verified by CM: Yes  Last Visit to PCP: 07/15/19  Palliative Care Criteria Met (RRAT>21 & CHF Dx)?: No  Transition of Care Consult (CM Consult): Discharge Planning  MyChart Signup: No  Discharge Durable Medical Equipment: No  Health Maintenance Reviewed: Yes  Physical Therapy Consult: Yes  Occupational Therapy Consult: No  Speech Therapy Consult: No  Current Support Network: Own Home, Lives with Caregiver, Has Personal Caregivers  Plan discussed with Pt/Family/Caregiver: No  Discharge Location  Discharge Placement: (TBD)      Sher Hess is insurance provider. AMD and DDNR on file.

## 2019-10-10 NOTE — PROGRESS NOTES
Received call from [facility]rapist, Queta Forde. Noted recent discharge from [address] [place] [address] 10/7/19  to sons home. Son, indicated she has not been able to walk since returning home and was max assist of 2 to carry her into home. Open to [facility]. Discussed with Dr. Sharda Villalta, unable to get authorization for [facility], Mr. Roopa Laird verified she has used 45 days. Discussed with him the importance of coming up with back up plan. He states she does not qualify for Medicaid, discussed additional help in the home. Call made to [facility], transferred to Nursing Supervisor. She was not in office, unable to reach anyone in admission. Sent referral to [address] to [facility], will leave hand off for AM Care Management.       Amari Florez, RN, BSN, [facility]  ED Care Management  [phone]

## 2019-10-10 NOTE — PROGRESS NOTES
FUNCTIONAL STATUS PRIOR TO ADMISSION: Discharged from SNF rehab 10/7/19 where she was ambulatory a short distance with a walker and staff assist.  Bed bound since returning home. HOME SUPPORT PRIOR TO ADMISSION: The patient lived with her son who provided assistance. Has part time caregiver for bathing. Open to home health for PT, OT. Nsg.    Physical Therapy Goals  Initiated 10/11/2019  1. Patient will move from supine to sit and sit to supine , scoot up and down and roll side to side in bed with minimal assistance/contact guard assist within 7 day(s). 2.  Patient will transfer from bed to chair and chair to bed with minimal assistance/contact guard assist using the least restrictive device within 7 day(s). 3.  Patient will perform sit to stand with minimal assistance/contact guard assist within 7 day(s). 4.  Patient will ambulate with minimal assistance/contact guard assist for 5 feet with the least restrictive device within 7 day(s). PHYSICAL THERAPY EMERGENCY DEPARTMENT EVALUATION with ADMISSION    Patient: Romina Munoz (47 y.o. female)  Date: 10/10/2019  Primary Diagnosis: No admission diagnoses are documented for this encounter. Precautions: fall       ASSESSMENT  Based on the objective data described below, the patient presents with chronic pain which is increased after a fall in her home. Her son was assisting her from the bed to the [facility] when her legs gave out. Xray negative for acute abnormality. Patient returned home from [facility] 10/7/19. Per her son's report, while in rehab, she was able to ambulate 15-20 steps with the walker and minimum assistance of staff. She has not walked since returning home, was max assist of her son and grandson to carry her into the home, has not been out of bed until today's attempt to transfer to the [facility].   She is open to [facility] for PT, OT, Nsg.  Per chart review and her son's report, patient has a history of chronic pain limiting her mobility though at one point was able to walk short distances with the walker. On assessment today, patient demonstrates the ability to move extremities with pain limiting her ability to raise her left leg against gravity and perform ankle dorsiflexion. She had poor tolerance to any LLE movement, required moderate assistance for supine<->sit, and unable bear weight though her left foot in sitting therefore unable to attempt standing or transfers. Conferred with care management to determine if there are placement options for this patient verus returning home with her son due to the level of assistance she requires. Discussed with MD.  Plan is for admission. Therapy will follow. Functional Outcome Measure: The patient scored 10/100 on the Barthel outcome measure which is indicative of 90% impaired ability to care for basic self needs/dependency on others; inferred 90% dependency on others for instrumental ADLs. Other factors to consider for discharge: steps to enter the home; lives with son who may not be able to care for her at her current functional level; has part time private duty caregiver who assists with bathing, [facility],OT,Nsg, recently discharged from rehab          PLAN :  Pending admission, therapy will see patient 5x/ wk for bed mobility training, transfer training, strengthening, gait training, and patient/ family education. Recommendation for discharge: (in order for the patient to meet his/her long term goals)  Therapy up to 5 days/week in SNF setting or intensive home health therapy program    This discharge recommendation:  Has been made in collaboration with the attending provider and/or case management    Equipment recommendations for successful discharge: none anticipated     SUBJECTIVE:   Patient stated You are going to have to wrap me first. referring to her left leg bruising.      OBJECTIVE DATA SUMMARY:   HISTORY:    Past Medical History:   Diagnosis Date    Arrhythmia     A-FIB    Arthritis     CAD (coronary artery disease) 1989    MI    Congestive heart failure (CHF) (HCC)     GERD (gastroesophageal reflux disease)     Gluten intolerance     Heart failure (HCC)     CHF    Polymyalgia [facility])      Past Surgical History:   Procedure Laterality Date    CARDIAC SURG PROCEDURE UNLIST  1980'S    CARDIAC CATH    HX BACK SURGERY  1998, 2006, 2013    LUMBAR SPINE    HX CHOLECYSTECTOMY      [place] Right 5/[place]    [place]    HX TONSILLECTOMY  CHILD    HX UROLOGICAL      BLADDER SUSPENSION - MESH     Prior Level of Function/Home Situation:  Lives with her son. Discharged from [facility] 10/7/19. Reported as having the ability to walk 15-20 steps with a rolling walker and Min A of therapy staff while in rehab. Patient, however, reports she did not walk while at [facility]. Per patient's son, she was max assist of two to carry her up the steps to get into the home and has been in the bed since (except today when [facility] transfer was attempted). She has an shower girl for bathing. Patient endorses having disagreements with her son during the transfer today and that she is 80years old and needs extra time for tasks. Home Situation  Home Environment: Private residence  # Steps to Enter: 3  One/Two Story Residence: Two story, lives on 1st floor  Lift Chair Available: No  Living Alone: No  Support Systems: Family member(s)  Patient Expects to be Discharged to: Private residence  Current DME Used/Available at Home: Hospital bed, Walker, rolling, Tub transfer bench, Wheelchair  Tub or Shower Type: Tub/Shower combination    EXAMINATION/PRESENTATION/DECISION MAKING:   Critical Behavior:  Neurologic State: Alert  Orientation Level: Oriented to person, Oriented to place, Oriented to situation  Cognition: Follows commands  Safety/Judgement: Awareness of environment  Hearing:   Auditory  Auditory Impairment: None    Range Of Motion:  AROM: Generally decreased, functional (limits left ankle and knee ROM d/t pain). With PROM f/b KITA, her AROM did improve though not WNL. Strength:    Strength: Generally decreased, functional   LLE limited by pain. Unable to perform SLR. Tone & Sensation:   Tone: Normal              Sensation: Intact               Coordination:  Coordination: Within functional limits    Functional Mobility:  Bed Mobility:  Supine to sit: moderate assistance, assist x1 for LLE management and trunk upright. Sit to supine: moderate assistance, assist x1 for BLE onto bed. Scooting: Min A (sitting); Max A x2 (supine)           Transfers:   Patient unable to attempt sit to stand due to left ankle and leg pain. Balance:   Sitting, unsupported: good    Ambulation/Gait Training:   No ability today                            Special Tests:  Barthel Index:    Bathin  Bladder: 0  Bowels: 0  Groomin  Dressin  Feedin  Mobility: 0  Stairs: 0  Toilet Use: 0  Transfer (Bed to Chair and Back): 5  Total: 10/100       The Barthel ADL Index: Guidelines  1. The index should be used as a record of what a patient does, not as a record of what a patient could do. 2. The main aim is to establish degree of independence from any help, physical or verbal, however minor and for whatever reason. 3. The need for supervision renders the patient not independent. 4. A patient's performance should be established using the best available evidence. Asking the patient, friends/relatives and nurses are the usual sources, but direct observation and common sense are also important. However direct testing is not needed. 5. Usually the patient's performance over the preceding 24-48 hours is important, but occasionally longer periods will be relevant. 6. Middle categories imply that the patient supplies over 50 per cent of the effort.   7. Use of aids to be independent is allowed. Nyla Necessary., Barthel, D.W. (7639). Functional evaluation: the Barthel Index. [address] (14)2. GIANNA Conteh, Christin Dutta., Maya Robles., Sabine Santana, [address] (1999). Measuring the change indisability after inpatient rehabilitation; comparison of the responsiveness of the Barthel Index and Functional [place] Measure. Journal of Neurology, Neurosurgery, and Psychiatry, 66(4), 872-305. MATTHEW Ware, MEKHI Nascimento, & Dary Enriquez M.A. (2004.) Assessment of post-stroke quality of life in cost-effectiveness studies: The usefulness of the Barthel Index and the EuroQoL-5D. Quality of Life Research, 15, 490-16          Physical Therapy Evaluation Charge Determination   History Examination Presentation Decision-Making   MEDIUM  Complexity : 1-2 comorbidities / personal factors will impact the outcome/ POC  LOW Complexity : 1-2 Standardized tests and measures addressing body structure, function, activity limitation and / or participation in recreation  LOW Complexity : Stable, uncomplicated  LOW Complexity : FOTO score of       Based on the above components, the patient evaluation is determined to be of the following complexity level: LOW       Pain:  Pain Scale 1: Numeric (0 - 10)  Pain Intensity 1: 7  Increases with LLE movement and palpation of lateral ankle and leg. Activity Tolerance:   Poor (pain)  Please refer to the flowsheet for vital signs taken during this treatment. After treatment patient left in no apparent distress:   Supine in bed, call bell within reach, bed rails x2 (in ED), nursing arrived into the room. COMMUNICATION/EDUCATION:   Communication/Collaboration:  Fall prevention education was provided and the patient/caregiver indicated understanding., Patient/family have participated as able in goal setting and plan of care. and Patient/family agree to work toward stated goals and plan of care.     Findings and recommendations were discussed with: MD, RN & Care Management    Thank you for this referral.  Casey So, PT   Time Calculation: 10 mins and 20 mins

## 2019-10-11 LAB
ALBUMIN SERPL-MCNC: 2.8 G/DL (ref 3.5–5)
ALBUMIN/GLOB SERPL: 1 {RATIO} (ref 1.1–2.2)
ALP SERPL-CCNC: 103 U/L (ref 45–117)
ALT SERPL-CCNC: 26 U/L (ref 12–78)
ANION GAP SERPL CALC-SCNC: 3 MMOL/L (ref 5–15)
AST SERPL-CCNC: 22 U/L (ref 15–37)
BASOPHILS # BLD: 0.1 K/UL (ref 0–0.1)
BASOPHILS NFR BLD: 1 % (ref 0–1)
BILIRUB SERPL-MCNC: 0.5 MG/DL (ref 0.2–1)
BUN SERPL-MCNC: 14 MG/DL (ref 6–20)
BUN/CREAT SERPL: 21 (ref 12–20)
CALCIUM SERPL-MCNC: 9.1 MG/DL (ref 8.5–10.1)
CHLORIDE SERPL-SCNC: 106 MMOL/L (ref 97–108)
CO2 SERPL-SCNC: 36 MMOL/L (ref 21–32)
CREAT SERPL-MCNC: 0.68 MG/DL (ref 0.55–1.02)
DIFFERENTIAL METHOD BLD: ABNORMAL
EOSINOPHIL # BLD: 0.2 K/UL (ref 0–0.4)
EOSINOPHIL NFR BLD: 3 % (ref 0–7)
ERYTHROCYTE [DISTWIDTH] IN BLOOD BY AUTOMATED COUNT: 13.6 % (ref 11.5–14.5)
GLOBULIN SER CALC-MCNC: 2.8 G/DL (ref 2–4)
GLUCOSE BLD STRIP.AUTO-MCNC: 146 MG/DL (ref 65–100)
GLUCOSE BLD STRIP.AUTO-MCNC: 191 MG/DL (ref 65–100)
GLUCOSE BLD STRIP.AUTO-MCNC: 196 MG/DL (ref 65–100)
GLUCOSE BLD STRIP.AUTO-MCNC: 98 MG/DL (ref 65–100)
GLUCOSE SERPL-MCNC: 109 MG/DL (ref 65–100)
HCT VFR BLD AUTO: 36.6 % (ref 35–47)
HGB BLD-MCNC: 10.7 G/DL (ref 11.5–16)
IMM GRANULOCYTES # BLD AUTO: 0 K/UL (ref 0–0.04)
IMM GRANULOCYTES NFR BLD AUTO: 0 % (ref 0–0.5)
LYMPHOCYTES # BLD: 1 K/UL (ref 0.8–3.5)
LYMPHOCYTES NFR BLD: 18 % (ref 12–49)
MAGNESIUM SERPL-MCNC: 1.7 MG/DL (ref 1.6–2.4)
MCH RBC QN AUTO: 32.1 PG (ref 26–34)
MCHC RBC AUTO-ENTMCNC: 29.2 G/DL (ref 30–36.5)
MCV RBC AUTO: 109.9 FL (ref 80–99)
MONOCYTES # BLD: 0.7 K/UL (ref 0–1)
MONOCYTES NFR BLD: 12 % (ref 5–13)
NEUTS SEG # BLD: 3.7 K/UL (ref 1.8–8)
NEUTS SEG NFR BLD: 66 % (ref 32–75)
NRBC # BLD: 0 K/UL (ref 0–0.01)
NRBC BLD-RTO: 0 PER 100 WBC
PHOSPHATE SERPL-MCNC: 3.2 MG/DL (ref 2.6–4.7)
PLATELET # BLD AUTO: 128 K/UL (ref 150–400)
PMV BLD AUTO: 10.2 FL (ref 8.9–12.9)
POTASSIUM SERPL-SCNC: 3.8 MMOL/L (ref 3.5–5.1)
PROT SERPL-MCNC: 5.6 G/DL (ref 6.4–8.2)
RBC # BLD AUTO: 3.33 M/UL (ref 3.8–5.2)
RBC MORPH BLD: ABNORMAL
SERVICE CMNT-IMP: ABNORMAL
SERVICE CMNT-IMP: NORMAL
SODIUM SERPL-SCNC: 145 MMOL/L (ref 136–145)
WBC # BLD AUTO: 5.7 K/UL (ref 3.6–11)

## 2019-10-11 PROCEDURE — 82962 GLUCOSE BLOOD TEST: CPT

## 2019-10-11 PROCEDURE — 80053 COMPREHEN METABOLIC PANEL: CPT

## 2019-10-11 PROCEDURE — 97530 THERAPEUTIC ACTIVITIES: CPT

## 2019-10-11 PROCEDURE — 74011250636 HC RX REV CODE- 250/636: Performed by: HOSPITALIST

## 2019-10-11 PROCEDURE — 74011636637 HC RX REV CODE- 636/637: Performed by: HOSPITALIST

## 2019-10-11 PROCEDURE — 74011250637 HC RX REV CODE- 250/637: Performed by: INTERNAL MEDICINE

## 2019-10-11 PROCEDURE — 74011250637 HC RX REV CODE- 250/637: Performed by: HOSPITALIST

## 2019-10-11 PROCEDURE — 96366 THER/PROPH/DIAG IV INF ADDON: CPT

## 2019-10-11 PROCEDURE — 99218 HC RM OBSERVATION: CPT

## 2019-10-11 PROCEDURE — 96374 THER/PROPH/DIAG INJ IV PUSH: CPT

## 2019-10-11 PROCEDURE — 74011000258 HC RX REV CODE- 258: Performed by: HOSPITALIST

## 2019-10-11 PROCEDURE — 84100 ASSAY OF PHOSPHORUS: CPT

## 2019-10-11 PROCEDURE — 36415 COLL VENOUS BLD VENIPUNCTURE: CPT

## 2019-10-11 PROCEDURE — 85025 COMPLETE CBC W/AUTO DIFF WBC: CPT

## 2019-10-11 PROCEDURE — 83735 ASSAY OF MAGNESIUM: CPT

## 2019-10-11 PROCEDURE — 94760 N-INVAS EAR/PLS OXIMETRY 1: CPT

## 2019-10-11 RX ORDER — BALSAM PERU/CASTOR OIL
OINTMENT (GRAM) TOPICAL EVERY 8 HOURS
Status: DISCONTINUED | OUTPATIENT
Start: 2019-10-11 | End: 2019-10-15 | Stop reason: HOSPADM

## 2019-10-11 RX ORDER — FENTANYL 25 UG/1
1 PATCH TRANSDERMAL
Status: DISCONTINUED | OUTPATIENT
Start: 2019-10-11 | End: 2019-10-15 | Stop reason: HOSPADM

## 2019-10-11 RX ADMIN — SENNOSIDES, DOCUSATE SODIUM 1 TABLET: 50; 8.6 TABLET, FILM COATED ORAL at 09:06

## 2019-10-11 RX ADMIN — PANTOPRAZOLE SODIUM 40 MG: 40 TABLET, DELAYED RELEASE ORAL at 07:05

## 2019-10-11 RX ADMIN — TRAMADOL HYDROCHLORIDE 50 MG: 50 TABLET ORAL at 13:07

## 2019-10-11 RX ADMIN — METOPROLOL TARTRATE 75 MG: 50 TABLET ORAL at 18:50

## 2019-10-11 RX ADMIN — SENNOSIDES, DOCUSATE SODIUM 1 TABLET: 50; 8.6 TABLET, FILM COATED ORAL at 18:50

## 2019-10-11 RX ADMIN — ACETAMINOPHEN 650 MG: 325 TABLET, FILM COATED ORAL at 07:05

## 2019-10-11 RX ADMIN — GABAPENTIN 300 MG: 300 CAPSULE ORAL at 18:50

## 2019-10-11 RX ADMIN — GABAPENTIN 300 MG: 300 CAPSULE ORAL at 09:06

## 2019-10-11 RX ADMIN — Medication 10 ML: at 07:17

## 2019-10-11 RX ADMIN — ACETAMINOPHEN 650 MG: 325 TABLET, FILM COATED ORAL at 13:07

## 2019-10-11 RX ADMIN — Medication 10 ML: at 21:31

## 2019-10-11 RX ADMIN — GABAPENTIN 300 MG: 300 CAPSULE ORAL at 13:07

## 2019-10-11 RX ADMIN — MIRTAZAPINE 15 MG: 15 TABLET, FILM COATED ORAL at 21:30

## 2019-10-11 RX ADMIN — ASPIRIN 81 MG CHEWABLE TABLET 81 MG: 81 TABLET CHEWABLE at 09:06

## 2019-10-11 RX ADMIN — ATORVASTATIN CALCIUM 40 MG: 40 TABLET, FILM COATED ORAL at 09:06

## 2019-10-11 RX ADMIN — GABAPENTIN 300 MG: 300 CAPSULE ORAL at 21:30

## 2019-10-11 RX ADMIN — CEFAZOLIN 1 G: 1 INJECTION, POWDER, FOR SOLUTION INTRAMUSCULAR; INTRAVENOUS at 07:05

## 2019-10-11 RX ADMIN — CASTOR OIL AND BALSAM, PERU: 788; 87 OINTMENT TOPICAL at 14:00

## 2019-10-11 RX ADMIN — TRAMADOL HYDROCHLORIDE 50 MG: 50 TABLET ORAL at 21:30

## 2019-10-11 RX ADMIN — TRAMADOL HYDROCHLORIDE 50 MG: 50 TABLET ORAL at 07:05

## 2019-10-11 RX ADMIN — FUROSEMIDE 20 MG: 10 INJECTION, SOLUTION INTRAMUSCULAR; INTRAVENOUS at 07:12

## 2019-10-11 RX ADMIN — TOPIRAMATE 25 MG: 25 TABLET, FILM COATED ORAL at 07:06

## 2019-10-11 RX ADMIN — CASTOR OIL AND BALSAM, PERU: 788; 87 OINTMENT TOPICAL at 22:00

## 2019-10-11 RX ADMIN — ACETAMINOPHEN 650 MG: 325 TABLET, FILM COATED ORAL at 21:30

## 2019-10-11 RX ADMIN — METOPROLOL TARTRATE 75 MG: 50 TABLET ORAL at 09:06

## 2019-10-11 RX ADMIN — Medication 10 ML: at 11:25

## 2019-10-11 RX ADMIN — CEFAZOLIN 1 G: 1 INJECTION, POWDER, FOR SOLUTION INTRAMUSCULAR; INTRAVENOUS at 14:47

## 2019-10-11 RX ADMIN — PREDNISONE 10 MG: 10 TABLET ORAL at 09:06

## 2019-10-11 NOTE — PROGRESS NOTES
Occupational Therapy    Acknowledge OT order, completed chart review and discussed patient with nursing. Nursing requesting to hold for OT services as patient with severe pain following wound care. Nursing to discuss pain mgmt with MD prior to initiating OT eval.  Will continue to follow.      Thank you,    Elisa Fletcher, OT

## 2019-10-11 NOTE — PROGRESS NOTES
Bedside shift change report given to Caretha Councilman (oncoming nurse) by Isabel ALMEIDA (offgoing nurse). Report included the following information SBAR and Kardex.

## 2019-10-11 NOTE — PROGRESS NOTES
Call placed to [facility] spoke w/ [facility] facility will need LTC plan for patient at this time no authorization request placed. CM to communicate w/ MPOA/Son regarding plan. 1310 Call placed to  VM left informing of above and requesting call back (outcome pending)    1325 Call received from  states wants patient to be able to receive rehab at SNF and be able walk to her bathroom at home which is the plan. If unable to mobilize will need have LTC at facility. Son states will need to receive resources from [address]. Son is en route to [place] and will meet w/ Admissions to discuss processes and finances. 1330 Updates  Provided to [facility]. Informed if authorization requested today will not have decision until Monday. [facility] cho/ Dr. Tommy Capone informed Jae will need to submit authorization request. MD to contact facility.

## 2019-10-11 NOTE — PROGRESS NOTES
2230: patient arrived to unit 2245: MD paged regarding oxygen saturation of 84-86% on 4L nasal cannula.  Awaiting return call from MD

## 2019-10-11 NOTE — H&P
History & Physical    Primary Care Provider: Feliberto Birmingham NP  Source of Information: Patient     History of Presenting Illness:   Lenora Darden is a 80 y.o. female who presents with severe bilat lower leg pain     80 y.o. female with past medical history significant for CAD, CHF, a-fib, polymyalgia, GERD, gluten intolerance, and arthritis who presents from home via EMS with chief complaint of bilateral leg pain. Pt was recently discharged from [facility] rehab 10/7 and is currently living with her son. Per patient. She developed bilateral leg pain, more severe in left legyesterday. Pt's son states he tried to walk her yesterday and she had an \"assited fall\" to the ground. Pt affirms having pain in the LE's bilaterally for \"quite some time. \" but the severe constant pain feeling in her left leg was new since yesterday. Pt localizes the pain to occur below the knee down to the ankle. Denied any knee pain. Pt states that her LE swelling had not increased lately. The patient denies any fever, chills, chest pain, cough, congestion, recent illness, palpitations, or dysuria. Review of Systems:  General: hpi, no changes of weight or sleep  HEENT: no headache, no vision changes, no nose discharge, no hearing changes   RES: no wheezing, no cough, no sob  CVS: no cp, no palpitation.   Muscular: HPI   Skin: no rash, no itching   GI: no vomiting, no diarrhea  : no dysuria, no hematuria  Hemo: no gum bleeding, no petechial   Neuro: no sensation changes, no focal weakness   Endo: no polydipsia   Psych: denied depression     Past Medical History:   Diagnosis Date    Arrhythmia     A-FIB    Arthritis     CAD (coronary artery disease) 1989    MI    Congestive heart failure (CHF) (HCC)     GERD (gastroesophageal reflux disease)     Gluten intolerance     Heart failure (HCC)     CHF    Polymyalgia (HCC)       Past Surgical History:   Procedure Laterality Date    CARDIAC SURG PROCEDURE UNLIST  1980'S    CARDIAC CATH    HX BACK SURGERY  1998, 2006, 2013    LUMBAR SPINE    HX CHOLECYSTECTOMY      Judith HIP REPLACEMENT Right 5/2015    Pinky Luis HX TONSILLECTOMY  CHILD    HX UROLOGICAL      BLADDER SUSPENSION - MESH     Prior to Admission medications    Medication Sig Start Date End Date Taking? Authorizing Provider   traMADol (ULTRAM) 50 mg tablet Take 1 Tab by mouth two (2) times daily as needed for Pain for up to 30 days. Max Daily Amount: 100 mg. 10/8/19 11/7/19  Israel Melgoza NP   mirtazapine (REMERON) 15 mg tablet Take 1 Tab by mouth nightly. Indications: major depressive disorder 8/26/19   Miki Ward MD   topiramate (TOPAMAX) 25 mg tablet Take 1 Tab by mouth daily (with breakfast). 8/26/19   Miki Ward MD   gabapentin (NEURONTIN) 300 mg capsule Take 2 Caps by mouth three (3) times daily. Patient taking differently: Take 600 mg by mouth two (2) times a day. 8/26/19   Miki Ward MD   acetaminophen (TYLENOL) 325 mg tablet Take 2 Tabs by mouth every four (4) hours as needed for Pain or Fever (headache). 8/26/19   Miki Ward MD   metoprolol tartrate (LOPRESSOR) 50 mg tablet Take 1.5 Tabs by mouth two (2) times a day. 7/17/19   Israel Melgoza NP   atorvastatin (LIPITOR) 40 mg tablet Take 40 mg by mouth daily. Provider, Historical   ondansetron (ZOFRAN ODT) 8 mg disintegrating tablet Take 1 Tab by mouth Before breakfast, lunch, and dinner. 6/5/19   Israel Melgoza NP   predniSONE (DELTASONE) 10 mg tablet Take 10 mg by mouth daily. 12/7/18   Israel Melgoza NP   aspirin 81 mg chewable tablet Take 1 Tab by mouth daily. 10/15/18   Roslyn Nixon MD   polyethylene glycol (MIRALAX) 17 gram/dose powder Take 17 g by mouth daily as needed. Provider, Historical   senna-docusate (SENNA PLUS) 8.6-50 mg per tablet Take 1 Tab by mouth two (2) times a day.     Provider, Historical   nitroglycerin (NITROSTAT) 0.4 mg SL tablet 1 Tab by SubLINGual route every five (5) minutes as needed for Chest Pain. 6/7/17   Tiffany Jordan MD   esomeprazole (NEXIUM) 40 mg capsule Take 40 mg by mouth Daily (before breakfast). Provider, Historical     Allergies   Allergen Reactions    Adhesive Tape-Silicones Other (comments)     BLISTERS    Novocain [Procaine] Other (comments)     Paralysis     Atenolol Nausea and Vomiting    Codeine Other (comments)     Unable to swallow    Cymbalta [Duloxetine] Other (comments)     CONFUSION      Digoxin Nausea and Vomiting    Gluten Other (comments)     GLUTEN INTOLERANCE    Macrobid [Nitrofurantoin Monohyd/M-Cryst] Nausea and Vomiting    Nsaids (Non-Steroidal Anti-Inflammatory Drug) Nausea and Vomiting    Phenergan [Promethazine] Other (comments)     \"loose my wits i go crazy\"    Sulfa (Sulfonamide Antibiotics) Nausea and Vomiting      Family History   Problem Relation Age of Onset    Other Mother         Intestinal obstruction    Cancer Father     Stroke Father     Heart Attack Brother     Cancer Brother     Cancer Brother     Anesth Problems Neg Hx         SOCIAL HISTORY:  Patient resides:  Independently    Assisted Living    SNF    With family care x      Smoking history:   None x   Former    Chronic      Alcohol history:   None x   Social    Chronic      Ambulates:   Independently    w/cane    w/walker x   w/wc x   CODE STATUS: DDNR   DNR x   Full    Other      Objective:     Physical Exam:     Visit Vitals  BP (P) 126/51 (BP 1 Location: Right arm, BP Patient Position: Supine)   Pulse (P) 63   Temp 97.9 °F (36.6 °C)   Resp 18   Ht 5' (1.524 m)   Wt 61.7 kg (136 lb)   SpO2 (P) 98%   BMI 26.56 kg/m²    O2 Flow Rate (L/min): (P) 2 l/min O2 Device: (P) Nasal cannula    General:  Alert, cooperative, no distress, appears stated age. Head:  Normocephalic, without obvious abnormality, atraumatic. Eyes:  Conjunctivae/corneas clear. PERRL, EOMs intact. Nose: Nares normal. Septum midline.  Mucosa normal. No drainage or sinus tenderness. Throat: Lips, mucosa, and tongue normal. Teeth and gums normal.   Neck: Supple, symmetrical, trachea midline, no adenopathy, thyroid: no enlargement/tenderness/nodules, no carotid bruit and no JVD. Back:   Symmetric, no curvature. ROM normal. No CVA tenderness. Lungs:   Clear to auscultation bilaterally. Chest wall:  No tenderness or deformity. Heart:  Regular rate and rhythm, S1, S2 normal, no murmur, click, rub or gallop. Abdomen:   Soft, non-tender. Bowel sounds normal. No masses,  No organomegaly. Extremities: Some echymosis noticed both hand, no swelling in upper ext. bilat lower leg ++ edema. No tenderness on knees. Very senstive to touch bother lower leg from below knee to ankle. Some skin tears in both lower legs and mild redness involve both lower leg. Pulses: 2+ and symmetric all extremities. Skin: As above    Neurologic: CNII-XII intact. No focal weakness              Data Review:     Recent Days:  Recent Labs     10/10/19  2009   WBC 6.7   HGB 11.2*   HCT 38.5   *     Recent Labs     10/10/19  2009      K 4.5      CO2 34*   GLU 86   BUN 14   CREA 0.61   CA 9.0     No results for input(s): PH, PCO2, PO2, HCO3, FIO2 in the last 72 hours. 24 Hour Results:  Recent Results (from the past 24 hour(s))   CBC WITH AUTOMATED DIFF    Collection Time: 10/10/19  8:09 PM   Result Value Ref Range    WBC 6.7 3.6 - 11.0 K/uL    RBC 3.48 (L) 3.80 - 5.20 M/uL    HGB 11.2 (L) 11.5 - 16.0 g/dL    HCT 38.5 35.0 - 47.0 %    .6 (H) 80.0 - 99.0 FL    MCH 32.2 26.0 - 34.0 PG    MCHC 29.1 (L) 30.0 - 36.5 g/dL    RDW 13.9 11.5 - 14.5 %    PLATELET 347 (L) 971 - 400 K/uL    MPV 11.0 8.9 - 12.9 FL    NRBC 0.0 0  WBC    ABSOLUTE NRBC 0.00 0.00 - 0.01 K/uL    NEUTROPHILS 71 32 - 75 %    LYMPHOCYTES 16 12 - 49 %    MONOCYTES 11 5 - 13 %    EOSINOPHILS 1 0 - 7 %    BASOPHILS 0 0 - 1 %    IMMATURE GRANULOCYTES 0 0.0 - 0.5 %    ABS.  NEUTROPHILS 4.8 1.8 - 8.0 K/UL    ABS. LYMPHOCYTES 1.1 0.8 - 3.5 K/UL    ABS. MONOCYTES 0.7 0.0 - 1.0 K/UL    ABS. EOSINOPHILS 0.1 0.0 - 0.4 K/UL    ABS. BASOPHILS 0.0 0.0 - 0.1 K/UL    ABS. IMM. GRANS. 0.0 0.00 - 0.04 K/UL    DF AUTOMATED     METABOLIC PANEL, BASIC    Collection Time: 10/10/19  8:09 PM   Result Value Ref Range    Sodium 144 136 - 145 mmol/L    Potassium 4.5 3.5 - 5.1 mmol/L    Chloride 106 97 - 108 mmol/L    CO2 34 (H) 21 - 32 mmol/L    Anion gap 4 (L) 5 - 15 mmol/L    Glucose 86 65 - 100 mg/dL    BUN 14 6 - 20 MG/DL    Creatinine 0.61 0.55 - 1.02 MG/DL    BUN/Creatinine ratio 23 (H) 12 - 20      GFR est AA >60 >60 ml/min/1.73m2    GFR est non-AA >60 >60 ml/min/1.73m2    Calcium 9.0 8.5 - 10.1 MG/DL   SAMPLES BEING HELD    Collection Time: 10/10/19  8:09 PM   Result Value Ref Range    SAMPLES BEING HELD 1BLU,1RED     COMMENT        Add-on orders for these samples will be processed based on acceptable specimen integrity and analyte stability, which may vary by analyte. Imaging:   Xr Tib/fib Lt    Result Date: 10/10/2019  IMPRESSION: No acute fracture. Assessment:     Active Problems:    Bilateral leg pain (10/10/2019)           Plan:     1. Acute bilateral lower leg pain: L>>R. NO dvt by doppler. No acute fx by xray. ? Due to swelling, cellulitis on top of  open wounds and neuropathy pain. Pain control with ATC tyelnol. Increase ultram. Continue neurontin for neuropathy pain. Will empirically start small dose IV lasix and ancef. Hope with the decreasing the swelling and inflammation will help her pain control and participate with PT.   2. Parosysmal afib: not a candidate for [facility]. Continue asa, metoprolol   3. Thrombocytopenia: plt mild low. Hold sc heparin for dvt prophylaxis.         Signed By: Yesy Burk MD     October 10, 2019

## 2019-10-11 NOTE — PROGRESS NOTES
Problem: Mobility Impaired (Adult and Pediatric)  Goal: *Acute Goals and Plan of Care (Insert Text)  Description  FUNCTIONAL STATUS PRIOR TO ADMISSION: Discharged from SNF rehab 10/7/19 where she was ambulatory a short distance with a walker and staff assist.  Bed bound since returning home. HOME SUPPORT PRIOR TO ADMISSION: The patient lived with her son who provided assistance. Has part time caregiver for bathing. Open to home health for PT, OT. Nsg.    Physical Therapy Goals  Initiated 10/11/2019  1. Patient will move from supine to sit and sit to supine , scoot up and down and roll side to side in bed with minimal assistance/contact guard assist within 7 day(s). 2.  Patient will transfer from bed to chair and chair to bed with minimal assistance/contact guard assist using the least restrictive device within 7 day(s). 3.  Patient will perform sit to stand with minimal assistance/contact guard assist within 7 day(s). 4.  Patient will ambulate with minimal assistance/contact guard assist for 5 feet with the least restrictive device within 7 day(s). Outcome: Progressing Towards Goal   PHYSICAL THERAPY TREATMENT  Patient: Amira July (15 y.o. female)  Date: 10/11/2019  Diagnosis: Bilateral leg pain [M79.604, M79.605] <principal problem not specified>       Precautions:  fall  Chart, physical therapy assessment, plan of care and goals were reviewed. ASSESSMENT  Patient continues with skilled PT services . Today pt continues to complain of leg pain left leg > right and refused to sit edge of bed. Pt known to this therapist from 8/19 admission - at that time c/o of bilateral leg pain and \"screamed in pain\"  when moved in bed to re-position or get OOB to chair. Today, pt did allow therapist to perform AAROM bilateral legs - refused to try sitting edge of bed. Current Level of Function Impacting Discharge (mobility/balance):  Max assist for bed mobility; pt declining to attempt sitting edge of bed    Other factors to consider for discharge: Pt lives with son - son states pt needs to be able to walk from her bed to bathroom - short distance to return home         PLAN :  Patient continues to benefit from skilled intervention to address the above impairments. Continue treatment per established plan of care. to address goals. Recommendation for discharge: (in order for the patient to meet his/her long term goals)  Therapy up to 5 days/week in SNF setting    This discharge recommendation:  Has been made in collaboration with the attending provider and/or case management    Equipment recommendations for successful discharge (if) home: to be determined by rehab setting        SUBJECTIVE:   Patient stated Tuan Adkins should I sit up - what good is it going to do.     OBJECTIVE DATA SUMMARY:   Critical Behavior:  Neurologic State: Alert  Orientation Level: Oriented X4  Cognition: Follows commands  Safety/Judgement: Awareness of environment  Functional Mobility Training:  Bed Mobility:   Pt refused - pt screamed in pain if touched. Therapeutic Exercises:   Pt performed gentle AAROM/PROM of bilateral legs in supine - pt assisting with right leg; pt \"yelling out\" with gentle left leg movement\". Pain Rating:  Pt unable to rate - states \"legs hurt all the time\"    Activity Tolerance:   Poor - secondary to pain; ? Fear of falling when moved  Please refer to the flowsheet for vital signs taken during this treatment.     After treatment patient left in no apparent distress:   Supine in bed, Call bell within reach and Bed / chair alarm activated    COMMUNICATION/COLLABORATION:   The patients plan of care was discussed with: Registered Nurse    Kristal Puckett, PT   Time Calculation: 15 mins

## 2019-10-11 NOTE — PROGRESS NOTES
Admission Medication Reconciliation:    Information obtained from:  Patient provided medication list  RxQuery data available¹:  YES    Comments/Recommendations: Updated PTA meds/reviewed patient's allergies. Patient provided medication list that son made. Unable to reach son to confirm administration times, including when fentanyl patch was last applied (noted on left upper arm). Patient states she has taken all her morning medications today. Medication changes (since last review): Added  Fentanyl patch 25 mcg    Adjusted  Gabapentin to QID    Removed  Miralax  Senna-docusate    Thank you for allowing me to participate in the care of your patient. Saud Ruiz PharmD, RN #[id number]       [facility] benefit data reflects medications filled and processed through the patient's insurance, however   this data does NOT capture whether the medication was picked up or is currently being taken by the patient. Allergies:  Adhesive tape-silicones; Novocain [procaine]; Atenolol; Codeine; Cymbalta [duloxetine]; Digoxin; Gluten; Macrobid [nitrofurantoin monohyd/m-cryst]; Nsaids (non-steroidal anti-inflammatory drug); Phenergan [promethazine]; and Sulfa (sulfonamide antibiotics)    Significant PMH/Disease States:   Past Medical History:   Diagnosis Date    Arrhythmia     A-FIB    Arthritis     CAD (coronary artery disease) 1989    MI    Congestive heart failure (CHF) ([facility])     GERD (gastroesophageal reflux disease)     Gluten intolerance     Heart failure ([facility]     CHF    Polymyalgia ([facility]      Chief Complaint for this Admission:    Chief Complaint   Patient presents with    Leg Pain     Prior to Admission Medications:   Prior to Admission Medications   Prescriptions Last Dose Informant Patient Reported? Taking?   acetaminophen (TYLENOL) 325 mg tablet   No No   Sig: Take 2 Tabs by mouth every four (4) hours as needed for Pain or Fever (headache).    aspirin 81 mg chewable tablet 10/10/2019 at Unknown time  No Yes Sig: Take 1 Tab by mouth daily. atorvastatin (LIPITOR) 40 mg tablet 10/10/2019 at Unknown time  Yes Yes   Sig: Take 40 mg by mouth daily. esomeprazole (NEXIUM) 40 mg capsule 10/10/2019 at Unknown time  Yes Yes   Sig: Take 40 mg by mouth Daily (before breakfast). fentaNYL (DURAGESIC) 25 mcg/hr PATCH   Yes Yes   Si Patch by TransDERmal route every seventy-two (72) hours. gabapentin (NEURONTIN) 300 mg capsule 10/10/2019 at Unknown time  Yes Yes   Sig: Take 300 mg by mouth four (4) times daily. metoprolol tartrate (LOPRESSOR) 50 mg tablet 10/10/2019 at Unknown time  No Yes   Sig: Take 1.5 Tabs by mouth two (2) times a day. mirtazapine (REMERON) 15 mg tablet 10/9/2019 at Unknown time  No Yes   Sig: Take 1 Tab by mouth nightly. Indications: major depressive disorder   nitroglycerin (NITROSTAT) 0.4 mg SL tablet   No Yes   Si Tab by SubLINGual route every five (5) minutes as needed for Chest Pain. ondansetron (ZOFRAN ODT) 8 mg disintegrating tablet   No Yes   Sig: Take 1 Tab by mouth Before breakfast, lunch, and dinner. predniSONE (DELTASONE) 10 mg tablet 10/10/2019 at Unknown time  No Yes   Sig: Take 10 mg by mouth daily. topiramate (TOPAMAX) 25 mg tablet 10/10/2019 at Unknown time  No Yes   Sig: Take 1 Tab by mouth daily (with breakfast). traMADol (ULTRAM) 50 mg tablet 10/10/2019 at Unknown time  No Yes   Sig: Take 1 Tab by mouth two (2) times daily as needed for Pain for up to 30 days. Max Daily Amount: 100 mg. Facility-Administered Medications: None       Please contact the main inpatient pharmacy with any questions or concerns at [phone] and we will direct you to the clinical pharmacist covering this patient's care while in-house.    BOGDAN Das

## 2019-10-11 NOTE — PROGRESS NOTES
Cm spoke with [facility]. They have started insurance authorization but do not expect to hear today.   Dannial Severin Helton BSW, ACM

## 2019-10-11 NOTE — ED NOTES
TRANSFER - OUT REPORT:    Verbal report given to RN on Desiree Dills  being transferred to 510 (unit) for routine progression of care       Report consisted of patients Situation, Background, Assessment and   Recommendations(SBAR). Information from the following report(s) ED Summary was reviewed with the receiving nurse. Lines:   Peripheral IV 10/10/19 Left Hand (Active)        Opportunity for questions and clarification was provided.       Patient transported with:  Transport

## 2019-10-11 NOTE — PROGRESS NOTES
Primary Nurse Garrison Grace and Allison Anderson RN performed a dual skin assessment on this patient Impairment noted- see wound doc flow sheet  Marcelo score is 19

## 2019-10-11 NOTE — PROGRESS NOTES
Hospitalist Progress Note  [facility]  Answering service: [phone] OR [phone] from in house phone        Date of Service:  10/11/2019  NAME:  Destiny Klein  :  1925  MRN:  [id number]      Admission Summary:   80 y.o. female with past medical history significant for CAD, CHF, a-fib, polymyalgia, GERD, gluten intolerance, and arthritis who presents from home via EMS with chief complaint of bilateral leg pain. Pt was recently discharged from [facility] rehab 10/7 and is currently living with her son. Interval history / Subjective:   Patient seen and examined. Complaining of bilateral leg pain. Restarted home fentanyl patch. Per rehab facility, patient was not ready for discharge. However was discharged due to insurance. Assessment & Plan:     Acute on chronic bilateral lower leg pain:   -L>R  -pain with minimal touch to skin   -Limited mobility  -dopplers negative  -Restart home fentanyl patch  -continue other prn medications  -PT consult  -recommend discharge to SNF vs long term care. Parosysmal afib: not a candidate for AC. Continue asa, metoprolol     Thrombocytopenia: plt mild low. Hold sc heparin for dvt prophylaxis. Code status: dnr  DVT prophylaxis: holding    Care Plan discussed with: Nurse  Disposition: SNF/LTC and TBD     Hospital Problems  Date Reviewed: 10/16/2018          Codes Class Noted POA    Bilateral leg pain ICD-10-CM: M79.604, M79.605  ICD-9-CM: 729.5  10/10/2019 Unknown                Review of Systems:   Negative unless stated above      Vital Signs:    Last 24hrs VS reviewed since prior progress note.  Most recent are:  Visit Vitals  BP 98/50 (BP 1 Location: Left arm, BP Patient Position: At rest)   Pulse 67   Temp 98.4 °F (36.9 °C)   Resp 16   Ht 5' (1.524 m)   Wt 61.7 kg (136 lb)   SpO2 95%   BMI 26.56 kg/m²       No intake or output data in the 24 hours ending 10/11/19 1478     Physical Examination:             Constitutional:  No acute distress, cooperative, pleasant    ENT:  Oral mucous moist, oropharynx benign. Resp:  CTA bilaterally. No wheezing/rhonchi/rales. No accessory muscle use   CV:  Regular rhythm, normal rate, no murmurs, gallops, rubs    GI:  Soft, non distended, non tender. normoactive bowel sounds, no hepatosplenomegaly     Musculoskeletal:  bilateral edema, tender to mild palpation     Neurologic:  Moves all extremities. Alert to name only           Data Review:    Review and/or order of clinical lab test      Labs:     Recent Labs     10/11/19  0333 10/10/19  2009   WBC 5.7 6.7   HGB 10.7* 11.2*   HCT 36.6 38.5   * 118*     Recent Labs     10/11/19  0333 10/10/19  2009    144   K 3.8 4.5    106   CO2 36* 34*   BUN 14 14   CREA 0.68 0.61   * 86   CA 9.1 9.0   MG 1.7  --    PHOS 3.2  --      Recent Labs     10/11/19  0333   SGOT 22   ALT 26      TBILI 0.5   TP 5.6*   ALB 2.8*   GLOB 2.8     No results for input(s): INR, PTP, APTT, INREXT in the last 72 hours. No results for input(s): FE, TIBC, PSAT, FERR in the last 72 hours. No results found for: FOL, RBCF   No results for input(s): PH, PCO2, PO2 in the last 72 hours. No results for input(s): CPK, CKNDX, TROIQ in the last 72 hours.     No lab exists for component: CPKMB  Lab Results   Component Value Date/Time    Cholesterol, total 171 12/19/2018 02:59 AM    HDL Cholesterol 78 12/19/2018 02:59 AM    LDL, calculated 71 12/19/2018 02:59 AM    Triglyceride 111 12/19/2018 02:59 AM     Lab Results   Component Value Date/Time    Glucose (POC) 196 (H) 10/11/2019 04:21 PM    Glucose (POC) 146 (H) 10/11/2019 11:19 AM    Glucose (POC) 98 10/11/2019 06:53 AM    Glucose (POC) 191 (H) 10/12/2018 12:52 PM    Glucose (POC) 89 10/03/2018 07:30 AM     Lab Results   Component Value Date/Time    Color YELLOW/STRAW 08/25/2019 01:33 PM    Appearance CLEAR 08/25/2019 01:33 PM    Specific gravity 1.005 08/25/2019 01:33 PM    Specific gravity 1.025 10/03/2018 06:18 AM    pH (UA) 7.5 08/25/2019 01:33 PM    Protein NEGATIVE  08/25/2019 01:33 PM    Glucose NEGATIVE  08/25/2019 01:33 PM    Ketone NEGATIVE  08/25/2019 01:33 PM    Bilirubin NEGATIVE  08/25/2019 01:33 PM    Urobilinogen 0.2 08/25/2019 01:33 PM    Nitrites NEGATIVE  08/25/2019 01:33 PM    Leukocyte Esterase NEGATIVE  08/25/2019 01:33 PM    Epithelial cells FEW 08/25/2019 01:33 PM    Bacteria NEGATIVE  08/25/2019 01:33 PM    WBC 0-4 08/25/2019 01:33 PM    RBC 0-5 08/25/2019 01:33 PM         Medications Reviewed:     Current Facility-Administered Medications   Medication Dose Route Frequency    balsam peru-castor oil (VENELEX) ointment   Topical Q8H    fentaNYL (DURAGESIC) 25 mcg/hr patch 1 Patch  1 Patch TransDERmal Q72H    acetaminophen (TYLENOL) tablet 650 mg  650 mg Oral Q8H    aspirin chewable tablet 81 mg  81 mg Oral DAILY    atorvastatin (LIPITOR) tablet 40 mg  40 mg Oral DAILY    gabapentin (NEURONTIN) capsule 300 mg  300 mg Oral QID    metoprolol tartrate (LOPRESSOR) tablet 75 mg  75 mg Oral BID    mirtazapine (REMERON) tablet 15 mg  15 mg Oral QHS    nitroglycerin (NITROSTAT) tablet 0.4 mg  0.4 mg SubLINGual Q5MIN PRN    predniSONE (DELTASONE) tablet 10 mg  10 mg Oral DAILY    senna-docusate (PERICOLACE) 8.6-50 mg per tablet 1 Tab  1 Tab Oral BID    topiramate (TOPAMAX) tablet 25 mg  25 mg Oral DAILY WITH BREAKFAST    traMADol (ULTRAM) tablet 50 mg  50 mg Oral Q8H    sodium chloride (NS) flush 5-40 mL  5-40 mL IntraVENous Q8H    sodium chloride (NS) flush 5-40 mL  5-40 mL IntraVENous PRN    naloxone (NARCAN) injection 0.4 mg  0.4 mg IntraVENous PRN    furosemide (LASIX) injection 20 mg  20 mg IntraVENous DAILY WITH BREAKFAST    ceFAZolin (ANCEF) 1 g in 0.9% sodium chloride (MBP/ADV) 50 mL  1 g IntraVENous Q8H    ondansetron (ZOFRAN) injection 4 mg  4 mg IntraVENous Q4H PRN    pantoprazole (PROTONIX) tablet 40 mg  40 mg Oral ACB ______________________________________________________________________  EXPECTED LENGTH OF STAY: - - -  ACTUAL LENGTH OF STAY:          0                 Hilary Johnston DO

## 2019-10-11 NOTE — WOUND CARE
Wound Care Note:  
 
New consult placed by nurse request for bilateral lower extremity wounds Chart shows: 
Admitted for bilateral leg pain Past Medical History:  
Diagnosis Date  Arrhythmia A-FIB  Arthritis  CAD (coronary artery disease) 1989 MI  
 Congestive heart failure (CHF) ([address]  GERD (gastroesophageal reflux disease)  Gluten intolerance  Heart failure ([address] CHF  Polymyalgia ([address] WBC = 5.7 on 10/11/19 Admitted from home Assessment:  
Patient is A&O x 4, communicative, incontinent with moderate assistance needed [id number]. Bed: Total Care Patient wearing briefs for incontinence and has a Pure Dorenda Lipoma in place. Diet: Cardiac regular with nutritional supplements Patient reports pain but does not want to rate pain; staff nurse notified. Bilateral heels, buttocks, and sacral skin intact and with blanchable erythema. 2+ edema and blanching erythema to lower legs and feet. 1. POA right lateral lower leg abrasion measures 7.5 cm x 2 cm x 0.1 cm, wound bed is shiny, pink, small sero/sang drainage, beth-wound edematous and very fragile skin, wound edges are open. Xeroform gauze, gauze and roll gauze applied. 2. POA left lateral lower leg abrasion measures 2.5 cm x 2.3 cm x 0.1 cm, wound bed is pink, small sero/sang drainage, beth-wound edematous and very fragile skin, wound edges are open. This leg is VERY painful to left off bed. Xeroform gauze, gauze and roll gauze applied. Patient has pain with movement, left lower leg (foot/ankle) is very painful with lifting off the bed. Educated the patient on the importance of repositioning every 2 hours and offloading heels at all times. Spoke with Dr. Faby Morris, wound care orders obtained. Patient repositioned on right side. Heels offloaded on pillows. Recommendations:   
No tape on patient's skin Bilateral lower leg wounds- Every other day days cleanse with normal saline, wipe wound bed clean and dry, apply a piece of Xeroform gauze that has been folded in half, cover with 4 x 4's, secure with roll gauze and tape. Sacrum and bilateral heels- Every 8 hours liberally apply Venelex ointment. Specialty bed: P-500 ordered via [facility]. Use only flat sheet and one incontinence pad. Please call [facility] at [phone] if not delivered and when patient discharged. Confirmation #[id number] Skin Care & Pressure Prevention: 
Minimize layers of linen/pads under patient to optimize support surface. Turn/reposition approximately every 2 hours and offload heels. Manage incontinence / promote continence Discussed above plan with patient & Viola Hercules RN Transition of Care: Plan to follow as needed while admitted to hospital. 
 
YADI VizcarraN, RN, [facility] 
office [phone] 
pager [phone] or call  to page

## 2019-10-12 LAB
GLUCOSE BLD STRIP.AUTO-MCNC: 101 MG/DL (ref 65–100)
GLUCOSE BLD STRIP.AUTO-MCNC: 164 MG/DL (ref 65–100)
GLUCOSE BLD STRIP.AUTO-MCNC: 218 MG/DL (ref 65–100)
GLUCOSE BLD STRIP.AUTO-MCNC: 94 MG/DL (ref 65–100)
SERVICE CMNT-IMP: ABNORMAL
SERVICE CMNT-IMP: NORMAL

## 2019-10-12 PROCEDURE — 96366 THER/PROPH/DIAG IV INF ADDON: CPT

## 2019-10-12 PROCEDURE — 74011000258 HC RX REV CODE- 258: Performed by: HOSPITALIST

## 2019-10-12 PROCEDURE — 99218 HC RM OBSERVATION: CPT

## 2019-10-12 PROCEDURE — 74011250637 HC RX REV CODE- 250/637: Performed by: HOSPITALIST

## 2019-10-12 PROCEDURE — 96376 TX/PRO/DX INJ SAME DRUG ADON: CPT

## 2019-10-12 PROCEDURE — 96372 THER/PROPH/DIAG INJ SC/IM: CPT

## 2019-10-12 PROCEDURE — 74011250636 HC RX REV CODE- 250/636: Performed by: HOSPITALIST

## 2019-10-12 PROCEDURE — 74011636637 HC RX REV CODE- 636/637: Performed by: HOSPITALIST

## 2019-10-12 PROCEDURE — 74011250636 HC RX REV CODE- 250/636: Performed by: INTERNAL MEDICINE

## 2019-10-12 PROCEDURE — 82962 GLUCOSE BLOOD TEST: CPT

## 2019-10-12 RX ORDER — ENOXAPARIN SODIUM 100 MG/ML
40 INJECTION SUBCUTANEOUS EVERY 24 HOURS
Status: DISCONTINUED | OUTPATIENT
Start: 2019-10-12 | End: 2019-10-15 | Stop reason: HOSPADM

## 2019-10-12 RX ORDER — ENOXAPARIN SODIUM 100 MG/ML
30 INJECTION SUBCUTANEOUS EVERY 24 HOURS
Status: DISCONTINUED | OUTPATIENT
Start: 2019-10-12 | End: 2019-10-12

## 2019-10-12 RX ADMIN — TRAMADOL HYDROCHLORIDE 50 MG: 50 TABLET ORAL at 07:13

## 2019-10-12 RX ADMIN — CEFAZOLIN 1 G: 1 INJECTION, POWDER, FOR SOLUTION INTRAMUSCULAR; INTRAVENOUS at 14:19

## 2019-10-12 RX ADMIN — GABAPENTIN 300 MG: 300 CAPSULE ORAL at 17:25

## 2019-10-12 RX ADMIN — TOPIRAMATE 25 MG: 25 TABLET, FILM COATED ORAL at 07:13

## 2019-10-12 RX ADMIN — METOPROLOL TARTRATE 75 MG: 50 TABLET ORAL at 17:25

## 2019-10-12 RX ADMIN — GABAPENTIN 300 MG: 300 CAPSULE ORAL at 14:17

## 2019-10-12 RX ADMIN — CASTOR OIL AND BALSAM, PERU: 788; 87 OINTMENT TOPICAL at 07:21

## 2019-10-12 RX ADMIN — METOPROLOL TARTRATE 75 MG: 50 TABLET ORAL at 09:43

## 2019-10-12 RX ADMIN — PREDNISONE 10 MG: 10 TABLET ORAL at 09:43

## 2019-10-12 RX ADMIN — PANTOPRAZOLE SODIUM 40 MG: 40 TABLET, DELAYED RELEASE ORAL at 07:13

## 2019-10-12 RX ADMIN — SENNOSIDES, DOCUSATE SODIUM 1 TABLET: 50; 8.6 TABLET, FILM COATED ORAL at 17:25

## 2019-10-12 RX ADMIN — ACETAMINOPHEN 650 MG: 325 TABLET, FILM COATED ORAL at 14:18

## 2019-10-12 RX ADMIN — Medication 10 ML: at 14:00

## 2019-10-12 RX ADMIN — ACETAMINOPHEN 650 MG: 325 TABLET, FILM COATED ORAL at 07:13

## 2019-10-12 RX ADMIN — CEFAZOLIN 1 G: 1 INJECTION, POWDER, FOR SOLUTION INTRAMUSCULAR; INTRAVENOUS at 00:16

## 2019-10-12 RX ADMIN — FUROSEMIDE 20 MG: 10 INJECTION, SOLUTION INTRAMUSCULAR; INTRAVENOUS at 07:14

## 2019-10-12 RX ADMIN — CEFAZOLIN 1 G: 1 INJECTION, POWDER, FOR SOLUTION INTRAMUSCULAR; INTRAVENOUS at 07:12

## 2019-10-12 RX ADMIN — Medication 10 ML: at 07:13

## 2019-10-12 RX ADMIN — ENOXAPARIN SODIUM 40 MG: 40 INJECTION SUBCUTANEOUS at 17:25

## 2019-10-12 RX ADMIN — ATORVASTATIN CALCIUM 40 MG: 40 TABLET, FILM COATED ORAL at 09:43

## 2019-10-12 RX ADMIN — CASTOR OIL AND BALSAM, PERU: 788; 87 OINTMENT TOPICAL at 14:18

## 2019-10-12 RX ADMIN — TRAMADOL HYDROCHLORIDE 50 MG: 50 TABLET ORAL at 14:17

## 2019-10-12 RX ADMIN — GABAPENTIN 300 MG: 300 CAPSULE ORAL at 09:43

## 2019-10-12 RX ADMIN — SENNOSIDES, DOCUSATE SODIUM 1 TABLET: 50; 8.6 TABLET, FILM COATED ORAL at 09:43

## 2019-10-12 NOTE — PROGRESS NOTES
Bedside shift change report given to Rufus Hopson (oncoming nurse) by Maikol Trejo (offgoing nurse). Report included the following information SBAR.

## 2019-10-12 NOTE — PROGRESS NOTES
Hospitalist Progress Note  Selam Reed DO  Answering service: [phone] from in house phone        Date of Service:  10/12/2019  NAME:  Magalis Warner  :  1925  MRN:  [id number]      Admission Summary:   80 y.o. female with past medical history significant for CAD, CHF, a-fib, polymyalgia, GERD, gluten intolerance, and arthritis who presents from home via EMS with chief complaint of bilateral leg pain. Pt was recently discharged from [facility] 10/7 and is currently living with her son. Interval history / Subjective:   Patient seen and examined. Appears more comfortable today. Engaging in conversation. Fentanyl patch restarted. Continues to have leg pain, less severe on exam.      Assessment & Plan:     Acute on chronic bilateral lower leg pain:   -L>R  -pain with minimal touch to skin, improving  -Limited mobility, per recent SNF, not ready for d/c but insurance declined further treatment  -Restarted home fentanyl patch with better pain control   -dopplers negative  -PT consult  -recommend discharge to SNF vs long term care. Parosysmal afib: not a candidate for AC. Continue asa, metoprolol     Thrombocytopenia: plt mild low. Hold sc heparin for dvt prophylaxis. Code status: dnr  DVT prophylaxis: lovenox     Care Plan discussed with: Nurse  Disposition: SNF/LTC and TBD     Hospital Problems  Date Reviewed: 10/16/2018          Codes Class Noted POA    Bilateral leg pain ICD-10-CM: M79.604, M79.605  ICD-9-CM: 729.5  10/10/2019 Unknown                Review of Systems:   Negative unless stated above      Vital Signs:    Last 24hrs VS reviewed since prior progress note.  Most recent are:  Visit Vitals  /66   Pulse 69   Temp 97.9 °F (36.6 °C)   Resp 16   Ht 5' (1.524 m)   Wt 61.7 kg (136 lb)   SpO2 98%   BMI 26.56 kg/m²         Intake/Output Summary (Last 24 hours) at 10/12/2019 1621  Last data filed at 10/12/2019 0901  Gross per 24 hour   Intake    Output 1100 ml   Net -1100 ml        Physical Examination:             Constitutional:  No acute distress, cooperative, pleasant    ENT:  Oral mucous moist, oropharynx benign. Resp:  CTA bilaterally. No wheezing/rhonchi/rales. No accessory muscle use   CV:  Regular rhythm, normal rate, no murmurs, gallops, rubs    GI:  Soft, non distended, non tender. normoactive bowel sounds, no hepatosplenomegaly     Musculoskeletal:  bilateral edema, tender to mild palpation     Neurologic:  Moves all extremities. Alert to name only           Data Review:    Review and/or order of clinical lab test      Labs:     Recent Labs     10/11/19  0333 10/10/19  2009   WBC 5.7 6.7   HGB 10.7* 11.2*   HCT 36.6 38.5   * 118*     Recent Labs     10/11/19  0333 10/10/19  2009    144   K 3.8 4.5    106   CO2 36* 34*   BUN 14 14   CREA 0.68 0.61   * 86   CA 9.1 9.0   MG 1.7  --    PHOS 3.2  --      Recent Labs     10/11/19  0333   SGOT 22   ALT 26      TBILI 0.5   TP 5.6*   ALB 2.8*   GLOB 2.8     No results for input(s): INR, PTP, APTT, INREXT, INREXT in the last 72 hours. No results for input(s): FE, TIBC, PSAT, FERR in the last 72 hours. No results found for: FOL, RBCF   No results for input(s): PH, PCO2, PO2 in the last 72 hours. No results for input(s): CPK, CKNDX, TROIQ in the last 72 hours.     No lab exists for component: CPKMB  Lab Results   Component Value Date/Time    Cholesterol, total 171 12/19/2018 02:59 AM    HDL Cholesterol 78 12/19/2018 02:59 AM    LDL, calculated 71 12/19/2018 02:59 AM    Triglyceride 111 12/19/2018 02:59 AM     Lab Results   Component Value Date/Time    Glucose (POC) 164 (H) 10/12/2019 11:34 AM    Glucose (POC) 94 10/12/2019 05:48 AM    Glucose (POC) 191 (H) 10/11/2019 09:22 PM    Glucose (POC) 196 (H) 10/11/2019 04:21 PM    Glucose (POC) 146 (H) 10/11/2019 11:19 AM     Lab Results   Component Value Date/Time    Color YELLOW/STRAW 08/25/2019 01:33 PM    Appearance CLEAR 08/25/2019 01:33 PM    Specific gravity 1.005 08/25/2019 01:33 PM    Specific gravity 1.025 10/03/2018 06:18 AM    pH (UA) 7.5 08/25/2019 01:33 PM    Protein NEGATIVE  08/25/2019 01:33 PM    Glucose NEGATIVE  08/25/2019 01:33 PM    Ketone NEGATIVE  08/25/2019 01:33 PM    Bilirubin NEGATIVE  08/25/2019 01:33 PM    Urobilinogen 0.2 08/25/2019 01:33 PM    Nitrites NEGATIVE  08/25/2019 01:33 PM    Leukocyte Esterase NEGATIVE  08/25/2019 01:33 PM    Epithelial cells FEW 08/25/2019 01:33 PM    Bacteria NEGATIVE  08/25/2019 01:33 PM    WBC 0-4 08/25/2019 01:33 PM    RBC 0-5 08/25/2019 01:33 PM         Medications Reviewed:     Current Facility-Administered Medications   Medication Dose Route Frequency    balsam peru-castor oil (VENELEX) ointment   Topical Q8H    fentaNYL (DURAGESIC) 25 mcg/hr patch 1 Patch  1 Patch TransDERmal Q72H    acetaminophen (TYLENOL) tablet 650 mg  650 mg Oral Q8H    aspirin chewable tablet 81 mg  81 mg Oral DAILY    atorvastatin (LIPITOR) tablet 40 mg  40 mg Oral DAILY    gabapentin (NEURONTIN) capsule 300 mg  300 mg Oral QID    metoprolol tartrate (LOPRESSOR) tablet 75 mg  75 mg Oral BID    mirtazapine (REMERON) tablet 15 mg  15 mg Oral QHS    nitroglycerin (NITROSTAT) tablet 0.4 mg  0.4 mg SubLINGual Q5MIN PRN    predniSONE (DELTASONE) tablet 10 mg  10 mg Oral DAILY    senna-docusate (PERICOLACE) 8.6-50 mg per tablet 1 Tab  1 Tab Oral BID    topiramate (TOPAMAX) tablet 25 mg  25 mg Oral DAILY WITH BREAKFAST    traMADol (ULTRAM) tablet 50 mg  50 mg Oral Q8H    sodium chloride (NS) flush 5-40 mL  5-40 mL IntraVENous Q8H    sodium chloride (NS) flush 5-40 mL  5-40 mL IntraVENous PRN    naloxone (NARCAN) injection 0.4 mg  0.4 mg IntraVENous PRN    furosemide (LASIX) injection 20 mg  20 mg IntraVENous DAILY WITH BREAKFAST    ceFAZolin (ANCEF) 1 g in 0.9% sodium chloride (MBP/ADV) 50 mL  1 g IntraVENous Q8H    ondansetron (ZOFRAN) injection 4 mg  4 mg IntraVENous Q4H PRN    pantoprazole (PROTONIX) tablet 40 mg  40 mg Oral ACB     ______________________________________________________________________  EXPECTED LENGTH OF STAY: - - -  ACTUAL LENGTH OF STAY:          0                 Hilary Unice Patch, DO

## 2019-10-13 LAB
GLUCOSE BLD STRIP.AUTO-MCNC: 150 MG/DL (ref 65–100)
GLUCOSE BLD STRIP.AUTO-MCNC: 190 MG/DL (ref 65–100)
GLUCOSE BLD STRIP.AUTO-MCNC: 213 MG/DL (ref 65–100)
GLUCOSE BLD STRIP.AUTO-MCNC: 85 MG/DL (ref 65–100)
SERVICE CMNT-IMP: ABNORMAL
SERVICE CMNT-IMP: NORMAL

## 2019-10-13 PROCEDURE — 97165 OT EVAL LOW COMPLEX 30 MIN: CPT

## 2019-10-13 PROCEDURE — 99218 HC RM OBSERVATION: CPT

## 2019-10-13 PROCEDURE — 74011250637 HC RX REV CODE- 250/637: Performed by: INTERNAL MEDICINE

## 2019-10-13 PROCEDURE — 74011250636 HC RX REV CODE- 250/636: Performed by: INTERNAL MEDICINE

## 2019-10-13 PROCEDURE — 74011636637 HC RX REV CODE- 636/637: Performed by: HOSPITALIST

## 2019-10-13 PROCEDURE — 97535 SELF CARE MNGMENT TRAINING: CPT

## 2019-10-13 PROCEDURE — 77010033678 HC OXYGEN DAILY

## 2019-10-13 PROCEDURE — 74011250637 HC RX REV CODE- 250/637: Performed by: HOSPITALIST

## 2019-10-13 PROCEDURE — 97530 THERAPEUTIC ACTIVITIES: CPT

## 2019-10-13 PROCEDURE — 96372 THER/PROPH/DIAG INJ SC/IM: CPT

## 2019-10-13 PROCEDURE — 82962 GLUCOSE BLOOD TEST: CPT

## 2019-10-13 RX ADMIN — GABAPENTIN 300 MG: 300 CAPSULE ORAL at 21:03

## 2019-10-13 RX ADMIN — ASPIRIN 81 MG CHEWABLE TABLET 81 MG: 81 TABLET CHEWABLE at 10:05

## 2019-10-13 RX ADMIN — CASTOR OIL AND BALSAM, PERU: 788; 87 OINTMENT TOPICAL at 01:16

## 2019-10-13 RX ADMIN — GABAPENTIN 300 MG: 300 CAPSULE ORAL at 17:18

## 2019-10-13 RX ADMIN — ACETAMINOPHEN 650 MG: 325 TABLET, FILM COATED ORAL at 10:05

## 2019-10-13 RX ADMIN — ACETAMINOPHEN 650 MG: 325 TABLET, FILM COATED ORAL at 01:10

## 2019-10-13 RX ADMIN — Medication 10 ML: at 21:03

## 2019-10-13 RX ADMIN — PANTOPRAZOLE SODIUM 40 MG: 40 TABLET, DELAYED RELEASE ORAL at 07:23

## 2019-10-13 RX ADMIN — MIRTAZAPINE 15 MG: 15 TABLET, FILM COATED ORAL at 21:03

## 2019-10-13 RX ADMIN — GABAPENTIN 300 MG: 300 CAPSULE ORAL at 10:05

## 2019-10-13 RX ADMIN — ENOXAPARIN SODIUM 40 MG: 40 INJECTION SUBCUTANEOUS at 16:06

## 2019-10-13 RX ADMIN — ACETAMINOPHEN 650 MG: 325 TABLET, FILM COATED ORAL at 16:06

## 2019-10-13 RX ADMIN — ATORVASTATIN CALCIUM 40 MG: 40 TABLET, FILM COATED ORAL at 10:05

## 2019-10-13 RX ADMIN — METOPROLOL TARTRATE 75 MG: 50 TABLET ORAL at 10:05

## 2019-10-13 RX ADMIN — TRAMADOL HYDROCHLORIDE 50 MG: 50 TABLET ORAL at 10:05

## 2019-10-13 RX ADMIN — TOPIRAMATE 25 MG: 25 TABLET, FILM COATED ORAL at 07:23

## 2019-10-13 RX ADMIN — METOPROLOL TARTRATE 75 MG: 50 TABLET ORAL at 17:18

## 2019-10-13 RX ADMIN — Medication 10 ML: at 14:35

## 2019-10-13 RX ADMIN — CASTOR OIL AND BALSAM, PERU: 788; 87 OINTMENT TOPICAL at 09:00

## 2019-10-13 RX ADMIN — GABAPENTIN 300 MG: 300 CAPSULE ORAL at 13:30

## 2019-10-13 RX ADMIN — PREDNISONE 10 MG: 10 TABLET ORAL at 10:05

## 2019-10-13 RX ADMIN — MIRTAZAPINE 15 MG: 15 TABLET, FILM COATED ORAL at 01:12

## 2019-10-13 RX ADMIN — TRAMADOL HYDROCHLORIDE 50 MG: 50 TABLET ORAL at 01:11

## 2019-10-13 RX ADMIN — CASTOR OIL AND BALSAM, PERU: 788; 87 OINTMENT TOPICAL at 16:06

## 2019-10-13 RX ADMIN — GABAPENTIN 300 MG: 300 CAPSULE ORAL at 01:11

## 2019-10-13 RX ADMIN — SENNOSIDES, DOCUSATE SODIUM 1 TABLET: 50; 8.6 TABLET, FILM COATED ORAL at 10:05

## 2019-10-13 RX ADMIN — SENNOSIDES, DOCUSATE SODIUM 1 TABLET: 50; 8.6 TABLET, FILM COATED ORAL at 17:18

## 2019-10-13 NOTE — PROGRESS NOTES
Problem: Self Care Deficits Care Plan (Adult)  Goal: *Acute Goals and Plan of Care (Insert Text)  Description    FUNCTIONAL STATUS PRIOR TO ADMISSION: Patient is a questionable historian at this time, however patient's son present during OT evaluation and assisting to provide information on patient's PLOF. Patient's son reporting patient able to transfer OOB, utilize walker for functional mobility <> bathroom using walker, and utilize walker for wheelchair transfers x MOD I PTA. Patient requires assistance for bathing tasks on tub transfer bench from care aide (3x/week). Patient also on 3L NC O2 at baseline, however patient's son reporting she takes off her nasal cannula for going to and from the bathroom. PTA, patient had a GLF during a bedside commode transfer with her son stating her legs just \"gave out\" (per son, he used to use a gait belt with therapist however home health therapist told him to stop using it. ...). HOME SUPPORT: Patient has 24/7 supervision at home from her son and from a care aide for bathing tasks 3x/week. Occupational Therapy Goals  Initiated 10/13/2019  1. Patient will perform lower body dressing with moderate assistance  within 7 day(s). 2.  Patient will perform upper body dressing with supervision/set-up within 7 day(s). 3.  Patient will perform bathing with moderate assistance  within 7 day(s). 4.  Patient will perform bedside commode transfers with moderate assistance  within 7 day(s). 5.  Patient will perform all aspects of toileting with moderate assistance within 7 day(s). 6.  Patient will participate in upper extremity therapeutic exercise/activities with supervision/set-up for 5 minutes within 7 day(s). 7.  Patient will utilize energy conservation techniques during functional activities with verbal cues within 7 day(s).          Outcome: Progressing Towards Goal   OCCUPATIONAL THERAPY EVALUATION  Patient: Isaac Hong (66 y.o. female)  Date: 10/13/2019  Primary Diagnosis: Bilateral leg pain [M79.604, M79.605]        Precautions: Fall    ASSESSMENT  Based on the objective data described below, the patient presents with generalized weakness, decreased endurance, difficulty problem-solving, difficulty command following, decreased attention to task, confusion (A&Ox2), decreased insight into deficits, and with bilateral leg pain (9/10 in LLE; 8/10 pain in RLE). Patient recently discharged from [facility] 10/7 and has been living with her son. Per her son, patient has been to [facility] over 5 times and they have received multiple therapy services. Noted per son, patient had GLF PTA during a bedside commode transfer. Patient is significantly below functional baseline at this time (see above). Patient will continue to benefit from OT services to maximize patient safety and independence with ADL transfers and tasks. Current Level of Function Impacting Discharge (ADLs/self-care): MAX A LB ADLs; TOTAL A toileting    Functional Outcome Measure: The patient scored 15/100 on the Barthel Index outcome measure which is indicative of 85% ADL impairment. Other factors to consider for discharge: patient with 24/7 care option at home, however extremely weak and high fall risk at this time     Patient will benefit from skilled therapy intervention to address the above noted impairments. PLAN :  Recommendations and Planned Interventions: self care training, functional mobility training, therapeutic exercise, balance training, therapeutic activities, endurance activities, patient education, home safety training and family training/education    Frequency/Duration: Patient will be followed by occupational therapy 5 times a week to address goals.     Recommendation for discharge: (in order for the patient to meet his/her long term goals)  Therapy up to 5 days/week in SNF setting    This discharge recommendation:  Has not yet been discussed the attending provider and/or case management    Equipment recommendations for successful discharge (if) home: TBD        SUBJECTIVE:   Patient stated I'm sorry I did so poorly.     OBJECTIVE DATA SUMMARY:   HISTORY:   Past Medical History:   Diagnosis Date    Arrhythmia     A-FIB    Arthritis     CAD (coronary artery disease) 1989    MI    Congestive heart failure (CHF) (HCC)     GERD (gastroesophageal reflux disease)     Gluten intolerance     Heart failure (HCC)     CHF    Polymyalgia (HCC)      Past Surgical History:   Procedure Laterality Date    CARDIAC SURG PROCEDURE UNLIST  1980'S    CARDIAC CATH    HX BACK SURGERY  1998, 2006, 2013    LUMBAR SPINE    HX CHOLECYSTECTOMY      HX HIP REPLACEMENT Right 5/2015    [facility]    HX TONSILLECTOMY  CHILD    HX UROLOGICAL      BLADDER SUSPENSION - MESH       Expanded or extensive additional review of patient history:     Home Situation  Home Environment: Private residence  # Steps to Enter: 3  Rails to Enter: No  One/Two Story Residence: Two story, live on 1st floor  Lift Chair Available: No  Living Alone: No(care aide 3x/week; family members for remainder of time)  Support Systems: Family member(s)  Patient Expects to be Discharged to[de-identified] Private residence  Current DME Used/Available at Home: Hospital bed, Commode, bedside, Walker, rolling, Tub transfer bench, Wheelchair  Tub or Shower Type: Tub/Shower combination    EXAMINATION OF PERFORMANCE DEFICITS:  Cognitive/Behavioral Status:  Neurologic State: Alert;Drowsy  Orientation Level: Oriented to person;Oriented to situation;Disoriented to time;Disoriented to place  Cognition: Decreased command following  Perception: Appears intact  Perseveration: No perseveration noted  Safety/Judgement: Decreased awareness of environment;Decreased awareness of need for assistance;Decreased awareness of need for safety;Decreased insight into deficits    Skin: patient with bruising on BLE and with thin, weak skin    Edema: BLE    Hearing:   Auditory  Auditory Impairment: Hard of hearing, bilateral    Vision/Perceptual:                                Corrective Lenses: Glasses    Range of Motion:  BUE  AROM: Generally decreased, functional                         Strength:  BUE  Strength: Generally decreased, functional                Coordination:  Coordination: Generally decreased, functional  Fine Motor Skills-Upper: Left Intact; Right Intact    Gross Motor Skills-Upper: Left Intact; Right Intact    Tone & Sensation:  BUE  Tone: Normal  Sensation: Intact                      Balance:  Sitting: Without support; Impaired  Sitting - Static: Fair (occasional)  Sitting - Dynamic: Poor (constant support)  Standing: Impaired; Without support  Standing - Static: Poor;Constant support  Standing - Dynamic : Poor;Constant support    Functional Mobility and Transfers for ADLs:  Bed Mobility:  Rolling: Maximum assistance;Assist x1  Supine to Sit: Maximum assistance;Assist x1;Additional time  Sit to Supine: Maximum assistance;Assist x1;Additional time  Scooting: Total assistance    Transfers:  Sit to Stand: Total assistance;Assist x2; Additional time  Stand to Sit: Total assistance    ADL Assessment:  Feeding: Setup;Supervision(in bed)    Oral Facial Hygiene/Grooming: Minimum assistance(infer seated unsupported)    Bathing: Maximum assistance(infer A for BLE and periarea)    Upper Body Dressing: Minimum assistance(infer seated unsupported for balance)    Lower Body Dressing: Maximum assistance    Toileting: Total assistance                ADL Intervention and task modifications:     Patient completed supine > sit transfer EOB x MAX A x 2. Patient required MAX A for donning bilateral tennis shoes seated EOB and MIN-MOD A to maintain sitting balance. Patient was incontinent of bladder in brief and required TOTAL A for pericare and clothing management in 3 sit <> stand trials with walker (MAX A X 2). Patient unable to maintain standing tolerance for >10 seconds.  Patient then MAX A for sit > supine transfer and was left seated with call bell in reach. Lower Body Dressing Assistance  Socks: Total assistance (dependent)  Shoes with Cloth Laces: Maximum assistance(patient assisting to push feet into shoes)    Toileting  Toileting Assistance: Total assistance(dependent)    Cognitive Retraining  Safety/Judgement: Decreased awareness of environment;Decreased awareness of need for assistance;Decreased awareness of need for safety;Decreased insight into deficits    Functional Measure:  Barthel Index:    Bathin  Bladder: 0  Bowels: 0  Groomin  Dressin  Feedin  Mobility: 0  Stairs: 0  Toilet Use: 0  Transfer (Bed to Chair and Back): 0  Total: 15/100        The Barthel ADL Index: Guidelines  1. The index should be used as a record of what a patient does, not as a record of what a patient could do. 2. The main aim is to establish degree of independence from any help, physical or verbal, however minor and for whatever reason. 3. The need for supervision renders the patient not independent. 4. A patient's performance should be established using the best available evidence. Asking the patient, friends/relatives and nurses are the usual sources, but direct observation and common sense are also important. However direct testing is not needed. 5. Usually the patient's performance over the preceding 24-48 hours is important, but occasionally longer periods will be relevant. 6. Middle categories imply that the patient supplies over 50 per cent of the effort. 7. Use of aids to be independent is allowed. Karo Stanford., Barthel, DDiannW. (7240). Functional evaluation: the Barthel Index. [address] [facility] (14)2. GIANNA Hunter, Marlene Barrett., Sammuel Hatchet.[facility], [address] [place] [address] Ave (). Measuring the change indisability after inpatient rehabilitation; comparison of the responsiveness of the Barthel Index and Functional [place] Measure.  Journal of Neurology, Neurosurgery, and Psychiatry, 66(4), [phone]. MATTHEW Steinberg, MEKHI Nascimento, & Imtiaz Espinoza M.A. (2004.) Assessment of post-stroke quality of life in cost-effectiveness studies: The usefulness of the Barthel Index and the EuroQoL-5D. Quality of Life Research, 15, 758-17        Occupational Therapy Evaluation Charge Determination   History Examination Decision-Making   LOW Complexity : Brief history review  MEDIUM Complexity : 3-5 performance deficits relating to physical, cognitive , or psychosocial skils that result in activity limitations and / or participation restrictions MEDIUM Complexity : Patient may present with comorbidities that affect occupational performnce. Miniml to moderate modification of tasks or assistance (eg, physical or verbal ) with assesment(s) is necessary to enable patient to complete evaluation       Based on the above components, the patient evaluation is determined to be of the following complexity level: LOW   Pain Ratin/10 LLE; 8/10 RLE; noted patient with increased pain touching back of legs and heels; patient prefers you to touch her toes when assisting with supine <> sit transfers    Activity Tolerance:   Fair and requires rest breaks; 3L NC O2  Please refer to the flowsheet for vital signs taken during this treatment. After treatment patient left in no apparent distress:    Supine in bed and Call bell within reach    COMMUNICATION/EDUCATION:   The patients plan of care was discussed with: Physical Therapist and Registered Nurse. Home safety education was provided and the patient/caregiver indicated understanding. This patients plan of care is appropriate for delegation to [facility].     Thank you for this referral.  Mario Byrd  Time Calculation: 35 mins

## 2019-10-13 NOTE — PROGRESS NOTES
Bedside and Verbal shift change report given to Rg Pack, [facility] (oncoming nurse) by Arnulfo Zhou RN (offgoing nurse). Report included the following information SBAR, Kardex, Procedure Summary, Intake/Output, MAR and Recent Results.

## 2019-10-13 NOTE — PROGRESS NOTES
Bedside shift change report given to Neena Blank RN (oncoming nurse) by Mone Christensen RN (offgoing nurse). Report included the following information SBAR, Kardex, Intake/Output and MAR.

## 2019-10-13 NOTE — PROGRESS NOTES
Bedside and Verbal shift change report given to Yamil Bonilla RN (oncoming nurse) by Harleen Ferrell RN (offgoing nurse). Report included the following information SBAR, Kardex, Procedure Summary, Intake/Output, MAR and Recent Results.

## 2019-10-13 NOTE — PHYSICIAN ADVISORY
Letter of Status Determination: Current Status OBSERVATION is Appropriate Pt Name:  León Small MR#  [id number] [facility]#   [id number] Room and Hospital  510/01  @ . [facility] hospital  
Hospitalization date  10/10/2019  3:10 PM  
Current Attending Physician  Hossein Vazquez DO  
Principal diagnosis  B/L Leg pain Clinicals  80 y.o. y.o  female with past medical history significant for CAD, CHF, a-fib, polymyalgia, GERD, gluten intolerance, and arthritis who presents from home via EMS with chief complaint of bilateral leg pain. W/u negative for acute pathology [facility] criteria Does  NOT apply STATUS DETERMINATION  On the basis of clinical data, available documentaion, we believe that the current status of this patient as OBSERVATION is Appropriate Additional comments Insurance  Payor: Jones Higgins / Plan: [employer] / Product Type: [employer] Care Medicare / Insurance Information [place] 1960 Phone:   
 Subscriber: Quincy Anmol Subscriber#: [id number] Group#: [id number] Precert#:   
  
 
 
The information in this document is a recommendation to be used for utilization review and utilization management purposes only. This recommendation is not an order. The recommendation is made based on the information reviewed at the time of the referral, is pursuant to the [facility] Conditions of Participation (42 CFR Part 482), and is neither a judgment nor an assessment with regard to the appropriateness or quality of clinical care. For all Managed Care patients: The Criteria are intended solely for use as screening guidelines with respect to the medical appropriateness of healthcare services and not for final clinical or payment determinations concerning the type or level of medical care provided, or proposed to be provided, to a patient.  They help the reviewers determine whether a patient is appropriate for observation or inpatient admission at the time a decision to admit the patient is being made. All efforts are made to apply the pertinent payor criteria ([employer] or [employer]) as well as the clinical judgements based on the information reviewed at the time of the referral.\" Nothing in this document may be used to limit, alter, or affect clinical services provided to the patient named below. Rodgers Mohs, MD 
Physician Advisor [address] C:  
Matt [email or web address]. [email or web address] 
  
1:40 PM 10/13/2019

## 2019-10-13 NOTE — PROGRESS NOTES
Hospitalist Progress Note  Myke YiDO  Answering service: [phone] OR [phone] from in house phone        Date of Service:  10/13/2019  NAME:  Isaac Hong  :  1925  MRN:  [id number]      Admission Summary:   80 y.o. female with past medical history significant for CAD, CHF, a-fib, polymyalgia, GERD, gluten intolerance, and arthritis who presents from home via EMS with chief complaint of bilateral leg pain. Pt was recently discharged from [facility] rehab 10/7 and is currently living with her son. Interval history / Subjective:   Patient seen and examined. No acute events overnight. Awaiting placement      Assessment & Plan:     Acute on chronic bilateral lower leg pain:   -L>R  -pain with minimal touch to skin, improving  -Limited mobility, per recent SNF, not ready for d/c but insurance declined further treatment  -Restarted home fentanyl patch with better pain control   -dopplers negative  -PT consult  -recommend discharge to SNF vs long term care. Parosysmal afib: not a candidate for AC. Continue asa, metoprolol     Thrombocytopenia: stable    Code status: dnr  DVT prophylaxis: lovenox     Care Plan discussed with: Nurse  Disposition: SNF/LTC and TBD     Hospital Problems  Date Reviewed: 10/16/2018          Codes Class Noted POA    Bilateral leg pain ICD-10-CM: M79.604, M79.605  ICD-9-CM: 729.5  10/10/2019 Unknown                Review of Systems:   Negative unless stated above      Vital Signs:    Last 24hrs VS reviewed since prior progress note.  Most recent are:  Visit Vitals  /49 (BP 1 Location: Left arm, BP Patient Position: At rest)   Pulse 70   Temp 98.6 °F (37 °C)   Resp 16   Ht 5' (1.524 m)   Wt 65.7 kg (144 lb 14.4 oz)   SpO2 97%   BMI 28.30 kg/m²       No intake or output data in the 24 hours ending 10/13/19 1355     Physical Examination:             Constitutional:  No acute distress, cooperative, pleasant ENT:  Oral mucous moist, oropharynx benign. Resp:  CTA bilaterally. No wheezing/rhonchi/rales. No accessory muscle use   CV:  Regular rhythm, normal rate, no murmurs, gallops, rubs    GI:  Soft, non distended, non tender. normoactive bowel sounds, no hepatosplenomegaly     Musculoskeletal:  bilateral edema with superficial abrasions, tender to mild palpation     Neurologic:  Moves all extremities. Alert to name only           Data Review:    Review and/or order of clinical lab test      Labs:     Recent Labs     10/11/19  0333 10/10/19  2009   WBC 5.7 6.7   HGB 10.7* 11.2*   HCT 36.6 38.5   * 118*     Recent Labs     10/11/19  0333 10/10/19  2009    144   K 3.8 4.5    106   CO2 36* 34*   BUN 14 14   CREA 0.68 0.61   * 86   CA 9.1 9.0   MG 1.7  --    PHOS 3.2  --      Recent Labs     10/11/19  0333   SGOT 22   ALT 26      TBILI 0.5   TP 5.6*   ALB 2.8*   GLOB 2.8     No results for input(s): INR, PTP, APTT, INREXT, INREXT in the last 72 hours. No results for input(s): FE, TIBC, PSAT, FERR in the last 72 hours. No results found for: FOL, RBCF   No results for input(s): PH, PCO2, PO2 in the last 72 hours. No results for input(s): CPK, CKNDX, TROIQ in the last 72 hours.     No lab exists for component: CPKMB  Lab Results   Component Value Date/Time    Cholesterol, total 171 12/19/2018 02:59 AM    HDL Cholesterol 78 12/19/2018 02:59 AM    LDL, calculated 71 12/19/2018 02:59 AM    Triglyceride 111 12/19/2018 02:59 AM     Lab Results   Component Value Date/Time    Glucose (POC) 150 (H) 10/13/2019 11:01 AM    Glucose (POC) 85 10/13/2019 06:26 AM    Glucose (POC) 101 (H) 10/12/2019 09:28 PM    Glucose (POC) 218 (H) 10/12/2019 04:24 PM    Glucose (POC) 164 (H) 10/12/2019 11:34 AM     Lab Results   Component Value Date/Time    Color YELLOW/STRAW 08/25/2019 01:33 PM    Appearance CLEAR 08/25/2019 01:33 PM    Specific gravity 1.005 08/25/2019 01:33 PM    Specific gravity 1.025 10/03/2018 06:18 AM    pH (UA) 7.5 08/25/2019 01:33 PM    Protein NEGATIVE  08/25/2019 01:33 PM    Glucose NEGATIVE  08/25/2019 01:33 PM    Ketone NEGATIVE  08/25/2019 01:33 PM    Bilirubin NEGATIVE  08/25/2019 01:33 PM    Urobilinogen 0.2 08/25/2019 01:33 PM    Nitrites NEGATIVE  08/25/2019 01:33 PM    Leukocyte Esterase NEGATIVE  08/25/2019 01:33 PM    Epithelial cells FEW 08/25/2019 01:33 PM    Bacteria NEGATIVE  08/25/2019 01:33 PM    WBC 0-4 08/25/2019 01:33 PM    RBC 0-5 08/25/2019 01:33 PM         Medications Reviewed:     Current Facility-Administered Medications   Medication Dose Route Frequency    enoxaparin (LOVENOX) injection 40 mg  40 mg SubCUTAneous Q24H    balsam peru-castor oil (VENELEX) ointment   Topical Q8H    fentaNYL (DURAGESIC) 25 mcg/hr patch 1 Patch  1 Patch TransDERmal Q72H    acetaminophen (TYLENOL) tablet 650 mg  650 mg Oral Q8H    aspirin chewable tablet 81 mg  81 mg Oral DAILY    atorvastatin (LIPITOR) tablet 40 mg  40 mg Oral DAILY    gabapentin (NEURONTIN) capsule 300 mg  300 mg Oral QID    metoprolol tartrate (LOPRESSOR) tablet 75 mg  75 mg Oral BID    mirtazapine (REMERON) tablet 15 mg  15 mg Oral QHS    nitroglycerin (NITROSTAT) tablet 0.4 mg  0.4 mg SubLINGual Q5MIN PRN    predniSONE (DELTASONE) tablet 10 mg  10 mg Oral DAILY    senna-docusate (PERICOLACE) 8.6-50 mg per tablet 1 Tab  1 Tab Oral BID    topiramate (TOPAMAX) tablet 25 mg  25 mg Oral DAILY WITH BREAKFAST    traMADol (ULTRAM) tablet 50 mg  50 mg Oral Q8H    sodium chloride (NS) flush 5-40 mL  5-40 mL IntraVENous Q8H    sodium chloride (NS) flush 5-40 mL  5-40 mL IntraVENous PRN    naloxone (NARCAN) injection 0.4 mg  0.4 mg IntraVENous PRN    furosemide (LASIX) injection 20 mg  20 mg IntraVENous DAILY WITH BREAKFAST    ondansetron (ZOFRAN) injection 4 mg  4 mg IntraVENous Q4H PRN    pantoprazole (PROTONIX) tablet 40 mg  40 mg Oral ACB ______________________________________________________________________  EXPECTED LENGTH OF STAY: - - -  ACTUAL LENGTH OF STAY:          0                 Hilary Baker DO

## 2019-10-14 LAB
GLUCOSE BLD STRIP.AUTO-MCNC: 110 MG/DL (ref 65–100)
GLUCOSE BLD STRIP.AUTO-MCNC: 122 MG/DL (ref 65–100)
GLUCOSE BLD STRIP.AUTO-MCNC: 128 MG/DL (ref 65–100)
GLUCOSE BLD STRIP.AUTO-MCNC: 172 MG/DL (ref 65–100)
SERVICE CMNT-IMP: ABNORMAL

## 2019-10-14 PROCEDURE — 74011250636 HC RX REV CODE- 250/636: Performed by: HOSPITALIST

## 2019-10-14 PROCEDURE — 74011636637 HC RX REV CODE- 636/637: Performed by: HOSPITALIST

## 2019-10-14 PROCEDURE — 74011250636 HC RX REV CODE- 250/636: Performed by: INTERNAL MEDICINE

## 2019-10-14 PROCEDURE — 74011250637 HC RX REV CODE- 250/637: Performed by: INTERNAL MEDICINE

## 2019-10-14 PROCEDURE — 74011250637 HC RX REV CODE- 250/637: Performed by: HOSPITALIST

## 2019-10-14 PROCEDURE — 97535 SELF CARE MNGMENT TRAINING: CPT

## 2019-10-14 PROCEDURE — 77010033678 HC OXYGEN DAILY

## 2019-10-14 PROCEDURE — 96374 THER/PROPH/DIAG INJ IV PUSH: CPT

## 2019-10-14 PROCEDURE — 82962 GLUCOSE BLOOD TEST: CPT

## 2019-10-14 PROCEDURE — 99218 HC RM OBSERVATION: CPT

## 2019-10-14 PROCEDURE — 96372 THER/PROPH/DIAG INJ SC/IM: CPT

## 2019-10-14 RX ORDER — GABAPENTIN 600 MG/1
600 TABLET ORAL 2 TIMES DAILY
Status: DISCONTINUED | OUTPATIENT
Start: 2019-10-14 | End: 2019-10-15 | Stop reason: HOSPADM

## 2019-10-14 RX ADMIN — METOPROLOL TARTRATE 75 MG: 50 TABLET ORAL at 10:41

## 2019-10-14 RX ADMIN — CASTOR OIL AND BALSAM, PERU: 788; 87 OINTMENT TOPICAL at 10:43

## 2019-10-14 RX ADMIN — MIRTAZAPINE 15 MG: 15 TABLET, FILM COATED ORAL at 22:36

## 2019-10-14 RX ADMIN — Medication 10 ML: at 07:14

## 2019-10-14 RX ADMIN — FUROSEMIDE 20 MG: 10 INJECTION, SOLUTION INTRAMUSCULAR; INTRAVENOUS at 07:22

## 2019-10-14 RX ADMIN — PREDNISONE 10 MG: 10 TABLET ORAL at 10:41

## 2019-10-14 RX ADMIN — ASPIRIN 81 MG CHEWABLE TABLET 81 MG: 81 TABLET CHEWABLE at 10:41

## 2019-10-14 RX ADMIN — CASTOR OIL AND BALSAM, PERU: 788; 87 OINTMENT TOPICAL at 00:21

## 2019-10-14 RX ADMIN — TRAMADOL HYDROCHLORIDE 50 MG: 50 TABLET ORAL at 17:57

## 2019-10-14 RX ADMIN — GABAPENTIN 300 MG: 300 CAPSULE ORAL at 10:41

## 2019-10-14 RX ADMIN — Medication 10 ML: at 14:10

## 2019-10-14 RX ADMIN — CASTOR OIL AND BALSAM, PERU: 788; 87 OINTMENT TOPICAL at 17:58

## 2019-10-14 RX ADMIN — SENNOSIDES, DOCUSATE SODIUM 1 TABLET: 50; 8.6 TABLET, FILM COATED ORAL at 10:40

## 2019-10-14 RX ADMIN — Medication 10 ML: at 22:37

## 2019-10-14 RX ADMIN — TOPIRAMATE 25 MG: 25 TABLET, FILM COATED ORAL at 07:14

## 2019-10-14 RX ADMIN — SENNOSIDES, DOCUSATE SODIUM 1 TABLET: 50; 8.6 TABLET, FILM COATED ORAL at 17:58

## 2019-10-14 RX ADMIN — TRAMADOL HYDROCHLORIDE 50 MG: 50 TABLET ORAL at 10:41

## 2019-10-14 RX ADMIN — METOPROLOL TARTRATE 75 MG: 50 TABLET ORAL at 17:58

## 2019-10-14 RX ADMIN — GABAPENTIN 300 MG: 300 CAPSULE ORAL at 14:09

## 2019-10-14 RX ADMIN — ATORVASTATIN CALCIUM 40 MG: 40 TABLET, FILM COATED ORAL at 10:41

## 2019-10-14 RX ADMIN — TRAMADOL HYDROCHLORIDE 50 MG: 50 TABLET ORAL at 00:20

## 2019-10-14 RX ADMIN — ENOXAPARIN SODIUM 40 MG: 40 INJECTION SUBCUTANEOUS at 17:57

## 2019-10-14 RX ADMIN — GABAPENTIN 600 MG: 600 TABLET, FILM COATED ORAL at 17:58

## 2019-10-14 RX ADMIN — ACETAMINOPHEN 650 MG: 325 TABLET, FILM COATED ORAL at 00:20

## 2019-10-14 RX ADMIN — ACETAMINOPHEN 650 MG: 325 TABLET, FILM COATED ORAL at 10:41

## 2019-10-14 RX ADMIN — PANTOPRAZOLE SODIUM 40 MG: 40 TABLET, DELAYED RELEASE ORAL at 07:14

## 2019-10-14 RX ADMIN — ACETAMINOPHEN 650 MG: 325 TABLET, FILM COATED ORAL at 17:57

## 2019-10-14 NOTE — PROGRESS NOTES
Hospitalist Progress Note  Jermey AndersonDO  Answering service: [phone] OR [phone] from in house phone        Date of Service:  10/14/2019  NAME:  Opal Bonner YOB: 1925  MRN:  [id number]      Admission Summary:   80 y.o. female with past medical history significant for CAD, CHF, a-fib, polymyalgia, GERD, gluten intolerance, and arthritis who presents from home via EMS with chief complaint of bilateral leg pain. Pt was recently discharged from [facility] rehab 10/7 and is currently living with her son. Interval history / Subjective:   Patient seen and examined. No acute events, continues to have lower extremity pain, unchanged from baseline. Assessment & Plan:     Acute on chronic bilateral lower leg pain:   -presented with pain   -L>R  -pain with minimal touch to skin  -Limited mobility, per recent SNF, not ready for d/c but insurance declined further treatment  -Restarted home fentanyl patch with better pain control   -dopplers negative  -PT consult  -recommend discharge to SNF vs long term care    Parosysmal afib: not a candidate for [facility]. Continue asa, metoprolol     Thrombocytopenia: stable    Code status: dnr  DVT prophylaxis: lovenox     Care Plan discussed with: Nurse  Disposition: SNF/LTC and TBD     Hospital Problems  Date Reviewed: 10/16/2018          Codes Class Noted POA    Bilateral leg pain ICD-10-CM: M79.604, M79.605  ICD-9-CM: 729.5  10/10/2019 Unknown                Review of Systems:   Negative unless stated above      Vital Signs:    Last 24hrs VS reviewed since prior progress note.  Most recent are:  Visit Vitals  /57 (BP 1 Location: Left arm, BP Patient Position: At rest)   Pulse 71   Temp 98.4 °F (36.9 °C)   Resp 16   Ht 5' (1.524 m)   Wt 65.6 kg (144 lb 9.6 oz)   SpO2 94%   BMI 28.24 kg/m²       No intake or output data in the 24 hours ending 10/14/19 1625     Physical Examination: Constitutional:  No acute distress, cooperative, pleasant    ENT:  Oral mucous moist, oropharynx benign. Resp:  CTA bilaterally. No wheezing/rhonchi/rales. No accessory muscle use   CV:  Regular rhythm, normal rate, no murmurs, gallops, rubs    GI:  Soft, non distended, non tender. normoactive bowel sounds, no hepatosplenomegaly     Musculoskeletal:  bilateral edema with superficial abrasions, tender to mild palpation     Neurologic:  Moves all extremities. Alert to name only           Data Review:    Review and/or order of clinical lab test      Labs:     No results for input(s): WBC, HGB, HCT, PLT, HGBEXT, HCTEXT, PLTEXT, HGBEXT, HCTEXT, PLTEXT in the last 72 hours. No results for input(s): NA, K, CL, CO2, BUN, CREA, GLU, CA, MG, PHOS, URICA in the last 72 hours. No results for input(s): SGOT, GPT, ALT, AP, TBIL, TBILI, TP, ALB, GLOB, GGT, AML, LPSE in the last 72 hours. No lab exists for component: AMYP, HLPSE  No results for input(s): INR, PTP, APTT, INREXT, INREXT in the last 72 hours. No results for input(s): FE, TIBC, PSAT, FERR in the last 72 hours. No results found for: FOL, RBCF   No results for input(s): PH, PCO2, PO2 in the last 72 hours. No results for input(s): CPK, CKNDX, TROIQ in the last 72 hours.     No lab exists for component: CPKMB  Lab Results   Component Value Date/Time    Cholesterol, total 171 12/19/2018 02:59 AM    HDL Cholesterol 78 12/19/2018 02:59 AM    LDL, calculated 71 12/19/2018 02:59 AM    Triglyceride 111 12/19/2018 02:59 AM     Lab Results   Component Value Date/Time    Glucose (POC) 172 (H) 10/14/2019 04:14 PM    Glucose (POC) 122 (H) 10/14/2019 11:16 AM    Glucose (POC) 110 (H) 10/14/2019 06:13 AM    Glucose (POC) 190 (H) 10/13/2019 09:09 PM    Glucose (POC) 213 (H) 10/13/2019 04:04 PM     Lab Results   Component Value Date/Time    Color YELLOW/STRAW 08/25/2019 01:33 PM    Appearance CLEAR 08/25/2019 01:33 PM    Specific gravity 1.005 08/25/2019 01:33 PM Specific gravity 1.025 10/03/2018 06:18 AM    pH (UA) 7.5 08/25/2019 01:33 PM    Protein NEGATIVE  08/25/2019 01:33 PM    Glucose NEGATIVE  08/25/2019 01:33 PM    Ketone NEGATIVE  08/25/2019 01:33 PM    Bilirubin NEGATIVE  08/25/2019 01:33 PM    Urobilinogen 0.2 08/25/2019 01:33 PM    Nitrites NEGATIVE  08/25/2019 01:33 PM    Leukocyte Esterase NEGATIVE  08/25/2019 01:33 PM    Epithelial cells FEW 08/25/2019 01:33 PM    Bacteria NEGATIVE  08/25/2019 01:33 PM    WBC 0-4 08/25/2019 01:33 PM    RBC 0-5 08/25/2019 01:33 PM         Medications Reviewed:     Current Facility-Administered Medications   Medication Dose Route Frequency    gabapentin (NEURONTIN) tablet 600 mg  600 mg Oral BID    enoxaparin (LOVENOX) injection 40 mg  40 mg SubCUTAneous Q24H    balsam peru-castor oil (VENELEX) ointment   Topical Q8H    fentaNYL (DURAGESIC) 25 mcg/hr patch 1 Patch  1 Patch TransDERmal Q72H    acetaminophen (TYLENOL) tablet 650 mg  650 mg Oral Q8H    aspirin chewable tablet 81 mg  81 mg Oral DAILY    atorvastatin (LIPITOR) tablet 40 mg  40 mg Oral DAILY    metoprolol tartrate (LOPRESSOR) tablet 75 mg  75 mg Oral BID    mirtazapine (REMERON) tablet 15 mg  15 mg Oral QHS    nitroglycerin (NITROSTAT) tablet 0.4 mg  0.4 mg SubLINGual Q5MIN PRN    predniSONE (DELTASONE) tablet 10 mg  10 mg Oral DAILY    senna-docusate (PERICOLACE) 8.6-50 mg per tablet 1 Tab  1 Tab Oral BID    topiramate (TOPAMAX) tablet 25 mg  25 mg Oral DAILY WITH BREAKFAST    traMADol (ULTRAM) tablet 50 mg  50 mg Oral Q8H    sodium chloride (NS) flush 5-40 mL  5-40 mL IntraVENous Q8H    sodium chloride (NS) flush 5-40 mL  5-40 mL IntraVENous PRN    naloxone (NARCAN) injection 0.4 mg  0.4 mg IntraVENous PRN    furosemide (LASIX) injection 20 mg  20 mg IntraVENous DAILY WITH BREAKFAST    ondansetron (ZOFRAN) injection 4 mg  4 mg IntraVENous Q4H PRN    pantoprazole (PROTONIX) tablet 40 mg  40 mg Oral ACB ______________________________________________________________________  EXPECTED LENGTH OF STAY: - - -  ACTUAL LENGTH OF STAY:          0                 Hilary Henry DO

## 2019-10-14 NOTE — PROGRESS NOTES
Attempted treatment at noon x2, patient eating and easily agitated, Will return as schedule allows    Toro Cordero, IVELISSET, PT

## 2019-10-14 NOTE — PROGRESS NOTES
Problem: Self Care Deficits Care Plan (Adult)  Goal: *Acute Goals and Plan of Care (Insert Text)  Description    FUNCTIONAL STATUS PRIOR TO ADMISSION: Patient is a questionable historian at this time, however patient's son present during OT evaluation and assisting to provide information on patient's PLOF. Patient's son reporting patient able to transfer OOB, utilize walker for functional mobility <> bathroom using walker, and utilize walker for wheelchair transfers x MOD I PTA. Patient requires assistance for bathing tasks on tub transfer bench from care aide (3x/week). Patient also on 3L NC O2 at baseline, however patient's son reporting she takes off her nasal cannula for going to and from the bathroom. PTA, patient had a GLF during a bedside commode transfer with her son stating her legs just \"gave out\" (per son, he used to use a gait belt with therapist however home health therapist told him to stop using it. ...). HOME SUPPORT: Patient has 24/7 supervision at home from her son and from a care aide for bathing tasks 3x/week. Occupational Therapy Goals  Initiated 10/13/2019  1. Patient will perform lower body dressing with moderate assistance  within 7 day(s). 2.  Patient will perform upper body dressing with supervision/set-up within 7 day(s). 3.  Patient will perform bathing with moderate assistance  within 7 day(s). 4.  Patient will perform bedside commode transfers with moderate assistance  within 7 day(s). 5.  Patient will perform all aspects of toileting with moderate assistance within 7 day(s). 6.  Patient will participate in upper extremity therapeutic exercise/activities with supervision/set-up for 5 minutes within 7 day(s). 7.  Patient will utilize energy conservation techniques during functional activities with verbal cues within 7 day(s).        Outcome: Progressing Towards Goal   OCCUPATIONAL THERAPY TREATMENT  Patient: Sabrina Lainez (64 y.o. female)  Date: 10/14/2019  Diagnosis: Bilateral leg pain [M79.604, M79.605] <principal problem not specified>       Precautions: Fall  Chart, occupational therapy assessment, plan of care, and goals were reviewed. ASSESSMENT  Patient continues with skilled OT services and is not progressing towards goals. Patient only able to tolerate grooming at bed level with EOB elevated. Patient falling asleep after combing hair, not appropriate to progress to attempt sitting EOB. Frequency decreased from 5x/wk to 3x/wk as patient with limited PLOF needing assist for ADL tasks, multiple hospital/SNF admissions and limited participation today. Current Level of Function Impacting Discharge (ADLs): combing hair supervision/set up; all other ADLs total A    Other factors to consider for discharge: Patient needing x 2 assist for basic bed level functional mobility and ADL tasks. Unsafe to dc home with son as she had GLF at home prior to this admission. PLAN :  Patient continues to benefit from skilled intervention to address the above impairments. Continue treatment per established plan of care. to address goals. Recommend with staff: turn every two hours, bed in chair for meals- set up for meals to increase attention/intake    Recommend next OT session: seated EOB if able to have x 2 assist and increase alertness    Recommendation for discharge: (in order for the patient to meet his/her long term goals)  SNF/LTC    This discharge recommendation:  Has been made in collaboration with the attending provider and/or case management    Equipment recommendations for successful discharge (if) home: TBD SNF/LTC        SUBJECTIVE:   Patient stated I need a nap.     OBJECTIVE DATA SUMMARY:   Cognitive/Behavioral Status:  Neurologic State: Alert;Confused  Orientation Level: Oriented to person;Disoriented to place; Disoriented to situation;Disoriented to time  Cognition: Follows commands        ADL Intervention:       Grooming  Washing Face: (falling asleep after combing hair)  Brushing/Combing Hair: Supervision;Set-up(supported sitting- EOB elevated)  Elevated HOB and adjusted pillow to encourage neutral neck position. Patient with good attention to combing hair with neck flexion to reach back of head, combing around whole head. With attempts to have patient wash face she fell asleep. Easily awaken to voice and tactile touch and requesting to nap. Terminated session. Unsafe and inappropriate to progress to additional ADLs or EOB tasks. Pain:  Reports fullness in her stomach from eating lunch    Activity Tolerance:   Poor  Please refer to the flowsheet for vital signs taken during this treatment.     After treatment patient left in no apparent distress:   Sitting in chair and Call bell within reach    COMMUNICATION/COLLABORATION:   The patients plan of care was discussed with: Registered Nurse and     Tony Riley OT  Time Calculation: 11 mins

## 2019-10-14 NOTE — PROGRESS NOTES
MARLENY plan:    -Cm contacted patients' son Sammy Rodriguez ([phone]) to discuss the discharge plan. -Lesly Watkins is working on insurance authorization and will call me once it is obtained.  -Patient will need ambulance transport-- Vinod set up [address] [facility] with [facility].    -Folder on chart    Crta. Claude 82, ACM

## 2019-10-14 NOTE — PROGRESS NOTES
Bedside and Verbal shift change report given to Houston Mi RN (oncoming nurse) by Joby Flanagan RN (offgoing nurse). Report included the following information SBAR, Kardex, Procedure Summary, Intake/Output, MAR and Recent Results.

## 2019-10-15 ENCOUNTER — PATIENT OUTREACH (OUTPATIENT)
Dept: INTERNAL MEDICINE CLINIC | Age: 84
End: 2019-10-15

## 2019-10-15 VITALS
HEIGHT: 60 IN | DIASTOLIC BLOOD PRESSURE: 60 MMHG | OXYGEN SATURATION: 94 % | WEIGHT: 140.7 LBS | RESPIRATION RATE: 16 BRPM | SYSTOLIC BLOOD PRESSURE: 140 MMHG | TEMPERATURE: 97.7 F | HEART RATE: 71 BPM | BODY MASS INDEX: 27.62 KG/M2

## 2019-10-15 LAB
ERYTHROCYTE [DISTWIDTH] IN BLOOD BY AUTOMATED COUNT: 13.5 % (ref 11.5–14.5)
GLUCOSE BLD STRIP.AUTO-MCNC: 134 MG/DL (ref 65–100)
GLUCOSE BLD STRIP.AUTO-MCNC: 95 MG/DL (ref 65–100)
HCT VFR BLD AUTO: 36.9 % (ref 35–47)
HGB BLD-MCNC: 10.9 G/DL (ref 11.5–16)
MCH RBC QN AUTO: 32 PG (ref 26–34)
MCHC RBC AUTO-ENTMCNC: 29.5 G/DL (ref 30–36.5)
MCV RBC AUTO: 108.2 FL (ref 80–99)
NRBC # BLD: 0 K/UL (ref 0–0.01)
NRBC BLD-RTO: 0 PER 100 WBC
PLATELET # BLD AUTO: 145 K/UL (ref 150–400)
PMV BLD AUTO: 10.4 FL (ref 8.9–12.9)
RBC # BLD AUTO: 3.41 M/UL (ref 3.8–5.2)
SERVICE CMNT-IMP: ABNORMAL
SERVICE CMNT-IMP: NORMAL
WBC # BLD AUTO: 4.2 K/UL (ref 3.6–11)

## 2019-10-15 PROCEDURE — 85027 COMPLETE CBC AUTOMATED: CPT

## 2019-10-15 PROCEDURE — 99218 HC RM OBSERVATION: CPT

## 2019-10-15 PROCEDURE — 74011250636 HC RX REV CODE- 250/636: Performed by: HOSPITALIST

## 2019-10-15 PROCEDURE — 74011636637 HC RX REV CODE- 636/637: Performed by: HOSPITALIST

## 2019-10-15 PROCEDURE — 74011250637 HC RX REV CODE- 250/637: Performed by: INTERNAL MEDICINE

## 2019-10-15 PROCEDURE — 36415 COLL VENOUS BLD VENIPUNCTURE: CPT

## 2019-10-15 PROCEDURE — 96376 TX/PRO/DX INJ SAME DRUG ADON: CPT

## 2019-10-15 PROCEDURE — 74011250637 HC RX REV CODE- 250/637: Performed by: HOSPITALIST

## 2019-10-15 PROCEDURE — 82962 GLUCOSE BLOOD TEST: CPT

## 2019-10-15 RX ORDER — GABAPENTIN 300 MG/1
300 CAPSULE ORAL 3 TIMES DAILY
Qty: 90 CAP | Refills: 0 | Status: SHIPPED | OUTPATIENT
Start: 2019-10-15

## 2019-10-15 RX ORDER — FUROSEMIDE 20 MG/1
20 TABLET ORAL DAILY
Qty: 30 TAB | Refills: 0 | Status: SHIPPED
Start: 2019-10-15

## 2019-10-15 RX ORDER — FENTANYL 25 UG/1
1 PATCH TRANSDERMAL
Qty: 4 PATCH | Refills: 0 | Status: SHIPPED | OUTPATIENT
Start: 2019-10-15 | End: 2019-10-27

## 2019-10-15 RX ORDER — TRAMADOL HYDROCHLORIDE 50 MG/1
50 TABLET ORAL
Qty: 14 TAB | Refills: 1 | Status: SHIPPED | OUTPATIENT
Start: 2019-10-15 | End: 2019-10-22

## 2019-10-15 RX ADMIN — GABAPENTIN 600 MG: 600 TABLET, FILM COATED ORAL at 09:13

## 2019-10-15 RX ADMIN — CASTOR OIL AND BALSAM, PERU: 788; 87 OINTMENT TOPICAL at 09:19

## 2019-10-15 RX ADMIN — ACETAMINOPHEN 650 MG: 325 TABLET, FILM COATED ORAL at 01:19

## 2019-10-15 RX ADMIN — TRAMADOL HYDROCHLORIDE 50 MG: 50 TABLET ORAL at 01:20

## 2019-10-15 RX ADMIN — METOPROLOL TARTRATE 75 MG: 50 TABLET ORAL at 09:13

## 2019-10-15 RX ADMIN — ATORVASTATIN CALCIUM 40 MG: 40 TABLET, FILM COATED ORAL at 09:13

## 2019-10-15 RX ADMIN — CASTOR OIL AND BALSAM, PERU: 788; 87 OINTMENT TOPICAL at 01:19

## 2019-10-15 RX ADMIN — SENNOSIDES, DOCUSATE SODIUM 1 TABLET: 50; 8.6 TABLET, FILM COATED ORAL at 09:14

## 2019-10-15 RX ADMIN — Medication 10 ML: at 07:31

## 2019-10-15 RX ADMIN — FUROSEMIDE 20 MG: 10 INJECTION, SOLUTION INTRAMUSCULAR; INTRAVENOUS at 07:31

## 2019-10-15 RX ADMIN — PANTOPRAZOLE SODIUM 40 MG: 40 TABLET, DELAYED RELEASE ORAL at 07:31

## 2019-10-15 RX ADMIN — ACETAMINOPHEN 650 MG: 325 TABLET, FILM COATED ORAL at 09:13

## 2019-10-15 RX ADMIN — TOPIRAMATE 25 MG: 25 TABLET, FILM COATED ORAL at 07:32

## 2019-10-15 RX ADMIN — PREDNISONE 10 MG: 10 TABLET ORAL at 09:13

## 2019-10-15 RX ADMIN — ASPIRIN 81 MG CHEWABLE TABLET 81 MG: 81 TABLET CHEWABLE at 09:14

## 2019-10-15 RX ADMIN — TRAMADOL HYDROCHLORIDE 50 MG: 50 TABLET ORAL at 09:14

## 2019-10-15 NOTE — PROGRESS NOTES
10/15/19 -   CM verified patient has a qualifying hospital stay for [facility]. Patient with commercial Medicare coverage; [facility]   CRM: Toshia Quijano MPH, [address] [place] [address]; Z: [phone]

## 2019-10-15 NOTE — PROGRESS NOTES
MARLENY plan:    -Authorization received for patient to go to [facility].   -Son notified via voicemail  -Report to be called to [phone]  -Discharge instructions have been faxed to [phone]    Transition of Care Plan to SNF/Rehab    SNF/Rehab Transition:  Patient has been accepted to [facility] and meets criteria for admission. Patient will transported by [facility] and expected to leave at 12 pm.    Communication to Patient/Family:  Met with patient and (identified care giver) and they are agreeable to the transition plan. Communication to SNF/Rehab:  Bedside RN, Zackery Munroe, has been notified to update the transition plan to the facility and call report (phone number [phone]. Discharge information has been updated on the AVS.     Discharge instructions to be fax'd to facility at [facility] # [phone]). SNF/Rehab Transition:  Patient to follow-up with Home Health: [facility], other  ,none)  PCP/Specialist:  Ambulatory Care Management:    Reviewed and confirmed with [facility] they can manage the patient care needs for the following:     [facility] SYSTEM with (X) only those applicable:    Medication:  [x]  Medications will be available at the facility  []  IV Antibiotics   [x]  Controlled Substance - hard copy to be sent with patient   []  Weekly Labs   Documents:  [x] Hard RX  [x] MAR  [x] Kardex  [x] AVS  []Transfer Summary  [x]Discharge   Equipment:  []  CPAP/BiPAP  []  Wound Vacuum  []  [place] or Urinary Device  []  PICC/Central Line  []  Nebulizer  []  Ventilator   Treatment:  []Isolation (for MRSA, VRE, etc.)  []Surgical Drain Management  []Tracheostomy Care  []Dressing Changes  []Dialysis with transportation and chair time.   []PEG Care  []Oxygen  []Daily Weights for Heart Failure   Dietary:  []Any diet limitations  []Tube Feedings   []Total Parenteral Management (TPN)   Eligible for Medicaid Long Term Services and Supports  Yes:  [] Eligible for medical assistance or will become eligible within 180 days and UAI completed. [] Provider/Patient and/or support system has requested screening. [] UAI copy provided to patient or responsible party,  [] UAI unavailable at discharge will send once processed to SNF provider. [] UAI unavailable at discharged mailed to patient  No:   [x] Private pay and is not financially eligible for Medicaid within the next 180 days. [] Reside out-of-state.   [] A residents of a state owned/operated facility that is licensed  by [address] [facility] or [facility]  [] Enrollment in [facility] hospice services  [] [address] [place] [address]  [] Patient /Family declines to have screening completed or provide financial information for screening     Financial Resources:  Medicaid    [] Initiated and application pending   [] Full coverage     Advanced Care Plan:  []Surrogate Decision Maker of Care  []POA  []Communicated Code Status (DDNR\", \"Full\")    Other

## 2019-10-15 NOTE — DISCHARGE INSTRUCTIONS
Discharging provider: Lorraine Figueroa MD    Primary care provider: Presley Johnson NP    [address] COURSE:    80 y.o. female with past medical history significant for CAD, CHF, a-fib, polymyalgia, GERD, gluten intolerance, and arthritis who presents from home via EMS with chief complaint of bilateral leg pain. Pt was recently discharged from [facility] is currently living with her son.        Acute on chronic bilateral lower leg pain:   -presented with pain   -L>R  -pain with minimal touch to skin  -Limited mobility, per recent SNF, not ready for d/c but insurance declined further treatment  -Restarted home fentanyl patch with better pain control, Tramadol PRN  - decreased Gabapentin to 300mg TID   -dopplers negative  -PT consult  -recommend discharge to SNF     Parosysmal afib: not a candidate for [facility]. Continue asa, metoprolol      Thrombocytopenia: stable    FOLLOW-UP CARE RECOMMENDATIONS:    APPOINTMENTS:  Follow-up Information     Follow up With Specialties Details Why Contact Info    [address] [facility] [place] [address] [place] [address]      Presley Johnson NP Nurse Practitioner In 2 weeks Discharge follow up  [address] [facility] [address]  [phone]              It is very important that you keep follow-up appointment(s). Bring discharge papers, medication list (and/or medication bottles) to follow-up appointments for review by outpatient provider(s). FOLLOW-UP TESTS RECOMMENDED:   · NONE    ONGOING TREATMENT PLAN: NONE    PENDING TEST RESULTS:  At the time of discharge the following test results are still pending: NONE. Please review these results as they become available. Specific symptoms to watch for: chest pain, shortness of breath, fever, chills, nausea, vomiting, diarrhea, change in mentation, falling, weakness, bleeding.     DIET:  Regular Diet    ACTIVITY:  Activity as tolerated    WOUND CARE: NONE    EQUIPMENT needed:  NONE    INCIDENTAL FINDINGS:  NONE    GOALS OF CARE:  X  Eventual return to home/independent/assisted living     Long term SNF      Hospice     No rehospitalization     Patient condition at discharge:   Functional status  X  Poor      Deconditioned      Independent   Cognition  X  Lucid     Forgetful (some sensescence)     Dementia   Catheters/lines (plus indication)    [place]     PICC      PEG         Code status    Full code    X  DNR    . . . . . . . . . . . . . . . . . . . . . . . . . . . . . . . . . . . . . . . . . . . . . . . . . . . . . . . . . . . . . . . . . . . . . . . Crispin [place]      CHRONIC MEDICAL CONDITIONS:  Problem List as of 10/15/2019 Date Reviewed: 10/16/2018          Codes Class Noted - Resolved    Bilateral leg pain ICD-10-CM: M79.604, M79.605  ICD-9-CM: 729.5  10/10/2019 - Present        Sepsis ([facility].) ICD-10-CM: A41.9  ICD-9-CM: 038.9, 995.91  8/14/2019 - Present        Acute metabolic encephalopathy IUH-67-NX: G93.41  ICD-9-CM: 348.31  12/2/2018 - Present        Acute respiratory distress ICD-10-CM: R06.03  ICD-9-CM: 518.82  Unknown - Present        Chronic pain syndrome ICD-10-CM: G89.4  ICD-9-CM: 338.4  Unknown - Present        Dementia without behavioral disturbance (HCC) ICD-10-CM: F03.90  ICD-9-CM: 294.20  Unknown - Present        Goals of care, counseling/discussion ICD-10-CM: Z71.89  ICD-9-CM: V65.49  Unknown - Present        Vascular dementia without behavioral disturbance (HCC) ICD-10-CM: F01.50  ICD-9-CM: 290.40  Unknown - Present        Polyneuropathy associated with underlying disease ([facility] 75.) ICD-10-CM: G63  ICD-9-CM: 357.4  8/1/2018 - Present        Dependence on continuous supplemental oxygen ICD-10-CM: Z99.81  ICD-9-CM: V46.2  7/18/2018 - Present        Anemia associated with acute blood loss ICD-10-CM: D62  ICD-9-CM: 285.1  6/19/2018 - Present        Long-term use of immunosuppressant medication ICD-10-CM: M14.949  ICD-9-CM: V58.69  11/20/2017 - Present CKD (chronic kidney disease) stage 2, GFR 60-89 ml/min ICD-10-CM: N18.2  ICD-9-CM: 585.2  10/24/2017 - Present        Long term current use of systemic steroids ICD-10-CM: Z79.52  ICD-9-CM: V58.65  10/24/2017 - Present        Seronegative rheumatoid arthritis of multiple sites [facility]) ICD-10-CM: M06.09  ICD-9-CM: 714.0  10/24/2017 - Present        PMR (polymyalgia rheumatica) ([facility] 75.) ICD-10-CM: M35.3  ICD-9-CM: 323  10/4/2017 - Present        Lower extremity edema ICD-10-CM: R60.0  ICD-9-CM: 782.3  10/4/2017 - Present        Advance directive on file ICD-10-CM: Z78.9  ICD-9-CM: V49.89  5/10/2017 - Present        Paroxysmal atrial fibrillation ([facility] 75.) ICD-10-CM: I48.0  ICD-9-CM: 427.31  3/23/2016 - Present        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 ([facility] 75.) ICD-10-CM: I25.111  ICD-9-CM: 414.01, 413.9  10/20/2015 - Present        CHF, stage C ([facility] 75.) ICD-10-CM: I50.9  ICD-9-CM: 428.0  10/20/2015 - Present        Lumbar spinal stenosis ICD-10-CM: M48.061  ICD-9-CM: 724.02  10/20/2015 - Present        Osteoarthritis ICD-10-CM: M19.90  ICD-9-CM: 715.90  5/22/2015 - Present        RESOLVED: STEMI (ST elevation myocardial infarction) ([facility] 75.) ICD-10-CM: I21.3  ICD-9-CM: 410.90  10/12/2018 - 10/14/2018        RESOLVED: Debility ICD-10-CM: R53.81  ICD-9-CM: 799.3  Unknown - 10/9/2018        RESOLVED: Decreased oral intake ICD-10-CM: R63.8  ICD-9-CM: 783.9  Unknown - 10/9/2018        RESOLVED: Allodynia ICD-10-CM: R20.8  ICD-9-CM: 782.0  Unknown - 10/9/2018        RESOLVED: Dissociative episodes ICD-10-CM: F44.9  ICD-9-CM: 300.15  Unknown - 10/9/2018        RESOLVED: Poor fluid intake ICD-10-CM: R63.8  ICD-9-CM: 783.9  Unknown - 10/9/2018        RESOLVED: Neck pain ICD-10-CM: M54.2  ICD-9-CM: 723.1  Unknown - 10/9/2018        RESOLVED: Depression ICD-10-CM: F32.9  ICD-9-CM: 875  Unknown - 10/9/2018        RESOLVED: Dehydration ICD-10-CM: E86.0  ICD-9-CM: 276.51  10/3/2018 - 10/9/2018        RESOLVED: Metabolic acidosis QYI-73-XS: E87.2  ICD-9-CM: 276.2  10/3/2018 - 10/9/2018        RESOLVED: Severe pain ICD-10-CM: R52  ICD-9-CM: 780.96  10/3/2018 - 10/9/2018        RESOLVED: Acute metabolic encephalopathy QON-89-EV: G93.41  ICD-9-CM: 348.31  10/3/2018 - 10/9/2018        RESOLVED: Long term (current) use of systemic steroids ICD-10-CM: Z79.52  ICD-9-CM: V58.65  10/4/2017 - 11/20/2017

## 2019-10-15 NOTE — PROGRESS NOTES
No transition of care outreach indicated due to patient being treated by [address] [place] [address] or Home Based Palliative team.

## 2019-10-15 NOTE — DISCHARGE SUMMARY
Discharge Summary     Patient:  Rosalinda Hayes       MRN: [id number]       YOB: 1925       Age: 80 y.o. Date of admission:  10/10/2019    Date of discharge:  10/15/2019    Primary care provider: Dr. Lady Regalado, NP    Admitting provider:  Terell Savage MD    Discharging provider:  Celine Gomes MD - [phone]  If unavailable, call [phone] and ask the  to page the triage hospitalist.    Consultations  · ED CONSULT TO [facility] 95.  · ED CONSULT TO [address] [place] [address] Rd  · IP CONSULT TO HOSPITALIST    Procedures  · * No surgery found *    Discharge destination: SNF. The patient is stable for discharge. Admission diagnosis  · Bilateral leg pain [M79.604, M79.605]    Current Discharge Medication List      START taking these medications    Details   furosemide (LASIX) 20 mg tablet Take 1 Tab by mouth daily. Qty: 30 Tab, Refills: 0         CONTINUE these medications which have CHANGED    Details   traMADol (ULTRAM) 50 mg tablet Take 1 Tab by mouth two (2) times daily as needed for Pain for up to 7 days. Max Daily Amount: 100 mg. Qty: 14 Tab, Refills: 1    Associated Diagnoses: Chronic low back pain with sciatica, sciatica laterality unspecified, unspecified back pain laterality      gabapentin (NEURONTIN) 300 mg capsule Take 1 Cap by mouth three (3) times daily. Max Daily Amount: 900 mg. Qty: 90 Cap, Refills: 0    Associated Diagnoses: Bilateral leg pain      fentaNYL (DURAGESIC) 25 mcg/hr PATCH 1 Patch by TransDERmal route every seventy-two (72) hours for 12 days. Max Daily Amount: 1 Patch. Qty: 4 Patch, Refills: 0    Associated Diagnoses: Primary osteoarthritis of both knees         CONTINUE these medications which have NOT CHANGED    Details   mirtazapine (REMERON) 15 mg tablet Take 1 Tab by mouth nightly.  Indications: major depressive disorder  Qty: 30 Tab, Refills: 0    Associated Diagnoses: Severe episode of recurrent major depressive disorder, without psychotic features (HCC)      topiramate (TOPAMAX) 25 mg tablet Take 1 Tab by mouth daily (with breakfast). Qty: 30 Tab, Refills: 1      metoprolol tartrate (LOPRESSOR) 50 mg tablet Take 1.5 Tabs by mouth two (2) times a day. Qty: 60 Tab, Refills: 6      atorvastatin (LIPITOR) 40 mg tablet Take 40 mg by mouth daily. ondansetron (ZOFRAN ODT) 8 mg disintegrating tablet Take 1 Tab by mouth Before breakfast, lunch, and dinner. Qty: 270 Tab, Refills: 3    Associated Diagnoses: Non-intractable vomiting with nausea, unspecified vomiting type      predniSONE (DELTASONE) 10 mg tablet Take 10 mg by mouth daily. Qty: 90 Tab, Refills: 3      aspirin 81 mg chewable tablet Take 1 Tab by mouth daily. Qty: 30 Tab, Refills: 0      nitroglycerin (NITROSTAT) 0.4 mg SL tablet 1 Tab by SubLINGual route every five (5) minutes as needed for Chest Pain. Qty: 50 Tab, Refills: 3      esomeprazole (NEXIUM) 40 mg capsule Take 40 mg by mouth Daily (before breakfast). acetaminophen (TYLENOL) 325 mg tablet Take 2 Tabs by mouth every four (4) hours as needed for Pain or Fever (headache). Qty: 20 Tab, Refills: 0         STOP taking these medications       senna-docusate (SENNA PLUS) 8.6-50 mg per tablet Comments:   Reason for Stopping: Follow-up Information     Follow up With Specialties Details Why Contact Info    [address] [place] [address] [address] [place] [address]      Johanny Dugan NP Nurse Practitioner In 2 weeks Discharge follow up  [address] [facility] [id number] [phone]            Final discharge diagnoses and brief hospital course  Please also refer to the admission H&P for details on the presenting problem.     80 y.o. female with past medical history significant for CAD, CHF, a-fib, polymyalgia, GERD, gluten intolerance, and arthritis who presents from home via EMS with chief complaint of bilateral leg pain. Pt was recently discharged from [facility] is currently living with her son.        Acute on chronic bilateral lower leg pain:   -presented with pain   -L>R  -pain with minimal touch to skin  -Limited mobility, per recent SNF, not ready for d/c but insurance declined further treatment  -Restarted home fentanyl patch with better pain control, Tramadol PRN  - decreased Gabapentin to 300mg TID   -dopplers negative  -PT consult  -recommend discharge to SNF     Parosysmal afib: not a candidate for [facility]. Continue asa, metoprolol      Thrombocytopenia: stable    FOLLOW-UP CARE RECOMMENDATIONS:      FOLLOW-UP TESTS RECOMMENDED:   · NONE    ONGOING TREATMENT PLAN: NONE    PENDING TEST RESULTS:  At the time of discharge the following test results are still pending: NONE. Please review these results as they become available. Specific symptoms to watch for: chest pain, shortness of breath, fever, chills, nausea, vomiting, diarrhea, change in mentation, falling, weakness, bleeding.     DIET:  Regular Diet    ACTIVITY:  Activity as tolerated    WOUND CARE: NONE    EQUIPMENT needed:  NONE    INCIDENTAL FINDINGS:  NONE    GOALS OF CARE:  X  Eventual return to home/independent/assisted living     Long term SNF      Hospice     No rehospitalization     Patient condition at discharge:   Functional status  X  Poor      Deconditioned      Independent   Cognition  X  Lucid     Forgetful (some sensescence)     Dementia   Catheters/lines (plus indication)    [place]erson     PICC      PEG         Code status    Full code    X  DNR        Physical examination at discharge  Visit Vitals  /60 (BP 1 Location: Left arm, BP Patient Position: At rest)   Pulse 71   Temp 97.7 °F (36.5 °C)   Resp 16   Ht 5' (1.524 m)   Wt 63.8 kg (140 lb 11.2 oz)   SpO2 94%   BMI 27.48 kg/m²     Alert, awake   PERRLA  Lung Clear  CVS: RRR  ABd: soft NT ND   Ext: trace edema Pertinent imaging studies:        Recent Labs     10/15/19  0530   WBC 4.2   HGB 10.9*   HCT 36.9   *     No results for input(s): NA, K, CL, CO2, BUN, CREA, GLU, CA, MG, PHOS, URICA in the last 72 hours. No results for input(s): SGOT, GPT, AP, TBIL, TP, ALB, GLOB, GGT, AML, LPSE in the last 72 hours. No lab exists for component: AMYP, HLPSE  No results for input(s): INR, PTP, APTT, INREXT in the last 72 hours. No results for input(s): FE, TIBC, PSAT, FERR in the last 72 hours. No results for input(s): PH, PCO2, PO2 in the last 72 hours. No results for input(s): CPK, CKMB in the last 72 hours.     No lab exists for component: TROPONINI  No components found for: [place]    Chronic Diagnoses:    Problem List as of 10/15/2019 Date Reviewed: 10/16/2018          Codes Class Noted - Resolved    Bilateral leg pain ICD-10-CM: M79.604, M79.605  ICD-9-CM: 729.5  10/10/2019 - Present        Sepsis ([facility] 75.) ICD-10-CM: A41.9  ICD-9-CM: 038.9, 995.91  8/14/2019 - Present        Acute metabolic encephalopathy WZI-19-DO: G93.41  ICD-9-CM: 348.31  12/2/2018 - Present        Acute respiratory distress ICD-10-CM: R06.03  ICD-9-CM: 518.82  Unknown - Present        Chronic pain syndrome ICD-10-CM: G89.4  ICD-9-CM: 338.4  Unknown - Present        Dementia without behavioral disturbance (HCC) ICD-10-CM: F03.90  ICD-9-CM: 294.20  Unknown - Present        Goals of care, counseling/discussion ICD-10-CM: Z71.89  ICD-9-CM: V65.49  Unknown - Present        Vascular dementia without behavioral disturbance (HCC) ICD-10-CM: F01.50  ICD-9-CM: 290.40  Unknown - Present        Polyneuropathy associated with underlying disease ([address] ICD-10-CM: G63  ICD-9-CM: 357.4  8/1/2018 - Present        Dependence on continuous supplemental oxygen ICD-10-CM: Z99.81  ICD-9-CM: V46.2  7/18/2018 - Present        Anemia associated with acute blood loss ICD-10-CM: D62  ICD-9-CM: 285.1  6/19/2018 - Present        Long-term use of immunosuppressant medication ICD-10-CM: Z79.899  ICD-9-CM: V58.69  11/20/2017 - Present        CKD (chronic kidney disease) stage 2, GFR 60-89 ml/min ICD-10-CM: N18.2  ICD-9-CM: 585.2  10/24/2017 - Present        Long term current use of systemic steroids ICD-10-CM: Z79.52  ICD-9-CM: V58.65  10/24/2017 - Present        Seronegative rheumatoid arthritis of multiple sites [facility]) ICD-10-CM: M06.09  ICD-9-CM: 714.0  10/24/2017 - Present        PMR (polymyalgia rheumatica) ([facility].) ICD-10-CM: M35.3  ICD-9-CM: 286  10/4/2017 - Present        Lower extremity edema ICD-10-CM: R60.0  ICD-9-CM: 782.3  10/4/2017 - Present        Advance directive on file ICD-10-CM: Z78.9  ICD-9-CM: V49.89  5/10/2017 - Present        Paroxysmal atrial fibrillation ([facility] ICD-10-CM: I48.0  ICD-9-CM: 427.31  3/23/2016 - Present        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 ([facility] ICD-10-CM: I25.111  ICD-9-CM: 414.01, 413.9  10/20/2015 - Present        CHF, stage C ([facility] ICD-10-CM: I50.9  ICD-9-CM: 428.0  10/20/2015 - Present        Lumbar spinal stenosis ICD-10-CM: M48.061  ICD-9-CM: 724.02  10/20/2015 - Present        Osteoarthritis ICD-10-CM: M19.90  ICD-9-CM: 715.90  5/22/2015 - Present        RESOLVED: STEMI (ST elevation myocardial infarction) ([facility] ICD-10-CM: I21.3  ICD-9-CM: 410.90  10/12/2018 - 10/14/2018        RESOLVED: Debility ICD-10-CM: R53.81  ICD-9-CM: 799.3  Unknown - 10/9/2018        RESOLVED: Decreased oral intake ICD-10-CM: R63.8  ICD-9-CM: 783.9  Unknown - 10/9/2018        RESOLVED: Allodynia ICD-10-CM: R20.8  ICD-9-CM: 782.0  Unknown - 10/9/2018        RESOLVED: Dissociative episodes ICD-10-CM: F44.9  ICD-9-CM: 300.15  Unknown - 10/9/2018        RESOLVED: Poor fluid intake ICD-10-CM: R63.8  ICD-9-CM: 783.9  Unknown - 10/9/2018        RESOLVED: Neck pain ICD-10-CM: M54.2  ICD-9-CM: 723.1  Unknown - 10/9/2018        RESOLVED: Depression ICD-10-CM: F32.9  ICD-9-CM: 136  Unknown - 10/9/2018 RESOLVED: Dehydration ICD-10-CM: E86.0  ICD-9-CM: 276.51  10/3/2018 - 10/9/2018        RESOLVED: Metabolic acidosis M-56-SG: E87.2  ICD-9-CM: 276.2  10/3/2018 - 10/9/2018        RESOLVED: Severe pain ICD-10-CM: R52  ICD-9-CM: 780.96  10/3/2018 - 10/9/2018        RESOLVED: Acute metabolic encephalopathy [facility]-54-RI: G93.41  ICD-9-CM: 348.31  10/3/2018 - 10/9/2018        RESOLVED: Long term (current) use of systemic steroids ICD-10-CM: M61.22  ICD-9-CM: V58.65  10/4/2017 - 11/20/2017              Time spent on discharge related activities today greater than 30 minutes.       Signed:  Aldair Fisher MD                 Hospitalist, Internal Medicine      Cc: Suhail Mancilla NP

## 2020-02-10 NOTE — TELEPHONE ENCOUNTER
Pt's son, Juan Barrios requesting Rx refill for Fentanyl. Pt will  in office when ready.  Pt best contact [phone].              From answering service Alert and oriented, no focal deficits, no motor or sensory deficits.

## 2022-03-22 NOTE — H&P
H&P dictated: [id number] A/P Metabolic encephalopathy 2/2 pain and dehydration AGMA due to rehydration from  absent oral intake Sinus tachy Acute on chronic pain which seems ot have triggered all the above. DNR Lives with son Nasim Vogt MD 
 
 
 X Size Of Lesion In Cm (Optional): 0 Morphology Per Location (Optional): AMP Excised 10/04/2016 [id number] Detail Level: Detailed Morphology Per Location (Optional): H/O Atypical Spindle Proliferation Excised 11/01/2011, [id number] Morphology Per Location (Optional): [id number] A. AMP excised 07/14/2021 by ERIN